# Patient Record
Sex: FEMALE | Race: WHITE | Employment: OTHER | URBAN - METROPOLITAN AREA
[De-identification: names, ages, dates, MRNs, and addresses within clinical notes are randomized per-mention and may not be internally consistent; named-entity substitution may affect disease eponyms.]

---

## 2018-03-27 ENCOUNTER — HOSPITAL ENCOUNTER (INPATIENT)
Age: 83
LOS: 14 days | Discharge: NURSING, REHAB OR LONG-TERM CARE | DRG: 871 | End: 2018-04-12
Attending: EMERGENCY MEDICINE | Admitting: GENERAL ACUTE CARE HOSPITAL
Payer: MEDICARE

## 2018-03-27 ENCOUNTER — APPOINTMENT (OUTPATIENT)
Dept: GENERAL RADIOLOGY | Age: 83
DRG: 871 | End: 2018-03-27
Attending: EMERGENCY MEDICINE
Payer: MEDICARE

## 2018-03-27 ENCOUNTER — APPOINTMENT (OUTPATIENT)
Dept: CT IMAGING | Age: 83
DRG: 871 | End: 2018-03-27
Attending: EMERGENCY MEDICINE
Payer: MEDICARE

## 2018-03-27 DIAGNOSIS — J44.1 COPD EXACERBATION (HCC): Chronic | ICD-10-CM

## 2018-03-27 DIAGNOSIS — N17.9 ACUTE RENAL FAILURE, UNSPECIFIED ACUTE RENAL FAILURE TYPE (HCC): ICD-10-CM

## 2018-03-27 DIAGNOSIS — I48.0 PAF (PAROXYSMAL ATRIAL FIBRILLATION) (HCC): ICD-10-CM

## 2018-03-27 DIAGNOSIS — I10 ESSENTIAL HYPERTENSION: ICD-10-CM

## 2018-03-27 DIAGNOSIS — I73.9 PERIPHERAL VASCULAR DISEASE (HCC): ICD-10-CM

## 2018-03-27 DIAGNOSIS — J96.01 ACUTE HYPOXEMIC RESPIRATORY FAILURE (HCC): ICD-10-CM

## 2018-03-27 DIAGNOSIS — C34.92: ICD-10-CM

## 2018-03-27 DIAGNOSIS — D72.829 LEUKOCYTOSIS, UNSPECIFIED TYPE: ICD-10-CM

## 2018-03-27 DIAGNOSIS — A41.9 SEPSIS, DUE TO UNSPECIFIED ORGANISM: ICD-10-CM

## 2018-03-27 DIAGNOSIS — R11.2 NAUSEA AND VOMITING, INTRACTABILITY OF VOMITING NOT SPECIFIED, UNSPECIFIED VOMITING TYPE: ICD-10-CM

## 2018-03-27 DIAGNOSIS — J96.01 ACUTE RESPIRATORY FAILURE WITH HYPOXIA (HCC): Primary | ICD-10-CM

## 2018-03-27 DIAGNOSIS — J18.9 PNEUMONIA OF BOTH LUNGS DUE TO INFECTIOUS ORGANISM, UNSPECIFIED PART OF LUNG: ICD-10-CM

## 2018-03-27 PROBLEM — J96.91 RESPIRATORY FAILURE WITH HYPOXIA (HCC): Status: ACTIVE | Noted: 2018-03-27

## 2018-03-27 LAB
ALBUMIN SERPL-MCNC: 3.8 G/DL (ref 3.5–5)
ALBUMIN/GLOB SERPL: 1 {RATIO} (ref 1.1–2.2)
ALP SERPL-CCNC: 88 U/L (ref 45–117)
ALT SERPL-CCNC: 14 U/L (ref 12–78)
ANION GAP SERPL CALC-SCNC: 8 MMOL/L (ref 5–15)
APPEARANCE UR: ABNORMAL
AST SERPL-CCNC: 24 U/L (ref 15–37)
BACTERIA URNS QL MICRO: ABNORMAL /HPF
BASOPHILS # BLD: 0 K/UL (ref 0–0.1)
BASOPHILS NFR BLD: 0 % (ref 0–1)
BILIRUB SERPL-MCNC: 0.3 MG/DL (ref 0.2–1)
BILIRUB UR QL CFM: NEGATIVE
BUN SERPL-MCNC: 52 MG/DL (ref 6–20)
BUN/CREAT SERPL: 21 (ref 12–20)
CALCIUM SERPL-MCNC: 9.8 MG/DL (ref 8.5–10.1)
CHLORIDE SERPL-SCNC: 107 MMOL/L (ref 97–108)
CO2 SERPL-SCNC: 23 MMOL/L (ref 21–32)
COLOR UR: ABNORMAL
CREAT SERPL-MCNC: 2.53 MG/DL (ref 0.55–1.02)
DIFFERENTIAL METHOD BLD: ABNORMAL
EOSINOPHIL # BLD: 0 K/UL (ref 0–0.4)
EOSINOPHIL NFR BLD: 0 % (ref 0–7)
EPITH CASTS URNS QL MICRO: ABNORMAL /LPF
ERYTHROCYTE [DISTWIDTH] IN BLOOD BY AUTOMATED COUNT: 14.4 % (ref 11.5–14.5)
GLOBULIN SER CALC-MCNC: 3.7 G/DL (ref 2–4)
GLUCOSE SERPL-MCNC: 115 MG/DL (ref 65–100)
GLUCOSE UR STRIP.AUTO-MCNC: NEGATIVE MG/DL
HCT VFR BLD AUTO: 32.9 % (ref 35–47)
HGB BLD-MCNC: 10.4 G/DL (ref 11.5–16)
HGB UR QL STRIP: ABNORMAL
IMM GRANULOCYTES # BLD: 0 K/UL (ref 0–0.04)
IMM GRANULOCYTES NFR BLD AUTO: 0 % (ref 0–0.5)
INR PPP: 1.1 (ref 0.9–1.1)
KETONES UR QL STRIP.AUTO: NEGATIVE MG/DL
LACTATE SERPL-SCNC: 1.9 MMOL/L (ref 0.4–2)
LEUKOCYTE ESTERASE UR QL STRIP.AUTO: ABNORMAL
LYMPHOCYTES # BLD: 0.1 K/UL (ref 0.8–3.5)
LYMPHOCYTES NFR BLD: 1 % (ref 12–49)
MCH RBC QN AUTO: 29.5 PG (ref 26–34)
MCHC RBC AUTO-ENTMCNC: 31.6 G/DL (ref 30–36.5)
MCV RBC AUTO: 93.5 FL (ref 80–99)
MONOCYTES # BLD: 0.6 K/UL (ref 0–1)
MONOCYTES NFR BLD: 5 % (ref 5–13)
NEUTS SEG # BLD: 11.9 K/UL (ref 1.8–8)
NEUTS SEG NFR BLD: 94 % (ref 32–75)
NITRITE UR QL STRIP.AUTO: NEGATIVE
NRBC # BLD: 0 K/UL (ref 0–0.01)
NRBC BLD-RTO: 0 PER 100 WBC
PH UR STRIP: 5 [PH] (ref 5–8)
PLATELET # BLD AUTO: 221 K/UL (ref 150–400)
PMV BLD AUTO: 10.5 FL (ref 8.9–12.9)
POTASSIUM SERPL-SCNC: 4.5 MMOL/L (ref 3.5–5.1)
PROT SERPL-MCNC: 7.5 G/DL (ref 6.4–8.2)
PROT UR STRIP-MCNC: ABNORMAL MG/DL
PROTHROMBIN TIME: 10.7 SEC (ref 9–11.1)
RBC # BLD AUTO: 3.52 M/UL (ref 3.8–5.2)
RBC #/AREA URNS HPF: ABNORMAL /HPF (ref 0–5)
RBC MORPH BLD: ABNORMAL
SODIUM SERPL-SCNC: 138 MMOL/L (ref 136–145)
SP GR UR REFRACTOMETRY: 1.02 (ref 1–1.03)
TROPONIN I SERPL-MCNC: <0.04 NG/ML
UROBILINOGEN UR QL STRIP.AUTO: 0.2 EU/DL (ref 0.2–1)
WBC # BLD AUTO: 12.6 K/UL (ref 3.6–11)
WBC URNS QL MICRO: ABNORMAL /HPF (ref 0–4)

## 2018-03-27 PROCEDURE — 94761 N-INVAS EAR/PLS OXIMETRY MLT: CPT

## 2018-03-27 PROCEDURE — 74176 CT ABD & PELVIS W/O CONTRAST: CPT

## 2018-03-27 PROCEDURE — 87040 BLOOD CULTURE FOR BACTERIA: CPT | Performed by: EMERGENCY MEDICINE

## 2018-03-27 PROCEDURE — 71045 X-RAY EXAM CHEST 1 VIEW: CPT

## 2018-03-27 PROCEDURE — 96361 HYDRATE IV INFUSION ADD-ON: CPT

## 2018-03-27 PROCEDURE — 84484 ASSAY OF TROPONIN QUANT: CPT | Performed by: EMERGENCY MEDICINE

## 2018-03-27 PROCEDURE — 85025 COMPLETE CBC W/AUTO DIFF WBC: CPT | Performed by: EMERGENCY MEDICINE

## 2018-03-27 PROCEDURE — 77030029684 HC NEB SM VOL KT MONA -A

## 2018-03-27 PROCEDURE — 85610 PROTHROMBIN TIME: CPT | Performed by: EMERGENCY MEDICINE

## 2018-03-27 PROCEDURE — 83605 ASSAY OF LACTIC ACID: CPT | Performed by: EMERGENCY MEDICINE

## 2018-03-27 PROCEDURE — 96374 THER/PROPH/DIAG INJ IV PUSH: CPT

## 2018-03-27 PROCEDURE — 74011000250 HC RX REV CODE- 250: Performed by: EMERGENCY MEDICINE

## 2018-03-27 PROCEDURE — 74011250636 HC RX REV CODE- 250/636: Performed by: GENERAL ACUTE CARE HOSPITAL

## 2018-03-27 PROCEDURE — 74011250636 HC RX REV CODE- 250/636: Performed by: EMERGENCY MEDICINE

## 2018-03-27 PROCEDURE — 94640 AIRWAY INHALATION TREATMENT: CPT

## 2018-03-27 PROCEDURE — 81001 URINALYSIS AUTO W/SCOPE: CPT | Performed by: EMERGENCY MEDICINE

## 2018-03-27 PROCEDURE — 99284 EMERGENCY DEPT VISIT MOD MDM: CPT

## 2018-03-27 PROCEDURE — 74011000258 HC RX REV CODE- 258: Performed by: EMERGENCY MEDICINE

## 2018-03-27 PROCEDURE — 93005 ELECTROCARDIOGRAM TRACING: CPT

## 2018-03-27 PROCEDURE — 36415 COLL VENOUS BLD VENIPUNCTURE: CPT | Performed by: EMERGENCY MEDICINE

## 2018-03-27 PROCEDURE — 80053 COMPREHEN METABOLIC PANEL: CPT | Performed by: EMERGENCY MEDICINE

## 2018-03-27 RX ORDER — ACETAMINOPHEN 325 MG/1
650 TABLET ORAL
Status: COMPLETED | OUTPATIENT
Start: 2018-03-27 | End: 2018-03-29

## 2018-03-27 RX ORDER — LEVOFLOXACIN 5 MG/ML
750 INJECTION, SOLUTION INTRAVENOUS
Status: COMPLETED | OUTPATIENT
Start: 2018-03-27 | End: 2018-03-27

## 2018-03-27 RX ORDER — IPRATROPIUM BROMIDE AND ALBUTEROL SULFATE 2.5; .5 MG/3ML; MG/3ML
3 SOLUTION RESPIRATORY (INHALATION)
Status: COMPLETED | OUTPATIENT
Start: 2018-03-27 | End: 2018-03-27

## 2018-03-27 RX ORDER — SODIUM CHLORIDE 9 MG/ML
75 INJECTION, SOLUTION INTRAVENOUS CONTINUOUS
Status: DISCONTINUED | OUTPATIENT
Start: 2018-03-27 | End: 2018-03-31

## 2018-03-27 RX ORDER — SODIUM CHLORIDE 9 MG/ML
500 INJECTION, SOLUTION INTRAVENOUS ONCE
Status: COMPLETED | OUTPATIENT
Start: 2018-03-27 | End: 2018-03-27

## 2018-03-27 RX ORDER — VANCOMYCIN HYDROCHLORIDE
1250 ONCE
Status: COMPLETED | OUTPATIENT
Start: 2018-03-27 | End: 2018-03-29

## 2018-03-27 RX ORDER — SODIUM CHLORIDE 9 MG/ML
1000 INJECTION, SOLUTION INTRAVENOUS ONCE
Status: COMPLETED | OUTPATIENT
Start: 2018-03-27 | End: 2018-03-27

## 2018-03-27 RX ORDER — VANCOMYCIN/0.9 % SOD CHLORIDE 1.5G/250ML
1500 PLASTIC BAG, INJECTION (ML) INTRAVENOUS
Status: DISCONTINUED | OUTPATIENT
Start: 2018-03-27 | End: 2018-03-27 | Stop reason: SDUPTHER

## 2018-03-27 RX ORDER — SODIUM CHLORIDE 0.9 % (FLUSH) 0.9 %
5-10 SYRINGE (ML) INJECTION AS NEEDED
Status: DISCONTINUED | OUTPATIENT
Start: 2018-03-27 | End: 2018-03-28

## 2018-03-27 RX ADMIN — SODIUM CHLORIDE 500 ML: 900 INJECTION, SOLUTION INTRAVENOUS at 21:16

## 2018-03-27 RX ADMIN — SODIUM CHLORIDE 75 ML/HR: 900 INJECTION, SOLUTION INTRAVENOUS at 22:50

## 2018-03-27 RX ADMIN — IPRATROPIUM BROMIDE AND ALBUTEROL SULFATE 3 ML: .5; 3 SOLUTION RESPIRATORY (INHALATION) at 19:24

## 2018-03-27 RX ADMIN — SODIUM CHLORIDE 1000 ML: 900 INJECTION, SOLUTION INTRAVENOUS at 18:31

## 2018-03-27 RX ADMIN — PIPERACILLIN SODIUM,TAZOBACTAM SODIUM 3.38 G: 3; .375 INJECTION, POWDER, FOR SOLUTION INTRAVENOUS at 23:27

## 2018-03-27 RX ADMIN — LEVOFLOXACIN 750 MG: 5 INJECTION, SOLUTION INTRAVENOUS at 20:47

## 2018-03-27 NOTE — IP AVS SNAPSHOT
[address] [facility] 
[phone] Patient: Inge Garcia MRN: [id number] UNP:[birth year] A check ken indicates which time of day the medication should be taken. My Medications START taking these medications Instructions Each Dose to Equal  
 Morning Noon Evening Bedtime  
 dilTIAZem  mg ER capsule Commonly known as:  CARDIZEM CD Your last dose was: Your next dose is: Take 1 Cap by mouth daily. 180 mg  
    
   
   
   
  
 L. acidoph & paracasei- S therm- Bifido 8 billion cell Cap cap Commonly known as:  HANG-Q/RISAQUAD Your last dose was: Your next dose is: Take 1 Cap by mouth daily. 1 Cap  
    
   
   
   
  
 metoprolol tartrate 50 mg tablet Commonly known as:  LOPRESSOR Your last dose was: Your next dose is: Take 1 Tab by mouth two (2) times a day. 50 mg  
    
   
   
   
  
 nystatin 100,000 unit/mL suspension Commonly known as:  MYCOSTATIN Your last dose was: Your next dose is: Take 5 mL by mouth four (4) times daily. swish and spit [id number] Units  
    
   
   
   
  
 predniSONE 10 mg tablet Commonly known as:  Rubia Cervantes Your last dose was: Your next dose is: Take 1 Tab by mouth daily (with breakfast). 10 mg CHANGE how you take these medications Instructions Each Dose to Equal  
 Morning Noon Evening Bedtime  
 escitalopram oxalate 20 mg tablet Commonly known as:  Luisa Uribe What changed:   
- medication strength 
- how much to take Your last dose was: Your next dose is: Take 1 Tab by mouth daily. 20 mg CONTINUE taking these medications Instructions Each Dose to Equal  
 Morning Noon Evening Bedtime  
 acetaminophen 650 mg Tber Commonly known as:  TYLENOL Your last dose was: Your next dose is: Take 650 mg by mouth every six (6) hours as needed for Pain. 650 mg  
    
   
   
   
  
 aspirin 81 mg tablet Your last dose was: Your next dose is: Take 81 mg by mouth every evening. Stopped for surgery 8-4-10  
 81 mg  
    
   
   
   
  
 hydrALAZINE 100 mg tablet Commonly known as:  APRESOLINE Your last dose was: Your next dose is: Take 1 Tab by mouth three (3) times daily. 100 mg  
    
   
   
   
  
 hydroCHLOROthiazide 25 mg tablet Commonly known as:  HYDRODIURIL Your last dose was: Your next dose is: Take 25 mg by mouth every evening. 25 mg  
    
   
   
   
  
 pantoprazole 40 mg tablet Commonly known as:  PROTONIX Your last dose was: Your next dose is: Take 1 Tab by mouth two (2) times a day. Indications: GASTRIC ULCER  
 40 mg  
    
   
   
   
  
 PLAVIX 75 mg Tab Generic drug:  clopidogrel Your last dose was: Your next dose is: Take 75 mg by mouth daily. Stopped for surgery-8-4-10  
 75 mg  
    
   
   
   
  
 simvastatin 80 mg tablet Commonly known as:  ZOCOR Your last dose was: Your next dose is: Take 80 mg by mouth every evening. 80 mg  
    
   
   
   
  
  
STOP taking these medications   
 cloNIDine HCl 0.3 mg tablet Commonly known as:  CATAPRES  
   
  
 EXFORGE 5-320 mg per tablet Generic drug:  amLODIPine-valsartan  
   
  
 gabapentin 600 mg tablet Commonly known as:  NEURONTIN Where to Get Your Medications These medications were sent to [address] [facility], [address] [place] [address] AT [address], [address] [facility] Hours:  24-hours Phone:  [phone]  
  dilTIAZem  mg ER capsule escitalopram oxalate 20 mg tablet L. acidoph & paracasei- S therm- Bifido 8 billion cell Cap cap  
 metoprolol tartrate 50 mg tablet  
 nystatin 100,000 unit/mL suspension  
 predniSONE 10 mg tablet

## 2018-03-27 NOTE — ED PROVIDER NOTES
EMERGENCY DEPARTMENT HISTORY AND PHYSICAL EXAM      Date: 3/27/2018  Patient Name: Sravan Mason    History of Presenting Illness     Chief Complaint   Patient presents with    Vomiting     Patient with uncontrolled vomiting since yesterday       History Provided By: Patient    HPI: Sravan Mason, 80 y.o. female with PMHx significant for COPD and lung CA, presents via EMS from nursing facility to the ED with cc of vomiting after PO intake since yesterday with associated chills and low garde fever of 100.8 rectally. Pt denies any current pain. Per EMS, pt also complains of some substernal chest pain associated with mild cough. Pt denies being on nasal cannula at baseline however she is sating in the 70s on room air while in the ED. Pt denies any other new symptoms at this time. PCP: Ethan Tamez MD    There are no other complaints, changes, or physical findings at this time. Current Facility-Administered Medications   Medication Dose Route Frequency Provider Last Rate Last Dose    sodium chloride (NS) flush 5-10 mL  5-10 mL IntraVENous PRN Melyssa Henderson DO        piperacillin-tazobactam (ZOSYN) 3.375 g in 0.9% sodium chloride (MBP/ADV) 100 mL ADV  3.375 g IntraVENous NOW Melyssa Henderson DO   3.375 g at 03/27/18 2327    0.9% sodium chloride infusion  75 mL/hr IntraVENous CONTINUOUS Lauren Lovell MD 75 mL/hr at 03/27/18 2250 75 mL/hr at 03/27/18 2250    vancomycin (VANCOCIN) 1250 mg in  ml infusion  1,250 mg IntraVENous ONCE Melyssa Henderson DO        acetaminophen (TYLENOL) tablet 650 mg  650 mg Oral Q6H PRN Carla Gillespie MD   650 mg at 03/28/18 0013     Current Outpatient Prescriptions   Medication Sig Dispense Refill    acetaminophen (TYLENOL) 650 mg CR tablet Take 650 mg by mouth every six (6) hours as needed for Pain.  hydrALAZINE (APRESOLINE) 100 mg tablet Take 1 Tab by mouth three (3) times daily.  90 Tab prn    escitalopram oxalate (LEXAPRO) 10 mg tablet Take 1 Tab by mouth daily. 30 Tab prn    pantoprazole (PROTONIX) 40 mg tablet Take 1 Tab by mouth two (2) times a day. Indications: GASTRIC ULCER 60 Tab 0    cloNIDine HCl (CATAPRES) 0.3 mg tablet Take 0.3 mg by mouth two (2) times a day.  clopidogrel (PLAVIX) 75 mg tablet Take 75 mg by mouth daily. Stopped for surgery-8-4-10       aspirin 81 mg tablet Take 81 mg by mouth every evening. Stopped for surgery 8-4-10       amlodipine-valsartan (EXFORGE) 5-320 mg per tablet Take 1 Tab by mouth every evening.  simvastatin (ZOCOR) 80 mg tablet Take 80 mg by mouth every evening.  gabapentin (NEURONTIN) 600 mg tablet Take 600 mg by mouth three (3) times daily.  hydrochlorothiazide (HYDRODIURIL) 25 mg tablet Take 25 mg by mouth every evening. Past History     Past Medical History:  Past Medical History:   Diagnosis Date    Hypertension     Ill-defined condition     Ex Lap with retickr patch of a perforated pyloric channel ulcer 7/19/16    Other ill-defined conditions(799.89)     lipids    Other ill-defined conditions(799.89)     blood transfusion history       Past Surgical History:  Past Surgical History:   Procedure Laterality Date    BYPASS GRAFT OTHR,FEM-FEM      BYPASS GRAFT OTHR,FEM-POP      right    HX HEENT      bilateral cataracts    HX ORTHOPAEDIC      right hip fracture/pinning    HX UROLOGICAL      right stent/kidney       Family History:  History reviewed. No pertinent family history. Social History:  Social History   Substance Use Topics    Smoking status: Current Every Day Smoker     Packs/day: 0.25    Smokeless tobacco: None      Comment: stopped 2009    Alcohol use No       Allergies: Allergies   Allergen Reactions    Dilaudid [Hydromorphone] Unknown (comments)     Does not remember    Hydrocodone Nausea and Vomiting    Morphine Rash         Review of Systems   Review of Systems   Constitutional: Positive for chills and fever. HENT: Negative for congestion and sore throat. Eyes: Negative for visual disturbance. Respiratory: Negative for cough and shortness of breath. Cardiovascular: Negative for chest pain and leg swelling. Gastrointestinal: Positive for vomiting. Negative for abdominal pain, blood in stool, diarrhea and nausea. Endocrine: Negative for polyuria. Genitourinary: Negative for dysuria, flank pain, vaginal bleeding and vaginal discharge. Musculoskeletal: Negative for myalgias. Skin: Negative for rash. Allergic/Immunologic: Negative for immunocompromised state. Neurological: Negative for weakness and headaches. Psychiatric/Behavioral: Negative for confusion. Physical Exam   Physical Exam   Constitutional: She is oriented to person, place, and time. She appears well-developed and well-nourished. HENT:   Head: Normocephalic and atraumatic. Mouth/Throat: Mucous membranes are dry and cyanotic (perioral). Eyes: Conjunctivae are normal. Pupils are equal, round, and reactive to light. Right eye exhibits no discharge. Left eye exhibits no discharge. Neck: Normal range of motion. Neck supple. No tracheal deviation present. Cardiovascular: Normal rate, regular rhythm and normal heart sounds. No murmur heard. Nail beds dusky   Pulmonary/Chest: She is in respiratory distress. She has no wheezes. She has no rales. Decreased breath sounds BL bases, worse on the left. Patient is hypoxic with perioral cyanosis, but no increased work of breathing. Abdominal: Soft. Bowel sounds are normal. There is no tenderness. There is no rebound and no guarding. Musculoskeletal: Normal range of motion. She exhibits no edema, tenderness or deformity. Neurological: She is alert and oriented to person, place, and time. Skin: Skin is warm and dry. No rash noted. No erythema. Stage 2 decubitus   Psychiatric: Her behavior is normal.   Nursing note and vitals reviewed.         Diagnostic Study Results     Labs -     Recent Results (from the past 12 hour(s))   EKG, 12 LEAD, INITIAL    Collection Time: 03/27/18  5:47 PM   Result Value Ref Range    Ventricular Rate 71 BPM    Atrial Rate 71 BPM    P-R Interval 176 ms    QRS Duration 78 ms    Q-T Interval 378 ms    QTC Calculation (Bezet) 410 ms    Calculated P Axis 73 degrees    Calculated R Axis -20 degrees    Calculated T Axis 54 degrees    Diagnosis       Normal sinus rhythm  Cannot rule out Anterior infarct (cited on or before 27-MAR-2018)  Abnormal ECG  When compared with ECG of 29-AUG-2016 15:31,  Borderline criteria for Inferior infarct are no longer present  QT has shortened     LACTIC ACID    Collection Time: 03/27/18  6:12 PM   Result Value Ref Range    Lactic acid 1.9 0.4 - 2.0 MMOL/L   METABOLIC PANEL, COMPREHENSIVE    Collection Time: 03/27/18  6:12 PM   Result Value Ref Range    Sodium 138 136 - 145 mmol/L    Potassium 4.5 3.5 - 5.1 mmol/L    Chloride 107 97 - 108 mmol/L    CO2 23 21 - 32 mmol/L    Anion gap 8 5 - 15 mmol/L    Glucose 115 (H) 65 - 100 mg/dL    BUN 52 (H) 6 - 20 MG/DL    Creatinine 2.53 (H) 0.55 - 1.02 MG/DL    BUN/Creatinine ratio 21 (H) 12 - 20      GFR est AA 22 (L) >60 ml/min/1.73m2    GFR est non-AA 18 (L) >60 ml/min/1.73m2    Calcium 9.8 8.5 - 10.1 MG/DL    Bilirubin, total 0.3 0.2 - 1.0 MG/DL    ALT (SGPT) 14 12 - 78 U/L    AST (SGOT) 24 15 - 37 U/L    Alk.  phosphatase 88 45 - 117 U/L    Protein, total 7.5 6.4 - 8.2 g/dL    Albumin 3.8 3.5 - 5.0 g/dL    Globulin 3.7 2.0 - 4.0 g/dL    A-G Ratio 1.0 (L) 1.1 - 2.2     CBC WITH AUTOMATED DIFF    Collection Time: 03/27/18  6:12 PM   Result Value Ref Range    WBC 12.6 (H) 3.6 - 11.0 K/uL    RBC 3.52 (L) 3.80 - 5.20 M/uL    HGB 10.4 (L) 11.5 - 16.0 g/dL    HCT 32.9 (L) 35.0 - 47.0 %    MCV 93.5 80.0 - 99.0 FL    MCH 29.5 26.0 - 34.0 PG    MCHC 31.6 30.0 - 36.5 g/dL    RDW 14.4 11.5 - 14.5 %    PLATELET 145 044 - 386 K/uL    MPV 10.5 8.9 - 12.9 FL    NRBC 0.0 0  WBC    ABSOLUTE NRBC 0.00 0.00 - 0.01 K/uL    NEUTROPHILS 94 (H) 32 - 75 %    LYMPHOCYTES 1 (L) 12 - 49 %    MONOCYTES 5 5 - 13 %    EOSINOPHILS 0 0 - 7 %    BASOPHILS 0 0 - 1 %    IMMATURE GRANULOCYTES 0 0.0 - 0.5 %    ABS. NEUTROPHILS 11.9 (H) 1.8 - 8.0 K/UL    ABS. LYMPHOCYTES 0.1 (L) 0.8 - 3.5 K/UL    ABS. MONOCYTES 0.6 0.0 - 1.0 K/UL    ABS. EOSINOPHILS 0.0 0.0 - 0.4 K/UL    ABS. BASOPHILS 0.0 0.0 - 0.1 K/UL    ABS. IMM. GRANS. 0.0 0.00 - 0.04 K/UL    DF SMEAR SCANNED      RBC COMMENTS NORMOCYTIC, NORMOCHROMIC     PROTHROMBIN TIME + INR    Collection Time: 03/27/18  6:12 PM   Result Value Ref Range    INR 1.1 0.9 - 1.1      Prothrombin time 10.7 9.0 - 11.1 sec   TROPONIN I    Collection Time: 03/27/18  6:12 PM   Result Value Ref Range    Troponin-I, Qt. <0.04 <0.05 ng/mL   URINALYSIS W/ RFLX MICROSCOPIC    Collection Time: 03/27/18  9:55 PM   Result Value Ref Range    Color DARK YELLOW      Appearance TURBID (A) CLEAR      Specific gravity 1.023 1.003 - 1.030      pH (UA) 5.0 5.0 - 8.0      Protein TRACE (A) NEG mg/dL    Glucose NEGATIVE  NEG mg/dL    Ketone NEGATIVE  NEG mg/dL    Blood TRACE (A) NEG      Urobilinogen 0.2 0.2 - 1.0 EU/dL    Nitrites NEGATIVE  NEG      Leukocyte Esterase MODERATE (A) NEG      WBC 5-10 0 - 4 /hpf    RBC 5-10 0 - 5 /hpf    Epithelial cells MODERATE (A) FEW /lpf    Bacteria 1+ (A) NEG /hpf   BILIRUBIN, CONFIRM    Collection Time: 03/27/18  9:55 PM   Result Value Ref Range    Bilirubin UA, confirm NEGATIVE  NEG         Radiologic Studies -     CT Results  (Last 48 hours)               03/27/18 1902  CT ABD PELV WO CONT Final result    Impression:  IMPRESSION:   Right middle lobe and bilateral lower lobe infiltrates. Bilateral simple and   hemorrhagic renal cysts. Stable left adrenal adenoma. Right ureteral stent   satisfactory position. No acute abnormality. Narrative:  EXAM:  CT ABD PELV WO CONT       INDICATION: Abdominal pain       COMPARISON: PET CT 10/28/2016       CONTRAST:  None.        TECHNIQUE:    Thin axial images were obtained through the abdomen and pelvis. Coronal and   sagittal reconstructions were generated. Oral contrast was not administered. CT   dose reduction was achieved through use of a standardized protocol tailored for   this examination and automatic exposure control for dose modulation. The absence of intravenous contrast material reduces the sensitivity for   evaluation of the solid parenchymal organs of the abdomen. FINDINGS:    LUNG BASES: Right middle lobe and bilateral lower lobe infiltrates are noted. INCIDENTALLY IMAGED HEART AND MEDIASTINUM: Unremarkable. LIVER: No mass or biliary dilatation. GALLBLADDER: Unremarkable. SPLEEN: No mass. PANCREAS: No mass or ductal dilatation. ADRENALS: 14 mm left adrenal adenoma is noted. KIDNEYS/URETERS: Simple cysts are noted bilaterally. Slight, there are small   hyperdense lesions bilaterally compatible with hemorrhagic cyst. Right ureteral   stent projects in satisfactory position without hydronephrosis. STOMACH: Unremarkable. SMALL BOWEL: No dilatation or wall thickening. COLON: Moderate fecal stasis is noted. APPENDIX: Unremarkable. PERITONEUM: No ascites or pneumoperitoneum. RETROPERITONEUM: Aortofemoral bypass graft is noted. REPRODUCTIVE ORGANS: There is no pelvic mass or lymphadenopathy. URINARY BLADDER: Ureteral stent is in satisfactory position. BONES: The patient is status post ORIF of the right femur. Degenerative changes   are seen in the lumbar spine. ADDITIONAL COMMENTS: N/A               CXR Results  (Last 48 hours)               03/27/18 1853  XR CHEST PORT Final result    Impression:  Impression: Left perihilar and right lower lobe infiltrates. Decrease in the   size of the left lower lobe lesion. Narrative: Indication: Sepsis, vomiting       Comparison: 10/13/2016       Portable exam of the chest obtained at 1850 demonstrates normal heart size. Left   perihilar and right lower lobe infiltrates are noted. The lesion in the left   lower lobe has decreased in size compared to the prior exam. The osseous   structures are unremarkable. Medical Decision Making   I am the first provider for this patient. I reviewed the vital signs, available nursing notes, past medical history, past surgical history, family history and social history. Vital Signs-Reviewed the patient's vital signs. Patient Vitals for the past 12 hrs:   Temp Pulse Resp BP SpO2   03/27/18 2330 (!) 102.3 °F (39.1 °C) 82 18 - 93 %   03/27/18 2315 - 72 20 (!) 111/35 97 %   03/27/18 2115 100 °F (37.8 °C) 69 17 (!) 97/32 95 %   03/27/18 1745 (!) 100.9 °F (38.3 °C) 74 19 (!) 141/35 (!) 86 %   03/27/18 1739 - 75 17 (!) 137/32 (!) 71 %       Pulse Oximetry Analysis - low 70s% on room air, increased to 85-89% on nasal cannula    EKG interpretation: (Preliminary) 1747  Rhythm: normal sinus rhythm; and regular . Rate (approx.): 71 bpm; QRS interval: 78 ms; QTc: 410 ms; No acute ischemic changes  Written by Kavon Sanderson ED Scribe, as dictated by Fina Scott DO. Records Reviewed: Nursing Notes and Old Medical Records    Provider Notes (Medical Decision Making):   Pt appears to have acute infectious process; likely PNA. Will work up with blood work, urinalysis, and CXR. Ptis hypoxic on room air, therefore will provide supplemental oxygen with SpO2 goal of 92%. Pt will need to be hospitalized. Pt is DNR per paperwork. ED Course:   5:37 PM  Initial assessment performed. The patients presenting problems have been discussed, and they are in agreement with the care plan formulated and outlined with them. I have encouraged them to ask questions as they arise throughout their visit. CONSULT NOTE:  7:53 PM  Fina Scott DO spoke with Dr. Ezra Hicks,  Specialty: Hospitalist  Discussed patient's hx, disposition, and available diagnostic and imaging results. Reviewed care plans. Consultant agrees with plans as outlined. Will admit.     CRITICAL CARE NOTE :  IMPENDING DETERIORATION -Respiratory and Metabolic  ASSOCIATED RISK FACTORS - Hypoxia and Metabolic changes  MANAGEMENT- Bedside Assessment and Supervision of Care  INTERPRETATION -  Xrays, ECG and Blood Pressure  INTERVENTIONS - Metobolic interventions and oxygen administration   CASE REVIEW - Hospitalist, Nursing and Family  TREATMENT RESPONSE -Improved  PERFORMED BY - Self  NOTES   :  I have spent 57 minutes of critical care time involved in lab review, consultations with specialist, family decision- making, bedside attention and documentation. During this entire length of time I was immediately available to the patient . Disposition:  PLAN:  1. Admit to Hospitalist    ADMIT NOTE:  7:54 PM  Patient is being admitted to the hospital by Dr. Mackenzie Riley. The results of their tests and reasons for their admission have been discussed with them and/or available family. They convey agreement and understanding for the need to be admitted and for their admission diagnosis. Consultation has been made with the inpatient physician specialist for hospitalization. Diagnosis     Clinical Impression:   1. Acute respiratory failure with hypoxia ([address]    2. Pneumonia of both lungs due to infectious organism, unspecified part of lung    3. Acute renal failure, unspecified acute renal failure type [facility])        Attestations:    ATTESTATION:  This note is prepared by Denia Devries, acting as Scribe for [employer], DO. [employer], DO: The scribe's documentation has been prepared under my direction and personally reviewed by me in its entirety. I confirm that the note above accurately reflects all work, treatment, procedures, and medical decision making performed by me.

## 2018-03-27 NOTE — IP AVS SNAPSHOT
[address] [facility] 
[phone] Patient: Jian Owens MRN: [id number] VGD:[id number] About your hospitalization You were admitted on:  March 29, 2018 You last received care in the:  [facility] 2 PROGRESSIVE CARE You were discharged on:  April 12, 2018 Why you were hospitalized Your primary diagnosis was:  Acute Hypoxemic Respiratory Failure (Hcc) Your diagnoses also included:  Sepsis (Hcc), Copd Exacerbation (Hcc), Pneumonia, Acute Renal Failure (Arf) (Hcc), Hypertension, Primary Lung Large Cell Carcinoma, Left (Hcc), Peripheral Vascular Disease (Hcc), Paf (Paroxysmal Atrial Fibrillation) (Hcc), Moderate Protein-Calorie Malnutrition (Hcc), Decubitus Ulcer Of Sacral Region, Stage 2, Leukocytosis Follow-up Information Follow up With Details Comments Contact Info [address] Ryley Toure [facility]   [address] [place] [address] 
[phone] Ramona Driscoll MD   [address] [facility] 
[phone] Ramona Driscoll MD  After D/C from [address] [place] [address]  [facility] 
[phone] Discharge Orders None A check ken indicates which time of day the medication should be taken. My Medications START taking these medications Instructions Each Dose to Equal  
 Morning Noon Evening Bedtime  
 dilTIAZem  mg ER capsule Commonly known as:  CARDIZEM CD Your last dose was: Your next dose is: Take 1 Cap by mouth daily. 180 mg  
    
   
   
   
  
 L. acidoph & paracasei- S therm- Bifido 8 billion cell Cap cap Commonly known as:  HANG-Q/RISAQUAD Your last dose was: Your next dose is: Take 1 Cap by mouth daily. 1 Cap  
    
   
   
   
  
 metoprolol tartrate 50 mg tablet Commonly known as:  LOPRESSOR Your last dose was: Your next dose is: Take 1 Tab by mouth two (2) times a day. 50 mg  
    
   
   
   
  
 nystatin 100,000 unit/mL suspension Commonly known as:  MYCOSTATIN Your last dose was: Your next dose is: Take 5 mL by mouth four (4) times daily. swish and spit [id number] Units  
    
   
   
   
  
 predniSONE 10 mg tablet Commonly known as:  Nicolette Zavala Your last dose was: Your next dose is: Take 1 Tab by mouth daily (with breakfast). 10 mg CHANGE how you take these medications Instructions Each Dose to Equal  
 Morning Noon Evening Bedtime  
 escitalopram oxalate 20 mg tablet Commonly known as:  Nohelia Camacho What changed:   
- medication strength 
- how much to take Your last dose was: Your next dose is: Take 1 Tab by mouth daily. 20 mg CONTINUE taking these medications Instructions Each Dose to Equal  
 Morning Noon Evening Bedtime  
 acetaminophen 650 mg Tber Commonly known as:  TYLENOL Your last dose was: Your next dose is: Take 650 mg by mouth every six (6) hours as needed for Pain. 650 mg  
    
   
   
   
  
 aspirin 81 mg tablet Your last dose was: Your next dose is: Take 81 mg by mouth every evening. Stopped for surgery 8-4-10  
 81 mg  
    
   
   
   
  
 hydrALAZINE 100 mg tablet Commonly known as:  APRESOLINE Your last dose was: Your next dose is: Take 1 Tab by mouth three (3) times daily. 100 mg  
    
   
   
   
  
 hydroCHLOROthiazide 25 mg tablet Commonly known as:  HYDRODIURIL Your last dose was: Your next dose is: Take 25 mg by mouth every evening. 25 mg  
    
   
   
   
  
 pantoprazole 40 mg tablet Commonly known as:  PROTONIX Your last dose was: Your next dose is: Take 1 Tab by mouth two (2) times a day. Indications: GASTRIC ULCER  
 40 mg  
    
   
   
   
  
 PLAVIX 75 mg Tab Generic drug:  clopidogrel Your last dose was: Your next dose is: Take 75 mg by mouth daily. Stopped for surgery-8-4-10  
 75 mg  
    
   
   
   
  
 simvastatin 80 mg tablet Commonly known as:  ZOCOR Your last dose was: Your next dose is: Take 80 mg by mouth every evening. 80 mg  
    
   
   
   
  
  
STOP taking these medications   
 cloNIDine HCl 0.3 mg tablet Commonly known as:  CATAPRES  
   
  
 EXFORGE 5-320 mg per tablet Generic drug:  amLODIPine-valsartan  
   
  
 gabapentin 600 mg tablet Commonly known as:  NEURONTIN Where to Get Your Medications These medications were sent to [address] [facility] [facility] AT [address], [address] [place] [address] Hours:  24-hours Phone:  [phone]  
  dilTIAZem  mg ER capsule  
 escitalopram oxalate 20 mg tablet L. acidoph & paracasei- S therm- Bifido 8 billion cell Cap cap  
 metoprolol tartrate 50 mg tablet  
 nystatin 100,000 unit/mL suspension  
 predniSONE 10 mg tablet Discharge Instructions [address] [address] 
[phone] Patient Discharge Instructions Jian Owens / [id number] : 1935 Admitted 3/27/2018 Discharged: 18 Principal Problem: 
  Acute hypoxemic respiratory failure ([facility] [address] (3/27/2018) Active Problems: COPD exacerbation ([facility] [address] (2010) Hypertension (2016) Peripheral vascular disease ([facility] [address] (2016) Sepsis ([facility] [facility] (3/28/2018) Pneumonia (3/28/2018) Acute renal failure (ARF) ([facility] [address] (3/28/2018) Primary lung large cell carcinoma, left ([facility] [address] [facility] (3/29/2018) Overview: Being candidate for surgical resection so treated with radiation therapy  
    in 2016 PAF (paroxysmal atrial fibrillation) ([facility] [address] (3/30/2018) Moderate protein-calorie malnutrition ([facility] [address] (3/30/2018) Decubitus ulcer of sacral region, stage 2 (3/30/2018) Leukocytosis (4/8/2018) Allergies Allergen Reactions  Dilaudid [Hydromorphone] Unknown (comments) Does not remember  Hydrocodone Nausea and Vomiting  Morphine Rash · It is important that you take the medication exactly as they are prescribed. · Do not take other medications without consulting your doctor. What to do at Next Level of Care Disposition:  [facility] Recommended diet: Hi Protein, Hi calorie Recommended activity: Up with PT Wound Care: To Perirectal area, Miconazole cream 
 
 
 
 
Information obtained by : 
I understand that if any problems occur once I am at home I am to contact my physician. I understand and acknowledge receipt of the instructions indicated above. Physician's or R.N.'s Signature                                                                  Date/Time Patient or Representative Signature                                                          Date/Time Introducing [facility]! [employer] introduces Online-OR patient portal. Now you can access parts of your medical record, email your doctor's office, and request medication refills online. 1. In your internet browser, go to [email or web address] 2. Click on the First Time User? Click Here link in the Sign In box. You will see the New Member Sign Up page. 3. Enter your Triangulate Access Code exactly as it appears below. You will not need to use this code after youve completed the sign-up process. If you do not sign up before the expiration date, you must request a new code. · Triangulate Access Code: [email or web address] Expires: 6/28/2018  6:00 AM 
 
4. Enter the last four digits of your Social Security Number (xxxx) and Date of Birth (mm/dd/yyyy) as indicated and click Submit. You will be taken to the next sign-up page. 5. Create a WholeWorldBandt ID. This will be your Triangulate login ID and cannot be changed, so think of one that is secure and easy to remember. 6. Create a Triangulate password. You can change your password at any time. 7. Enter your Password Reset Question and Answer. This can be used at a later time if you forget your password. 8. Enter your e-mail address. You will receive e-mail notification when new information is available in [address] 9. Click Sign Up. You can now view and download portions of your medical record. 10. Click the Download Summary menu link to download a portable copy of your medical information. If you have questions, please visit the Frequently Asked Questions section of the Triangulate website. Remember, Triangulate is NOT to be used for urgent needs. For medical emergencies, dial 911. Now available from your iPhone and Android! Introducing Vin Kirk As a [facility] patient, I wanted to make you aware of our electronic visit tool called Vin Kirk. [facility] 24/7 allows you to connect within minutes with a medical provider 24 hours a day, seven days a week via a mobile device or tablet or logging into a secure website from your computer. You can access Vin Kirk from anywhere in the [place].  
 
A virtual visit might be right for you when you have a simple condition and feel like you just dont want to get out of bed, or cant get away from work for an appointment, when your regular [facility] provider is not available (evenings, weekends or holidays), or when youre out of town and need minor care. Electronic visits cost only $49 and if the [facility] 24/7 provider determines a prescription is needed to treat your condition, one can be electronically transmitted to a nearby pharmacy*. Please take a moment to enroll today if you have not already done so. The enrollment process is free and takes just a few minutes. To enroll, please download the Gemvara/Traction pedro to your tablet or phone, or visit [email or web address] to enroll on your computer. And, as an [address] [place] [address] patient with a [employer] account, the results of your visits will be scanned into your electronic medical record and your primary care provider will be able to view the scanned results. We urge you to continue to see your regular [facility] provider for your ongoing medical care. And while your primary care provider may not be the one available when you seek a KidoZen virtual visit, the peace of mind you get from getting a real diagnosis real time can be priceless. For more information on KidoZen, view our Frequently Asked Questions (FAQs) at [email or web address]. Sincerely, 
 
Sonja Nunez MD 
Chief Medical Officer Solo Gauthier *:  certain medications cannot be prescribed via Kido[employer]en Providers Seen During Your Hospitalization Provider Specialty Primary office phone Dg Aviles DO Emergency Medicine [phone] Gabino Daniels MD Internal Medicine [phone] Your Primary Care Physician (PCP) Primary Care Physician Office Phone Office Fax Joni Roach [phone] You are allergic to the following Allergen Reactions Dilaudid (Hydromorphone) Unknown (comments) Does not remember Hydrocodone Nausea and Vomiting Morphine Rash Recent Documentation Height Weight Breastfeeding? BMI OB Status Smoking Status 1.562 m 42.1 kg No 17.27 kg/m2 Menopause Current Every Day Smoker Emergency Contacts Name Discharge Info Relation Home Work Mobile Jessica Lockwood CAREGIVER [3] Child [2] [phone] Caleb Salas DISCHARGE CAREGIVER [3] Child [2] [phone] Patient Belongings The following personal items are in your possession at time of discharge: 
  Dental Appliances: Lowers, Uppers  Visual Aid: Glasses, At bedside      Home Medications: None   Jewelry: Bracelet, Ring  Clothing: At bedside    Other Valuables: None  Personal Items Sent to Safe: none Please provide this summary of care documentation to your next provider. Signatures-by signing, you are acknowledging that this After Visit Summary has been reviewed with you and you have received a copy. Patient Signature:  ____________________________________________________________ Date:  ____________________________________________________________  
  
Meera Zarate Provider Signature:  ____________________________________________________________ Date:  ____________________________________________________________

## 2018-03-27 NOTE — ED TRIAGE NOTES
Pt. Presents to ED today for complaints of vomiting for \"a few days. \"  Upon arrival patient sats 66% on room air. Patient placed on 5L nasal cannula by Dr. Jossy Mayorga. Sats up to 92%. Per Dr. Vizcaino Shark to be maintained above 88%. Pt. Alert and oriented x4. Pt. Placed in position of comfort with call bell in reach.

## 2018-03-27 NOTE — IP AVS SNAPSHOT
Summary of Care Report The Summary of Care report has been created to help improve care coordination. Users with access to Present or [facility] (Web-based application) may access additional patient information including the Discharge Summary. If you are not currently a [address] user and need more information, please call the number listed below in the [address] section and ask to be connected with Medical Records. Facility Information Name Address Phone [address] [address] [phone] Patient Information Patient Name Sex  Tiffany Toure ([id number]) Female 1935 Discharge Information Admitting Provider Service Area Unit Melody Talley MD / [facility] / [phone] Discharge Provider Discharge Date/Time Discharge Disposition Destination (none) 2018 Midday (Pending) SNF (none) Patient Language Language ENGLISH [13] Hospital Problems as of 2018  Reviewed: 3/27/2018  8:48 PM by Melody Talley MD  
  
  
  
 Class Noted - Resolved Last Modified POA Active Problems COPD exacerbation ([facility] [address] (Chronic)  2010 - Present 3/28/2018 by Clifton Krabbe, MD Yes Entered by Ethan Redding Hypertension  2016 - Present 3/28/2018 by Clifton Krabbe, MD Yes Entered by Clifton Krabbe, MD  
  Peripheral vascular disease [facility])  2016 - Present 3/29/2018 by Clifton Krabbe, MD Yes Entered by Clifton Krabbe, MD  
  * (Principal)Acute hypoxemic respiratory failure [facility])  3/27/2018 - Present 3/28/2018 by Clifton Krabbe, MD Unknown Entered by Melody Talley MD  
  Sepsis [facility])  3/28/2018 - Present 3/28/2018 by Clifton Krabbe, MD Unknown   Entered by Clifton Krabbe, MD  
 Pneumonia  3/28/2018 - Present 3/28/2018 by Ramona Driscoll MD Unknown Entered by Ramona Driscoll MD  
  Acute renal failure (ARF) ([facility] [address]  3/28/2018 - Present 3/28/2018 by Ramona Driscoll MD Unknown Entered by Ramona Driscoll MD  
  Primary lung large cell carcinoma, left ([facility] [address]  3/29/2018 - Present 3/29/2018 by Ramona Driscoll MD Unknown Entered by Ramona Driscoll MD  
  Overview Signed 3/29/2018  9:22 AM by Ramona Driscoll MD  
   Being candidate for surgical resection so treated with radiation therapy in 2016 PAF (paroxysmal atrial fibrillation) ([facility] [address]  3/30/2018 - Present 3/30/2018 by Ramona Driscoll MD Unknown Entered by Ramona Driscoll MD  
  Moderate protein-calorie malnutrition ([facility] [address]  3/30/2018 - Present 3/30/2018 by Ramona Driscoll MD Unknown Entered by Ramona Driscoll MD  
  Decubitus ulcer of sacral region, stage 2  3/30/2018 - Present 3/30/2018 by Ramona Driscoll MD Unknown Entered by Ramona Driscoll MD  
  Leukocytosis  4/8/2018 - Present 4/8/2018 by Bladimir Osei MD No  
  Entered by Bladimir Osei MD  
  
Non-Hospital Problems as of 4/12/2018  Reviewed: 3/27/2018  8:48 PM by Elizabeth Quiroga MD  
  
  
  
 Class Noted - Resolved Last Modified Active Problems Acute prepyloric ulcer  7/19/2016 - Present 7/20/2016 by Ramona Driscoll MD  
  Entered by Seth Hart MD  
  Hyperlipidemia  7/20/2016 - Present 9/1/2016 by Ramona Driscoll MD  
  Entered by Ramona Driscoll MD  
  Nausea & vomiting  8/30/2016 - Present 8/30/2016 by Ramona Driscoll MD  
  Entered by Ramona Driscoll MD  
  Gastritis  8/31/2016 - Present 8/31/2016 by Ramona Driscoll MD  
  Entered by Ramona Driscoll MD  
  Depressed  9/1/2016 - Present 9/1/2016 by Ramona Driscoll MD  
  Entered by Ramona Driscoll MD  
  
You are allergic to the following Allergen Reactions Dilaudid (Hydromorphone) Unknown (comments) Does not remember Hydrocodone Nausea and Vomiting Morphine Rash Current Discharge Medication List  
  
START taking these medications Dose & Instructions Dispensing Information Comments  
 dilTIAZem  mg ER capsule Commonly known as:  CARDIZEM CD Dose:  180 mg Take 1 Cap by mouth daily. Quantity:  30 Cap Refills:  PRN  
   
 L. acidoph & paracasei- S therm- Bifido 8 billion cell Cap cap Commonly known as:  HANG-Q/RISAQUAD Dose:  1 Cap Take 1 Cap by mouth daily. Quantity:  30 Cap Refills:  0  
   
 metoprolol tartrate 50 mg tablet Commonly known as:  LOPRESSOR Dose:  50 mg Take 1 Tab by mouth two (2) times a day. Quantity:  60 Tab Refills:  PRN  
   
 nystatin 100,000 unit/mL suspension Commonly known as:  MYCOSTATIN Dose:  390294 Units Take 5 mL by mouth four (4) times daily. swish and spit Quantity:  200 mL Refills:  0  
   
 predniSONE 10 mg tablet Commonly known as:  Lupillo Anthony Dose:  10 mg Take 1 Tab by mouth daily (with breakfast). Quantity:  30 Tab Refills:  0 CONTINUE these medications which have CHANGED Dose & Instructions Dispensing Information Comments  
 escitalopram oxalate 20 mg tablet Commonly known as:  Celesta Prost What changed:   
- medication strength 
- how much to take Dose:  20 mg Take 1 Tab by mouth daily. Quantity:  30 Tab Refills:  PRN  
   
  
CONTINUE these medications which have NOT CHANGED Dose & Instructions Dispensing Information Comments  
 acetaminophen 650 mg Tber Commonly known as:  TYLENOL Dose:  650 mg Take 650 mg by mouth every six (6) hours as needed for Pain. Refills:  0  
   
 aspirin 81 mg tablet Dose:  81 mg Take 81 mg by mouth every evening. Stopped for surgery 8-4-10 Refills:  0  
   
 hydrALAZINE 100 mg tablet Commonly known as:  APRESOLINE Dose:  100 mg Take 1 Tab by mouth three (3) times daily. Quantity:  90 Tab Refills:  prn  
   
 hydroCHLOROthiazide 25 mg tablet Commonly known as:  HYDRODIURIL Dose:  25 mg Take 25 mg by mouth every evening. Refills:  0  
   
 pantoprazole 40 mg tablet Commonly known as:  PROTONIX Dose:  40 mg Take 1 Tab by mouth two (2) times a day. Indications: GASTRIC ULCER Quantity:  60 Tab Refills:  0 PLAVIX 75 mg Tab Generic drug:  clopidogrel Dose:  75 mg Take 75 mg by mouth daily. Stopped for surgery-8-4-10 Refills:  0  
   
 simvastatin 80 mg tablet Commonly known as:  ZOCOR Dose:  80 mg Take 80 mg by mouth every evening. Refills:  0 STOP taking these medications Comments  
 cloNIDine HCl 0.3 mg tablet Commonly known as:  CATAPRES  
   
   
 EXFORGE 5-320 mg per tablet Generic drug:  amLODIPine-valsartan  
   
   
 gabapentin 600 mg tablet Commonly known as:  NEURONTIN Follow-up Information Follow up With Details Comments Contact Info [id number] [address] 
[phone] Geetha Mullen MD   [address] [facility] 
[phone] Geetha Mullen MD  After D/C from [address] [place] [address] [address] [place] 
[phone] Discharge Instructions [address] [address] 
[phone] Patient Discharge Instructions [address] / [id number] : 1935 Admitted 3/27/2018 Discharged: 18 Principal Problem: 
  Acute hypoxemic respiratory failure ([facility] [address] (3/27/2018) Active Problems: COPD exacerbation ([address] (2010) Hypertension (2016) Peripheral vascular disease ([address] (2016) Sepsis ([address] (3/28/2018) Pneumonia (3/28/2018) Acute renal failure (ARF) ([address] (3/28/2018) Primary lung large cell carcinoma, left ([address] (3/29/2018) Overview: Being candidate for surgical resection so treated with radiation therapy  
    in 2016 PAF (paroxysmal atrial fibrillation) ([facility] [address] (3/30/2018) Moderate protein-calorie malnutrition ([facility] [address] (3/30/2018) Decubitus ulcer of sacral region, stage 2 (3/30/2018) Leukocytosis (4/8/2018) Allergies Allergen Reactions  Dilaudid [Hydromorphone] Unknown (comments) Does not remember  Hydrocodone Nausea and Vomiting  Morphine Rash · It is important that you take the medication exactly as they are prescribed. · Do not take other medications without consulting your doctor. What to do at Next Level of Care Disposition:  [facility] Recommended diet: Hi Protein, Hi calorie Recommended activity: Up with PT Wound Care: To Perirectal area, Miconazole cream 
 
 
 
 
Information obtained by : 
I understand that if any problems occur once I am at home I am to contact my physician. I understand and acknowledge receipt of the instructions indicated above. Physician's or R.N.'s Signature                                                                  Date/Time Patient or Representative Signature                                                          Date/Time Chart Review Routing History Recipient Method Report Sent By Demario Poster Bev Ramos MD [place] [facility] 2/2/2012  9:18 PM 02/02/2012 Yong Hinton MD  
Phone: [phone] In Basket IP Auto Routed Notes Ang Arce MD [[id number]] 8/29/2016 10:37 PM 08/29/2016 Hayden Arora MD  
Fax: [phone] Phone: [phone] Fax Shaun Mclaughlin MD NOTES AUTO ROUTING REPORT Bea Loza MD [[id number]] 9/14/2016  5:52 PM 09/14/2016 Amina Costa MD  
Fax: [phone] Phone: [phone] Fax Shaun Mclaughlin MD NOTES AUTO ROUTING REPORT Bea Loza MD [[id number]] 9/14/2016  5:52 PM 09/14/2016 Ubaldo Schmidt MD  
Fax: [phone] Phone: [phone] Fax Shaun Mclaughlin MD NOTES AUTO ROUTING REPORT Bea Loza MD [[id number]] 9/14/2016  5:52 PM 09/14/2016 Bea Loza MD  
Phone: [phone] In Basket IP Auto Routed Notes Margo Lares MD [[id number]] 10/13/2016  2:52 PM 10/13/2016 Bea Loza MD  
Phone: [phone] In Basket IP Auto Routed Notes Honey Oh MD [[id number]] 3/27/2018  9:09 PM 03/27/2018

## 2018-03-28 ENCOUNTER — APPOINTMENT (OUTPATIENT)
Dept: GENERAL RADIOLOGY | Age: 83
DRG: 871 | End: 2018-03-28
Attending: INTERNAL MEDICINE
Payer: MEDICARE

## 2018-03-28 PROBLEM — J18.9 PNEUMONIA: Status: ACTIVE | Noted: 2018-03-28

## 2018-03-28 PROBLEM — A41.9 SEPSIS (HCC): Status: ACTIVE | Noted: 2018-03-28

## 2018-03-28 PROBLEM — J96.01 ACUTE HYPOXEMIC RESPIRATORY FAILURE (HCC): Status: ACTIVE | Noted: 2018-03-27

## 2018-03-28 PROBLEM — N17.9 ACUTE RENAL FAILURE (ARF) (HCC): Status: ACTIVE | Noted: 2018-03-28

## 2018-03-28 LAB
ALBUMIN SERPL-MCNC: 3.1 G/DL (ref 3.5–5)
ALBUMIN/GLOB SERPL: 0.9 {RATIO} (ref 1.1–2.2)
ALP SERPL-CCNC: 69 U/L (ref 45–117)
ALT SERPL-CCNC: 12 U/L (ref 12–78)
ANION GAP SERPL CALC-SCNC: 7 MMOL/L (ref 5–15)
AST SERPL-CCNC: 21 U/L (ref 15–37)
ATRIAL RATE: 71 BPM
BASOPHILS # BLD: 0 K/UL (ref 0–0.1)
BASOPHILS NFR BLD: 0 % (ref 0–1)
BILIRUB SERPL-MCNC: 0.4 MG/DL (ref 0.2–1)
BUN SERPL-MCNC: 48 MG/DL (ref 6–20)
BUN/CREAT SERPL: 22 (ref 12–20)
CALCIUM SERPL-MCNC: 8.9 MG/DL (ref 8.5–10.1)
CALCULATED P AXIS, ECG09: 73 DEGREES
CALCULATED R AXIS, ECG10: -20 DEGREES
CALCULATED T AXIS, ECG11: 54 DEGREES
CHLORIDE SERPL-SCNC: 111 MMOL/L (ref 97–108)
CO2 SERPL-SCNC: 22 MMOL/L (ref 21–32)
CREAT SERPL-MCNC: 2.17 MG/DL (ref 0.55–1.02)
DIAGNOSIS, 93000: NORMAL
DIFFERENTIAL METHOD BLD: ABNORMAL
EOSINOPHIL # BLD: 0 K/UL (ref 0–0.4)
EOSINOPHIL NFR BLD: 0 % (ref 0–7)
ERYTHROCYTE [DISTWIDTH] IN BLOOD BY AUTOMATED COUNT: 14.5 % (ref 11.5–14.5)
GLOBULIN SER CALC-MCNC: 3.5 G/DL (ref 2–4)
GLUCOSE SERPL-MCNC: 111 MG/DL (ref 65–100)
HCT VFR BLD AUTO: 28.1 % (ref 35–47)
HGB BLD-MCNC: 9 G/DL (ref 11.5–16)
IMM GRANULOCYTES # BLD: 0 K/UL (ref 0–0.04)
IMM GRANULOCYTES NFR BLD AUTO: 0 % (ref 0–0.5)
LYMPHOCYTES # BLD: 0.5 K/UL (ref 0.8–3.5)
LYMPHOCYTES NFR BLD: 3 % (ref 12–49)
MAGNESIUM SERPL-MCNC: 1.8 MG/DL (ref 1.6–2.4)
MCH RBC QN AUTO: 30 PG (ref 26–34)
MCHC RBC AUTO-ENTMCNC: 32 G/DL (ref 30–36.5)
MCV RBC AUTO: 93.7 FL (ref 80–99)
MONOCYTES # BLD: 0.5 K/UL (ref 0–1)
MONOCYTES NFR BLD: 3 % (ref 5–13)
NEUTS BAND NFR BLD MANUAL: 3 %
NEUTS SEG # BLD: 15.7 K/UL (ref 1.8–8)
NEUTS SEG NFR BLD: 91 % (ref 32–75)
NRBC # BLD: 0 K/UL (ref 0–0.01)
NRBC BLD-RTO: 0 PER 100 WBC
P-R INTERVAL, ECG05: 176 MS
PHOSPHATE SERPL-MCNC: 3.5 MG/DL (ref 2.6–4.7)
PLATELET # BLD AUTO: 177 K/UL (ref 150–400)
PMV BLD AUTO: 10.4 FL (ref 8.9–12.9)
POTASSIUM SERPL-SCNC: 4.5 MMOL/L (ref 3.5–5.1)
PROT SERPL-MCNC: 6.6 G/DL (ref 6.4–8.2)
Q-T INTERVAL, ECG07: 378 MS
QRS DURATION, ECG06: 78 MS
QTC CALCULATION (BEZET), ECG08: 410 MS
RBC # BLD AUTO: 3 M/UL (ref 3.8–5.2)
RBC MORPH BLD: ABNORMAL
SODIUM SERPL-SCNC: 140 MMOL/L (ref 136–145)
VENTRICULAR RATE, ECG03: 71 BPM
WBC # BLD AUTO: 16.7 K/UL (ref 3.6–11)

## 2018-03-28 PROCEDURE — 51702 INSERT TEMP BLADDER CATH: CPT

## 2018-03-28 PROCEDURE — 85025 COMPLETE CBC W/AUTO DIFF WBC: CPT | Performed by: GENERAL ACUTE CARE HOSPITAL

## 2018-03-28 PROCEDURE — G8978 MOBILITY CURRENT STATUS: HCPCS

## 2018-03-28 PROCEDURE — 74011000250 HC RX REV CODE- 250: Performed by: INTERNAL MEDICINE

## 2018-03-28 PROCEDURE — 84100 ASSAY OF PHOSPHORUS: CPT | Performed by: GENERAL ACUTE CARE HOSPITAL

## 2018-03-28 PROCEDURE — 97161 PT EVAL LOW COMPLEX 20 MIN: CPT

## 2018-03-28 PROCEDURE — 0T9B70Z DRAINAGE OF BLADDER WITH DRAINAGE DEVICE, VIA NATURAL OR ARTIFICIAL OPENING: ICD-10-PCS | Performed by: INTERNAL MEDICINE

## 2018-03-28 PROCEDURE — 74011000258 HC RX REV CODE- 258: Performed by: INTERNAL MEDICINE

## 2018-03-28 PROCEDURE — 36415 COLL VENOUS BLD VENIPUNCTURE: CPT | Performed by: GENERAL ACUTE CARE HOSPITAL

## 2018-03-28 PROCEDURE — 77010033678 HC OXYGEN DAILY

## 2018-03-28 PROCEDURE — 74011250636 HC RX REV CODE- 250/636: Performed by: GENERAL ACUTE CARE HOSPITAL

## 2018-03-28 PROCEDURE — 83735 ASSAY OF MAGNESIUM: CPT | Performed by: GENERAL ACUTE CARE HOSPITAL

## 2018-03-28 PROCEDURE — 51798 US URINE CAPACITY MEASURE: CPT

## 2018-03-28 PROCEDURE — 77030038269 HC DRN EXT URIN PURWCK BARD -A

## 2018-03-28 PROCEDURE — 74011250637 HC RX REV CODE- 250/637: Performed by: GENERAL ACUTE CARE HOSPITAL

## 2018-03-28 PROCEDURE — 74011250637 HC RX REV CODE- 250/637: Performed by: INTERNAL MEDICINE

## 2018-03-28 PROCEDURE — 97530 THERAPEUTIC ACTIVITIES: CPT

## 2018-03-28 PROCEDURE — 80053 COMPREHEN METABOLIC PANEL: CPT | Performed by: GENERAL ACUTE CARE HOSPITAL

## 2018-03-28 PROCEDURE — 74011250636 HC RX REV CODE- 250/636: Performed by: EMERGENCY MEDICINE

## 2018-03-28 PROCEDURE — 74011250636 HC RX REV CODE- 250/636: Performed by: INTERNAL MEDICINE

## 2018-03-28 PROCEDURE — 71045 X-RAY EXAM CHEST 1 VIEW: CPT

## 2018-03-28 PROCEDURE — G8979 MOBILITY GOAL STATUS: HCPCS

## 2018-03-28 RX ORDER — ESCITALOPRAM OXALATE 10 MG/1
10 TABLET ORAL DAILY
Status: DISCONTINUED | OUTPATIENT
Start: 2018-03-28 | End: 2018-04-11

## 2018-03-28 RX ORDER — GUAIFENESIN 100 MG/5ML
81 LIQUID (ML) ORAL EVERY EVENING
Status: DISCONTINUED | OUTPATIENT
Start: 2018-03-28 | End: 2018-04-12 | Stop reason: HOSPADM

## 2018-03-28 RX ORDER — PANTOPRAZOLE SODIUM 40 MG/1
40 TABLET, DELAYED RELEASE ORAL 2 TIMES DAILY
Status: DISCONTINUED | OUTPATIENT
Start: 2018-03-28 | End: 2018-04-12 | Stop reason: HOSPADM

## 2018-03-28 RX ORDER — LEVOFLOXACIN 5 MG/ML
750 INJECTION, SOLUTION INTRAVENOUS
Status: DISCONTINUED | OUTPATIENT
Start: 2018-03-29 | End: 2018-03-28

## 2018-03-28 RX ORDER — ALBUTEROL SULFATE 0.83 MG/ML
2.5 SOLUTION RESPIRATORY (INHALATION)
Status: DISCONTINUED | OUTPATIENT
Start: 2018-03-28 | End: 2018-04-03

## 2018-03-28 RX ORDER — HYDRALAZINE HYDROCHLORIDE 50 MG/1
100 TABLET, FILM COATED ORAL 3 TIMES DAILY
Status: DISCONTINUED | OUTPATIENT
Start: 2018-03-28 | End: 2018-04-12 | Stop reason: HOSPADM

## 2018-03-28 RX ORDER — CLOPIDOGREL BISULFATE 75 MG/1
75 TABLET ORAL DAILY
Status: DISCONTINUED | OUTPATIENT
Start: 2018-03-28 | End: 2018-04-12 | Stop reason: HOSPADM

## 2018-03-28 RX ORDER — LEVOFLOXACIN 5 MG/ML
500 INJECTION, SOLUTION INTRAVENOUS
Status: DISCONTINUED | OUTPATIENT
Start: 2018-03-29 | End: 2018-03-29

## 2018-03-28 RX ORDER — SODIUM CHLORIDE 0.9 % (FLUSH) 0.9 %
5-10 SYRINGE (ML) INJECTION EVERY 8 HOURS
Status: DISCONTINUED | OUTPATIENT
Start: 2018-03-28 | End: 2018-04-12 | Stop reason: HOSPADM

## 2018-03-28 RX ORDER — HEPARIN SODIUM 5000 [USP'U]/ML
5000 INJECTION, SOLUTION INTRAVENOUS; SUBCUTANEOUS EVERY 8 HOURS
Status: DISCONTINUED | OUTPATIENT
Start: 2018-03-28 | End: 2018-04-12 | Stop reason: HOSPADM

## 2018-03-28 RX ORDER — ALBUTEROL SULFATE 0.83 MG/ML
2.5 SOLUTION RESPIRATORY (INHALATION)
Status: COMPLETED | OUTPATIENT
Start: 2018-03-28 | End: 2018-03-28

## 2018-03-28 RX ORDER — SODIUM CHLORIDE 0.9 % (FLUSH) 0.9 %
5-10 SYRINGE (ML) INJECTION AS NEEDED
Status: DISCONTINUED | OUTPATIENT
Start: 2018-03-28 | End: 2018-04-12 | Stop reason: HOSPADM

## 2018-03-28 RX ORDER — HYDROCHLOROTHIAZIDE 25 MG/1
25 TABLET ORAL EVERY EVENING
Status: DISCONTINUED | OUTPATIENT
Start: 2018-03-28 | End: 2018-04-12 | Stop reason: HOSPADM

## 2018-03-28 RX ADMIN — HEPARIN SODIUM 5000 UNITS: 5000 INJECTION, SOLUTION INTRAVENOUS; SUBCUTANEOUS at 02:09

## 2018-03-28 RX ADMIN — Medication 5 ML: at 14:00

## 2018-03-28 RX ADMIN — HYDROCHLOROTHIAZIDE 25 MG: 25 TABLET ORAL at 17:06

## 2018-03-28 RX ADMIN — HEPARIN SODIUM 5000 UNITS: 5000 INJECTION, SOLUTION INTRAVENOUS; SUBCUTANEOUS at 17:06

## 2018-03-28 RX ADMIN — PIPERACILLIN SODIUM,TAZOBACTAM SODIUM 4.5 G: 4; .5 INJECTION, POWDER, FOR SOLUTION INTRAVENOUS at 23:55

## 2018-03-28 RX ADMIN — VANCOMYCIN HYDROCHLORIDE 1250 MG: 10 INJECTION, POWDER, LYOPHILIZED, FOR SOLUTION INTRAVENOUS at 03:08

## 2018-03-28 RX ADMIN — HYDRALAZINE HYDROCHLORIDE 100 MG: 50 TABLET ORAL at 09:59

## 2018-03-28 RX ADMIN — ALBUTEROL SULFATE 2.5 MG: 2.5 SOLUTION RESPIRATORY (INHALATION) at 04:16

## 2018-03-28 RX ADMIN — ACETAMINOPHEN 650 MG: 325 TABLET ORAL at 00:13

## 2018-03-28 RX ADMIN — HYDRALAZINE HYDROCHLORIDE 100 MG: 50 TABLET ORAL at 17:06

## 2018-03-28 RX ADMIN — PANTOPRAZOLE SODIUM 40 MG: 40 TABLET, DELAYED RELEASE ORAL at 17:06

## 2018-03-28 RX ADMIN — CLOPIDOGREL BISULFATE 75 MG: 75 TABLET ORAL at 09:59

## 2018-03-28 RX ADMIN — PANTOPRAZOLE SODIUM 40 MG: 40 TABLET, DELAYED RELEASE ORAL at 09:59

## 2018-03-28 RX ADMIN — PIPERACILLIN SODIUM,TAZOBACTAM SODIUM 4.5 G: 4; .5 INJECTION, POWDER, FOR SOLUTION INTRAVENOUS at 13:19

## 2018-03-28 RX ADMIN — HYDRALAZINE HYDROCHLORIDE 100 MG: 50 TABLET ORAL at 22:19

## 2018-03-28 RX ADMIN — ESCITALOPRAM OXALATE 10 MG: 10 TABLET ORAL at 11:30

## 2018-03-28 RX ADMIN — HEPARIN SODIUM 5000 UNITS: 5000 INJECTION, SOLUTION INTRAVENOUS; SUBCUTANEOUS at 09:59

## 2018-03-28 RX ADMIN — SODIUM CHLORIDE 75 ML/HR: 900 INJECTION, SOLUTION INTRAVENOUS at 23:55

## 2018-03-28 RX ADMIN — SODIUM CHLORIDE 75 ML/HR: 900 INJECTION, SOLUTION INTRAVENOUS at 05:29

## 2018-03-28 NOTE — H&P
Hospitalist Admission Note    NAME: Yaakov Kanner   :  1935   MRN:  [id number]     Date/Time:  3/27/2018 8:55 PM    Patient PCP: Li Haro MD  ______________________________________________________________________  Given the patient's current clinical presentation, I have a high level of concern for decompensation if discharged from the emergency department. Complex decision making was performed, which includes reviewing the patient's available past medical records, laboratory results, and x-ray films. My assessment of this patient's clinical condition and my plan of care is as follows. Assessment / Plan:  Acute hypoxic respiratory failure, secondary to bilateral/multilobar PNA  Sepsis, secondary to above  -Admit to stepdown  -Continue O2 supplementation as needed, currently on 5L NC and saturating >90%  -CXR noted, CT abdo noted  -Continue IV antiobiotics - Zosyn, Vanco and Levofloxacin. Agree with broad coverage. Pt lives in assisted living facility so would treat as if HCAP and not CAP  -Follow BCx  -Repeat labs in the AM  -Currently maintaining MAP >65mmHg  -Pt is febrile, WBC 12.6, negative LA  -CLD for now - uptitrate as tolerated  -Gentle IV hydration  -Pt high risk for further deterioration    CHRISTY  -Likely secondary to sepsis  -Continue to monitor, avoid nephrotoxic drugs, repeat labs in the AM  -If it continues to worsen, consider Nephrology eval and US    HTN  -Hesitant to place pt back on the substantial anti-hypertensives that she is on - have held Norvasc mixture and Clonidine   -Continue to monitor     Code Status: DNR  Surrogate Decision Maker: Son, Dolores Velázquez. Called his listed number to update but no one picked up  DVT Prophylaxis: Heparin  GI Prophylaxis: not indicated  Baseline: Lives at  [address]       Subjective:   CHIEF COMPLAINT: feeling unwell    HISTORY OF PRESENT ILLNESS:     Monie Sandra is a 80 y.o.    female who presents with CC listed above. Pt is here from [address] She states that she feels unwell and does not provide any more history till prompted specifically. She does endorse a cough, non productive, subjective fever, negative chills, decreased appetite. She states all this started this morning. Currently she states that she is feeling \"ok. \" She denies any CP, nausea, vomiting, or dizziness. We were asked to admit for work up and evaluation of the above problems. Past Medical History:   Diagnosis Date    Hypertension     Ill-defined condition     Ex Lap with Maggie Poplar patch of a perforated pyloric channel ulcer 7/19/16    Other ill-defined conditions(799.89)     lipids    Other ill-defined conditions(799.89)     blood transfusion history        Past Surgical History:   Procedure Laterality Date    BYPASS GRAFT OTHR,FEM-FEM      BYPASS GRAFT OTHR,FEM-POP      right    HX HEENT      bilateral cataracts    HX ORTHOPAEDIC      right hip fracture/pinning    HX UROLOGICAL      right stent/kidney       Social History   Substance Use Topics    Smoking status: Current Every Day Smoker     Packs/day: 0.25    Smokeless tobacco: Not on file      Comment: stopped 2009    Alcohol use No        History reviewed. No pertinent family history. Asked pt about family history specifically and she does not recall. Allergies   Allergen Reactions    Dilaudid [Hydromorphone] Unknown (comments)     Does not remember    Hydrocodone Nausea and Vomiting    Morphine Rash        Prior to Admission medications    Medication Sig Start Date End Date Taking? Authorizing Provider   acetaminophen (TYLENOL) 650 mg CR tablet Take 650 mg by mouth every six (6) hours as needed for Pain. Historical Provider   hydrALAZINE (APRESOLINE) 100 mg tablet Take 1 Tab by mouth three (3) times daily. 9/13/16   Caleb Main MD   escitalopram oxalate (LEXAPRO) 10 mg tablet Take 1 Tab by mouth daily.  9/13/16   Caleb Main MD   pantoprazole (PROTONIX) 40 mg tablet Take 1 Tab by mouth two (2) times a day. Indications: GASTRIC ULCER 7/28/16   Tank Liz MD   cloNIDine HCl (CATAPRES) 0.3 mg tablet Take 0.3 mg by mouth two (2) times a day. Baldev Banda MD   clopidogrel (PLAVIX) 75 mg tablet Take 75 mg by mouth daily. Stopped for surgery-8-4-10     Historical Provider   aspirin 81 mg tablet Take 81 mg by mouth every evening. Stopped for surgery 8-4-10  8/5/10   Historical Provider   amlodipine-valsartan (EXFORGE) 5-320 mg per tablet Take 1 Tab by mouth every evening. 8/5/10   Historical Provider   simvastatin (ZOCOR) 80 mg tablet Take 80 mg by mouth every evening. 8/5/10   Historical Provider   gabapentin (NEURONTIN) 600 mg tablet Take 600 mg by mouth three (3) times daily. Historical Provider   hydrochlorothiazide (HYDRODIURIL) 25 mg tablet Take 25 mg by mouth every evening. 8/5/10   Historical Provider       REVIEW OF SYSTEMS:     I am not able to complete the review of systems because:    The patient is intubated and sedated    The patient has altered mental status due to his acute medical problems    The patient has baseline aphasia from prior stroke(s)    The patient has baseline dementia and is not reliable historian    The patient is in acute medical distress and unable to provide information           Total of 12 systems reviewed as follows:       POSITIVE= underlined text  Negative = text not underlined  General:  fever, chills, sweats, generalized weakness, weight loss/gain,      loss of appetite   Eyes:    blurred vision, eye pain, loss of vision, double vision  ENT:    rhinorrhea, pharyngitis   Respiratory:   cough, sputum production, SOB, MONTANO, wheezing, pleuritic pain   Cardiology:   chest pain, palpitations, orthopnea, PND, edema, syncope   Gastrointestinal:  abdominal pain , N/V, diarrhea, dysphagia, constipation, bleeding   Genitourinary:  frequency, urgency, dysuria, hematuria, incontinence   Muskuloskeletal : arthralgia, myalgia, back pain  Hematology:  easy bruising, nose or gum bleeding, lymphadenopathy   Dermatological: rash, ulceration, pruritis, color change / jaundice  Endocrine:   hot flashes or polydipsia   Neurological:  headache, dizziness, confusion, focal weakness, paresthesia,     Speech difficulties, memory loss, gait difficulty  Psychological: Feelings of anxiety, depression, agitation    Objective:   VITALS:    Visit Vitals    BP (!) 141/35    Pulse 74    Temp (!) 100.9 °F (38.3 °C)    Resp 19    SpO2 (!) 86%       PHYSICAL EXAM:    General:    Alert, cooperative, no distress, elderly, on NC 5L  HEENT: Atraumatic, anicteric sclerae, pink conjunctivae     No oral ulcers, mucosa moist, throat clear, dentition fair  Neck:  Supple, symmetrical,  thyroid: non tender  Lungs:   Coarse BS bibasilar  Chest wall:  No tenderness  No Accessory muscle use. Heart:   Regular  rhythm,  No  murmur   No edema  Abdomen:   Soft, non-tender. Not distended. Bowel sounds normal  Extremities: No cyanosis. No clubbing,      Skin turgor normal, Capillary refill normal, Radial dial pulse 2+  Skin:     Not pale. Not Jaundiced  No rashes   Psych:  Good insight. Not depressed. Not anxious or agitated. Neurologic: EOMs intact. No facial asymmetry. No aphasia or slurred speech. Symmetrical strength, Sensation grossly intact.  Alert and oriented X 4.     _______________________________________________________________________  Care Plan discussed with:    Comments   Patient x    Family      RN x    Care Manager                    Consultant:      _______________________________________________________________________  Expected  Disposition:   Home with Family    HH/PT/OT/RN    SNF/LTC x   HERB    ________________________________________________________________________  TOTAL TIME:  54 Minutes    Critical Care Provided     Minutes non procedure based      Comments    x Reviewed previous records   >50% of visit spent in counseling and coordination of care  Discussion with patient and/or family and questions answered       ________________________________________________________________________  Signed: Jamshid Dooley MD    Procedures: see electronic medical records for all procedures/Xrays and details which were not copied into this note but were reviewed prior to creation of Plan. LAB DATA REVIEWED:    Recent Results (from the past 24 hour(s))   EKG, 12 LEAD, INITIAL    Collection Time: 03/27/18  5:47 PM   Result Value Ref Range    Ventricular Rate 71 BPM    Atrial Rate 71 BPM    P-R Interval 176 ms    QRS Duration 78 ms    Q-T Interval 378 ms    QTC Calculation (Bezet) 410 ms    Calculated P Axis 73 degrees    Calculated R Axis -20 degrees    Calculated T Axis 54 degrees    Diagnosis       Normal sinus rhythm  Cannot rule out Anterior infarct (cited on or before 27-MAR-2018)  Abnormal ECG  When compared with ECG of 29-AUG-2016 15:31,  Borderline criteria for Inferior infarct are no longer present  QT has shortened     LACTIC ACID    Collection Time: 03/27/18  6:12 PM   Result Value Ref Range    Lactic acid 1.9 0.4 - 2.0 MMOL/L   METABOLIC PANEL, COMPREHENSIVE    Collection Time: 03/27/18  6:12 PM   Result Value Ref Range    Sodium 138 136 - 145 mmol/L    Potassium 4.5 3.5 - 5.1 mmol/L    Chloride 107 97 - 108 mmol/L    CO2 23 21 - 32 mmol/L    Anion gap 8 5 - 15 mmol/L    Glucose 115 (H) 65 - 100 mg/dL    BUN 52 (H) 6 - 20 MG/DL    Creatinine 2.53 (H) 0.55 - 1.02 MG/DL    BUN/Creatinine ratio 21 (H) 12 - 20      GFR est AA 22 (L) >60 ml/min/1.73m2    GFR est non-AA 18 (L) >60 ml/min/1.73m2    Calcium 9.8 8.5 - 10.1 MG/DL    Bilirubin, total 0.3 0.2 - 1.0 MG/DL    ALT (SGPT) 14 12 - 78 U/L    AST (SGOT) 24 15 - 37 U/L    Alk.  phosphatase 88 45 - 117 U/L    Protein, total 7.5 6.4 - 8.2 g/dL    Albumin 3.8 3.5 - 5.0 g/dL    Globulin 3.7 2.0 - 4.0 g/dL    A-G Ratio 1.0 (L) 1.1 - 2.2     CBC WITH AUTOMATED DIFF    Collection Time: 03/27/18  6:12 PM   Result Value Ref Range    WBC 12.6 (H) 3.6 - 11.0 K/uL    RBC 3.52 (L) 3.80 - 5.20 M/uL    HGB 10.4 (L) 11.5 - 16.0 g/dL    HCT 32.9 (L) 35.0 - 47.0 %    MCV 93.5 80.0 - 99.0 FL    MCH 29.5 26.0 - 34.0 PG    MCHC 31.6 30.0 - 36.5 g/dL    RDW 14.4 11.5 - 14.5 %    PLATELET 419 373 - 727 K/uL    MPV 10.5 8.9 - 12.9 FL    NRBC 0.0 0  WBC    ABSOLUTE NRBC 0.00 0.00 - 0.01 K/uL    NEUTROPHILS 94 (H) 32 - 75 %    LYMPHOCYTES 1 (L) 12 - 49 %    MONOCYTES 5 5 - 13 %    EOSINOPHILS 0 0 - 7 %    BASOPHILS 0 0 - 1 %    IMMATURE GRANULOCYTES 0 0.0 - 0.5 %    ABS. NEUTROPHILS 11.9 (H) 1.8 - 8.0 K/UL    ABS. LYMPHOCYTES 0.1 (L) 0.8 - 3.5 K/UL    ABS. MONOCYTES 0.6 0.0 - 1.0 K/UL    ABS. EOSINOPHILS 0.0 0.0 - 0.4 K/UL    ABS. BASOPHILS 0.0 0.0 - 0.1 K/UL    ABS. IMM.  GRANS. 0.0 0.00 - 0.04 K/UL    DF SMEAR SCANNED      RBC COMMENTS NORMOCYTIC, NORMOCHROMIC     PROTHROMBIN TIME + INR    Collection Time: 03/27/18  6:12 PM   Result Value Ref Range    INR 1.1 0.9 - 1.1      Prothrombin time 10.7 9.0 - 11.1 sec   TROPONIN I    Collection Time: 03/27/18  6:12 PM   Result Value Ref Range    Troponin-I, Qt. <0.04 <0.05 ng/mL

## 2018-03-28 NOTE — PROGRESS NOTES
Pharmacy Automatic Renal Dosing Protocol - Antimicrobials    Indication for Antimicrobials: b/a PNA (patient admitted from [facility])    Current Regimen of Each Antimicrobial:  Vancomycin IV Start Date 3/28/18; Day # 1)  Levofloxacin 750 mg IV q 48 hours (Start Date: 3/27; Day #2)  Pip-tazo 3.375 g IV q 12 hours (Start Date: 3/27; Day #2)      Goal Level: VANCOMYCIN TROUGH GOAL RANGE    Vancomycin Trough: 15 - 20 mcg/mL    Measured / Extrapolated Vancomycin Level: N/A    Significant Cultures:   3/27: blood: in process    Radiology / Imaging results: (X-ray, CT scan or MRI):   3/27 (XR Chest Port): Left perihilar and right lower lobe infiltrates. Decrease in the size of the left lower lobe lesion. Paralysis, amputations, malnutrition: Actual BW<IBW    Labs:  Recent Labs      18   0338  18   1812   CREA  2.17*  2.53*   BUN  48*  52*   WBC  16.7*  12.6*     Temp (24hrs), Av.3 °F (37.9 °C), Min:98.3 °F (36.8 °C), Max:102.3 °F (39.1 °C)    Creatinine Clearance (mL/min) or Dialysis: ~15 ml/min    Impression/Plan:   · Scr trending down, but still in CHRISTY (baseline ~1.3 mg/dL)  · Vancomycin Maintenance regimen entered as \"dosing per levels\" as renal function remains unstable; patient received 1250 mg loading dose this am  · Pip-tazo dosing adjusted to 4.5 g IV q 12 per protocol as patient is being treated for HAP  · Levofloxacin dosing adjusted to 500 mg IV q 48 hours per protocol for indication and renal function  · Daily BMP ordered  · No vancomycin level has been ordered at this time  · Antibiotic stop date: TBD     Pharmacy will follow daily and adjust medications as appropriate for renal function and/or serum levels.     Thank you,    Izabella Cutler, PharmD, [address] [facility] Pharmacy Specialist

## 2018-03-28 NOTE — ED NOTES
I have reviewed the chart and documentation for Aniyah Gregory RN and I agree with the documentation as recorded

## 2018-03-28 NOTE — ED NOTES
Pt received resting on stretcher in NAD with family at the bedside.  Pt asking for ginger ale and given with MD approval.

## 2018-03-28 NOTE — ED NOTES
Pt shaking and pox down to 89% and pt has lt anterior wheezing via auscultation. MD notified and vo for albuterol neb received.

## 2018-03-28 NOTE — PROGRESS NOTES
Problem: Mobility Impaired (Adult and Pediatric)  Goal: *Acute Goals and Plan of Care (Insert Text)  Physical Therapy Goals  Initiated 3/28/2018  1. Patient will move from supine to sit and sit to supine , scoot up and down and roll side to side in bed with contact guard assist within 7 day(s). 2.  Patient will transfer from bed to chair and chair to bed with minimal assistance using the least restrictive device within 7 day(s). 3.  Patient will perform sit to stand with minimal assistance within 7 day(s). 4.  Patient will ambulate with minimal assistance for 3 feet with the least restrictive device within 7 day(s). physical Therapy EVALUATION  Patient: Pamla Habermann (25 y.o. female)  Date: 3/28/2018  Primary Diagnosis: Respiratory failure with hypoxia [facility])        Precautions:  DNR, Fall    ASSESSMENT :  Based on the objective data described below, the patient presents with generalized weakness, impaired balance, impaired activity tolerance following admission for ongoing N/V and general debility. Patient from care home where she mobilized via rollator device, with no A typically required from staff and patient reporting undergoing [place] therapy. Received in supine on 4L NCO2 with prior N/V resolved per patient. Patient initially in poor agreement to participate with education provided. Noted patient with significant bloody drainage under R upper arm with skin tear visible; RN notified and linens obtained to assist in change. Patient very slowed with all movements, especially with LE's, requiring mod A to roll to side then come to EOB via log roll. Obtained RW where patient unable to fully stand despite max A of 2; noted patient with very narrow NAVEEN, and minimal weight bearing through BLE despite facilitation. Returned to supine with mod A and max A to scoot to head of bed. Patient's VSS up until that point with patient HTN s/p activity. During activity, linens switched.         Given current deficits, patient will likely benefit from SNF rehabilitation at [facility] unless much improvement is seen. She would struggle with return to her SUKHDEV without much greater assistance from staff members. Patient will benefit from skilled intervention to address the above impairments. Patients rehabilitation potential is considered to be Fair  Factors which may influence rehabilitation potential include:   []         None noted  []         Mental ability/status  [x]         Medical condition  []         Home/family situation and support systems  []         Safety awareness  []         Pain tolerance/management  []         Other:      PLAN :  Recommendations and Planned Interventions:  [x]           Bed Mobility Training             []    Neuromuscular Re-Education  [x]           Transfer Training                   []    Orthotic/Prosthetic Training  [x]           Gait Training                         []    Modalities  [x]           Therapeutic Exercises           []    Edema Management/Control  [x]           Therapeutic Activities            [x]    Patient and Family Training/Education  []           Other (comment):    Frequency/Duration: Patient will be followed by physical therapy  5 times a week to address goals. Discharge Recommendations: Skilled Nursing Facility  Further Equipment Recommendations for Discharge: defer     SUBJECTIVE:   Patient stated I don't think I can do this right now.     OBJECTIVE DATA SUMMARY:   HISTORY:    Past Medical History:   Diagnosis Date    Hypertension     Ill-defined condition     Ex Lap with Fabian Velasquezr patch of a perforated pyloric channel ulcer 7/19/16    Other ill-defined conditions(799.89)     lipids    Other ill-defined conditions(799.89)     blood transfusion history     Past Surgical History:   Procedure Laterality Date    BYPASS GRAFT OTHR,FEM-FEM      BYPASS GRAFT OTHR,FEM-POP      right    HX HEENT      bilateral cataracts    HX ORTHOPAEDIC      right hip fracture/pinning    HX UROLOGICAL      right stent/kidney     Prior Level of Function/Home Situation: Resides at [facility] where she uses a rollator for all mobility. She typically doesn't require A from staff there, ambulating within her room, the facility, and even the outdoor spaces when the weather is nice. She does report facility recently under quarantine thus she's kept to her room, rarely going down any longer for meals (previously 3x/day). She reports undergoing twice weekly HH therapy. Denies recent falls. Not on O2. Reports continence yet wearing briefs 'just in case'. Personal factors and/or comorbidities impacting plan of care:     Home Situation  Home Environment: [address] Name: Anoop Baird   # Steps to Enter: 0  One/Two Story Residence: One story  Living Alone: No  Support Systems: Assisted living, Family member(s), Child(hansel)  Patient Expects to be Discharged to[de-identified] Assisted living  Current DME Used/Available at Home: Mozella Maha, rollator, Grab bars, Raised toilet seat, Shower chair  Tub or Shower Type: Shower    EXAMINATION/PRESENTATION/DECISION MAKING:   Critical Behavior:  Neurologic State: Alert  Orientation Level: Oriented X4  Cognition: Appropriate decision making  Safety/Judgement: Awareness of environment  Hearing: Auditory  Auditory Impairment: None  Range Of Motion:  AROM: Generally decreased, functional                       Strength:    Strength: Grossly decreased, non-functional                    Tone & Sensation:                  Sensation: Impaired ('diaz numb' in L hand )         Vision:   Corrective Lenses: Reading glasses  Functional Mobility:  Bed Mobility:  Rolling: Moderate assistance;Assist x1;Additional time  Supine to Sit: Moderate assistance;Assist x1;Additional time     Scooting:  Moderate assistance;Assist x1;Additional time  Transfers:  Sit to Stand: Maximum assistance;Assist x2  Stand to Sit: Maximum assistance;Assist x2 Balance:   Sitting: Intact; Without support  Standing: Impaired; With support  Standing - Static: Poor;Constant support    Functional Measure:  Barthel Index:    Bathin  Bladder: 5  Bowels: 10  Groomin  Dressin  Feedin  Mobility: 0  Stairs: 0  Toilet Use: 5  Transfer (Bed to Chair and Back): 5  Total: 30       Barthel and G-code impairment scale:  Percentage of impairment CH  0% CI  1-19% CJ  20-39% CK  40-59% CL  60-79% CM  80-99% CN  100%   Barthel Score 0-100 100 99-80 79-60 59-40 20-39 1-19   0   Barthel Score 0-20 20 17-19 13-16 9-12 5-8 1-4 0      The Barthel ADL Index: Guidelines  1. The index should be used as a record of what a patient does, not as a record of what a patient could do. 2. The main aim is to establish degree of independence from any help, physical or verbal, however minor and for whatever reason. 3. The need for supervision renders the patient not independent. 4. A patient's performance should be established using the best available evidence. Asking the patient, friends/relatives and nurses are the usual sources, but direct observation and common sense are also important. However direct testing is not needed. 5. Usually the patient's performance over the preceding 24-48 hours is important, but occasionally longer periods will be relevant. 6. Middle categories imply that the patient supplies over 50 per cent of the effort. 7. Use of aids to be independent is allowed. Alyssa Hopkins., Barthel, D.W. (3567). Functional evaluation: the Barthel Index. [address] [facility] (14)2. Lula Libman der [place], J.J.M.F, Lidia Estrada., Antoinette Braun., [place], [address] [place] [address] (). Measuring the change indisability after inpatient rehabilitation; comparison of the responsiveness of the Barthel Index and Functional [place] Measure. Journal of Neurology, Neurosurgery, and Psychiatry, 66(4), 199-636.   CELIA Puri.MICHELE, PAULO Sanchez, & Lizbeth Anthony MMitraA. (2004.) Assessment of post-stroke quality of life in cost-effectiveness studies: The usefulness of the Barthel Index and the EuroQoL-5D. Quality of Life Research, 13, 663-31         G codes: In compliance with CMSs Claims Based Outcome Reporting, the following G-code set was chosen for this patient based on their primary functional limitation being treated: The outcome measure chosen to determine the severity of the functional limitation was the barthel with a score of 30/100 which was correlated with the impairment scale. ? Mobility - Walking and Moving Around:     - CURRENT STATUS: CL - 60%-79% impaired, limited or restricted    - GOAL STATUS: CK - 40%-59% impaired, limited or restricted    - D/C STATUS:  ---------------To be determined---------------   Pain:  Pain Scale 1: Numeric (0 - 10)  Pain Intensity 1: 0              Activity Tolerance:   Fair; HTN after activity, requiring 4L NCO2     Please refer to the flowsheet for vital signs taken during this treatment. After treatment:   []         Patient left in no apparent distress sitting up in chair  [x]         Patient left in no apparent distress in bed  [x]         Call bell left within reach  [x]         Nursing notified  []         Caregiver present  []         Bed alarm activated    COMMUNICATION/EDUCATION:   The patients plan of care was discussed with: Registered Nurse. [x]         Fall prevention education was provided and the patient/caregiver indicated understanding. [x]         Patient/family have participated as able in goal setting and plan of care. [x]         Patient/family agree to work toward stated goals and plan of care. []         Patient understands intent and goals of therapy, but is neutral about his/her participation. []         Patient is unable to participate in goal setting and plan of care.     Thank you for this referral.  Jackie Marie, PT, DPT, CEEAA      Time Calculation: 27 mins

## 2018-03-28 NOTE — PROGRESS NOTES
PROGRESS NOTE    NAME:  Xiang Alaniz   :   1935   MRN:   [id number]     Date/Time:  3/28/2018 5:27 AM  Subjective:   History:  Chart reviewed and patient seen and examined this AM and D/W her nurse and all events noted. She presented yesterday with several episodes of N/V w/o diarrhea or other GI c/o. She has had no N/V since yesterday AM and no other GI c/o. She noted to be hypoxemic and was diagnosed with bilateral Pneumonia and has been started on triple antibiotics. She has little cough, but notable SOB w/o other cardio/respiratory c/o. She has no  c/o. She is generally weak w/o other neurologic c/o. There are no other c/o on complete ROS. Medications reviewed:  Current Facility-Administered Medications   Medication Dose Route Frequency    piperacillin-tazobactam (ZOSYN) 4.5 g in 0.9% sodium chloride (MBP/ADV) 100 mL ADV  4.5 g IntraVENous Q12H    [START ON 3/29/2018] levoFLOXacin (LEVAQUIN) 750 mg in D5W IVPB  750 mg IntraVENous Q48H    aspirin chewable tablet 81 mg  81 mg Oral QPM    clopidogrel (PLAVIX) tablet 75 mg  75 mg Oral DAILY    hydrALAZINE (APRESOLINE) tablet 100 mg  100 mg Oral TID    escitalopram oxalate (LEXAPRO) tablet 10 mg  10 mg Oral DAILY    pantoprazole (PROTONIX) tablet 40 mg  40 mg Oral BID    hydroCHLOROthiazide (HYDRODIURIL) tablet 25 mg  25 mg Oral QPM    sodium chloride (NS) flush 5-10 mL  5-10 mL IntraVENous Q8H    sodium chloride (NS) flush 5-10 mL  5-10 mL IntraVENous PRN    heparin (porcine) injection 5,000 Units  5,000 Units SubCUTAneous Q8H    albuterol (PROVENTIL VENTOLIN) nebulizer solution 2.5 mg  2.5 mg Nebulization Q6H PRN    0.9% sodium chloride infusion  75 mL/hr IntraVENous CONTINUOUS    acetaminophen (TYLENOL) tablet 650 mg  650 mg Oral Q6H PRN     Current Outpatient Prescriptions   Medication Sig    acetaminophen (TYLENOL) 650 mg CR tablet Take 650 mg by mouth every six (6) hours as needed for Pain.     hydrALAZINE (APRESOLINE) 100 mg tablet Take 1 Tab by mouth three (3) times daily.  escitalopram oxalate (LEXAPRO) 10 mg tablet Take 1 Tab by mouth daily.  pantoprazole (PROTONIX) 40 mg tablet Take 1 Tab by mouth two (2) times a day. Indications: GASTRIC ULCER    cloNIDine HCl (CATAPRES) 0.3 mg tablet Take 0.3 mg by mouth two (2) times a day.  clopidogrel (PLAVIX) 75 mg tablet Take 75 mg by mouth daily. Stopped for surgery-8-4-10     aspirin 81 mg tablet Take 81 mg by mouth every evening. Stopped for surgery 8-4-10     amlodipine-valsartan (EXFORGE) 5-320 mg per tablet Take 1 Tab by mouth every evening.  simvastatin (ZOCOR) 80 mg tablet Take 80 mg by mouth every evening.  gabapentin (NEURONTIN) 600 mg tablet Take 600 mg by mouth three (3) times daily.  hydrochlorothiazide (HYDRODIURIL) 25 mg tablet Take 25 mg by mouth every evening. Objective:   Vitals:  Visit Vitals    /64    Pulse 79    Temp (!) 100.7 °F (38.2 °C)    Resp 14    Ht 5' 1.5\" (1.562 m)    Wt 105 lb (47.6 kg)    SpO2 (!) 89%    BMI 19.52 kg/m2    O2 Flow Rate (L/min): 4 l/min O2 Device: Nasal cannula Temp (24hrs), Av.9 °F (38.3 °C), Min:100 °F (37.8 °C), Max:102.3 °F (39.1 °C)      Last 24hr Input/Output:  No intake or output data in the 24 hours ending 18 3347     PHYSICAL EXAM:  General:     Alert, cooperative, no distress, appears stated age. Head:    Normocephalic, without obvious abnormality, atraumatic. Eyes:    Conjunctivae/corneas clear. PERRLA  Nose:   Nares normal. No drainage or sinus tenderness. Throat:     Lips, mucosa, and tongue normal.  No Thrush  Neck:   Supple, symmetrical,  no adenopathy, thyroid: non tender     no carotid bruit and no JVD. Back:     Symmetric,  No CVA tenderness. Lungs:    Clear to auscultation bilaterally with overall decreased BS. No Wheezing or Rhonchi. No rales. Heart:    Regular rate and rhythm,  no murmur, rub or gallop.   Abdomen:    Soft, mild mid upper abdominal tenderness w/o rebound. Not distended. Bowel sounds normal. No masses  Extremities:  Extremities normal, atraumatic, No cyanosis. No edema. + clubbing  Lymph nodes:  Cervical, supraclavicular normal.  Neurologic:  General decreased strength, Alert and oriented X 3. Skin:                 No rash      Lab Data Reviewed:    Recent Results (from the past 24 hour(s))   EKG, 12 LEAD, INITIAL    Collection Time: 03/27/18  5:47 PM   Result Value Ref Range    Ventricular Rate 71 BPM    Atrial Rate 71 BPM    P-R Interval 176 ms    QRS Duration 78 ms    Q-T Interval 378 ms    QTC Calculation (Bezet) 410 ms    Calculated P Axis 73 degrees    Calculated R Axis -20 degrees    Calculated T Axis 54 degrees    Diagnosis       Normal sinus rhythm  Cannot rule out Anterior infarct (cited on or before 27-MAR-2018)  Abnormal ECG  When compared with ECG of 29-AUG-2016 15:31,  Borderline criteria for Inferior infarct are no longer present  QT has shortened     LACTIC ACID    Collection Time: 03/27/18  6:12 PM   Result Value Ref Range    Lactic acid 1.9 0.4 - 2.0 MMOL/L   METABOLIC PANEL, COMPREHENSIVE    Collection Time: 03/27/18  6:12 PM   Result Value Ref Range    Sodium 138 136 - 145 mmol/L    Potassium 4.5 3.5 - 5.1 mmol/L    Chloride 107 97 - 108 mmol/L    CO2 23 21 - 32 mmol/L    Anion gap 8 5 - 15 mmol/L    Glucose 115 (H) 65 - 100 mg/dL    BUN 52 (H) 6 - 20 MG/DL    Creatinine 2.53 (H) 0.55 - 1.02 MG/DL    BUN/Creatinine ratio 21 (H) 12 - 20      GFR est AA 22 (L) >60 ml/min/1.73m2    GFR est non-AA 18 (L) >60 ml/min/1.73m2    Calcium 9.8 8.5 - 10.1 MG/DL    Bilirubin, total 0.3 0.2 - 1.0 MG/DL    ALT (SGPT) 14 12 - 78 U/L    AST (SGOT) 24 15 - 37 U/L    Alk.  phosphatase 88 45 - 117 U/L    Protein, total 7.5 6.4 - 8.2 g/dL    Albumin 3.8 3.5 - 5.0 g/dL    Globulin 3.7 2.0 - 4.0 g/dL    A-G Ratio 1.0 (L) 1.1 - 2.2     CBC WITH AUTOMATED DIFF    Collection Time: 03/27/18  6:12 PM   Result Value Ref Range    WBC 12.6 (H) 3.6 - 11.0 K/uL RBC 3.52 (L) 3.80 - 5.20 M/uL    HGB 10.4 (L) 11.5 - 16.0 g/dL    HCT 32.9 (L) 35.0 - 47.0 %    MCV 93.5 80.0 - 99.0 FL    MCH 29.5 26.0 - 34.0 PG    MCHC 31.6 30.0 - 36.5 g/dL    RDW 14.4 11.5 - 14.5 %    PLATELET 760 199 - 196 K/uL    MPV 10.5 8.9 - 12.9 FL    NRBC 0.0 0  WBC    ABSOLUTE NRBC 0.00 0.00 - 0.01 K/uL    NEUTROPHILS 94 (H) 32 - 75 %    LYMPHOCYTES 1 (L) 12 - 49 %    MONOCYTES 5 5 - 13 %    EOSINOPHILS 0 0 - 7 %    BASOPHILS 0 0 - 1 %    IMMATURE GRANULOCYTES 0 0.0 - 0.5 %    ABS. NEUTROPHILS 11.9 (H) 1.8 - 8.0 K/UL    ABS. LYMPHOCYTES 0.1 (L) 0.8 - 3.5 K/UL    ABS. MONOCYTES 0.6 0.0 - 1.0 K/UL    ABS. EOSINOPHILS 0.0 0.0 - 0.4 K/UL    ABS. BASOPHILS 0.0 0.0 - 0.1 K/UL    ABS. IMM.  GRANS. 0.0 0.00 - 0.04 K/UL    DF SMEAR SCANNED      RBC COMMENTS NORMOCYTIC, NORMOCHROMIC     PROTHROMBIN TIME + INR    Collection Time: 03/27/18  6:12 PM   Result Value Ref Range    INR 1.1 0.9 - 1.1      Prothrombin time 10.7 9.0 - 11.1 sec   TROPONIN I    Collection Time: 03/27/18  6:12 PM   Result Value Ref Range    Troponin-I, Qt. <0.04 <0.05 ng/mL   URINALYSIS W/ RFLX MICROSCOPIC    Collection Time: 03/27/18  9:55 PM   Result Value Ref Range    Color DARK YELLOW      Appearance TURBID (A) CLEAR      Specific gravity 1.023 1.003 - 1.030      pH (UA) 5.0 5.0 - 8.0      Protein TRACE (A) NEG mg/dL    Glucose NEGATIVE  NEG mg/dL    Ketone NEGATIVE  NEG mg/dL    Blood TRACE (A) NEG      Urobilinogen 0.2 0.2 - 1.0 EU/dL    Nitrites NEGATIVE  NEG      Leukocyte Esterase MODERATE (A) NEG      WBC 5-10 0 - 4 /hpf    RBC 5-10 0 - 5 /hpf    Epithelial cells MODERATE (A) FEW /lpf    Bacteria 1+ (A) NEG /hpf   BILIRUBIN, CONFIRM    Collection Time: 03/27/18  9:55 PM   Result Value Ref Range    Bilirubin UA, confirm NEGATIVE  NEG     CBC WITH AUTOMATED DIFF    Collection Time: 03/28/18  3:38 AM   Result Value Ref Range    WBC 16.7 (H) 3.6 - 11.0 K/uL    RBC 3.00 (L) 3.80 - 5.20 M/uL    HGB 9.0 (L) 11.5 - 16.0 g/dL    HCT 28.1 (L) 35.0 - 47.0 %    MCV 93.7 80.0 - 99.0 FL    MCH 30.0 26.0 - 34.0 PG    MCHC 32.0 30.0 - 36.5 g/dL    RDW 14.5 11.5 - 14.5 %    PLATELET 627 314 - 173 K/uL    MPV 10.4 8.9 - 12.9 FL    NRBC 0.0 0  WBC    ABSOLUTE NRBC 0.00 0.00 - 0.01 K/uL    NEUTROPHILS 91 (H) 32 - 75 %    BAND NEUTROPHILS 3 %    LYMPHOCYTES 3 (L) 12 - 49 %    MONOCYTES 3 (L) 5 - 13 %    EOSINOPHILS 0 0 - 7 %    BASOPHILS 0 0 - 1 %    IMMATURE GRANULOCYTES 0 0.0 - 0.5 %    ABS. NEUTROPHILS 15.7 (H) 1.8 - 8.0 K/UL    ABS. LYMPHOCYTES 0.5 (L) 0.8 - 3.5 K/UL    ABS. MONOCYTES 0.5 0.0 - 1.0 K/UL    ABS. EOSINOPHILS 0.0 0.0 - 0.4 K/UL    ABS. BASOPHILS 0.0 0.0 - 0.1 K/UL    ABS. IMM. GRANS. 0.0 0.00 - 0.04 K/UL    DF AUTOMATED      RBC COMMENTS NORMOCYTIC, NORMOCHROMIC     MAGNESIUM    Collection Time: 03/28/18  3:38 AM   Result Value Ref Range    Magnesium 1.8 1.6 - 2.4 mg/dL   PHOSPHORUS    Collection Time: 03/28/18  3:38 AM   Result Value Ref Range    Phosphorus 3.5 2.6 - 4.7 MG/DL   METABOLIC PANEL, COMPREHENSIVE    Collection Time: 03/28/18  3:38 AM   Result Value Ref Range    Sodium 140 136 - 145 mmol/L    Potassium 4.5 3.5 - 5.1 mmol/L    Chloride 111 (H) 97 - 108 mmol/L    CO2 22 21 - 32 mmol/L    Anion gap 7 5 - 15 mmol/L    Glucose 111 (H) 65 - 100 mg/dL    BUN 48 (H) 6 - 20 MG/DL    Creatinine 2.17 (H) 0.55 - 1.02 MG/DL    BUN/Creatinine ratio 22 (H) 12 - 20      GFR est AA 26 (L) >60 ml/min/1.73m2    GFR est non-AA 22 (L) >60 ml/min/1.73m2    Calcium 8.9 8.5 - 10.1 MG/DL    Bilirubin, total 0.4 0.2 - 1.0 MG/DL    ALT (SGPT) 12 12 - 78 U/L    AST (SGOT) 21 15 - 37 U/L    Alk.  phosphatase 69 45 - 117 U/L    Protein, total 6.6 6.4 - 8.2 g/dL    Albumin 3.1 (L) 3.5 - 5.0 g/dL    Globulin 3.5 2.0 - 4.0 g/dL    A-G Ratio 0.9 (L) 1.1 - 2.2           Assessment/Plan:     Principal Problem:    Acute hypoxemic respiratory failure (HCC) (3/27/2018)    Active Problems:    [facility] (8/14/2010)      Hypertension (7/20/2016)      Sepsis ([facility] (3/28/2018)      Pneumonia (3/28/2018)      Acute renal failure (ARF) ([facility] (3/28/2018)       ___________________________________________________  PLAN:    1. Zosyn, Levaquin and Vancomycin for Pneumonia pending cultures  2. Supplemental Oxygen for Hypoxemia  3.  JA treatments for acute respiratory failure  4. Steroids for COPD exacerbation  5. IV Hydration with ARF and follow Creat (2.53 on adm to 2.17)  6. With anemia follow Hgb, 10.4 on adm to 9.0 now  7. Protonix for PUD prevention  8. Cont current HTN medications  9.   Other orders as noted and reviewed    40 minutes spent in direct care of this complex patient today        ___________________________________________________    Attending Physician: Teja Domínguez MD

## 2018-03-29 PROBLEM — C34.92: Status: ACTIVE | Noted: 2018-03-29

## 2018-03-29 LAB
ANION GAP SERPL CALC-SCNC: 7 MMOL/L (ref 5–15)
ATRIAL RATE: 103 BPM
BUN SERPL-MCNC: 37 MG/DL (ref 6–20)
BUN/CREAT SERPL: 25 (ref 12–20)
CALCIUM SERPL-MCNC: 9.2 MG/DL (ref 8.5–10.1)
CALCULATED P AXIS, ECG09: 74 DEGREES
CALCULATED R AXIS, ECG10: -32 DEGREES
CALCULATED T AXIS, ECG11: 62 DEGREES
CHLORIDE SERPL-SCNC: 114 MMOL/L (ref 97–108)
CO2 SERPL-SCNC: 21 MMOL/L (ref 21–32)
CREAT SERPL-MCNC: 1.51 MG/DL (ref 0.55–1.02)
DIAGNOSIS, 93000: NORMAL
GLUCOSE SERPL-MCNC: 114 MG/DL (ref 65–100)
P-R INTERVAL, ECG05: 164 MS
POTASSIUM SERPL-SCNC: 4.5 MMOL/L (ref 3.5–5.1)
Q-T INTERVAL, ECG07: 392 MS
QRS DURATION, ECG06: 78 MS
QTC CALCULATION (BEZET), ECG08: 513 MS
SODIUM SERPL-SCNC: 142 MMOL/L (ref 136–145)
VENTRICULAR RATE, ECG03: 103 BPM

## 2018-03-29 PROCEDURE — 65660000000 HC RM CCU STEPDOWN

## 2018-03-29 PROCEDURE — 74011250637 HC RX REV CODE- 250/637: Performed by: INTERNAL MEDICINE

## 2018-03-29 PROCEDURE — 93005 ELECTROCARDIOGRAM TRACING: CPT

## 2018-03-29 PROCEDURE — 74011250637 HC RX REV CODE- 250/637: Performed by: GENERAL ACUTE CARE HOSPITAL

## 2018-03-29 PROCEDURE — 36415 COLL VENOUS BLD VENIPUNCTURE: CPT | Performed by: INTERNAL MEDICINE

## 2018-03-29 PROCEDURE — 93041 RHYTHM ECG TRACING: CPT

## 2018-03-29 PROCEDURE — 80048 BASIC METABOLIC PNL TOTAL CA: CPT | Performed by: INTERNAL MEDICINE

## 2018-03-29 PROCEDURE — 77010033678 HC OXYGEN DAILY

## 2018-03-29 PROCEDURE — 74011250636 HC RX REV CODE- 250/636: Performed by: INTERNAL MEDICINE

## 2018-03-29 PROCEDURE — 74011250636 HC RX REV CODE- 250/636: Performed by: GENERAL ACUTE CARE HOSPITAL

## 2018-03-29 PROCEDURE — 74011000258 HC RX REV CODE- 258: Performed by: INTERNAL MEDICINE

## 2018-03-29 PROCEDURE — 74011000250 HC RX REV CODE- 250: Performed by: INTERNAL MEDICINE

## 2018-03-29 RX ORDER — DIPHENOXYLATE HYDROCHLORIDE AND ATROPINE SULFATE 2.5; .025 MG/1; MG/1
1 TABLET ORAL
Status: DISCONTINUED | OUTPATIENT
Start: 2018-03-29 | End: 2018-04-12 | Stop reason: HOSPADM

## 2018-03-29 RX ORDER — LEVOFLOXACIN 5 MG/ML
750 INJECTION, SOLUTION INTRAVENOUS
Status: DISCONTINUED | OUTPATIENT
Start: 2018-03-29 | End: 2018-04-01

## 2018-03-29 RX ORDER — TEMAZEPAM 15 MG/1
15 CAPSULE ORAL
Status: DISCONTINUED | OUTPATIENT
Start: 2018-03-29 | End: 2018-04-12 | Stop reason: HOSPADM

## 2018-03-29 RX ORDER — METOPROLOL TARTRATE 25 MG/1
25 TABLET, FILM COATED ORAL
Status: COMPLETED | OUTPATIENT
Start: 2018-03-29 | End: 2018-03-29

## 2018-03-29 RX ADMIN — PIPERACILLIN SODIUM,TAZOBACTAM SODIUM 4.5 G: 4; .5 INJECTION, POWDER, FOR SOLUTION INTRAVENOUS at 13:14

## 2018-03-29 RX ADMIN — Medication 10 ML: at 21:50

## 2018-03-29 RX ADMIN — HEPARIN SODIUM 5000 UNITS: 5000 INJECTION, SOLUTION INTRAVENOUS; SUBCUTANEOUS at 02:20

## 2018-03-29 RX ADMIN — DIPHENOXYLATE HYDROCHLORIDE AND ATROPINE SULFATE 1 TABLET: 2.5; .025 TABLET ORAL at 12:57

## 2018-03-29 RX ADMIN — HEPARIN SODIUM 5000 UNITS: 5000 INJECTION, SOLUTION INTRAVENOUS; SUBCUTANEOUS at 18:00

## 2018-03-29 RX ADMIN — METOPROLOL TARTRATE 25 MG: 25 TABLET ORAL at 02:17

## 2018-03-29 RX ADMIN — ASPIRIN 81 MG CHEWABLE TABLET 81 MG: 81 TABLET CHEWABLE at 18:00

## 2018-03-29 RX ADMIN — HYDRALAZINE HYDROCHLORIDE 100 MG: 50 TABLET ORAL at 09:32

## 2018-03-29 RX ADMIN — ACETAMINOPHEN 650 MG: 325 TABLET ORAL at 01:28

## 2018-03-29 RX ADMIN — CLOPIDOGREL BISULFATE 75 MG: 75 TABLET ORAL at 09:33

## 2018-03-29 RX ADMIN — TEMAZEPAM 15 MG: 15 CAPSULE ORAL at 21:49

## 2018-03-29 RX ADMIN — ESCITALOPRAM OXALATE 10 MG: 10 TABLET ORAL at 09:32

## 2018-03-29 RX ADMIN — PIPERACILLIN SODIUM,TAZOBACTAM SODIUM 4.5 G: 4; .5 INJECTION, POWDER, FOR SOLUTION INTRAVENOUS at 21:50

## 2018-03-29 RX ADMIN — LEVOFLOXACIN 750 MG: 5 INJECTION, SOLUTION INTRAVENOUS at 23:31

## 2018-03-29 RX ADMIN — METHYLPREDNISOLONE SODIUM SUCCINATE 40 MG: 40 INJECTION, POWDER, FOR SOLUTION INTRAMUSCULAR; INTRAVENOUS at 12:57

## 2018-03-29 RX ADMIN — PANTOPRAZOLE SODIUM 40 MG: 40 TABLET, DELAYED RELEASE ORAL at 18:00

## 2018-03-29 RX ADMIN — HYDROCHLOROTHIAZIDE 25 MG: 25 TABLET ORAL at 18:00

## 2018-03-29 RX ADMIN — HYDRALAZINE HYDROCHLORIDE 100 MG: 50 TABLET ORAL at 21:50

## 2018-03-29 RX ADMIN — SODIUM CHLORIDE 75 ML/HR: 900 INJECTION, SOLUTION INTRAVENOUS at 12:38

## 2018-03-29 RX ADMIN — ALBUTEROL SULFATE 2.5 MG: 2.5 SOLUTION RESPIRATORY (INHALATION) at 05:11

## 2018-03-29 RX ADMIN — HEPARIN SODIUM 5000 UNITS: 5000 INJECTION, SOLUTION INTRAVENOUS; SUBCUTANEOUS at 09:33

## 2018-03-29 RX ADMIN — METHYLPREDNISOLONE SODIUM SUCCINATE 40 MG: 40 INJECTION, POWDER, FOR SOLUTION INTRAMUSCULAR; INTRAVENOUS at 18:00

## 2018-03-29 RX ADMIN — PANTOPRAZOLE SODIUM 40 MG: 40 TABLET, DELAYED RELEASE ORAL at 09:32

## 2018-03-29 RX ADMIN — HYDRALAZINE HYDROCHLORIDE 100 MG: 50 TABLET ORAL at 16:35

## 2018-03-29 RX ADMIN — METHYLPREDNISOLONE SODIUM SUCCINATE 40 MG: 40 INJECTION, POWDER, FOR SOLUTION INTRAMUSCULAR; INTRAVENOUS at 23:31

## 2018-03-29 RX ADMIN — METHYLPREDNISOLONE SODIUM SUCCINATE 40 MG: 40 INJECTION, POWDER, FOR SOLUTION INTRAMUSCULAR; INTRAVENOUS at 09:33

## 2018-03-29 NOTE — ED NOTES
Bedside and Verbal shift change report given to LOVELY Mariano (oncoming nurse) by Suzanna Parry (offgoing nurse). Report included the following information SBAR, ED Summary, Intake/Output, MAR and Recent Results. Monitor x 3. Call bell in reach. Bed in lowest position, with side rails up x 2. Pt updated on plan of care. Appears to be sleeping at this time, no acute distress. IVF infusing as ordered. Removed off bedpan, meplix border placed to sacral area for redness.

## 2018-03-29 NOTE — PROGRESS NOTES
Pharmacy Automatic Renal Dosing Protocol - Antimicrobials    Indication for Antimicrobials: b/l PNA (patient admitted from )    Current Regimen of Each Antimicrobial:  Vancomycin IV Start Date 3/28/18; Day # 2)  Levofloxacin 500 mg IV q 48 hours (Start Date: 3/27; Day #3)  Pip-tazo 4.5 g IV q 12 hours (Start Date: 3/27; Day #3)    Goal Level: VANCOMYCIN TROUGH GOAL RANGE    Vancomycin Trough: 15 - 20 mcg/mL    Measured / Extrapolated Vancomycin Level: N/A    Significant Cultures:    3/27: blood: no growth x 2 days-PRELIM    Radiology / Imaging results: (X-ray, CT scan or MRI):    3/27 (XR Chest Port): Left perihilar and right lower lobe infiltrates. Decrease in the size of the left lower lobe lesion.  3/28 (XR Chest Port): Interstitial edema, cardiomegaly. Possible superimposed infiltrate left perihilar. Paralysis, amputations, malnutrition: Actual BW<IBW    Labs:  Recent Labs      18   0248  18   0338  18   1812   CREA  1.51*  2.17*  2.53*   BUN  37*  48*  52*   WBC   --   16.7*  12.6*     Temp (24hrs), Av.2 °F (37.3 °C), Min:98 °F (36.7 °C), Max:101.2 °F (38.4 °C)    Creatinine Clearance (mL/min) or Dialysis: ~22 ml/min    Impression/Plan:   · Scr improving and almost to baseline (~1.3 mg/dL)  · Vancomycin Maintenance regimen transitioned from \"dosing per levels\" to 500 mg IV q 24 hours to achieve an expected trough level of ~16 mcg/mL  · Pip-tazo dosing adjusted to 4.5 g IV q 8 per protocol for indication and renal function  · Levofloxacin dosing adjusted to 750 mg IV q 48 hours per protocol for indication and renal function  · Daily BMP ordered  · No vancomycin level has been ordered at this time  · Antibiotic stop date: TBD     Pharmacy will follow daily and adjust medications as appropriate for renal function and/or serum levels.     Thank you,    Anamika Mclean, PharmD, [address] [facility] Specialist

## 2018-03-29 NOTE — ED NOTES
Bedside and Verbal shift change report given to LOVELY Bhatt (oncoming nurse) by Uzma Deluna (offgoing nurse). Report included the following information SBAR, ED Summary, Intake/Output, MAR and Recent Results. Monitor x 3. Call bell in reach. Bed in lowest position, with side rails up x 2. Pt updated on plan of care. 75 mL/hr IVF infusing to RAC.

## 2018-03-29 NOTE — PROGRESS NOTES
PROGRESS NOTE    NAME:  Donald Cote   :   1935   MRN:   [id number]     Date/Time:  3/29/2018 7:19 AM  Subjective:   History:  Chart reviewed and patient seen and examined this AM and D/W her nurse and all events noted. She presented on 3/27 with several episodes of N/V w/o diarrhea or other GI c/o. She has had no Vomiting since 3/27 AM and no other GI c/o except a little nausea. She noted to be hypoxemic and was diagnosed with bilateral Pneumonia and has been started on triple antibiotics. She has a little cough and notable SOB w/o other cardio/respiratory c/o. She has no  c/o. She is generally weak w/o other neurologic c/o. There are no other c/o on complete ROS. Intermittent marked tachycardia last PM up to 170 which responded to Metoprolol IV.        Medications reviewed:  Current Facility-Administered Medications   Medication Dose Route Frequency    aspirin chewable tablet 81 mg  81 mg Oral QPM    clopidogrel (PLAVIX) tablet 75 mg  75 mg Oral DAILY    hydrALAZINE (APRESOLINE) tablet 100 mg  100 mg Oral TID    escitalopram oxalate (LEXAPRO) tablet 10 mg  10 mg Oral DAILY    pantoprazole (PROTONIX) tablet 40 mg  40 mg Oral BID    hydroCHLOROthiazide (HYDRODIURIL) tablet 25 mg  25 mg Oral QPM    sodium chloride (NS) flush 5-10 mL  5-10 mL IntraVENous Q8H    sodium chloride (NS) flush 5-10 mL  5-10 mL IntraVENous PRN    heparin (porcine) injection 5,000 Units  5,000 Units SubCUTAneous Q8H    albuterol (PROVENTIL VENTOLIN) nebulizer solution 2.5 mg  2.5 mg Nebulization Q6H PRN    piperacillin-tazobactam (ZOSYN) 4.5 g in 0.9% sodium chloride (MBP/ADV) 100 mL  4.5 g IntraVENous Q12H    Vancomycin Dosing per Levels   Other DAILY    levoFLOXacin (LEVAQUIN) 500 mg in D5W IVPB  500 mg IntraVENous Q48H    0.9% sodium chloride infusion  75 mL/hr IntraVENous CONTINUOUS     Current Outpatient Prescriptions   Medication Sig    acetaminophen (TYLENOL) 650 mg CR tablet Take 650 mg by mouth every six (6) hours as needed for Pain.  hydrALAZINE (APRESOLINE) 100 mg tablet Take 1 Tab by mouth three (3) times daily.  escitalopram oxalate (LEXAPRO) 10 mg tablet Take 1 Tab by mouth daily.  pantoprazole (PROTONIX) 40 mg tablet Take 1 Tab by mouth two (2) times a day. Indications: GASTRIC ULCER    cloNIDine HCl (CATAPRES) 0.3 mg tablet Take 0.3 mg by mouth two (2) times a day.  clopidogrel (PLAVIX) 75 mg tablet Take 75 mg by mouth daily. Stopped for surgery-8-4-10     aspirin 81 mg tablet Take 81 mg by mouth every evening. Stopped for surgery 8-4-10     amlodipine-valsartan (EXFORGE) 5-320 mg per tablet Take 1 Tab by mouth every evening.  simvastatin (ZOCOR) 80 mg tablet Take 80 mg by mouth every evening.  gabapentin (NEURONTIN) 600 mg tablet Take 600 mg by mouth three (3) times daily.  hydrochlorothiazide (HYDRODIURIL) 25 mg tablet Take 25 mg by mouth every evening. Objective:   Vitals:  Visit Vitals    /66 (BP 1 Location: Left arm, BP Patient Position: At rest)    Pulse 73    Temp 99.2 °F (37.3 °C)    Resp 15    Ht 5' 1.5\" (1.562 m)    Wt 105 lb (47.6 kg)    SpO2 94%    Breastfeeding No    BMI 19.52 kg/m2    O2 Flow Rate (L/min): 5 l/min O2 Device: Nasal cannula Temp (24hrs), Av.3 °F (37.4 °C), Min:98.3 °F (36.8 °C), Max:101.2 °F (38.4 °C)      Last 24hr Input/Output:    Intake/Output Summary (Last 24 hours) at 18 0762  Last data filed at 18 0340   Gross per 24 hour   Intake             1245 ml   Output             1100 ml   Net              145 ml        PHYSICAL EXAM:  General:     Alert, cooperative, no distress, appears stated age. Head:    Normocephalic, without obvious abnormality, atraumatic. Eyes:    Conjunctivae/corneas clear. PERRLA  Nose:   Nares normal. No drainage or sinus tenderness.   Throat:     Lips, mucosa, and tongue normal.  No Thrush  Neck:   Supple, symmetrical,  no adenopathy, thyroid: non tender     no carotid bruit and no JVD.  Back:     Symmetric,  No CVA tenderness. Lungs:    Clear to auscultation bilaterally with overall decreased BS. No Wheezing or Rhonchi. No rales. Heart:    Regular rate and rhythm,  no murmur, rub or gallop. Abdomen:    Soft, mild mid upper abdominal tenderness w/o rebound. Not distended. Bowel sounds normal. No masses  Extremities:  Extremities normal, atraumatic, No cyanosis. No edema. + clubbing  Lymph nodes:  Cervical, supraclavicular normal.  Neurologic:  General decreased strength, Alert and oriented X 3.    Skin:                 No rash      Lab Data Reviewed:    Recent Results (from the past 24 hour(s))   EKG, 12 LEAD, SUBSEQUENT    Collection Time: 03/29/18  1:01 AM   Result Value Ref Range    Ventricular Rate 103 BPM    Atrial Rate 103 BPM    P-R Interval 164 ms    QRS Duration 78 ms    Q-T Interval 392 ms    QTC Calculation (Bezet) 513 ms    Calculated P Axis 74 degrees    Calculated R Axis -32 degrees    Calculated T Axis 62 degrees    Diagnosis       Sinus tachycardia with premature supraventricular complexes  Left axis deviation  Inferior infarct , age undetermined  Cannot rule out Anterior infarct (cited on or before 29-MAR-2018)  When compared with ECG of 27-MAR-2018 17:47,  premature supraventricular complexes are now present  Inferior infarct is now present  Non-specific change in ST segment in Anterolateral leads  QT has lengthened     METABOLIC PANEL, BASIC    Collection Time: 03/29/18  2:48 AM   Result Value Ref Range    Sodium 142 136 - 145 mmol/L    Potassium 4.5 3.5 - 5.1 mmol/L    Chloride 114 (H) 97 - 108 mmol/L    CO2 21 21 - 32 mmol/L    Anion gap 7 5 - 15 mmol/L    Glucose 114 (H) 65 - 100 mg/dL    BUN 37 (H) 6 - 20 MG/DL    Creatinine 1.51 (H) 0.55 - 1.02 MG/DL    BUN/Creatinine ratio 25 (H) 12 - 20      GFR est AA 40 (L) >60 ml/min/1.73m2    GFR est non-AA 33 (L) >60 ml/min/1.73m2    Calcium 9.2 8.5 - 10.1 MG/DL         Assessment/Plan:     Principal Problem:    Ac[facility] ([facility] (3/27/2018)    Active Problems:    COPD exacerbation ([facility] (8/14/2010)      Hypertension (7/20/2016)      Sepsis ([facility] (3/28/2018)      Pneumonia (3/28/2018)      Acute renal failure (ARF) ([facility] (3/28/2018)       ___________________________________________________  PLAN:    1. Zosyn, Levaquin and Vancomycin for Pneumonia pending cultures  2. Supplemental Oxygen for Hypoxemia, now 92% on 5 L NC  3.  JA treatments for acute respiratory failure  4. Steroids for COPD exacerbation  5. IV Hydration currently at 75 cc/hr with ARF and follow Creat (2.53 on adm to 1.51 now)  6. With anemia follow Hgb, 10.4 on adm to 9.0 now  7. Protonix for PUD prevention  8. Cont current HTN with HCTZ and Hydralazine and follow BP which is up a little  9. Cont ASA and Plavix with ASVD  10.  Other orders reviewed and continued    35 minutes spent in direct care of this high complexity patient today with high risk of deterioration        ___________________________________________________    Attending Physician: Sindhu Bowling MD

## 2018-03-29 NOTE — ED NOTES
Paged and spoke with Dr. Mirela Moreno regarding patient's recent VS changes. Order received for repeat chest xray and to call him back if it shows any changes or anything other than her known PNA.

## 2018-03-29 NOTE — ED NOTES
Paged Dr. Gabo Medley covering for Dr. Mary Borrero. No new orders for pain medication at this time.

## 2018-03-29 NOTE — PROGRESS NOTES
TRANSFER - IN REPORT:    Verbal report received from [address] [place] [address] RN(name) on Inge Major  being received from ED(unit) for routine progression of care      Report consisted of patients Situation, Background, Assessment and   Recommendations(SBAR). Information from the following report(s) SBAR, Kardex, ED Summary, Procedure Summary, Intake/Output, MAR and Accordion was reviewed with the receiving nurse. Opportunity for questions and clarification was provided. Assessment completed upon patients arrival to unit and care assumed.          1457: Awaiting pts arrival.     1520: Primary Nurse Sujata Whittaker and Kayode Garrido RN performed a dual skin assessment on this patient Impairment noted- see wound doc flow sheet  Rashid score is 17

## 2018-03-29 NOTE — ED NOTES
Pt passed three loose green stools in the last hour. Incontinence care provided.  Pt resting with lights dimmed

## 2018-03-29 NOTE — ED NOTES
Lab from [facility] called regarding C. Diff specimen. Pt specimen negative for c. Diff. Spoke with Fermín Galvin from the lab.

## 2018-03-29 NOTE — ED NOTES
Report received from LOVELY Bhatt. DAMIAN, Alejandrina, ED Summary, Intake/Output and Recent Results were discussed.     Guanaco Mackay

## 2018-03-29 NOTE — PROGRESS NOTES
Chart reviewed for PT treatment. Spoke to RN who requests deferring at this time 2/2 patient's pulmonary status. Will f/u.      Yelena Narvaez, PT, DPT, [facility]onal[facility]ers

## 2018-03-29 NOTE — ED NOTES
Spoke with Dr. Lashay Alejo regarding pt new onset diarrhea and C. Diff protocol. Orders for q4 PRN Lomotil for diarrhea and okay to send C.  Diff specimen

## 2018-03-29 NOTE — ED NOTES
TRANSFER - OUT REPORT:    Verbal report given to [place], RN(name) on Leandro Delarosa  being transferred to PCU(unit) for routine progression of care       Report consisted of patients Situation, Background, Assessment and   Recommendations(SBAR). Information from the following report(s) SBAR, Kardex, ED Summary, Intake/Output, MAR and Recent Results was reviewed with the receiving nurse. Lines:   Peripheral IV 03/27/18 Right Antecubital (Active)   Site Assessment Clean, dry, & intact 3/28/2018  7:47 AM   Phlebitis Assessment 0 3/28/2018  7:47 AM   Infiltration Assessment 0 3/28/2018  7:47 AM   Dressing Status Clean, dry, & intact 3/28/2018  7:47 AM   Dressing Type Transparent 3/28/2018  7:47 AM   Hub Color/Line Status Infusing 3/28/2018  7:47 AM        Opportunity for questions and clarification was provided.       Patient transported with:   O2 @ 5 liters humidified

## 2018-03-29 NOTE — ED NOTES
Pt required extensive 2 person assistance to bedside commode for BM. Pt back to bed after BM and repositioned in bed. Denies pain/nausea. Respirations easy and unlabored. HR has slowed to 105, sinus.

## 2018-03-30 PROBLEM — L89.152 DECUBITUS ULCER OF SACRAL REGION, STAGE 2 (HCC): Status: ACTIVE | Noted: 2018-03-30

## 2018-03-30 PROBLEM — E44.0 MODERATE PROTEIN-CALORIE MALNUTRITION (HCC): Status: ACTIVE | Noted: 2018-03-30

## 2018-03-30 PROBLEM — I48.0 PAF (PAROXYSMAL ATRIAL FIBRILLATION) (HCC): Status: ACTIVE | Noted: 2018-03-30

## 2018-03-30 LAB
ANION GAP SERPL CALC-SCNC: 7 MMOL/L (ref 5–15)
ATRIAL RATE: 170 BPM
BASOPHILS # BLD: 0 K/UL (ref 0–0.1)
BASOPHILS NFR BLD: 0 % (ref 0–1)
BUN SERPL-MCNC: 39 MG/DL (ref 6–20)
BUN/CREAT SERPL: 33 (ref 12–20)
CALCIUM SERPL-MCNC: 9.7 MG/DL (ref 8.5–10.1)
CALCULATED P AXIS, ECG09: 86 DEGREES
CALCULATED R AXIS, ECG10: -46 DEGREES
CALCULATED T AXIS, ECG11: 71 DEGREES
CHLORIDE SERPL-SCNC: 117 MMOL/L (ref 97–108)
CO2 SERPL-SCNC: 21 MMOL/L (ref 21–32)
CREAT SERPL-MCNC: 1.2 MG/DL (ref 0.55–1.02)
DIAGNOSIS, 93000: NORMAL
DIFFERENTIAL METHOD BLD: ABNORMAL
EOSINOPHIL # BLD: 0 K/UL (ref 0–0.4)
EOSINOPHIL NFR BLD: 0 % (ref 0–7)
ERYTHROCYTE [DISTWIDTH] IN BLOOD BY AUTOMATED COUNT: 15.1 % (ref 11.5–14.5)
GLUCOSE SERPL-MCNC: 139 MG/DL (ref 65–100)
HCT VFR BLD AUTO: 28.9 % (ref 35–47)
HGB BLD-MCNC: 9.5 G/DL (ref 11.5–16)
IMM GRANULOCYTES # BLD: 0 K/UL (ref 0–0.04)
IMM GRANULOCYTES NFR BLD AUTO: 0 % (ref 0–0.5)
LYMPHOCYTES # BLD: 0.2 K/UL (ref 0.8–3.5)
LYMPHOCYTES NFR BLD: 1 % (ref 12–49)
MCH RBC QN AUTO: 30.3 PG (ref 26–34)
MCHC RBC AUTO-ENTMCNC: 32.9 G/DL (ref 30–36.5)
MCV RBC AUTO: 92 FL (ref 80–99)
MONOCYTES # BLD: 0.6 K/UL (ref 0–1)
MONOCYTES NFR BLD: 3 % (ref 5–13)
NEUTS BAND NFR BLD MANUAL: 2 %
NEUTS SEG # BLD: 20.5 K/UL (ref 1.8–8)
NEUTS SEG NFR BLD: 94 % (ref 32–75)
NRBC # BLD: 0.02 K/UL (ref 0–0.01)
NRBC BLD-RTO: 0.1 PER 100 WBC
P-R INTERVAL, ECG05: 172 MS
PLATELET # BLD AUTO: 204 K/UL (ref 150–400)
PMV BLD AUTO: 11.3 FL (ref 8.9–12.9)
POTASSIUM SERPL-SCNC: 3.4 MMOL/L (ref 3.5–5.1)
Q-T INTERVAL, ECG07: 300 MS
QRS DURATION, ECG06: 82 MS
QTC CALCULATION (BEZET), ECG08: 434 MS
RBC # BLD AUTO: 3.14 M/UL (ref 3.8–5.2)
RBC MORPH BLD: ABNORMAL
SODIUM SERPL-SCNC: 145 MMOL/L (ref 136–145)
VENTRICULAR RATE, ECG03: 126 BPM
WBC # BLD AUTO: 21.3 K/UL (ref 3.6–11)

## 2018-03-30 PROCEDURE — 36415 COLL VENOUS BLD VENIPUNCTURE: CPT | Performed by: INTERNAL MEDICINE

## 2018-03-30 PROCEDURE — 77010033678 HC OXYGEN DAILY

## 2018-03-30 PROCEDURE — 74011250637 HC RX REV CODE- 250/637: Performed by: INTERNAL MEDICINE

## 2018-03-30 PROCEDURE — 74011000250 HC RX REV CODE- 250: Performed by: INTERNAL MEDICINE

## 2018-03-30 PROCEDURE — 94640 AIRWAY INHALATION TREATMENT: CPT

## 2018-03-30 PROCEDURE — 85025 COMPLETE CBC W/AUTO DIFF WBC: CPT | Performed by: INTERNAL MEDICINE

## 2018-03-30 PROCEDURE — 74011250636 HC RX REV CODE- 250/636: Performed by: GENERAL ACUTE CARE HOSPITAL

## 2018-03-30 PROCEDURE — 65660000000 HC RM CCU STEPDOWN

## 2018-03-30 PROCEDURE — 74011250636 HC RX REV CODE- 250/636: Performed by: INTERNAL MEDICINE

## 2018-03-30 PROCEDURE — 74011000258 HC RX REV CODE- 258: Performed by: INTERNAL MEDICINE

## 2018-03-30 PROCEDURE — 80048 BASIC METABOLIC PNL TOTAL CA: CPT | Performed by: INTERNAL MEDICINE

## 2018-03-30 PROCEDURE — 74011250637 HC RX REV CODE- 250/637: Performed by: GENERAL ACUTE CARE HOSPITAL

## 2018-03-30 RX ORDER — METOPROLOL TARTRATE 25 MG/1
25 TABLET, FILM COATED ORAL 2 TIMES DAILY
Status: DISCONTINUED | OUTPATIENT
Start: 2018-03-30 | End: 2018-04-03

## 2018-03-30 RX ADMIN — ALBUTEROL SULFATE 2.5 MG: 2.5 SOLUTION RESPIRATORY (INHALATION) at 06:14

## 2018-03-30 RX ADMIN — HEPARIN SODIUM 5000 UNITS: 5000 INJECTION, SOLUTION INTRAVENOUS; SUBCUTANEOUS at 01:33

## 2018-03-30 RX ADMIN — HYDRALAZINE HYDROCHLORIDE 100 MG: 50 TABLET ORAL at 18:04

## 2018-03-30 RX ADMIN — PIPERACILLIN SODIUM,TAZOBACTAM SODIUM 4.5 G: 4; .5 INJECTION, POWDER, FOR SOLUTION INTRAVENOUS at 05:00

## 2018-03-30 RX ADMIN — PIPERACILLIN SODIUM,TAZOBACTAM SODIUM 4.5 G: 4; .5 INJECTION, POWDER, FOR SOLUTION INTRAVENOUS at 21:59

## 2018-03-30 RX ADMIN — HYDRALAZINE HYDROCHLORIDE 100 MG: 50 TABLET ORAL at 21:59

## 2018-03-30 RX ADMIN — DILTIAZEM HYDROCHLORIDE 5 MG/HR: 5 INJECTION, SOLUTION INTRAVENOUS at 16:28

## 2018-03-30 RX ADMIN — ESCITALOPRAM OXALATE 10 MG: 10 TABLET ORAL at 08:50

## 2018-03-30 RX ADMIN — PANTOPRAZOLE SODIUM 40 MG: 40 TABLET, DELAYED RELEASE ORAL at 08:50

## 2018-03-30 RX ADMIN — PIPERACILLIN SODIUM,TAZOBACTAM SODIUM 4.5 G: 4; .5 INJECTION, POWDER, FOR SOLUTION INTRAVENOUS at 15:38

## 2018-03-30 RX ADMIN — TEMAZEPAM 15 MG: 15 CAPSULE ORAL at 21:59

## 2018-03-30 RX ADMIN — PANTOPRAZOLE SODIUM 40 MG: 40 TABLET, DELAYED RELEASE ORAL at 18:04

## 2018-03-30 RX ADMIN — HYDROCHLOROTHIAZIDE 25 MG: 25 TABLET ORAL at 18:04

## 2018-03-30 RX ADMIN — HEPARIN SODIUM 5000 UNITS: 5000 INJECTION, SOLUTION INTRAVENOUS; SUBCUTANEOUS at 13:04

## 2018-03-30 RX ADMIN — HYDRALAZINE HYDROCHLORIDE 100 MG: 50 TABLET ORAL at 11:31

## 2018-03-30 RX ADMIN — HEPARIN SODIUM 5000 UNITS: 5000 INJECTION, SOLUTION INTRAVENOUS; SUBCUTANEOUS at 18:03

## 2018-03-30 RX ADMIN — Medication 5 ML: at 15:40

## 2018-03-30 RX ADMIN — METHYLPREDNISOLONE SODIUM SUCCINATE 40 MG: 40 INJECTION, POWDER, FOR SOLUTION INTRAMUSCULAR; INTRAVENOUS at 18:03

## 2018-03-30 RX ADMIN — CLOPIDOGREL BISULFATE 75 MG: 75 TABLET ORAL at 08:50

## 2018-03-30 RX ADMIN — METHYLPREDNISOLONE SODIUM SUCCINATE 40 MG: 40 INJECTION, POWDER, FOR SOLUTION INTRAMUSCULAR; INTRAVENOUS at 05:00

## 2018-03-30 RX ADMIN — METHYLPREDNISOLONE SODIUM SUCCINATE 40 MG: 40 INJECTION, POWDER, FOR SOLUTION INTRAMUSCULAR; INTRAVENOUS at 23:15

## 2018-03-30 RX ADMIN — SODIUM CHLORIDE 500 MG: 900 INJECTION, SOLUTION INTRAVENOUS at 01:00

## 2018-03-30 RX ADMIN — DEXTROSE MONOHYDRATE 10 MG/HR: 50 INJECTION, SOLUTION INTRAVENOUS at 06:15

## 2018-03-30 RX ADMIN — ASPIRIN 81 MG CHEWABLE TABLET 81 MG: 81 TABLET CHEWABLE at 18:04

## 2018-03-30 RX ADMIN — METOPROLOL TARTRATE 25 MG: 25 TABLET ORAL at 08:50

## 2018-03-30 RX ADMIN — SODIUM CHLORIDE 75 ML/HR: 900 INJECTION, SOLUTION INTRAVENOUS at 22:03

## 2018-03-30 RX ADMIN — DEXTROSE MONOHYDRATE 10 MG/HR: 50 INJECTION, SOLUTION INTRAVENOUS at 00:13

## 2018-03-30 RX ADMIN — Medication 10 ML: at 21:59

## 2018-03-30 RX ADMIN — METOPROLOL TARTRATE 25 MG: 25 TABLET ORAL at 18:04

## 2018-03-30 RX ADMIN — METHYLPREDNISOLONE SODIUM SUCCINATE 40 MG: 40 INJECTION, POWDER, FOR SOLUTION INTRAMUSCULAR; INTRAVENOUS at 13:04

## 2018-03-30 RX ADMIN — SODIUM CHLORIDE 500 MG: 900 INJECTION, SOLUTION INTRAVENOUS at 16:30

## 2018-03-30 NOTE — PROGRESS NOTES
Bedside shift report received from Silvano Matos. SBAR, MAR,VS, KARDEX reviewed. Family at bedside. 2000 Assessment completed as charted. Pt is alert, oriented x4. Generalized weakness noted. Oxygen at 6L via nc with humidification to maintain sats >92%. 2100  paged for pt's RSL medication. New order received    0000 EKG done. 0600  paged regarding HTN and ekg changes. New orders received. 0630  RT paged for prn resp treatment d/t wheezing.

## 2018-03-30 NOTE — PROGRESS NOTES
Physical Therapy  3/30/2018    1130: Chart reviewed and spoke with RN. Nursing requests that we hold therapy today as pt's O2 saturations have been decreased on nasal cannula (6L/min) this date at rest and cardizem drip is running. Will defer and continue to follow.       Thank Blanca Paredes, PT, DPT

## 2018-03-30 NOTE — PROGRESS NOTES
Initial Nutrition Assessment:    INTERVENTIONS/RECOMMENDATIONS:   · Meals/Snacks: General/healthful diet: Advance diet as tolerated  · Supplements: Commercial supplement: Ensure clear BID    ASSESSMENT:   Patient medically noted for vomiting PTA, respiratory failure, pneumonia, acute renal failure, COPD, HTN, and AFIB. Currently receiving a clear liquid diet. Patient is not a fan of the clear liquid diet; didn't consume much for breakfast this morning. Reports she was eating well prior to vomiting episodes; no recent weight loss. Agreeable to ensure clear supplements to help with PO intake for now. Menu provided and room service explained. Encouraged intake of meals. Of note, PI referral received but unable to locate documentation in flowsheets. Diet Order: Clear liquids  % Eaten:  Patient Vitals for the past 72 hrs:   % Diet Eaten   03/28/18 1827 50 %     Pertinent Medications: [x]Reviewed []Other: Plavix, Lexapro, Hydralazine, HCTZ, Solu-medrol, Lopressor, Protonix, Cardizem  Pertinent Labs: [x]Reviewed []Other: K+ 3.4  Food Allergies: [x]None []Other   Last BM: 3/19   [x]Active     []Hyperactive  []Hypoactive       [] Absent BS  Skin:    [] Intact   [] Incision  [] Breakdown  [x] Other: buttocks? Anthropometrics:   Height: 5' 1.5\" (156.2 cm) Weight: 47.6 kg (105 lb)   IBW (%IBW):   ( ) UBW (%UBW):   (  %)   Last Weight Metrics:  Weight Loss Metrics 3/27/2018 10/28/2016 10/13/2016 8/29/2016 7/19/2016 2/14/2015 8/10/2010   Today's Wt 105 lb 95 lb 95 lb 101 lb 101 lb 98 lb 5.2 oz 118 lb 3.2 oz   BMI 19.52 kg/m2 17.95 kg/m2 17.66 kg/m2 19.08 kg/m2 18.78 kg/m2 18.59 kg/m2 -       BMI: Body mass index is 19.52 kg/(m^2). This BMI is indicative of:   [x]Underweight (for age)   []Normal    []Overweight    [] Obesity   [] Extreme Obesity (BMI>40)     Estimated Nutrition Needs (Based on):   1411 Kcals/day (BMR (893) x 1.3AF +250kcal) , 48 g (-58g (1.0-1.2 g/kg bw)) Protein  Carbohydrate:  At Least 130 g/day Fluids: 1400 mL/day (1ml/kcal)    Pt expected to meet estimated nutrient needs: [x]Yes []No    NUTRITION DIAGNOSES:   Problem:  Inadequate protein-energy intake      Etiology: related to current respiratory status     Signs/Symptoms: as evidenced by clear liquids, decreased appetite      NUTRITION INTERVENTIONS:  Meals/Snacks: General/healthful diet   Supplements: Commercial supplement              GOAL:   Diet advanced next 1-2 days; PO intake >50% of meals/supplement next 3-5 days    LEARNING NEEDS (Diet, Food/Nutrient-Drug Interaction):    [x] None Identified   [] Identified and Education Provided/Documented   [] Identified and Pt declined/was not appropriate     Cultureal, Baptist, OR Ethnic Dietary Needs:    [x] None Identified   [] Identified and Addressed     [x] Interdisciplinary Care Plan Reviewed/Documented    [x] Discharge Planning:  Regular/2g Na      MONITORING /EVALUATION:   Food/Nutrient Intake Outcomes:  Total energy intake  Physical Signs/Symptoms Outcomes: Weight/weight change, Electrolyte and renal profile    NUTRITION RISK:    [] High              [x] Moderate           []  Low  []  Minimal/Uncompromised    PT SEEN FOR:    []  MD Consult: []Calorie Count      []Diabetic Diet Education        []Diet Education     []Electrolyte Management     []General Nutrition Management and Supplements     []Management of Tube Feeding     []TPN Recommendations    [x]  RN Referral:  [x]MST score >=2     []Enteral/Parenteral Nutrition PTA     []Pregnant: Gestational DM or Multigestation     [x]Pressure Ulcer/Wound Care needs        []  Low BMI  []  LOS Caryle Meline  Pager [phone]                 Weekend Pager [phone]

## 2018-03-30 NOTE — PROGRESS NOTES
Occupational Therapy  Orders received and medical record reviewed. Nursing requests that we hold therapy evaluations today as pt's O2 saturations have been decreased on nasal cannula this date at rest and cardizem drip is running. Will defer and continue to follow.

## 2018-03-30 NOTE — CDMP QUERY
Patient is noted to have a BMI of 19. Please clarify if this patient is:     =>Underweight  =>Cachexia  =>Failure to Thrive  =>Other explanation of clinical findings  =>Unable to determine (no explanation for clinical findings)    Presentation: 5'1\", 103 lbs = BMI 19    Please clarify and document your clinical opinion in the progress notes and discharge summary, including the definitive and or presumptive diagnosis, (suspected or probable), related to the above clinical findings. Please include clinical findings supporting your diagnosis.    Thanks,  Melanie Ortega RN/CDMP

## 2018-03-30 NOTE — CDMP QUERY
Account Number: [de-identified]  MRN: [id number]  Patient: Francisco Javier Emery  Created: [id number]  Clinician Name: Grandville Rhein Dr. Shara Mcardle MD :  Please clarify if this patient is (was) being treated/managed for:     => Pressure ulcer of unspecified buttock, stage 2 as evidenced by 3/27 ED notes, 3/29 nursing req wound cx, respositing  => Other explanation of clinical findings  => Clinically Undetermined (no explanation for clinical findings)    The medical record reflects the following clinical findings, treatment, and risk factors. Risk Factors:  82f adm w/ sepsis/ pna  Clinical Indicators:  ED--Stage 2 decubitus   Treatment:  wound cx, respositing  Please clarify and document your clinical opinion in the progress notes and discharge summary including the definitive and/or presumptive diagnosis, (suspected or probable), related to the above clinical findings. Please include clinical findings supporting your diagnosis.   Thank Katlyn Mancini

## 2018-03-30 NOTE — PROGRESS NOTES
PROGRESS NOTE    NAME:  Smita Ponce   :   1935   MRN:   [id number]     Date/Time:  3/30/2018 7:07 AM  Subjective:   History:  Chart reviewed and patient seen and examined this AM and D/W her nurse and all events noted. She presented on 3/27 with several episodes of N/V w/o diarrhea or other GI c/o. She has had no Vomiting since 3/27 AM and no other GI c/o except yesterday AM had some diarrhea w/o since yesterday AM. She noted to be hypoxemic and was diagnosed with bilateral Pneumonia and has been started on triple antibiotics. She has a little cough and notable SOB w/o other cardio/respiratory c/o. She has no  c/o. She has developed PAF and now on Cardizem drip. She is generally weak w/o other neurologic c/o. There are no other c/o on complete ROS.        Medications reviewed:  Current Facility-Administered Medications   Medication Dose Route Frequency    metoprolol tartrate (LOPRESSOR) tablet 25 mg  25 mg Oral BID    methylPREDNISolone (PF) (SOLU-MEDROL) injection 40 mg  40 mg IntraVENous Q6H    diphenoxylate-atropine (LOMOTIL) tablet 1 Tab  1 Tab Oral Q4H PRN    vancomycin (VANCOCIN) 500 mg in 0.9% sodium chloride 100 mL IVPB  500 mg IntraVENous Q24H    levoFLOXacin (LEVAQUIN) 750 mg in D5W IVPB  750 mg IntraVENous Q48H    piperacillin-tazobactam (ZOSYN) 4.5 g in 0.9% sodium chloride (MBP/ADV) 100 mL  4.5 g IntraVENous Q8H    temazepam (RESTORIL) capsule 15 mg  15 mg Oral QHS    dilTIAZem (CARDIZEM) 100 mg in dextrose 5% (MBP/ADV) 100 mL infusion  0-15 mg/hr IntraVENous TITRATE    aspirin chewable tablet 81 mg  81 mg Oral QPM    clopidogrel (PLAVIX) tablet 75 mg  75 mg Oral DAILY    hydrALAZINE (APRESOLINE) tablet 100 mg  100 mg Oral TID    escitalopram oxalate (LEXAPRO) tablet 10 mg  10 mg Oral DAILY    pantoprazole (PROTONIX) tablet 40 mg  40 mg Oral BID    hydroCHLOROthiazide (HYDRODIURIL) tablet 25 mg  25 mg Oral QPM    sodium chloride (NS) flush 5-10 mL  5-10 mL IntraVENous Q8H    sodium chloride (NS) flush 5-10 mL  5-10 mL IntraVENous PRN    heparin (porcine) injection 5,000 Units  5,000 Units SubCUTAneous Q8H    albuterol (PROVENTIL VENTOLIN) nebulizer solution 2.5 mg  2.5 mg Nebulization Q6H PRN    0.9% sodium chloride infusion  75 mL/hr IntraVENous CONTINUOUS        Objective:   Vitals:  Visit Vitals    /63 (BP 1 Location: Left arm)    Pulse (!) 113    Temp 97.7 °F (36.5 °C)    Resp 16    Ht 5' 1.5\" (1.562 m)    Wt 105 lb (47.6 kg)    SpO2 91%    Breastfeeding No    BMI 19.52 kg/m2    O2 Flow Rate (L/min): 6 l/min O2 Device: Nasal cannula Temp (24hrs), Av.1 °F (36.7 °C), Min:97.7 °F (36.5 °C), Max:98.6 °F (37 °C)      Last 24hr Input/Output:    Intake/Output Summary (Last 24 hours) at 18 0707  Last data filed at 18 0049   Gross per 24 hour   Intake              250 ml   Output                0 ml   Net              250 ml        PHYSICAL EXAM:  General:     Alert, cooperative, no distress, appears stated age. Head:    Normocephalic, without obvious abnormality, atraumatic. Eyes:    Conjunctivae/corneas clear. PERRLA  Nose:   Nares normal. No drainage or sinus tenderness. Throat:     Lips, mucosa, and tongue normal.  No Thrush  Neck:   Supple, symmetrical,  no adenopathy, thyroid: non tender     no carotid bruit and no JVD. Back:     Symmetric,  No CVA tenderness. Lungs:    Clear to auscultation bilaterally with overall decreased BS. No Wheezing or Rhonchi. No rales. Heart:    Irregular rate and rhythm,  no murmur, rub or gallop. Abdomen:    Soft, mild mid upper abdominal tenderness w/o rebound. Not distended. Bowel sounds normal. No masses  Extremities:  Extremities normal, atraumatic, No cyanosis. No edema. + clubbing  Lymph nodes:  Cervical, supraclavicular normal.  Neurologic:  General decreased strength, Alert and oriented X 3.    Skin:                 No rash      Lab Data Reviewed:    Recent Results (from the past 24 hour(s)) ECG RHYTHM ANALYSIS ADULT    Collection Time: 03/29/18 11:15 PM   Result Value Ref Range    Ventricular Rate 126 BPM    Atrial Rate 170 BPM    P-R Interval 172 ms    QRS Duration 82 ms    Q-T Interval 300 ms    QTC Calculation (Bezet) 434 ms    Calculated P Axis 86 degrees    Calculated R Axis -46 degrees    Calculated T Axis 71 degrees    Diagnosis       Sinus tachycardia with frequent premature ventricular complexes and fusion   complexes  Pulmonary disease pattern  Left anterior fascicular block  Septal infarct , age undetermined  Inferior infarct , age undetermined  When compared with ECG of 29-MAR-2018 01:01,  MANUAL COMPARISON REQUIRED, DATA IS UNCONFIRMED     CBC WITH AUTOMATED DIFF    Collection Time: 03/30/18  5:16 AM   Result Value Ref Range    WBC 21.3 (H) 3.6 - 11.0 K/uL    RBC 3.14 (L) 3.80 - 5.20 M/uL    HGB 9.5 (L) 11.5 - 16.0 g/dL    HCT 28.9 (L) 35.0 - 47.0 %    MCV 92.0 80.0 - 99.0 FL    MCH 30.3 26.0 - 34.0 PG    MCHC 32.9 30.0 - 36.5 g/dL    RDW 15.1 (H) 11.5 - 14.5 %    PLATELET 985 921 - 745 K/uL    MPV 11.3 8.9 - 12.9 FL    NRBC 0.1 (H) 0  WBC    ABSOLUTE NRBC 0.02 (H) 0.00 - 0.01 K/uL    NEUTROPHILS 94 (H) 32 - 75 %    BAND NEUTROPHILS 2 %    LYMPHOCYTES 1 (L) 12 - 49 %    MONOCYTES 3 (L) 5 - 13 %    EOSINOPHILS 0 0 - 7 %    BASOPHILS 0 0 - 1 %    IMMATURE GRANULOCYTES 0 0.0 - 0.5 %    ABS. NEUTROPHILS 20.5 (H) 1.8 - 8.0 K/UL    ABS. LYMPHOCYTES 0.2 (L) 0.8 - 3.5 K/UL    ABS. MONOCYTES 0.6 0.0 - 1.0 K/UL    ABS. EOSINOPHILS 0.0 0.0 - 0.4 K/UL    ABS. BASOPHILS 0.0 0.0 - 0.1 K/UL    ABS. IMM.  GRANS. 0.0 0.00 - 0.04 K/UL    DF MANUAL      RBC COMMENTS ANISOCYTOSIS  1+       METABOLIC PANEL, BASIC    Collection Time: 03/30/18  5:16 AM   Result Value Ref Range    Sodium 145 136 - 145 mmol/L    Potassium 3.4 (L) 3.5 - 5.1 mmol/L    Chloride 117 (H) 97 - 108 mmol/L    CO2 21 21 - 32 mmol/L    Anion gap 7 5 - 15 mmol/L    Glucose 139 (H) 65 - 100 mg/dL    BUN 39 (H) 6 - 20 MG/DL Creatinine 1.20 (H) 0.55 - 1.02 MG/DL    BUN/Creatinine ratio 33 (H) 12 - 20      GFR est AA 52 (L) >60 ml/min/1.73m2    GFR est non-AA 43 (L) >60 ml/min/1.73m2    Calcium 9.7 8.5 - 10.1 MG/DL         Assessment/Plan:     Principal Problem:    Acute hypoxemic respiratory failure (HCC) (3/27/2018)    Active Problems:    COPD exacerbation ([facility] (8/14/2010)      Hypertension (7/20/2016)      Peripheral vascular disease ([facility] (7/20/2016)      Sepsis ([facility] (3/28/2018)      Pneumonia (3/28/2018)      Acute renal failure (ARF) ([facility] (3/28/2018)      Primary lung large cell carcinoma, left ([facility] (3/29/2018)      Overview: Being candidate for surgical resection so treated with radiation therapy       in 2016      PAF (paroxysmal atrial fibrillation) ([facility] (3/30/2018)       ___________________________________________________  PLAN:    1. Zosyn, Levaquin and Vancomycin for Pneumonia pending cultures  2. Supplemental Oxygen for Hypoxemia, now 91% on 5 L NC  3.  JA treatments for acute respiratory failure  4. Steroids for COPD exacerbation  5. IV Hydration currently at 75 cc/hr with ARF and follow Creat (2.53 on adm to 1.20 now)  6. With anemia follow Hgb, 10.4 on adm to 9.5 now  7. Protonix for PUD prevention  8. Cont current HTN with HCTZ and Hydralazine and follow BP which is up a little so add Metoprolol and also on cardiazem drip now  9. Cont ASA and Plavix with ASVD  10.  Other orders reviewed and continued          ___________________________________________________    Attending Physician: Dread Conte MD

## 2018-03-30 NOTE — PROGRESS NOTES
8:34 AM  Pt required NRB mask to maintain SpO2 >90 overnight. Pt now on 6L NC SpO2 89-93%. ST with PAC's on tele- HR remains elevated (> 110bpm) at rest on 10mg/min, increased dilt gtt to 15mg/min. Will monitor HR/BP and give scheduled PO Rx.    3:05 PM  SpO2 88% on 6L NC, encouraged breathing techniques but unable to get SpO2 up. . RT notified- pt placed on simple mask on 12L     3:30 PM  Placed pt back on NRB to maintain SpO2 >90%

## 2018-03-31 ENCOUNTER — APPOINTMENT (OUTPATIENT)
Dept: GENERAL RADIOLOGY | Age: 83
DRG: 871 | End: 2018-03-31
Attending: HOSPITALIST
Payer: MEDICARE

## 2018-03-31 LAB
ANION GAP SERPL CALC-SCNC: 11 MMOL/L (ref 5–15)
ARTERIAL PATENCY WRIST A: ABNORMAL
ARTERIAL PATENCY WRIST A: YES
ARTERIAL PATENCY WRIST A: YES
BASE DEFICIT BLDA-SCNC: 4.4 MMOL/L
BASE DEFICIT BLDA-SCNC: 5.3 MMOL/L
BASE DEFICIT BLDA-SCNC: 5.8 MMOL/L
BDY SITE: ABNORMAL
BUN SERPL-MCNC: 47 MG/DL (ref 6–20)
BUN/CREAT SERPL: 38 (ref 12–20)
CALCIUM SERPL-MCNC: 10.5 MG/DL (ref 8.5–10.1)
CHLORIDE SERPL-SCNC: 117 MMOL/L (ref 97–108)
CO2 SERPL-SCNC: 18 MMOL/L (ref 21–32)
CREAT SERPL-MCNC: 1.25 MG/DL (ref 0.55–1.02)
DATE LAST DOSE: NO GROWTH
EPAP/CPAP/PEEP, PAPEEP: 8
EPAP/CPAP/PEEP, PAPEEP: 8
FIO2 ON VENT: 100 %
GAS FLOW.O2 O2 DELIVERY SYS: 4 L/MIN
GAS FLOW.O2 O2 DELIVERY SYS: 6 L/MIN
GLUCOSE SERPL-MCNC: 129 MG/DL (ref 65–100)
HCO3 BLDA-SCNC: 18 MMOL/L (ref 22–26)
HCO3 BLDA-SCNC: 19 MMOL/L (ref 22–26)
HCO3 BLDA-SCNC: 20 MMOL/L (ref 22–26)
IPAP/PIP, IPAPIP: 12
IPAP/PIP, IPAPIP: 12
PCO2 BLDA: 32 MMHG (ref 35–45)
PCO2 BLDA: 33 MMHG (ref 35–45)
PCO2 BLDA: 33 MMHG (ref 35–45)
PH BLDA: 7.38 [PH] (ref 7.35–7.45)
PH BLDA: 7.38 [PH] (ref 7.35–7.45)
PH BLDA: 7.39 [PH] (ref 7.35–7.45)
PO2 BLDA: 47 MMHG (ref 80–100)
PO2 BLDA: 59 MMHG (ref 80–100)
PO2 BLDA: 90 MMHG (ref 80–100)
POTASSIUM SERPL-SCNC: 3.1 MMOL/L (ref 3.5–5.1)
REPORTED DOSE,DOSE: NO GROWTH UNITS
REPORTED DOSE/TIME,TMG: NO GROWTH
SAO2 % BLD: 83 % (ref 92–97)
SAO2 % BLD: 90 % (ref 92–97)
SAO2 % BLD: 97 % (ref 92–97)
SAO2% DEVICE SAO2% SENSOR NAME: ABNORMAL
SERVICE CMNT-IMP: ABNORMAL
SODIUM SERPL-SCNC: 146 MMOL/L (ref 136–145)
SPECIMEN SITE: ABNORMAL
VANCOMYCIN TROUGH SERPL-MCNC: 12.3 UG/ML (ref 5–10)
VENTILATION MODE VENT: ABNORMAL
VENTILATION MODE VENT: ABNORMAL

## 2018-03-31 PROCEDURE — 94660 CPAP INITIATION&MGMT: CPT

## 2018-03-31 PROCEDURE — 80202 ASSAY OF VANCOMYCIN: CPT | Performed by: INTERNAL MEDICINE

## 2018-03-31 PROCEDURE — 36415 COLL VENOUS BLD VENIPUNCTURE: CPT | Performed by: GENERAL ACUTE CARE HOSPITAL

## 2018-03-31 PROCEDURE — 65660000000 HC RM CCU STEPDOWN

## 2018-03-31 PROCEDURE — 74011000250 HC RX REV CODE- 250: Performed by: INTERNAL MEDICINE

## 2018-03-31 PROCEDURE — 74011250637 HC RX REV CODE- 250/637: Performed by: GENERAL ACUTE CARE HOSPITAL

## 2018-03-31 PROCEDURE — 74011250636 HC RX REV CODE- 250/636: Performed by: HOSPITALIST

## 2018-03-31 PROCEDURE — 36600 WITHDRAWAL OF ARTERIAL BLOOD: CPT | Performed by: INTERNAL MEDICINE

## 2018-03-31 PROCEDURE — 82803 BLOOD GASES ANY COMBINATION: CPT | Performed by: HOSPITALIST

## 2018-03-31 PROCEDURE — 74011250637 HC RX REV CODE- 250/637: Performed by: INTERNAL MEDICINE

## 2018-03-31 PROCEDURE — 74011000258 HC RX REV CODE- 258: Performed by: INTERNAL MEDICINE

## 2018-03-31 PROCEDURE — 74011250636 HC RX REV CODE- 250/636: Performed by: GENERAL ACUTE CARE HOSPITAL

## 2018-03-31 PROCEDURE — 74011250636 HC RX REV CODE- 250/636: Performed by: INTERNAL MEDICINE

## 2018-03-31 PROCEDURE — 74011250636 HC RX REV CODE- 250/636

## 2018-03-31 PROCEDURE — 36600 WITHDRAWAL OF ARTERIAL BLOOD: CPT | Performed by: HOSPITALIST

## 2018-03-31 PROCEDURE — 94640 AIRWAY INHALATION TREATMENT: CPT

## 2018-03-31 PROCEDURE — 80048 BASIC METABOLIC PNL TOTAL CA: CPT | Performed by: GENERAL ACUTE CARE HOSPITAL

## 2018-03-31 PROCEDURE — 36600 WITHDRAWAL OF ARTERIAL BLOOD: CPT

## 2018-03-31 PROCEDURE — 71045 X-RAY EXAM CHEST 1 VIEW: CPT

## 2018-03-31 RX ORDER — POTASSIUM CHLORIDE 750 MG/1
40 TABLET, FILM COATED, EXTENDED RELEASE ORAL
Status: COMPLETED | OUTPATIENT
Start: 2018-03-31 | End: 2018-03-31

## 2018-03-31 RX ORDER — FUROSEMIDE 10 MG/ML
40 INJECTION INTRAMUSCULAR; INTRAVENOUS DAILY
Status: DISCONTINUED | OUTPATIENT
Start: 2018-04-01 | End: 2018-04-01

## 2018-03-31 RX ORDER — FUROSEMIDE 10 MG/ML
INJECTION INTRAMUSCULAR; INTRAVENOUS
Status: COMPLETED
Start: 2018-03-31 | End: 2018-03-31

## 2018-03-31 RX ORDER — FUROSEMIDE 10 MG/ML
40 INJECTION INTRAMUSCULAR; INTRAVENOUS ONCE
Status: COMPLETED | OUTPATIENT
Start: 2018-03-31 | End: 2018-03-31

## 2018-03-31 RX ORDER — POTASSIUM CHLORIDE 1.5 G/1.77G
20 POWDER, FOR SOLUTION ORAL 2 TIMES DAILY WITH MEALS
Status: DISCONTINUED | OUTPATIENT
Start: 2018-03-31 | End: 2018-04-07

## 2018-03-31 RX ORDER — FUROSEMIDE 10 MG/ML
100 INJECTION INTRAMUSCULAR; INTRAVENOUS
Status: COMPLETED | OUTPATIENT
Start: 2018-03-31 | End: 2018-03-31

## 2018-03-31 RX ADMIN — METHYLPREDNISOLONE SODIUM SUCCINATE 40 MG: 40 INJECTION, POWDER, FOR SOLUTION INTRAMUSCULAR; INTRAVENOUS at 17:02

## 2018-03-31 RX ADMIN — PIPERACILLIN SODIUM,TAZOBACTAM SODIUM 4.5 G: 4; .5 INJECTION, POWDER, FOR SOLUTION INTRAVENOUS at 05:40

## 2018-03-31 RX ADMIN — METHYLPREDNISOLONE SODIUM SUCCINATE 40 MG: 40 INJECTION, POWDER, FOR SOLUTION INTRAMUSCULAR; INTRAVENOUS at 11:43

## 2018-03-31 RX ADMIN — METHYLPREDNISOLONE SODIUM SUCCINATE 40 MG: 40 INJECTION, POWDER, FOR SOLUTION INTRAMUSCULAR; INTRAVENOUS at 23:12

## 2018-03-31 RX ADMIN — FUROSEMIDE 100 MG: 10 INJECTION, SOLUTION INTRAMUSCULAR; INTRAVENOUS at 10:40

## 2018-03-31 RX ADMIN — METOPROLOL TARTRATE 25 MG: 25 TABLET ORAL at 17:02

## 2018-03-31 RX ADMIN — METOPROLOL TARTRATE 25 MG: 25 TABLET ORAL at 09:56

## 2018-03-31 RX ADMIN — HEPARIN SODIUM 5000 UNITS: 5000 INJECTION, SOLUTION INTRAVENOUS; SUBCUTANEOUS at 02:01

## 2018-03-31 RX ADMIN — HYDRALAZINE HYDROCHLORIDE 100 MG: 50 TABLET ORAL at 09:56

## 2018-03-31 RX ADMIN — METHYLPREDNISOLONE SODIUM SUCCINATE 40 MG: 40 INJECTION, POWDER, FOR SOLUTION INTRAMUSCULAR; INTRAVENOUS at 05:41

## 2018-03-31 RX ADMIN — ALBUTEROL SULFATE 2.5 MG: 2.5 SOLUTION RESPIRATORY (INHALATION) at 01:58

## 2018-03-31 RX ADMIN — PANTOPRAZOLE SODIUM 40 MG: 40 TABLET, DELAYED RELEASE ORAL at 09:56

## 2018-03-31 RX ADMIN — HYDRALAZINE HYDROCHLORIDE 100 MG: 50 TABLET ORAL at 21:55

## 2018-03-31 RX ADMIN — PIPERACILLIN SODIUM,TAZOBACTAM SODIUM 4.5 G: 4; .5 INJECTION, POWDER, FOR SOLUTION INTRAVENOUS at 21:55

## 2018-03-31 RX ADMIN — Medication 10 ML: at 05:41

## 2018-03-31 RX ADMIN — SODIUM CHLORIDE 500 MG: 900 INJECTION, SOLUTION INTRAVENOUS at 17:08

## 2018-03-31 RX ADMIN — ESCITALOPRAM OXALATE 10 MG: 10 TABLET ORAL at 09:56

## 2018-03-31 RX ADMIN — Medication 10 ML: at 21:56

## 2018-03-31 RX ADMIN — TEMAZEPAM 15 MG: 15 CAPSULE ORAL at 21:55

## 2018-03-31 RX ADMIN — ASPIRIN 81 MG CHEWABLE TABLET 81 MG: 81 TABLET CHEWABLE at 17:01

## 2018-03-31 RX ADMIN — DILTIAZEM HYDROCHLORIDE 5 MG/HR: 5 INJECTION, SOLUTION INTRAVENOUS at 17:11

## 2018-03-31 RX ADMIN — POTASSIUM CHLORIDE 20 MEQ: 1.5 POWDER, FOR SOLUTION ORAL at 17:01

## 2018-03-31 RX ADMIN — Medication 10 ML: at 15:11

## 2018-03-31 RX ADMIN — LEVOFLOXACIN 750 MG: 5 INJECTION, SOLUTION INTRAVENOUS at 23:12

## 2018-03-31 RX ADMIN — CLOPIDOGREL BISULFATE 75 MG: 75 TABLET ORAL at 09:56

## 2018-03-31 RX ADMIN — HYDROCHLOROTHIAZIDE 25 MG: 25 TABLET ORAL at 17:02

## 2018-03-31 RX ADMIN — PANTOPRAZOLE SODIUM 40 MG: 40 TABLET, DELAYED RELEASE ORAL at 17:02

## 2018-03-31 RX ADMIN — FUROSEMIDE 40 MG: 10 INJECTION, SOLUTION INTRAMUSCULAR; INTRAVENOUS at 02:42

## 2018-03-31 RX ADMIN — HEPARIN SODIUM 5000 UNITS: 5000 INJECTION, SOLUTION INTRAVENOUS; SUBCUTANEOUS at 09:56

## 2018-03-31 RX ADMIN — POTASSIUM CHLORIDE 40 MEQ: 750 TABLET, FILM COATED, EXTENDED RELEASE ORAL at 11:43

## 2018-03-31 RX ADMIN — HEPARIN SODIUM 5000 UNITS: 5000 INJECTION, SOLUTION INTRAVENOUS; SUBCUTANEOUS at 17:01

## 2018-03-31 RX ADMIN — PIPERACILLIN SODIUM,TAZOBACTAM SODIUM 4.5 G: 4; .5 INJECTION, POWDER, FOR SOLUTION INTRAVENOUS at 14:30

## 2018-03-31 RX ADMIN — FUROSEMIDE 100 MG: 10 INJECTION INTRAMUSCULAR; INTRAVENOUS at 10:40

## 2018-03-31 RX ADMIN — HYDRALAZINE HYDROCHLORIDE 100 MG: 50 TABLET ORAL at 17:01

## 2018-03-31 NOTE — PROGRESS NOTES
PROGRESS NOTE    NAME:  Clary Velázquez   :   1935   MRN:   [id number]     Date/Time:  3/31/2018 11:02 AM  Subjective:   History:  Chart reviewed and patient seen and examined this AM and D/W her nurse and all events noted. She presented on 3/27 with several episodes of N/V w/o diarrhea or other GI c/o. She has had no Vomiting since 3/27 AM and no other GI c/o except on 3/29 AM had some diarrhea w/o since. . She noted to be hypoxemic and was diagnosed with bilateral Pneumonia and has been started on triple antibiotics. She has had a little cough and notable SOB w/o other cardio/respiratory c/o until during the night when she became more SOB and Hypoxemic and CXR revealed pulmonary Edema. She had 1 dose of 40 mg IV Lasix with 700 cc UO; however still hypoxemic and SOB requiring BIPAP. She denies CP or other cardio/respiratory c/o. She has no  c/o. She has developed PAF since admission and now on Cardizem drip. She is generally weak w/o other neurologic c/o. There are no other c/o on complete ROS.        Medications reviewed:  Current Facility-Administered Medications   Medication Dose Route Frequency    potassium chloride SR (KLOR-CON 10) tablet 40 mEq  40 mEq Oral NOW    metoprolol tartrate (LOPRESSOR) tablet 25 mg  25 mg Oral BID    dilTIAZem (CARDIZEM) 125 mg in 0.9% sodium chloride 125 mL infusion  0-15 mg/hr IntraVENous TITRATE    methylPREDNISolone (PF) (SOLU-MEDROL) injection 40 mg  40 mg IntraVENous Q6H    diphenoxylate-atropine (LOMOTIL) tablet 1 Tab  1 Tab Oral Q4H PRN    vancomycin (VANCOCIN) 500 mg in 0.9% sodium chloride 100 mL IVPB  500 mg IntraVENous Q24H    levoFLOXacin (LEVAQUIN) 750 mg in D5W IVPB  750 mg IntraVENous Q48H    piperacillin-tazobactam (ZOSYN) 4.5 g in 0.9% sodium chloride (MBP/ADV) 100 mL  4.5 g IntraVENous Q8H    temazepam (RESTORIL) capsule 15 mg  15 mg Oral QHS    aspirin chewable tablet 81 mg  81 mg Oral QPM    clopidogrel (PLAVIX) tablet 75 mg  75 mg Oral DAILY    hydrALAZINE (APRESOLINE) tablet 100 mg  100 mg Oral TID    escitalopram oxalate (LEXAPRO) tablet 10 mg  10 mg Oral DAILY    pantoprazole (PROTONIX) tablet 40 mg  40 mg Oral BID    hydroCHLOROthiazide (HYDRODIURIL) tablet 25 mg  25 mg Oral QPM    sodium chloride (NS) flush 5-10 mL  5-10 mL IntraVENous Q8H    sodium chloride (NS) flush 5-10 mL  5-10 mL IntraVENous PRN    heparin (porcine) injection 5,000 Units  5,000 Units SubCUTAneous Q8H    albuterol (PROVENTIL VENTOLIN) nebulizer solution 2.5 mg  2.5 mg Nebulization Q6H PRN        Objective:   Vitals:  Visit Vitals    /65    Pulse 95    Temp 98.5 °F (36.9 °C)    Resp 22    Ht 5' 1.5\" (1.562 m)    Wt 105 lb (47.6 kg)    SpO2 98%    Breastfeeding No    BMI 19.52 kg/m2    O2 Flow Rate (L/min): 15 l/min (6 L NC) O2 Device: BIPAP Temp (24hrs), Av.6 °F (37 °C), Min:98.2 °F (36.8 °C), Max:99.1 °F (37.3 °C)      Last 24hr Input/Output:    Intake/Output Summary (Last 24 hours) at 18 1102  Last data filed at 18 1025   Gross per 24 hour   Intake               50 ml   Output             1575 ml   Net            -1525 ml        PHYSICAL EXAM:  General:     Alert, cooperative, moderate respiratory distress, appears stated age. Head:    Normocephalic, without obvious abnormality, atraumatic. Eyes:    Conjunctivae/corneas clear. PERRLA  Nose:   Nares normal. No drainage or sinus tenderness. BIPAP in place  Throat:     Lips, mucosa, and tongue normal.  No Thrush  Neck:   Supple, symmetrical,  no adenopathy, thyroid: non tender     no carotid bruit and no JVD. Back:     Symmetric,  No CVA tenderness. Lungs:    Clear to auscultation bilaterally with overall decreased BS. No Wheezing or Rhonchi. No rales. Heart:    Irregular rate and rhythm,  no murmur, rub or gallop. Abdomen:    Soft, mild mid upper abdominal tenderness w/o rebound. Not distended.   Bowel sounds normal. No masses  Extremities:  Extremities normal, atraumatic, No cyanosis. No edema. + clubbing  Lymph nodes:  Cervical, supraclavicular normal.  Neurologic:  General decreased strength, Alert and oriented X 3.    Skin:                 No rash      Lab Data Reviewed:    Recent Results (from the past 24 hour(s))   METABOLIC PANEL, BASIC    Collection Time: 03/31/18  2:22 AM   Result Value Ref Range    Sodium 146 (H) 136 - 145 mmol/L    Potassium 3.1 (L) 3.5 - 5.1 mmol/L    Chloride 117 (H) 97 - 108 mmol/L    CO2 18 (L) 21 - 32 mmol/L    Anion gap 11 5 - 15 mmol/L    Glucose 129 (H) 65 - 100 mg/dL    BUN 47 (H) 6 - 20 MG/DL    Creatinine 1.25 (H) 0.55 - 1.02 MG/DL    BUN/Creatinine ratio 38 (H) 12 - 20      GFR est AA 50 (L) >60 ml/min/1.73m2    GFR est non-AA 41 (L) >60 ml/min/1.73m2    Calcium 10.5 (H) 8.5 - 10.1 MG/DL   BLOOD GAS, ARTERIAL    Collection Time: 03/31/18  2:45 AM   Result Value Ref Range    pH 7.39 7.35 - 7.45      PCO2 33 (L) 35.0 - 45.0 mmHg    PO2 47 (LL) 80 - 100 mmHg    O2 SAT 83 (L) 92 - 97 %    BICARBONATE 20 (L) 22 - 26 mmol/L    BASE DEFICIT 4.4 mmol/L    O2 METHOD NC + NRM      O2 FLOW RATE 6.00 L/min    FIO2 100 %    Sample source ARTERIAL      SITE LEFT RADIAL      DOLORES'S TEST YES      Critical value read back [employer] R.N.    BLOOD GAS, ARTERIAL    Collection Time: 03/31/18  4:06 AM   Result Value Ref Range    pH 7.38 7.35 - 7.45      PCO2 32 (L) 35.0 - 45.0 mmHg    PO2 59 (L) 80 - 100 mmHg    O2 SAT 90 (L) 92 - 97 %    BICARBONATE 18 (L) 22 - 26 mmol/L    BASE DEFICIT 5.8 mmol/L    O2 METHOD BIPAP      FIO2 100 %    MODE BIPAP      IPAP/PIP 12.0      EPAP/CPAP/PEEP 8.0      Sample source ARTERIAL      SITE LEFT RADIAL      DOLORES'S TEST YES     BLOOD GAS, ARTERIAL    Collection Time: 03/31/18  9:00 AM   Result Value Ref Range    pH 7.38 7.35 - 7.45      PCO2 33 (L) 35.0 - 45.0 mmHg    PO2 90 80 - 100 mmHg    O2 SAT 97 92 - 97 %    BICARBONATE 19 (L) 22 - 26 mmol/L    BASE DEFICIT 5.3 mmol/L    O2 METHOD BIPAP      O2 FLOW RATE 4.00 L/min FIO2 100 %    MODE BIPAP      IPAP/PIP 12.0      EPAP/CPAP/PEEP 8.0      Sample source ARTERIAL      SITE LEFT BRACHIAL      DOLORES'S TEST N/A           Assessment/Plan:     Principal Problem:    Acute hypoxemic respiratory failure ([facility] (3/27/2018)    Active Problems:    COPD exacerbation ([address] (8/14/2010)      Hypertension (7/20/2016)      Peripheral vascular disease ([facility] (7/20/2016)      Sepsis ([facility] (3/28/2018)      Pneumonia (3/28/2018)      Acute renal failure (ARF) ([facility] (3/28/2018)      Primary lung large cell carcinoma, left ([facility] (3/29/2018)      Overview: Being candidate for surgical resection so treated with radiation therapy       in 2016      PAF (paroxysmal atrial fibrillation) ([facility] (3/30/2018)      Moderate protein-calorie malnutrition ([facility] (3/30/2018)      Decubitus ulcer of sacral region, stage 2 (3/30/2018)       ___________________________________________________  PLAN:    1. Zosyn, Levaquin and Vancomycin for Pneumonia pending cultures  2. Supplemental Oxygen for Hypoxemia now with BIPAP and 90% Sat (She is DNR)  3.  JA treatments for acute respiratory failure  4. Steroids for COPD exacerbation  5. D/C IV Hydration which she was on with ARF and follow Creat (2.53 on adm to 1.25 now)  6. Give another dose of IV Lasix now with 100 mg  7. With anemia follow Hgb, 10.4 on adm to 9.5 now  8. Protonix for PUD prevention  9. Cont current HTN with HCTZ and Hydralazine and follow BP which is up a little so added Metoprolol yesterday and also on cardiazem drip now  10. Cont ASA and Plavix with ASVD  11.  Other orders reviewed and continued      35 minutes spent today in direct care of this critically ill patient with end stage lung disease and high risk of decompensation (Resprct DNR wishes)    ___________________________________________________    Attending Physician: Li Haro MD

## 2018-03-31 NOTE — PROGRESS NOTES
PCU SHIFT NURSING NOTE      Bedside and Verbal shift change report given to Cristina Pelaez RN (oncoming nurse) by Mrac Pyle (offgoing nurse). Report included the following information SBAR, Kardex, Intake/Output, MAR, Recent Results and Cardiac Rhythm SR/ST. Shift Summary:   5786 - RT made aware of ABG ordered this AM.  9181 - Paged Dr. Lashay Alejo to inquire as to whether MD wants to give additional lasix this AM. Attempted to take off Bipap per RT after this AM ABGs without success; pt O2 Sats 77% on 6L NC. O2 Sats 80% on Simple mask. Pt placed back on Bipap 100% FiO2; pt does not appear to be in distress and denies any discomfort. RT notified and to evaluate. 80 - Repaged Dr. Lashay Alejo regarding the above; waiting for return call. 2690 - Discussed the above with Dr. Lashay Alejo and noted 400 ml urine output since IV Lasix early this AM; received order to give 100 mg IV Lasix now as BP still elevated. Admission Date 3/27/2018   Admission Diagnosis Respiratory failure with hypoxia ([facility] [address]   Consults None        Consults   []PT   []OT   []Speech   []Case Management      [] Palliative      Cardiac Monitoring Order   []Yes   []No     IV drips   []Yes    Drip:                            Dose:  Drip:                            Dose:  Drip:                            Dose:   []No     GI Prophylaxis   []Yes   []No         DVT Prophylaxis   SCDs:             Kunal stockings:         [] Medication   []Contraindicated   []None      Activity Level Activity Level: Bed Rest     Activity Assistance: Partial (two people)   Purposeful Rounding every 1-2 hour? []Yes   Campo Score  Total Score: 4   Bed Alarm (If score 3 or >)   []Yes   [] Refused (See signed refusal form in chart)   Rashid Score  Rashid Score: 14   Rashid Score (if score 14 or less)   []PMT consult   []Wound Care consult      []Specialty bed   [] Nutrition consult          Needs prior to discharge:   Home O2 required:    []Yes   []No    If yes, how much O2 required? Other:    Last Bowel Movement: Last Bowel Movement Date: 03/29/18      Influenza Vaccine Received Flu Vaccine for Current Season (usually Sept-March): Yes        Pneumonia Vaccine           Diet Active Orders   Diet    DIET CLEAR LIQUID      LDAs               Peripheral IV 03/27/18 Right Antecubital (Active)   Site Assessment Clean, dry, & intact 3/31/2018  2:00 AM   Phlebitis Assessment 0 3/31/2018  2:00 AM   Infiltration Assessment 0 3/31/2018  2:00 AM   Dressing Status Clean, dry, & intact 3/31/2018  2:00 AM   Dressing Type Tape;Transparent 3/31/2018  2:00 AM   Hub Color/Line Status Pink;Flushed 3/31/2018  2:00 AM       Peripheral IV 03/30/18 Left; Inner Arm (Active)   Site Assessment Clean, dry, & intact 3/31/2018  2:00 AM   Phlebitis Assessment 0 3/31/2018  2:00 AM   Infiltration Assessment 0 3/31/2018  2:00 AM   Dressing Status Clean, dry, & intact 3/31/2018  2:00 AM   Dressing Type Tape;Transparent 3/31/2018  2:00 AM   Hub Color/Line Status Yellow; Infusing 3/31/2018  2:00 AM   Action Taken Open ports on tubing capped; Tubing changed 3/30/2018  1:07 AM   Alcohol Cap Used Yes 3/30/2018  1:07 AM                      Urinary Catheter Urinary Catheter 03/28/18 Tellez-Indications for Use: Urinary retention/bladder outlet obstruction    Intake & Output   Date 03/30/18 0700 - 03/31/18 0659 03/31/18 0700 - 04/01/18 0659   Shift 2096-0163 1911-0202 24 Hour Total 3604-1964 2796-9496 24 Hour Total   I  N  T  A  K  E   P. O. 60 50 110         P. O. 60 50 110       Shift Total  (mL/kg) 60  (1.3) 50  (1) 110  (2.3)      O  U  T  P  U  T   Urine  (mL/kg/hr) 1225  (2.1) 700  (1.2) 1925  (1.7)         Urine Output (mL) (Urinary Catheter 03/28/18 Tellez) 4013 321 2220       Shift Total  (mL/kg) 1225  (25.7) 700  (14.7) 1925  (40.4)      NET -4772 -709 -6880      Weight (kg) 47.6 47.6 47.6 47.6 47.6 47.6         Readmission Risk Assessment Tool Score Medium Risk            17       Total Score        2 .  Living with Significant Other. Assisted Living. LTAC. SNF. or   Rehab    5 Pt.  Coverage (Medicare=5 , Medicaid, or Self-Pay=4)    10 Charlson Comorbidity Score (Age + Comorbid Conditions)        Criteria that do not apply:    Has Seen PCP in Last 6 Months (Yes=3, No=0)    Patient Length of Stay (>5 days = 3)    IP Visits Last 12 Months (1-3=4, 4=9, >4=11)       Expected Length of Stay 4d 21h   Actual Length of Stay 2

## 2018-03-31 NOTE — PROGRESS NOTES
PCU SHIFT NURSING NOTE      Bedside verbal shift change report given to Cristhian Palumbo (oncoming nurse) by Hari Jose (offgoing nurse). Report included the following information SBAR, Kardex, Intake/Output, Recent Results and Cardiac Rhythm NSR-ST.    1930: Noted stage 1 pressure ulcer to sacrum with non-blanchable erythema extending to buttocks. Mepilex replaced. 0140: Patient on NRB sating 84-88%. Patient denies any complaints. Respiratory rate 24. Lungs coarse. Dr. Jp mcgraw. 0155: RT on floor. Made Salazar aware. Albuterol treatment given. Oxygen saturation maintain 88%. 0209: Second page sent to Dr. Deutsch Angry: Dr. Jp Carrasco re-paged. MD made aware of patients status. Ordered: ABG, CXR, lasix 40 mg x1, BIPAP (RT to set settings.)    9366: Wound consult placed for Stage 1 pressure ulcer to sacrum. Mepilex re-applied. Admission Date 3/27/2018   Admission Diagnosis Respiratory failure with hypoxia ([address]   Consults None        Consults   []PT   []OT   []Speech   []Case Management      [] Palliative      Cardiac Monitoring Order   []Yes   []No     IV drips   []Yes    Drip:                            Dose:  Drip:                            Dose:  Drip:                            Dose:   []No     GI Prophylaxis   []Yes   []No         DVT Prophylaxis   SCDs:             Kunal stockings:         [] Medication   []Contraindicated   []None      Activity Level Activity Level: Bed Rest     Activity Assistance: Partial (two people)   Purposeful Rounding every 1-2 hour? []Yes   Campo Score  Total Score: 4   Bed Alarm (If score 3 or >)   []Yes   [] Refused (See signed refusal form in chart)   Rashid Score  Rashid Score: 14   Rashid Score (if score 14 or less)   []PMT consult   []Wound Care consult      []Specialty bed   [] Nutrition consult          Needs prior to discharge:   Home O2 required:    []Yes   []No    If yes, how much O2 required?     Other:    Last Bowel Movement: Last Bowel Movement Date: 03/29/18 Influenza Vaccine Received Flu Vaccine for Current Season (usually Sept-March): Yes        Pneumonia Vaccine           Diet Active Orders   Diet    DIET CLEAR LIQUID      LDAs               Peripheral IV 03/27/18 Right Antecubital (Active)   Site Assessment Clean, dry, & intact 3/30/2018  7:00 PM   Phlebitis Assessment 0 3/30/2018  7:00 PM   Infiltration Assessment 0 3/30/2018  7:00 PM   Dressing Status Clean, dry, & intact 3/30/2018  7:00 PM   Dressing Type Tape;Transparent 3/30/2018  7:00 PM   Hub Color/Line Status Pink; Infusing 3/30/2018  7:00 PM       Peripheral IV 03/30/18 Left; Inner Arm (Active)   Site Assessment Clean, dry, & intact 3/30/2018  7:00 PM   Phlebitis Assessment 0 3/30/2018  7:00 PM   Infiltration Assessment 0 3/30/2018  7:00 PM   Dressing Status Clean, dry, & intact 3/30/2018  7:00 PM   Dressing Type Tape;Transparent 3/30/2018  7:00 PM   Hub Color/Line Status Yellow; Infusing 3/30/2018  7:00 PM   Action Taken Open ports on tubing capped; Tubing changed 3/30/2018  1:07 AM   Alcohol Cap Used Yes 3/30/2018  1:07 AM                      Urinary Catheter Urinary Catheter 03/28/18 Tellez-Indications for Use: Urinary retention/bladder outlet obstruction    Intake & Output   Date 03/29/18 1900 - 03/30/18 0659 03/30/18 0700 - 03/31/18 0659   Shift 4752-6379 24 Hour Total 4157-6795 2593-3291 24 Hour Total   I  N  T  A  K  E   P. O.   60  60      P. O.   60  60    I.V.  (mL/kg/hr) 250 250         Volume (levoFLOXacin (LEVAQUIN) 750 mg in D5W IVPB) 150 150         Volume (piperacillin-tazobactam (ZOSYN) 4.5 g in 0.9% sodium chloride (MBP/ADV) 100 mL) 100 100       Shift Total  (mL/kg) 250  (5.2) 250  (5.2) 60  (1.3)  60  (1.3)   O  U  T  P  U  T   Urine  (mL/kg/hr)   1225  (2.1)  1225      Urine Output (mL) (Urinary Catheter 03/28/18 Tellez)   1225  1225    Shift Total  (mL/kg)   1225  (25.7)  1225  (25.7)    070 -1168 -1168   Weight (kg) 47.6 47.6 47.6 47.6 47.6         Readmission Risk Assessment Tool Score Medium Risk            17       Total Score        2 . Living with Significant Other. Assisted Living. LTAC. SNF. or   Rehab    5 Pt.  Coverage (Medicare=5 , Medicaid, or Self-Pay=4)    10 Charlson Comorbidity Score (Age + Comorbid Conditions)        Criteria that do not apply:    Has Seen PCP in Last 6 Months (Yes=3, No=0)    Patient Length of Stay (>5 days = 3)    IP Visits Last 12 Months (1-3=4, 4=9, >4=11)       Expected Length of Stay 4d 21h   Actual Length of Stay 1

## 2018-03-31 NOTE — PROGRESS NOTES
Called re: hypoxia on patient.  -stat cxr, abg ordered  Cxr reviewed with CHF and bilateral pulmonary edema  abg with hypoxia worsening  -give iv lasix  -bipap  Case re discussed with RN

## 2018-04-01 ENCOUNTER — APPOINTMENT (OUTPATIENT)
Dept: GENERAL RADIOLOGY | Age: 83
DRG: 871 | End: 2018-04-01
Attending: INTERNAL MEDICINE
Payer: MEDICARE

## 2018-04-01 LAB
ANION GAP SERPL CALC-SCNC: 13 MMOL/L (ref 5–15)
BASOPHILS # BLD: 0 K/UL (ref 0–0.1)
BASOPHILS NFR BLD: 0 % (ref 0–1)
BUN SERPL-MCNC: 59 MG/DL (ref 6–20)
BUN/CREAT SERPL: 35 (ref 12–20)
CALCIUM SERPL-MCNC: 10.5 MG/DL (ref 8.5–10.1)
CHLORIDE SERPL-SCNC: 114 MMOL/L (ref 97–108)
CO2 SERPL-SCNC: 24 MMOL/L (ref 21–32)
CREAT SERPL-MCNC: 1.67 MG/DL (ref 0.55–1.02)
DIFFERENTIAL METHOD BLD: ABNORMAL
EOSINOPHIL # BLD: 0 K/UL (ref 0–0.4)
EOSINOPHIL NFR BLD: 0 % (ref 0–7)
ERYTHROCYTE [DISTWIDTH] IN BLOOD BY AUTOMATED COUNT: 14.7 % (ref 11.5–14.5)
GLUCOSE SERPL-MCNC: 145 MG/DL (ref 65–100)
HCT VFR BLD AUTO: 28.1 % (ref 35–47)
HGB BLD-MCNC: 9.5 G/DL (ref 11.5–16)
IMM GRANULOCYTES # BLD: 0.3 K/UL (ref 0–0.04)
IMM GRANULOCYTES NFR BLD AUTO: 2 % (ref 0–0.5)
LYMPHOCYTES # BLD: 0.3 K/UL (ref 0.8–3.5)
LYMPHOCYTES NFR BLD: 2 % (ref 12–49)
MCH RBC QN AUTO: 29.3 PG (ref 26–34)
MCHC RBC AUTO-ENTMCNC: 33.8 G/DL (ref 30–36.5)
MCV RBC AUTO: 86.7 FL (ref 80–99)
MONOCYTES # BLD: 1 K/UL (ref 0–1)
MONOCYTES NFR BLD: 7 % (ref 5–13)
NEUTS SEG # BLD: 13.8 K/UL (ref 1.8–8)
NEUTS SEG NFR BLD: 90 % (ref 32–75)
NRBC # BLD: 0.17 K/UL (ref 0–0.01)
NRBC BLD-RTO: 1.1 PER 100 WBC
PLATELET # BLD AUTO: 213 K/UL (ref 150–400)
PMV BLD AUTO: 11.6 FL (ref 8.9–12.9)
POTASSIUM SERPL-SCNC: 3.1 MMOL/L (ref 3.5–5.1)
RBC # BLD AUTO: 3.24 M/UL (ref 3.8–5.2)
SODIUM SERPL-SCNC: 151 MMOL/L (ref 136–145)
WBC # BLD AUTO: 15.3 K/UL (ref 3.6–11)

## 2018-04-01 PROCEDURE — 74011000258 HC RX REV CODE- 258: Performed by: INTERNAL MEDICINE

## 2018-04-01 PROCEDURE — 74011250636 HC RX REV CODE- 250/636: Performed by: INTERNAL MEDICINE

## 2018-04-01 PROCEDURE — 80048 BASIC METABOLIC PNL TOTAL CA: CPT | Performed by: GENERAL ACUTE CARE HOSPITAL

## 2018-04-01 PROCEDURE — 71045 X-RAY EXAM CHEST 1 VIEW: CPT

## 2018-04-01 PROCEDURE — 85025 COMPLETE CBC W/AUTO DIFF WBC: CPT | Performed by: INTERNAL MEDICINE

## 2018-04-01 PROCEDURE — 74011250637 HC RX REV CODE- 250/637: Performed by: INTERNAL MEDICINE

## 2018-04-01 PROCEDURE — 74011250636 HC RX REV CODE- 250/636: Performed by: GENERAL ACUTE CARE HOSPITAL

## 2018-04-01 PROCEDURE — 77010033678 HC OXYGEN DAILY

## 2018-04-01 PROCEDURE — 65660000000 HC RM CCU STEPDOWN

## 2018-04-01 PROCEDURE — 74011000250 HC RX REV CODE- 250: Performed by: INTERNAL MEDICINE

## 2018-04-01 PROCEDURE — 74011250637 HC RX REV CODE- 250/637: Performed by: GENERAL ACUTE CARE HOSPITAL

## 2018-04-01 PROCEDURE — 36415 COLL VENOUS BLD VENIPUNCTURE: CPT | Performed by: GENERAL ACUTE CARE HOSPITAL

## 2018-04-01 PROCEDURE — 94660 CPAP INITIATION&MGMT: CPT

## 2018-04-01 RX ORDER — FUROSEMIDE 10 MG/ML
100 INJECTION INTRAMUSCULAR; INTRAVENOUS DAILY
Status: DISCONTINUED | OUTPATIENT
Start: 2018-04-01 | End: 2018-04-02

## 2018-04-01 RX ORDER — LEVOFLOXACIN 5 MG/ML
500 INJECTION, SOLUTION INTRAVENOUS
Status: DISCONTINUED | OUTPATIENT
Start: 2018-04-02 | End: 2018-04-11

## 2018-04-01 RX ORDER — DILTIAZEM HYDROCHLORIDE 180 MG/1
180 CAPSULE, COATED, EXTENDED RELEASE ORAL DAILY
Status: DISCONTINUED | OUTPATIENT
Start: 2018-04-01 | End: 2018-04-12 | Stop reason: HOSPADM

## 2018-04-01 RX ADMIN — HYDRALAZINE HYDROCHLORIDE 100 MG: 50 TABLET ORAL at 09:02

## 2018-04-01 RX ADMIN — Medication 10 ML: at 06:19

## 2018-04-01 RX ADMIN — PANTOPRAZOLE SODIUM 40 MG: 40 TABLET, DELAYED RELEASE ORAL at 18:33

## 2018-04-01 RX ADMIN — METHYLPREDNISOLONE SODIUM SUCCINATE 40 MG: 40 INJECTION, POWDER, FOR SOLUTION INTRAMUSCULAR; INTRAVENOUS at 22:23

## 2018-04-01 RX ADMIN — METOPROLOL TARTRATE 25 MG: 25 TABLET ORAL at 09:02

## 2018-04-01 RX ADMIN — DILTIAZEM HYDROCHLORIDE 10 MG/HR: 5 INJECTION, SOLUTION INTRAVENOUS at 06:18

## 2018-04-01 RX ADMIN — Medication 10 ML: at 22:09

## 2018-04-01 RX ADMIN — PIPERACILLIN SODIUM,TAZOBACTAM SODIUM 4.5 G: 4; .5 INJECTION, POWDER, FOR SOLUTION INTRAVENOUS at 06:19

## 2018-04-01 RX ADMIN — HEPARIN SODIUM 5000 UNITS: 5000 INJECTION, SOLUTION INTRAVENOUS; SUBCUTANEOUS at 18:33

## 2018-04-01 RX ADMIN — ASPIRIN 81 MG CHEWABLE TABLET 81 MG: 81 TABLET CHEWABLE at 18:33

## 2018-04-01 RX ADMIN — PANTOPRAZOLE SODIUM 40 MG: 40 TABLET, DELAYED RELEASE ORAL at 09:02

## 2018-04-01 RX ADMIN — Medication 10 ML: at 15:30

## 2018-04-01 RX ADMIN — HYDROCHLOROTHIAZIDE 25 MG: 25 TABLET ORAL at 18:33

## 2018-04-01 RX ADMIN — VANCOMYCIN HYDROCHLORIDE 750 MG: 750 INJECTION, POWDER, LYOPHILIZED, FOR SOLUTION INTRAVENOUS at 15:57

## 2018-04-01 RX ADMIN — ESCITALOPRAM OXALATE 10 MG: 10 TABLET ORAL at 09:02

## 2018-04-01 RX ADMIN — METOPROLOL TARTRATE 25 MG: 25 TABLET ORAL at 18:33

## 2018-04-01 RX ADMIN — DILTIAZEM HYDROCHLORIDE 180 MG: 180 CAPSULE, COATED, EXTENDED RELEASE ORAL at 11:14

## 2018-04-01 RX ADMIN — PIPERACILLIN SODIUM,TAZOBACTAM SODIUM 4.5 G: 4; .5 INJECTION, POWDER, FOR SOLUTION INTRAVENOUS at 18:33

## 2018-04-01 RX ADMIN — POTASSIUM CHLORIDE 20 MEQ: 1.5 POWDER, FOR SOLUTION ORAL at 09:02

## 2018-04-01 RX ADMIN — TEMAZEPAM 15 MG: 15 CAPSULE ORAL at 22:09

## 2018-04-01 RX ADMIN — CLOPIDOGREL BISULFATE 75 MG: 75 TABLET ORAL at 09:02

## 2018-04-01 RX ADMIN — METHYLPREDNISOLONE SODIUM SUCCINATE 40 MG: 40 INJECTION, POWDER, FOR SOLUTION INTRAMUSCULAR; INTRAVENOUS at 15:30

## 2018-04-01 RX ADMIN — FUROSEMIDE 40 MG: 10 INJECTION, SOLUTION INTRAMUSCULAR; INTRAVENOUS at 09:02

## 2018-04-01 RX ADMIN — HYDRALAZINE HYDROCHLORIDE 100 MG: 50 TABLET ORAL at 22:09

## 2018-04-01 RX ADMIN — HYDRALAZINE HYDROCHLORIDE 100 MG: 50 TABLET ORAL at 15:57

## 2018-04-01 RX ADMIN — HEPARIN SODIUM 5000 UNITS: 5000 INJECTION, SOLUTION INTRAVENOUS; SUBCUTANEOUS at 02:49

## 2018-04-01 RX ADMIN — HEPARIN SODIUM 5000 UNITS: 5000 INJECTION, SOLUTION INTRAVENOUS; SUBCUTANEOUS at 09:02

## 2018-04-01 RX ADMIN — POTASSIUM CHLORIDE 20 MEQ: 1.5 POWDER, FOR SOLUTION ORAL at 15:58

## 2018-04-01 RX ADMIN — METHYLPREDNISOLONE SODIUM SUCCINATE 40 MG: 40 INJECTION, POWDER, FOR SOLUTION INTRAMUSCULAR; INTRAVENOUS at 06:19

## 2018-04-01 NOTE — PROGRESS NOTES
PCU SHIFT NURSING NOTE      Bedside and Verbal shift change report given to Katherin King RN (oncoming nurse) by Suma Pardo RN (offgoing nurse). Report included the following information SBAR, Kardex, Intake/Output, MAR, Recent Results and Cardiac Rhythm A-Fib. Shift Summary:   1314 - Cardizem gtt stopped 2 hours post administration of po cardizem per protocol. HR 90s-110s; but remains controlled A-Fib. Will continue to monitor. 1600 - Throughout the day, pt continually removes NC; O2 Sats drop to 80s%. O2 Sats slowly increases >90% after 5 min of being on 6L NC. Pt oriented to self and place; but periodically confused since admission. Admission Date 3/27/2018   Admission Diagnosis Respiratory failure with hypoxia ([facility] [address]   Consults None        Consults   []PT   []OT   []Speech   []Case Management      [] Palliative      Cardiac Monitoring Order   [x]Yes   []No     IV drips   []Yes    Drip:                            Dose:  Drip:                            Dose:  Drip:                            Dose:   [x]No     GI Prophylaxis   []Yes   []No         DVT Prophylaxis   SCDs:             Kunal stockings:         [] Medication   []Contraindicated   []None      Activity Level Activity Level: Bed Rest     Activity Assistance: Partial (two people)   Purposeful Rounding every 1-2 hour? [x]Yes   Campo Score  Total Score: 4   Bed Alarm (If score 3 or >)   [x]Yes   [] Refused (See signed refusal form in chart)   Rashid Score  Rashid Score: 12   Rashid Score (if score 14 or less)   []PMT consult   []Wound Care consult      []Specialty bed   [] Nutrition consult          Needs prior to discharge:   Home O2 required:    [x]Yes   []No    If yes, how much O2 required?     Other:    Last Bowel Movement: Last Bowel Movement Date: 03/29/18      Influenza Vaccine Received Flu Vaccine for Current Season (usually Sept-March): Yes        Pneumonia Vaccine           Diet Active Orders   Diet    DIET CLEAR LIQUID      LDAs Peripheral IV 03/30/18 Left; Inner Arm (Active)   Site Assessment Clean, dry, & intact 4/1/2018  2:50 AM   Phlebitis Assessment 0 4/1/2018  2:50 AM   Infiltration Assessment 0 4/1/2018  2:50 AM   Dressing Status Clean, dry, & intact 4/1/2018  2:50 AM   Dressing Type Tape;Transparent 4/1/2018  2:50 AM   Hub Color/Line Status Yellow; Infusing 4/1/2018  2:50 AM   Action Taken Open ports on tubing capped; Tubing changed 3/30/2018  1:07 AM   Alcohol Cap Used Yes 3/30/2018  1:07 AM       Peripheral IV 04/01/18 Right Forearm (Active)                      Urinary Catheter Urinary Catheter 03/28/18 Tellez-Indications for Use: Urinary retention/bladder outlet obstruction    Intake & Output   Date 03/31/18 0700 - 04/01/18 0659 04/01/18 0700 - 04/02/18 0659   Shift 8898-2080 7288-9965 24 Hour Total 0700-1859 1900-0659 24 Hour Total   I  N  T  A  K  E   I.V.  (mL/kg/hr) 20.1  (0)  20.1  (0)         Cardizem Volume 20.1  20.1       Shift Total  (mL/kg) 20.1  (0.4)  20.1  (0.4)      O  U  T  P  U  T   Urine  (mL/kg/hr) 1000  (1.7) 950  (1.7) 1950  (1.7)         Urine Output (mL) (Urinary Catheter 03/28/18 Tellez) 3447 760 7235       Shift Total  (mL/kg) 1000  (21) 950  (19.9) 1950  (40.9)      NET -979.9 -950 -1929.9      Weight (kg) 47.6 47.6 47.6 47.6 47.6 47.6         Readmission Risk Assessment Tool Score Medium Risk            17       Total Score        2 . Living with Significant Other. Assisted Living. LTAC. SNF. or   Rehab    5 Pt.  Coverage (Medicare=5 , Medicaid, or Self-Pay=4)    10 Charlson Comorbidity Score (Age + Comorbid Conditions)        Criteria that do not apply:    Has Seen PCP in Last 6 Months (Yes=3, No=0)    Patient Length of Stay (>5 days = 3)    IP Visits Last 12 Months (1-3=4, 4=9, >4=11)       Expected Length of Stay 4d 21h   Actual Length of Stay 3

## 2018-04-01 NOTE — PROGRESS NOTES
PCU SHIFT NURSING NOTE    Bedside verbal shift change report given to Jennifer Mcdonald (oncoming nurse) by Corine Peguero (offgoing nurse). Report included the following information SBAR, Kardex, Intake/Output, Recent Results and Cardiac Rhythm A.FIB.      1900: Daughter Romaine Severin present in the room. 4114-0428: Patient assisted with medication administration. Was able to tolerate medication with no difficulty. Venti-mask utilized and patient able to maintain saturations between 88-91% with no difficulty. Patient has weak unproductive congested cough. Assisted back on BIPAP. Admission Date 3/27/2018   Admission Diagnosis Respiratory failure with hypoxia ([facility] [address]   Consults None        Consults   []PT   []OT   []Speech   []Case Management      [] Palliative      Cardiac Monitoring Order   []Yes   []No     IV drips   []Yes    Drip:                            Dose:  Drip:                            Dose:  Drip:                            Dose:   []No     GI Prophylaxis   []Yes   []No         DVT Prophylaxis   SCDs:             Kunal stockings:         [] Medication   []Contraindicated   []None      Activity Level Activity Level: Bed Rest     Activity Assistance: Partial (two people)   Purposeful Rounding every 1-2 hour? []Yes   Campo Score  Total Score: 4   Bed Alarm (If score 3 or >)   []Yes   [] Refused (See signed refusal form in chart)   Rashid Score  Rashid Score: 12   Rashid Score (if score 14 or less)   []PMT consult   []Wound Care consult      []Specialty bed   [] Nutrition consult          Needs prior to discharge:   Home O2 required:    []Yes   []No    If yes, how much O2 required?     Other:    Last Bowel Movement: Last Bowel Movement Date: 03/29/18      Influenza Vaccine Received Flu Vaccine for Current Season (usually Sept-March): Yes        Pneumonia Vaccine           Diet Active Orders   Diet    DIET CLEAR LIQUID      LDAs               Peripheral IV 03/27/18 Right Antecubital (Active)   Site Assessment Clean, dry, & intact 3/31/2018  7:17 PM   Phlebitis Assessment 0 3/31/2018  7:17 PM   Infiltration Assessment 0 3/31/2018  7:17 PM   Dressing Status Clean, dry, & intact 3/31/2018  7:17 PM   Dressing Type Tape;Transparent 3/31/2018  7:17 PM   Hub Color/Line Status Pink;Flushed 3/31/2018  7:17 PM       Peripheral IV 03/30/18 Left; Inner Arm (Active)   Site Assessment Clean, dry, & intact 3/31/2018  7:17 PM   Phlebitis Assessment 0 3/31/2018  7:17 PM   Infiltration Assessment 0 3/31/2018  7:17 PM   Dressing Status Clean, dry, & intact 3/31/2018  7:17 PM   Dressing Type Tape;Transparent 3/31/2018  7:17 PM   Hub Color/Line Status Yellow; Infusing 3/31/2018  7:17 PM   Action Taken Open ports on tubing capped; Tubing changed 3/30/2018  1:07 AM   Alcohol Cap Used Yes 3/30/2018  1:07 AM                      Urinary Catheter Urinary Catheter 03/28/18 Tellez-Indications for Use: Urinary retention/bladder outlet obstruction    Intake & Output   Date 03/31/18 0700 - 04/01/18 0659 04/01/18 0700 - 04/02/18 0659   Shift 0964-2422 1297-2647 24 Hour Total 2035-4614 7146-9562 24 Hour Total   I  N  T  A  K  E   I.V.  (mL/kg/hr) 20.1  (0)  20.1         Cardizem Volume 20.1  20.1       Shift Total  (mL/kg) 20.1  (0.4)  20.1  (0.4)      O  U  T  P  U  T   Urine  (mL/kg/hr) 1000  (1.7) 450 1450         Urine Output (mL) (Urinary Catheter 03/28/18 Tellez) 2791 721 7313       Shift Total  (mL/kg) 1000  (21) 450  (9.4) 1450  (30.4)      NET -979.9 -450 -1429.9      Weight (kg) 47.6 47.6 47.6 47.6 47.6 47.6         Readmission Risk Assessment Tool Score Medium Risk            17       Total Score        2 . Living with Significant Other. Assisted Living. LTAC. SNF. or   Rehab    5 Pt.  Coverage (Medicare=5 , Medicaid, or Self-Pay=4)    10 Charlson Comorbidity Score (Age + Comorbid Conditions)        Criteria that do not apply:    Has Seen PCP in Last 6 Months (Yes=3, No=0)    Patient Length of Stay (>5 days = 3)    IP Visits Last 12 Months (1-3=4, 4=9, >4=11)       Expected Length of Stay 4d 21h   Actual Length of Stay 3

## 2018-04-01 NOTE — PROGRESS NOTES
PROGRESS NOTE    NAME:  Yung Cohen   :   1935   MRN:   [id number]     Date/Time:  2018 10:07 AM  Subjective:   History:  Chart reviewed and patient seen and examined this AM and D/W her nurse and daughter and all events noted. She presented on 3/27 with several episodes of N/V w/o diarrhea or other GI c/o. She has had no Vomiting since 3/27 AM and no other GI c/o except on 3/29 AM had some diarrhea w/o since. . She noted to be hypoxemic and was diagnosed with bilateral Pneumonia and has been started on triple antibiotics. She has had a little cough and notable SOB w/o other cardio/respiratory c/o until during the night 3/30 when she became more SOB and Hypoxemic and CXR revealed Pulmonary Edema. She had 1 dose of 40 mg IV Lasix with 700 cc UO; however still hypoxemic and SOB requiring BIPAP so [id number] with improved UO and oxygenation since. She denies CP or other cardio/respiratory c/o. She has no  c/o. She has developed PAF since admission and now on Cardizem drip. She is generally weak w/o other neurologic c/o. There are no other c/o on complete ROS except now poor appetite.  .       Medications reviewed:  Current Facility-Administered Medications   Medication Dose Route Frequency    furosemide (LASIX) injection 40 mg  40 mg IntraVENous DAILY    potassium chloride (KLOR-CON) packet 20 mEq  20 mEq Oral BID WITH MEALS    vancomycin (VANCOCIN) 750 mg in 0.9% sodium chloride (MBP/ADV) 250 mL  750 mg IntraVENous Q24H    metoprolol tartrate (LOPRESSOR) tablet 25 mg  25 mg Oral BID    dilTIAZem (CARDIZEM) 125 mg in 0.9% sodium chloride 125 mL infusion  0-15 mg/hr IntraVENous TITRATE    methylPREDNISolone (PF) (SOLU-MEDROL) injection 40 mg  40 mg IntraVENous Q6H    diphenoxylate-atropine (LOMOTIL) tablet 1 Tab  1 Tab Oral Q4H PRN    levoFLOXacin (LEVAQUIN) 750 mg in D5W IVPB  750 mg IntraVENous Q48H    piperacillin-tazobactam (ZOSYN) 4.5 g in 0.9% sodium chloride (MBP/ADV) 100 mL 4.5 g IntraVENous Q8H    temazepam (RESTORIL) capsule 15 mg  15 mg Oral QHS    aspirin chewable tablet 81 mg  81 mg Oral QPM    clopidogrel (PLAVIX) tablet 75 mg  75 mg Oral DAILY    hydrALAZINE (APRESOLINE) tablet 100 mg  100 mg Oral TID    escitalopram oxalate (LEXAPRO) tablet 10 mg  10 mg Oral DAILY    pantoprazole (PROTONIX) tablet 40 mg  40 mg Oral BID    hydroCHLOROthiazide (HYDRODIURIL) tablet 25 mg  25 mg Oral QPM    sodium chloride (NS) flush 5-10 mL  5-10 mL IntraVENous Q8H    sodium chloride (NS) flush 5-10 mL  5-10 mL IntraVENous PRN    heparin (porcine) injection 5,000 Units  5,000 Units SubCUTAneous Q8H    albuterol (PROVENTIL VENTOLIN) nebulizer solution 2.5 mg  2.5 mg Nebulization Q6H PRN        Objective:   Vitals:  Visit Vitals    /84 (BP 1 Location: Right arm, BP Patient Position: At rest)    Pulse (!) 120    Temp 97.5 °F (36.4 °C)    Resp 24    Ht 5' 1.5\" (1.562 m)    Wt 105 lb (47.6 kg)    SpO2 95%    Breastfeeding No    BMI 19.52 kg/m2    O2 Flow Rate (L/min): 6 l/min O2 Device: Nasal cannula Temp (24hrs), Av.1 °F (36.7 °C), Min:97.5 °F (36.4 °C), Max:98.5 °F (36.9 °C)      Last 24hr Input/Output:    Intake/Output Summary (Last 24 hours) at 18 1007  Last data filed at 18 3061   Gross per 24 hour   Intake            20.08 ml   Output             1950 ml   Net         -1929.92 ml        PHYSICAL EXAM:  General:     Alert, cooperative, moderate respiratory distress, appears stated age. Head:    Normocephalic, without obvious abnormality, atraumatic. Eyes:    Conjunctivae/corneas clear. PERRLA  Nose:   Nares normal. No drainage or sinus tenderness. BIPAP in place  Throat:     Lips, mucosa, and tongue normal.  No Thrush  Neck:   Supple, symmetrical,  no adenopathy, thyroid: non tender     no carotid bruit and no JVD. Back:     Symmetric,  No CVA tenderness. Lungs:    Clear to auscultation bilaterally with overall decreased BS.   No Wheezing or Rhonchi. No rales. Heart:    Regular rate and rhythm,  no murmur, rub or gallop. Abdomen:    Soft, non tender. Not distended. Bowel sounds normal. No masses  Extremities:  Extremities normal, atraumatic, No cyanosis. No edema. + clubbing  Lymph nodes:  Cervical, supraclavicular normal.  Neurologic:  General decreased strength, Alert and oriented X 3. Skin:                 No rash    Telemetry: NSR    Lab Data Reviewed:    Recent Results (from the past 24 hour(s))   VANCOMYCIN, TROUGH    Collection Time: 03/31/18  3:15 PM   Result Value Ref Range    Vancomycin,trough 12.3 (H) 5.0 - 10.0 ug/mL    Reported dose date: NO GROWTH      Reported dose time: NO GROWTH      Reported dose: NO GROWTH UNITS   METABOLIC PANEL, BASIC    Collection Time: 04/01/18  2:55 AM   Result Value Ref Range    Sodium 151 (H) 136 - 145 mmol/L    Potassium 3.1 (L) 3.5 - 5.1 mmol/L    Chloride 114 (H) 97 - 108 mmol/L    CO2 24 21 - 32 mmol/L    Anion gap 13 5 - 15 mmol/L    Glucose 145 (H) 65 - 100 mg/dL    BUN 59 (H) 6 - 20 MG/DL    Creatinine 1.67 (H) 0.55 - 1.02 MG/DL    BUN/Creatinine ratio 35 (H) 12 - 20      GFR est AA 36 (L) >60 ml/min/1.73m2    GFR est non-AA 29 (L) >60 ml/min/1.73m2    Calcium 10.5 (H) 8.5 - 10.1 MG/DL   CBC WITH AUTOMATED DIFF    Collection Time: 04/01/18  2:55 AM   Result Value Ref Range    WBC 15.3 (H) 3.6 - 11.0 K/uL    RBC 3.24 (L) 3.80 - 5.20 M/uL    HGB 9.5 (L) 11.5 - 16.0 g/dL    HCT 28.1 (L) 35.0 - 47.0 %    MCV 86.7 80.0 - 99.0 FL    MCH 29.3 26.0 - 34.0 PG    MCHC 33.8 30.0 - 36.5 g/dL    RDW 14.7 (H) 11.5 - 14.5 %    PLATELET 778 435 - 003 K/uL    MPV 11.6 8.9 - 12.9 FL    NRBC 1.1 (H) 0  WBC    ABSOLUTE NRBC 0.17 (H) 0.00 - 0.01 K/uL    NEUTROPHILS 90 (H) 32 - 75 %    LYMPHOCYTES 2 (L) 12 - 49 %    MONOCYTES 7 5 - 13 %    EOSINOPHILS 0 0 - 7 %    BASOPHILS 0 0 - 1 %    IMMATURE GRANULOCYTES 2 (H) 0.0 - 0.5 %    ABS. NEUTROPHILS 13.8 (H) 1.8 - 8.0 K/UL    ABS.  LYMPHOCYTES 0.3 (L) 0.8 - 3.5 K/UL ABS. MONOCYTES 1.0 0.0 - 1.0 K/UL    ABS. EOSINOPHILS 0.0 0.0 - 0.4 K/UL    ABS. BASOPHILS 0.0 0.0 - 0.1 K/UL    ABS. IMM. GRANS. 0.3 (H) 0.00 - 0.04 K/UL    DF AUTOMATED           Assessment/Plan:     Principal Problem:    Acute hypoxemic respiratory failure ([facility] (3/27/2018)    Active Problems:    COPD exacerbation ([facility] (8/14/2010)      Hypertension (7/20/2016)      Peripheral vascular disease ([facility] (7/20/2016)      Sepsis ([facility] (3/28/2018)      Pneumonia (3/28/2018)      Acute renal failure (ARF) ([facility] (3/28/2018)      Primary lung large cell carcinoma, left ([facility] (3/29/2018)      Overview: Being candidate for surgical resection so treated with radiation therapy       in 2016      PAF (paroxysmal atrial fibrillation) ([facility] (3/30/2018)      Moderate protein-calorie malnutrition ([facility] (3/30/2018)      Decubitus ulcer of sacral region, stage 2 (3/30/2018)       ___________________________________________________  PLAN:    1. Zosyn, Levaquin and Vancomycin for Pneumonia pending cultures  2. Supplemental Oxygen for Hypoxemia now with 6 L and 95% Sat (She is DNR)  3.  JA treatments for acute respiratory failure  4. Steroids for COPD exacerbation  5. D/Micah IV Hydration which she was on with ARF and follow Creat (2.53 on adm to 1.67 now)  6. Give another dose of IV Lasix now with 100 mg  7. With anemia follow Hgb, 10.4 on adm to 9.5 now  8. Protonix for PUD prevention  9. Cont current HTN with HCTZ and Hydralazine and follow BP which is up a little so added Metoprolol on 3/30 and also on cardiazem drip now which I will change to PO  10. Cont ASA and Plavix with ASVD  11. Repeat CXR  12. Encourage PO intake  13.  Other orders reviewed and continued      Critically ill patient with end stage lung disease and high risk of decompensation (Resprct DNR wishes)    ___________________________________________________    Attending Physician: Geetha Mullen MD

## 2018-04-01 NOTE — PROGRESS NOTES
Vancomycin trough resulted 12.3, extrapolated to 11.74. Will adjust regimen to 750 mg IV Q24h for an expected trough of 17.6.     Thank you,  Merlyn Goltz, ERIKD

## 2018-04-01 NOTE — PROGRESS NOTES
Pharmacy Automatic Renal Dosing Protocol - Antimicrobials    Indication for Antimicrobials: b/l PNA (patient admitted from SNF)    Current Regimen of Each Antimicrobial:  Vancomycin IV Start Date 3/28/18; Day # 5  Levofloxacin 500 mg IV q 48 hours (Start Date: 3/27; Day #6  Pip-tazo 4.5 g IV q 12 hours (Start Date: 3/27; Day #6    Goal Level: VANCOMYCIN TROUGH GOAL RANGE    Vancomycin Trough: 15 - 20 mcg/mL    Measured / Extrapolated Vancomycin Level: N/A    Significant Cultures:    3/27: blood: no growth  -PRELIM    Radiology / Imaging results: (X-ray, CT scan or MRI):    3/27 (XR Chest Port): Left perihilar and right lower lobe infiltrates. Decrease in the size of the left lower lobe lesion.  3/28 (XR Chest Port): Interstitial edema, cardiomegaly. Possible superimposed infiltrate left perihilar. Paralysis, amputations, malnutrition: Actual BW<IBW    Labs:  Recent Labs      18   0255  18   0222  18   0516   CREA  1.67*  1.25*  1.20*   BUN  59*  47*  39*   WBC  15.3*   --   21.3*     Temp (24hrs), Av.1 °F (36.7 °C), Min:97.5 °F (36.4 °C), Max:98.5 °F (36.9 °C)    Creatinine Clearance (mL/min) or Dialysis: ~19 ml/min    Impression/Plan:   · Scr trending up  · WBC trending down  · Vancomycin trough resulted 12.3, extrapolated to 11.74.   · Vancomycin dose adjusted to 750 mg IV Q24h for an expected trough of 17.6 mcg/ml. · Will adjust the Pip-tazo dosing to 4.5 g IV q 12 hours    · Will adjust the Levofloxacin dosing to 500 mg IV q 48 hours per protocol for indication and renal function. · Daily BMP ordered  · Antibiotic stop date: TBD     Pharmacy will follow daily and adjust medications as appropriate for renal function and/or serum levels. Thank you,  Christelle Valencia, PHARMD      Recommended duration of therapy  http://[facility]/[place]/[facility]/Pharmacy/Clinical%20Companion/Duration%20of%20ABX%20therapy. docx    Renal Dosing  [email or web address]/[place]/[facility]/Pharmacy/Clinical%20Companion/Renal%[email or web address]. pdf

## 2018-04-02 LAB
ANION GAP SERPL CALC-SCNC: 11 MMOL/L (ref 5–15)
BACTERIA SPEC CULT: NORMAL
BACTERIA SPEC CULT: NORMAL
BASOPHILS # BLD: 0 K/UL (ref 0–0.1)
BASOPHILS NFR BLD: 0 % (ref 0–1)
BUN SERPL-MCNC: 71 MG/DL (ref 6–20)
BUN/CREAT SERPL: 38 (ref 12–20)
CALCIUM SERPL-MCNC: 10.2 MG/DL (ref 8.5–10.1)
CHLORIDE SERPL-SCNC: 112 MMOL/L (ref 97–108)
CO2 SERPL-SCNC: 27 MMOL/L (ref 21–32)
CREAT SERPL-MCNC: 1.86 MG/DL (ref 0.55–1.02)
DIFFERENTIAL METHOD BLD: ABNORMAL
EOSINOPHIL # BLD: 0 K/UL (ref 0–0.4)
EOSINOPHIL NFR BLD: 0 % (ref 0–7)
ERYTHROCYTE [DISTWIDTH] IN BLOOD BY AUTOMATED COUNT: 14.6 % (ref 11.5–14.5)
GLUCOSE SERPL-MCNC: 140 MG/DL (ref 65–100)
HCT VFR BLD AUTO: 28 % (ref 35–47)
HGB BLD-MCNC: 9.6 G/DL (ref 11.5–16)
IMM GRANULOCYTES # BLD: 0 K/UL (ref 0–0.04)
IMM GRANULOCYTES NFR BLD AUTO: 0 % (ref 0–0.5)
LYMPHOCYTES # BLD: 0.4 K/UL (ref 0.8–3.5)
LYMPHOCYTES NFR BLD: 3 % (ref 12–49)
MAGNESIUM SERPL-MCNC: 1.7 MG/DL (ref 1.6–2.4)
MCH RBC QN AUTO: 29.5 PG (ref 26–34)
MCHC RBC AUTO-ENTMCNC: 34.3 G/DL (ref 30–36.5)
MCV RBC AUTO: 86.2 FL (ref 80–99)
METAMYELOCYTES NFR BLD MANUAL: 1 %
MONOCYTES # BLD: 0.4 K/UL (ref 0–1)
MONOCYTES NFR BLD: 3 % (ref 5–13)
MYELOCYTES NFR BLD MANUAL: 1 %
NEUTS BAND NFR BLD MANUAL: 1 %
NEUTS SEG # BLD: 11.9 K/UL (ref 1.8–8)
NEUTS SEG NFR BLD: 91 % (ref 32–75)
NRBC # BLD: 0.4 K/UL (ref 0–0.01)
NRBC BLD-RTO: 3.1 PER 100 WBC
PLATELET # BLD AUTO: 178 K/UL (ref 150–400)
PMV BLD AUTO: 11.8 FL (ref 8.9–12.9)
POTASSIUM SERPL-SCNC: 2.4 MMOL/L (ref 3.5–5.1)
RBC # BLD AUTO: 3.25 M/UL (ref 3.8–5.2)
RBC MORPH BLD: ABNORMAL
SERVICE CMNT-IMP: NORMAL
SERVICE CMNT-IMP: NORMAL
SODIUM SERPL-SCNC: 150 MMOL/L (ref 136–145)
WBC # BLD AUTO: 12.9 K/UL (ref 3.6–11)

## 2018-04-02 PROCEDURE — 85025 COMPLETE CBC W/AUTO DIFF WBC: CPT | Performed by: INTERNAL MEDICINE

## 2018-04-02 PROCEDURE — G8988 SELF CARE GOAL STATUS: HCPCS | Performed by: OCCUPATIONAL THERAPIST

## 2018-04-02 PROCEDURE — 74011000258 HC RX REV CODE- 258: Performed by: INTERNAL MEDICINE

## 2018-04-02 PROCEDURE — 65660000000 HC RM CCU STEPDOWN

## 2018-04-02 PROCEDURE — 77010033678 HC OXYGEN DAILY

## 2018-04-02 PROCEDURE — 74011250637 HC RX REV CODE- 250/637: Performed by: INTERNAL MEDICINE

## 2018-04-02 PROCEDURE — 74011000250 HC RX REV CODE- 250: Performed by: INTERNAL MEDICINE

## 2018-04-02 PROCEDURE — 74011250636 HC RX REV CODE- 250/636: Performed by: HOSPITALIST

## 2018-04-02 PROCEDURE — 74011250637 HC RX REV CODE- 250/637: Performed by: GENERAL ACUTE CARE HOSPITAL

## 2018-04-02 PROCEDURE — 74011250637 HC RX REV CODE- 250/637: Performed by: HOSPITALIST

## 2018-04-02 PROCEDURE — 83735 ASSAY OF MAGNESIUM: CPT | Performed by: INTERNAL MEDICINE

## 2018-04-02 PROCEDURE — G8987 SELF CARE CURRENT STATUS: HCPCS | Performed by: OCCUPATIONAL THERAPIST

## 2018-04-02 PROCEDURE — 97535 SELF CARE MNGMENT TRAINING: CPT | Performed by: OCCUPATIONAL THERAPIST

## 2018-04-02 PROCEDURE — 80048 BASIC METABOLIC PNL TOTAL CA: CPT | Performed by: INTERNAL MEDICINE

## 2018-04-02 PROCEDURE — 97165 OT EVAL LOW COMPLEX 30 MIN: CPT | Performed by: OCCUPATIONAL THERAPIST

## 2018-04-02 PROCEDURE — 74011250636 HC RX REV CODE- 250/636: Performed by: GENERAL ACUTE CARE HOSPITAL

## 2018-04-02 PROCEDURE — 36415 COLL VENOUS BLD VENIPUNCTURE: CPT | Performed by: INTERNAL MEDICINE

## 2018-04-02 PROCEDURE — 74011250636 HC RX REV CODE- 250/636: Performed by: INTERNAL MEDICINE

## 2018-04-02 RX ORDER — FUROSEMIDE 10 MG/ML
40 INJECTION INTRAMUSCULAR; INTRAVENOUS DAILY
Status: DISCONTINUED | OUTPATIENT
Start: 2018-04-02 | End: 2018-04-12 | Stop reason: HOSPADM

## 2018-04-02 RX ORDER — ACETAMINOPHEN 325 MG/1
650 TABLET ORAL
Status: DISCONTINUED | OUTPATIENT
Start: 2018-04-02 | End: 2018-04-12 | Stop reason: HOSPADM

## 2018-04-02 RX ORDER — POTASSIUM CHLORIDE 7.45 MG/ML
10 INJECTION INTRAVENOUS
Status: COMPLETED | OUTPATIENT
Start: 2018-04-02 | End: 2018-04-03

## 2018-04-02 RX ORDER — POTASSIUM CHLORIDE 1.5 G/1.77G
40 POWDER, FOR SOLUTION ORAL
Status: COMPLETED | OUTPATIENT
Start: 2018-04-02 | End: 2018-04-02

## 2018-04-02 RX ORDER — POTASSIUM CHLORIDE 1.5 G/1.77G
20 POWDER, FOR SOLUTION ORAL
Status: DISCONTINUED | OUTPATIENT
Start: 2018-04-02 | End: 2018-04-06

## 2018-04-02 RX ADMIN — ESCITALOPRAM OXALATE 10 MG: 10 TABLET ORAL at 10:12

## 2018-04-02 RX ADMIN — POTASSIUM CHLORIDE 20 MEQ: 1.5 POWDER, FOR SOLUTION ORAL at 11:08

## 2018-04-02 RX ADMIN — PANTOPRAZOLE SODIUM 40 MG: 40 TABLET, DELAYED RELEASE ORAL at 10:12

## 2018-04-02 RX ADMIN — DILTIAZEM HYDROCHLORIDE 15 MG/HR: 5 INJECTION, SOLUTION INTRAVENOUS at 06:28

## 2018-04-02 RX ADMIN — HYDROCHLOROTHIAZIDE 25 MG: 25 TABLET ORAL at 17:09

## 2018-04-02 RX ADMIN — DILTIAZEM HYDROCHLORIDE 180 MG: 180 CAPSULE, COATED, EXTENDED RELEASE ORAL at 10:12

## 2018-04-02 RX ADMIN — HEPARIN SODIUM 5000 UNITS: 5000 INJECTION, SOLUTION INTRAVENOUS; SUBCUTANEOUS at 02:05

## 2018-04-02 RX ADMIN — Medication 10 ML: at 13:29

## 2018-04-02 RX ADMIN — LEVOFLOXACIN 500 MG: 5 INJECTION, SOLUTION INTRAVENOUS at 22:29

## 2018-04-02 RX ADMIN — POTASSIUM CHLORIDE 10 MEQ: 10 INJECTION, SOLUTION INTRAVENOUS at 04:18

## 2018-04-02 RX ADMIN — CLOPIDOGREL BISULFATE 75 MG: 75 TABLET ORAL at 10:12

## 2018-04-02 RX ADMIN — Medication 10 ML: at 21:35

## 2018-04-02 RX ADMIN — ACETAMINOPHEN 650 MG: 325 TABLET ORAL at 23:32

## 2018-04-02 RX ADMIN — METOPROLOL TARTRATE 25 MG: 25 TABLET ORAL at 10:12

## 2018-04-02 RX ADMIN — PANTOPRAZOLE SODIUM 40 MG: 40 TABLET, DELAYED RELEASE ORAL at 17:09

## 2018-04-02 RX ADMIN — PIPERACILLIN SODIUM,TAZOBACTAM SODIUM 4.5 G: 4; .5 INJECTION, POWDER, FOR SOLUTION INTRAVENOUS at 05:10

## 2018-04-02 RX ADMIN — METOPROLOL TARTRATE 25 MG: 25 TABLET ORAL at 17:09

## 2018-04-02 RX ADMIN — PIPERACILLIN SODIUM,TAZOBACTAM SODIUM 4.5 G: 4; .5 INJECTION, POWDER, FOR SOLUTION INTRAVENOUS at 17:11

## 2018-04-02 RX ADMIN — METHYLPREDNISOLONE SODIUM SUCCINATE 40 MG: 40 INJECTION, POWDER, FOR SOLUTION INTRAMUSCULAR; INTRAVENOUS at 21:35

## 2018-04-02 RX ADMIN — HEPARIN SODIUM 5000 UNITS: 5000 INJECTION, SOLUTION INTRAVENOUS; SUBCUTANEOUS at 17:10

## 2018-04-02 RX ADMIN — METHYLPREDNISOLONE SODIUM SUCCINATE 40 MG: 40 INJECTION, POWDER, FOR SOLUTION INTRAMUSCULAR; INTRAVENOUS at 13:28

## 2018-04-02 RX ADMIN — VANCOMYCIN HYDROCHLORIDE 750 MG: 750 INJECTION, POWDER, LYOPHILIZED, FOR SOLUTION INTRAVENOUS at 16:00

## 2018-04-02 RX ADMIN — Medication 10 ML: at 14:30

## 2018-04-02 RX ADMIN — TEMAZEPAM 15 MG: 15 CAPSULE ORAL at 21:35

## 2018-04-02 RX ADMIN — DILTIAZEM HYDROCHLORIDE 10 MG/HR: 5 INJECTION, SOLUTION INTRAVENOUS at 05:08

## 2018-04-02 RX ADMIN — DILTIAZEM HYDROCHLORIDE 5 MG/HR: 5 INJECTION, SOLUTION INTRAVENOUS at 04:47

## 2018-04-02 RX ADMIN — POTASSIUM CHLORIDE 20 MEQ: 1.5 POWDER, FOR SOLUTION ORAL at 17:10

## 2018-04-02 RX ADMIN — FUROSEMIDE 40 MG: 10 INJECTION, SOLUTION INTRAMUSCULAR; INTRAVENOUS at 10:11

## 2018-04-02 RX ADMIN — POTASSIUM CHLORIDE 40 MEQ: 1.5 POWDER, FOR SOLUTION ORAL at 10:11

## 2018-04-02 RX ADMIN — HYDRALAZINE HYDROCHLORIDE 100 MG: 50 TABLET ORAL at 17:09

## 2018-04-02 RX ADMIN — ASPIRIN 81 MG CHEWABLE TABLET 81 MG: 81 TABLET CHEWABLE at 17:09

## 2018-04-02 RX ADMIN — Medication 10 ML: at 06:00

## 2018-04-02 RX ADMIN — METHYLPREDNISOLONE SODIUM SUCCINATE 40 MG: 40 INJECTION, POWDER, FOR SOLUTION INTRAMUSCULAR; INTRAVENOUS at 05:09

## 2018-04-02 RX ADMIN — POTASSIUM CHLORIDE 20 MEQ: 1.5 POWDER, FOR SOLUTION ORAL at 17:09

## 2018-04-02 RX ADMIN — HYDRALAZINE HYDROCHLORIDE 100 MG: 50 TABLET ORAL at 21:35

## 2018-04-02 RX ADMIN — POTASSIUM CHLORIDE 10 MEQ: 10 INJECTION, SOLUTION INTRAVENOUS at 05:27

## 2018-04-02 RX ADMIN — HEPARIN SODIUM 5000 UNITS: 5000 INJECTION, SOLUTION INTRAVENOUS; SUBCUTANEOUS at 10:12

## 2018-04-02 RX ADMIN — HYDRALAZINE HYDROCHLORIDE 100 MG: 50 TABLET ORAL at 10:12

## 2018-04-02 NOTE — PROGRESS NOTES
PROGRESS NOTE    NAME:  Leandro Delarosa   :   1935   MRN:   [id number]     Date/Time:  2018 6:59 AM  Subjective:   History:  Chart reviewed and patient seen and examined this AM and D/W her nurse and all events noted. She presented on 3/27 with several episodes of N/V w/o diarrhea or other GI c/o. She has had no Vomiting since 3/27 AM and no other GI c/o except on 3/29 AM had some diarrhea w/o since. . She noted to be hypoxemic and was diagnosed with bilateral Pneumonia and has been started on triple antibiotics. She has had a little cough and notable SOB w/o other cardio/respiratory c/o until during the night 3/30 when she became more SOB and Hypoxemic and CXR revealed Pulmonary Edema. She had IV Lasix with good UO and progressively improved oxygenation since. She denies CP or other cardio/respiratory c/o and says she feels better now. She has no  c/o. She developed PAF since admission treated with Cardizem drip and changed to PO yesterday. She is generally weak w/o other neurologic c/o. There are no other c/o on complete ROS except now poor appetite.  .       Medications reviewed:  Current Facility-Administered Medications   Medication Dose Route Frequency    dilTIAZem (CARDIZEM) 125 mg in 0.9% sodium chloride 125 mL infusion  0-15 mg/hr IntraVENous TITRATE    furosemide (LASIX) injection 100 mg  100 mg IntraVENous DAILY    methylPREDNISolone (PF) (SOLU-MEDROL) injection 40 mg  40 mg IntraVENous Q8H    dilTIAZem CD (CARDIZEM CD) capsule 180 mg  180 mg Oral DAILY    levoFLOXacin (LEVAQUIN) 500 mg in D5W IVPB  500 mg IntraVENous Q48H    piperacillin-tazobactam (ZOSYN) 4.5 g in 0.9% sodium chloride (MBP/ADV) 100 mL  4.5 g IntraVENous Q12H    potassium chloride (KLOR-CON) packet 20 mEq  20 mEq Oral BID WITH MEALS    vancomycin (VANCOCIN) 750 mg in 0.9% sodium chloride (MBP/ADV) 250 mL  750 mg IntraVENous Q24H    metoprolol tartrate (LOPRESSOR) tablet 25 mg  25 mg Oral BID    diphenoxylate-atropine (LOMOTIL) tablet 1 Tab  1 Tab Oral Q4H PRN    temazepam (RESTORIL) capsule 15 mg  15 mg Oral QHS    aspirin chewable tablet 81 mg  81 mg Oral QPM    clopidogrel (PLAVIX) tablet 75 mg  75 mg Oral DAILY    hydrALAZINE (APRESOLINE) tablet 100 mg  100 mg Oral TID    escitalopram oxalate (LEXAPRO) tablet 10 mg  10 mg Oral DAILY    pantoprazole (PROTONIX) tablet 40 mg  40 mg Oral BID    hydroCHLOROthiazide (HYDRODIURIL) tablet 25 mg  25 mg Oral QPM    sodium chloride (NS) flush 5-10 mL  5-10 mL IntraVENous Q8H    sodium chloride (NS) flush 5-10 mL  5-10 mL IntraVENous PRN    heparin (porcine) injection 5,000 Units  5,000 Units SubCUTAneous Q8H    albuterol (PROVENTIL VENTOLIN) nebulizer solution 2.5 mg  2.5 mg Nebulization Q6H PRN        Objective:   Vitals:  Visit Vitals    /68    Pulse (!) 135    Temp 98.5 °F (36.9 °C)    Resp 20    Ht 5' 1.5\" (1.562 m)    Wt 105 lb (47.6 kg)    SpO2 93%    Breastfeeding No    BMI 19.52 kg/m2    O2 Flow Rate (L/min): 6 l/min O2 Device: Nasal cannula Temp (24hrs), Av.3 °F (36.8 °C), Min:98 °F (36.7 °C), Max:98.7 °F (37.1 °C)      Last 24hr Input/Output:    Intake/Output Summary (Last 24 hours) at 18 0659  Last data filed at 18 0530   Gross per 24 hour   Intake           539.17 ml   Output             1900 ml   Net         -1360.83 ml        PHYSICAL EXAM:  General:     Alert, cooperative, moderate respiratory distress, appears stated age. Head:    Normocephalic, without obvious abnormality, atraumatic. Eyes:    Conjunctivae/corneas clear. PERRLA  Nose:   Nares normal. No drainage or sinus tenderness. BIPAP in place  Throat:     Lips, mucosa, and tongue normal.  No Thrush  Neck:   Supple, symmetrical,  no adenopathy, thyroid: non tender     no carotid bruit and no JVD. Back:     Symmetric,  No CVA tenderness. Lungs:    Clear to auscultation bilaterally with overall decreased BS. No Wheezing or Rhonchi.  No rales.  Heart:    Regular rate and rhythm,  no murmur, rub or gallop. Abdomen:    Soft, non tender. Not distended. Bowel sounds normal. No masses  Extremities:  Extremities normal, atraumatic, No cyanosis. No edema. + clubbing  Lymph nodes:  Cervical, supraclavicular normal.  Neurologic:  General decreased strength, Alert and oriented X 3. Skin:                 No rash    Telemetry: NSR with PVCs    Lab Data Reviewed:    Recent Results (from the past 24 hour(s))   METABOLIC PANEL, BASIC    Collection Time: 04/02/18  2:13 AM   Result Value Ref Range    Sodium 150 (H) 136 - 145 mmol/L    Potassium 2.4 (LL) 3.5 - 5.1 mmol/L    Chloride 112 (H) 97 - 108 mmol/L    CO2 27 21 - 32 mmol/L    Anion gap 11 5 - 15 mmol/L    Glucose 140 (H) 65 - 100 mg/dL    BUN 71 (H) 6 - 20 MG/DL    Creatinine 1.86 (H) 0.55 - 1.02 MG/DL    BUN/Creatinine ratio 38 (H) 12 - 20      GFR est AA 31 (L) >60 ml/min/1.73m2    GFR est non-AA 26 (L) >60 ml/min/1.73m2    Calcium 10.2 (H) 8.5 - 10.1 MG/DL   CBC WITH AUTOMATED DIFF    Collection Time: 04/02/18  2:13 AM   Result Value Ref Range    WBC 12.9 (H) 3.6 - 11.0 K/uL    RBC 3.25 (L) 3.80 - 5.20 M/uL    HGB 9.6 (L) 11.5 - 16.0 g/dL    HCT 28.0 (L) 35.0 - 47.0 %    MCV 86.2 80.0 - 99.0 FL    MCH 29.5 26.0 - 34.0 PG    MCHC 34.3 30.0 - 36.5 g/dL    RDW 14.6 (H) 11.5 - 14.5 %    PLATELET 351 662 - 359 K/uL    MPV 11.8 8.9 - 12.9 FL    NRBC 3.1 (H) 0  WBC    ABSOLUTE NRBC 0.40 (H) 0.00 - 0.01 K/uL    NEUTROPHILS 91 (H) 32 - 75 %    BAND NEUTROPHILS 1 %    LYMPHOCYTES 3 (L) 12 - 49 %    MONOCYTES 3 (L) 5 - 13 %    EOSINOPHILS 0 0 - 7 %    BASOPHILS 0 0 - 1 %    METAMYELOCYTES 1 %    MYELOCYTES 1 %    IMMATURE GRANULOCYTES 0 0.0 - 0.5 %    ABS. NEUTROPHILS 11.9 (H) 1.8 - 8.0 K/UL    ABS. LYMPHOCYTES 0.4 (L) 0.8 - 3.5 K/UL    ABS. MONOCYTES 0.4 0.0 - 1.0 K/UL    ABS. EOSINOPHILS 0.0 0.0 - 0.4 K/UL    ABS. BASOPHILS 0.0 0.0 - 0.1 K/UL    ABS. IMM.  GRANS. 0.0 0.00 - 0.04 K/UL    DF MANUAL RBC COMMENTS NORMOCYTIC, NORMOCHROMIC           Assessment/Plan:     Principal Problem:    Acute hypoxemic respiratory failure ([facility] (3/27/2018)    Active Problems:    COPD exacerbation ([facility] (8/14/2010)      Hypertension (7/20/2016)      Peripheral vascular disease ([facility] (7/20/2016)      Sepsis ([facility] (3/28/2018)      Pneumonia (3/28/2018)      Acute renal failure (ARF) ([facility] (3/28/2018)      Primary lung large cell carcinoma, left ([facility] (3/29/2018)      Overview: Being candidate for surgical resection so treated with radiation therapy       in 2016      PAF ([facility]oxysmal [facility] fibrillation) ([facility] (3/30/2018)      Moderate protein-calorie malnutrition ([facility] (3/30/2018)      Decubitus ulcer of sacral region, stage 2 (3/30/2018)       ___________________________________________________  PLAN:    1. Zosyn, Levaquin and Vancomycin for Pneumonia pending cultures  2. Supplemental Oxygen for Hypoxemia now with 6 L and 93% Sat (She is DNR)  3.  JA treatments for acute respiratory failure  4. Steroids for COPD exacerbation tapered a little yesterday  5. D/Micah IV Hydration which she was on with ARF and follow Creat (2.53 on adm to 1.86 now)  6. Decrease IV Lasix  7. Replete K  8. With anemia follow Hgb, 10.4 on adm to 9.6 now  9. Cont current HTN with HCTZ and Hydralazine and follow BP which is up a little so added Metoprolol on 3/30 and also on cardiazem drip now which I will change to PO  10. Cont ASA and Plavix with ASVD  11. Repeat CXR improved  12. Encourage PO intake  13.  Protonix IV      Critically ill patient with end stage lung disease and high risk of decompensation (Resprct DNR wishes)    ___________________________________________________    Attending Physician: Robert Castro MD

## 2018-04-02 NOTE — PROGRESS NOTES
PCU SHIFT NURSING NOTE      Bedside verbal shift change report given to Richard Jacobs (oncoming nurse) by Heike Gonzales (offgoing nurse). Report included the following information SBAR, Kardex, Intake/Output, Recent Results and Cardiac Rhythm A.FIB.    0307: Notified Dr. Jing Caldwell of potassium level of 2.4. Orders received. 0423: Patient A. FIB with RVR. Patient restless. Oxygen 89%. Venti-mask applied. Dr. Jing Caldwell notified. Ordered to restart cardizem infusion. Orders received. 0530: Patient resting with no signs of distress noted. 6L NC on patient with oxygen level 94%. HR 93 on 10 mg IV cardizem infusion. Will continue to monitor. Admission Date 3/27/2018   Admission Diagnosis Respiratory failure with hypoxia ([facility]   Consults None        Consults   []PT   []OT   []Speech   []Case Management      [] Palliative      Cardiac Monitoring Order   []Yes   []No     IV drips   []Yes    Drip:                            Dose:  Drip:                            Dose:  Drip:                            Dose:   []No     GI Prophylaxis   []Yes   []No         DVT Prophylaxis   SCDs:             Kunal stockings:         [] Medication   []Contraindicated   []None      Activity Level Activity Level: Bed Rest     Activity Assistance: Partial (two people)   Purposeful Rounding every 1-2 hour? []Yes   Campo Score  Total Score: 4   Bed Alarm (If score 3 or >)   []Yes   [] Refused (See signed refusal form in chart)   Rashid Score  Rashid Score: 12   Rashid Score (if score 14 or less)   []PMT consult   []Wound Care consult      []Specialty bed   [] Nutrition consult          Needs prior to discharge:   Home O2 required:    []Yes   []No    If yes, how much O2 required?     Other:    Last Bowel Movement: Last Bowel Movement Date: 03/29/18      Influenza Vaccine Received Flu Vaccine for Current Season (usually Sept-March): Yes        Pneumonia Vaccine           Diet Active Orders   Diet    DIET CLEAR LIQUID      LDAs               Peripheral IV 03/30/18 Left;Inner Arm (Active)   Site Assessment Clean, dry, & intact 4/1/2018  8:01 PM   Phlebitis Assessment 0 4/1/2018  8:01 PM   Infiltration Assessment 0 4/1/2018  8:01 PM   Dressing Status Clean, dry, & intact 4/1/2018  8:01 PM   Dressing Type Tape;Transparent 4/1/2018  8:01 PM   Hub Color/Line Status Yellow; Flushed 4/1/2018  8:01 PM   Action Taken Open ports on tubing capped; Tubing changed 3/30/2018  1:07 AM   Alcohol Cap Used Yes 3/30/2018  1:07 AM       Peripheral IV 04/01/18 Right Forearm (Active)   Site Assessment Clean, dry, & intact 4/1/2018  8:01 PM   Phlebitis Assessment 0 4/1/2018  8:01 PM   Infiltration Assessment 0 4/1/2018  8:01 PM   Dressing Status Clean, dry, & intact 4/1/2018  8:01 PM   Dressing Type Tape;Transparent 4/1/2018  8:01 PM   Hub Color/Line Status Blue; Infusing 4/1/2018  8:01 PM                      Urinary Catheter Urinary Catheter 03/28/18 Tellez-Indications for Use: Urinary retention/bladder outlet obstruction    Intake & Output   Date 03/31/18 1900 - 04/01/18 0659 04/01/18 0700 - 04/02/18 0659   Shift 9558-2165 24 Hour Total 5890-2038 4029-1150 24 Hour Total   I  N  T  A  K  E   I.V.  (mL/kg/hr) 403.3 423.4 539.2  (0.9)  539.2      Cardizem Volume 103.3 123.4 39.2  39.2      Volume (vancomycin (VANCOCIN) 750 mg in 0.9% sodium chloride (MBP/ADV) 250 mL)   250  250      Volume (piperacillin-tazobactam (ZOSYN) 4.5 g in 0.9% sodium chloride (MBP/ADV) 100 mL)   100  100      Volume (levoFLOXacin (LEVAQUIN) 750 mg in D5W IVPB)   150  150      Volume (piperacillin-tazobactam (ZOSYN) 4.5 g in 0.9% sodium chloride (MBP/ADV) 100 mL) 300 300       Shift Total  (mL/kg) 403.3  (8.5) 423.4  (8.9) 539.2  (11.3)  539.2  (11.3)   O  U  T  P  U  T   Urine  (mL/kg/hr) 950 1950 1000  (1.7)  1000      Urine Output (mL) (Urinary Catheter 03/28/18 Tellez) 950 1950 1000  1000    Stool           Stool Occurrence(s)    1 x 1 x    Shift Total  (mL/kg) 950  (19.9) 1950  (40.9) 1000  (21)  1000  (21)   NET -546. 7 -1526.6 -460.8  -460.8   Weight (kg) 47.6 47.6 47.6 47.6 47.6         Readmission Risk Assessment Tool Score Medium Risk            20       Total Score        2 . Living with Significant Other. Assisted Living. LTAC. SNF. or   Rehab    3 Patient Length of Stay (>5 days = 3)    5 Pt.  Coverage (Medicare=5 , Medicaid, or Self-Pay=4)    10 Charlson Comorbidity Score (Age + Comorbid Conditions)        Criteria that do not apply:    Has Seen PCP in Last 6 Months (Yes=3, No=0)    IP Visits Last 12 Months (1-3=4, 4=9, >4=11)       Expected Length of Stay 4d 21h   Actual Length of Stay 3

## 2018-04-02 NOTE — PROGRESS NOTES
Spiritual Care Assessment/Progress Note  [facility]      NAME: Anni Beckwith      MRN: [id number]  AGE: 80 y.o.  SEX: female  Confucianist Affiliation: Amish   Language: English     4/2/2018     Total Time (in minutes): 14     Spiritual Assessment begun in [facility] PROGRESSIVE CARE through conversation with:         []Patient        [] Family    [] Friend(s)        Reason for Consult: Initial/Spiritual assessment, patient floor     Spiritual beliefs: (Please include comment if needed)     [x] Involved in a jairon tradition/spiritual practice: Farzad Green     [] Supported by a jairon community:      [] Claims no spiritual orientation:      [] Seeking spiritual identity:           [] Adheres to an individual form of spirituality:      [] Not able to assess:                     Identified resources for coping:      [x] Prayer                  [] Devotional reading               [] Music                  [] Guided Imagery     [x] Family/friends                 [] Pet visits     [] Other:         Interventions offered during this visit: (See comments for more details)    Patient Interventions: Affirmation of jairon, Affirmation of emotions/emotional suffering, Catharsis/review of pertinent events in supportive environment, Iconic (affirming the presence of God/Higher Power), Initial/Spiritual assessment, patient floor, Normalization of emotional/spiritual concerns, Prayer (assurance of), Confucianist beliefs/image of God discussed           Plan of Care:     [] Discuss Spiritual/Cultural needs    [] Support AMD and/or advance care planning process      [] Support grieving process   [] Coordinate Rites/Rituals    [] Coordination with community clergy   [x] No spiritual needs identified at this time   [] Detailed Plan of Care below (See Comments)  [] Make referral to Music Therapy  [] Make referral to Pet Therapy     [] Make referral to Addiction services  [] Make referral to [facility]  [] Make referral to Spiritual Care Partner  [] No future visits requested             Comments:   Initial visit on PCU for spiritual assessment. No visitors present. Patient appears sullen, answered questions, but did not appear to want to engage further. She indicated her family has been visiting. She identifies as [facility] and was receptive to assurance of prayer. Provided pastoral presence and advised her of  availability.   OMAIRA Lopez, [facility], [address]    [address] Paging Service  [phone]

## 2018-04-02 NOTE — PROGRESS NOTES
PCU SHIFT NURSING NOTE      Bedside and Verbal shift change report given to Greyson Santacruz RN (oncoming nurse) by Adrian Villeda RN (offgoing nurse). Report included the following information SBAR, Kardex, Intake/Output, MAR, Recent Results and Cardiac Rhythm NSR. Shift Summary:       Admission Date 3/27/2018   Admission Diagnosis Respiratory failure with hypoxia ([address]   Consults None        Consults   []PT   []OT   []Speech   []Case Management      [] Palliative      Cardiac Monitoring Order   []Yes   []No     IV drips   []Yes    Drip:                            Dose:  Drip:                            Dose:  Drip:                            Dose:   []No     GI Prophylaxis   []Yes   []No         DVT Prophylaxis   SCDs:             Kunal stockings:         [] Medication   []Contraindicated   []None      Activity Level Activity Level: Bed Rest     Activity Assistance: Partial (two people)   Purposeful Rounding every 1-2 hour? []Yes   Campo Score  Total Score: 4   Bed Alarm (If score 3 or >)   []Yes   [] Refused (See signed refusal form in chart)   Rashid Score  Rashid Score: 12   Rashid Score (if score 14 or less)   []PMT consult   []Wound Care consult      []Specialty bed   [] Nutrition consult          Needs prior to discharge:   Home O2 required:    []Yes   []No    If yes, how much O2 required? Other:    Last Bowel Movement: Last Bowel Movement Date: 04/01/18      Influenza Vaccine Received Flu Vaccine for Current Season (usually Sept-March): Yes        Pneumonia Vaccine           Diet Active Orders   Diet    DIET CLEAR LIQUID      LDAs               Peripheral IV 04/01/18 Right Forearm (Active)   Site Assessment Clean, dry, & intact 4/2/2018  2:05 AM   Phlebitis Assessment 0 4/2/2018  2:05 AM   Infiltration Assessment 0 4/2/2018  2:05 AM   Dressing Status Clean, dry, & intact 4/2/2018  2:05 AM   Dressing Type Tape;Transparent 4/2/2018  2:05 AM   Hub Color/Line Status Blue; Infusing 4/2/2018  2:05 AM       Peripheral IV 04/02/18 Right Hand (Active)       Peripheral IV 04/02/18 Right;Posterior Forearm (Active)                      Urinary Catheter Urinary Catheter 03/28/18 Tellez-Indications for Use: Urinary retention/bladder outlet obstruction    Intake & Output   Date 04/01/18 0700 - 04/02/18 0659 04/02/18 0700 - 04/03/18 0659   Shift 8738-8241 [id number] 24 Hour Total 4555-8699 6041-5669 24 Hour Total   I  N  T  A  K  E   I.V.  (mL/kg/hr) 539.2  (0.9)  539.2  (0.5)         Cardizem Volume 39.2  39.2         Volume (vancomycin (VANCOCIN) 750 mg in 0.9% sodium chloride (MBP/ADV) 250 mL) 250  250         Volume (piperacillin-tazobactam (ZOSYN) 4.5 g in 0.9% sodium chloride (MBP/ADV) 100 mL) 100  100         Volume (levoFLOXacin (LEVAQUIN) 750 mg in D5W IVPB) 150  150       Shift Total  (mL/kg) 539.2  (11.3)  539.2  (11.3)      O  U  T  P  U  T   Urine  (mL/kg/hr) 1000  (1.7) 900  (1.6) 1900  (1.7)         Urine Output (mL) (Urinary Catheter 03/28/18 Tellez) 8413 337 5956       Stool            Stool Occurrence(s)  1 x 1 x       Shift Total  (mL/kg) 1000  (21) 900  (18.9) 1900  (39.9)      NET -460.8 -900 -1360.8      Weight (kg) 47.6 47.6 47.6 47.6 47.6 47.6         Readmission Risk Assessment Tool Score Medium Risk            20       Total Score        2 . Living with Significant Other. Assisted Living. LTAC. SNF. or   Rehab    3 Patient Length of Stay (>5 days = 3)    5 Pt.  Coverage (Medicare=5 , Medicaid, or Self-Pay=4)    10 Charlson Comorbidity Score (Age + Comorbid Conditions)        Criteria that do not apply:    Has Seen PCP in Last 6 Months (Yes=3, No=0)    IP Visits Last 12 Months (1-3=4, 4=9, >4=11)       Expected Length of Stay 4d 21h   Actual Length of Stay 4

## 2018-04-02 NOTE — PROGRESS NOTES
Problem: Self Care Deficits Care Plan (Adult)  Goal: *Acute Goals and Plan of Care (Insert Text)  Occupational Therapy Goals  Initiated 4/2/2018  1. Patient will perform self-feeding with moderate assistance  within 7 day(s). 2.  Patient will perform upper body bathing with moderate assistance  within 7 day(s). 3.  Patient will perform upper body dressing with minimal assistance/contact guard assist within 7 day(s). 4.  Patient will perform toilet transfers with moderate assistance  within 7 day(s). 5.  Patient will perform all aspects of toileting with moderate assistance  within 7 day(s). 6.  Patient will participate in upper extremity therapeutic exercise/activities with moderate assistance  for 5 minutes within 7 day(s). 7.  Patient will participate in functional mobility/transfer assessment within 7 day(s). Occupational Therapy EVALUATION  Patient: Sravan Mason (43 y.o. female)  Date: 4/2/2018  Primary Diagnosis: Respiratory failure with hypoxia ([address] [facility] [address]        Precautions:   DNR, Fall    ASSESSMENT :  Based on the objective data described below, the patient presents with confusion/altered mental status, decreased balance, generalized weakness, and decreased endurance impairing independence and safety during adls and functional mobility. Pt is functioning well below her independent/supervision baseline level requiring assistance  For all adls and  functional mobility. At this time, pt is generally dependent for self care and mobility. She was unable to rise up from sitting EOB to standing  this date, with maximal assistance, moving her hips slightly up off the mattress requiring maximal assistance to scoot up the side of the bed for repositioning. Pt was incontinent and nursing assisted with pt care. Pt will likely need SNF rehab at discharge. . .    Patient will benefit from skilled intervention to address the above impairments.   Patients rehabilitation potential is considered to be Good  Factors which may influence rehabilitation potential include:   []             None noted  [x]             Mental ability/status  [x]             Medical condition  []             Home/family situation and support systems  []             Safety awareness  []             Pain tolerance/management  []             Other:      PLAN :  Recommendations and Planned Interventions:  [x]               Self Care Training                  [x]        Therapeutic Activities  [x]               Functional Mobility Training    [x]        Cognitive Retraining  [x]               Therapeutic Exercises           [x]        Endurance Activities  [x]               Balance Training                   [x]        Neuromuscular Re-Education  []               Visual/Perceptual Training     [x]   Home Safety Training  [x]               Patient Education                 []        Family Training/Education  []               Other (comment):    Frequency/Duration: Patient will be followed by occupational therapy 3 times a week to address goals. Discharge Recommendations: Rehab/snf rehab  Further Equipment Recommendations for Discharge: tbd     SUBJECTIVE:   Patient was able to state her name, knew she was in a hospital, stated the month and year.   \"I wonder where my daughter is\"    OBJECTIVE DATA SUMMARY:   HISTORY:   Past Medical History:   Diagnosis Date    Hypertension     Ill-defined condition     Ex Lap with Gearldean Hurl patch of a perforated pyloric channel ulcer 7/19/16    Other ill-defined conditions(799.89)     lipids    Other ill-defined conditions(799.89)     blood transfusion history     Past Surgical History:   Procedure Laterality Date    BYPASS GRAFT OTHR,FEM-FEM      BYPASS GRAFT OTHR,FEM-POP      right    HX HEENT      bilateral cataracts    HX ORTHOPAEDIC      right hip fracture/pinning    HX UROLOGICAL      right stent/kidney       Prior Level of Function/Environment/Context: Pt lives in [place], reporting that she sometimes has assistance for self care, but mostly is independent. Pt is unable to report specifically her PLOF and per medical record, pt uses a RW. She had been going to the DR 3X a day, but less often lately. 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, generalized weakness-Medical condition  Performance Patterns (routines, roles, habits, and rituals):   Personal Interests and/or values: reports she likes to read  Expanded or extensive additional review of patient history:     Home Situation  [address] [facility] [facility] [address] Name: Manuel Powell  # Steps to Enter: 0  One/Two Story Residence: One story  Living Alone: No  Support Systems: Assisted living, Friends \ neighbors, Child(hansel)  Patient Expects to be Discharged to[de-identified] Assisted living  Current DME Used/Available at Home: Allie Stack, rollator, Grab bars, Raised toilet seat, Shower chair  Tub or Shower Type: Shower  []  Right hand dominant   []  Left hand dominant    EXAMINATION OF PERFORMANCE DEFICITS:  Cognitive/Behavioral Status:  Neurologic State: Alert;Confused;Drowsy (no initiation of conversation, flat affect, limited response)  Orientation Level: Oriented to person (knows in hospital (not name of), knows mo.,year, birthdate)  Cognition: Decreased attention/concentration; Follows commands; Impaired decision making;Poor safety awareness  Perception:  (unable to accurately assess.)  Perseveration: No perseveration noted  Safety/Judgement: Decreased awareness of environment;Decreased awareness of need for assistance;Decreased awareness of need for safety;Decreased insight into deficits; Fall prevention    Skin: breakdown on sacrum    Edema: none observed    Hearing:   Auditory  Auditory Impairment: None    Vision/Perceptual:    Tracking:  (decreased awareness, decreased scanning of environment)                      Acuity:  (not formally tested)    Corrective Lenses: Reading glasses    Range of Motion:  BUEs. AROM: Generally decreased, functional                         Strength:  BUES:  impaired  Strength: Grossly decreased, n (minimally functional for adls/limited participation in all adls at this time.  )                Coordination:     Fine Motor Skills-Upper: Left Intact; Right Intact    Gross Motor Skills-Upper: Left Impaired;Right Impaired    Tone & Sensation: Tone is normal     Sensation: Intact (no complaints this date)                      Balance:  Sitting: Impaired; Without support  Sitting - Static: Fair (occasional) (needs max A initially to sit unsupported, progresses to fair)  Sitting - Dynamic: Not tested  Standing: Impaired  Standing - Static: Constant support;Poor  Standing - Dynamic :  (not assessed this date, unable to participate in OOB)    Functional Mobility and Transfers for ADLs:  Bed Mobility:  Rolling: Moderate assistance  Supine to Sit: Moderate assistance;Maximum assistance  Sit to Supine: Moderate assistance;Maximum assistance  Scooting: Maximum assistance    Transfers:  Sit to Stand: Maximum assistance; Total assistance (limited WB lifts hips off bed minimally)  Stand to Sit: Maximum assistance  Bed to Chair:  (unable this date)    ADL Assessment:  Feeding: Maximum assistance (pt is declining eating--likes gingerale)    Oral Facial Hygiene/Grooming: Moderate assistance    Bathing: Maximum assistance    Upper Body Dressing: Maximum assistance    Lower Body Dressing: Maximum assistance    Toileting: Total assistance (catheter)                ADL Intervention and task modifications:     Pt unable to report specifics in PLOF. Pt agreeable to tx. Pt with fair sitting balance at EOB, poor strength for mobility and adls at this time. Pt minimally wiped her face at bed level--required assistance for thoroughness. Total assistance for pericare at bed level post incontinence.                                 Cognitive Retraining  Safety/Judgement: Decreased awareness of environment;Decreased awareness of need for assistance;Decreased awareness of need for safety;Decreased insight into deficits; Fall prevention    Functional Measure:  Barthel Index:    Bathin  Bladder: 0  Bowels: 0  Groomin  Dressin  Feedin  Mobility: 0  Stairs: 0  Toilet Use: 0  Transfer (Bed to Chair and Back): 0  Total: 0       Barthel and G-code impairment scale:  Percentage of impairment CH  0% CI  1-19% CJ  20-39% CK  40-59% CL  60-79% CM  80-99% CN  100%   Barthel Score 0-100 100 99-80 79-60 59-40 20-39 1-19   0   Barthel Score 0-20 20 17-19 13-16 9-12 5-8 1-4 0      The Barthel ADL Index: Guidelines  1. The index should be used as a record of what a patient does, not as a record of what a patient could do. 2. The main aim is to establish degree of independence from any help, physical or verbal, however minor and for whatever reason. 3. The need for supervision renders the patient not independent. 4. A patient's performance should be established using the best available evidence. Asking the patient, friends/relatives and nurses are the usual sources, but direct observation and common sense are also important. However direct testing is not needed. 5. Usually the patient's performance over the preceding 24-48 hours is important, but occasionally longer periods will be relevant. 6. Middle categories imply that the patient supplies over 50 per cent of the effort. 7. Use of aids to be independent is allowed. Portia Woodall., Barthel, DMitraWMitra (1693). Functional evaluation: the Barthel Index. [address] [facility] (14)2. [facility] evan CARINA Rosales, Genoveva Smith., Devorah Masterson., [place], [address] [facility] (). Measuring the change indisability after inpatient rehabilitation; comparison of the responsiveness of the Barthel Index and Functional [place] Measure. Journal of Neurology, Neurosurgery, and Psychiatry, 66(4), 394-419.   ROSE MARY Matta, PAULO Sanchez, Deangelo Mosqueda, M.A. (2004.) Assessment of post-stroke quality of life in cost-effectiveness studies: The usefulness of the Barthel Index and the EuroQoL-5D. Quality of Life Research, 13, 256-97       G codes: In compliance with CMSs Claims Based Outcome Reporting, the following G-code set was chosen for this patient based on their primary functional limitation being treated: The outcome measure chosen to determine the severity of the functional limitation was the Barthel index with a score of 0-/100 which was correlated with the impairment scale. ? Self Care:     - CURRENT STATUS: CN - 100% impaired, limited or restricted    - GOAL STATUS: CM - 80%-99% impaired, limited or restricted    - D/C STATUS:  ---------------To be determined---------------     Occupational Therapy Evaluation Charge Determination   History Examination Decision-Making   LOW Complexity : Brief history review  MEDIUM Complexity : 3-5 performance deficits relating to physical, cognitive , or psychosocial skils that result in activity limitations and / or participation restrictions MEDIUM Complexity : Patient may present with comorbidities that affect occupational performnce.  Miniml to moderate modification of tasks or assistance (eg, physical or verbal ) with assesment(s) is necessary to enable patient to complete evaluation       Based on the above components, the patient evaluation is determined to be of the following complexity level: LOW   Pain:  Pain Scale 1: Numeric (0 - 10)  Pain Intensity 1: 0              Activity Tolerance:   Poor due to weakness and impaired cognition  After treatment:   [] Patient left in no apparent distress sitting up in chair  [x] Patient left in no apparent distress in bed  [x] Call bell left within reach  [x] Nursing notified  [x] Caregiver present  [x] Bed alarm activated    COMMUNICATION/EDUCATION:   The patients plan of care was discussed with: Registered Nurse.  [] Home safety education was provided and the patient/caregiver indicated understanding. [] Patient/family have participated as able in goal setting and plan of care. [] Patient/family agree to work toward stated goals and plan of care. [] Patient understands intent and goals of therapy, but is neutral about his/her participation. [x] Patient is unable to participate in goal setting and plan of care. This patients plan of care is appropriate for delegation to [facility].     Thank you for this referral.  Prachi Gongora, OTR/L   39 minutes

## 2018-04-03 LAB
ANION GAP SERPL CALC-SCNC: 11 MMOL/L (ref 5–15)
ATRIAL RATE: 144 BPM
BASOPHILS # BLD: 0 K/UL (ref 0–0.1)
BASOPHILS NFR BLD: 0 % (ref 0–1)
BUN SERPL-MCNC: 70 MG/DL (ref 6–20)
BUN/CREAT SERPL: 41 (ref 12–20)
CALCIUM SERPL-MCNC: 9.9 MG/DL (ref 8.5–10.1)
CALCULATED P AXIS, ECG09: 55 DEGREES
CALCULATED R AXIS, ECG10: -37 DEGREES
CALCULATED T AXIS, ECG11: 80 DEGREES
CHLORIDE SERPL-SCNC: 108 MMOL/L (ref 97–108)
CO2 SERPL-SCNC: 24 MMOL/L (ref 21–32)
CREAT SERPL-MCNC: 1.72 MG/DL (ref 0.55–1.02)
DIAGNOSIS, 93000: NORMAL
DIFFERENTIAL METHOD BLD: ABNORMAL
EOSINOPHIL # BLD: 0 K/UL (ref 0–0.4)
EOSINOPHIL NFR BLD: 0 % (ref 0–7)
ERYTHROCYTE [DISTWIDTH] IN BLOOD BY AUTOMATED COUNT: 14.6 % (ref 11.5–14.5)
GLUCOSE SERPL-MCNC: 145 MG/DL (ref 65–100)
HCT VFR BLD AUTO: 28 % (ref 35–47)
HGB BLD-MCNC: 9.6 G/DL (ref 11.5–16)
IMM GRANULOCYTES # BLD: 0 K/UL (ref 0–0.04)
IMM GRANULOCYTES NFR BLD AUTO: 0 % (ref 0–0.5)
LYMPHOCYTES # BLD: 0.4 K/UL (ref 0.8–3.5)
LYMPHOCYTES NFR BLD: 4 % (ref 12–49)
MCH RBC QN AUTO: 29.4 PG (ref 26–34)
MCHC RBC AUTO-ENTMCNC: 34.3 G/DL (ref 30–36.5)
MCV RBC AUTO: 85.9 FL (ref 80–99)
METAMYELOCYTES NFR BLD MANUAL: 3 %
MONOCYTES # BLD: 0.4 K/UL (ref 0–1)
MONOCYTES NFR BLD: 4 % (ref 5–13)
NEUTS BAND NFR BLD MANUAL: 2 %
NEUTS SEG # BLD: 9.8 K/UL (ref 1.8–8)
NEUTS SEG NFR BLD: 87 % (ref 32–75)
NRBC # BLD: 0.54 K/UL (ref 0–0.01)
NRBC BLD-RTO: 4.9 PER 100 WBC
PLATELET # BLD AUTO: 162 K/UL (ref 150–400)
PMV BLD AUTO: 11.8 FL (ref 8.9–12.9)
POTASSIUM SERPL-SCNC: 2.3 MMOL/L (ref 3.5–5.1)
Q-T INTERVAL, ECG07: 356 MS
QRS DURATION, ECG06: 98 MS
QTC CALCULATION (BEZET), ECG08: 519 MS
RBC # BLD AUTO: 3.26 M/UL (ref 3.8–5.2)
RBC MORPH BLD: ABNORMAL
SODIUM SERPL-SCNC: 143 MMOL/L (ref 136–145)
VENTRICULAR RATE, ECG03: 128 BPM
WBC # BLD AUTO: 11 K/UL (ref 3.6–11)

## 2018-04-03 PROCEDURE — 74011250637 HC RX REV CODE- 250/637: Performed by: INTERNAL MEDICINE

## 2018-04-03 PROCEDURE — 65660000000 HC RM CCU STEPDOWN

## 2018-04-03 PROCEDURE — 36415 COLL VENOUS BLD VENIPUNCTURE: CPT | Performed by: INTERNAL MEDICINE

## 2018-04-03 PROCEDURE — 97110 THERAPEUTIC EXERCISES: CPT

## 2018-04-03 PROCEDURE — 77030011256 HC DRSG MEPILEX <16IN NO BORD MOLN -A

## 2018-04-03 PROCEDURE — 85025 COMPLETE CBC W/AUTO DIFF WBC: CPT | Performed by: INTERNAL MEDICINE

## 2018-04-03 PROCEDURE — 74011000250 HC RX REV CODE- 250: Performed by: HOSPITALIST

## 2018-04-03 PROCEDURE — 80048 BASIC METABOLIC PNL TOTAL CA: CPT | Performed by: INTERNAL MEDICINE

## 2018-04-03 PROCEDURE — 97530 THERAPEUTIC ACTIVITIES: CPT

## 2018-04-03 PROCEDURE — 74011250636 HC RX REV CODE- 250/636: Performed by: GENERAL ACUTE CARE HOSPITAL

## 2018-04-03 PROCEDURE — 93005 ELECTROCARDIOGRAM TRACING: CPT

## 2018-04-03 PROCEDURE — 74011000258 HC RX REV CODE- 258: Performed by: INTERNAL MEDICINE

## 2018-04-03 PROCEDURE — 74011250637 HC RX REV CODE- 250/637: Performed by: GENERAL ACUTE CARE HOSPITAL

## 2018-04-03 PROCEDURE — 74011250636 HC RX REV CODE- 250/636: Performed by: INTERNAL MEDICINE

## 2018-04-03 RX ORDER — METOPROLOL TARTRATE 50 MG/1
50 TABLET ORAL 2 TIMES DAILY
Status: DISCONTINUED | OUTPATIENT
Start: 2018-04-03 | End: 2018-04-12 | Stop reason: HOSPADM

## 2018-04-03 RX ORDER — POTASSIUM CHLORIDE 7.45 MG/ML
10 INJECTION INTRAVENOUS
Status: COMPLETED | OUTPATIENT
Start: 2018-04-03 | End: 2018-04-03

## 2018-04-03 RX ORDER — METOPROLOL TARTRATE 5 MG/5ML
5 INJECTION INTRAVENOUS ONCE
Status: COMPLETED | OUTPATIENT
Start: 2018-04-03 | End: 2018-04-03

## 2018-04-03 RX ADMIN — POTASSIUM CHLORIDE 10 MEQ: 10 INJECTION, SOLUTION INTRAVENOUS at 11:46

## 2018-04-03 RX ADMIN — METHYLPREDNISOLONE SODIUM SUCCINATE 40 MG: 40 INJECTION, POWDER, FOR SOLUTION INTRAMUSCULAR; INTRAVENOUS at 14:39

## 2018-04-03 RX ADMIN — Medication 10 ML: at 14:39

## 2018-04-03 RX ADMIN — METHYLPREDNISOLONE SODIUM SUCCINATE 40 MG: 40 INJECTION, POWDER, FOR SOLUTION INTRAMUSCULAR; INTRAVENOUS at 21:27

## 2018-04-03 RX ADMIN — ESCITALOPRAM OXALATE 10 MG: 10 TABLET ORAL at 09:23

## 2018-04-03 RX ADMIN — METOPROLOL TARTRATE 5 MG: 5 INJECTION, SOLUTION INTRAVENOUS at 05:54

## 2018-04-03 RX ADMIN — TEMAZEPAM 15 MG: 15 CAPSULE ORAL at 21:27

## 2018-04-03 RX ADMIN — CLOPIDOGREL BISULFATE 75 MG: 75 TABLET ORAL at 09:23

## 2018-04-03 RX ADMIN — HYDRALAZINE HYDROCHLORIDE 100 MG: 50 TABLET ORAL at 16:03

## 2018-04-03 RX ADMIN — POTASSIUM CHLORIDE 10 MEQ: 10 INJECTION, SOLUTION INTRAVENOUS at 09:33

## 2018-04-03 RX ADMIN — POTASSIUM CHLORIDE 20 MEQ: 1.5 POWDER, FOR SOLUTION ORAL at 17:40

## 2018-04-03 RX ADMIN — ASPIRIN 81 MG CHEWABLE TABLET 81 MG: 81 TABLET CHEWABLE at 17:40

## 2018-04-03 RX ADMIN — DILTIAZEM HYDROCHLORIDE 180 MG: 180 CAPSULE, COATED, EXTENDED RELEASE ORAL at 09:23

## 2018-04-03 RX ADMIN — POTASSIUM CHLORIDE 20 MEQ: 1.5 POWDER, FOR SOLUTION ORAL at 13:09

## 2018-04-03 RX ADMIN — PIPERACILLIN SODIUM,TAZOBACTAM SODIUM 4.5 G: 4; .5 INJECTION, POWDER, FOR SOLUTION INTRAVENOUS at 17:32

## 2018-04-03 RX ADMIN — HEPARIN SODIUM 5000 UNITS: 5000 INJECTION, SOLUTION INTRAVENOUS; SUBCUTANEOUS at 17:41

## 2018-04-03 RX ADMIN — POTASSIUM CHLORIDE 10 MEQ: 10 INJECTION, SOLUTION INTRAVENOUS at 10:38

## 2018-04-03 RX ADMIN — POTASSIUM CHLORIDE 20 MEQ: 1.5 POWDER, FOR SOLUTION ORAL at 09:22

## 2018-04-03 RX ADMIN — METOPROLOL TARTRATE 50 MG: 50 TABLET ORAL at 17:40

## 2018-04-03 RX ADMIN — PIPERACILLIN SODIUM,TAZOBACTAM SODIUM 4.5 G: 4; .5 INJECTION, POWDER, FOR SOLUTION INTRAVENOUS at 05:56

## 2018-04-03 RX ADMIN — HYDRALAZINE HYDROCHLORIDE 100 MG: 50 TABLET ORAL at 21:27

## 2018-04-03 RX ADMIN — METOPROLOL TARTRATE 50 MG: 50 TABLET ORAL at 09:23

## 2018-04-03 RX ADMIN — PANTOPRAZOLE SODIUM 40 MG: 40 TABLET, DELAYED RELEASE ORAL at 09:23

## 2018-04-03 RX ADMIN — Medication 10 ML: at 21:27

## 2018-04-03 RX ADMIN — FUROSEMIDE 40 MG: 10 INJECTION, SOLUTION INTRAMUSCULAR; INTRAVENOUS at 09:28

## 2018-04-03 RX ADMIN — HYDROCHLOROTHIAZIDE 25 MG: 25 TABLET ORAL at 17:40

## 2018-04-03 RX ADMIN — POTASSIUM CHLORIDE 10 MEQ: 10 INJECTION, SOLUTION INTRAVENOUS at 08:29

## 2018-04-03 RX ADMIN — PANTOPRAZOLE SODIUM 40 MG: 40 TABLET, DELAYED RELEASE ORAL at 17:40

## 2018-04-03 RX ADMIN — HYDRALAZINE HYDROCHLORIDE 100 MG: 50 TABLET ORAL at 09:23

## 2018-04-03 RX ADMIN — VANCOMYCIN HYDROCHLORIDE 750 MG: 750 INJECTION, POWDER, LYOPHILIZED, FOR SOLUTION INTRAVENOUS at 16:08

## 2018-04-03 RX ADMIN — HEPARIN SODIUM 5000 UNITS: 5000 INJECTION, SOLUTION INTRAVENOUS; SUBCUTANEOUS at 09:28

## 2018-04-03 RX ADMIN — HEPARIN SODIUM 5000 UNITS: 5000 INJECTION, SOLUTION INTRAVENOUS; SUBCUTANEOUS at 02:30

## 2018-04-03 RX ADMIN — DIPHENOXYLATE HYDROCHLORIDE AND ATROPINE SULFATE 1 TABLET: 2.5; .025 TABLET ORAL at 14:43

## 2018-04-03 RX ADMIN — Medication 10 ML: at 05:55

## 2018-04-03 RX ADMIN — METHYLPREDNISOLONE SODIUM SUCCINATE 40 MG: 40 INJECTION, POWDER, FOR SOLUTION INTRAMUSCULAR; INTRAVENOUS at 05:56

## 2018-04-03 NOTE — PROGRESS NOTES
Problem: Mobility Impaired (Adult and Pediatric)  Goal: *Acute Goals and Plan of Care (Insert Text)  Physical Therapy Goals  Initiated 3/28/2018  1. Patient will move from supine to sit and sit to supine , scoot up and down and roll side to side in bed with contact guard assist within 7 day(s). 2.  Patient will transfer from bed to chair and chair to bed with minimal assistance using the least restrictive device within 7 day(s). 3.  Patient will perform sit to stand with minimal assistance within 7 day(s). 4.  Patient will ambulate with minimal assistance for 3 feet with the least restrictive device within 7 day(s). physical Therapy TREATMENT  Patient: Kayode Day (75 y.o. female)  Date: 4/3/2018  Diagnosis: Respiratory failure with hypoxia ([address]  Acute hypoxemic respiratory failure ([address]       Precautions: DNR, Fall  Chart, physical therapy assessment, plan of care and goals were reviewed. ASSESSMENT:  Pt with slow progress, very weak does fair with bed mob but needs a lot of assist for transfers, fair with ther-ex, will need SNF rehab at d/c, 's for safety. Progression toward goals:  []    Improving appropriately and progressing toward goals  [x]    Improving very slowly and progressing toward goals  []    Not making progress toward goals and plan of care will be adjusted     PLAN:  Patient continues to benefit from skilled intervention to address the above impairments. Continue treatment per established plan of care.   Discharge Recommendations:  Tee López  Further Equipment Recommendations for Discharge:  rolling walker     OBJECTIVE DATA SUMMARY:     Critical Behavior:  Neurologic State: Alert  Orientation Level: Disoriented to situation, Disoriented to time, Oriented to person, Oriented to place  Cognition: Follows commands  Safety/Judgement: Decreased awareness of environment, Decreased awareness of need for assistance, Decreased awareness of need for safety, Decreased insight into deficits, Fall prevention     Functional Mobility Training:  Bed Mobility:  Rolling: Minimum assistance;Assist x1;Additional time  Supine to Sit: Minimum assistance;Assist x1;Additional time  Scooting: Minimum assistance;Assist x1;Additional time  Level of Assistance: Minimum assistance  Interventions: Tactile cues; Verbal cues     Transfers:  Sit to Stand: Maximum assistance;Assist x2; Additional time  Stand to Sit: Maximum assistance;Assist x2  Stand Pivot Transfers: Maximum assistance  Bed to Chair: Maximum assistance;Assist x2; Additional time  Interventions: Tactile cues;Manual cues; Verbal cues  Level of Assistance: Maximum assistance     Balance:  Sitting: Intact; Without support  Sitting - Static: Good (unsupported)  Sitting - Dynamic: Not tested  Standing: Impaired; With support  Standing - Static: Poor;Constant support  Standing - Dynamic :  (unable)     Ambulation/Gait Training: unable    Therapeutic Exercises:   sitting  EXERCISE   Sets   Reps   Active Active Assist   Passive   Comments   Ankle pumps 1 10 [x] [] [] bilat   Heel raises 1 10 [x] [] [] \"   Toe tap 1 10 [x] [] [] \"   Knee ext 1 10 [x] [] [] \"   Hip flex 1 10 [x] [] [] \"     Pain:  Pain Scale 1: Numeric (0 - 10)  Pain Intensity 1: 0    Activity Tolerance: poor    After treatment:   [x]    Patient left in no apparent distress sitting up in chair  []    Patient left in no apparent distress in bed  [x]    Call bell left within reach  [x]    Nursing notified  []    Caregiver present  [x]    Bed alarm activated    COMMUNICATION/COLLABORATION:   The patients plan of care was discussed with: Registered Nurse    Con Calamity, PTA   Time Calculation: 25 mins

## 2018-04-03 NOTE — PROGRESS NOTES
PCU SHIFT NURSING NOTE      Bedside shift change report given to [facility] (oncoming nurse) by Farzaneh Stauffer  (offgoing nurse). Report included the following information SBAR, Intake/Output, MAR, Accordion, Recent Results and Cardiac Rhythm Afib. Shift Summary: 7310  Patients HR continues to bounce between 90s and 140s however does not sustain in the 100s   0430: frequency of HR tach increases On call paged no return call   0500: EKG obtained On call paged   0530: MD on call place straight through to Dr. Med Givens made aware of situation telephone order with read back for 5mg metoprolol IV push    Admission Date 3/27/2018   Admission Diagnosis Respiratory failure with hypoxia ([address]   Consults None        Consults   []PT   []OT   []Speech   []Case Management      [] Palliative      Cardiac Monitoring Order   []Yes   []No     IV drips   []Yes    Drip:                            Dose:  Drip:                            Dose:  Drip:                            Dose:   []No     GI Prophylaxis   []Yes   []No         DVT Prophylaxis   SCDs:             Kunal stockings:         [] Medication   []Contraindicated   []None      Activity Level Activity Level: Bed Rest     Activity Assistance: Partial (two people)   Purposeful Rounding every 1-2 hour? []Yes   Campo Score  Total Score: 4   Bed Alarm (If score 3 or >)   []Yes   [] Refused (See signed refusal form in chart)   Rashid Score  Rashid Score: 13   Rashid Score (if score 14 or less)   []PMT consult   []Wound Care consult      []Specialty bed   [] Nutrition consult          Needs prior to discharge:   Home O2 required:    []Yes   []No    If yes, how much O2 required?     Other:    Last Bowel Movement: Last Bowel Movement Date: 04/02/18      Influenza Vaccine Received Flu Vaccine for Current Season (usually Sept-March): Yes        Pneumonia Vaccine           Diet Active Orders   Diet    DIET CLEAR LIQUID      LDAs               Peripheral IV 04/01/18 Right Forearm (Active)   Site Assessment Clean, dry, & intact 4/2/2018  7:00 PM   Phlebitis Assessment 0 4/2/2018  7:00 PM   Infiltration Assessment 0 4/2/2018  7:00 PM   Dressing Status Clean, dry, & intact 4/2/2018  7:00 PM   Dressing Type Transparent;Tape 4/2/2018  7:00 PM   Hub Color/Line Status Blue 4/2/2018  7:00 PM       Peripheral IV 04/02/18 Right Hand (Active)   Site Assessment Clean, dry, & intact 4/2/2018  7:00 PM   Phlebitis Assessment 0 4/2/2018  7:00 PM   Infiltration Assessment 0 4/2/2018  7:00 PM   Dressing Status Clean, dry, & intact 4/2/2018  7:00 PM   Dressing Type Transparent;Tape 4/2/2018  7:00 PM   Hub Color/Line Status Blue 4/2/2018  7:00 PM       Peripheral IV 04/02/18 Right;Posterior Forearm (Active)   Site Assessment Clean, dry, & intact 4/2/2018  7:00 PM   Phlebitis Assessment 0 4/2/2018  7:00 PM   Infiltration Assessment 0 4/2/2018  7:00 PM   Dressing Status Clean, dry, & intact 4/2/2018  7:00 PM   Dressing Type Transparent;Tape 4/2/2018  7:00 PM   Hub Color/Line Status Blue 4/2/2018  7:00 PM                      Urinary Catheter Urinary Catheter 03/28/18 Tellez-Indications for Use: Urinary retention/bladder outlet obstruction    Intake & Output   Date 04/02/18 0700 - 04/03/18 0659 04/03/18 0700 - 04/04/18 0659   Shift 0700-1859 1297-6713 24 Hour Total 2234-1339 1169-5835 24 Hour Total   I  N  T  A  K  E   I.V.  (mL/kg/hr) 100  (0.2)  100         Volume (piperacillin-tazobactam (ZOSYN) 4.5 g in 0.9% sodium chloride (MBP/ADV) 100 mL) 100  100       Shift Total  (mL/kg) 100  (2.1)  100  (2.1)      O  U  T  P  U  T   Urine  (mL/kg/hr)  1450 1450         Urine Output (mL) (Urinary Catheter 03/28/18 Tellez)  1450 1450       Shift Total  (mL/kg)  1450  (30.4) 1450  (30.4)       -1450 -1350      Weight (kg) 47.6 47.6 47.6 47.6 47.6 47.6         Readmission Risk Assessment Tool Score Medium Risk            20       Total Score        2 . Living with Significant Other. Assisted Living. LTAC. SNF.  or   Rehab    3 Patient Length of Stay (>5 days = 3)    5 Pt.  Coverage (Medicare=5 , Medicaid, or Self-Pay=4)    10 Charlson Comorbidity Score (Age + Comorbid Conditions)        Criteria that do not apply:    Has Seen PCP in Last 6 Months (Yes=3, No=0)    IP Visits Last 12 Months (1-3=4, 4=9, >4=11)       Expected Length of Stay 4d 21h   Actual Length of Stay 5

## 2018-04-03 NOTE — PROGRESS NOTES
Pharmacy Automatic Renal Dosing Protocol - Antimicrobials    Indication for Antimicrobials: b/l PNA (patient admitted from SNF)    Current Regimen of Each Antimicrobial:  Vancomycin IV Start Date 3/28/18; Day # 7  Levofloxacin 500 mg IV q 48 hours (Start Date: 3/27; Day #8  Pip-tazo 4.5 g IV q 12 hours (Start Date: 3/27; Day #8    Goal Level: VANCOMYCIN TROUGH GOAL RANGE    Vancomycin Trough: 15 - 20 mcg/mL    Measured / Extrapolated Vancomycin Level: N/A    Significant Cultures:    3/27: blood: no growth  -Final    Radiology / Imaging results: (X-ray, CT scan or MRI):    3/27 (XR Chest Port): Left perihilar and right lower lobe infiltrates. Decrease in the size of the left lower lobe lesion.  3/28 (XR Chest Port): Interstitial edema, cardiomegaly. Possible superimposed infiltrate left perihilar. Paralysis, amputations, malnutrition: Actual BW<IBW    Labs:  Recent Labs      18   0437  18   0213  18   0255   CREA  1.72*  1.86*  1.67*   BUN  70*  71*  59*   WBC  11.0  12.9*  15.3*     Temp (24hrs), Av.8 °F (37.1 °C), Min:98.2 °F (36.8 °C), Max:99.6 °F (37.6 °C)    Creatinine Clearance (mL/min) or Dialysis: ~19 ml/min    Impression/Plan:   · Scr trending down  · WBC trending down  · Continue with the current dose of Vancomycin, zosyn and Levofloxacin  · Daily BMP ordered  · Antibiotic stop date: Day #8 of LVQ and Zosyn. Day #7 of vancomycin. Blood cx: NG - Final.  Please consider discontinuing the abx as the patient had a week of abx for PNA. Pharmacy will follow daily and adjust medications as appropriate for renal function and/or serum levels. Thank you,  ERIK TuckerD      Recommended duration of therapy  http://[facility]/Pharmacy/Clinical%20Companion/Duration%20of%20ABX%20therapy. docx    Renal Dosing  http://[facility]/Pharmacy/Clinical%20Companion/Renal%[id number]. pdf

## 2018-04-03 NOTE — PROGRESS NOTES
PCU SHIFT NURSING NOTE      Bedside and Verbal shift change report given to Narda Cox RN (oncoming nurse) by Harvey Toth RN (offgoing nurse). Report included the following information SBAR, Kardex, MAR, Recent Results and Cardiac Rhythm sinus with PVCs. Shift Summary:       Admission Date 3/27/2018   Admission Diagnosis Respiratory failure with hypoxia ([address]   Consults None        Consults   [x]PT   [x]OT   []Speech   [x]Case Management      [] Palliative      Cardiac Monitoring Order   [x]Yes   []No     IV drips   []Yes    Drip:                            Dose:  Drip:                            Dose:  Drip:                            Dose:   [x]No     GI Prophylaxis   [x]Yes   []No         DVT Prophylaxis   SCDs:             Kunal stockings:         [x] Medication   []Contraindicated   []None      Activity Level Activity Level: Bed Rest     Activity Assistance: Partial (two people)   Purposeful Rounding every 1-2 hour? []Yes   Campo Score  Total Score: 4   Bed Alarm (If score 3 or >)   [x]Yes   [] Refused (See signed refusal form in chart)   Rashid Score  Rashid Score: 13   Rashid Score (if score 14 or less)   [x]PMT consult   [x]Wound Care consult      []Specialty bed   [x] Nutrition consult          Needs prior to discharge:   Home O2 required:    [x]Yes   []No    If yes, how much O2 required?     Other:    Last Bowel Movement: Last Bowel Movement Date: 04/02/18      Influenza Vaccine Received Flu Vaccine for Current Season (usually Sept-March): Yes        Pneumonia Vaccine           Diet Active Orders   Diet    DIET CLEAR LIQUID      LDAs               Peripheral IV 04/01/18 Right Forearm (Active)   Site Assessment Clean, dry, & intact 4/2/2018  7:00 PM   Phlebitis Assessment 0 4/2/2018  7:00 PM   Infiltration Assessment 0 4/2/2018  7:00 PM   Dressing Status Clean, dry, & intact 4/2/2018  7:00 PM   Dressing Type Transparent;Tape 4/2/2018  7:00 PM   Hub Color/Line Status Blue 4/2/2018  7:00 PM       Peripheral IV 04/02/18 Right Hand (Active)   Site Assessment Clean, dry, & intact 4/2/2018  7:00 PM   Phlebitis Assessment 0 4/2/2018  7:00 PM   Infiltration Assessment 0 4/2/2018  7:00 PM   Dressing Status Clean, dry, & intact 4/2/2018  7:00 PM   Dressing Type Transparent;Tape 4/2/2018  7:00 PM   Hub Color/Line Status Blue 4/2/2018  7:00 PM       Peripheral IV 04/02/18 Right;Posterior Forearm (Active)   Site Assessment Clean, dry, & intact 4/2/2018  7:00 PM   Phlebitis Assessment 0 4/2/2018  7:00 PM   Infiltration Assessment 0 4/2/2018  7:00 PM   Dressing Status Clean, dry, & intact 4/2/2018  7:00 PM   Dressing Type Transparent;Tape 4/2/2018  7:00 PM   Hub Color/Line Status Blue 4/2/2018  7:00 PM                      Urinary Catheter Urinary Catheter 03/28/18 Tellez-Indications for Use: Urinary retention/bladder outlet obstruction    Intake & Output   Date 04/02/18 0700 - 04/03/18 0659 04/03/18 0700 - 04/04/18 0659   Shift 6025-0470 1474-7425 24 Hour Total 6241-8484 0017-4344 24 Hour Total   I  N  T  A  K  E   I.V.  (mL/kg/hr) 100  (0.2)  100  (0.1)         Volume (piperacillin-tazobactam (ZOSYN) 4.5 g in 0.9% sodium chloride (MBP/ADV) 100 mL) 100  100       Shift Total  (mL/kg) 100  (2.1)  100  (2.1)      O  U  T  P  U  T   Urine  (mL/kg/hr)  1450  (2.5) 1450  (1.3)         Urine Output (mL) (Urinary Catheter 03/28/18 Tellez)  1450 1450       Shift Total  (mL/kg)  1450  (30.4) 1450  (30.4)       -1450 -1350      Weight (kg) 47.6 47.6 47.6 47.6 47.6 47.6         Readmission Risk Assessment Tool Score Medium Risk            20       Total Score        2 . Living with Significant Other. Assisted Living. LTAC. SNF. or   Rehab    3 Patient Length of Stay (>5 days = 3)    5 Pt.  Coverage (Medicare=5 , Medicaid, or Self-Pay=4)    10 Charlson Comorbidity Score (Age + Comorbid Conditions)        Criteria that do not apply:    Has Seen PCP in Last 6 Months (Yes=3, No=0)    IP Visits Last 12 Months (1-3=4, 4=9, >4=11) Expected Length of Stay 4d 21h   Actual Length of Stay 5

## 2018-04-03 NOTE — PROGRESS NOTES
PROGRESS NOTE    NAME:  Clary Velázquez   :   1935   MRN:   [id number]     Date/Time:  4/3/2018 7:30 AM  Subjective:   History:  Chart reviewed and patient seen and examined this AM and D/W her nurse and all events noted. She presented on 3/27 with several episodes of N/V w/o diarrhea or other GI c/o. She has had no Vomiting since 3/27 AM and no other GI c/o except on 3/29 AM had some diarrhea w/o since. . She noted to be hypoxemic and was diagnosed with bilateral Pneumonia and has been started on triple antibiotics. She has had a little cough and notable SOB w/o other cardio/respiratory c/o until during the night 3/30 when she became more SOB and Hypoxemic and CXR revealed Pulmonary Edema. She had IV Lasix with good UO and progressively improved oxygenation since. She denies CP or other cardio/respiratory c/o and says she feels better now. She has no  c/o. She developed PAF since admission treated with Cardizem drip and changed to PO yesterday. She is generally weak w/o other neurologic c/o. There are no other c/o on complete ROS except now poor appetite.  .       Medications reviewed:  Current Facility-Administered Medications   Medication Dose Route Frequency    potassium chloride (KLOR-CON) packet 20 mEq  20 mEq Oral TID WITH MEALS    furosemide (LASIX) injection 40 mg  40 mg IntraVENous DAILY    acetaminophen (TYLENOL) tablet 650 mg  650 mg Oral Q6H PRN    methylPREDNISolone (PF) (SOLU-MEDROL) injection 40 mg  40 mg IntraVENous Q8H    dilTIAZem CD (CARDIZEM CD) capsule 180 mg  180 mg Oral DAILY    levoFLOXacin (LEVAQUIN) 500 mg in D5W IVPB  500 mg IntraVENous Q48H    piperacillin-tazobactam (ZOSYN) 4.5 g in 0.9% sodium chloride (MBP/ADV) 100 mL  4.5 g IntraVENous Q12H    potassium chloride (KLOR-CON) packet 20 mEq  20 mEq Oral BID WITH MEALS    vancomycin (VANCOCIN) 750 mg in 0.9% sodium chloride (MBP/ADV) 250 mL  750 mg IntraVENous Q24H    metoprolol tartrate (LOPRESSOR) tablet 25 mg  25 mg Oral BID    diphenoxylate-atropine (LOMOTIL) tablet 1 Tab  1 Tab Oral Q4H PRN    temazepam (RESTORIL) capsule 15 mg  15 mg Oral QHS    aspirin chewable tablet 81 mg  81 mg Oral QPM    clopidogrel (PLAVIX) tablet 75 mg  75 mg Oral DAILY    hydrALAZINE (APRESOLINE) tablet 100 mg  100 mg Oral TID    escitalopram oxalate (LEXAPRO) tablet 10 mg  10 mg Oral DAILY    pantoprazole (PROTONIX) tablet 40 mg  40 mg Oral BID    hydroCHLOROthiazide (HYDRODIURIL) tablet 25 mg  25 mg Oral QPM    sodium chloride (NS) flush 5-10 mL  5-10 mL IntraVENous Q8H    sodium chloride (NS) flush 5-10 mL  5-10 mL IntraVENous PRN    heparin (porcine) injection 5,000 Units  5,000 Units SubCUTAneous Q8H    albuterol (PROVENTIL VENTOLIN) nebulizer solution 2.5 mg  2.5 mg Nebulization Q6H PRN        Objective:   Vitals:  Visit Vitals    /81 (BP 1 Location: Right arm, BP Patient Position: At rest)    Pulse 84    Temp 99.3 °F (37.4 °C)    Resp 18    Ht 5' 1.5\" (1.562 m)    Wt 105 lb (47.6 kg)    SpO2 92%    Breastfeeding No    BMI 19.52 kg/m2    O2 Flow Rate (L/min): 6 l/min O2 Device: Nasal cannula Temp (24hrs), Av.8 °F (37.1 °C), Min:98.2 °F (36.8 °C), Max:99.6 °F (37.6 °C)      Last 24hr Input/Output:    Intake/Output Summary (Last 24 hours) at 18 0730  Last data filed at 18 1900   Gross per 24 hour   Intake              100 ml   Output             1450 ml   Net            -1350 ml        PHYSICAL EXAM:  General:     Alert, cooperative, moderate respiratory distress, appears stated age. Head:    Normocephalic, without obvious abnormality, atraumatic. Eyes:    Conjunctivae/corneas clear. PERRLA  Nose:   Nares normal. No drainage or sinus tenderness. BIPAP in place  Throat:     Lips, mucosa, and tongue normal.  No Thrush  Neck:   Supple, symmetrical,  no adenopathy, thyroid: non tender     no carotid bruit and no JVD. Back:     Symmetric,  No CVA tenderness.   Lungs:    Clear to auscultation bilaterally with overall decreased BS. No Wheezing or Rhonchi. No rales. Heart:    Regular rate and rhythm,  no murmur, rub or gallop. Abdomen:    Soft, non tender. Not distended. Bowel sounds normal. No masses  Extremities:  Extremities normal, atraumatic, No cyanosis. No edema. + clubbing  Lymph nodes:  Cervical, supraclavicular normal.  Neurologic:  General decreased strength, Alert and oriented X 3. Skin:                 No rash    Telemetry: NSR with PVCs    Lab Data Reviewed:    Recent Results (from the past 24 hour(s))   CBC WITH AUTOMATED DIFF    Collection Time: 04/03/18  4:37 AM   Result Value Ref Range    WBC 11.0 3.6 - 11.0 K/uL    RBC 3.26 (L) 3.80 - 5.20 M/uL    HGB 9.6 (L) 11.5 - 16.0 g/dL    HCT 28.0 (L) 35.0 - 47.0 %    MCV 85.9 80.0 - 99.0 FL    MCH 29.4 26.0 - 34.0 PG    MCHC 34.3 30.0 - 36.5 g/dL    RDW 14.6 (H) 11.5 - 14.5 %    PLATELET 862 753 - 844 K/uL    MPV 11.8 8.9 - 12.9 FL    NRBC 4.9 (H) 0  WBC    ABSOLUTE NRBC 0.54 (H) 0.00 - 0.01 K/uL    NEUTROPHILS 87 (H) 32 - 75 %    BAND NEUTROPHILS 2 %    LYMPHOCYTES 4 (L) 12 - 49 %    MONOCYTES 4 (L) 5 - 13 %    EOSINOPHILS 0 0 - 7 %    BASOPHILS 0 0 - 1 %    METAMYELOCYTES 3 %    IMMATURE GRANULOCYTES 0 0.0 - 0.5 %    ABS. NEUTROPHILS 9.8 (H) 1.8 - 8.0 K/UL    ABS. LYMPHOCYTES 0.4 (L) 0.8 - 3.5 K/UL    ABS. MONOCYTES 0.4 0.0 - 1.0 K/UL    ABS. EOSINOPHILS 0.0 0.0 - 0.4 K/UL    ABS. BASOPHILS 0.0 0.0 - 0.1 K/UL    ABS. IMM.  GRANS. 0.0 0.00 - 0.04 K/UL    DF MANUAL      RBC COMMENTS HELMET CELLS  PRESENT        RBC COMMENTS SCHISTOCYTES  PRESENT        RBC COMMENTS POLYCHROMASIA  PRESENT       METABOLIC PANEL, BASIC    Collection Time: 04/03/18  4:37 AM   Result Value Ref Range    Sodium 143 136 - 145 mmol/L    Potassium 2.3 (LL) 3.5 - 5.1 mmol/L    Chloride 108 97 - 108 mmol/L    CO2 24 21 - 32 mmol/L    Anion gap 11 5 - 15 mmol/L    Glucose 145 (H) 65 - 100 mg/dL    BUN 70 (H) 6 - 20 MG/DL    Creatinine 1.72 (H) 0.55 - 1.02 MG/DL BUN/Creatinine ratio 41 (H) 12 - 20      GFR est AA 34 (L) >60 ml/min/1.73m2    GFR est non-AA 28 (L) >60 ml/min/1.73m2    Calcium 9.9 8.5 - 10.1 MG/DL   EKG, 12 LEAD, INITIAL    Collection Time: 04/03/18  4:52 AM   Result Value Ref Range    Ventricular Rate 128 BPM    Atrial Rate 144 BPM    QRS Duration 98 ms    Q-T Interval 356 ms    QTC Calculation (Bezet) 519 ms    Calculated P Axis 55 degrees    Calculated R Axis -37 degrees    Calculated T Axis 80 degrees    Diagnosis       Sinus tachycardia with frequent and consecutive premature ventricular   complexes and fusion complexes  Left axis deviation  Inferior infarct (cited on or before 29-MAR-2018)  Anterior infarct (cited on or before 29-MAR-2018)  When compared with ECG of 29-MAR-2018 23:15,  fusion complexes are now present  premature ventricular complexes are now present  premature atrial complexes are no longer present  Questionable change in initial forces of Anteroseptal leads           Assessment/Plan:     Principal Problem:    Acute hypoxemic respiratory failure ([address] (3/27/2018)    Active Problems:    COPD exacerbation ([address] (8/14/2010)      Hypertension (7/20/2016)      Peripheral vascular disease ([address] (7/20/2016)      Sepsis ([address] (3/28/2018)      Pneumonia (3/28/2018)      Acute renal failure (ARF) ([address] (3/28/2018)      Primary lung large cell carcinoma, left ([address] (3/29/2018)      Overview: Being candidate for surgical resection so treated with radiation therapy       in 2016      PAF (paroxysmal atrial fibrillation) ([address] (3/30/2018)      Moderate protein-calorie malnutrition ([address] (3/30/2018)      Decubitus ulcer of sacral region, stage 2 (3/30/2018)       ___________________________________________________  PLAN:    1. Zosyn, Levaquin and Vancomycin for Pneumonia pending cultures  2. Supplemental Oxygen for Hypoxemia now with 6 L and 93% Sat (She is DNR)  3.  JA treatments for acute respiratory failure  4.   Steroids for COPD exacerbation tapered a little yesterday  5. D/Micah IV Hydration which she was on with ARF and follow Creat (2.53 on adm to 1.72 now)  6. Decreased IV Lasix  7. Replete K with I.V  8.  With anemia follow Hgb, 10.4 on adm to 9.6 now  9. Cont current HTN with HCTZ and Hydralazine and follow BP which is up a little so added Metoprolol on 3/30 and also on Cardizem PO  10. Cont ASA and Plavix with ASVD  11. Repeat CXR improved  12. Encourage PO intake  13.  Protonix IV      Critically ill patient with end stage lung disease and high risk of decompensation (Resprct DNR wishes)    ___________________________________________________    Attending Physician: Camryn Mcclain MD

## 2018-04-03 NOTE — PROGRESS NOTES
Cm met pt and her daughter Riri Harrell discussed discharge plan. SNF list given pt selected [facility] from list referral send through [address] [facility] [place]. Confirmed with Libby regarding pt's admission. Pt had multiple SNF placement in [address] [place] [address] given copy attached to pt's bedside chart.     Ubaldo Astudillo MSW  ED Case Manager   Ext -[phone]

## 2018-04-04 ENCOUNTER — APPOINTMENT (OUTPATIENT)
Dept: GENERAL RADIOLOGY | Age: 83
DRG: 871 | End: 2018-04-04
Attending: INTERNAL MEDICINE
Payer: MEDICARE

## 2018-04-04 LAB
ANION GAP SERPL CALC-SCNC: 8 MMOL/L (ref 5–15)
BUN SERPL-MCNC: 68 MG/DL (ref 6–20)
BUN/CREAT SERPL: 38 (ref 12–20)
CALCIUM SERPL-MCNC: 9.3 MG/DL (ref 8.5–10.1)
CHLORIDE SERPL-SCNC: 110 MMOL/L (ref 97–108)
CO2 SERPL-SCNC: 28 MMOL/L (ref 21–32)
CREAT SERPL-MCNC: 1.77 MG/DL (ref 0.55–1.02)
DATE LAST DOSE: ABNORMAL
GLUCOSE SERPL-MCNC: 124 MG/DL (ref 65–100)
POTASSIUM SERPL-SCNC: 3.2 MMOL/L (ref 3.5–5.1)
REPORTED DOSE,DOSE: ABNORMAL UNITS
REPORTED DOSE/TIME,TMG: ABNORMAL
SODIUM SERPL-SCNC: 146 MMOL/L (ref 136–145)
VANCOMYCIN TROUGH SERPL-MCNC: 17.2 UG/ML (ref 5–10)

## 2018-04-04 PROCEDURE — 80048 BASIC METABOLIC PNL TOTAL CA: CPT | Performed by: GENERAL ACUTE CARE HOSPITAL

## 2018-04-04 PROCEDURE — 36415 COLL VENOUS BLD VENIPUNCTURE: CPT | Performed by: GENERAL ACUTE CARE HOSPITAL

## 2018-04-04 PROCEDURE — 77030038269 HC DRN EXT URIN PURWCK BARD -A

## 2018-04-04 PROCEDURE — 77030011256 HC DRSG MEPILEX <16IN NO BORD MOLN -A

## 2018-04-04 PROCEDURE — 74011250637 HC RX REV CODE- 250/637: Performed by: GENERAL ACUTE CARE HOSPITAL

## 2018-04-04 PROCEDURE — 74011250637 HC RX REV CODE- 250/637: Performed by: INTERNAL MEDICINE

## 2018-04-04 PROCEDURE — 71045 X-RAY EXAM CHEST 1 VIEW: CPT

## 2018-04-04 PROCEDURE — 80202 ASSAY OF VANCOMYCIN: CPT | Performed by: INTERNAL MEDICINE

## 2018-04-04 PROCEDURE — 74011000258 HC RX REV CODE- 258: Performed by: INTERNAL MEDICINE

## 2018-04-04 PROCEDURE — 74011250636 HC RX REV CODE- 250/636: Performed by: INTERNAL MEDICINE

## 2018-04-04 PROCEDURE — 65660000000 HC RM CCU STEPDOWN

## 2018-04-04 PROCEDURE — 74011250636 HC RX REV CODE- 250/636: Performed by: GENERAL ACUTE CARE HOSPITAL

## 2018-04-04 PROCEDURE — 77010033678 HC OXYGEN DAILY

## 2018-04-04 RX ORDER — NYSTATIN 100000 [USP'U]/ML
500000 SUSPENSION ORAL 4 TIMES DAILY
Status: DISCONTINUED | OUTPATIENT
Start: 2018-04-04 | End: 2018-04-12 | Stop reason: HOSPADM

## 2018-04-04 RX ORDER — MICONAZOLE NITRATE 20 MG/G
CREAM TOPICAL 2 TIMES DAILY
Status: DISCONTINUED | OUTPATIENT
Start: 2018-04-04 | End: 2018-04-12 | Stop reason: HOSPADM

## 2018-04-04 RX ADMIN — POTASSIUM CHLORIDE 20 MEQ: 1.5 POWDER, FOR SOLUTION ORAL at 16:18

## 2018-04-04 RX ADMIN — HEPARIN SODIUM 5000 UNITS: 5000 INJECTION, SOLUTION INTRAVENOUS; SUBCUTANEOUS at 03:00

## 2018-04-04 RX ADMIN — ASPIRIN 81 MG CHEWABLE TABLET 81 MG: 81 TABLET CHEWABLE at 18:00

## 2018-04-04 RX ADMIN — FUROSEMIDE 40 MG: 10 INJECTION, SOLUTION INTRAMUSCULAR; INTRAVENOUS at 09:55

## 2018-04-04 RX ADMIN — HYDRALAZINE HYDROCHLORIDE 100 MG: 50 TABLET ORAL at 09:54

## 2018-04-04 RX ADMIN — METOPROLOL TARTRATE 50 MG: 50 TABLET ORAL at 18:35

## 2018-04-04 RX ADMIN — POTASSIUM CHLORIDE 20 MEQ: 1.5 POWDER, FOR SOLUTION ORAL at 09:54

## 2018-04-04 RX ADMIN — PANTOPRAZOLE SODIUM 40 MG: 40 TABLET, DELAYED RELEASE ORAL at 16:56

## 2018-04-04 RX ADMIN — Medication 10 ML: at 09:55

## 2018-04-04 RX ADMIN — Medication 10 ML: at 16:17

## 2018-04-04 RX ADMIN — Medication 10 ML: at 23:01

## 2018-04-04 RX ADMIN — MICONAZOLE NITRATE: 20 CREAM TOPICAL at 16:23

## 2018-04-04 RX ADMIN — DILTIAZEM HYDROCHLORIDE 180 MG: 180 CAPSULE, COATED, EXTENDED RELEASE ORAL at 09:54

## 2018-04-04 RX ADMIN — LEVOFLOXACIN 500 MG: 5 INJECTION, SOLUTION INTRAVENOUS at 22:56

## 2018-04-04 RX ADMIN — PIPERACILLIN SODIUM,TAZOBACTAM SODIUM 4.5 G: 4; .5 INJECTION, POWDER, FOR SOLUTION INTRAVENOUS at 18:35

## 2018-04-04 RX ADMIN — METHYLPREDNISOLONE SODIUM SUCCINATE 40 MG: 40 INJECTION, POWDER, FOR SOLUTION INTRAMUSCULAR; INTRAVENOUS at 06:00

## 2018-04-04 RX ADMIN — HYDRALAZINE HYDROCHLORIDE 100 MG: 50 TABLET ORAL at 22:56

## 2018-04-04 RX ADMIN — METHYLPREDNISOLONE SODIUM SUCCINATE 20 MG: 40 INJECTION, POWDER, FOR SOLUTION INTRAMUSCULAR; INTRAVENOUS at 22:56

## 2018-04-04 RX ADMIN — ESCITALOPRAM OXALATE 10 MG: 10 TABLET ORAL at 09:54

## 2018-04-04 RX ADMIN — HYDROCHLOROTHIAZIDE 25 MG: 25 TABLET ORAL at 18:35

## 2018-04-04 RX ADMIN — POTASSIUM CHLORIDE 20 MEQ: 1.5 POWDER, FOR SOLUTION ORAL at 12:00

## 2018-04-04 RX ADMIN — NYSTATIN 500000 UNITS: 100000 SUSPENSION ORAL at 22:56

## 2018-04-04 RX ADMIN — VANCOMYCIN HYDROCHLORIDE 750 MG: 750 INJECTION, POWDER, LYOPHILIZED, FOR SOLUTION INTRAVENOUS at 16:21

## 2018-04-04 RX ADMIN — DIPHENOXYLATE HYDROCHLORIDE AND ATROPINE SULFATE 1 TABLET: 2.5; .025 TABLET ORAL at 10:33

## 2018-04-04 RX ADMIN — NYSTATIN 500000 UNITS: 100000 SUSPENSION ORAL at 16:54

## 2018-04-04 RX ADMIN — Medication 10 ML: at 05:12

## 2018-04-04 RX ADMIN — HYDRALAZINE HYDROCHLORIDE 100 MG: 50 TABLET ORAL at 16:56

## 2018-04-04 RX ADMIN — PIPERACILLIN SODIUM,TAZOBACTAM SODIUM 4.5 G: 4; .5 INJECTION, POWDER, FOR SOLUTION INTRAVENOUS at 05:12

## 2018-04-04 RX ADMIN — METHYLPREDNISOLONE SODIUM SUCCINATE 20 MG: 40 INJECTION, POWDER, FOR SOLUTION INTRAMUSCULAR; INTRAVENOUS at 16:17

## 2018-04-04 RX ADMIN — HEPARIN SODIUM 5000 UNITS: 5000 INJECTION, SOLUTION INTRAVENOUS; SUBCUTANEOUS at 09:57

## 2018-04-04 RX ADMIN — CLOPIDOGREL BISULFATE 75 MG: 75 TABLET ORAL at 09:53

## 2018-04-04 RX ADMIN — METOPROLOL TARTRATE 50 MG: 50 TABLET ORAL at 09:56

## 2018-04-04 RX ADMIN — PANTOPRAZOLE SODIUM 40 MG: 40 TABLET, DELAYED RELEASE ORAL at 09:54

## 2018-04-04 RX ADMIN — TEMAZEPAM 15 MG: 15 CAPSULE ORAL at 22:56

## 2018-04-04 RX ADMIN — POTASSIUM CHLORIDE 20 MEQ: 1.5 POWDER, FOR SOLUTION ORAL at 09:55

## 2018-04-04 RX ADMIN — HEPARIN SODIUM 5000 UNITS: 5000 INJECTION, SOLUTION INTRAVENOUS; SUBCUTANEOUS at 18:34

## 2018-04-04 NOTE — PROGRESS NOTES
Pharmacy Automatic Renal Dosing Protocol - Antimicrobials    Indication for Antimicrobials: b/l PNA (patient admitted from [facility])    Current Regimen of Each Antimicrobial:  Vancomycin IV Start Date 3/28/18; Day # 8  Levofloxacin 500 mg IV q 48 hours (Start Date: 3/27; Day #9  Pip-tazo 4.5 g IV q 12 hours (Start Date: 3/27; Day #9    Goal Level: VANCOMYCIN TROUGH GOAL RANGE    Vancomycin Trough: 15 - 20 mcg/mL    Measured / Extrapolated Vancomycin Level:   3/31: 12.3 mcg/ml / 11.74 mcg/ml    Significant Cultures:    3/27: blood: no growth  -Final    Radiology / Imaging results: (X-ray, CT scan or MRI):    3/27 (XR Chest Port): Left perihilar and right lower lobe infiltrates. Decrease in the size of the left lower lobe lesion.  3/28 (XR Chest Port): Interstitial edema, cardiomegaly. Possible superimposed infiltrate left perihilar. Paralysis, amputations, malnutrition: Actual BW<IBW    Labs:  Recent Labs      18   0449  18   0437  18   0213   CREA  1.77*  1.72*  1.86*   BUN  68*  70*  71*   WBC   --   11.0  12.9*     Temp (24hrs), Av.4 °F (36.9 °C), Min:97.3 °F (36.3 °C), Max:99.5 °F (37.5 °C)    Creatinine Clearance (mL/min) or Dialysis: ~18 ml/min    Impression/Plan:   · Scr trending down  · WBC trending down  · Continue with the current dose of Vancomycin, zosyn and Levofloxacin  · Daily BMP ordered  · Vancomycin trough today at 1500  · Antibiotic stop date: Day #9 of LVQ and Zosyn. Day #8 of vancomycin. Blood cx: NG - Final.  Please consider discontinuing the abx as the patient had over a week of abx for PNA. Pharmacy will follow daily and adjust medications as appropriate for renal function and/or serum levels. Thank you,  Christelle Valencia, ERIKD      Recommended duration of therapy  [email or web address]/[place]/[facility]/Pharmacy/Clinical%20Companion/Duration%20of%20ABX%20therapy. docx    Renal Dosing  [email or web address]/[place]/[facility]/Pharmacy/Clinical%20Companion/Renal%[id number]. pdf

## 2018-04-04 NOTE — PROGRESS NOTES
Nutrition Assessment:    INTERVENTIONS/RECOMMENDATIONS:   Meals/Snacks: General/healthful diet: Continue Dental soft diet  Supplements: Commercial supplement: Discontinue ensure enlive; try magic cups BID and ensure pudding daily    ASSESSMENT:   Patient medically noted for respiratory failure, COPD, and HTN. Diet advanced to dental soft. Nursing reports poor appetite and not eating much. Patient and family at bedside confirm poor appetite and PO intake. Patient only taking in gingerale. Dislikes the ensure clear supplements and reports dislike for ensure enlive as well. Agreeable to trying magic cups and ensure pudding. Encouraged use of menu and room service to choose foods she feels she can tolerate. Diet Order:  (Dental Soft)  % Eaten:  Patient Vitals for the past 72 hrs:   % Diet Eaten   04/04/18 0955 0 %     Pertinent Medications: [x] Reviewed []Other: Plavix, Cardizem, Lexapro, Lasix, hydralazine, HCTZ, Solu-medrol, Lopressor, Protonix, KCl  Pertinent Labs: [x]Reviewed  []Other: K+ 3.2, Na 146  Food Allergies: [x]None []Other:     Last BM: 4/3   [x]Active     []Hyperactive  []Hypoactive       [] Absent  BS  Skin:    [x] Intact   [] Incision  [] Breakdown   []Edema   []Other:    Anthropometrics: Height: 5' 1.5\" (156.2 cm) Weight: 47.6 kg (105 lb)    IBW (%IBW):   ( ) UBW (%UBW):   (  %)    BMI: Body mass index is 19.52 kg/(m^2). This BMI is indicative of:  []Underweight   [x]Normal   []Overweight   [] Obesity   [] Extreme Obesity (BMI>40)  Last Weight Metrics:  Weight Loss Metrics 3/27/2018 10/28/2016 10/13/2016 8/29/2016 7/19/2016 2/14/2015 8/10/2010   Today's Wt 105 lb 95 lb 95 lb 101 lb 101 lb 98 lb 5.2 oz 118 lb 3.2 oz   BMI 19.52 kg/m2 17.95 kg/m2 17.66 kg/m2 19.08 kg/m2 18.78 kg/m2 18.59 kg/m2 -       Estimated Nutrition Needs (Based on): 1411 Kcals/day (BMR (893) x 1.3AF +250kcal) , 48 g (-58g (1.0-1.2 g/kg bw)) Protein  Carbohydrate:  At Least 130 g/day  Fluids: 1400 mL/day     Pt expected to meet estimated nutrient needs: [x]Yes []No    NUTRITION DIAGNOSES:   Problem:  Inadequate protein-energy intake      Etiology: related to nausea, decreased appetite     Signs/Symptoms: as evidenced by PO intake <25% of meals      NUTRITION INTERVENTIONS:  Meals/Snacks: General/healthful diet   Supplements: Commercial supplement              GOAL:   PO intake >50% of meals next 2-4 days    NUTRITION MONITORING AND EVALUATION   Food/Nutrient Intake Outcomes:  Total energy intake  Physical Signs/Symptoms Outcomes: Weight/weight change, Electrolyte and renal profile    Previous Goal Met:   [] Met              [x] Progressing Towards Goal              [] Not Progressing Towards Goal   Previous Recommendations:   [x] Implemented          [] Not Implemented          [] Not Applicable    LEARNING NEEDS (Diet, Food/Nutrient-Drug Interaction):    [x] None Identified   [] Identified and Education Provided/Documented   [] Identified and Pt declined/was not appropriate     Cultural, Orthodoxy, OR Ethnic Dietary Needs:    [x] None Identified   [] Identified and Addressed     [x] Interdisciplinary Care Plan Reviewed/Documented    [x] Discharge Planning: Regular diet + ONS TID   [] Participated in Interdisciplinary Rounds    NUTRITION RISK:    [x] High              [] Moderate           []  Low  []  Minimal/Uncompromised      Milagros Luke  Pager [phone]              Weekend Pager [phone]

## 2018-04-04 NOTE — WOUND CARE
Wound Care Consult: Chart reviewed and patient assessed for her sacrum excoriation. Pt. Is not mobile and she appears to be \"stiff\" when staff are attempting to move her side to side. Pt. Has some fungal rash on the gluteal cleft and upper thighs as well as in the mouth (thrush). The rash is red with satellite lesions mildly. Patient is non-mobile and her Rashid is now a 9 today. She will not eat anything today. She drank a tiny amount of gingerale only. The sacrum has some scabbed areas that were once open. The warner was removed today and the patient is urinating all day incontinent. Wound Buttocks Left;Right;Mid (Active)   DRESSING STATUS Dry; Intact 4/2/2018  4:00 PM   DRESSING TYPE Foam 4/2/2018 10:25 PM   Non-Pressure Injury Partial thickness (epider/derm) 4/4/2018  4:18 PM   Pressure Injury Stage l 3/31/2018  4:00 AM   Tissue Type Pink 4/4/2018  4:18 PM   Drainage Amount  None 4/4/2018  4:18 PM   Wound Odor None 4/4/2018  4:18 PM   Periwound Skin Condition Erythema, blanchable 4/4/2018  4:18 PM   Cleansing and Cleansing Agents  Soap and water 4/4/2018  4:18 PM   Dressing Type Applied Zinc based paste 4/4/2018  4:18 PM   Procedure Tolerated Well 4/4/2018  4:18 PM   Number of days:6       [REMOVED] Wound Abdomen Mid (Removed)   Removed 03/29/18 0741   DRESSING STATUS Dry; Intact 3/29/2018  8:00 AM   Pressure Injury Stage ll 3/29/2018  8:00 AM   Number of days:618      Recommendation: Treat the topical fungal rash with the Secura antifungal which has miconizole 2% and zinc oxide cream 30 %. This is thick and will protect as well as heal the skin. Use one absorbent Chux under the patient and NO Diapers. Pull the sheets very tight to decrease wrinkles under her. Turn pt. Every two hours. Specialty bed ordered.    María Moon RN, BSN, [employer]

## 2018-04-04 NOTE — PROGRESS NOTES
PROGRESS NOTE    NAME:  Emilie Paz   :   1935   MRN:   [id number]     Date/Time:  2018 7:14 AM  Subjective:   History:  Chart reviewed and patient seen and examined this AM and D/W her nurse and all events noted. She presented on 3/27 with several episodes of N/V w/o diarrhea or other GI c/o. She has had no Vomiting since 3/27 AM and no other GI c/o except on 3/29 AM had some diarrhea w/o since. . She noted to be hypoxemic and was diagnosed with bilateral Pneumonia and has been started on triple antibiotics. She has had a little cough and notable SOB w/o other cardio/respiratory c/o until during the night 3/30 when she became more SOB and Hypoxemic and CXR revealed Pulmonary Edema. She had IV Lasix with good UO and progressively improved oxygenation since. She denies CP or other cardio/respiratory c/o and says she feels better now. She has no  c/o. She developed PAF since admission treated with Cardizem drip and changed to PO on . She is generally weak w/o other neurologic c/o. There are no other c/o on complete ROS except now poor appetite persists .        Medications reviewed:  Current Facility-Administered Medications   Medication Dose Route Frequency    metoprolol tartrate (LOPRESSOR) tablet 50 mg  50 mg Oral BID    potassium chloride (KLOR-CON) packet 20 mEq  20 mEq Oral TID WITH MEALS    furosemide (LASIX) injection 40 mg  40 mg IntraVENous DAILY    acetaminophen (TYLENOL) tablet 650 mg  650 mg Oral Q6H PRN    methylPREDNISolone (PF) (SOLU-MEDROL) injection 40 mg  40 mg IntraVENous Q8H    dilTIAZem CD (CARDIZEM CD) capsule 180 mg  180 mg Oral DAILY    levoFLOXacin (LEVAQUIN) 500 mg in D5W IVPB  500 mg IntraVENous Q48H    piperacillin-tazobactam (ZOSYN) 4.5 g in 0.9% sodium chloride (MBP/ADV) 100 mL  4.5 g IntraVENous Q12H    potassium chloride (KLOR-CON) packet 20 mEq  20 mEq Oral BID WITH MEALS    vancomycin (VANCOCIN) 750 mg in 0.9% sodium chloride (MBP/ADV) 250 mL  750 mg IntraVENous Q24H    diphenoxylate-atropine (LOMOTIL) tablet 1 Tab  1 Tab Oral Q4H PRN    temazepam (RESTORIL) capsule 15 mg  15 mg Oral QHS    aspirin chewable tablet 81 mg  81 mg Oral QPM    clopidogrel (PLAVIX) tablet 75 mg  75 mg Oral DAILY    hydrALAZINE (APRESOLINE) tablet 100 mg  100 mg Oral TID    escitalopram oxalate (LEXAPRO) tablet 10 mg  10 mg Oral DAILY    pantoprazole (PROTONIX) tablet 40 mg  40 mg Oral BID    hydroCHLOROthiazide (HYDRODIURIL) tablet 25 mg  25 mg Oral QPM    sodium chloride (NS) flush 5-10 mL  5-10 mL IntraVENous Q8H    sodium chloride (NS) flush 5-10 mL  5-10 mL IntraVENous PRN    heparin (porcine) injection 5,000 Units  5,000 Units SubCUTAneous Q8H        Objective:   Vitals:  Visit Vitals    /65    Pulse 75    Temp 98.3 °F (36.8 °C)    Resp 20    Ht 5' 1.5\" (1.562 m)    Wt 105 lb (47.6 kg)    SpO2 94%    Breastfeeding No    BMI 19.52 kg/m2    O2 Flow Rate (L/min): 5 l/min O2 Device: Nasal cannula Temp (24hrs), Av.4 °F (36.9 °C), Min:97.3 °F (36.3 °C), Max:99.3 °F (37.4 °C)      Last 24hr Input/Output:    Intake/Output Summary (Last 24 hours) at 18 0714  Last data filed at 18 1744   Gross per 24 hour   Intake              400 ml   Output             1050 ml   Net             -650 ml        PHYSICAL EXAM:  General:     Alert, cooperative, moderate respiratory distress, appears stated age. Head:    Normocephalic, without obvious abnormality, atraumatic. Eyes:    Conjunctivae/corneas clear. PERRLA  Nose:   Nares normal. No drainage or sinus tenderness. BIPAP in place  Throat:     Lips, mucosa, and tongue normal.  No Thrush  Neck:   Supple, symmetrical,  no adenopathy, thyroid: non tender     no carotid bruit and no JVD. Back:     Symmetric,  No CVA tenderness. Lungs:    Clear to auscultation bilaterally with overall decreased BS. No Wheezing or Rhonchi. No rales. Heart:    Regular rate and rhythm,  no murmur, rub or gallop.   Abdomen: Soft, non tender. Not distended. Bowel sounds normal. No masses  Extremities:  Extremities normal, atraumatic, No cyanosis. No edema. + clubbing  Lymph nodes:  Cervical, supraclavicular normal.  Neurologic:  General decreased strength, Alert and oriented X 3. Skin:                 No rash    Telemetry: NSR with PVCs    Lab Data Reviewed:    Recent Results (from the past 24 hour(s))   METABOLIC PANEL, BASIC    Collection Time: 04/04/18  4:49 AM   Result Value Ref Range    Sodium 146 (H) 136 - 145 mmol/L    Potassium 3.2 (L) 3.5 - 5.1 mmol/L    Chloride 110 (H) 97 - 108 mmol/L    CO2 28 21 - 32 mmol/L    Anion gap 8 5 - 15 mmol/L    Glucose 124 (H) 65 - 100 mg/dL    BUN 68 (H) 6 - 20 MG/DL    Creatinine 1.77 (H) 0.55 - 1.02 MG/DL    BUN/Creatinine ratio 38 (H) 12 - 20      GFR est AA 33 (L) >60 ml/min/1.73m2    GFR est non-AA 27 (L) >60 ml/min/1.73m2    Calcium 9.3 8.5 - 10.1 MG/DL         Assessment/Plan:     Principal Problem:    Acute hypoxemic respiratory failure (HCC) (3/27/2018)    Active Problems:    COPD exacerbation ([facility] [address] (8/14/2010)      Hypertension (7/20/2016)      Peripheral vascular disease ([facility] [address] (7/20/2016)      Sepsis ([facility] [address] (3/28/2018)      Pneumonia (3/28/2018)      Acute renal failure (ARF) ([address] (3/28/2018)      Primary lung large cell carcinoma, 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 Zosyn, Levaquin and Vancomycin for Pneumonia pending cultures (D/C Zosyn in light of diarrhea - has received it for 7 days)  2. Supplemental Oxygen for Hypoxemia now with 5 L and 94% Sat (She is DNR)  3.  JA treatments for acute respiratory failure  4.   Steroids for COPD exacerbation tapered a little on 4/2 and decrease again today  5. D/Micah IV Hydration which she was on with ARF and follow Creat (2.53 on adm to 1.77 now)  6. Decreased IV Lasix  7. Repleting K with I.V and PO and now 3.2 from 2.3, Mag normal 1.7  8. With anemia follow Hgb, 10.4 on adm to 9.6 now  9. Cont current HTN with HCTZ and Hydralazine and follow BP which is up a little so increasedMetoprolol on 4/3 and also on Cardizem PO  10. Cont ASA and Plavix with ASVD  11. Repeat CXR improved, re check today  12. Encouraged PO intake  13.  Protonix IV      Critically ill patient with end stage lung disease and high risk of decompensation (Resprct DNR wishes)    ___________________________________________________    Attending Physician: Li Haro MD

## 2018-04-04 NOTE — PROGRESS NOTES
Physical Therapy  Attempting to see patient for PT this pm. Patient refusing any mobility at this time, stating was dizzy with therapy yesterday and wants to take a nap. Attempting to educate patient on the importance of OOB mobility however patient continues to refuse. Therapy session aborted. Will follow back tomorrow.   Thank you,  Jabari Castelan, PT

## 2018-04-04 NOTE — PROGRESS NOTES
Pharmacy Automatic Renal Dosing Protocol - Antimicrobials    Indication for Antimicrobials: PNA (patient admitted from [facility])    Current Regimen of Each Antimicrobial:  Vancomycin IV Start Date 3/28/18; Day # 8  Levofloxacin 500 mg IV q 48 hours (Start Date: 3/27; Day #9  Pip-tazo 4.5 g IV q 12 hours (Start Date: 3/27; Day #9    Goal Level: VANCOMYCIN TROUGH GOAL RANGE    Vancomycin Trough: 15 - 20 mcg/mL    Measured / Extrapolated Vancomycin Level:   3/31: 12.3 mcg/ml / 11.74 mcg/ml  / 750 mg Q24H  17.2 / 15.4    Significant Cultures:    3/27: blood: no growth  -Final    Radiology / Imaging results: (X-ray, CT scan or MRI):    3/27 (XR Chest Port): Left perihilar and right lower lobe infiltrates. Decrease in the size of the left lower lobe lesion.  3/28 (XR Chest Port): Interstitial edema, cardiomegaly. Possible superimposed infiltrate left perihilar. Paralysis, amputations, malnutrition: Actual BW<IBW    Labs:  Recent Labs      18   0449  18   0437  18   0213   CREA  1.77*  1.72*  1.86*   BUN  68*  70*  71*   WBC   --   11.0  12.9*     Temp (24hrs), Av.7 °F (37.1 °C), Min:97.3 °F (36.3 °C), Max:99.9 °F (37.7 °C)    Creatinine Clearance (mL/min) or Dialysis: ~18 ml/min    Impression/Plan:   · Scr trending down  · WBC trending down  · Continue with the current dose of Vancomycin, zosyn and Levofloxacin  · Daily BMP ordered  · Vancomycin trough 17.2 mcg/ml extrapolated to 15.4 mcg/ml on 750 mg Q24H. Continue same. · Antibiotic stop date: Day #9 of LVQ and Zosyn. Day #8 of vancomycin. Blood cx: NG - Final.  Please consider discontinuing the abx as the patient had over a week of abx for PNA. Pharmacy will follow daily and adjust medications as appropriate for renal function and/or serum levels.     Thank you,  Chantelle Dyer [facility]      Recommended duration of therapy  [email or web address]. docx    Renal Dosing  [email or web address]/[facility]/Pharmacy/[email or web address]/Renal%[email or web address]. pdf

## 2018-04-04 NOTE — PROGRESS NOTES
Occupational Therapy  Medical record reviewed. Nurse cleared pt for therapy. Attempted to initiate OT  Treatment, however, pt declined, reporting that she wanted to take a nap. She also reported that her R leg was painful and nursing was informed. Son reported that pt has chronic R LE pain that she takes medication for. Comfort measures provided. Aborted treatment session. Will continue to follow.  14 minutes

## 2018-04-05 LAB
ANION GAP SERPL CALC-SCNC: 6 MMOL/L (ref 5–15)
BASOPHILS # BLD: 0 K/UL (ref 0–0.1)
BASOPHILS NFR BLD: 0 % (ref 0–1)
BUN SERPL-MCNC: 66 MG/DL (ref 6–20)
BUN/CREAT SERPL: 35 (ref 12–20)
CALCIUM SERPL-MCNC: 8.9 MG/DL (ref 8.5–10.1)
CHLORIDE SERPL-SCNC: 111 MMOL/L (ref 97–108)
CO2 SERPL-SCNC: 29 MMOL/L (ref 21–32)
CREAT SERPL-MCNC: 1.9 MG/DL (ref 0.55–1.02)
DIFFERENTIAL METHOD BLD: ABNORMAL
EOSINOPHIL # BLD: 0 K/UL (ref 0–0.4)
EOSINOPHIL NFR BLD: 0 % (ref 0–7)
ERYTHROCYTE [DISTWIDTH] IN BLOOD BY AUTOMATED COUNT: 15.6 % (ref 11.5–14.5)
GLUCOSE SERPL-MCNC: 125 MG/DL (ref 65–100)
HCT VFR BLD AUTO: 28.6 % (ref 35–47)
HGB BLD-MCNC: 9.7 G/DL (ref 11.5–16)
IMM GRANULOCYTES # BLD: 0 K/UL (ref 0–0.04)
IMM GRANULOCYTES NFR BLD AUTO: 0 % (ref 0–0.5)
LYMPHOCYTES # BLD: 0.4 K/UL (ref 0.8–3.5)
LYMPHOCYTES NFR BLD: 3 % (ref 12–49)
MCH RBC QN AUTO: 29.6 PG (ref 26–34)
MCHC RBC AUTO-ENTMCNC: 33.9 G/DL (ref 30–36.5)
MCV RBC AUTO: 87.2 FL (ref 80–99)
METAMYELOCYTES NFR BLD MANUAL: 3 %
MONOCYTES # BLD: 0.4 K/UL (ref 0–1)
MONOCYTES NFR BLD: 3 % (ref 5–13)
MYELOCYTES NFR BLD MANUAL: 1 %
NEUTS SEG # BLD: 12.3 K/UL (ref 1.8–8)
NEUTS SEG NFR BLD: 90 % (ref 32–75)
NRBC # BLD: 0.52 K/UL (ref 0–0.01)
NRBC BLD-RTO: 3.8 PER 100 WBC
PLATELET # BLD AUTO: 157 K/UL (ref 150–400)
PMV BLD AUTO: 11.8 FL (ref 8.9–12.9)
POTASSIUM SERPL-SCNC: 4.2 MMOL/L (ref 3.5–5.1)
RBC # BLD AUTO: 3.28 M/UL (ref 3.8–5.2)
RBC MORPH BLD: ABNORMAL
SODIUM SERPL-SCNC: 146 MMOL/L (ref 136–145)
WBC # BLD AUTO: 13.7 K/UL (ref 3.6–11)

## 2018-04-05 PROCEDURE — 65660000000 HC RM CCU STEPDOWN

## 2018-04-05 PROCEDURE — 77030038269 HC DRN EXT URIN PURWCK BARD -A

## 2018-04-05 PROCEDURE — 36415 COLL VENOUS BLD VENIPUNCTURE: CPT | Performed by: GENERAL ACUTE CARE HOSPITAL

## 2018-04-05 PROCEDURE — 85025 COMPLETE CBC W/AUTO DIFF WBC: CPT | Performed by: INTERNAL MEDICINE

## 2018-04-05 PROCEDURE — 97110 THERAPEUTIC EXERCISES: CPT | Performed by: PHYSICAL THERAPIST

## 2018-04-05 PROCEDURE — 74011250636 HC RX REV CODE- 250/636: Performed by: INTERNAL MEDICINE

## 2018-04-05 PROCEDURE — 74011250637 HC RX REV CODE- 250/637: Performed by: GENERAL ACUTE CARE HOSPITAL

## 2018-04-05 PROCEDURE — 97530 THERAPEUTIC ACTIVITIES: CPT | Performed by: PHYSICAL THERAPIST

## 2018-04-05 PROCEDURE — 77030011256 HC DRSG MEPILEX <16IN NO BORD MOLN -A

## 2018-04-05 PROCEDURE — 74011250636 HC RX REV CODE- 250/636: Performed by: GENERAL ACUTE CARE HOSPITAL

## 2018-04-05 PROCEDURE — 77010033678 HC OXYGEN DAILY

## 2018-04-05 PROCEDURE — 74011250637 HC RX REV CODE- 250/637: Performed by: INTERNAL MEDICINE

## 2018-04-05 PROCEDURE — 80048 BASIC METABOLIC PNL TOTAL CA: CPT | Performed by: GENERAL ACUTE CARE HOSPITAL

## 2018-04-05 RX ADMIN — POTASSIUM CHLORIDE 20 MEQ: 1.5 POWDER, FOR SOLUTION ORAL at 10:13

## 2018-04-05 RX ADMIN — MICONAZOLE NITRATE: 20 CREAM TOPICAL at 10:12

## 2018-04-05 RX ADMIN — DILTIAZEM HYDROCHLORIDE 180 MG: 180 CAPSULE, COATED, EXTENDED RELEASE ORAL at 10:09

## 2018-04-05 RX ADMIN — TEMAZEPAM 15 MG: 15 CAPSULE ORAL at 22:45

## 2018-04-05 RX ADMIN — PANTOPRAZOLE SODIUM 40 MG: 40 TABLET, DELAYED RELEASE ORAL at 10:08

## 2018-04-05 RX ADMIN — POTASSIUM CHLORIDE 20 MEQ: 1.5 POWDER, FOR SOLUTION ORAL at 10:10

## 2018-04-05 RX ADMIN — POTASSIUM CHLORIDE 20 MEQ: 1.5 POWDER, FOR SOLUTION ORAL at 13:12

## 2018-04-05 RX ADMIN — CLOPIDOGREL BISULFATE 75 MG: 75 TABLET ORAL at 10:08

## 2018-04-05 RX ADMIN — NYSTATIN 500000 UNITS: 100000 SUSPENSION ORAL at 10:08

## 2018-04-05 RX ADMIN — METOPROLOL TARTRATE 50 MG: 50 TABLET ORAL at 10:08

## 2018-04-05 RX ADMIN — HEPARIN SODIUM 5000 UNITS: 5000 INJECTION, SOLUTION INTRAVENOUS; SUBCUTANEOUS at 01:51

## 2018-04-05 RX ADMIN — HYDROCHLOROTHIAZIDE 25 MG: 25 TABLET ORAL at 17:39

## 2018-04-05 RX ADMIN — Medication 10 ML: at 22:46

## 2018-04-05 RX ADMIN — HYDRALAZINE HYDROCHLORIDE 100 MG: 50 TABLET ORAL at 17:39

## 2018-04-05 RX ADMIN — METOPROLOL TARTRATE 50 MG: 50 TABLET ORAL at 17:39

## 2018-04-05 RX ADMIN — MICONAZOLE NITRATE: 20 CREAM TOPICAL at 17:40

## 2018-04-05 RX ADMIN — ASPIRIN 81 MG CHEWABLE TABLET 81 MG: 81 TABLET CHEWABLE at 17:39

## 2018-04-05 RX ADMIN — Medication 10 ML: at 05:14

## 2018-04-05 RX ADMIN — DIPHENOXYLATE HYDROCHLORIDE AND ATROPINE SULFATE 1 TABLET: 2.5; .025 TABLET ORAL at 17:39

## 2018-04-05 RX ADMIN — DIPHENOXYLATE HYDROCHLORIDE AND ATROPINE SULFATE 1 TABLET: 2.5; .025 TABLET ORAL at 22:45

## 2018-04-05 RX ADMIN — POTASSIUM CHLORIDE 20 MEQ: 1.5 POWDER, FOR SOLUTION ORAL at 17:39

## 2018-04-05 RX ADMIN — VANCOMYCIN HYDROCHLORIDE 750 MG: 750 INJECTION, POWDER, LYOPHILIZED, FOR SOLUTION INTRAVENOUS at 17:38

## 2018-04-05 RX ADMIN — HYDRALAZINE HYDROCHLORIDE 100 MG: 50 TABLET ORAL at 10:09

## 2018-04-05 RX ADMIN — METHYLPREDNISOLONE SODIUM SUCCINATE 20 MG: 40 INJECTION, POWDER, FOR SOLUTION INTRAMUSCULAR; INTRAVENOUS at 13:04

## 2018-04-05 RX ADMIN — Medication 10 ML: at 13:04

## 2018-04-05 RX ADMIN — FUROSEMIDE 40 MG: 10 INJECTION, SOLUTION INTRAMUSCULAR; INTRAVENOUS at 10:08

## 2018-04-05 RX ADMIN — HEPARIN SODIUM 5000 UNITS: 5000 INJECTION, SOLUTION INTRAVENOUS; SUBCUTANEOUS at 10:01

## 2018-04-05 RX ADMIN — HYDRALAZINE HYDROCHLORIDE 100 MG: 50 TABLET ORAL at 22:45

## 2018-04-05 RX ADMIN — PANTOPRAZOLE SODIUM 40 MG: 40 TABLET, DELAYED RELEASE ORAL at 17:39

## 2018-04-05 RX ADMIN — POTASSIUM CHLORIDE 20 MEQ: 1.5 POWDER, FOR SOLUTION ORAL at 17:41

## 2018-04-05 RX ADMIN — HEPARIN SODIUM 5000 UNITS: 5000 INJECTION, SOLUTION INTRAVENOUS; SUBCUTANEOUS at 17:40

## 2018-04-05 RX ADMIN — METHYLPREDNISOLONE SODIUM SUCCINATE 20 MG: 40 INJECTION, POWDER, FOR SOLUTION INTRAMUSCULAR; INTRAVENOUS at 22:45

## 2018-04-05 RX ADMIN — ESCITALOPRAM OXALATE 10 MG: 10 TABLET ORAL at 10:09

## 2018-04-05 RX ADMIN — NYSTATIN 500000 UNITS: 100000 SUSPENSION ORAL at 17:39

## 2018-04-05 RX ADMIN — NYSTATIN 500000 UNITS: 100000 SUSPENSION ORAL at 13:03

## 2018-04-05 RX ADMIN — METHYLPREDNISOLONE SODIUM SUCCINATE 20 MG: 40 INJECTION, POWDER, FOR SOLUTION INTRAMUSCULAR; INTRAVENOUS at 05:23

## 2018-04-05 RX ADMIN — NYSTATIN 500000 UNITS: 100000 SUSPENSION ORAL at 22:45

## 2018-04-05 NOTE — PROGRESS NOTES
PCU SHIFT NURSING NOTE      Bedside shift change report given to James Castro RN (oncoming nurse) by Radha Michelle RN (offgoing nurse). Report included the following information SBAR, Kardex, Intake/Output, MAR, Recent Results and Cardiac Rhythm NSR. Shift Summary:   2030: Pt asking for bedpan. Pt already incontinent of small amount of loose brown stool. Vianca care completed, bed pad changed. Cream applied to excoriated perineum. Sacrum/coccyx red, blanchable. Applied mepilex to bony prominence of coccyx to protect skin. Stool is not liquid and was therefore not sent for C diff testing. Repositioned pt and elevated heels. 0600: Pt incontinent of smear of soft stool. Vianca care provided; changed Pure Wick.  0700: Bedside shift change report given to Radha Michelle RN (oncoming nurse) by James Castro RN (offgoing nurse). Report included the following information SBAR, Kardex, Intake/Output, MAR, Recent Results and Cardiac Rhythm NSR c T inversion. Admission Date 3/27/2018   Admission Diagnosis Respiratory failure with hypoxia ([facility] [address]   Consults None        Consults   []PT   []OT   []Speech   []Case Management      [] Palliative      Cardiac Monitoring Order   []Yes   []No     IV drips   []Yes    Drip:                            Dose:  Drip:                            Dose:  Drip:                            Dose:   []No     GI Prophylaxis   []Yes   []No         DVT Prophylaxis   SCDs:  Sequential Compression Device: Bilateral     Patient Refused VTE Prophylaxis: Yes    Kunal stockings:         [] Medication   []Contraindicated   []None      Activity Level Activity Level: Bed Rest     Activity Assistance: Complete care   Purposeful Rounding every 1-2 hour?    []Yes   Campo Score  Total Score: 4   Bed Alarm (If score 3 or >)   []Yes   [] Refused (See signed refusal form in chart)   Rashid Score  Rashid Score: 9   Rashid Score (if score 14 or less)   []PMT consult   []Wound Care consult      []Specialty bed   [] Nutrition consult Needs prior to discharge:   Home O2 required:    []Yes   []No    If yes, how much O2 required? Other:    Last Bowel Movement: Last Bowel Movement Date: 04/04/18      Influenza Vaccine Received Flu Vaccine for Current Season (usually Sept-March): Yes        Pneumonia Vaccine           Diet Active Orders   Diet    DIET DENTAL SOFT (SOFT SOLID)      LDAs               Peripheral IV 04/02/18 Right Hand (Active)   Site Assessment Clean, dry, & intact 4/4/2018  7:26 PM   Phlebitis Assessment 0 4/4/2018  7:26 PM   Infiltration Assessment 0 4/4/2018  7:26 PM   Dressing Status Clean, dry, & intact 4/4/2018  7:26 PM   Dressing Type Transparent 4/4/2018  7:26 PM   Hub Color/Line Status Blue;Patent; Flushed 4/4/2018  7:26 PM   Alcohol Cap Used Yes 4/4/2018  1:56 AM       Peripheral IV 04/02/18 Right;Posterior Forearm (Active)   Site Assessment Clean, dry, & intact 4/4/2018  7:26 PM   Phlebitis Assessment 0 4/4/2018  7:26 PM   Infiltration Assessment 0 4/4/2018  7:26 PM   Dressing Status Clean, dry, & intact 4/4/2018  7:26 PM   Dressing Type Transparent 4/4/2018  7:26 PM   Hub Color/Line Status Blue;Patent; Flushed; Infusing 4/4/2018  7:26 PM   Alcohol Cap Used Yes 4/4/2018  1:56 AM          External Female Catheter 04/04/18 (Active)   Site Assessment Dry; Intact 4/4/2018  8:30 PM   Repositioned Yes 4/4/2018  8:30 PM   Perineal Care Yes 4/4/2018  8:30 PM   Wick Changed Yes 4/4/2018  8:30 PM   Suction Canister/Tubing Changed No 4/4/2018  8:30 PM   Urine Output (mL) 300 ml 4/4/2018  8:30 PM                Urinary Catheter [REMOVED] Urinary Catheter 03/28/18 Tellez-Indications for Use: Urinary retention/bladder outlet obstruction    Intake & Output   Date 04/03/18 1900 - 04/04/18 0659 04/04/18 0700 - 04/05/18 0659   Shift 4969-0537 24 Hour Total 9739-9257 8713-5027 24 Hour Total   I  N  T  A  K  E   P. O.   550  550      P. O.   550  550    I.V.  (mL/kg/hr)  400 450  (0.8)  450      I.V.   200  200      Volume (vancomycin (VANCOCIN) 750 mg in 0.9% sodium chloride (MBP/ADV) 250 mL)   250  250      Volume (potassium chloride 10 mEq in 100 ml IVPB)  400       Shift Total  (mL/kg)  400  (8.4) 1000  (21)  1000  (21)   O  U  T  P  U  T   Urine  (mL/kg/hr)  1050 500  (0.9) 300 800      Urine Occurrence(s)   1 x  1 x      Urine Output (mL) (External Female Catheter 04/04/18)    300 300      Urine Output (mL) ([REMOVED] Urinary Catheter 03/28/18 Tellez)  1050 500  500    Stool           Stool Occurrence(s)  3 x 2 x 1 x 3 x    Shift Total  (mL/kg)  1050  (22) 500  (10.5) 300  (6.3) 800  (16.8)   NET  -650 500 -300 200   Weight (kg) 47.6 47.6 47.6 47.6 47.6         Readmission Risk Assessment Tool Score Medium Risk            20       Total Score        2 . Living with Significant Other. Assisted Living. LTAC. SNF. or   Rehab    3 Patient Length of Stay (>5 days = 3)    5 Pt.  Coverage (Medicare=5 , Medicaid, or Self-Pay=4)    10 Charlson Comorbidity Score (Age + Comorbid Conditions)        Criteria that do not apply:    Has Seen PCP in Last 6 Months (Yes=3, No=0)    IP Visits Last 12 Months (1-3=4, 4=9, >4=11)       Expected Length of Stay 4d 21h   Actual Length of Stay 6

## 2018-04-05 NOTE — PROGRESS NOTES
9:04 AM pt's stool not appropriate for cdiff collection, stool is soft/formed and less frequent. Order d/c'd.

## 2018-04-05 NOTE — PROGRESS NOTES
Reason for Admission:  Patient came into ed with vomiting , decreased po intake with associated chills and low grade temperature. Per ems patient also complained of chest pain with mild cough. She lives at the [facility]. Per her daughter states at the [facility] patient was independent. She used a walker. She does not use oxygen at the [facility]. She has friends and family who take her to appointments and errands. RRAT Score:   20      Ask Me Three:         What is my (patient's) main problem? Not feeling good. What is it that I (patient) need to do? To get better. Why is it important for me (patient) to do this? I want to get back to my old self. Do you (patient/family) have any concerns for transition/discharge? No.    Plan for utilizing home health:  Patient will be going to snf when medically stable. Family and patient wants to go to [facility] and [address] [place] [address]. Referral was sent by previous CM and Manuel has accepted. Patient and family already aware and FOC obtained by previous CM. Likelihood of readmission? Low      Care Management Interventions  PCP Verified by CM: Yes  Discharge Durable Medical Equipment:  (Patient has a walker at home. )  Physical Therapy Consult: Yes  Occupational Therapy Consult: Yes  Current Support Network:  Other (Patient lives at the [facility].)  Confirm Follow Up Transport: Family  Plan discussed with Pt/Family/Caregiver: Yes  Discharge Location  Discharge Placement: Home

## 2018-04-05 NOTE — PROGRESS NOTES
PROGRESS NOTE    NAME:  Donald Cote   :   1935   MRN:   [id number]     Date/Time:  2018 7:55 AM  Subjective:   History:  Chart reviewed and patient seen and examined this AM and D/W her nurse and all events noted. She presented on 3/27 with several episodes of N/V w/o diarrhea or other GI c/o. She has had no Vomiting since 3/27 AM and no other GI c/o except on 3/29 AM had some diarrhea w/o since. . She noted to be hypoxemic and was diagnosed with bilateral Pneumonia and has been started on triple antibiotics. She has had a little cough and notable SOB w/o other cardio/respiratory c/o until during the night 3/30 when she became more SOB and Hypoxemic and CXR revealed Pulmonary Edema. She had IV Lasix with good UO and progressively improved oxygenation since. She denies CP or other cardio/respiratory c/o and says she feels better now. She has no  c/o. She developed PAF since admission treated with Cardizem drip and changed to PO on . She is generally weak w/o other neurologic c/o. There are no other c/o on complete ROS except now poor appetite persists .       Medications reviewed:  Current Facility-Administered Medications   Medication Dose Route Frequency    methylPREDNISolone (PF) (SOLU-MEDROL) injection 20 mg  20 mg IntraVENous Q8H    miconazole (SECURA) 2 % extra thick cream   Topical BID    nystatin (MYCOSTATIN) 100,000 unit/mL oral suspension 500,000 Units  500,000 Units Oral QID    metoprolol tartrate (LOPRESSOR) tablet 50 mg  50 mg Oral BID    potassium chloride (KLOR-CON) packet 20 mEq  20 mEq Oral TID WITH MEALS    furosemide (LASIX) injection 40 mg  40 mg IntraVENous DAILY    acetaminophen (TYLENOL) tablet 650 mg  650 mg Oral Q6H PRN    dilTIAZem CD (CARDIZEM CD) capsule 180 mg  180 mg Oral DAILY    levoFLOXacin (LEVAQUIN) 500 mg in D5W IVPB  500 mg IntraVENous Q48H    potassium chloride (KLOR-CON) packet 20 mEq  20 mEq Oral BID WITH MEALS    vancomycin (VANCOCIN) 750 mg in 0.9% sodium chloride (MBP/ADV) 250 mL  750 mg IntraVENous Q24H    diphenoxylate-atropine (LOMOTIL) tablet 1 Tab  1 Tab Oral Q4H PRN    temazepam (RESTORIL) capsule 15 mg  15 mg Oral QHS    aspirin chewable tablet 81 mg  81 mg Oral QPM    clopidogrel (PLAVIX) tablet 75 mg  75 mg Oral DAILY    hydrALAZINE (APRESOLINE) tablet 100 mg  100 mg Oral TID    escitalopram oxalate (LEXAPRO) tablet 10 mg  10 mg Oral DAILY    pantoprazole (PROTONIX) tablet 40 mg  40 mg Oral BID    hydroCHLOROthiazide (HYDRODIURIL) tablet 25 mg  25 mg Oral QPM    sodium chloride (NS) flush 5-10 mL  5-10 mL IntraVENous Q8H    sodium chloride (NS) flush 5-10 mL  5-10 mL IntraVENous PRN    heparin (porcine) injection 5,000 Units  5,000 Units SubCUTAneous Q8H        Objective:   Vitals:  Visit Vitals    /71 (BP 1 Location: Right arm, BP Patient Position: At rest)    Pulse 68    Temp 99.5 °F (37.5 °C)    Resp 20    Ht 5' 1.5\" (1.562 m)    Wt 93 lb 8 oz (42.4 kg)    SpO2 97%    Breastfeeding No    BMI 17.38 kg/m2    O2 Flow Rate (L/min): 5 l/min O2 Device: Nasal cannula Temp (24hrs), Av.2 °F (37.3 °C), Min:98.6 °F (37 °C), Max:99.9 °F (37.7 °C)      Last 24hr Input/Output:    Intake/Output Summary (Last 24 hours) at 18 0755  Last data filed at 18 0500   Gross per 24 hour   Intake             1440 ml   Output             1100 ml   Net              340 ml        PHYSICAL EXAM:  General:     Alert, cooperative, moderate respiratory distress, appears stated age. Head:    Normocephalic, without obvious abnormality, atraumatic. Eyes:    Conjunctivae/corneas clear. PERRLA  Nose:   Nares normal. No drainage or sinus tenderness. BIPAP in place  Throat:     Lips, mucosa, and tongue normal.  + Thrush  Neck:   Supple, symmetrical,  no adenopathy, thyroid: non tender     no carotid bruit and no JVD. Back:     Symmetric,  No CVA tenderness. Lungs:    Clear to auscultation bilaterally with overall decreased BS.   No Wheezing or Rhonchi. No rales. Heart:    Regular rate and rhythm,  no murmur, rub or gallop. Abdomen:    Soft, non tender. Not distended. Bowel sounds normal. No masses  Extremities:  Extremities normal, atraumatic, No cyanosis. No edema. + clubbing  Lymph nodes:  Cervical, supraclavicular normal.  Neurologic:  General decreased strength, Alert and oriented X 3. Skin:                 No rash    Telemetry: NSR with PVCs    Lab Data Reviewed:    Recent Results (from the past 24 hour(s))   VANCOMYCIN, TROUGH    Collection Time: 04/04/18 12:55 PM   Result Value Ref Range    Vancomycin,trough 17.2 (H) 5.0 - 10.0 ug/mL    Reported dose date: NOT PROVIDED      Reported dose time: NOT PROVIDED      Reported dose: NOT PROVIDED UNITS   METABOLIC PANEL, BASIC    Collection Time: 04/05/18  2:00 AM   Result Value Ref Range    Sodium 146 (H) 136 - 145 mmol/L    Potassium 4.2 3.5 - 5.1 mmol/L    Chloride 111 (H) 97 - 108 mmol/L    CO2 29 21 - 32 mmol/L    Anion gap 6 5 - 15 mmol/L    Glucose 125 (H) 65 - 100 mg/dL    BUN 66 (H) 6 - 20 MG/DL    Creatinine 1.90 (H) 0.55 - 1.02 MG/DL    BUN/Creatinine ratio 35 (H) 12 - 20      GFR est AA 31 (L) >60 ml/min/1.73m2    GFR est non-AA 25 (L) >60 ml/min/1.73m2    Calcium 8.9 8.5 - 10.1 MG/DL   CBC WITH AUTOMATED DIFF    Collection Time: 04/05/18  2:00 AM   Result Value Ref Range    WBC 13.7 (H) 3.6 - 11.0 K/uL    RBC 3.28 (L) 3.80 - 5.20 M/uL    HGB 9.7 (L) 11.5 - 16.0 g/dL    HCT 28.6 (L) 35.0 - 47.0 %    MCV 87.2 80.0 - 99.0 FL    MCH 29.6 26.0 - 34.0 PG    MCHC 33.9 30.0 - 36.5 g/dL    RDW 15.6 (H) 11.5 - 14.5 %    PLATELET 173 474 - 308 K/uL    MPV 11.8 8.9 - 12.9 FL    NRBC 3.8 (H) 0  WBC    ABSOLUTE NRBC 0.52 (H) 0.00 - 0.01 K/uL    NEUTROPHILS 90 (H) 32 - 75 %    LYMPHOCYTES 3 (L) 12 - 49 %    MONOCYTES 3 (L) 5 - 13 %    EOSINOPHILS 0 0 - 7 %    BASOPHILS 0 0 - 1 %    METAMYELOCYTES 3 %    MYELOCYTES 1 %    IMMATURE GRANULOCYTES 0 0.0 - 0.5 %    ABS.  NEUTROPHILS 12.3 (H) 1.8 - 8.0 K/UL    ABS. LYMPHOCYTES 0.4 (L) 0.8 - 3.5 K/UL    ABS. MONOCYTES 0.4 0.0 - 1.0 K/UL    ABS. EOSINOPHILS 0.0 0.0 - 0.4 K/UL    ABS. BASOPHILS 0.0 0.0 - 0.1 K/UL    ABS. IMM. GRANS. 0.0 0.00 - 0.04 K/UL    DF MANUAL      RBC COMMENTS ANISOCYTOSIS  1+        RBC COMMENTS HELMET CELLS  PRESENT        RBC COMMENTS SCHISTOCYTES  PRESENT             Assessment/Plan:     Principal Problem:    Acute hypoxemic respiratory failure ([facility] (3/27/2018)    Active Problems:    COPD exacerbation ([facility] [address] (8/14/2010)      Hypertension (7/20/2016)      Peripheral vascular disease ([facility] [address] (7/20/2016)      Sepsis ([facility] (3/28/2018)      Pneumonia (3/28/2018)      Acute renal failure (ARF) ([facility] [address] (3/28/2018)      Primary lung large cell carcinoma, left ([facility] [address] (3/29/2018)      Overview: Being candidate for surgical resection so treated with radiation therapy       in 2016      PAF (paroxysmal atrial fibrillation) ([facility] [address] (3/30/2018)      Moderate protein-calorie malnutrition ([facility] [address] (3/30/2018)      Decubitus ulcer of sacral region, stage 2 (3/30/2018)       ___________________________________________________  PLAN:    1. Levaquin and Vancomycin for Pneumonia (D/Micah Zosyn in light of diarrhea - had received it for 7 days)  2. Supplemental Oxygen for Hypoxemia now with 5 L and 97% Sat (She is DNR)  3.  JA treatments for acute respiratory failure  4. Steroids for COPD exacerbation tapered a little on 4/2 and decrease again today  5. D/Micah IV Hydration which she was on with ARF and follow Creat (2.53 on adm to 1.90 now)  6. Decreased IV Lasix  7. Repleting K with I.V and PO and now 4.2 from 2.3, Mag normal 1.7  8. With anemia follow Hgb, 10.4 on adm to 9.6 now  9. Cont current HTN with HCTZ and Hydralazine and follow BP which is up a little so increasedMetoprolol on 4/3 and also on Cardizem PO  10. Cont ASA and Plavix with ASVD  11. Repeat CXR improved  12. Encouraged PO intake  13. Protonix IV  14.  Nystatin for thrush      Critically ill patient with end stage lung disease and high risk of decompensation (Resprct DNR licha)    ___________________________________________________    Attending Physician: Kerry Gallardo MD

## 2018-04-05 NOTE — PROGRESS NOTES
Problem: Mobility Impaired (Adult and Pediatric)  Goal: *Acute Goals and Plan of Care (Insert Text)  Physical Therapy Goals  Revised 4/5/2018  1. Patient will move from supine to sit and sit to supine , scoot up and down and roll side to side in bed with minimal assistance/contact guard assist within 7 day(s). 2.  Patient will transfer from bed to chair and chair to bed with moderate assistance  using the least restrictive device within 7 day(s). 3.  Patient will perform sit to stand with moderate assistance  within 7 day(s). 4.  Patient will ambulate with moderate assistance of 2 for 5 feet with the least restrictive device within 7 day(s). 5.  Patient will perform 2 sets of 10 repetitions of active strengthening exercises for bilateral upper and lower extremity(s) with supervision/set-up within 7 day(s). Physical Therapy Goals  Initiated 3/28/2018- all goals not achieved. Please see above for modified goals. 1.  Patient will move from supine to sit and sit to supine , scoot up and down and roll side to side in bed with contact guard assist within 7 day(s). 2.  Patient will transfer from bed to chair and chair to bed with minimal assistance using the least restrictive device within 7 day(s). 3.  Patient will perform sit to stand with minimal assistance within 7 day(s). 4.  Patient will ambulate with minimal assistance for 3 feet with the least restrictive device within 7 day(s). physical Therapy TREATMENT: WEEKLY REASSESSMENT  Patient: Ottis Najjar (89 y.o. female)  Date: 4/5/2018  Diagnosis: Respiratory failure with hypoxia ([facility] [address] Acute hypoxemic respiratory failure (HCC)       Precautions: DNR, Fall  Chart, physical therapy assessment, plan of care and goals were reviewed. ASSESSMENT: Patient minimally interactive with no eye contact noted but willing to participate in therapy today. Patient demonstrating increasing generalized weakness and increasing overall debility.  Patient requiring min A to perform exercises. She requires mod A for overall bed mobility and max A for sit<>stand. Patient unable to achieve fully upright standing position and not able to advance LEs to step bed to chair. Patient requiring total A for stand pivot transfer today. Patient agreeable to remain in chair for dinner. Recommend mobility team for OOB transfers at this time. Recommend SNF following discharge. Patient may benefit from psych consult as she appears depressed. Patient's progression toward goals since last assessment: goals not achieved and POC updated     PLAN:  Goals have been updated based on progression since last assessment. Patient continues to benefit from skilled intervention to address the above impairments. Continue to follow the patient 4 times a week to address goals. Planned Interventions:  [x]              Bed Mobility Training             []       Neuromuscular Re-Education  [x]              Transfer Training                   []       Orthotic/Prosthetic Training  [x]              Gait Training                         []       Modalities  [x]              Therapeutic Exercises           []       Edema Management/Control  [x]              Therapeutic Activities            [x]       Patient and Family Training/Education  []              Other (comment):  Discharge Recommendations: Skilled Nursing Facility  Further Equipment Recommendations for Discharge: TBD     SUBJECTIVE:   Patient stated I'll try.     OBJECTIVE DATA SUMMARY:   Critical Behavior:  Neurologic State: Alert  Orientation Level: Disoriented to time  Cognition: Follows commands  Safety/Judgement: Decreased awareness of environment, Decreased awareness of need for assistance, Decreased awareness of need for safety, Decreased insight into deficits, Fall prevention    Strength:   Strength: Generally decreased, functional       Functional Mobility Training:  Bed Mobility:  Rolling:  Moderate assistance  Supine to Sit: Moderate assistance; Additional time     Scooting: Moderate assistance; Additional time        Transfers:  Sit to Stand: Maximum assistance;Assist x1  Stand to Sit: Maximum assistance        Bed to Chair: Total assistance;Assist x1                    Balance:  Sitting: Impaired  Sitting - Static: Fair (occasional)  Sitting - Dynamic: Not tested  Standing: Impaired  Standing - Static: Poor;Constant support  Standing - Dynamic : Poor  Ambulation/Gait Training:   Patient unable to advance LEs to step bed to chair requiring total assist for stand pivot transfer       Therapeutic Exercises:     EXERCISE   Sets   Reps   Active Active Assist   Passive   Comments   Finger flex/ext 1 5 []                        [x]                        []                           Elbow flex/ext 1 5 []                        [x]                        []                           Shoulder flex 1 5 []                        [x]                        []                           Heel slides 1 5 []                        [x]                        []                           abd slides 1 5 []                        [x]                        []                                                                                   Pain:   Patient states that her stomach hurts but did not rate pain                 Activity Tolerance:   VSS  Please refer to the flowsheet for vital signs taken during this treatment.   After treatment:   [x]  Patient left in no apparent distress sitting up in chair  []  Patient left in no apparent distress in bed  [x]  Call bell left within reach  [x]  Nursing notified  []  Caregiver present  []  Bed alarm activated    COMMUNICATION/COLLABORATION:   The patients plan of care was discussed with: Registered Nurse and Rehabilitation Attendant    Margie Lange, PT   Time Calculation: 30 mins

## 2018-04-06 LAB
ANION GAP SERPL CALC-SCNC: 7 MMOL/L (ref 5–15)
BUN SERPL-MCNC: 69 MG/DL (ref 6–20)
BUN/CREAT SERPL: 41 (ref 12–20)
CALCIUM SERPL-MCNC: 9.3 MG/DL (ref 8.5–10.1)
CHLORIDE SERPL-SCNC: 112 MMOL/L (ref 97–108)
CO2 SERPL-SCNC: 28 MMOL/L (ref 21–32)
CREAT SERPL-MCNC: 1.7 MG/DL (ref 0.55–1.02)
GLUCOSE SERPL-MCNC: 132 MG/DL (ref 65–100)
POTASSIUM SERPL-SCNC: 5.1 MMOL/L (ref 3.5–5.1)
SODIUM SERPL-SCNC: 147 MMOL/L (ref 136–145)

## 2018-04-06 PROCEDURE — 77030038269 HC DRN EXT URIN PURWCK BARD -A

## 2018-04-06 PROCEDURE — 74011250637 HC RX REV CODE- 250/637: Performed by: INTERNAL MEDICINE

## 2018-04-06 PROCEDURE — 36415 COLL VENOUS BLD VENIPUNCTURE: CPT | Performed by: GENERAL ACUTE CARE HOSPITAL

## 2018-04-06 PROCEDURE — 97530 THERAPEUTIC ACTIVITIES: CPT | Performed by: PHYSICAL THERAPIST

## 2018-04-06 PROCEDURE — 74011250636 HC RX REV CODE- 250/636: Performed by: GENERAL ACUTE CARE HOSPITAL

## 2018-04-06 PROCEDURE — 74011250636 HC RX REV CODE- 250/636: Performed by: INTERNAL MEDICINE

## 2018-04-06 PROCEDURE — 77010033678 HC OXYGEN DAILY

## 2018-04-06 PROCEDURE — 80048 BASIC METABOLIC PNL TOTAL CA: CPT | Performed by: GENERAL ACUTE CARE HOSPITAL

## 2018-04-06 PROCEDURE — 74011250637 HC RX REV CODE- 250/637: Performed by: GENERAL ACUTE CARE HOSPITAL

## 2018-04-06 PROCEDURE — 65660000000 HC RM CCU STEPDOWN

## 2018-04-06 RX ORDER — POTASSIUM CHLORIDE 1.5 G/1.77G
20 POWDER, FOR SOLUTION ORAL DAILY
Status: DISCONTINUED | OUTPATIENT
Start: 2018-04-07 | End: 2018-04-08 | Stop reason: SDUPTHER

## 2018-04-06 RX ADMIN — HYDROCHLOROTHIAZIDE 25 MG: 25 TABLET ORAL at 17:38

## 2018-04-06 RX ADMIN — METHYLPREDNISOLONE SODIUM SUCCINATE 20 MG: 40 INJECTION, POWDER, FOR SOLUTION INTRAMUSCULAR; INTRAVENOUS at 13:36

## 2018-04-06 RX ADMIN — HEPARIN SODIUM 5000 UNITS: 5000 INJECTION, SOLUTION INTRAVENOUS; SUBCUTANEOUS at 02:39

## 2018-04-06 RX ADMIN — Medication 1 CAPSULE: at 09:27

## 2018-04-06 RX ADMIN — Medication 10 ML: at 06:00

## 2018-04-06 RX ADMIN — MICONAZOLE NITRATE: 20 CREAM TOPICAL at 09:29

## 2018-04-06 RX ADMIN — NYSTATIN 500000 UNITS: 100000 SUSPENSION ORAL at 22:17

## 2018-04-06 RX ADMIN — NYSTATIN 500000 UNITS: 100000 SUSPENSION ORAL at 17:37

## 2018-04-06 RX ADMIN — NYSTATIN 500000 UNITS: 100000 SUSPENSION ORAL at 13:36

## 2018-04-06 RX ADMIN — LEVOFLOXACIN 500 MG: 5 INJECTION, SOLUTION INTRAVENOUS at 23:41

## 2018-04-06 RX ADMIN — FUROSEMIDE 40 MG: 10 INJECTION, SOLUTION INTRAMUSCULAR; INTRAVENOUS at 09:31

## 2018-04-06 RX ADMIN — METOPROLOL TARTRATE 50 MG: 50 TABLET ORAL at 09:27

## 2018-04-06 RX ADMIN — POTASSIUM CHLORIDE 20 MEQ: 1.5 POWDER, FOR SOLUTION ORAL at 09:27

## 2018-04-06 RX ADMIN — POTASSIUM CHLORIDE 20 MEQ: 1.5 POWDER, FOR SOLUTION ORAL at 17:38

## 2018-04-06 RX ADMIN — NYSTATIN 500000 UNITS: 100000 SUSPENSION ORAL at 09:27

## 2018-04-06 RX ADMIN — VANCOMYCIN HYDROCHLORIDE 750 MG: 750 INJECTION, POWDER, LYOPHILIZED, FOR SOLUTION INTRAVENOUS at 16:35

## 2018-04-06 RX ADMIN — ESCITALOPRAM OXALATE 10 MG: 10 TABLET ORAL at 09:28

## 2018-04-06 RX ADMIN — Medication 10 ML: at 13:37

## 2018-04-06 RX ADMIN — HEPARIN SODIUM 5000 UNITS: 5000 INJECTION, SOLUTION INTRAVENOUS; SUBCUTANEOUS at 10:16

## 2018-04-06 RX ADMIN — METHYLPREDNISOLONE SODIUM SUCCINATE 20 MG: 40 INJECTION, POWDER, FOR SOLUTION INTRAMUSCULAR; INTRAVENOUS at 06:00

## 2018-04-06 RX ADMIN — METHYLPREDNISOLONE SODIUM SUCCINATE 20 MG: 40 INJECTION, POWDER, FOR SOLUTION INTRAMUSCULAR; INTRAVENOUS at 22:17

## 2018-04-06 RX ADMIN — PANTOPRAZOLE SODIUM 40 MG: 40 TABLET, DELAYED RELEASE ORAL at 09:28

## 2018-04-06 RX ADMIN — HYDRALAZINE HYDROCHLORIDE 100 MG: 50 TABLET ORAL at 09:27

## 2018-04-06 RX ADMIN — DILTIAZEM HYDROCHLORIDE 180 MG: 180 CAPSULE, COATED, EXTENDED RELEASE ORAL at 09:27

## 2018-04-06 RX ADMIN — DIPHENOXYLATE HYDROCHLORIDE AND ATROPINE SULFATE 1 TABLET: 2.5; .025 TABLET ORAL at 22:17

## 2018-04-06 RX ADMIN — HEPARIN SODIUM 5000 UNITS: 5000 INJECTION, SOLUTION INTRAVENOUS; SUBCUTANEOUS at 17:38

## 2018-04-06 RX ADMIN — HYDRALAZINE HYDROCHLORIDE 100 MG: 50 TABLET ORAL at 22:17

## 2018-04-06 RX ADMIN — PANTOPRAZOLE SODIUM 40 MG: 40 TABLET, DELAYED RELEASE ORAL at 17:39

## 2018-04-06 RX ADMIN — CLOPIDOGREL BISULFATE 75 MG: 75 TABLET ORAL at 09:27

## 2018-04-06 RX ADMIN — HYDRALAZINE HYDROCHLORIDE 100 MG: 50 TABLET ORAL at 17:39

## 2018-04-06 RX ADMIN — Medication 10 ML: at 09:31

## 2018-04-06 RX ADMIN — MICONAZOLE NITRATE: 20 CREAM TOPICAL at 17:39

## 2018-04-06 RX ADMIN — Medication 10 ML: at 22:17

## 2018-04-06 RX ADMIN — ASPIRIN 81 MG CHEWABLE TABLET 81 MG: 81 TABLET CHEWABLE at 17:38

## 2018-04-06 RX ADMIN — METOPROLOL TARTRATE 50 MG: 50 TABLET ORAL at 17:39

## 2018-04-06 RX ADMIN — TEMAZEPAM 15 MG: 15 CAPSULE ORAL at 22:17

## 2018-04-06 NOTE — PROGRESS NOTES
Problem: Mobility Impaired (Adult and Pediatric)  Goal: *Acute Goals and Plan of Care (Insert Text)  Physical Therapy Goals  Revised 4/5/2018  1. Patient will move from supine to sit and sit to supine , scoot up and down and roll side to side in bed with minimal assistance/contact guard assist within 7 day(s). 2.  Patient will transfer from bed to chair and chair to bed with moderate assistance  using the least restrictive device within 7 day(s). 3.  Patient will perform sit to stand with moderate assistance  within 7 day(s). 4.  Patient will ambulate with moderate assistance of 2 for 5 feet with the least restrictive device within 7 day(s). 5.  Patient will perform 2 sets of 10 repetitions of active strengthening exercises for bilateral upper and lower extremity(s) with supervision/set-up within 7 day(s). Physical Therapy Goals  Initiated 3/28/2018- all goals not achieved. Please see above for modified goals. 1.  Patient will move from supine to sit and sit to supine , scoot up and down and roll side to side in bed with contact guard assist within 7 day(s). 2.  Patient will transfer from bed to chair and chair to bed with minimal assistance using the least restrictive device within 7 day(s). 3.  Patient will perform sit to stand with minimal assistance within 7 day(s). 4.  Patient will ambulate with minimal assistance for 3 feet with the least restrictive device within 7 day(s). physical Therapy TREATMENT  Patient: Donna Morris (58 y.o. female)  Date: 4/6/2018  Diagnosis: Respiratory failure with hypoxia ([facility] Acute hypoxemic respiratory failure ([facility]       Precautions: DNR, Fall  Chart, physical therapy assessment, plan of care and goals were reviewed. ASSESSMENT:  Patient continues with profound weakness and debility. Barely able to help with any mobility. Max assist of 2 to come to sitting EOB. Attempted to stand with a RW but she is unable to weight bear or come upright. Noted to be stooling so we transferred her to the [facility]. Stood with max assist to be cleaned up and then max assist to pivot to the chair. The nursing student is with her otherwise not safe to be up alone as she looks like she may tumble out of the chair. She is able to respond to questions. Will needs SNF rehab. Progression toward goals:  []    Improving appropriately and progressing toward goals  [x]    Improving slowly and progressing toward goals  []    Not making progress toward goals and plan of care will be adjusted     PLAN:  Patient continues to benefit from skilled intervention to address the above impairments. Continue treatment per established plan of care. Discharge Recommendations:  [facility]  Further Equipment Recommendations for Discharge:  none     SUBJECTIVE:   Patient stated I'm finished (using the [facility]).     OBJECTIVE DATA SUMMARY:   Critical Behavior:  Neurologic State: Alert, Eyes open spontaneously  Orientation Level: Disoriented to time, Oriented to person, Oriented to place, Oriented to situation  Cognition: Follows commands  Safety/Judgement: Decreased awareness of environment, Decreased awareness of need for assistance, Decreased awareness of need for safety, Decreased insight into deficits, Fall prevention  Functional Mobility Training:  Bed Mobility:  Rolling: Moderate assistance;Assist x2  Supine to Sit: Moderate assistance;Assist x2     Scooting: Maximum assistance        Transfers:  Sit to Stand: Maximum assistance;Assist x2  Stand to Sit: Maximum assistance;Assist x2        Bed to Chair: Maximum assistance;Assist x2                    Balance:  Sitting: Impaired  Sitting - Static: Fair (occasional)  Sitting - Dynamic: Fair (occasional)  Standing: Impaired  Standing - Static: Constant support;Poor  Standing - Dynamic : Poor  Ambulation/Gait Training:                                                      Unable at this time.                Pain:  Pain Scale 1: Numeric (0 - 10)  Pain Intensity 1: 0              Activity Tolerance:   Good. Low endurance. Please refer to the flowsheet for vital signs taken during this treatment. After treatment:   [x]    Patient left in no apparent distress sitting up in chair with nursing student present. []    Patient left in no apparent distress in bed  [x]    Call bell left within reach  [x]    Nursing notified  []    Caregiver present  []    Bed alarm activated    COMMUNICATION/COLLABORATION:   The patients plan of care was discussed with: Registered Nurse, Rehabilitation Attendant and nursing student.      Sheng Rivera PT   Time Calculation: 25 mins

## 2018-04-06 NOTE — PROGRESS NOTES
PROGRESS NOTE    NAME:  Smita Ponce   :   1935   MRN:   [id number]     Date/Time:  2018 7:54 AM  Subjective:   History:  Chart reviewed and patient seen and examined this AM and D/W her nurse and all events noted. She presented on 3/27 with several episodes of N/V w/o diarrhea or other GI c/o. She has had no Vomiting since 3/27 AM and no other GI c/o except some diarrhea and now loose stools. She noted to be hypoxemic and was diagnosed with bilateral Pneumonia and has been started on triple antibiotics initially. She has had a little cough and notable SOB w/o other cardio/respiratory c/o until during the night 3/30 when she became more SOB and Hypoxemic and CXR revealed Pulmonary Edema. She had IV Lasix with good UO and progressively improved oxygenation since. She denies CP or other cardio/respiratory c/o and says she feels better now. She has no  c/o. She developed PAF since admission treated with Cardizem drip and changed to PO on . She is generally weak w/o other neurologic c/o. There are no other c/o on complete ROS except now poor appetite persists .        Medications reviewed:  Current Facility-Administered Medications   Medication Dose Route Frequency    lactobac ac& pc-s.therm-b.anim (HANG Q/RISAQUAD)  1 Cap Oral DAILY    methylPREDNISolone (PF) (SOLU-MEDROL) injection 20 mg  20 mg IntraVENous Q8H    miconazole (SECURA) 2 % extra thick cream   Topical BID    nystatin (MYCOSTATIN) 100,000 unit/mL oral suspension 500,000 Units  500,000 Units Oral QID    metoprolol tartrate (LOPRESSOR) tablet 50 mg  50 mg Oral BID    potassium chloride (KLOR-CON) packet 20 mEq  20 mEq Oral TID WITH MEALS    furosemide (LASIX) injection 40 mg  40 mg IntraVENous DAILY    acetaminophen (TYLENOL) tablet 650 mg  650 mg Oral Q6H PRN    dilTIAZem CD (CARDIZEM CD) capsule 180 mg  180 mg Oral DAILY    levoFLOXacin (LEVAQUIN) 500 mg in D5W IVPB  500 mg IntraVENous Q48H    potassium chloride (KLOR-CON) packet 20 mEq  20 mEq Oral BID WITH MEALS    vancomycin (VANCOCIN) 750 mg in 0.9% sodium chloride (MBP/ADV) 250 mL  750 mg IntraVENous Q24H    diphenoxylate-atropine (LOMOTIL) tablet 1 Tab  1 Tab Oral Q4H PRN    temazepam (RESTORIL) capsule 15 mg  15 mg Oral QHS    aspirin chewable tablet 81 mg  81 mg Oral QPM    clopidogrel (PLAVIX) tablet 75 mg  75 mg Oral DAILY    hydrALAZINE (APRESOLINE) tablet 100 mg  100 mg Oral TID    escitalopram oxalate (LEXAPRO) tablet 10 mg  10 mg Oral DAILY    pantoprazole (PROTONIX) tablet 40 mg  40 mg Oral BID    hydroCHLOROthiazide (HYDRODIURIL) tablet 25 mg  25 mg Oral QPM    sodium chloride (NS) flush 5-10 mL  5-10 mL IntraVENous Q8H    sodium chloride (NS) flush 5-10 mL  5-10 mL IntraVENous PRN    heparin (porcine) injection 5,000 Units  5,000 Units SubCUTAneous Q8H        Objective:   Vitals:  Visit Vitals    /68 (BP 1 Location: Right arm, BP Patient Position: At rest)    Pulse 77    Temp 98.4 °F (36.9 °C)    Resp 20    Ht 5' 1.5\" (1.562 m)    Wt 93 lb 4.8 oz (42.3 kg)    SpO2 (!) 87%    Breastfeeding No    BMI 17.34 kg/m2    O2 Flow Rate (L/min): 4 l/min O2 Device: Nasal cannula Temp (24hrs), Av.3 °F (36.8 °C), Min:97.5 °F (36.4 °C), Max:99.3 °F (37.4 °C)      Last 24hr Input/Output:    Intake/Output Summary (Last 24 hours) at 18 7608  Last data filed at 18 1948   Gross per 24 hour   Intake              650 ml   Output              600 ml   Net               50 ml        PHYSICAL EXAM:  General:     Alert, cooperative, moderate respiratory distress, appears stated age. Head:    Normocephalic, without obvious abnormality, atraumatic. Eyes:    Conjunctivae/corneas clear. PERRLA  Nose:   Nares normal. No drainage or sinus tenderness. BIPAP in place  Throat:     Lips, mucosa, and tongue normal.  No Thrush  Neck:   Supple, symmetrical,  no adenopathy, thyroid: non tender     no carotid bruit and no JVD.   Back:     Symmetric, No CVA tenderness. Lungs:    Clear to auscultation bilaterally with overall decreased BS. No Wheezing or Rhonchi. No rales. Heart:    Regular rate and rhythm,  no murmur, rub or gallop. Abdomen:    Soft, non tender. Not distended. Bowel sounds normal. No masses  Extremities:  Extremities normal, atraumatic, No cyanosis. No edema. + clubbing  Lymph nodes:  Cervical, supraclavicular normal.  Neurologic:  General decreased strength, Alert and oriented X 3. Skin:                 No rash    Telemetry: NSR with PVCs    Lab Data Reviewed:    Recent Results (from the past 24 hour(s))   METABOLIC PANEL, BASIC    Collection Time: 04/06/18  2:47 AM   Result Value Ref Range    Sodium 147 (H) 136 - 145 mmol/L    Potassium 5.1 3.5 - 5.1 mmol/L    Chloride 112 (H) 97 - 108 mmol/L    CO2 28 21 - 32 mmol/L    Anion gap 7 5 - 15 mmol/L    Glucose 132 (H) 65 - 100 mg/dL    BUN 69 (H) 6 - 20 MG/DL    Creatinine 1.70 (H) 0.55 - 1.02 MG/DL    BUN/Creatinine ratio 41 (H) 12 - 20      GFR est AA 35 (L) >60 ml/min/1.73m2    GFR est non-AA 29 (L) >60 ml/min/1.73m2    Calcium 9.3 8.5 - 10.1 MG/DL         Assessment/Plan:     Principal Problem:    Acute hypoxemic respiratory failure (HCC) (3/27/2018)    Active Problems:    COPD exacerbation ([address] (8/14/2010)      Hypertension (7/20/2016)      Peripheral vascular disease ([address] (7/20/2016)      Sepsis ([address] (3/28/2018)      Pneumonia (3/28/2018)      Acute renal failure (ARF) ([address] (3/28/2018)      Primary lung large cell carcinoma, 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   Levaquin and Vancomycin for Pneumonia (D/Micah Zosyn in light of diarrhea - had received it for 7 days)  2. Supplemental Oxygen for Hypoxemia now with 5 L and 94% Sat (She is DNR)  3.  JA treatments for acute respiratory failure  4. Steroids for COPD exacerbation tapered a little   5. D/Micah IV Hydration which she was on with ARF and follow Creat (2.53 on adm to 1.70 now)  6. Decreased IV Lasix  7. Repleting K with I.V and PO and now 5.1 from 2.3, Mag normal 1.7  8. With anemia follow Hgb, 10.4 on adm to 9.6 now  9. Cont current HTN with HCTZ and Hydralazine and follow BP which is up a little so increased Metoprolol on 4/3 and also on Cardizem PO  10. Cont ASA and Plavix with ASVD  11. Repeat CXR improved  12. Encouraged PO intake  13. Protonix IV  14.  Add Prociotic      Critically ill patient with end stage lung disease and high risk of decompensation (Resprct DNR wishes)    ___________________________________________________    Attending Physician: Marisela Correia MD

## 2018-04-06 NOTE — PROGRESS NOTES
Nutrition Assessment:    INTERVENTIONS/RECOMMENDATIONS:   Meals/Snacks: General/healthful diet: Dental soft diet  Supplements: Commercial supplement: Ensure pudding daily, magic cups BID    ASSESSMENT:   Chart reviewed; patient continues on a dental soft diet. PO intake remains poor; mostly eating <25% of meals. Patient states she continues with a poor appetite; still drinking gingerale. She reported that she liked the supplements being sent. Lunch tray at bedside was essentially untouched, including supplement. Offered to help her get set up but she declined meal. Patient's weight has dropped 12# since admission (not sure of accuracy on admit). Currently meets criteria for acute severe malnutrition. Recommend discussion on goals of care and nutrition support if appetite continues to remain poor.      Meets Criteria for Acute Malnutrition   [x] Severe Malnutrition, as evidenced by:   [x] Moderate muscle wasting, loss of subcutaneous fat   [x] Nutritional intake of <50% of recommended intake for >5 days   [x] Weight loss of >1-2% in 1 week, >5% in 1 month, >7.5% in 3 months, or >10% in 6 months   [] Moderate-severe edema   []Moderate Malnutrition, as evidenced by:   [] Mild muscle wasting, loss of subcutaneous fat   [] Nutritional intake <75% of recommended intake for >1 week   [] Weight loss of 1-2% in 1 week, 5% in 1 month, 7.5% in 3 months, or 10% in 6 months   [] Mild edema          Diet Order:  (Dental Soft + magic cups BID, ensure pudding daily)  % Eaten:  Patient Vitals for the past 72 hrs:   % Diet Eaten   04/06/18 0914 10 %   04/05/18 1738 5 %   04/05/18 1200 5 %   04/05/18 0904 40 %   04/04/18 0955 0 %     Pertinent Medications: [x] Reviewed []Other: Plavix, Cardizem, Lexapro, Lasix, Hydralazine, HCTZ, Priscila Q, Solu-medrol, Lopressor, KCl   Pertinent Labs: [x]Reviewed  []Other: Na 147, K+ 5.1, [phone]  Food Allergies: [x]None []Other:     Last BM: 4/5   [x]Active     []Hyperactive  []Hypoactive [] Absent  BS  Skin:    [x] Intact   [] Incision  [] Breakdown   []Edema   []Other:    Anthropometrics: Height: 5' 1.5\" (156.2 cm) Weight: 42.3 kg (93 lb 4.8 oz)    IBW (%IBW):   ( ) UBW (%UBW):   (  %)    BMI: Body mass index is 17.34 kg/(m^2). This BMI is indicative of:  [x]Underweight   []Normal   []Overweight   [] Obesity   [] Extreme Obesity (BMI>40)  Last Weight Metrics:  Weight Loss Metrics 4/6/2018 10/28/2016 10/13/2016 8/29/2016 7/19/2016 2/14/2015 8/10/2010   Today's Wt 93 lb 4.8 oz 95 lb 95 lb 101 lb 101 lb 98 lb 5.2 oz 118 lb 3.2 oz   BMI 17.34 kg/m2 17.95 kg/m2 17.66 kg/m2 19.08 kg/m2 18.78 kg/m2 18.59 kg/m2 -       Estimated Nutrition Needs (Based on): 1333 Kcals/day (BMR (833) x 1.2AF +250kcal) , 50 g (-63g (1.2-1.5 g/kg bw)) Protein  Carbohydrate: At Least 130 g/day  Fluids: 1350 mL/day     Pt expected to meet estimated nutrient needs: []Yes [x]No    NUTRITION DIAGNOSES:   Problem:  Inadequate protein-energy intake      Etiology: related to nausea, decreased appetite     Signs/Symptoms: as evidenced by PO intake <25% of meals      NUTRITION INTERVENTIONS:  Meals/Snacks: General/healthful diet   Supplements: Commercial supplement              GOAL:   PO intake >25% of meals and 50% of supplement next 2-4 days    NUTRITION MONITORING AND EVALUATION   Behavioral-Environmental Outcomes: Behavior  Food/Nutrient Intake Outcomes:  Total energy intake  Physical Signs/Symptoms Outcomes: Weight/weight change, Electrolyte and renal profile, GI profile, Glucose profile    Previous Goal Met:   [x] Met              [] Progressing Towards Goal              [] Not Progressing Towards Goal   Previous Recommendations:   [x] Implemented          [] Not Implemented          [] Not Applicable    LEARNING NEEDS (Diet, Food/Nutrient-Drug Interaction):    [x] None Identified   [] Identified and Education Provided/Documented   [] Identified and Pt declined/was not appropriate     Cultural, Lutheran, OR Ethnic Dietary Needs:    [x] None Identified   [] Identified and Addressed     [x] Interdisciplinary Care Plan Reviewed/Documented    [x] Discharge Planning: Regular diet   [] Participated in Interdisciplinary Rounds    NUTRITION RISK:    [x] High              [] Moderate           []  Low  []  Minimal/Uncompromised      Stefanie [place]  Pager [phone]              Weekend Pager [phone]

## 2018-04-06 NOTE — PROGRESS NOTES
PCU SHIFT NURSING NOTE      Bedside and Verbal shift change report given to Keke Pool (oncoming nurse) by Keke Pool (offgoing nurse). Report included the following information SBAR, Kardex, Procedure Summary, Intake/Output, MAR and Recent Results. Shift Summary: 2105  1281: Patient with flat affect, not interactive or talking. Will answer to yes and no questions. Patient appears very depressed and upset. Vital signs stable, patient being assisted with eating breakfast at this time. 1015: Patient up to bedside commode with physical therapy, now sitting up in chair. No complaints at this time. 1200:Patient back to bed with assistance of three nursing staff. Patient is very weak and needs complete care when moving from chair to bed    Admission Date 3/27/2018   Admission Diagnosis Respiratory failure with hypoxia ([facility]   Consults None        Consults   [x]PT   [x]OT   []Speech   []Case Management      [] Palliative      Cardiac Monitoring Order   [x]Yes   []No     IV drips   []Yes    Drip:                            Dose:  Drip:                            Dose:  Drip:                            Dose:   []No     GI Prophylaxis   []Yes   []No         DVT Prophylaxis   SCDs:  Sequential Compression Device: Bilateral     Patient Refused VTE Prophylaxis: Yes    Kunal stockings:         [x] Medication   []Contraindicated   []None      Activity Level Activity Level: Up with Assistance     Activity Assistance: Partial (two people)   Purposeful Rounding every 1-2 hour? [x]Yes   Campo Score  Total Score: 4   Bed Alarm (If score 3 or >)   []Yes   [] Refused (See signed refusal form in chart)   Rashid Score  Rashid Score: 9   Rashid Score (if score 14 or less)   []PMT consult   []Wound Care consult      []Specialty bed   [] Nutrition consult          Needs prior to discharge:   Home O2 required:    []Yes   []No    If yes, how much O2 required?     Other:    Last Bowel Movement: Last Bowel Movement Date: 04/05/18      Influenza Vaccine Received Flu Vaccine for Current Season (usually Sept-March): Yes        Pneumonia Vaccine           Diet Active Orders   Diet    DIET DENTAL SOFT (SOFT SOLID)      LDAs               Peripheral IV 04/02/18 Right Hand (Active)   Site Assessment Clean, dry, & intact 4/6/2018  2:23 AM   Phlebitis Assessment 0 4/6/2018  2:23 AM   Infiltration Assessment 0 4/6/2018  2:23 AM   Dressing Status Clean, dry, & intact 4/6/2018  2:23 AM   Dressing Type Transparent 4/6/2018  2:23 AM   Hub Color/Line Status Blue;Flushed;Patent 4/6/2018  2:23 AM   Action Taken Open ports on tubing capped 4/6/2018  2:23 AM   Alcohol Cap Used Yes 4/6/2018  2:23 AM       Peripheral IV 04/02/18 Right;Posterior Forearm (Active)   Site Assessment Clean, dry, & intact 4/6/2018  2:23 AM   Phlebitis Assessment 0 4/6/2018  2:23 AM   Infiltration Assessment 0 4/6/2018  2:23 AM   Dressing Status Clean, dry, & intact 4/6/2018  2:23 AM   Dressing Type Transparent 4/6/2018  2:23 AM   Hub Color/Line Status Blue;Patent 4/6/2018  2:23 AM   Action Taken Open ports on tubing capped 4/6/2018  2:23 AM   Alcohol Cap Used Yes 4/6/2018  2:23 AM          External Female Catheter 04/04/18 (Active)   Site Assessment Clean;Clean, dry, & intact 4/5/2018  7:48 PM   Repositioned Yes 4/5/2018  7:48 PM   Perineal Care Yes 4/5/2018  7:48 PM   Wick Changed No 4/5/2018  7:48 PM   Suction Canister/Tubing Changed No 4/5/2018  7:48 PM   Urine Output (mL) 300 ml 4/5/2018  7:48 PM                Urinary Catheter [REMOVED] Urinary Catheter 03/28/18 Tellez-Indications for Use: Urinary retention/bladder outlet obstruction    Intake & Output   Date 04/05/18 0700 - 04/06/18 0659 04/06/18 0700 - 04/07/18 0659   Shift [id number] 1900-0659 24 Hour Total 0700-1859 1900-0659 24 Hour Total   I  N  T  A  K  E   P.O. 400  400         P. O. 400  400       I.V.  (mL/kg/hr) 250  (0.5)  250  (0.2)         Volume (vancomycin (VANCOCIN) 750 mg in 0.9% sodium chloride (MBP/ADV) 250 mL) 250  250 Shift Total  (mL/kg) 650  (15.3)  650  (15.4)      O  U  T  P  U  T   Urine  (mL/kg/hr) 300  (0.6) 300  (0.6) 600  (0.6)         Urine Voided 300  300         Urine Output (mL) (External Female Catheter 04/04/18)  300 300       Shift Total  (mL/kg) 300  (7.1) 300  (7.1) 600  (14.2)       -300 50      Weight (kg) 42.4 42.3 42.3 42.3 42.3 42.3         Readmission Risk Assessment Tool Score Medium Risk            20       Total Score        2 . Living with Significant Other. Assisted Living. LTAC. SNF. or   Rehab    3 Patient Length of Stay (>5 days = 3)    5 Pt.  Coverage (Medicare=5 , Medicaid, or Self-Pay=4)    10 Charlson Comorbidity Score (Age + Comorbid Conditions)        Criteria that do not apply:    Has Seen PCP in Last 6 Months (Yes=3, No=0)    IP Visits Last 12 Months (1-3=4, 4=9, >4=11)       Expected Length of Stay 4d 21h   Actual Length of Stay 8

## 2018-04-06 NOTE — PROGRESS NOTES
Pharmacy Automatic Renal Dosing Protocol - Antimicrobials    Indication for Antimicrobials: PNA (patient admitted from [facility])    Current Regimen of Each Antimicrobial:  Vancomycin IV Start Date 3/28/18; Day # 10  Levofloxacin 500 mg IV q 48 hours (Start Date: 3/27; Day #11    Previous Abx:  Pip-tazo 4.5 g IV q 12 hours (Start Date: 3/27; Day #9    Goal Level: VANCOMYCIN TROUGH GOAL RANGE    Vancomycin Trough: 15 - 20 mcg/mL    Measured / Extrapolated Vancomycin Level:   3/31: 12.3 mcg/ml / 11.74 mcg/ml  / 750 mg Q24H  17.2 / 15.4    Significant Cultures:    3/27: blood: no growth  -Final    Radiology / Imaging results: (X-ray, CT scan or MRI):    3/27 (XR Chest Port): Left perihilar and right lower lobe infiltrates. Decrease in the size of the left lower lobe lesion.  3/28 (XR Chest Port): Interstitial edema, cardiomegaly. Possible superimposed infiltrate left perihilar. Paralysis, amputations, malnutrition: Actual BW<IBW    Labs:  Recent Labs      18   0247  18   0200  18   0449   CREA  1.70*  1.90*  1.77*   BUN  69*  66*  68*   WBC   --   13.7*   --      Temp (24hrs), Av.4 °F (36.9 °C), Min:97.5 °F (36.4 °C), Max:99.3 °F (37.4 °C)    Creatinine Clearance (mL/min) or Dialysis: ~18 ml/min    Impression/Plan:   · Scr stable since last vanco trough on   · BMP in am  · Continue same vanco and levaquin dose  · No stop date yet - Please consider placing stop date on antibiotics     Pharmacy will follow daily and adjust medications as appropriate for renal function and/or serum levels.     Thank you,  Iam Casas, PHARMD

## 2018-04-07 LAB
ANION GAP SERPL CALC-SCNC: 12 MMOL/L (ref 5–15)
BASOPHILS # BLD: 0 K/UL (ref 0–0.1)
BASOPHILS NFR BLD: 0 % (ref 0–1)
BUN SERPL-MCNC: 66 MG/DL (ref 6–20)
BUN/CREAT SERPL: 41 (ref 12–20)
CALCIUM SERPL-MCNC: 8.8 MG/DL (ref 8.5–10.1)
CHLORIDE SERPL-SCNC: 106 MMOL/L (ref 97–108)
CO2 SERPL-SCNC: 24 MMOL/L (ref 21–32)
CREAT SERPL-MCNC: 1.6 MG/DL (ref 0.55–1.02)
DIFFERENTIAL METHOD BLD: ABNORMAL
EOSINOPHIL # BLD: 0 K/UL (ref 0–0.4)
EOSINOPHIL NFR BLD: 0 % (ref 0–7)
ERYTHROCYTE [DISTWIDTH] IN BLOOD BY AUTOMATED COUNT: 17.5 % (ref 11.5–14.5)
GLUCOSE SERPL-MCNC: 158 MG/DL (ref 65–100)
HCT VFR BLD AUTO: 30.3 % (ref 35–47)
HGB BLD-MCNC: 10.3 G/DL (ref 11.5–16)
IMM GRANULOCYTES # BLD: 0 K/UL
IMM GRANULOCYTES NFR BLD AUTO: 0 %
LYMPHOCYTES # BLD: 0.2 K/UL (ref 0.8–3.5)
LYMPHOCYTES NFR BLD: 1 % (ref 12–49)
MCH RBC QN AUTO: 30.4 PG (ref 26–34)
MCHC RBC AUTO-ENTMCNC: 34 G/DL (ref 30–36.5)
MCV RBC AUTO: 89.4 FL (ref 80–99)
MONOCYTES # BLD: 0.6 K/UL (ref 0–1)
MONOCYTES NFR BLD: 3 % (ref 5–13)
NEUTS BAND NFR BLD MANUAL: 4 % (ref 0–6)
NEUTS SEG # BLD: 19.7 K/UL (ref 1.8–8)
NEUTS SEG NFR BLD: 92 % (ref 32–75)
NRBC # BLD: 0.17 K/UL (ref 0–0.01)
NRBC BLD-RTO: 0.8 PER 100 WBC
PLATELET # BLD AUTO: 184 K/UL (ref 150–400)
PMV BLD AUTO: 12.2 FL (ref 8.9–12.9)
POTASSIUM SERPL-SCNC: 3.7 MMOL/L (ref 3.5–5.1)
RBC # BLD AUTO: 3.39 M/UL (ref 3.8–5.2)
RBC MORPH BLD: ABNORMAL
SODIUM SERPL-SCNC: 142 MMOL/L (ref 136–145)
WBC # BLD AUTO: 20.5 K/UL (ref 3.6–11)

## 2018-04-07 PROCEDURE — 74011250637 HC RX REV CODE- 250/637: Performed by: INTERNAL MEDICINE

## 2018-04-07 PROCEDURE — 65660000000 HC RM CCU STEPDOWN

## 2018-04-07 PROCEDURE — 80048 BASIC METABOLIC PNL TOTAL CA: CPT | Performed by: GENERAL ACUTE CARE HOSPITAL

## 2018-04-07 PROCEDURE — 74011250636 HC RX REV CODE- 250/636: Performed by: GENERAL ACUTE CARE HOSPITAL

## 2018-04-07 PROCEDURE — 77010033678 HC OXYGEN DAILY

## 2018-04-07 PROCEDURE — 36415 COLL VENOUS BLD VENIPUNCTURE: CPT | Performed by: GENERAL ACUTE CARE HOSPITAL

## 2018-04-07 PROCEDURE — 74011250637 HC RX REV CODE- 250/637: Performed by: GENERAL ACUTE CARE HOSPITAL

## 2018-04-07 PROCEDURE — 74011250636 HC RX REV CODE- 250/636: Performed by: INTERNAL MEDICINE

## 2018-04-07 PROCEDURE — 85025 COMPLETE CBC W/AUTO DIFF WBC: CPT | Performed by: INTERNAL MEDICINE

## 2018-04-07 RX ORDER — POTASSIUM CHLORIDE 1.5 G/1.77G
20 POWDER, FOR SOLUTION ORAL
Status: DISCONTINUED | OUTPATIENT
Start: 2018-04-07 | End: 2018-04-08

## 2018-04-07 RX ADMIN — HYDRALAZINE HYDROCHLORIDE 100 MG: 50 TABLET ORAL at 16:46

## 2018-04-07 RX ADMIN — TEMAZEPAM 15 MG: 15 CAPSULE ORAL at 21:27

## 2018-04-07 RX ADMIN — METHYLPREDNISOLONE SODIUM SUCCINATE 20 MG: 40 INJECTION, POWDER, FOR SOLUTION INTRAMUSCULAR; INTRAVENOUS at 04:00

## 2018-04-07 RX ADMIN — MICONAZOLE NITRATE: 20 CREAM TOPICAL at 18:11

## 2018-04-07 RX ADMIN — CLOPIDOGREL BISULFATE 75 MG: 75 TABLET ORAL at 09:30

## 2018-04-07 RX ADMIN — HYDRALAZINE HYDROCHLORIDE 100 MG: 50 TABLET ORAL at 09:30

## 2018-04-07 RX ADMIN — MICONAZOLE NITRATE: 20 CREAM TOPICAL at 09:30

## 2018-04-07 RX ADMIN — NYSTATIN 500000 UNITS: 100000 SUSPENSION ORAL at 18:10

## 2018-04-07 RX ADMIN — POTASSIUM CHLORIDE 20 MEQ: 1.5 POWDER, FOR SOLUTION ORAL at 16:46

## 2018-04-07 RX ADMIN — NYSTATIN 500000 UNITS: 100000 SUSPENSION ORAL at 13:30

## 2018-04-07 RX ADMIN — POTASSIUM CHLORIDE 20 MEQ: 1.5 POWDER, FOR SOLUTION ORAL at 12:35

## 2018-04-07 RX ADMIN — METOPROLOL TARTRATE 50 MG: 50 TABLET ORAL at 09:30

## 2018-04-07 RX ADMIN — Medication 10 ML: at 04:03

## 2018-04-07 RX ADMIN — Medication 1 CAPSULE: at 09:30

## 2018-04-07 RX ADMIN — FUROSEMIDE 40 MG: 10 INJECTION, SOLUTION INTRAMUSCULAR; INTRAVENOUS at 09:30

## 2018-04-07 RX ADMIN — ASPIRIN 81 MG CHEWABLE TABLET 81 MG: 81 TABLET CHEWABLE at 18:09

## 2018-04-07 RX ADMIN — VANCOMYCIN HYDROCHLORIDE 750 MG: 750 INJECTION, POWDER, LYOPHILIZED, FOR SOLUTION INTRAVENOUS at 16:44

## 2018-04-07 RX ADMIN — HYDRALAZINE HYDROCHLORIDE 100 MG: 50 TABLET ORAL at 21:28

## 2018-04-07 RX ADMIN — DIPHENOXYLATE HYDROCHLORIDE AND ATROPINE SULFATE 1 TABLET: 2.5; .025 TABLET ORAL at 14:01

## 2018-04-07 RX ADMIN — PANTOPRAZOLE SODIUM 40 MG: 40 TABLET, DELAYED RELEASE ORAL at 18:09

## 2018-04-07 RX ADMIN — DILTIAZEM HYDROCHLORIDE 180 MG: 180 CAPSULE, COATED, EXTENDED RELEASE ORAL at 09:30

## 2018-04-07 RX ADMIN — HEPARIN SODIUM 5000 UNITS: 5000 INJECTION, SOLUTION INTRAVENOUS; SUBCUTANEOUS at 09:48

## 2018-04-07 RX ADMIN — HYDROCHLOROTHIAZIDE 25 MG: 25 TABLET ORAL at 18:09

## 2018-04-07 RX ADMIN — Medication 10 ML: at 14:03

## 2018-04-07 RX ADMIN — PANTOPRAZOLE SODIUM 40 MG: 40 TABLET, DELAYED RELEASE ORAL at 09:30

## 2018-04-07 RX ADMIN — NYSTATIN 500000 UNITS: 100000 SUSPENSION ORAL at 09:30

## 2018-04-07 RX ADMIN — Medication 10 ML: at 21:28

## 2018-04-07 RX ADMIN — METHYLPREDNISOLONE SODIUM SUCCINATE 20 MG: 40 INJECTION, POWDER, FOR SOLUTION INTRAMUSCULAR; INTRAVENOUS at 21:28

## 2018-04-07 RX ADMIN — HEPARIN SODIUM 5000 UNITS: 5000 INJECTION, SOLUTION INTRAVENOUS; SUBCUTANEOUS at 18:09

## 2018-04-07 RX ADMIN — METHYLPREDNISOLONE SODIUM SUCCINATE 20 MG: 40 INJECTION, POWDER, FOR SOLUTION INTRAMUSCULAR; INTRAVENOUS at 14:02

## 2018-04-07 RX ADMIN — ESCITALOPRAM OXALATE 10 MG: 10 TABLET ORAL at 09:30

## 2018-04-07 RX ADMIN — METOPROLOL TARTRATE 50 MG: 50 TABLET ORAL at 18:09

## 2018-04-07 RX ADMIN — NYSTATIN 500000 UNITS: 100000 SUSPENSION ORAL at 21:28

## 2018-04-07 RX ADMIN — HEPARIN SODIUM 5000 UNITS: 5000 INJECTION, SOLUTION INTRAVENOUS; SUBCUTANEOUS at 03:00

## 2018-04-07 RX ADMIN — POTASSIUM CHLORIDE 20 MEQ: 1.5 POWDER, FOR SOLUTION ORAL at 09:30

## 2018-04-07 NOTE — PROGRESS NOTES
PCU SHIFT NURSING NOTE      Bedside and Verbal shift change report given to Renata Wilkins RN (oncoming nurse) by Joby Graves RN (offgoing nurse). Report included the following information SBAR, Kardex, ED Summary, Procedure Summary, Intake/Output, MAR, Recent Results, Med Rec Status, Cardiac Rhythm NSR and Alarm Parameters . Shift Summary:         Admission Date 3/27/2018   Admission Diagnosis Respiratory failure with hypoxia ([address]   Consults None        Consults   [x]PT   [x]OT   []Speech   [x]Case Management      [x] Palliative      Cardiac Monitoring Order   [x]Yes   []No     IV drips   []Yes    Drip:                            Dose:  Drip:                            Dose:  Drip:                            Dose:   [x]No     GI Prophylaxis   [x]Yes   []No         DVT Prophylaxis   SCDs:  Sequential Compression Device: Bilateral     Patient Refused VTE Prophylaxis: Yes    Kunal stockings:         [x] Medication   []Contraindicated   []None      Activity Level Activity Level: Up with Assistance     Activity Assistance: Complete care   Purposeful Rounding every 1-2 hour? [x]Yes   Campo Score  Total Score: 4   Bed Alarm (If score 3 or >)   [x]Yes   [] Refused (See signed refusal form in chart)   Rashid Score  Rashid Score: 12   Rashid Score (if score 14 or less)   [x]PMT consult   [x]Wound Care consult      []Specialty bed   [x] Nutrition consult          Needs prior to discharge:   Home O2 required:    []Yes   [x]No    If yes, how much O2 required?     Other:    Last Bowel Movement: Last Bowel Movement Date: 04/06/18      Influenza Vaccine Received Flu Vaccine for Current Season (usually Sept-March): Yes        Pneumonia Vaccine           Diet Active Orders   Diet    DIET DENTAL SOFT (SOFT SOLID)      LDAs               Peripheral IV 04/07/18 Left Wrist (Active)   Site Assessment Clean, dry, & intact 4/7/2018 11:44 AM   Phlebitis Assessment 0 4/7/2018 11:44 AM   Infiltration Assessment 0 4/7/2018 11:44 AM Dressing Status Clean, dry, & intact 4/7/2018 11:44 AM   Dressing Type Tape;Transparent 4/7/2018 11:44 AM   Hub Color/Line Status Pink; Infusing;Flushed 4/7/2018 11:44 AM   Action Taken Open ports on tubing capped 4/7/2018 11:44 AM   Alcohol Cap Used Yes 4/7/2018 11:44 AM          External Female Catheter 04/04/18 (Active)   Site Assessment Clean, dry, & intact 4/7/2018 11:44 AM   Repositioned No 4/7/2018 11:44 AM   Perineal Care No 4/7/2018 11:44 AM   Wick Changed No 4/7/2018 11:44 AM   Suction Canister/Tubing Changed No 4/7/2018 11:44 AM   Urine Output (mL) 225 ml 4/7/2018 11:44 AM                Urinary Catheter [REMOVED] Urinary Catheter 03/28/18 Tellez-Indications for Use: Urinary retention/bladder outlet obstruction    Intake & Output   Date 04/06/18 0700 - 04/07/18 0659 04/07/18 0700 - 04/08/18 0659   Shift 6788-1478 8757-4312 24 Hour Total 6677-2972 6755-2283 24 Hour Total   I  N  T  A  K  E   P.O. 425  425         P. O. 425  425       I.V.  (mL/kg/hr) 250  (0.5) 673.5  (1.3) 923.5  (0.9) 0  0      Cardizem Volume  573.5 573.5 0  0      Volume (vancomycin (VANCOCIN) 750 mg in 0.9% sodium chloride (MBP/ADV) 250 mL) 250  250 0  0      Volume (levoFLOXacin (LEVAQUIN) 500 mg in D5W IVPB)  100 100 0  0    Shift Total  (mL/kg) 675  (15.9) 673.5  (16.1) 1348.5  (32.3) 0  (0)  0  (0)   O  U  T  P  U  T   Urine  (mL/kg/hr) 950  (1.9) 300  (0.6) 1250  (1.2) 450  450      Urine Voided 350 300 650         Urine Occurrence(s) 1 x  1 x         Urine Output (mL) (External Female Catheter 04/04/18) 600  600 450  450    Stool            Stool Occurrence(s) 1 x  1 x       Shift Total  (mL/kg) 950  (22.4) 300  (7.2) 1250  (29.9) 450  (10.8)  450  (10.8)   NET -275 373.5 98.5 -450  -450   Weight (kg) 42.3 41.8 41.8 41.8 41.8 41.8         Readmission Risk Assessment Tool Score Medium Risk            20       Total Score        2 . Living with Significant Other. Assisted Living. LTAC. SNF.  or   Rehab    3 Patient Length of Stay (>5 days = 3)    5 Pt.  Coverage (Medicare=5 , Medicaid, or Self-Pay=4)    10 Charlson Comorbidity Score (Age + Comorbid Conditions)        Criteria that do not apply:    Has Seen PCP in Last 6 Months (Yes=3, No=0)    IP Visits Last 12 Months (1-3=4, 4=9, >4=11)       Expected Length of Stay 4d 21h   Actual Length of Stay 9

## 2018-04-07 NOTE — PROGRESS NOTES
Problem: Falls - Risk of  Goal: *Absence of Falls  Document [place] Fall Risk and appropriate interventions in the flowsheet.    Outcome: Progressing Towards Goal  Fall Risk Interventions:  Mobility Interventions: Bed/chair exit alarm, Patient to call before getting OOB, PT Consult for assist device competence, Utilize walker, cane, or other assitive device, PT Consult for mobility concerns, Strengthening exercises (ROM-active/passive), Utilize gait belt for transfers/ambulation, OT consult for ADLs, Communicate number of staff needed for ambulation/transfer, Assess mobility with egress test    Mentation Interventions: Adequate sleep, hydration, pain control, Bed/chair exit alarm, Door open when patient unattended, Familiar objects from home, Reorient patient, Toileting rounds, More frequent rounding, Eyeglasses and hearing aids, Gait belt with transfers/ambulation, Update white board, Room close to nurse's station, Increase mobility, Family/sitter at bedside, Evaluate medications/consider consulting pharmacy    Medication Interventions: Bed/chair exit alarm, Patient to call before getting OOB, Teach patient to arise slowly, Evaluate medications/consider consulting pharmacy    Elimination Interventions: Bed/chair exit alarm, Call light in reach, Elevated toilet seat, Toileting schedule/hourly rounds, Toilet paper/wipes in reach, Patient to call for help with toileting needs

## 2018-04-07 NOTE — PROGRESS NOTES
PROGRESS NOTE    NAME:  Carolyn Hancock   :   1935   MRN:   [id number]     Date/Time:  2018 9:27 AM  Subjective:   History:  Poorly communicative but denies C/P or increased SOB. Being treated for pneumonia and respiratory failure. WBC significantly elevated this am possibly related to steroids? No fever. Renal function stable.   O2 sat stable on 4 lpm    Medications reviewed:  Current Facility-Administered Medications   Medication Dose Route Frequency    lactobac ac& pc-s.therm-b.anim (HANG Q/RISAQUAD)  1 Cap Oral DAILY    potassium chloride (KLOR-CON) packet 20 mEq  20 mEq Oral DAILY    methylPREDNISolone (PF) (SOLU-MEDROL) injection 20 mg  20 mg IntraVENous Q8H    miconazole (SECURA) 2 % extra thick cream   Topical BID    nystatin (MYCOSTATIN) 100,000 unit/mL oral suspension 500,000 Units  500,000 Units Oral QID    metoprolol tartrate (LOPRESSOR) tablet 50 mg  50 mg Oral BID    furosemide (LASIX) injection 40 mg  40 mg IntraVENous DAILY    acetaminophen (TYLENOL) tablet 650 mg  650 mg Oral Q6H PRN    dilTIAZem CD (CARDIZEM CD) capsule 180 mg  180 mg Oral DAILY    levoFLOXacin (LEVAQUIN) 500 mg in D5W IVPB  500 mg IntraVENous Q48H    potassium chloride (KLOR-CON) packet 20 mEq  20 mEq Oral BID WITH MEALS    vancomycin (VANCOCIN) 750 mg in 0.9% sodium chloride (MBP/ADV) 250 mL  750 mg IntraVENous Q24H    diphenoxylate-atropine (LOMOTIL) tablet 1 Tab  1 Tab Oral Q4H PRN    temazepam (RESTORIL) capsule 15 mg  15 mg Oral QHS    aspirin chewable tablet 81 mg  81 mg Oral QPM    clopidogrel (PLAVIX) tablet 75 mg  75 mg Oral DAILY    hydrALAZINE (APRESOLINE) tablet 100 mg  100 mg Oral TID    escitalopram oxalate (LEXAPRO) tablet 10 mg  10 mg Oral DAILY    pantoprazole (PROTONIX) tablet 40 mg  40 mg Oral BID    hydroCHLOROthiazide (HYDRODIURIL) tablet 25 mg  25 mg Oral QPM    sodium chloride (NS) flush 5-10 mL  5-10 mL IntraVENous Q8H    sodium chloride (NS) flush 5-10 mL  5-10 mL IntraVENous PRN    heparin (porcine) injection 5,000 Units  5,000 Units SubCUTAneous Q8H        Objective:   Vitals:  Visit Vitals    /88 (BP 1 Location: Left arm, BP Patient Position: At rest)    Pulse 84    Temp 98.4 °F (36.9 °C)    Resp 20    Ht 5' 1.5\" (1.562 m)    Wt 92 lb 1.6 oz (41.8 kg)    SpO2 97%    Breastfeeding No    BMI 17.12 kg/m2    O2 Flow Rate (L/min): 4 l/min O2 Device: Nasal cannula Temp (24hrs), Av.7 °F (37.1 °C), Min:98.4 °F (36.9 °C), Max:99.3 °F (37.4 °C)      Last 24hr Input/Output:    Intake/Output Summary (Last 24 hours) at 1859  Last data filed at 18 0418   Gross per 24 hour   Intake              675 ml   Output             1250 ml   Net             -575 ml        PHYSICAL EXAM:  General:     Alert, cooperative, moderate respiratory distress, appears stated age. Head:    Normocephalic, without obvious abnormality, atraumatic. Eyes:    Conjunctivae/corneas clear. PERRLA  Nose:   Nares normal. No drainage or sinus tenderness. BIPAP in place  Throat:     Lips, mucosa, and tongue normal.  No Thrush  Neck:   Supple, symmetrical,  no adenopathy, thyroid: non tender     no carotid bruit and no JVD. Back:     Symmetric,  No CVA tenderness. Lungs:    Clear to auscultation bilaterally with overall decreased BS. No Wheezing or Rhonchi. No rales. Heart:    Regular rate and rhythm,  no murmur, rub or gallop. Abdomen:    Soft, non tender. Not distended. Bowel sounds normal. No masses  Extremities:  Extremities normal, atraumatic, No cyanosis. No edema. + clubbing  Lymph nodes:  Cervical, supraclavicular normal.  Neurologic:  General decreased strength, Alert and oriented X 3.    Skin:                 No rash    Telemetry: NSR with PVCs    Lab Data Reviewed:    Recent Results (from the past 24 hour(s))   METABOLIC PANEL, BASIC    Collection Time: 18  4:00 AM   Result Value Ref Range    Sodium 142 136 - 145 mmol/L    Potassium 3.7 3.5 - 5.1 mmol/L Chloride 106 97 - 108 mmol/L    CO2 24 21 - 32 mmol/L    Anion gap 12 5 - 15 mmol/L    Glucose 158 (H) 65 - 100 mg/dL    BUN 66 (H) 6 - 20 MG/DL    Creatinine 1.60 (H) 0.55 - 1.02 MG/DL    BUN/Creatinine ratio 41 (H) 12 - 20      GFR est AA 37 (L) >60 ml/min/1.73m2    GFR est non-AA 31 (L) >60 ml/min/1.73m2    Calcium 8.8 8.5 - 10.1 MG/DL   CBC WITH AUTOMATED DIFF    Collection Time: 04/07/18  4:00 AM   Result Value Ref Range    WBC 20.5 (H) 3.6 - 11.0 K/uL    RBC 3.39 (L) 3.80 - 5.20 M/uL    HGB 10.3 (L) 11.5 - 16.0 g/dL    HCT 30.3 (L) 35.0 - 47.0 %    MCV 89.4 80.0 - 99.0 FL    MCH 30.4 26.0 - 34.0 PG    MCHC 34.0 30.0 - 36.5 g/dL    RDW 17.5 (H) 11.5 - 14.5 %    PLATELET 938 797 - 804 K/uL    MPV 12.2 8.9 - 12.9 FL    NRBC 0.8 (H) 0  WBC    ABSOLUTE NRBC 0.17 (H) 0.00 - 0.01 K/uL    NEUTROPHILS 92 (H) 32 - 75 %    BAND NEUTROPHILS 4 0 - 6 %    LYMPHOCYTES 1 (L) 12 - 49 %    MONOCYTES 3 (L) 5 - 13 %    EOSINOPHILS 0 0 - 7 %    BASOPHILS 0 0 - 1 %    IMMATURE GRANULOCYTES 0 %    ABS. NEUTROPHILS 19.7 (H) 1.8 - 8.0 K/UL    ABS. LYMPHOCYTES 0.2 (L) 0.8 - 3.5 K/UL    ABS. MONOCYTES 0.6 0.0 - 1.0 K/UL    ABS. EOSINOPHILS 0.0 0.0 - 0.4 K/UL    ABS. BASOPHILS 0.0 0.0 - 0.1 K/UL    ABS. IMM.  GRANS. 0.0 K/UL    DF MANUAL      RBC COMMENTS ANISOCYTOSIS  1+             Assessment/Plan:     Principal Problem:    Acute hypoxemic respiratory failure ([facility] (3/27/2018)    Active Problems:    COPD exacerbation ([address] (8/14/2010)      Hypertension (7/20/2016)      Peripheral vascular disease ([address] (7/20/2016)      Sepsis ([address] (3/28/2018)      Pneumonia (3/28/2018)      Acute renal failure (ARF) ([address] (3/28/2018)      Primary lung large cell carcinoma, left ([address] (3/29/2018)      Overview: Being candidate for surgical resection so treated with radiation therapy       in 2016      PAF (paroxysmal atrial fibrillation) ([address] (3/30/2018)      Moderate protein-calorie malnutrition ([address] (3/30/2018)      Decubitus ulcer of sacral region, stage 2 (3/30/2018)       ___________________________________________________  PLAN:    1. Levaquin and Vancomycin for Pneumonia   2. Supplemental Oxygen for Hypoxemia   3. Not on nebs and don't see need currently  4. Steroids continued  5. D/Micah IV Hydration  6. Decreased IV Lasix  7. Repleting K with increased PO   8. With anemia follow Hgb. Recheck WBC  9. Cont current Rx HTN   10. Cont ASA and Plavix with ASVD  11. Repeat CXR   12. Encouraged PO intake  13. Protonix po  14.  Added Probiotic      ___________________________________________________    Attending Physician: Martin Estrada MD

## 2018-04-08 ENCOUNTER — APPOINTMENT (OUTPATIENT)
Dept: GENERAL RADIOLOGY | Age: 83
DRG: 871 | End: 2018-04-08
Attending: INTERNAL MEDICINE
Payer: MEDICARE

## 2018-04-08 PROBLEM — D72.829 LEUKOCYTOSIS: Status: ACTIVE | Noted: 2018-04-08

## 2018-04-08 LAB
ANION GAP SERPL CALC-SCNC: 9 MMOL/L (ref 5–15)
BUN SERPL-MCNC: 66 MG/DL (ref 6–20)
BUN/CREAT SERPL: 42 (ref 12–20)
CALCIUM SERPL-MCNC: 8.9 MG/DL (ref 8.5–10.1)
CHLORIDE SERPL-SCNC: 107 MMOL/L (ref 97–108)
CO2 SERPL-SCNC: 27 MMOL/L (ref 21–32)
CREAT SERPL-MCNC: 1.57 MG/DL (ref 0.55–1.02)
ERYTHROCYTE [DISTWIDTH] IN BLOOD BY AUTOMATED COUNT: 17.8 % (ref 11.5–14.5)
GLUCOSE SERPL-MCNC: 123 MG/DL (ref 65–100)
HCT VFR BLD AUTO: 30.2 % (ref 35–47)
HGB BLD-MCNC: 10.5 G/DL (ref 11.5–16)
MCH RBC QN AUTO: 30.7 PG (ref 26–34)
MCHC RBC AUTO-ENTMCNC: 34.8 G/DL (ref 30–36.5)
MCV RBC AUTO: 88.3 FL (ref 80–99)
NRBC # BLD: 0.08 K/UL (ref 0–0.01)
NRBC BLD-RTO: 0.4 PER 100 WBC
PLATELET # BLD AUTO: 181 K/UL (ref 150–400)
PMV BLD AUTO: 12.7 FL (ref 8.9–12.9)
POTASSIUM SERPL-SCNC: 4.9 MMOL/L (ref 3.5–5.1)
RBC # BLD AUTO: 3.42 M/UL (ref 3.8–5.2)
SODIUM SERPL-SCNC: 143 MMOL/L (ref 136–145)
WBC # BLD AUTO: 21.6 K/UL (ref 3.6–11)

## 2018-04-08 PROCEDURE — 74011250637 HC RX REV CODE- 250/637: Performed by: INTERNAL MEDICINE

## 2018-04-08 PROCEDURE — 36415 COLL VENOUS BLD VENIPUNCTURE: CPT | Performed by: GENERAL ACUTE CARE HOSPITAL

## 2018-04-08 PROCEDURE — 74011250636 HC RX REV CODE- 250/636: Performed by: INTERNAL MEDICINE

## 2018-04-08 PROCEDURE — 80048 BASIC METABOLIC PNL TOTAL CA: CPT | Performed by: GENERAL ACUTE CARE HOSPITAL

## 2018-04-08 PROCEDURE — 85027 COMPLETE CBC AUTOMATED: CPT | Performed by: GENERAL ACUTE CARE HOSPITAL

## 2018-04-08 PROCEDURE — 74011250636 HC RX REV CODE- 250/636: Performed by: GENERAL ACUTE CARE HOSPITAL

## 2018-04-08 PROCEDURE — 74011250637 HC RX REV CODE- 250/637: Performed by: GENERAL ACUTE CARE HOSPITAL

## 2018-04-08 PROCEDURE — 71046 X-RAY EXAM CHEST 2 VIEWS: CPT

## 2018-04-08 PROCEDURE — 77010033678 HC OXYGEN DAILY

## 2018-04-08 PROCEDURE — 65660000000 HC RM CCU STEPDOWN

## 2018-04-08 RX ORDER — POTASSIUM CHLORIDE 1.5 G/1.77G
20 POWDER, FOR SOLUTION ORAL DAILY
Status: DISCONTINUED | OUTPATIENT
Start: 2018-04-08 | End: 2018-04-12 | Stop reason: HOSPADM

## 2018-04-08 RX ADMIN — METHYLPREDNISOLONE SODIUM SUCCINATE 20 MG: 40 INJECTION, POWDER, FOR SOLUTION INTRAMUSCULAR; INTRAVENOUS at 06:04

## 2018-04-08 RX ADMIN — Medication 10 ML: at 09:44

## 2018-04-08 RX ADMIN — NYSTATIN 500000 UNITS: 100000 SUSPENSION ORAL at 09:45

## 2018-04-08 RX ADMIN — METOPROLOL TARTRATE 50 MG: 50 TABLET ORAL at 09:45

## 2018-04-08 RX ADMIN — HEPARIN SODIUM 5000 UNITS: 5000 INJECTION, SOLUTION INTRAVENOUS; SUBCUTANEOUS at 18:04

## 2018-04-08 RX ADMIN — HEPARIN SODIUM 5000 UNITS: 5000 INJECTION, SOLUTION INTRAVENOUS; SUBCUTANEOUS at 09:42

## 2018-04-08 RX ADMIN — MICONAZOLE NITRATE: 20 CREAM TOPICAL at 18:04

## 2018-04-08 RX ADMIN — DILTIAZEM HYDROCHLORIDE 180 MG: 180 CAPSULE, COATED, EXTENDED RELEASE ORAL at 09:45

## 2018-04-08 RX ADMIN — MICONAZOLE NITRATE: 20 CREAM TOPICAL at 09:46

## 2018-04-08 RX ADMIN — HEPARIN SODIUM 5000 UNITS: 5000 INJECTION, SOLUTION INTRAVENOUS; SUBCUTANEOUS at 02:28

## 2018-04-08 RX ADMIN — VANCOMYCIN HYDROCHLORIDE 750 MG: 750 INJECTION, POWDER, LYOPHILIZED, FOR SOLUTION INTRAVENOUS at 16:47

## 2018-04-08 RX ADMIN — Medication 1 CAPSULE: at 09:45

## 2018-04-08 RX ADMIN — NYSTATIN 500000 UNITS: 100000 SUSPENSION ORAL at 20:40

## 2018-04-08 RX ADMIN — FUROSEMIDE 40 MG: 10 INJECTION, SOLUTION INTRAMUSCULAR; INTRAVENOUS at 09:43

## 2018-04-08 RX ADMIN — LEVOFLOXACIN 500 MG: 5 INJECTION, SOLUTION INTRAVENOUS at 20:39

## 2018-04-08 RX ADMIN — TEMAZEPAM 15 MG: 15 CAPSULE ORAL at 20:40

## 2018-04-08 RX ADMIN — ESCITALOPRAM OXALATE 10 MG: 10 TABLET ORAL at 09:45

## 2018-04-08 RX ADMIN — POTASSIUM CHLORIDE 20 MEQ: 1.5 POWDER, FOR SOLUTION ORAL at 09:44

## 2018-04-08 RX ADMIN — Medication 10 ML: at 20:41

## 2018-04-08 RX ADMIN — METOPROLOL TARTRATE 50 MG: 50 TABLET ORAL at 18:04

## 2018-04-08 RX ADMIN — ASPIRIN 81 MG CHEWABLE TABLET 81 MG: 81 TABLET CHEWABLE at 18:04

## 2018-04-08 RX ADMIN — Medication 10 ML: at 13:20

## 2018-04-08 RX ADMIN — PANTOPRAZOLE SODIUM 40 MG: 40 TABLET, DELAYED RELEASE ORAL at 18:04

## 2018-04-08 RX ADMIN — METHYLPREDNISOLONE SODIUM SUCCINATE 20 MG: 40 INJECTION, POWDER, FOR SOLUTION INTRAMUSCULAR; INTRAVENOUS at 20:40

## 2018-04-08 RX ADMIN — HYDRALAZINE HYDROCHLORIDE 100 MG: 50 TABLET ORAL at 09:45

## 2018-04-08 RX ADMIN — HYDROCHLOROTHIAZIDE 25 MG: 25 TABLET ORAL at 18:04

## 2018-04-08 RX ADMIN — PANTOPRAZOLE SODIUM 40 MG: 40 TABLET, DELAYED RELEASE ORAL at 09:44

## 2018-04-08 RX ADMIN — NYSTATIN 500000 UNITS: 100000 SUSPENSION ORAL at 13:20

## 2018-04-08 RX ADMIN — HYDRALAZINE HYDROCHLORIDE 100 MG: 50 TABLET ORAL at 20:39

## 2018-04-08 RX ADMIN — NYSTATIN 500000 UNITS: 100000 SUSPENSION ORAL at 18:04

## 2018-04-08 RX ADMIN — HYDRALAZINE HYDROCHLORIDE 100 MG: 50 TABLET ORAL at 16:49

## 2018-04-08 RX ADMIN — CLOPIDOGREL BISULFATE 75 MG: 75 TABLET ORAL at 09:45

## 2018-04-08 RX ADMIN — Medication 10 ML: at 06:04

## 2018-04-08 NOTE — PROGRESS NOTES
Problem: Falls - Risk of  Goal: *Absence of Falls  Document [place] Fall Risk and appropriate interventions in the flowsheet.    Outcome: Progressing Towards Goal  Fall Risk Interventions:  Mobility Interventions: Assess mobility with egress test, Bed/chair exit alarm, Communicate number of staff needed for ambulation/transfer, PT Consult for assist device competence, PT Consult for mobility concerns, Utilize gait belt for transfers/ambulation, Utilize walker, cane, or other assitive device, Patient to call before getting OOB, Strengthening exercises (ROM-active/passive), OT consult for ADLs    Mentation Interventions: Adequate sleep, hydration, pain control, Bed/chair exit alarm, Door open when patient unattended, Evaluate medications/consider consulting pharmacy, Eyeglasses and hearing aids, Increase mobility, Toileting rounds, HELP ([address]) if available, Room close to nurse's station, Gait belt with transfers/ambulation, Reorient patient, Update white board, More frequent rounding, Familiar objects from home    Medication Interventions: Bed/chair exit alarm, Evaluate medications/consider consulting pharmacy, Patient to call before getting OOB, Teach patient to arise slowly    Elimination Interventions: Bed/chair exit alarm, Call light in reach, Elevated toilet seat, Patient to call for help with toileting needs, Toilet paper/wipes in reach, Toileting schedule/hourly rounds

## 2018-04-08 NOTE — PROGRESS NOTES
PROGRESS NOTE    NAME:  Reuben Abrams   :   1935   MRN:   [id number]     Date/Time:  2018 9:47 AM  Subjective:   History:  Poorly communicative but denies C/P or increased SOB. Being treated for pneumonia and respiratory failure. WBC significantly elevated this am possibly related to steroids? No fever. Renal function stable. O2 sat stable on 4 lpm.  K= up to 4.9. CXR unchanged. Poor po intake.     Medications reviewed:  Current Facility-Administered Medications   Medication Dose Route Frequency    potassium chloride (KLOR-CON) packet 20 mEq  20 mEq Oral DAILY    methylPREDNISolone (PF) (SOLU-MEDROL) injection 20 mg  20 mg IntraVENous Q12H    lactobac ac& pc-s.therm-b.anim (HANG Q/RISAQUAD)  1 Cap Oral DAILY    miconazole (SECURA) 2 % extra thick cream   Topical BID    nystatin (MYCOSTATIN) 100,000 unit/mL oral suspension 500,000 Units  500,000 Units Oral QID    metoprolol tartrate (LOPRESSOR) tablet 50 mg  50 mg Oral BID    furosemide (LASIX) injection 40 mg  40 mg IntraVENous DAILY    acetaminophen (TYLENOL) tablet 650 mg  650 mg Oral Q6H PRN    dilTIAZem CD (CARDIZEM CD) capsule 180 mg  180 mg Oral DAILY    levoFLOXacin (LEVAQUIN) 500 mg in D5W IVPB  500 mg IntraVENous Q48H    vancomycin (VANCOCIN) 750 mg in 0.9% sodium chloride (MBP/ADV) 250 mL  750 mg IntraVENous Q24H    diphenoxylate-atropine (LOMOTIL) tablet 1 Tab  1 Tab Oral Q4H PRN    temazepam (RESTORIL) capsule 15 mg  15 mg Oral QHS    aspirin chewable tablet 81 mg  81 mg Oral QPM    clopidogrel (PLAVIX) tablet 75 mg  75 mg Oral DAILY    hydrALAZINE (APRESOLINE) tablet 100 mg  100 mg Oral TID    escitalopram oxalate (LEXAPRO) tablet 10 mg  10 mg Oral DAILY    pantoprazole (PROTONIX) tablet 40 mg  40 mg Oral BID    hydroCHLOROthiazide (HYDRODIURIL) tablet 25 mg  25 mg Oral QPM    sodium chloride (NS) flush 5-10 mL  5-10 mL IntraVENous Q8H    sodium chloride (NS) flush 5-10 mL  5-10 mL IntraVENous PRN    heparin (porcine) injection 5,000 Units  5,000 Units SubCUTAneous Q8H        Objective:   Vitals:  Visit Vitals    /72 (BP 1 Location: Left arm, BP Patient Position: At rest)    Pulse 78    Temp 98.2 °F (36.8 °C)    Resp 21    Ht 5' 1.5\" (1.562 m)    Wt 93 lb 4.8 oz (42.3 kg)    SpO2 97%    Breastfeeding No    BMI 17.34 kg/m2    O2 Flow Rate (L/min): 4 l/min O2 Device: Nasal cannula Temp (24hrs), Av.2 °F (36.8 °C), Min:98.1 °F (36.7 °C), Max:98.3 °F (36.8 °C)      Last 24hr Input/Output:    Intake/Output Summary (Last 24 hours) at 18 09  Last data filed at 18 0720   Gross per 24 hour   Intake              250 ml   Output             1075 ml   Net             -825 ml        PHYSICAL EXAM:  General:     Alert, cooperative, moderate respiratory distress, appears stated age. Head:    Normocephalic, without obvious abnormality, atraumatic. Eyes:    Conjunctivae/corneas clear. PERRLA  Nose:   Nares normal. No drainage or sinus tenderness. BIPAP in place  Throat:     Lips, mucosa, and tongue normal.  No Thrush  Neck:   Supple, symmetrical,  no adenopathy, thyroid: non tender     no carotid bruit and no JVD. Back:     Symmetric,  No CVA tenderness. Lungs:    Clear to auscultation bilaterally with overall decreased BS. No Wheezing or Rhonchi. No rales. Heart:    Regular rate and rhythm,  no murmur, rub or gallop. Abdomen:    Soft, non tender. Not distended. Bowel sounds normal. No masses  Extremities:  Extremities normal, atraumatic, No cyanosis. No edema. + clubbing  Lymph nodes:  Cervical, supraclavicular normal.  Neurologic:  General decreased strength, Alert and oriented X 3.    Skin:                 No rash    Telemetry: NSR with PVCs    Lab Data Reviewed:    Recent Results (from the past 24 hour(s))   METABOLIC PANEL, BASIC    Collection Time: 18  2:32 AM   Result Value Ref Range    Sodium 143 136 - 145 mmol/L    Potassium 4.9 3.5 - 5.1 mmol/L    Chloride 107 97 - 108 mmol/L    CO2 27 21 - 32 mmol/L    Anion gap 9 5 - 15 mmol/L    Glucose 123 (H) 65 - 100 mg/dL    BUN 66 (H) 6 - 20 MG/DL    Creatinine 1.57 (H) 0.55 - 1.02 MG/DL    BUN/Creatinine ratio 42 (H) 12 - 20      GFR est AA 38 (L) >60 ml/min/1.73m2    GFR est non-AA 32 (L) >60 ml/min/1.73m2    Calcium 8.9 8.5 - 10.1 MG/DL   CBC W/O DIFF    Collection Time: 04/08/18  2:32 AM   Result Value Ref Range    WBC 21.6 (H) 3.6 - 11.0 K/uL    RBC 3.42 (L) 3.80 - 5.20 M/uL    HGB 10.5 (L) 11.5 - 16.0 g/dL    HCT 30.2 (L) 35.0 - 47.0 %    MCV 88.3 80.0 - 99.0 FL    MCH 30.7 26.0 - 34.0 PG    MCHC 34.8 30.0 - 36.5 g/dL    RDW 17.8 (H) 11.5 - 14.5 %    PLATELET 238 970 - 320 K/uL    MPV 12.7 8.9 - 12.9 FL    NRBC 0.4 (H) 0  WBC    ABSOLUTE NRBC 0.08 (H) 0.00 - 0.01 K/uL         Assessment/Plan:     Principal Problem:    Acute hypoxemic respiratory failure (HCC) (3/27/2018)    Active Problems:    COPD exacerbation (HCC) (8/14/2010)      Hypertension (7/20/2016)      Peripheral vascular disease (HCC) (7/20/2016)      Sepsis ([address] (3/28/2018)      Pneumonia (3/28/2018)      Acute renal failure (ARF) ([address] (3/28/2018)      Primary lung large cell carcinoma, left ([address] (3/29/2018)      Overview: Being candidate for surgical resection so treated with radiation therapy       in 2016      PAF (paroxysmal atrial fibrillation) ([address] (3/30/2018)      Moderate protein-calorie malnutrition ([address] (3/30/2018)      Decubitus ulcer of sacral region, stage 2 (3/30/2018)      Leukocytosis (4/8/2018)       ___________________________________________________  PLAN:    1. Levaquin and Vancomycin for Pneumonia   2. Supplemental Oxygen for Hypoxemia   3. Not on nebs and don't see need currently  4. Steroids decreased  5. D/Micah IV Hydration  6. Decreased IV Lasix  7. Decrease KVl   8. With anemia follow Hgb. Recheck WBC  9. Cont current Rx HTN   10. Cont ASA and Plavix with ASCVD  11. Repeat CXR unchanged  12. Encouraged PO intake  13. Protonix po  14.  Added Probiotic      ___________________________________________________    Attending Physician: Priya Kennedy MD

## 2018-04-08 NOTE — PROGRESS NOTES
PCU SHIFT NURSING NOTE      Bedside shift change report given to [employer] RN (oncoming nurse) by Nereyda Champion (offgoing nurse). Report included the following information SBAR, Kardex, Intake/Output and Recent Results. Shift Summary:   1930: Patient resting in bed. Son at bedside. No complaints of pain or discomfort. Call bell in reach. Will monitor   0600: Uneventful night   Bedside shift change report given to Nereyda Champion (oncoming nurse) by [employer] RN (offgoing nurse). Report included the following information SBAR, Kardex, Intake/Output and Recent Results. Admission Date 3/27/2018   Admission Diagnosis Respiratory failure with hypoxia ([facility]   Consults None        Consults   []PT   []OT   []Speech   []Case Management      [] Palliative      Cardiac Monitoring Order   []Yes   []No     IV drips   []Yes    Drip:                            Dose:  Drip:                            Dose:  Drip:                            Dose:   []No     GI Prophylaxis   []Yes   []No         DVT Prophylaxis   SCDs:  Sequential Compression Device: Bilateral     Patient Refused VTE Prophylaxis: Yes    Kunal stockings:         [] Medication   []Contraindicated   []None      Activity Level Activity Level: Up with Assistance     Activity Assistance: Complete care   Purposeful Rounding every 1-2 hour? []Yes   Campo Score  Total Score: 3   Bed Alarm (If score 3 or >)   []Yes   [] Refused (See signed refusal form in chart)   Rashid Score  Rashid Score: 12   Rashid Score (if score 14 or less)   []PMT consult   []Wound Care consult      []Specialty bed   [] Nutrition consult          Needs prior to discharge:   Home O2 required:    []Yes   []No    If yes, how much O2 required?     Other:    Last Bowel Movement: Last Bowel Movement Date: 04/06/18      Influenza Vaccine Received Flu Vaccine for Current Season (usually Sept-March): Yes        Pneumonia Vaccine           Diet Active Orders   Diet    DIET DENTAL SOFT (SOFT SOLID)      LDAs Peripheral IV 04/07/18 Left Wrist (Active)   Site Assessment Clean, dry, & intact 4/7/2018  7:12 PM   Phlebitis Assessment 0 4/7/2018  7:12 PM   Infiltration Assessment 0 4/7/2018  7:12 PM   Dressing Status Clean, dry, & intact 4/7/2018  7:12 PM   Dressing Type Tape;Transparent 4/7/2018  7:12 PM   Hub Color/Line Status Pink; Infusing 4/7/2018  7:12 PM   Action Taken Open ports on tubing capped 4/7/2018  7:12 PM   Alcohol Cap Used Yes 4/7/2018  7:12 PM          External Female Catheter 04/04/18 (Active)   Site Assessment Clean, dry, & intact 4/7/2018  7:12 PM   Repositioned Yes 4/7/2018  7:12 PM   Perineal Care Yes 4/7/2018  7:12 PM   Wick Changed Yes 4/7/2018  7:12 PM   Suction Canister/Tubing Changed No 4/7/2018  7:12 PM   Urine Output (mL) 225 ml 4/7/2018  6:10 PM                Urinary Catheter [REMOVED] Urinary Catheter 03/28/18 Tellez-Indications for Use: Urinary retention/bladder outlet obstruction    Intake & Output   Date 04/06/18 1900 - 04/07/18 0659 04/07/18 0700 - 04/08/18 0659   Shift 1783-2693 24 Hour Total 1966-9230 4220-0858 24 Hour Total   I  N  T  A  K  E   P.O.  425         P. O.  425       I.V.  (mL/kg/hr) 673.5 923.5 250  (0.5)  250      Cardizem Volume 573.5 573.5 0  0      Volume (vancomycin (VANCOCIN) 750 mg in 0.9% sodium chloride (MBP/ADV) 250 mL)  250 250  250      Volume (levoFLOXacin (LEVAQUIN) 500 mg in D5W IVPB) 100 100 0  0    Shift Total  (mL/kg) 673.5  (16.1) 1348.5  (32.3) 250  (6)  250  (6)   O  U  T  P  U  T   Urine  (mL/kg/hr) 300 1250 1175  (2.3)  1175      Urine Voided 300 650         Urine Occurrence(s)  1 x         Urine Output (mL) (External Female Catheter 04/04/18)  600 1175  1175    Stool           Stool Occurrence(s)  1 x  2 x 2 x    Shift Total  (mL/kg) 300  (7.2) 1250  (29.9) 1175  (28.1)  1175  (28.1)   .5 98.5 -925  -925   Weight (kg) 41.8 41.8 41.8 41.8 41.8         Readmission Risk Assessment Tool Score Medium Risk            20       Total Score 2 . Living with Significant Other. Assisted Living. LTAC. SNF. or   Rehab    3 Patient Length of Stay (>5 days = 3)    5 Pt.  Coverage (Medicare=5 , Medicaid, or Self-Pay=4)    10 Charlson Comorbidity Score (Age + Comorbid Conditions)        Criteria that do not apply:    Has Seen PCP in Last 6 Months (Yes=3, No=0)    IP Visits Last 12 Months (1-3=4, 4=9, >4=11)       Expected Length of Stay 4d 21h   Actual Length of Stay 9

## 2018-04-08 NOTE — PROGRESS NOTES
PCU SHIFT NURSING NOTE      Bedside and Verbal shift change report given to Brittany Marquis RN (oncoming nurse) by Huy Levy RN (offgoing nurse). Report included the following information SBAR, Kardex, ED Summary, Procedure Summary, Intake/Output, MAR, Recent Results, Med Rec Status, Cardiac Rhythm NSR and Alarm Parameters . Shift Summary:         Admission Date 3/27/2018   Admission Diagnosis Respiratory failure with hypoxia ([address]   Consults None        Consults   [x]PT   [x]OT   [x]Speech   [x]Case Management      [x] Palliative      Cardiac Monitoring Order   [x]Yes   []No     IV drips   []Yes    Drip:                            Dose:  Drip:                            Dose:  Drip:                            Dose:   [x]No     GI Prophylaxis   [x]Yes   []No         DVT Prophylaxis   SCDs:  Sequential Compression Device: Bilateral     Patient Refused VTE Prophylaxis: Yes    Kunal stockings:         [x] Medication   []Contraindicated   []None      Activity Level Activity Level: Up with Assistance     Activity Assistance: Complete care   Purposeful Rounding every 1-2 hour? [x]Yes   Campo Score  Total Score: 3   Bed Alarm (If score 3 or >)   [x]Yes   [] Refused (See signed refusal form in chart)   Rashid Score  Rashid Score: 12   Rashid Score (if score 14 or less)   [x]PMT consult   [x]Wound Care consult      []Specialty bed   [x] Nutrition consult          Needs prior to discharge:   Home O2 required:    []Yes   [x]No    If yes, how much O2 required?     Other:    Last Bowel Movement: Last Bowel Movement Date: 04/07/18      Influenza Vaccine Received Flu Vaccine for Current Season (usually Sept-March): Yes        Pneumonia Vaccine           Diet Active Orders   Diet    DIET DENTAL SOFT (SOFT SOLID)      LDAs               Peripheral IV 04/07/18 Left Wrist (Active)   Site Assessment Clean, dry, & intact 4/8/2018  7:25 AM   Phlebitis Assessment 0 4/8/2018  7:25 AM   Infiltration Assessment 0 4/8/2018 7:25 AM   Dressing Status Clean, dry, & intact 4/8/2018  7:25 AM   Dressing Type Tape;Transparent 4/8/2018  7:25 AM   Hub Color/Line Status Pink; Infusing;Flushed 4/8/2018  7:25 AM   Action Taken Open ports on tubing capped 4/8/2018  7:25 AM   Alcohol Cap Used Yes 4/8/2018  7:25 AM          External Female Catheter 04/04/18 (Active)   Site Assessment Clean, dry, & intact 4/8/2018  7:25 AM   Repositioned No 4/8/2018  7:25 AM   Perineal Care No 4/8/2018  7:25 AM   Wick Changed No 4/8/2018  7:25 AM   Suction Canister/Tubing Changed No 4/8/2018  7:25 AM   Urine Output (mL) 125 ml 4/8/2018  7:25 AM                Urinary Catheter [REMOVED] Urinary Catheter 03/28/18 Tellez-Indications for Use: Urinary retention/bladder outlet obstruction    Intake & Output   Date 04/07/18 0700 - 04/08/18 0659 04/08/18 0700 - 04/09/18 0659   Shift 9785-0746 6331-5138 24 Hour Total 0700-1859 1900-0659 24 Hour Total   I  N  T  A  K  E   I.V.  (mL/kg/hr) 250  (0.5) 0  (0) 250  (0.2) 0  0      Cardizem Volume 0 0 0 0  0      Volume (vancomycin (VANCOCIN) 750 mg in 0.9% sodium chloride (MBP/ADV) 250 mL) 250 0 250 0  0      Volume (levoFLOXacin (LEVAQUIN) 500 mg in D5W IVPB) 0 0 0 0  0    Shift Total  (mL/kg) 250  (6) 0  (0) 250  (6) 0  (0)  0  (0)   O  U  T  P  U  T   Urine  (mL/kg/hr) 1175  (2.3)  1175  (1.2) 125  125      Urine Output (mL) (External Female Catheter 04/04/18) 1175  1175 125  125    Stool            Stool Occurrence(s)  4 x 4 x 1 x  1 x    Shift Total  (mL/kg) 1175  (28.1)  1175  (28.1) 125  (3)  125  (3)   NET -925 0 -925 -125  -125   Weight (kg) 41.8 41.8 41.8 42.3 42.3 42.3         Readmission Risk Assessment Tool Score Medium Risk            20       Total Score        2 . Living with Significant Other. Assisted Living. LTAC. SNF. or   Rehab    3 Patient Length of Stay (>5 days = 3)    5 Pt.  Coverage (Medicare=5 , Medicaid, or Self-Pay=4)    10 Charlson Comorbidity Score (Age + Comorbid Conditions)        Criteria that do not apply:    Has Seen PCP in Last 6 Months (Yes=3, No=0)    IP Visits Last 12 Months (1-3=4, 4=9, >4=11)       Expected Length of Stay 4d 21h   Actual Length of Stay 10

## 2018-04-09 LAB
ANION GAP SERPL CALC-SCNC: 9 MMOL/L (ref 5–15)
BASOPHILS # BLD: 0 K/UL (ref 0–0.1)
BASOPHILS NFR BLD: 0 % (ref 0–1)
BUN SERPL-MCNC: 68 MG/DL (ref 6–20)
BUN/CREAT SERPL: 47 (ref 12–20)
CALCIUM SERPL-MCNC: 8.8 MG/DL (ref 8.5–10.1)
CHLORIDE SERPL-SCNC: 105 MMOL/L (ref 97–108)
CO2 SERPL-SCNC: 27 MMOL/L (ref 21–32)
CREAT SERPL-MCNC: 1.45 MG/DL (ref 0.55–1.02)
DIFFERENTIAL METHOD BLD: ABNORMAL
EOSINOPHIL # BLD: 0 K/UL (ref 0–0.4)
EOSINOPHIL NFR BLD: 0 % (ref 0–7)
ERYTHROCYTE [DISTWIDTH] IN BLOOD BY AUTOMATED COUNT: 18.6 % (ref 11.5–14.5)
GLUCOSE SERPL-MCNC: 119 MG/DL (ref 65–100)
HCT VFR BLD AUTO: 33.5 % (ref 35–47)
HGB BLD-MCNC: 11.5 G/DL (ref 11.5–16)
IMM GRANULOCYTES # BLD: 0.3 K/UL (ref 0–0.04)
IMM GRANULOCYTES NFR BLD AUTO: 2 % (ref 0–0.5)
LYMPHOCYTES # BLD: 0.1 K/UL (ref 0.8–3.5)
LYMPHOCYTES NFR BLD: 1 % (ref 12–49)
MCH RBC QN AUTO: 30.9 PG (ref 26–34)
MCHC RBC AUTO-ENTMCNC: 34.3 G/DL (ref 30–36.5)
MCV RBC AUTO: 90.1 FL (ref 80–99)
MONOCYTES # BLD: 0.6 K/UL (ref 0–1)
MONOCYTES NFR BLD: 3 % (ref 5–13)
NEUTS SEG # BLD: 17.6 K/UL (ref 1.8–8)
NEUTS SEG NFR BLD: 94 % (ref 32–75)
NRBC # BLD: 0.02 K/UL (ref 0–0.01)
NRBC BLD-RTO: 0.1 PER 100 WBC
PLATELET # BLD AUTO: 182 K/UL (ref 150–400)
PMV BLD AUTO: 12.2 FL (ref 8.9–12.9)
POTASSIUM SERPL-SCNC: 4.4 MMOL/L (ref 3.5–5.1)
RBC # BLD AUTO: 3.72 M/UL (ref 3.8–5.2)
SODIUM SERPL-SCNC: 141 MMOL/L (ref 136–145)
WBC # BLD AUTO: 18.7 K/UL (ref 3.6–11)

## 2018-04-09 PROCEDURE — 36415 COLL VENOUS BLD VENIPUNCTURE: CPT | Performed by: INTERNAL MEDICINE

## 2018-04-09 PROCEDURE — 74011250636 HC RX REV CODE- 250/636: Performed by: GENERAL ACUTE CARE HOSPITAL

## 2018-04-09 PROCEDURE — 77030038269 HC DRN EXT URIN PURWCK BARD -A

## 2018-04-09 PROCEDURE — 74011250636 HC RX REV CODE- 250/636: Performed by: INTERNAL MEDICINE

## 2018-04-09 PROCEDURE — 74011250637 HC RX REV CODE- 250/637: Performed by: INTERNAL MEDICINE

## 2018-04-09 PROCEDURE — 77030011256 HC DRSG MEPILEX <16IN NO BORD MOLN -A

## 2018-04-09 PROCEDURE — 85025 COMPLETE CBC W/AUTO DIFF WBC: CPT | Performed by: INTERNAL MEDICINE

## 2018-04-09 PROCEDURE — 74011250637 HC RX REV CODE- 250/637: Performed by: GENERAL ACUTE CARE HOSPITAL

## 2018-04-09 PROCEDURE — 65660000000 HC RM CCU STEPDOWN

## 2018-04-09 PROCEDURE — 80048 BASIC METABOLIC PNL TOTAL CA: CPT | Performed by: GENERAL ACUTE CARE HOSPITAL

## 2018-04-09 RX ADMIN — METOPROLOL TARTRATE 50 MG: 50 TABLET ORAL at 08:37

## 2018-04-09 RX ADMIN — Medication 10 ML: at 08:45

## 2018-04-09 RX ADMIN — METHYLPREDNISOLONE SODIUM SUCCINATE 20 MG: 40 INJECTION, POWDER, FOR SOLUTION INTRAMUSCULAR; INTRAVENOUS at 21:26

## 2018-04-09 RX ADMIN — HEPARIN SODIUM 5000 UNITS: 5000 INJECTION, SOLUTION INTRAVENOUS; SUBCUTANEOUS at 17:15

## 2018-04-09 RX ADMIN — FUROSEMIDE 40 MG: 10 INJECTION, SOLUTION INTRAMUSCULAR; INTRAVENOUS at 08:44

## 2018-04-09 RX ADMIN — PANTOPRAZOLE SODIUM 40 MG: 40 TABLET, DELAYED RELEASE ORAL at 17:12

## 2018-04-09 RX ADMIN — METOPROLOL TARTRATE 50 MG: 50 TABLET ORAL at 17:10

## 2018-04-09 RX ADMIN — Medication 10 ML: at 03:46

## 2018-04-09 RX ADMIN — CLOPIDOGREL BISULFATE 75 MG: 75 TABLET ORAL at 08:37

## 2018-04-09 RX ADMIN — VANCOMYCIN HYDROCHLORIDE 750 MG: 750 INJECTION, POWDER, LYOPHILIZED, FOR SOLUTION INTRAVENOUS at 15:18

## 2018-04-09 RX ADMIN — HYDRALAZINE HYDROCHLORIDE 100 MG: 50 TABLET ORAL at 15:30

## 2018-04-09 RX ADMIN — PANTOPRAZOLE SODIUM 40 MG: 40 TABLET, DELAYED RELEASE ORAL at 08:58

## 2018-04-09 RX ADMIN — POTASSIUM CHLORIDE 20 MEQ: 1.5 POWDER, FOR SOLUTION ORAL at 08:38

## 2018-04-09 RX ADMIN — HEPARIN SODIUM 5000 UNITS: 5000 INJECTION, SOLUTION INTRAVENOUS; SUBCUTANEOUS at 10:24

## 2018-04-09 RX ADMIN — NYSTATIN 500000 UNITS: 100000 SUSPENSION ORAL at 21:27

## 2018-04-09 RX ADMIN — NYSTATIN 500000 UNITS: 100000 SUSPENSION ORAL at 13:25

## 2018-04-09 RX ADMIN — HYDROCHLOROTHIAZIDE 25 MG: 25 TABLET ORAL at 17:12

## 2018-04-09 RX ADMIN — Medication 1 CAPSULE: at 08:29

## 2018-04-09 RX ADMIN — NYSTATIN 500000 UNITS: 100000 SUSPENSION ORAL at 17:14

## 2018-04-09 RX ADMIN — HYDRALAZINE HYDROCHLORIDE 100 MG: 50 TABLET ORAL at 08:31

## 2018-04-09 RX ADMIN — HYDRALAZINE HYDROCHLORIDE 100 MG: 50 TABLET ORAL at 21:26

## 2018-04-09 RX ADMIN — HEPARIN SODIUM 5000 UNITS: 5000 INJECTION, SOLUTION INTRAVENOUS; SUBCUTANEOUS at 01:03

## 2018-04-09 RX ADMIN — ESCITALOPRAM OXALATE 10 MG: 10 TABLET ORAL at 08:35

## 2018-04-09 RX ADMIN — MICONAZOLE NITRATE: 20 CREAM TOPICAL at 08:52

## 2018-04-09 RX ADMIN — MICONAZOLE NITRATE: 20 CREAM TOPICAL at 17:20

## 2018-04-09 RX ADMIN — METHYLPREDNISOLONE SODIUM SUCCINATE 20 MG: 40 INJECTION, POWDER, FOR SOLUTION INTRAMUSCULAR; INTRAVENOUS at 08:46

## 2018-04-09 RX ADMIN — Medication 10 ML: at 21:28

## 2018-04-09 RX ADMIN — ASPIRIN 81 MG CHEWABLE TABLET 81 MG: 81 TABLET CHEWABLE at 17:13

## 2018-04-09 RX ADMIN — DILTIAZEM HYDROCHLORIDE 180 MG: 180 CAPSULE, COATED, EXTENDED RELEASE ORAL at 08:30

## 2018-04-09 RX ADMIN — Medication 10 ML: at 15:23

## 2018-04-09 RX ADMIN — NYSTATIN 500000 UNITS: 100000 SUSPENSION ORAL at 08:40

## 2018-04-09 RX ADMIN — TEMAZEPAM 15 MG: 15 CAPSULE ORAL at 21:27

## 2018-04-09 NOTE — PROGRESS NOTES
PROGRESS NOTE    NAME:  Russell Da Silva   :   1935   MRN:   [id number]     Date/Time:  2018 7:53 AM  Subjective:   History:  Chart reviewed and patient seen and examined this AM and D/W her nurse and all events noted. She presented on 3/27 with several episodes of N/V w/o diarrhea or other GI c/o. She has had no Vomiting since 3/27 AM and no other GI c/o except some diarrhea which has now resolved. She noted to be hypoxemic and was diagnosed with bilateral Pneumonia and has been started on triple antibiotics initially. She has had a little cough and notable SOB w/o other cardio/respiratory c/o until during the night 3/30 when she became more SOB and Hypoxemic and CXR revealed Pulmonary Edema. She had IV Lasix with good UO and progressively improved oxygenation since. She denies CP or other cardio/respiratory c/o and says she feels better now. She has no  c/o. She developed PAF since admission treated with Cardizem drip and changed to PO on . She is generally weak w/o other neurologic c/o. There are no other c/o on complete ROS except now poor appetite persists .        Medications reviewed:  Current Facility-Administered Medications   Medication Dose Route Frequency    potassium chloride (KLOR-CON) packet 20 mEq  20 mEq Oral DAILY    methylPREDNISolone (PF) (SOLU-MEDROL) injection 20 mg  20 mg IntraVENous Q12H    lactobac ac& pc-s.therm-b.anim (HANG Q/RISAQUAD)  1 Cap Oral DAILY    miconazole (SECURA) 2 % extra thick cream   Topical BID    nystatin (MYCOSTATIN) 100,000 unit/mL oral suspension 500,000 Units  500,000 Units Oral QID    metoprolol tartrate (LOPRESSOR) tablet 50 mg  50 mg Oral BID    furosemide (LASIX) injection 40 mg  40 mg IntraVENous DAILY    acetaminophen (TYLENOL) tablet 650 mg  650 mg Oral Q6H PRN    dilTIAZem CD (CARDIZEM CD) capsule 180 mg  180 mg Oral DAILY    levoFLOXacin (LEVAQUIN) 500 mg in D5W IVPB  500 mg IntraVENous Q48H    vancomycin (VANCOCIN) 750 mg in 0.9% sodium chloride (MBP/ADV) 250 mL  750 mg IntraVENous Q24H    diphenoxylate-atropine (LOMOTIL) tablet 1 Tab  1 Tab Oral Q4H PRN    temazepam (RESTORIL) capsule 15 mg  15 mg Oral QHS    aspirin chewable tablet 81 mg  81 mg Oral QPM    clopidogrel (PLAVIX) tablet 75 mg  75 mg Oral DAILY    hydrALAZINE (APRESOLINE) tablet 100 mg  100 mg Oral TID    escitalopram oxalate (LEXAPRO) tablet 10 mg  10 mg Oral DAILY    pantoprazole (PROTONIX) tablet 40 mg  40 mg Oral BID    hydroCHLOROthiazide (HYDRODIURIL) tablet 25 mg  25 mg Oral QPM    sodium chloride (NS) flush 5-10 mL  5-10 mL IntraVENous Q8H    sodium chloride (NS) flush 5-10 mL  5-10 mL IntraVENous PRN    heparin (porcine) injection 5,000 Units  5,000 Units SubCUTAneous Q8H        Objective:   Vitals:  Visit Vitals    /62 (BP 1 Location: Left arm, BP Patient Position: At rest)    Pulse 75    Temp 97.7 °F (36.5 °C)    Resp 18    Ht 5' 1.5\" (1.562 m)    Wt 93 lb 4.8 oz (42.3 kg)    SpO2 94%    Breastfeeding No    BMI 17.34 kg/m2    O2 Flow Rate (L/min): 4 l/min O2 Device: Room air Temp (24hrs), Av.1 °F (36.7 °C), Min:97.7 °F (36.5 °C), Max:98.3 °F (36.8 °C)      Last 24hr Input/Output:    Intake/Output Summary (Last 24 hours) at 18 1337  Last data filed at 18 1090   Gross per 24 hour   Intake              350 ml   Output              950 ml   Net             -600 ml        PHYSICAL EXAM:  General:     Alert, cooperative, moderate respiratory distress, appears stated age. Head:    Normocephalic, without obvious abnormality, atraumatic. Eyes:    Conjunctivae/corneas clear. PERRLA  Nose:   Nares normal. No drainage or sinus tenderness. Throat:     Lips, mucosa, and tongue normal.  No Thrush  Neck:   Supple, symmetrical,  no adenopathy, thyroid: non tender     no carotid bruit and no JVD. Back:     Symmetric,  No CVA tenderness. Lungs:    Clear to auscultation bilaterally with overall decreased BS.   No Wheezing or Rhonchi. No rales. Heart:    Regular rate and rhythm,  no murmur, rub or gallop. Abdomen:    Soft, non tender. Not distended. Bowel sounds normal. No masses  Extremities:  Extremities normal, atraumatic, No cyanosis. No edema. + clubbing  Lymph nodes:  Cervical, supraclavicular normal.  Neurologic:  General decreased strength, Alert and oriented X 3. Skin:                 No rash    Telemetry: NSR     Lab Data Reviewed:    Recent Results (from the past 24 hour(s))   METABOLIC PANEL, BASIC    Collection Time: 04/09/18  2:48 AM   Result Value Ref Range    Sodium 141 136 - 145 mmol/L    Potassium 4.4 3.5 - 5.1 mmol/L    Chloride 105 97 - 108 mmol/L    CO2 27 21 - 32 mmol/L    Anion gap 9 5 - 15 mmol/L    Glucose 119 (H) 65 - 100 mg/dL    BUN 68 (H) 6 - 20 MG/DL    Creatinine 1.45 (H) 0.55 - 1.02 MG/DL    BUN/Creatinine ratio 47 (H) 12 - 20      GFR est AA 42 (L) >60 ml/min/1.73m2    GFR est non-AA 35 (L) >60 ml/min/1.73m2    Calcium 8.8 8.5 - 10.1 MG/DL   CBC WITH AUTOMATED DIFF    Collection Time: 04/09/18  2:48 AM   Result Value Ref Range    WBC 18.7 (H) 3.6 - 11.0 K/uL    RBC 3.72 (L) 3.80 - 5.20 M/uL    HGB 11.5 11.5 - 16.0 g/dL    HCT 33.5 (L) 35.0 - 47.0 %    MCV 90.1 80.0 - 99.0 FL    MCH 30.9 26.0 - 34.0 PG    MCHC 34.3 30.0 - 36.5 g/dL    RDW 18.6 (H) 11.5 - 14.5 %    PLATELET 980 568 - 469 K/uL    MPV 12.2 8.9 - 12.9 FL    NRBC 0.1 (H) 0  WBC    ABSOLUTE NRBC 0.02 (H) 0.00 - 0.01 K/uL    NEUTROPHILS 94 (H) 32 - 75 %    LYMPHOCYTES 1 (L) 12 - 49 %    MONOCYTES 3 (L) 5 - 13 %    EOSINOPHILS 0 0 - 7 %    BASOPHILS 0 0 - 1 %    IMMATURE GRANULOCYTES 2 (H) 0.0 - 0.5 %    ABS. NEUTROPHILS 17.6 (H) 1.8 - 8.0 K/UL    ABS. LYMPHOCYTES 0.1 (L) 0.8 - 3.5 K/UL    ABS. MONOCYTES 0.6 0.0 - 1.0 K/UL    ABS. EOSINOPHILS 0.0 0.0 - 0.4 K/UL    ABS. BASOPHILS 0.0 0.0 - 0.1 K/UL    ABS. IMM.  GRANS. 0.3 (H) 0.00 - 0.04 K/UL    DF AUTOMATED           Assessment/Plan:     Principal Problem:    Acute hypoxemic respiratory failure ([facility] (3/27/2018)    Active Problems:    COPD exacerbation ([facility] (8/14/2010)      Hypertension (7/20/2016)      Peripheral vascular disease ([facility] (7/20/2016)      Sepsis ([facility] (3/28/2018)      Pneumonia (3/28/2018)      Acute renal failure (ARF) ([facility] (3/28/2018)      Primary lung large cell carcinoma, left ([facility] (3/29/2018)      Overview: Being candidate for surgical resection so treated with radiation therapy       in 2016      PAF (paroxysmal atrial fibrillation) ([facility] [address] (3/30/2018)      Moderate protein-calorie malnutrition ([facility] (3/30/2018)      Decubitus ulcer of sacral region, stage 2 (3/30/2018)      Leukocytosis (4/8/2018)       ___________________________________________________  PLAN:    1. Levaquin and Vancomycin for Pneumonia (D/Micah Zosyn in light of diarrhea - had received it for 7 days)  2. Supplemental Oxygen for Hypoxemia now with 2 L and 93% Sat (She is DNR)  3.  JA treatments for acute respiratory failure  4. Steroids for COPD exacerbation tapered   5. D/Micah IV Hydration which she was on with ARF and follow Creat (2.53 on adm to 1.45 now)  6. Decreased IV Lasix  7. Repleting K with I.V and PO and now 4.4 from 2.3, Mag normal 1.7  8. With anemia follow Hgb, 10.4 on adm to 11.5 now  9. Cont current HTN with HCTZ and Hydralazine and follow BP which is up a little so increased Metoprolol on 4/3 and also on Cardizem PO  10. Cont ASA and Plavix with ASVD  11. Repeat CXR improved  12. Encouraged PO intake  13. Protonix IV  14. Added Probiotic  15. SNF Rehab on D/C  16.  Lexapro for depression    Critically ill patient with end stage lung disease and high risk of decompensation (Resprct DNR wishes)    ___________________________________________________    Attending Physician: Akil Diallo MD

## 2018-04-09 NOTE — PROGRESS NOTES
Problem: Falls - Risk of  Goal: *Absence of Falls  Document [place] Fall Risk and appropriate interventions in the flowsheet.    Outcome: Progressing Towards Goal  Fall Risk Interventions:  Mobility Interventions: Bed/chair exit alarm, Communicate number of staff needed for ambulation/transfer, OT consult for ADLs, Patient to call before getting OOB, PT Consult for mobility concerns, PT Consult for assist device competence, Strengthening exercises (ROM-active/passive), Utilize walker, cane, or other assitive device, Utilize gait belt for transfers/ambulation    Mentation Interventions: Adequate sleep, hydration, pain control, Bed/chair exit alarm, Door open when patient unattended, Evaluate medications/consider consulting pharmacy, Increase mobility, More frequent rounding, Update white board    Medication Interventions: Bed/chair exit alarm, Patient to call before getting OOB, Teach patient to arise slowly    Elimination Interventions: Bed/chair exit alarm, Call light in reach, Patient to call for help with toileting needs, Toilet paper/wipes in reach, Toileting schedule/hourly rounds

## 2018-04-09 NOTE — PROGRESS NOTES
Today is day 13 of vancomycin and day 14 of levofloxacin for pneumonia. Please consider discontinuing antibiotics or placing a stop date.     Thank you,  Beth Owusu, PHARMD

## 2018-04-09 NOTE — PROGRESS NOTES
PCU SHIFT NURSING NOTE      Bedside and Verbal shift change report given to Tal Marie RN (oncoming nurse) by Colletta Springer RN (offgoing nurse). Report included the following information SBAR, Kardex, Intake/Output, MAR, Recent Results and Cardiac Rhythm NSR. Shift Summary: Received pt lying in bed. No noted distress. Pt quiet and responds infreq with one to just a couple of words. 9745 new pur wick and new tubing and cannister after pt cleaned of loose brown stool. Applied secura to perineal,groin and rectal excoriated areas. Admission Date 3/27/2018   Admission Diagnosis Respiratory failure with hypoxia ([facility]   Consults None        Consults   []PT   []OT   []Speech   []Case Management      [] Palliative      Cardiac Monitoring Order   []Yes   []No     IV drips   []Yes    Drip:                            Dose:  Drip:                            Dose:  Drip:                            Dose:   []No     GI Prophylaxis   []Yes   []No         DVT Prophylaxis   SCDs:  Sequential Compression Device: Bilateral     Patient Refused VTE Prophylaxis: Yes    Kunal stockings:         [] Medication   []Contraindicated   []None      Activity Level Activity Level: Up with Assistance     Activity Assistance: Complete care   Purposeful Rounding every 1-2 hour? []Yes   Campo Score  Total Score: 3   Bed Alarm (If score 3 or >)   []Yes   [] Refused (See signed refusal form in chart)   Rashid Score  Rashid Score: 12   Rashid Score (if score 14 or less)   []PMT consult   []Wound Care consult      []Specialty bed   [] Nutrition consult          Needs prior to discharge:   Home O2 required:    []Yes   []No    If yes, how much O2 required?     Other:    Last Bowel Movement: Last Bowel Movement Date: 04/08/18      Influenza Vaccine Received Flu Vaccine for Current Season (usually Sept-March): Yes        Pneumonia Vaccine           Diet Active Orders   Diet    DIET DENTAL SOFT (SOFT SOLID)      LDAs               Peripheral IV 04/07/18 Left Wrist (Active)   Site Assessment Clean, dry, & intact 4/8/2018  8:00 PM   Phlebitis Assessment 0 4/8/2018  8:00 PM   Infiltration Assessment 0 4/8/2018  8:00 PM   Dressing Status Clean, dry, & intact 4/8/2018  8:00 PM   Dressing Type Transparent 4/8/2018  8:00 PM   Hub Color/Line Status Pink; Infusing 4/8/2018  8:00 PM   Action Taken Open ports on tubing capped 4/8/2018  6:20 PM   Alcohol Cap Used Yes 4/8/2018  8:00 PM          External Female Catheter 04/04/18 (Active)   Site Assessment Clean, dry, & intact 4/8/2018  8:00 PM   Repositioned Yes 4/8/2018  8:00 PM   Perineal Care Yes 4/8/2018  8:00 PM   Wick Changed No 4/8/2018  8:00 PM   Suction Canister/Tubing Changed No 4/8/2018  8:00 PM   Urine Output (mL) 150 ml 4/8/2018  6:20 PM                Urinary Catheter [REMOVED] Urinary Catheter 03/28/18 Tellez-Indications for Use: Urinary retention/bladder outlet obstruction    Intake & Output   Date 04/07/18 1900 - 04/08/18 0659 04/08/18 0700 - 04/09/18 0659   Shift 9979-1586 24 Hour Total 7377-4846 2340-5655 24 Hour Total   I  N  T  A  K  E   I.V.  (mL/kg/hr) 0 250 250  (0.5)  250      Cardizem Volume 0 0 0  0      Volume (vancomycin (VANCOCIN) 750 mg in 0.9% sodium chloride (MBP/ADV) 250 mL) 0 250 250  250      Volume (levoFLOXacin (LEVAQUIN) 500 mg in D5W IVPB) 0 0 0  0    Shift Total  (mL/kg) 0  (0) 250  (6) 250  (5.9)  250  (5.9)   O  U  T  P  U  T   Urine  (mL/kg/hr)  1175 675  (1.3)  675      Urine Occurrence(s)   1 x  1 x      Urine Output (mL) (External Female Catheter 04/04/18)  1175 675  675    Stool           Stool Occurrence(s) 4 x 4 x 3 x  3 x    Shift Total  (mL/kg)  1175  (28.1) 675  (15.9)  675  (15.9)   NET 0 -925 -425  -425   Weight (kg) 41.8 41.8 42.3 42.3 42.3         Readmission Risk Assessment Tool Score Medium Risk            20       Total Score        2 . Living with Significant Other. Assisted Living. LTAC. SNF. or   Rehab    3 Patient Length of Stay (>5 days = 3)    5 Pt. Coverage (Medicare=5 , Medicaid, or Self-Pay=4)    10 Charlson Comorbidity Score (Age + Comorbid Conditions)        Criteria that do not apply:    Has Seen PCP in Last 6 Months (Yes=3, No=0)    IP Visits Last 12 Months (1-3=4, 4=9, >4=11)       Expected Length of Stay 4d 21h   Actual Length of Stay 10

## 2018-04-09 NOTE — PROGRESS NOTES
PCU SHIFT NURSING NOTE      Bedside and Verbal shift change report given to Juancarlos Wallace RN (oncoming nurse) by Inés Leger RN (offgoing nurse). Report included the following information SBAR, Kardex, ED Summary, Procedure Summary, Intake/Output, MAR, Recent Results, Med Rec Status, Cardiac Rhythm NSR and Alarm Parameters . Shift Summary:         Admission Date 3/27/2018   Admission Diagnosis Respiratory failure with hypoxia ([address]   Consults None        Consults   [x]PT   [x]OT   [x]Speech   [x]Case Management      [] Palliative      Cardiac Monitoring Order   [x]Yes   []No     IV drips   []Yes    Drip:                            Dose:  Drip:                            Dose:  Drip:                            Dose:   [x]No     GI Prophylaxis   [x]Yes   []No         DVT Prophylaxis   SCDs:  Sequential Compression Device: Bilateral     Patient Refused VTE Prophylaxis: Yes    Kunal stockings:         [x] Medication   []Contraindicated   []None      Activity Level Activity Level: Up with Assistance     Activity Assistance: Partial (two people)   Purposeful Rounding every 1-2 hour? [x]Yes   Campo Score  Total Score: 3   Bed Alarm (If score 3 or >)   [x]Yes   [] Refused (See signed refusal form in chart)   Rashid Score  Rashid Score: 11   Rashid Score (if score 14 or less)   [x]PMT consult   [x]Wound Care consult      []Specialty bed   [x] Nutrition consult          Needs prior to discharge:   Home O2 required:    []Yes   [x]No    If yes, how much O2 required?     Other:    Last Bowel Movement: Last Bowel Movement Date: 04/09/18      Influenza Vaccine Received Flu Vaccine for Current Season (usually Sept-March): Yes        Pneumonia Vaccine           Diet Active Orders   Diet    DIET DENTAL SOFT (SOFT SOLID)      LDAs               Peripheral IV 04/07/18 Left Wrist (Active)   Site Assessment Clean, dry, & intact 4/9/2018  1:00 PM   Phlebitis Assessment 0 4/9/2018  1:00 PM   Infiltration Assessment 0 4/9/2018 1:00 PM   Dressing Status Clean, dry, & intact 4/9/2018  1:00 PM   Dressing Type Tape;Transparent 4/9/2018  1:00 PM   Hub Color/Line Status Pink; Infusing;Flushed 4/9/2018  1:00 PM   Action Taken Open ports on tubing capped 4/9/2018  1:00 PM   Alcohol Cap Used Yes 4/9/2018  1:00 PM          External Female Catheter 04/04/18 (Active)   Site Assessment Clean, dry, & intact 4/9/2018 11:59 AM   Repositioned No 4/9/2018 11:59 AM   Perineal Care No 4/9/2018 11:59 AM   Wick Changed No 4/9/2018 11:59 AM   Suction Canister/Tubing Changed No 4/9/2018 11:59 AM   Urine Output (mL) 450 ml 4/9/2018 11:59 AM                Urinary Catheter [REMOVED] Urinary Catheter 03/28/18 Tellez-Indications for Use: Urinary retention/bladder outlet obstruction    Intake & Output   Date 04/08/18 0700 - 04/09/18 0659 04/09/18 0700 - 04/10/18 0659   Shift 2261-7522 3142-5884 24 Hour Total 0700-1859 6921-6327 24 Hour Total   I  N  T  A  K  E   I.V.  (mL/kg/hr) 250  (0.5) 100  (0.2) 350  (0.3) 0  0      Cardizem Volume 0 0 0 0  0      Volume (vancomycin (VANCOCIN) 750 mg in 0.9% sodium chloride (MBP/ADV) 250 mL) 250 0 250 0  0      Volume (levoFLOXacin (LEVAQUIN) 500 mg in D5W IVPB) 0 100 100 0  0    Shift Total  (mL/kg) 250  (5.9) 100  (2.4) 350  (8.3) 0  (0)  0  (0)   O  U  T  P  U  T   Urine  (mL/kg/hr) 675  (1.3) 400  (0.8) 1075  (1.1) 575  575      Urine Occurrence(s) 1 x  1 x         Urine Output (mL) (External Female Catheter 04/04/18)  575  575    Stool            Stool Occurrence(s) 3 x 1 x 4 x 1 x  1 x    Shift Total  (mL/kg) 675  (15.9) 400  (9.5) 1075  (25.4) 575  (13.6)  575  (13.6)   NET -425 -300 -725 -575  -575   Weight (kg) 42.3 42.3 42.3 42.3 42.3 42.3         Readmission Risk Assessment Tool Score Medium Risk            20       Total Score        2 . Living with Significant Other. Assisted Living. LTAC. SNF. or   Rehab    3 Patient Length of Stay (>5 days = 3)    5 Pt.  Coverage (Medicare=5 , Medicaid, or Self-Pay=4)    10 Charlson Comorbidity Score (Age + Comorbid Conditions)        Criteria that do not apply:    Has Seen PCP in Last 6 Months (Yes=3, No=0)    IP Visits Last 12 Months (1-3=4, 4=9, >4=11)       Expected Length of Stay 4d 21h   Actual Length of Stay 11

## 2018-04-10 LAB
ANION GAP SERPL CALC-SCNC: 10 MMOL/L (ref 5–15)
BASOPHILS # BLD: 0 K/UL (ref 0–0.1)
BASOPHILS NFR BLD: 0 % (ref 0–1)
BUN SERPL-MCNC: 72 MG/DL (ref 6–20)
BUN/CREAT SERPL: 43 (ref 12–20)
CALCIUM SERPL-MCNC: 8.8 MG/DL (ref 8.5–10.1)
CHLORIDE SERPL-SCNC: 102 MMOL/L (ref 97–108)
CO2 SERPL-SCNC: 25 MMOL/L (ref 21–32)
CREAT SERPL-MCNC: 1.67 MG/DL (ref 0.55–1.02)
DIFFERENTIAL METHOD BLD: ABNORMAL
EOSINOPHIL # BLD: 0 K/UL (ref 0–0.4)
EOSINOPHIL NFR BLD: 0 % (ref 0–7)
ERYTHROCYTE [DISTWIDTH] IN BLOOD BY AUTOMATED COUNT: 18.6 % (ref 11.5–14.5)
GLUCOSE SERPL-MCNC: 124 MG/DL (ref 65–100)
HCT VFR BLD AUTO: 35.1 % (ref 35–47)
HGB BLD-MCNC: 12.1 G/DL (ref 11.5–16)
IMM GRANULOCYTES # BLD: 0 K/UL (ref 0–0.04)
IMM GRANULOCYTES NFR BLD AUTO: 0 % (ref 0–0.5)
LYMPHOCYTES # BLD: 0.2 K/UL (ref 0.8–3.5)
LYMPHOCYTES NFR BLD: 1 % (ref 12–49)
MCH RBC QN AUTO: 30.6 PG (ref 26–34)
MCHC RBC AUTO-ENTMCNC: 34.5 G/DL (ref 30–36.5)
MCV RBC AUTO: 88.6 FL (ref 80–99)
MONOCYTES # BLD: 0.4 K/UL (ref 0–1)
MONOCYTES NFR BLD: 2 % (ref 5–13)
NEUTS SEG # BLD: 20.1 K/UL (ref 1.8–8)
NEUTS SEG NFR BLD: 97 % (ref 32–75)
NRBC # BLD: 0.02 K/UL (ref 0–0.01)
NRBC BLD-RTO: 0.1 PER 100 WBC
PLATELET # BLD AUTO: 185 K/UL (ref 150–400)
POTASSIUM SERPL-SCNC: 4.6 MMOL/L (ref 3.5–5.1)
RBC # BLD AUTO: 3.96 M/UL (ref 3.8–5.2)
RBC MORPH BLD: ABNORMAL
SODIUM SERPL-SCNC: 137 MMOL/L (ref 136–145)
WBC # BLD AUTO: 20.7 K/UL (ref 3.6–11)

## 2018-04-10 PROCEDURE — 36415 COLL VENOUS BLD VENIPUNCTURE: CPT | Performed by: GENERAL ACUTE CARE HOSPITAL

## 2018-04-10 PROCEDURE — 74011250636 HC RX REV CODE- 250/636: Performed by: GENERAL ACUTE CARE HOSPITAL

## 2018-04-10 PROCEDURE — 74011636637 HC RX REV CODE- 636/637: Performed by: INTERNAL MEDICINE

## 2018-04-10 PROCEDURE — 74011250637 HC RX REV CODE- 250/637: Performed by: INTERNAL MEDICINE

## 2018-04-10 PROCEDURE — 85025 COMPLETE CBC W/AUTO DIFF WBC: CPT | Performed by: INTERNAL MEDICINE

## 2018-04-10 PROCEDURE — 97535 SELF CARE MNGMENT TRAINING: CPT

## 2018-04-10 PROCEDURE — 74011250637 HC RX REV CODE- 250/637: Performed by: GENERAL ACUTE CARE HOSPITAL

## 2018-04-10 PROCEDURE — 74011250636 HC RX REV CODE- 250/636: Performed by: INTERNAL MEDICINE

## 2018-04-10 PROCEDURE — 65660000000 HC RM CCU STEPDOWN

## 2018-04-10 PROCEDURE — 80048 BASIC METABOLIC PNL TOTAL CA: CPT | Performed by: GENERAL ACUTE CARE HOSPITAL

## 2018-04-10 RX ORDER — PREDNISONE 20 MG/1
20 TABLET ORAL
Status: DISCONTINUED | OUTPATIENT
Start: 2018-04-10 | End: 2018-04-12 | Stop reason: HOSPADM

## 2018-04-10 RX ADMIN — MICONAZOLE NITRATE: 20 CREAM TOPICAL at 18:17

## 2018-04-10 RX ADMIN — Medication 10 ML: at 08:19

## 2018-04-10 RX ADMIN — PANTOPRAZOLE SODIUM 40 MG: 40 TABLET, DELAYED RELEASE ORAL at 08:13

## 2018-04-10 RX ADMIN — PANTOPRAZOLE SODIUM 40 MG: 40 TABLET, DELAYED RELEASE ORAL at 18:16

## 2018-04-10 RX ADMIN — Medication 10 ML: at 21:16

## 2018-04-10 RX ADMIN — POTASSIUM CHLORIDE 20 MEQ: 1.5 POWDER, FOR SOLUTION ORAL at 08:13

## 2018-04-10 RX ADMIN — ESCITALOPRAM OXALATE 10 MG: 10 TABLET ORAL at 08:13

## 2018-04-10 RX ADMIN — NYSTATIN 500000 UNITS: 100000 SUSPENSION ORAL at 18:17

## 2018-04-10 RX ADMIN — HEPARIN SODIUM 5000 UNITS: 5000 INJECTION, SOLUTION INTRAVENOUS; SUBCUTANEOUS at 10:15

## 2018-04-10 RX ADMIN — HYDRALAZINE HYDROCHLORIDE 100 MG: 50 TABLET ORAL at 08:13

## 2018-04-10 RX ADMIN — CLOPIDOGREL BISULFATE 75 MG: 75 TABLET ORAL at 08:13

## 2018-04-10 RX ADMIN — HEPARIN SODIUM 5000 UNITS: 5000 INJECTION, SOLUTION INTRAVENOUS; SUBCUTANEOUS at 18:17

## 2018-04-10 RX ADMIN — HYDRALAZINE HYDROCHLORIDE 100 MG: 50 TABLET ORAL at 16:45

## 2018-04-10 RX ADMIN — TEMAZEPAM 15 MG: 15 CAPSULE ORAL at 21:15

## 2018-04-10 RX ADMIN — NYSTATIN 500000 UNITS: 100000 SUSPENSION ORAL at 12:20

## 2018-04-10 RX ADMIN — HEPARIN SODIUM 5000 UNITS: 5000 INJECTION, SOLUTION INTRAVENOUS; SUBCUTANEOUS at 01:28

## 2018-04-10 RX ADMIN — DILTIAZEM HYDROCHLORIDE 180 MG: 180 CAPSULE, COATED, EXTENDED RELEASE ORAL at 08:13

## 2018-04-10 RX ADMIN — HYDRALAZINE HYDROCHLORIDE 100 MG: 50 TABLET ORAL at 21:15

## 2018-04-10 RX ADMIN — MICONAZOLE NITRATE: 20 CREAM TOPICAL at 08:14

## 2018-04-10 RX ADMIN — Medication 10 ML: at 13:07

## 2018-04-10 RX ADMIN — PREDNISONE 20 MG: 20 TABLET ORAL at 08:16

## 2018-04-10 RX ADMIN — FUROSEMIDE 40 MG: 10 INJECTION, SOLUTION INTRAMUSCULAR; INTRAVENOUS at 08:12

## 2018-04-10 RX ADMIN — METOPROLOL TARTRATE 50 MG: 50 TABLET ORAL at 08:13

## 2018-04-10 RX ADMIN — HYDROCHLOROTHIAZIDE 25 MG: 25 TABLET ORAL at 18:17

## 2018-04-10 RX ADMIN — NYSTATIN 500000 UNITS: 100000 SUSPENSION ORAL at 21:15

## 2018-04-10 RX ADMIN — Medication 1 CAPSULE: at 08:13

## 2018-04-10 RX ADMIN — NYSTATIN 500000 UNITS: 100000 SUSPENSION ORAL at 08:12

## 2018-04-10 RX ADMIN — METOPROLOL TARTRATE 50 MG: 50 TABLET ORAL at 18:17

## 2018-04-10 RX ADMIN — ASPIRIN 81 MG CHEWABLE TABLET 81 MG: 81 TABLET CHEWABLE at 18:17

## 2018-04-10 RX ADMIN — LEVOFLOXACIN 500 MG: 5 INJECTION, SOLUTION INTRAVENOUS at 22:59

## 2018-04-10 NOTE — PROGRESS NOTES
Today is day 15 of levofloxacin for pneumonia. Please consider discontinuing antibiotics or placing a stop date.     Thank you,  KEY Garcia

## 2018-04-10 NOTE — PROGRESS NOTES
PCU SHIFT NURSING NOTE      Bedside and Verbal shift change report given to Teo Glover RN (oncoming nurse) by Luan Mustafa RN (offgoing nurse). Report included the following information SBAR, Kardex, ED Summary, OR Summary, Intake/Output, MAR, Recent Results, Med Rec Status, Cardiac Rhythm NSR and Alarm Parameters . Shift Summary:         Admission Date 3/27/2018   Admission Diagnosis Respiratory failure with hypoxia ([address]   Consults None        Consults   [x]PT   [x]OT   [x]Speech   [x]Case Management      [x] Palliative      Cardiac Monitoring Order   [x]Yes   []No     IV drips   []Yes    Drip:                            Dose:  Drip:                            Dose:  Drip:                            Dose:   [x]No     GI Prophylaxis   [x]Yes   []No         DVT Prophylaxis   SCDs:  Sequential Compression Device: Bilateral     Patient Refused VTE Prophylaxis: Yes    Kunal stockings:         [x] Medication   []Contraindicated   []None      Activity Level Activity Level: Up with Assistance     Activity Assistance: Partial (two people)   Purposeful Rounding every 1-2 hour? [x]Yes   Campo Score  Total Score: 3   Bed Alarm (If score 3 or >)   [x]Yes   [] Refused (See signed refusal form in chart)   Rashid Score  Rashid Score: 11   Rashid Score (if score 14 or less)   [x]PMT consult   [x]Wound Care consult      []Specialty bed   [x] Nutrition consult          Needs prior to discharge:   Home O2 required:    []Yes   [x]No    If yes, how much O2 required?     Other:    Last Bowel Movement: Last Bowel Movement Date: 04/10/18      Influenza Vaccine Received Flu Vaccine for Current Season (usually Sept-March): Yes        Pneumonia Vaccine           Diet Active Orders   Diet    DIET DENTAL SOFT (SOFT SOLID)      LDAs               Peripheral IV 04/07/18 Left Wrist (Active)   Site Assessment Clean, dry, & intact 4/10/2018  7:00 AM   Phlebitis Assessment 0 4/10/2018  7:00 AM   Infiltration Assessment 0 4/10/2018  7:00 AM   Dressing Status Clean, dry, & intact 4/10/2018  7:00 AM   Dressing Type Tape;Transparent 4/10/2018  7:00 AM   Hub Color/Line Status Pink; Infusing;Flushed 4/10/2018  7:00 AM   Action Taken Open ports on tubing capped 4/10/2018  7:00 AM   Alcohol Cap Used Yes 4/10/2018  7:00 AM          External Female Catheter 04/04/18 (Active)   Site Assessment Clean, dry, & intact 4/10/2018  7:00 AM   Repositioned No 4/10/2018  7:00 AM   Perineal Care No 4/10/2018  7:00 AM   Wick Changed No 4/10/2018  7:00 AM   Suction Canister/Tubing Changed No 4/10/2018  7:00 AM   Urine Output (mL) 100 ml 4/10/2018  7:00 AM                Urinary Catheter [REMOVED] Urinary Catheter 03/28/18 Tellez-Indications for Use: Urinary retention/bladder outlet obstruction    Intake & Output   Date 04/09/18 0700 - 04/10/18 0659 04/10/18 0700 - 04/11/18 0659   Shift 0700-1859 0573-1810 24 Hour Total 0700-1859 6732-7772 24 Hour Total   I  N  T  A  K  E   I.V.  (mL/kg/hr) 250  (0.5)  250  (0.2) 0  0      Cardizem Volume 0  0 0  0      Volume (vancomycin (VANCOCIN) 750 mg in 0.9% sodium chloride (MBP/ADV) 250 mL) 250  250 0  0      Volume (levoFLOXacin (LEVAQUIN) 500 mg in D5W IVPB) 0  0 0  0    Shift Total  (mL/kg) 250  (5.9)  250  (5.9) 0  (0)  0  (0)   O  U  T  P  U  T   Urine  (mL/kg/hr) 825  (1.6) 375  (0.7) 1200  (1.2) 100  100      Urine Output (mL) (External Female Catheter 04/04/18)  100  100    Stool            Stool Occurrence(s) 2 x 1 x 3 x       Shift Total  (mL/kg) 825  (19.5) 375  (8.9) 1200  (28.4) 100  (2.4)  100  (2.4)   NET -575 -375 -950 -100  -100   Weight (kg) 42.3 42.3 42.3 42.1 42.1 42.1         Readmission Risk Assessment Tool Score Medium Risk            20       Total Score        2 . Living with Significant Other. Assisted Living. LTAC. SNF. or   Rehab    3 Patient Length of Stay (>5 days = 3)    5 Pt.  Coverage (Medicare=5 , Medicaid, or Self-Pay=4)    10 Charlson Comorbidity Score (Age + Comorbid Conditions) Criteria that do not apply:    Has Seen PCP in Last 6 Months (Yes=3, No=0)    IP Visits Last 12 Months (1-3=4, 4=9, >4=11)       Expected Length of Stay 4d 21h   Actual Length of Stay 12

## 2018-04-10 NOTE — PROGRESS NOTES
PROGRESS NOTE    NAME:  Ottis Najjar   :   1935   MRN:   [id number]     Date/Time:  4/10/2018 7:42 AM  Subjective:   History:  Chart reviewed and patient seen and examined this AM and D/W her nurse and all events noted. She presented on 3/27 with several episodes of N/V w/o diarrhea or other GI c/o. She has had no Vomiting since 3/27 AM and no other GI c/o except some diarrhea which has now resolved. She noted to be hypoxemic and was diagnosed with bilateral Pneumonia and has been started on triple antibiotics initially. She has had a little cough and notable SOB w/o other cardio/respiratory c/o until during the night 3/30 when she became more SOB and Hypoxemic and CXR revealed Pulmonary Edema. She had IV Lasix with good UO and progressively improved oxygenation since. She denies CP or other cardio/respiratory c/o and says she feels better now. She has no  c/o. She developed PAF since admission treated with Cardizem drip and changed to PO on . She is generally weak w/o other neurologic c/o. There are no other c/o on complete ROS except now poor appetite persists and remains extremely weak .        Medications reviewed:  Current Facility-Administered Medications   Medication Dose Route Frequency    potassium chloride (KLOR-CON) packet 20 mEq  20 mEq Oral DAILY    methylPREDNISolone (PF) (SOLU-MEDROL) injection 20 mg  20 mg IntraVENous Q12H    lactobac ac& pc-s.therm-b.anim (HANG Q/RISAQUAD)  1 Cap Oral DAILY    miconazole (SECURA) 2 % extra thick cream   Topical BID    nystatin (MYCOSTATIN) 100,000 unit/mL oral suspension 500,000 Units  500,000 Units Oral QID    metoprolol tartrate (LOPRESSOR) tablet 50 mg  50 mg Oral BID    furosemide (LASIX) injection 40 mg  40 mg IntraVENous DAILY    acetaminophen (TYLENOL) tablet 650 mg  650 mg Oral Q6H PRN    dilTIAZem CD (CARDIZEM CD) capsule 180 mg  180 mg Oral DAILY    levoFLOXacin (LEVAQUIN) 500 mg in D5W IVPB  500 mg IntraVENous Q48H    vancomycin (VANCOCIN) 750 mg in 0.9% sodium chloride (MBP/ADV) 250 mL  750 mg IntraVENous Q24H    diphenoxylate-atropine (LOMOTIL) tablet 1 Tab  1 Tab Oral Q4H PRN    temazepam (RESTORIL) capsule 15 mg  15 mg Oral QHS    aspirin chewable tablet 81 mg  81 mg Oral QPM    clopidogrel (PLAVIX) tablet 75 mg  75 mg Oral DAILY    hydrALAZINE (APRESOLINE) tablet 100 mg  100 mg Oral TID    escitalopram oxalate (LEXAPRO) tablet 10 mg  10 mg Oral DAILY    pantoprazole (PROTONIX) tablet 40 mg  40 mg Oral BID    hydroCHLOROthiazide (HYDRODIURIL) tablet 25 mg  25 mg Oral QPM    sodium chloride (NS) flush 5-10 mL  5-10 mL IntraVENous Q8H    sodium chloride (NS) flush 5-10 mL  5-10 mL IntraVENous PRN    heparin (porcine) injection 5,000 Units  5,000 Units SubCUTAneous Q8H        Objective:   Vitals:  Visit Vitals    /65 (BP 1 Location: Left arm, BP Patient Position: At rest)    Pulse 66    Temp 98.5 °F (36.9 °C)    Resp 18    Ht 5' 1.5\" (1.562 m)    Wt 92 lb 14.2 oz (42.1 kg)    SpO2 95%    Breastfeeding No    BMI 17.27 kg/m2    O2 Flow Rate (L/min): 4 l/min O2 Device: Room air Temp (24hrs), Av.3 °F (36.8 °C), Min:97.8 °F (36.6 °C), Max:99 °F (37.2 °C)      Last 24hr Input/Output:    Intake/Output Summary (Last 24 hours) at 04/10/18 0742  Last data filed at 04/10/18 0700   Gross per 24 hour   Intake              250 ml   Output             1175 ml   Net             -925 ml        PHYSICAL EXAM:  General:     Alert, cooperative, moderate respiratory distress, appears stated age. Head:    Normocephalic, without obvious abnormality, atraumatic. Eyes:    Conjunctivae/corneas clear. PERRLA  Nose:   Nares normal. No drainage or sinus tenderness. Throat:     Lips, mucosa, and tongue normal.  No Thrush  Neck:   Supple, symmetrical,  no adenopathy, thyroid: non tender     no carotid bruit and no JVD. Back:     Symmetric,  No CVA tenderness.   Lungs:    Clear to auscultation bilaterally with overall decreased BS. No Wheezing or Rhonchi. No rales. Heart:    Regular rate and rhythm,  no murmur, rub or gallop. Abdomen:    Soft, non tender. Not distended. Bowel sounds normal. No masses  Extremities:  Extremities normal, atraumatic, No cyanosis. No edema. + clubbing  Lymph nodes:  Cervical, supraclavicular normal.  Neurologic:  General decreased strength, Alert and oriented X 3. Skin:                 No rash    Telemetry: NSR     Lab Data Reviewed:    Recent Results (from the past 24 hour(s))   METABOLIC PANEL, BASIC    Collection Time: 04/10/18  1:51 AM   Result Value Ref Range    Sodium 137 136 - 145 mmol/L    Potassium 4.6 3.5 - 5.1 mmol/L    Chloride 102 97 - 108 mmol/L    CO2 25 21 - 32 mmol/L    Anion gap 10 5 - 15 mmol/L    Glucose 124 (H) 65 - 100 mg/dL    BUN 72 (H) 6 - 20 MG/DL    Creatinine 1.67 (H) 0.55 - 1.02 MG/DL    BUN/Creatinine ratio 43 (H) 12 - 20      GFR est AA 36 (L) >60 ml/min/1.73m2    GFR est non-AA 29 (L) >60 ml/min/1.73m2    Calcium 8.8 8.5 - 10.1 MG/DL   CBC WITH AUTOMATED DIFF    Collection Time: 04/10/18  1:51 AM   Result Value Ref Range    WBC 20.7 (H) 3.6 - 11.0 K/uL    RBC 3.96 3.80 - 5.20 M/uL    HGB 12.1 11.5 - 16.0 g/dL    HCT 35.1 35.0 - 47.0 %    MCV 88.6 80.0 - 99.0 FL    MCH 30.6 26.0 - 34.0 PG    MCHC 34.5 30.0 - 36.5 g/dL    RDW 18.6 (H) 11.5 - 14.5 %    PLATELET 698 243 - 637 K/uL    NRBC 0.1 (H) 0  WBC    ABSOLUTE NRBC 0.02 (H) 0.00 - 0.01 K/uL    NEUTROPHILS 97 (H) 32 - 75 %    LYMPHOCYTES 1 (L) 12 - 49 %    MONOCYTES 2 (L) 5 - 13 %    EOSINOPHILS 0 0 - 7 %    BASOPHILS 0 0 - 1 %    IMMATURE GRANULOCYTES 0 0.0 - 0.5 %    ABS. NEUTROPHILS 20.1 (H) 1.8 - 8.0 K/UL    ABS. LYMPHOCYTES 0.2 (L) 0.8 - 3.5 K/UL    ABS. MONOCYTES 0.4 0.0 - 1.0 K/UL    ABS. EOSINOPHILS 0.0 0.0 - 0.4 K/UL    ABS. BASOPHILS 0.0 0.0 - 0.1 K/UL    ABS. IMM.  GRANS. 0.0 0.00 - 0.04 K/UL    DF MANUAL      RBC COMMENTS ANISOCYTOSIS  1+        RBC COMMENTS HELMET CELLS  PRESENT        RBC COMMENTS SCHISTOCYTES  PRESENT             Assessment/Plan:     Principal Problem:    Acute hypoxemic respiratory failure ([address] (3/27/2018)    Active Problems:    COPD exacerbation ([address] (8/14/2010)      Hypertension (7/20/2016)      Peripheral vascular disease ([address] (7/20/2016)      Sepsis ([address] (3/28/2018)      Pneumonia (3/28/2018)      Acute renal failure (ARF) ([address] (3/28/2018)      Primary lung large cell carcinoma, left ([address] (3/29/2018)      Overview: Being candidate for surgical resection so treated with radiation therapy       in 2016      PAF (paroxysmal atrial fibrillation) ([facility] [address] (3/30/2018)      Moderate protein-calorie malnutrition ([facility] [address] (3/30/2018)      Decubitus ulcer of sacral region, stage 2 (3/30/2018)      Leukocytosis (4/8/2018)       ___________________________________________________  PLAN:    1. Levaquin and Vancomycin for Pneumonia (D/Micah Zosyn in light of diarrhea - had received it for 7 days) (D/C Vanc now)  2. Supplemental Oxygen for Hypoxemia now with RA and 94% Sat (She is DNR)  3.  JA treatments for acute respiratory failure  4. Steroids for COPD exacerbation tapered, taper further today  5. D/Micah IV Hydration which she was on with ARF and follow Creat (2.53 on adm to 1.67 now)  6. Decreased IV Lasix  7. Repleting K with I.V and PO and now 4.6 from 2.3, Mag normal 1.7  8. With anemia follow Hgb, 10.4 on adm to 12.1 now  9. Cont current HTN with HCTZ and Hydralazine and follow BP which is better now with Metoprolol on 4/3 and also on Cardizem PO  10. Cont ASA and Plavix with ASVD  11. Repeat CXR improved  12. Encouraged PO intake  13. Protonix IV  14. Added Probiotic  15. SNF Rehab on D/C, hopefully tomorrow  16.  Lexapro for depression    Critically ill patient with end stage lung disease and high risk of decompensation (Resprct DNR wishes)    ___________________________________________________    Attending Physician: Clifton Krabbe, MD

## 2018-04-10 NOTE — PROGRESS NOTES
PCU SHIFT NURSING NOTE      Bedside and Verbal shift change report given to Jackie Dillon RN (oncoming nurse) by Saba Gutiérrez rn (offgoing nurse). Report included the following information SBAR, Kardex, Intake/Output, MAR, Recent Results and Cardiac Rhythm NSR. Shift Summary: Received pt lying in bed with no noted distress. Will monitor. Admission Date 3/27/2018   Admission Diagnosis Respiratory failure with hypoxia ([address]   Consults None        Consults   []PT   []OT   []Speech   []Case Management      [] Palliative      Cardiac Monitoring Order   []Yes   []No     IV drips   []Yes    Drip:                            Dose:  Drip:                            Dose:  Drip:                            Dose:   []No     GI Prophylaxis   []Yes   []No         DVT Prophylaxis   SCDs:  Sequential Compression Device: Bilateral     Patient Refused VTE Prophylaxis: Yes    Kunal stockings:         [] Medication   []Contraindicated   []None      Activity Level Activity Level: Up with Assistance     Activity Assistance: Partial (two people)   Purposeful Rounding every 1-2 hour? []Yes   Campo Score  Total Score: 3   Bed Alarm (If score 3 or >)   []Yes   [] Refused (See signed refusal form in chart)   Rashid Score  Rashid Score: 11   Rashid Score (if score 14 or less)   []PMT consult   []Wound Care consult      []Specialty bed   [] Nutrition consult          Needs prior to discharge:   Home O2 required:    []Yes   []No    If yes, how much O2 required?     Other:    Last Bowel Movement: Last Bowel Movement Date: 04/09/18      Influenza Vaccine Received Flu Vaccine for Current Season (usually Sept-March): Yes        Pneumonia Vaccine           Diet Active Orders   Diet    DIET DENTAL SOFT (SOFT SOLID)      LDAs               Peripheral IV 04/07/18 Left Wrist (Active)   Site Assessment Clean, dry, & intact 4/9/2018  7:25 PM   Phlebitis Assessment 0 4/9/2018  7:25 PM   Infiltration Assessment 0 4/9/2018  7:25 PM   Dressing Status Clean, dry, & intact 4/9/2018  7:25 PM   Dressing Type Transparent 4/9/2018  7:25 PM   Hub Color/Line Status Pink 4/9/2018  7:25 PM   Action Taken Open ports on tubing capped 4/9/2018  5:30 PM   Alcohol Cap Used Yes 4/9/2018  7:25 PM          External Female Catheter 04/04/18 (Active)   Site Assessment Clean, dry, & intact 4/9/2018  7:25 PM   Repositioned Yes 4/9/2018  7:25 PM   Perineal Care Yes 4/9/2018  7:25 PM   Wick Changed No 4/9/2018  7:25 PM   Suction Canister/Tubing Changed No 4/9/2018  7:25 PM   Urine Output (mL) 150 ml 4/9/2018  6:49 PM                Urinary Catheter [REMOVED] Urinary Catheter 03/28/18 Tellez-Indications for Use: Urinary retention/bladder outlet obstruction    Intake & Output   Date 04/08/18 1900 - 04/09/18 0659 04/09/18 0700 - 04/10/18 0659   Shift 0498-6846 24 Hour Total 0866-4036 4902-6408 24 Hour Total   I  N  T  A  K  E   I.V.  (mL/kg/hr) 100 350 250  (0.5)  250      Cardizem Volume 0 0 0  0      Volume (vancomycin (VANCOCIN) 750 mg in 0.9% sodium chloride (MBP/ADV) 250 mL) 0 250 250  250      Volume (levoFLOXacin (LEVAQUIN) 500 mg in D5W IVPB) 100 100 0  0    Shift Total  (mL/kg) 100  (2.4) 350  (8.3) 250  (5.9)  250  (5.9)   O  U  T  P  U  T   Urine  (mL/kg/hr) 400 1075 825  (1.6)  825      Urine Occurrence(s)  1 x         Urine Output (mL) (External Female Catheter 04/04/18) 400 1075 825  825    Stool           Stool Occurrence(s) 1 x 4 x 2 x  2 x    Shift Total  (mL/kg) 400  (9.5) 1075  (25.4) 825  (19.5)  825  (19.5)   NET -300 -725 -575  -575   Weight (kg) 42.3 42.3 42.3 42.3 42.3         Readmission Risk Assessment Tool Score Medium Risk            20       Total Score        2 . Living with Significant Other. Assisted Living. LTAC. SNF. or   Rehab    3 Patient Length of Stay (>5 days = 3)    5 Pt.  Coverage (Medicare=5 , Medicaid, or Self-Pay=4)    10 Charlson Comorbidity Score (Age + Comorbid Conditions)        Criteria that do not apply:    Has Seen PCP in Last 6 Months (Yes=3, No=0)    IP Visits Last 12 Months (1-3=4, 4=9, >4=11)       Expected Length of Stay 4d 21h   Actual Length of Stay 11

## 2018-04-10 NOTE — PROGRESS NOTES
Nutrition Assessment:    INTERVENTIONS/RECOMMENDATIONS:   · Meals/Snacks: General/healthful diet: Dental soft diet. Encourage PO intake of meals  · Supplements: Commercial supplement: Continue magic cups BID and ensure pudding daily  · Consider appetite stimulant if appropriate  · Discussion on goals of care regarding nutrition support/TF     ASSESSMENT:   Chart reviewed; patient in bed with eyes closed at time of visit. Walked into room to check breakfast tray at bedside; it was untouched as are most meals when visiting patient. She opened here eyes shortly after entering; she continues to report poor appetite and does not feel like eating anything at all. Nothing sounds good and not requesting anything special when offered to provide her with anything she felt like. Patient with very poor appetite throughout admission. Underweight BMI. Continues to meet criteria for acute severe malnutrition. She would benefit from nutrition support if in line with goals of care. Encouraged intake of meals/supplements. Meets Criteria for Acute Malnutrition   [x] Severe Malnutrition, as evidenced by:   [x] Moderate muscle wasting, loss of subcutaneous fat   [x] Nutritional intake of <50% of recommended intake for >5 days   [x] Weight loss of >1-2% in 1 week, >5% in 1 month, >7.5% in 3 months, or >10% in 6 months   [] Moderate-severe edema   []Moderate Malnutrition, as evidenced by:   [] Mild muscle wasting, loss of subcutaneous fat   [] Nutritional intake <75% of recommended intake for >1 week   [] Weight loss of 1-2% in 1 week, 5% in 1 month, 7.5% in 3 months, or 10% in 6 months   [] Mild edema        Diet Order:  (Dental Soft + Magic cup BID, ensure pudding daily)  % Eaten:  No data found.     Pertinent Medications: [x] Reviewed []Other: Plavix, Cardizem, Lexapro, Lasix, Hydralazine, HCTZ, Priscila Q, Lopressor, Protonix, KCl, Prednisone   Pertinent Labs: [x]Reviewed  []Other:   Food Allergies: [x]None []Other:     Last BM: 4/10   [x]Active     []Hyperactive  []Hypoactive       [] Absent  BS  Skin:    [x] Intact   [] Incision  [] Breakdown   []Edema   []Other:    Anthropometrics: Height: 5' 1.5\" (156.2 cm) Weight: 42.1 kg (92 lb 14.2 oz)    IBW (%IBW):   ( ) UBW (%UBW):   (  %)    BMI: Body mass index is 17.27 kg/(m^2). This BMI is indicative of:  [x]Underweight   []Normal   []Overweight   [] Obesity   [] Extreme Obesity (BMI>40)  Last Weight Metrics:  Weight Loss Metrics 4/10/2018 10/28/2016 10/13/2016 8/29/2016 7/19/2016 2/14/2015 8/10/2010   Today's Wt 92 lb 14.2 oz 95 lb 95 lb 101 lb 101 lb 98 lb 5.2 oz 118 lb 3.2 oz   BMI 17.27 kg/m2 17.95 kg/m2 17.66 kg/m2 19.08 kg/m2 18.78 kg/m2 18.59 kg/m2 -       Estimated Nutrition Needs (Based on): 1363 Kcals/day (BMR (817) x 1.3AF +300kcal) , 50 g (-63g (1.2-1.5 g/kg bw)) Protein  Carbohydrate: At Least 130 g/day  Fluids: 1350 mL/day     Pt expected to meet estimated nutrient needs: []Yes [x]No    NUTRITION DIAGNOSES:   Problem:  Inadequate protein-energy intake      Etiology: related to nausea, decreased appetite     Signs/Symptoms: as evidenced by PO intake <25% of meals      NUTRITION INTERVENTIONS:  Meals/Snacks: General/healthful diet   Supplements: Commercial supplement              GOAL:   PO intake >25%/supplement next 2-4 days    NUTRITION MONITORING AND EVALUATION   Behavioral-Environmental Outcomes: Behavior  Food/Nutrient Intake Outcomes:  Total energy intake  Physical Signs/Symptoms Outcomes: Weight/weight change, Electrolyte and renal profile, Glucose profile    Previous Goal Met:   [] Met              [] Progressing Towards Goal              [x] Not Progressing Towards Goal   Previous Recommendations:   [x] Implemented          [] Not Implemented          [] Not Applicable    LEARNING NEEDS (Diet, Food/Nutrient-Drug Interaction):    [x] None Identified   [] Identified and Education Provided/Documented   [] Identified and Pt declined/was not appropriate     Cultural, Holiness, OR Ethnic Dietary Needs:    [x] None Identified   [] Identified and Addressed     [x] Interdisciplinary Care Plan Reviewed/Documented    [x] Discharge Planning: Regular diet + ONS TID   [] Participated in Interdisciplinary Rounds    NUTRITION RISK:    [x] High              [] Moderate           []  Low  []  Minimal/Uncompromised      Gibran\[facility] [place] [facility]\""  Pager [phone]              Weekend Pager [phone]

## 2018-04-10 NOTE — PROGRESS NOTES
Problem: Self Care Deficits Care Plan (Adult)  Goal: *Acute Goals and Plan of Care (Insert Text)  Occupational Therapy Goals  Initiated 4/2/2018, ReEval 4/10/2018  1. Patient will perform self-feeding with moderate assistance  within 7 day(s). MET upgrade to SETUP continue for consistency  2. Patient will perform upper body bathing with moderate assistance  within 7 day(s). CONTINUE  3. Patient will perform upper body dressing with minimal assistance/contact guard assist within 7 day(s). CONTINUE  4.  Patient will perform toilet transfers with moderate assistance  within 7 day(s). CONTINUE  5. Patient will perform all aspects of toileting with moderate assistance  within 7 day(s). CONTINUE  6. Patient will participate in upper extremity therapeutic exercise/activities with moderate assistance  for 5 minutes within 7 day(s). CONTINUE  7. Patient will participate in functional mobility/transfer assessment within 7 day(s). CONTINUE       Occupational Therapy TREATMENT: WEEKLY REASSESSMENT  Patient: Donald Cote (14 y.o. female)  Date: 4/10/2018  Diagnosis: Respiratory failure with hypoxia ([facility] [address] Acute hypoxemic respiratory failure (HCC)       Precautions: DNR, Fall  Chart, occupational therapy assessment, plan of care, and goals were reviewed. ASSESSMENT:  Progressing slowly, declined EOB or OOB, attempted to reposition, modified bed-chair positioning, mod A to setup for grooming task sitting up in bed, more alert but still requesting to \"sleep\", participated with 1/2 of each task with MAX encouragement, patient declined any other ADLs, seems to be confused, may benefit from cognitive assessment, small increase in affect while discussing daugther/family, Trial EOB next session as tolerated.  Recommend discharge to SNF    Progression toward goals:  [x]            Improving appropriately and progressing toward goals  []            Improving slowly and progressing toward goals  []            Not making progress toward goals and plan of care will be adjusted     PLAN:  Goals have been updated based on progression since last assessment. Patient continues to benefit from skilled intervention to address the above impairments. Continue to follow patient 3 times a week to address goals. Planned Interventions:  [x]                    Self Care Training                  [x]             Therapeutic Activities  [x]                    Functional Mobility Training    []             Cognitive Retraining  [x]                    Therapeutic Exercises           [x]             Endurance Activities  [x]                    Balance Training                   []             Neuromuscular Re-Education  []                    Visual/Perceptual Training     []        Home Safety Training  [x]                    Patient Education                 []             Family Training/Education  []                    Other (comment):  Discharge Recommendations: Skilled Nursing Facility  Further Equipment Recommendations for Discharge: TBD at SNF     SUBJECTIVE:   Patient stated I don't want to put my teeth in, I want to sleep.     OBJECTIVE DATA SUMMARY:   Cognitive/Behavioral Status:  Neurologic State: Alert;Eyes do not open to any stimulus  Orientation Level: Disoriented to situation;Disoriented to time;Oriented to person;Oriented to place  Cognition: Decreased attention/concentration; Follows commands; Impaired decision making;Poor safety awareness;Recognition of people/places             Functional Mobility and Transfers for ADLs:  Bed Mobility:       Transfers:         Bed to Chair: Total assistance (bed-chair positiong modified)    Balance:       ADL Intervention:  Feeding  Feeding Assistance: Supervision/set-up  Container Management: Total assistance (dependent)  Food to Mouth: Supervision/set-up  Drink to Mouth: Supervision/set-up  Cues: Verbal cues provided    Grooming  Washing Face:  Moderate assistance (for full completion)  Washing Hands: Moderate assistance (for full completion)  Brushing/Combing Hair: Maximum assistance                   Lower Body Dressing Assistance  Socks: Total assistance (dependent)    Toileting  Bladder Hygiene: Total assistance (dependent) (wes)         Neuro Re-Education:           Therapeutic Exercises:   B UE AAROM shoulder flexion, 5x each, 2x, B UE lifting for functional forward reach with AAROM and 10x  Pain:  Pain Scale 1: Numeric (0 - 10)  Pain Intensity 1: 0              Activity Tolerance:   poor  Please refer to the flowsheet for vital signs taken during this treatment.   After treatment:   [] Patient left in no apparent distress sitting up in chair  [x] Patient left in no apparent distress in bed  [x] Call bell left within reach  [x] Nursing notified  [] Caregiver present  [] Bed alarm activated    COMMUNICATION/COLLABORATION:   The patients plan of care was discussed with: Physical Therapy Assistant and Registered Nurse    Durga Reed OT  Time Calculation: 21 mins

## 2018-04-10 NOTE — PROGRESS NOTES
PCU SHIFT NURSING NOTE      Bedside shift change report given to [facility] RN (oncoming nurse) by Freeman Cuba (offgoing nurse). Report included the following information SBAR, Kardex, Intake/Output, Recent Results and Cardiac Rhythm NSR. Shift Summary:   1930: Patient resting comfortably in bed. Family at bedside. Denies pain, sob. Call bell in reach. Will monitor   2317: PIV infiltrated. New 24G placed in patients left hand   Bedside shift change report given to [facility] [address] (oncoming nurse) by Wade Barlow RN (offgoing nurse). Report included the following information SBAR, Kardex, Intake/Output and Recent Results. Admission Date 3/27/2018   Admission Diagnosis Respiratory failure with hypoxia ([facility] [address]   Consults None        Consults   []PT   []OT   []Speech   []Case Management      [] Palliative      Cardiac Monitoring Order   []Yes   []No     IV drips   []Yes    Drip:                            Dose:  Drip:                            Dose:  Drip:                            Dose:   []No     GI Prophylaxis   []Yes   []No         DVT Prophylaxis   SCDs:  Sequential Compression Device: Bilateral     Patient Refused VTE Prophylaxis: Yes    Kunal stockings:         [] Medication   []Contraindicated   []None      Activity Level Activity Level: Up with Assistance     Activity Assistance: Complete care   Purposeful Rounding every 1-2 hour? []Yes   Campo Score  Total Score: 3   Bed Alarm (If score 3 or >)   []Yes   [] Refused (See signed refusal form in chart)   Rashid Score  Rashid Score: 11   Rashid Score (if score 14 or less)   []PMT consult   []Wound Care consult      []Specialty bed   [] Nutrition consult          Needs prior to discharge:   Home O2 required:    []Yes   []No    If yes, how much O2 required?     Other:    Last Bowel Movement: Last Bowel Movement Date: 04/10/18      Influenza Vaccine Received Flu Vaccine for Current Season (usually Sept-March): Yes        Pneumonia Vaccine           Diet Active Orders Diet    DIET DENTAL SOFT (SOFT SOLID)      LDAs               Peripheral IV 04/07/18 Left Wrist (Active)   Site Assessment Clean, dry, & intact 4/10/2018  4:17 PM   Phlebitis Assessment 0 4/10/2018  4:17 PM   Infiltration Assessment 0 4/10/2018  4:17 PM   Dressing Status Clean, dry, & intact 4/10/2018  4:17 PM   Dressing Type Tape;Transparent 4/10/2018  4:17 PM   Hub Color/Line Status Pink; Infusing;Flushed 4/10/2018  4:17 PM   Action Taken Open ports on tubing capped 4/10/2018  4:17 PM   Alcohol Cap Used Yes 4/10/2018  4:17 PM          External Female Catheter 04/04/18 (Active)   Site Assessment Clean, dry, & intact 4/10/2018  4:17 PM   Repositioned Yes 4/10/2018  4:17 PM   Perineal Care No 4/10/2018  4:17 PM   Wick Changed No 4/10/2018  4:17 PM   Suction Canister/Tubing Changed No 4/10/2018  4:17 PM   Urine Output (mL) 225 ml 4/10/2018  4:17 PM                Urinary Catheter [REMOVED] Urinary Catheter 03/28/18 Tellez-Indications for Use: Urinary retention/bladder outlet obstruction    Intake & Output   Date 04/09/18 1900 - 04/10/18 0659 04/10/18 0700 - 04/11/18 0659   Shift 4280-8870 24 Hour Total 0734-6934 9772-8082 24 Hour Total   I  N  T  A  K  E   I.V.  (mL/kg/hr)  250 0  (0)  0      Cardizem Volume  0 0  0      Volume (vancomycin (VANCOCIN) 750 mg in 0.9% sodium chloride (MBP/ADV) 250 mL)  250 0  0      Volume (levoFLOXacin (LEVAQUIN) 500 mg in D5W IVPB)  0 0  0    Shift Total  (mL/kg)  250  (5.9) 0  (0)  0  (0)   O  U  T  P  U  T   Urine  (mL/kg/hr) 375 1200 1025  (2)  1025      Urine Output (mL) (External Female Catheter 04/04/18) 375 1200 1025  1025    Stool           Stool Occurrence(s) 1 x 3 x 1 x  1 x    Shift Total  (mL/kg) 375  (8.9) 1200  (28.4) 1025  (24.3)  1025  (24.3)   NET -375 -950 -1025  -1025   Weight (kg) 42.3 42.3 42.1 42.1 42.1         Readmission Risk Assessment Tool Score Medium Risk            20       Total Score        2 . Living with Significant Other. Assisted Living. LTAC. SNF. or   Rehab    3 Patient Length of Stay (>5 days = 3)    5 Pt.  Coverage (Medicare=5 , Medicaid, or Self-Pay=4)    10 Charlson Comorbidity Score (Age + Comorbid Conditions)        Criteria that do not apply:    Has Seen PCP in Last 6 Months (Yes=3, No=0)    IP Visits Last 12 Months (1-3=4, 4=9, >4=11)       Expected Length of Stay 4d 21h   Actual Length of Stay 12

## 2018-04-10 NOTE — PROGRESS NOTES
Problem: Falls - Risk of  Goal: *Absence of Falls  Document [place] Fall Risk and appropriate interventions in the flowsheet.    Outcome: Progressing Towards Goal  Fall Risk Interventions:  Mobility Interventions: Bed/chair exit alarm    Mentation Interventions: Adequate sleep, hydration, pain control    Medication Interventions: Bed/chair exit alarm    Elimination Interventions: Bed/chair exit alarm

## 2018-04-11 LAB
ANION GAP SERPL CALC-SCNC: 10 MMOL/L (ref 5–15)
BASOPHILS # BLD: 0 K/UL (ref 0–0.1)
BASOPHILS NFR BLD: 0 % (ref 0–1)
BUN SERPL-MCNC: 83 MG/DL (ref 6–20)
BUN/CREAT SERPL: 44 (ref 12–20)
CALCIUM SERPL-MCNC: 8.4 MG/DL (ref 8.5–10.1)
CHLORIDE SERPL-SCNC: 103 MMOL/L (ref 97–108)
CO2 SERPL-SCNC: 26 MMOL/L (ref 21–32)
CREAT SERPL-MCNC: 1.89 MG/DL (ref 0.55–1.02)
DIFFERENTIAL METHOD BLD: ABNORMAL
EOSINOPHIL # BLD: 0 K/UL (ref 0–0.4)
EOSINOPHIL NFR BLD: 0 % (ref 0–7)
ERYTHROCYTE [DISTWIDTH] IN BLOOD BY AUTOMATED COUNT: 18.1 % (ref 11.5–14.5)
GLUCOSE SERPL-MCNC: 99 MG/DL (ref 65–100)
HCT VFR BLD AUTO: 29 % (ref 35–47)
HGB BLD-MCNC: 10.1 G/DL (ref 11.5–16)
IMM GRANULOCYTES # BLD: 0.2 K/UL (ref 0–0.04)
IMM GRANULOCYTES NFR BLD AUTO: 1 % (ref 0–0.5)
LYMPHOCYTES # BLD: 0.2 K/UL (ref 0.8–3.5)
LYMPHOCYTES NFR BLD: 1 % (ref 12–49)
MCH RBC QN AUTO: 30.6 PG (ref 26–34)
MCHC RBC AUTO-ENTMCNC: 34.8 G/DL (ref 30–36.5)
MCV RBC AUTO: 87.9 FL (ref 80–99)
MONOCYTES # BLD: 1.4 K/UL (ref 0–1)
MONOCYTES NFR BLD: 9 % (ref 5–13)
NEUTS SEG # BLD: 13.7 K/UL (ref 1.8–8)
NEUTS SEG NFR BLD: 88 % (ref 32–75)
NRBC # BLD: 0 K/UL (ref 0–0.01)
NRBC BLD-RTO: 0 PER 100 WBC
PLATELET # BLD AUTO: 174 K/UL (ref 150–400)
PMV BLD AUTO: 12.3 FL (ref 8.9–12.9)
POTASSIUM SERPL-SCNC: 4.5 MMOL/L (ref 3.5–5.1)
RBC # BLD AUTO: 3.3 M/UL (ref 3.8–5.2)
SODIUM SERPL-SCNC: 139 MMOL/L (ref 136–145)
WBC # BLD AUTO: 15.4 K/UL (ref 3.6–11)

## 2018-04-11 PROCEDURE — 74011250637 HC RX REV CODE- 250/637: Performed by: INTERNAL MEDICINE

## 2018-04-11 PROCEDURE — 65660000000 HC RM CCU STEPDOWN

## 2018-04-11 PROCEDURE — 74011636637 HC RX REV CODE- 636/637: Performed by: INTERNAL MEDICINE

## 2018-04-11 PROCEDURE — 74011250636 HC RX REV CODE- 250/636: Performed by: INTERNAL MEDICINE

## 2018-04-11 PROCEDURE — 77030038269 HC DRN EXT URIN PURWCK BARD -A

## 2018-04-11 PROCEDURE — 74011250637 HC RX REV CODE- 250/637: Performed by: GENERAL ACUTE CARE HOSPITAL

## 2018-04-11 PROCEDURE — 85025 COMPLETE CBC W/AUTO DIFF WBC: CPT | Performed by: INTERNAL MEDICINE

## 2018-04-11 PROCEDURE — 36415 COLL VENOUS BLD VENIPUNCTURE: CPT | Performed by: GENERAL ACUTE CARE HOSPITAL

## 2018-04-11 PROCEDURE — 74011250636 HC RX REV CODE- 250/636: Performed by: GENERAL ACUTE CARE HOSPITAL

## 2018-04-11 PROCEDURE — 80048 BASIC METABOLIC PNL TOTAL CA: CPT | Performed by: GENERAL ACUTE CARE HOSPITAL

## 2018-04-11 RX ORDER — ESCITALOPRAM OXALATE 10 MG/1
20 TABLET ORAL DAILY
Status: DISCONTINUED | OUTPATIENT
Start: 2018-04-11 | End: 2018-04-12 | Stop reason: HOSPADM

## 2018-04-11 RX ADMIN — NYSTATIN 500000 UNITS: 100000 SUSPENSION ORAL at 21:14

## 2018-04-11 RX ADMIN — Medication 1 CAPSULE: at 09:37

## 2018-04-11 RX ADMIN — PREDNISONE 20 MG: 20 TABLET ORAL at 09:37

## 2018-04-11 RX ADMIN — MICONAZOLE NITRATE: 20 CREAM TOPICAL at 18:02

## 2018-04-11 RX ADMIN — METOPROLOL TARTRATE 50 MG: 50 TABLET ORAL at 17:59

## 2018-04-11 RX ADMIN — PANTOPRAZOLE SODIUM 40 MG: 40 TABLET, DELAYED RELEASE ORAL at 18:00

## 2018-04-11 RX ADMIN — HEPARIN SODIUM 5000 UNITS: 5000 INJECTION, SOLUTION INTRAVENOUS; SUBCUTANEOUS at 09:37

## 2018-04-11 RX ADMIN — HEPARIN SODIUM 5000 UNITS: 5000 INJECTION, SOLUTION INTRAVENOUS; SUBCUTANEOUS at 04:12

## 2018-04-11 RX ADMIN — ASPIRIN 81 MG CHEWABLE TABLET 81 MG: 81 TABLET CHEWABLE at 18:00

## 2018-04-11 RX ADMIN — POTASSIUM CHLORIDE 20 MEQ: 1.5 POWDER, FOR SOLUTION ORAL at 09:37

## 2018-04-11 RX ADMIN — MICONAZOLE NITRATE: 20 CREAM TOPICAL at 09:38

## 2018-04-11 RX ADMIN — DILTIAZEM HYDROCHLORIDE 180 MG: 180 CAPSULE, COATED, EXTENDED RELEASE ORAL at 09:37

## 2018-04-11 RX ADMIN — PANTOPRAZOLE SODIUM 40 MG: 40 TABLET, DELAYED RELEASE ORAL at 09:36

## 2018-04-11 RX ADMIN — NYSTATIN 500000 UNITS: 100000 SUSPENSION ORAL at 13:44

## 2018-04-11 RX ADMIN — Medication 10 ML: at 13:45

## 2018-04-11 RX ADMIN — Medication 10 ML: at 05:54

## 2018-04-11 RX ADMIN — METOPROLOL TARTRATE 50 MG: 50 TABLET ORAL at 09:37

## 2018-04-11 RX ADMIN — HYDRALAZINE HYDROCHLORIDE 100 MG: 50 TABLET ORAL at 09:37

## 2018-04-11 RX ADMIN — Medication 10 ML: at 09:37

## 2018-04-11 RX ADMIN — FUROSEMIDE 40 MG: 10 INJECTION, SOLUTION INTRAMUSCULAR; INTRAVENOUS at 09:37

## 2018-04-11 RX ADMIN — NYSTATIN 500000 UNITS: 100000 SUSPENSION ORAL at 09:37

## 2018-04-11 RX ADMIN — HYDRALAZINE HYDROCHLORIDE 100 MG: 50 TABLET ORAL at 21:14

## 2018-04-11 RX ADMIN — NYSTATIN 500000 UNITS: 100000 SUSPENSION ORAL at 18:15

## 2018-04-11 RX ADMIN — HEPARIN SODIUM 5000 UNITS: 5000 INJECTION, SOLUTION INTRAVENOUS; SUBCUTANEOUS at 18:01

## 2018-04-11 RX ADMIN — HYDRALAZINE HYDROCHLORIDE 100 MG: 50 TABLET ORAL at 17:59

## 2018-04-11 RX ADMIN — CLOPIDOGREL BISULFATE 75 MG: 75 TABLET ORAL at 09:37

## 2018-04-11 RX ADMIN — HYDROCHLOROTHIAZIDE 25 MG: 25 TABLET ORAL at 18:58

## 2018-04-11 RX ADMIN — TEMAZEPAM 15 MG: 15 CAPSULE ORAL at 21:13

## 2018-04-11 RX ADMIN — Medication 10 ML: at 21:14

## 2018-04-11 RX ADMIN — ESCITALOPRAM OXALATE 20 MG: 10 TABLET ORAL at 09:37

## 2018-04-11 NOTE — PROGRESS NOTES
Attempted to see pt this pm and once again pt refused. Will attempt one more time and d/c if she is not going to participate.

## 2018-04-11 NOTE — PROGRESS NOTES
PCU SHIFT NURSING NOTE      Bedside shift change report given to Fannie RN (oncoming nurse) by Manuel Freeman RN (offgoing nurse). Report included the following information SBAR, Kardex, Intake/Output and Recent Results. Shift Summary:   1930: Patient resting comfortably and quietly in bed. Denies pain, sob. Call bell in reach. Will monitor   Bedside shift change report given to [facility] (oncoming nurse) by Anand Trinh RN (offgoing nurse). Report included the following information SBAR, Kardex, Intake/Output and Recent Results. Admission Date 3/27/2018   Admission Diagnosis Respiratory failure with hypoxia ([facility] [address]   Consults None        Consults   []PT   []OT   []Speech   []Case Management      [] Palliative      Cardiac Monitoring Order   []Yes   []No     IV drips   []Yes    Drip:                            Dose:  Drip:                            Dose:  Drip:                            Dose:   []No     GI Prophylaxis   []Yes   []No         DVT Prophylaxis   SCDs:  Sequential Compression Device: Bilateral     Patient Refused VTE Prophylaxis: Yes    Kunal stockings:         [] Medication   []Contraindicated   []None      Activity Level Activity Level: Up with Assistance     Activity Assistance: Complete care   Purposeful Rounding every 1-2 hour? []Yes   Campo Score  Total Score: 3   Bed Alarm (If score 3 or >)   []Yes   [] Refused (See signed refusal form in chart)   Rashid Score  Rashid Score: 11   Rashid Score (if score 14 or less)   []PMT consult   []Wound Care consult      []Specialty bed   [] Nutrition consult          Needs prior to discharge:   Home O2 required:    []Yes   []No    If yes, how much O2 required?     Other:    Last Bowel Movement: Last Bowel Movement Date: 04/10/18      Influenza Vaccine Received Flu Vaccine for Current Season (usually Sept-March): Yes        Pneumonia Vaccine           Diet Active Orders   Diet    DIET DENTAL SOFT (SOFT SOLID)      LDAs               Peripheral IV 04/10/18 Left Hand (Active)   Site Assessment Clean, dry, & intact 4/11/2018  8:08 AM   Phlebitis Assessment 0 4/11/2018  8:08 AM   Infiltration Assessment 0 4/11/2018  8:08 AM   Dressing Status Clean, dry, & intact 4/11/2018  8:08 AM   Dressing Type Transparent 4/11/2018  8:08 AM   Hub Color/Line Status Yellow 4/11/2018  8:08 AM   Action Taken Open ports on tubing capped 4/11/2018  4:15 AM   Alcohol Cap Used Yes 4/11/2018  4:15 AM          External Female Catheter 04/04/18 (Active)   Site Assessment Clean, dry, & intact 4/11/2018  4:15 AM   Repositioned Yes 4/11/2018  4:15 AM   Perineal Care Yes 4/11/2018  4:15 AM   Wick Changed Yes 4/11/2018  4:15 AM   Suction Canister/Tubing Changed No 4/11/2018  4:15 AM   Urine Output (mL) 300 ml 4/11/2018  6:55 AM                Urinary Catheter [REMOVED] Urinary Catheter 03/28/18 Tellez-Indications for Use: Urinary retention/bladder outlet obstruction    Intake & Output   Date 04/10/18 1900 - 04/11/18 0659 04/11/18 0700 - 04/12/18 0659   Shift 9622-3516 24 Hour Total 4946-7879 3074-2368 24 Hour Total   I  N  T  A  K  E   I.V.  (mL/kg/hr)  0         Cardizem Volume  0         Volume (vancomycin (VANCOCIN) 750 mg in 0.9% sodium chloride (MBP/ADV) 250 mL)  0         Volume (levoFLOXacin (LEVAQUIN) 500 mg in D5W IVPB)  0       Shift Total  (mL/kg)  0  (0)      O  U  T  P  U  T   Urine  (mL/kg/hr) 1200 2225 400  (0.8)  400      Urine Voided   400  400      Urine Occurrence(s)    1 x 1 x      Urine Output (mL) (External Female Catheter 04/04/18) 1200 2225       Stool           Stool Occurrence(s)  1 x  1 x 1 x    Shift Total  (mL/kg) 1200  (28.5) 2225  (52.8) 400  (9.5)  400  (9.5)   NET -1200 -2225 -400  -400   Weight (kg) 42.1 42.1 42.1 42.1 42.1         Readmission Risk Assessment Tool Score Medium Risk            20       Total Score        2 . Living with Significant Other. Assisted Living. LTAC. SNF. or   Rehab    3 Patient Length of Stay (>5 days = 3)    5 Pt.  Coverage (Medicare=5 , Medicaid, or Self-Pay=4)    10 Charlson Comorbidity Score (Age + Comorbid Conditions)        Criteria that do not apply:    Has Seen PCP in Last 6 Months (Yes=3, No=0)    IP Visits Last 12 Months (1-3=4, 4=9, >4=11)       Expected Length of Stay 4d 21h   Actual Length of Stay 13

## 2018-04-11 NOTE — PROGRESS NOTES
Second medicare im letter given to patient with opportunity for questions and signed copy placed on chart. Spoke with OREMIGDIO at [facility] and alerted her patient for possible discharge to them tomorrow. Spoke with patient and made her aware.

## 2018-04-12 VITALS
HEART RATE: 73 BPM | WEIGHT: 92.89 LBS | TEMPERATURE: 98 F | OXYGEN SATURATION: 95 % | HEIGHT: 62 IN | SYSTOLIC BLOOD PRESSURE: 140 MMHG | BODY MASS INDEX: 17.09 KG/M2 | DIASTOLIC BLOOD PRESSURE: 62 MMHG | RESPIRATION RATE: 20 BRPM

## 2018-04-12 LAB
ANION GAP SERPL CALC-SCNC: 9 MMOL/L (ref 5–15)
BASOPHILS # BLD: 0 K/UL (ref 0–0.1)
BASOPHILS NFR BLD: 0 % (ref 0–1)
BUN SERPL-MCNC: 85 MG/DL (ref 6–20)
BUN/CREAT SERPL: 47 (ref 12–20)
CALCIUM SERPL-MCNC: 8.7 MG/DL (ref 8.5–10.1)
CHLORIDE SERPL-SCNC: 103 MMOL/L (ref 97–108)
CO2 SERPL-SCNC: 26 MMOL/L (ref 21–32)
CREAT SERPL-MCNC: 1.81 MG/DL (ref 0.55–1.02)
DIFFERENTIAL METHOD BLD: ABNORMAL
EOSINOPHIL # BLD: 0 K/UL (ref 0–0.4)
EOSINOPHIL NFR BLD: 0 % (ref 0–7)
ERYTHROCYTE [DISTWIDTH] IN BLOOD BY AUTOMATED COUNT: 18.2 % (ref 11.5–14.5)
GLUCOSE SERPL-MCNC: 101 MG/DL (ref 65–100)
HCT VFR BLD AUTO: 30.4 % (ref 35–47)
HGB BLD-MCNC: 10.7 G/DL (ref 11.5–16)
IMM GRANULOCYTES # BLD: 0.1 K/UL (ref 0–0.04)
IMM GRANULOCYTES NFR BLD AUTO: 1 % (ref 0–0.5)
LYMPHOCYTES # BLD: 0.2 K/UL (ref 0.8–3.5)
LYMPHOCYTES NFR BLD: 2 % (ref 12–49)
MCH RBC QN AUTO: 30.8 PG (ref 26–34)
MCHC RBC AUTO-ENTMCNC: 35.2 G/DL (ref 30–36.5)
MCV RBC AUTO: 87.6 FL (ref 80–99)
MONOCYTES # BLD: 1.2 K/UL (ref 0–1)
MONOCYTES NFR BLD: 8 % (ref 5–13)
NEUTS SEG # BLD: 13.2 K/UL (ref 1.8–8)
NEUTS SEG NFR BLD: 89 % (ref 32–75)
NRBC # BLD: 0 K/UL (ref 0–0.01)
NRBC BLD-RTO: 0 PER 100 WBC
PLATELET # BLD AUTO: 166 K/UL (ref 150–400)
PMV BLD AUTO: 12.3 FL (ref 8.9–12.9)
POTASSIUM SERPL-SCNC: 4.3 MMOL/L (ref 3.5–5.1)
RBC # BLD AUTO: 3.47 M/UL (ref 3.8–5.2)
SODIUM SERPL-SCNC: 138 MMOL/L (ref 136–145)
WBC # BLD AUTO: 14.7 K/UL (ref 3.6–11)

## 2018-04-12 PROCEDURE — 74011250637 HC RX REV CODE- 250/637: Performed by: INTERNAL MEDICINE

## 2018-04-12 PROCEDURE — 85025 COMPLETE CBC W/AUTO DIFF WBC: CPT | Performed by: INTERNAL MEDICINE

## 2018-04-12 PROCEDURE — 36415 COLL VENOUS BLD VENIPUNCTURE: CPT | Performed by: INTERNAL MEDICINE

## 2018-04-12 PROCEDURE — 74011636637 HC RX REV CODE- 636/637: Performed by: INTERNAL MEDICINE

## 2018-04-12 PROCEDURE — 74011250636 HC RX REV CODE- 250/636: Performed by: GENERAL ACUTE CARE HOSPITAL

## 2018-04-12 PROCEDURE — 74011250636 HC RX REV CODE- 250/636: Performed by: INTERNAL MEDICINE

## 2018-04-12 PROCEDURE — 74011250637 HC RX REV CODE- 250/637: Performed by: GENERAL ACUTE CARE HOSPITAL

## 2018-04-12 PROCEDURE — 80048 BASIC METABOLIC PNL TOTAL CA: CPT | Performed by: GENERAL ACUTE CARE HOSPITAL

## 2018-04-12 RX ORDER — PREDNISONE 10 MG/1
10 TABLET ORAL
Qty: 30 TAB | Refills: 0 | Status: SHIPPED | OUTPATIENT
Start: 2018-04-12 | End: 2019-10-11

## 2018-04-12 RX ORDER — DILTIAZEM HYDROCHLORIDE 180 MG/1
180 CAPSULE, COATED, EXTENDED RELEASE ORAL DAILY
Qty: 30 CAP | Status: SHIPPED | OUTPATIENT
Start: 2018-04-12 | End: 2018-10-30

## 2018-04-12 RX ORDER — ESCITALOPRAM OXALATE 20 MG/1
20 TABLET ORAL DAILY
Qty: 30 TAB | Status: SHIPPED | OUTPATIENT
Start: 2018-04-12 | End: 2018-10-24

## 2018-04-12 RX ORDER — METOPROLOL TARTRATE 50 MG/1
50 TABLET ORAL 2 TIMES DAILY
Qty: 60 TAB | Status: SHIPPED | OUTPATIENT
Start: 2018-04-12

## 2018-04-12 RX ORDER — NYSTATIN 100000 [USP'U]/ML
500000 SUSPENSION ORAL 4 TIMES DAILY
Qty: 200 ML | Refills: 0 | Status: SHIPPED | OUTPATIENT
Start: 2018-04-12 | End: 2018-10-24

## 2018-04-12 RX ADMIN — Medication 10 ML: at 05:24

## 2018-04-12 RX ADMIN — FUROSEMIDE 40 MG: 10 INJECTION, SOLUTION INTRAMUSCULAR; INTRAVENOUS at 09:31

## 2018-04-12 RX ADMIN — ESCITALOPRAM OXALATE 20 MG: 10 TABLET ORAL at 09:30

## 2018-04-12 RX ADMIN — HEPARIN SODIUM 5000 UNITS: 5000 INJECTION, SOLUTION INTRAVENOUS; SUBCUTANEOUS at 09:31

## 2018-04-12 RX ADMIN — PREDNISONE 20 MG: 20 TABLET ORAL at 09:30

## 2018-04-12 RX ADMIN — DILTIAZEM HYDROCHLORIDE 180 MG: 180 CAPSULE, COATED, EXTENDED RELEASE ORAL at 09:30

## 2018-04-12 RX ADMIN — HEPARIN SODIUM 5000 UNITS: 5000 INJECTION, SOLUTION INTRAVENOUS; SUBCUTANEOUS at 01:27

## 2018-04-12 RX ADMIN — HYDRALAZINE HYDROCHLORIDE 100 MG: 50 TABLET ORAL at 09:30

## 2018-04-12 RX ADMIN — NYSTATIN 500000 UNITS: 100000 SUSPENSION ORAL at 09:30

## 2018-04-12 RX ADMIN — Medication 1 CAPSULE: at 09:30

## 2018-04-12 RX ADMIN — PANTOPRAZOLE SODIUM 40 MG: 40 TABLET, DELAYED RELEASE ORAL at 09:30

## 2018-04-12 RX ADMIN — METOPROLOL TARTRATE 50 MG: 50 TABLET ORAL at 09:31

## 2018-04-12 RX ADMIN — POTASSIUM CHLORIDE 20 MEQ: 1.5 POWDER, FOR SOLUTION ORAL at 09:30

## 2018-04-12 RX ADMIN — MICONAZOLE NITRATE: 20 CREAM TOPICAL at 09:31

## 2018-04-12 RX ADMIN — CLOPIDOGREL BISULFATE 75 MG: 75 TABLET ORAL at 09:30

## 2018-04-12 NOTE — PROGRESS NOTES
1530: Bedside shift change report given to Maureen Perdomo RN (oncoming nurse) by Tricia Larose RN (offgoing nurse). Report included the following information SBAR, Kardex, Intake/Output, MAR, Recent Results, Cardiac Rhythm Sinus rhythm and Alarm Parameters . 1845: Patient was incontinent of stool and was leaking around the [address]. Incontinence care was provided, linen changed, and new Purewick inserted. Will continue to monitor closely. 1910: Bedside shift change report given to Hema Deluna RN (oncoming nurse) by Maureen Perdomo RN (offgoing nurse). Report included the following information SBAR, Kardex, Intake/Output, MAR, Recent Results, Cardiac Rhythm Sinus rhythm and Alarm Parameters .

## 2018-04-12 NOTE — ROUTINE PROCESS
PCP MARC appt scheduled with Dr. Gallito Rivera on May 2 at 10:50am. Appt added to [address] [place] CM Specialist

## 2018-04-12 NOTE — PROGRESS NOTES
Patient is being discharged to [facility] today via [facility]. Called daughter and her son and made them aware. Referral sent to [facility] and they will transport patient at 1100am today to [facility]. Informed patient. PCS completed and given to nursing. Nursing to call report to [phone]. Patient will be going to room 409b.

## 2018-04-12 NOTE — PROGRESS NOTES
[facility] [id number] [facility], RECENT RESULTS. No complaints of pain, nausea, or shortness of breath. 0930: Patient bathed, eating breakfast. PO meds crushed in applesauce. 0945: Called report to [facility].

## 2018-04-12 NOTE — DISCHARGE INSTRUCTIONS
[address]  [phone]      Patient Discharge Instructions    Pamla Habermann / [id number] : 1935    Admitted 3/27/2018 Discharged: 18     Principal Problem:    Acute hypoxemic respiratory failure ([address] (3/27/2018)    Active Problems:    COPD exacerbation ([address] (2010)      Hypertension (2016)      Peripheral vascular disease ([address] (2016)      Sepsis ([address] (3/28/2018)      Pneumonia (3/28/2018)      Acute renal failure (ARF) ([address] (3/28/2018)      Primary lung large cell carcinoma, left ([address] (3/29/2018)      Overview: Being candidate for surgical resection so treated with radiation therapy       in       PAF (paroxysmal atrial fibrillation) ([address] (3/30/2018)      Moderate protein-calorie malnutrition ([address] (3/30/2018)      Decubitus ulcer of sacral region, stage 2 (3/30/2018)      Leukocytosis (2018)          Allergies   Allergen Reactions    Dilaudid [Hydromorphone] Unknown (comments)     Does not remember    Hydrocodone Nausea and Vomiting    Morphine Rash       · It is important that you take the medication exactly as they are prescribed. · Do not take other medications without consulting your doctor. What to do at Next Level of Care    Disposition:  [facility]    Recommended diet: Hi Protein, Hi calorie    Recommended activity: Up with PT    Wound Care: To Perirectal area, Miconazole cream          Information obtained by :  I understand that if any problems occur once I am at home I am to contact my physician. I understand and acknowledge receipt of the instructions indicated above.                                                                                                                                            Physician's or R.N.'s Signature                                                                  Date/Time Patient or Representative Signature                                                          Date/Time

## 2018-04-13 ENCOUNTER — PATIENT OUTREACH (OUTPATIENT)
Dept: CASE MANAGEMENT | Age: 83
End: 2018-04-13

## 2018-04-13 NOTE — Clinical Note
Dr. Zuri Souza,  I am the ambulatory , Thalia Banks working with you and your patient. I have placed a outreach note in patient's chart for your review. If you need any assistance, please feel free to contact me at [phone]. Thank you.

## 2018-04-13 NOTE — DISCHARGE SUMMARY
[facility] [address]    Massimo Live.  MR#: [id number]  : 1935  ACCOUNT #: [de-identified]   ADMIT DATE: 2018  DISCHARGE DATE: 2018    FINAL DIAGNOSES  1. Acute hypoxemic respiratory failure. 2.  Chronic obstructive pulmonary disease exacerbation. 3.  Bilateral pneumonia. 4.  Hypertension. 5.  Peripheral vascular disease. 6.  Sepsis. 7.  Acute renal failure, resolved. 8.  Status post treatment for large cell lung carcinoma, left side, treated with radiation in 2016.  9.  Paroxysmal atrial fibrillation during this hospitalization. 10.  Moderate protein calorie malnutrition. 11.  Decubitus ulcers, sacral region, stage II.  12.  Failure to thrive. CONSULTATIONS:  None. PROCEDURES:  None. HISTORY OF PRESENT ILLNESS:  For details of admission history and physical, please see admit note. Briefly, the patient is an 45-year-old white female resident of assisted living facility, who presented to the hospital with increased respiratory distress and was found to have hypoxemic respiratory failure and bilateral infiltrates on chest x-ray. She also was noted to have acute on chronic renal failure. HOSPITAL COURSE:  She was initially started on IV hydration as well as triple antibiotic therapy with Zosyn, Levaquin and vancomycin. She, after a couple days of hydration, developed some increasing shortness of breath, and was found to have pulmonary edema and hydration was stopped and she was diuresed and continued on low dose of IV Lasix and the remainder of the hospitalization was without IV fluids. Her chest x-ray cleared and her respiratory status gradually improved with antibiotic therapy. She was treated with Zosyn for 7 days and that was discontinued due to diarrhea. She was continued on Levaquin and vancomycin for a total of 14 days.   She had improvement in respiratory status and oxygenation improved to the point where she had 95% sat on room air. She was noted to be generally debilitated and malnourished and protein calorie intake was increased. She was also seen for rehabilitation and accepted at [facility] for rehabilitation. I did discuss this with her daughter who preferred [facility] over other health care facilities. At this point, it is felt that her diarrhea is now resolved, her pneumonia is clear, she is no longer in pulmonary edema, her hypertension is controlled and paroxysmal atrial fib that she developed during the hospitalization is now controlled with p.o. medications and she is in sinus rhythm. Maximal hospitalization has been achieved and she will be discharged to SNF rehab at [facility]. DISCHARGE MEDICATIONS:  Will consist of discontinuation of Exforge, and in place of that she will have Cardizem- daily, metoprolol 50 mg b.i.d. She will have her Catapres discontinued. She will be continued on aspirin 81 mg daily, Plavix 75 mg every day, Tylenol 650 every 6 hours as needed, Lexapro 20 mg daily as a new prescription. Her gabapentin was discontinued. She will be continued on hydralazine 100 mg 3 times a day, hydrochlorothiazide 25 mg daily, Protonix 40 mg daily, Zocor 80 mg daily. Other additional new medications prednisone 10 mg daily to be tapered as an outpatient. Nystatin swish and swallow 5 mL q.i.d. for an additional 10 days, and Priscila-Q probiotic one tablet daily. FOLLOWUP:  She will have followup with me after discharge from rehabilitation. DISPOSITION:  Discharged to [facility].       MD SEAN Santacruz/TN  D: 04/12/2018 07:59     T: 04/13/2018 06:35  JOB #: [id number]

## 2018-04-13 NOTE — PROGRESS NOTES
Hospital Discharge Follow-Up      Date/Time:  2018 1:57 PM    [facility] and discharged on 18 for respiratory failure/COPD. The physician discharge summary was available at the time of outreach. Patient was discharge to skilled nursing facility (SNF), [address] Per PCP note, patient is to follow up with PCP after discharge from SNF. Patient was contacted within 2 business days of discharge. Top Challenges reviewed with the provider   Recommended blood labs to be drawn at SNF - CBC (WBCs elevated at d/c, 14.7 and hemoglobin 10.7), BMP (potassium was low, 2.3, BUN 70, creatinine 1.72), no d/c lactic acid, but admitted lactic acid was 1.7 WNL and no albumin. Information on labs was relayed to community care team via [employer] and [address] via telephone on 18 to nurseDion. Medication reconciliation completed. Patient's family informed of ambulatory case management with [employer] via telephone. Patient's PCP informed of ambulatory case management via [facility]. Inpatient RRAT score: 21  Was this a readmission? no   Patient stated reason for the readmission: n/a    Method of communication with provider :chart routing    Nurse Navigator (NN) contacted the family by telephone to perform post hospital discharge assessment. Verified name and  with family as identifiers. Provided introduction to self, and explanation of the Nurse Navigator role. Reviewed discharge instructions and red flags with  family who verbalized understanding. Family given an opportunity to ask questions and does not have any further questions or concerns at this time. The family agrees to contact the PCP office for questions related to their healthcare. NN provided contact information for future reference. Summary of patients top problems:  1. Respiratory failure/COPD   2. PNA  3.  Sepsis    Home Health orders at discharge: Patient currently at [facility], [facility]: n/a  Date of initial visit: [address] [place] [address] ordered/company: [address] [place] [address] received: n/a    Barriers to care? Patient is currently at [facility], financial, depression, ineffective coping, lack of knowledge about disease, level of motivation, medication management, PCP relationship, stages of grief, support system, transportation, utilization of services    Advance Care Planning:   Does patient have an Advance Directive:  reviewed and current       Medication:     New Medications at Discharge: cardizem, meteprolol, nystatin, prednisone & petra-q  Changed Medications at Discharge: none  Discontinued Medications at Discharge: exforge, catapres    Medication reconciliation was performed with [facility] nurse, who verbalizes understanding of administration of home medications. There were no barriers to obtaining medications identified at this time. Referral to Pharm D needed: no     Current Outpatient Prescriptions   Medication Sig    escitalopram oxalate (LEXAPRO) 20 mg tablet Take 1 Tab by mouth daily.  dilTIAZem CD (CARDIZEM CD) 180 mg ER capsule Take 1 Cap by mouth daily.  L. acidoph & paracasei- S therm- Bifido (PETRA-Q/RISAQUAD) 8 billion cell cap cap Take 1 Cap by mouth daily.  metoprolol tartrate (LOPRESSOR) 50 mg tablet Take 1 Tab by mouth two (2) times a day.  nystatin (MYCOSTATIN) 100,000 unit/mL suspension Take 5 mL by mouth four (4) times daily. swish and spit    predniSONE (DELTASONE) 10 mg tablet Take 1 Tab by mouth daily (with breakfast).  acetaminophen (TYLENOL) 650 mg CR tablet Take 650 mg by mouth every six (6) hours as needed for Pain.  hydrALAZINE (APRESOLINE) 100 mg tablet Take 1 Tab by mouth three (3) times daily.     pantoprazole (PROTONIX) 40 mg tablet Take 1 Tab by mouth two (2) times a day. Indications: GASTRIC ULCER    clopidogrel (PLAVIX) 75 mg tablet Take 75 mg by mouth daily. Stopped for surgery-8-4-10     aspirin 81 mg tablet Take 81 mg by mouth every evening. Stopped for surgery 8-4-10     simvastatin (ZOCOR) 80 mg tablet Take 80 mg by mouth every evening.  hydrochlorothiazide (HYDRODIURIL) 25 mg tablet Take 25 mg by mouth every evening. No current facility-administered medications for this visit. There are no discontinued medications.     PCP/Specialist follow up: Future Appointments  Date Time Provider Miroslava Espinoza   5/2/2018 10:50 AM Jero Ruiz MD [address] [place] [address],[facility], [id number]

## 2018-04-18 ENCOUNTER — PATIENT OUTREACH (OUTPATIENT)
Dept: CASE MANAGEMENT | Age: 83
End: 2018-04-18

## 2018-04-18 NOTE — PROGRESS NOTES
Community Care Team Documentation for Patient in [facility]  Initial Follow Up       Patient was admitted to [facility] from 3/27 to 4/12. Patient was discharged to [facility], on 4/12 (date). See previous [facility] Care Team documentation under Patient Outreach. Hospital Discharge diagnosis:  Acute hypoxemic respiratory failure    RRAT score:  20    Advance Medical Directive on file in EMR? DDNR     Total Hospitalizations/ED visits last 6 months? IP - 1; ED - 0    PCP : Ramona Driscoll MD    Per Karen Lopez at [facility], Reviewed [place] questions with SNF to ensure patient arrived with admission packet in order. Pt is not progressing well with therapy. Motivation and participation is low. Currently max to dependent with transfers. Ambulating 5ft in parallel bars with mod assist. Mod assist with upper body bathing and dressing. Max assist with lower body bathing, dressing and toileting. Psych to eval this week. Plan to return to [facility]. SNF Attending:  Evelin Nguyen MD    Medications were not reconciled and general patient assessment was not completed during this skilled nursing facility outreach.      VIVIANA CorreaW

## 2018-04-25 ENCOUNTER — PATIENT OUTREACH (OUTPATIENT)
Dept: CASE MANAGEMENT | Age: 83
End: 2018-04-25

## 2018-04-25 NOTE — PROGRESS NOTES
Community Care Team Documentation for Patient in [facility]  Subsequent Follow up     Patient remains at [address] ([facility]). See previous [facility] Team notes. PCP : Ramona Driscoll MD    Per Frank Valdez and team at [facility], PT/OT continue. Currently mod to max assist for bed mobility and transfers, ambulating 5 ft with RW or in parallel bars, mod assist for grooming, max assist for ADLs and toileting. Barrier - motivation. Needs encouragement. Paxil started this week by provider. Psych will evaluate on next visit to SNF. Plan for return to [facility]. Date TBD. PCP follow up canceled. CCT will reschedule once discharge plan/date is determined. Medications were not reconciled and general patient assessment was not completed during this skilled nursing facility outreach.      Luis Hernandez, MSN, RN, ACNS-BC, [facility]  Nurse Navigator, [place] [phone]

## 2018-05-02 ENCOUNTER — PATIENT OUTREACH (OUTPATIENT)
Dept: CASE MANAGEMENT | Age: 83
End: 2018-05-02

## 2018-05-02 NOTE — PROGRESS NOTES
Community Care Team Documentation for Patient in [facility]  Subsequent Follow up     Patient remains at [facility] ([facility]). See previous [facility] Team notes. PCP : Adriana Cuenca MD    Per Charlene Martino at [facility], PT/OT continue. Currently ambulating 20ft with RW and min assist. Min assist with transfers and bed mobility. Mod assist with upper body ADLs. Max assist with lower body ADLs. Max assist with toileting. Needing encouragement. Therapy anticipating 3 weeks LOS. Plan to DC to [place]. Medications were not reconciled and general patient assessment was not completed during this skilled nursing facility outreach.      HIRA Mota

## 2018-05-09 ENCOUNTER — PATIENT OUTREACH (OUTPATIENT)
Dept: CASE MANAGEMENT | Age: 83
End: 2018-05-09

## 2018-05-09 NOTE — PROGRESS NOTES
Community Care Team Documentation for Patient in [facility]  Subsequent Follow up     Patient remains at [facility] ([facility]). See previous [facility] Team notes. PCP : Caleb Main MD    Per Dl Hines and IDT at SNF, Progressing well with PT/OT. Currently mod assist with bed mobility and toileting. Min to mod assist with lower body ADLs. Set up to min assist with upper body ADLs. Min to contact guard assist with transfers. Ambulating 50ft with RW and min assist. Toileting with mod assist. 3 weeks LOS anticipated. Plan to DC to [place] with [facility]. Medications were not reconciled and general patient assessment was not completed during this skilled nursing facility outreach.      VIVIANA LópezW

## 2018-05-18 ENCOUNTER — PATIENT OUTREACH (OUTPATIENT)
Dept: CASE MANAGEMENT | Age: 83
End: 2018-05-18

## 2018-05-18 NOTE — PROGRESS NOTES
Community Care Team Documentation for Patient in [facility]  Subsequent Follow up     Patient remains at [address] ([facility]). See previous [facility] Team notes. PCP : Margarita Jones MD  PCP follow up appointment:  6/1 at 11:10 AM.    Per Leonides Ray and team at [facility], PT/OT continue. Currently ambulating 60-80 ft with RW at [facility], close supervision to [facility] for transfers, supervision with cues for upper and lower body ADLs. Using pull-ups during the day. Toileting with supervision. Plan for discharge 5/31, possibly to [facility] with [facility]. PCP follow up 6/1 at 11:10 AM.    Medications were not reconciled and general patient assessment was not completed during this skilled nursing facility outreach.      Dania Cross, MSN, RN, ACNS-BC, [facility]  Nurse Navigator, [employer] [phone]

## 2018-05-23 ENCOUNTER — PATIENT OUTREACH (OUTPATIENT)
Dept: CASE MANAGEMENT | Age: 83
End: 2018-05-23

## 2018-05-23 NOTE — PROGRESS NOTES
Community Care Team Documentation for Patient in [facility]  Subsequent Follow up     Patient remains at [address] ([facility]). See previous [facility] Team notes. PCP : Harshad Cuenca MD    Per Margy Castro and team at [facility], PT/OT continue. Currently CGA for mobility, supervision for all ADLs, using adaptive equipment for lower body. Plan for discharge on 5/31 to [facility]. Possibly open to [facility]. SNF checking. PCP follow up 6/1 at 11:10 AM.    Medications were not reconciled and general patient assessment was not completed during this skilled nursing facility outreach.      Billie Calixto, MSN, RN, ACNS-BC, [facility]  Nurse Navigator, [employer] [phone]

## 2018-05-30 ENCOUNTER — PATIENT OUTREACH (OUTPATIENT)
Dept: CASE MANAGEMENT | Age: 83
End: 2018-05-30

## 2018-05-30 NOTE — PROGRESS NOTES
Community Care Team Documentation for Patient in [facility]  Discharge Note    Per SNF staff, patient to discharge from [facility] ([facility]) tomorrow 5/31. See previous [facility] Team notes. PCP : Lissy Jennings MD    Per Milagro Thakkar at [facility], PT/OT last therapy treat day today 5/30. Plan to DC 5/31 to [facility] with [facility] (pt choice). PCP follow up 6/1 at 11:10 AM.    [facility] Team will sign off at this time. Medications were not reconciled and general patient assessment was not completed during this skilled nursing facility outreach.

## 2018-06-06 ENCOUNTER — PATIENT OUTREACH (OUTPATIENT)
Dept: CASE MANAGEMENT | Age: 83
End: 2018-06-06

## 2018-06-06 NOTE — PROGRESS NOTES
Community Care Team Documentation for Patient in [facility]  Discharge Note    Per SNF staff, patient discharged from [facility] ([facility]) 5/31 to [facility] with [facility] (pt choice). PCP follow up 6/1 at 11:10 AM.    See previous [facility] Team notes. Community Care Team will sign off at this time. Medications were not reconciled and general patient assessment was not completed during this skilled nursing facility outreach.

## 2018-10-24 ENCOUNTER — APPOINTMENT (OUTPATIENT)
Dept: GENERAL RADIOLOGY | Age: 83
DRG: 659 | End: 2018-10-24
Attending: EMERGENCY MEDICINE
Payer: MEDICARE

## 2018-10-24 ENCOUNTER — ANESTHESIA (OUTPATIENT)
Dept: SURGERY | Age: 83
DRG: 659 | End: 2018-10-24
Payer: MEDICARE

## 2018-10-24 ENCOUNTER — ANESTHESIA EVENT (OUTPATIENT)
Dept: SURGERY | Age: 83
DRG: 659 | End: 2018-10-24
Payer: MEDICARE

## 2018-10-24 ENCOUNTER — APPOINTMENT (OUTPATIENT)
Dept: CT IMAGING | Age: 83
DRG: 659 | End: 2018-10-24
Attending: EMERGENCY MEDICINE
Payer: MEDICARE

## 2018-10-24 ENCOUNTER — HOSPITAL ENCOUNTER (INPATIENT)
Age: 83
LOS: 6 days | Discharge: HOME | DRG: 659 | End: 2018-10-30
Attending: EMERGENCY MEDICINE | Admitting: INTERNAL MEDICINE
Payer: MEDICARE

## 2018-10-24 ENCOUNTER — APPOINTMENT (OUTPATIENT)
Dept: GENERAL RADIOLOGY | Age: 83
DRG: 659 | End: 2018-10-24
Attending: UROLOGY
Payer: MEDICARE

## 2018-10-24 DIAGNOSIS — N17.9 ACUTE RENAL FAILURE, UNSPECIFIED ACUTE RENAL FAILURE TYPE (HCC): Primary | ICD-10-CM

## 2018-10-24 DIAGNOSIS — C34.32 MALIGNANT NEOPLASM OF LOWER LOBE OF LEFT LUNG (HCC): ICD-10-CM

## 2018-10-24 DIAGNOSIS — I10 ESSENTIAL HYPERTENSION: ICD-10-CM

## 2018-10-24 DIAGNOSIS — N19 RENAL FAILURE, UNSPECIFIED CHRONICITY: ICD-10-CM

## 2018-10-24 DIAGNOSIS — N13.30 HYDRONEPHROSIS, UNSPECIFIED HYDRONEPHROSIS TYPE: ICD-10-CM

## 2018-10-24 DIAGNOSIS — J44.1 COPD EXACERBATION (HCC): Chronic | ICD-10-CM

## 2018-10-24 DIAGNOSIS — A41.9 SEPSIS, DUE TO UNSPECIFIED ORGANISM: ICD-10-CM

## 2018-10-24 DIAGNOSIS — N17.9 AKI (ACUTE KIDNEY INJURY) (HCC): ICD-10-CM

## 2018-10-24 DIAGNOSIS — T83.122A URETERAL STENT DISPLACEMENT, INITIAL ENCOUNTER (HCC): ICD-10-CM

## 2018-10-24 DIAGNOSIS — E44.0 MODERATE PROTEIN-CALORIE MALNUTRITION (HCC): ICD-10-CM

## 2018-10-24 DIAGNOSIS — I48.0 PAF (PAROXYSMAL ATRIAL FIBRILLATION) (HCC): ICD-10-CM

## 2018-10-24 LAB
ALBUMIN SERPL-MCNC: 2.5 G/DL (ref 3.5–5)
ALBUMIN/GLOB SERPL: 0.5 {RATIO} (ref 1.1–2.2)
ALP SERPL-CCNC: 73 U/L (ref 45–117)
ALT SERPL-CCNC: 13 U/L (ref 12–78)
ANION GAP SERPL CALC-SCNC: 8 MMOL/L (ref 5–15)
APPEARANCE UR: ABNORMAL
AST SERPL-CCNC: 18 U/L (ref 15–37)
ATRIAL RATE: 65 BPM
BACTERIA URNS QL MICRO: ABNORMAL /HPF
BASOPHILS # BLD: 0 K/UL (ref 0–0.1)
BASOPHILS NFR BLD: 0 % (ref 0–1)
BILIRUB SERPL-MCNC: 0.4 MG/DL (ref 0.2–1)
BILIRUB UR QL: NEGATIVE
BUN SERPL-MCNC: 61 MG/DL (ref 6–20)
BUN/CREAT SERPL: 19 (ref 12–20)
CALCIUM SERPL-MCNC: 10 MG/DL (ref 8.5–10.1)
CALCULATED P AXIS, ECG09: 50 DEGREES
CALCULATED R AXIS, ECG10: -16 DEGREES
CALCULATED T AXIS, ECG11: 113 DEGREES
CHLORIDE SERPL-SCNC: 102 MMOL/L (ref 97–108)
CK MB CFR SERPL CALC: 4.3 % (ref 0–2.5)
CK MB SERPL-MCNC: 1.8 NG/ML (ref 5–25)
CK SERPL-CCNC: 42 U/L (ref 26–192)
CO2 SERPL-SCNC: 23 MMOL/L (ref 21–32)
COLOR UR: ABNORMAL
CREAT SERPL-MCNC: 3.21 MG/DL (ref 0.55–1.02)
DIAGNOSIS, 93000: NORMAL
DIFFERENTIAL METHOD BLD: ABNORMAL
EOSINOPHIL # BLD: 0.1 K/UL (ref 0–0.4)
EOSINOPHIL NFR BLD: 1 % (ref 0–7)
EPITH CASTS URNS QL MICRO: ABNORMAL /LPF
ERYTHROCYTE [DISTWIDTH] IN BLOOD BY AUTOMATED COUNT: 16.9 % (ref 11.5–14.5)
GLOBULIN SER CALC-MCNC: 4.9 G/DL (ref 2–4)
GLUCOSE SERPL-MCNC: 132 MG/DL (ref 65–100)
GLUCOSE UR STRIP.AUTO-MCNC: NEGATIVE MG/DL
HCT VFR BLD AUTO: 39 % (ref 35–47)
HGB BLD-MCNC: 12.6 G/DL (ref 11.5–16)
HGB UR QL STRIP: NEGATIVE
IMM GRANULOCYTES # BLD: 0.1 K/UL (ref 0–0.04)
IMM GRANULOCYTES NFR BLD AUTO: 1 % (ref 0–0.5)
KETONES UR QL STRIP.AUTO: NEGATIVE MG/DL
LACTATE SERPL-SCNC: 1 MMOL/L (ref 0.4–2)
LEUKOCYTE ESTERASE UR QL STRIP.AUTO: ABNORMAL
LIPASE SERPL-CCNC: 81 U/L (ref 73–393)
LYMPHOCYTES # BLD: 0.3 K/UL (ref 0.8–3.5)
LYMPHOCYTES NFR BLD: 2 % (ref 12–49)
MCH RBC QN AUTO: 28.3 PG (ref 26–34)
MCHC RBC AUTO-ENTMCNC: 32.3 G/DL (ref 30–36.5)
MCV RBC AUTO: 87.4 FL (ref 80–99)
MONOCYTES # BLD: 1.3 K/UL (ref 0–1)
MONOCYTES NFR BLD: 9 % (ref 5–13)
NEUTS SEG # BLD: 12.3 K/UL (ref 1.8–8)
NEUTS SEG NFR BLD: 87 % (ref 32–75)
NITRITE UR QL STRIP.AUTO: NEGATIVE
NRBC # BLD: 0 K/UL (ref 0–0.01)
NRBC BLD-RTO: 0 PER 100 WBC
P-R INTERVAL, ECG05: 170 MS
PH UR STRIP: 5.5 [PH] (ref 5–8)
PLATELET # BLD AUTO: 229 K/UL (ref 150–400)
PMV BLD AUTO: 10.9 FL (ref 8.9–12.9)
POTASSIUM SERPL-SCNC: 4.8 MMOL/L (ref 3.5–5.1)
PROT SERPL-MCNC: 7.4 G/DL (ref 6.4–8.2)
PROT UR STRIP-MCNC: 300 MG/DL
Q-T INTERVAL, ECG07: 384 MS
QRS DURATION, ECG06: 78 MS
QTC CALCULATION (BEZET), ECG08: 399 MS
RBC # BLD AUTO: 4.46 M/UL (ref 3.8–5.2)
RBC #/AREA URNS HPF: ABNORMAL /HPF (ref 0–5)
RBC MORPH BLD: ABNORMAL
SODIUM SERPL-SCNC: 133 MMOL/L (ref 136–145)
SP GR UR REFRACTOMETRY: 1.02 (ref 1–1.03)
TROPONIN I SERPL-MCNC: <0.05 NG/ML
UA: UC IF INDICATED,UAUC: ABNORMAL
UROBILINOGEN UR QL STRIP.AUTO: 0.2 EU/DL (ref 0.2–1)
VENTRICULAR RATE, ECG03: 65 BPM
WBC # BLD AUTO: 14.1 K/UL (ref 3.6–11)
WBC URNS QL MICRO: >100 /HPF (ref 0–4)
YEAST URNS QL MICRO: PRESENT

## 2018-10-24 PROCEDURE — 77030020782 HC GWN BAIR PAWS FLX 3M -B

## 2018-10-24 PROCEDURE — C1758 CATHETER, URETERAL: HCPCS | Performed by: UROLOGY

## 2018-10-24 PROCEDURE — 76010000138 HC OR TIME 0.5 TO 1 HR: Performed by: UROLOGY

## 2018-10-24 PROCEDURE — 77030018846 HC SOL IRR STRL H20 ICUM -A: Performed by: UROLOGY

## 2018-10-24 PROCEDURE — 74420 UROGRAPHY RTRGR +-KUB: CPT

## 2018-10-24 PROCEDURE — 87040 BLOOD CULTURE FOR BACTERIA: CPT | Performed by: INTERNAL MEDICINE

## 2018-10-24 PROCEDURE — 76210000017 HC OR PH I REC 1.5 TO 2 HR: Performed by: UROLOGY

## 2018-10-24 PROCEDURE — 81001 URINALYSIS AUTO W/SCOPE: CPT | Performed by: EMERGENCY MEDICINE

## 2018-10-24 PROCEDURE — 74011250636 HC RX REV CODE- 250/636: Performed by: INTERNAL MEDICINE

## 2018-10-24 PROCEDURE — 77030032490 HC SLV COMPR SCD KNE COVD -B: Performed by: UROLOGY

## 2018-10-24 PROCEDURE — 74011250636 HC RX REV CODE- 250/636

## 2018-10-24 PROCEDURE — 93005 ELECTROCARDIOGRAM TRACING: CPT

## 2018-10-24 PROCEDURE — 82550 ASSAY OF CK (CPK): CPT | Performed by: EMERGENCY MEDICINE

## 2018-10-24 PROCEDURE — 74011250636 HC RX REV CODE- 250/636: Performed by: EMERGENCY MEDICINE

## 2018-10-24 PROCEDURE — 65270000029 HC RM PRIVATE

## 2018-10-24 PROCEDURE — 74176 CT ABD & PELVIS W/O CONTRAST: CPT

## 2018-10-24 PROCEDURE — 74011250637 HC RX REV CODE- 250/637: Performed by: INTERNAL MEDICINE

## 2018-10-24 PROCEDURE — 74011250636 HC RX REV CODE- 250/636: Performed by: ANESTHESIOLOGY

## 2018-10-24 PROCEDURE — 36415 COLL VENOUS BLD VENIPUNCTURE: CPT | Performed by: EMERGENCY MEDICINE

## 2018-10-24 PROCEDURE — 76060000032 HC ANESTHESIA 0.5 TO 1 HR: Performed by: UROLOGY

## 2018-10-24 PROCEDURE — 87086 URINE CULTURE/COLONY COUNT: CPT | Performed by: EMERGENCY MEDICINE

## 2018-10-24 PROCEDURE — 85025 COMPLETE CBC W/AUTO DIFF WBC: CPT | Performed by: EMERGENCY MEDICINE

## 2018-10-24 PROCEDURE — 84484 ASSAY OF TROPONIN QUANT: CPT | Performed by: EMERGENCY MEDICINE

## 2018-10-24 PROCEDURE — 77030012961 HC IRR KT CYSTO/TUR ICUM -A: Performed by: UROLOGY

## 2018-10-24 PROCEDURE — C2617 STENT, NON-COR, TEM W/O DEL: HCPCS | Performed by: UROLOGY

## 2018-10-24 PROCEDURE — 77030018832 HC SOL IRR H20 ICUM -A: Performed by: UROLOGY

## 2018-10-24 PROCEDURE — 99285 EMERGENCY DEPT VISIT HI MDM: CPT

## 2018-10-24 PROCEDURE — 83605 ASSAY OF LACTIC ACID: CPT | Performed by: INTERNAL MEDICINE

## 2018-10-24 PROCEDURE — 77010033678 HC OXYGEN DAILY

## 2018-10-24 PROCEDURE — 74011000258 HC RX REV CODE- 258: Performed by: EMERGENCY MEDICINE

## 2018-10-24 PROCEDURE — 51702 INSERT TEMP BLADDER CATH: CPT

## 2018-10-24 PROCEDURE — 83690 ASSAY OF LIPASE: CPT | Performed by: EMERGENCY MEDICINE

## 2018-10-24 PROCEDURE — 74018 RADEX ABDOMEN 1 VIEW: CPT

## 2018-10-24 PROCEDURE — C1769 GUIDE WIRE: HCPCS | Performed by: UROLOGY

## 2018-10-24 PROCEDURE — 71046 X-RAY EXAM CHEST 2 VIEWS: CPT

## 2018-10-24 PROCEDURE — 80053 COMPREHEN METABOLIC PANEL: CPT | Performed by: EMERGENCY MEDICINE

## 2018-10-24 DEVICE — BLACK SILICONE FILIFORM DBL PIGTAIL URETERAL STENT SET WIRE GUIDE WITH HYDROPHILIC COATING
Type: IMPLANTABLE DEVICE | Site: URETER | Status: NON-FUNCTIONAL
Brand: BLACK SILICONE
Removed: 2019-07-10

## 2018-10-24 RX ORDER — SODIUM CHLORIDE 9 MG/ML
50 INJECTION, SOLUTION INTRAVENOUS CONTINUOUS
Status: DISCONTINUED | OUTPATIENT
Start: 2018-10-24 | End: 2018-10-28

## 2018-10-24 RX ORDER — GABAPENTIN 400 MG/1
400 CAPSULE ORAL 4 TIMES DAILY
COMMUNITY
End: 2019-08-13 | Stop reason: SDUPTHER

## 2018-10-24 RX ORDER — ASPIRIN 325 MG
50000 TABLET, DELAYED RELEASE (ENTERIC COATED) ORAL
COMMUNITY
End: 2019-10-29

## 2018-10-24 RX ORDER — SODIUM CHLORIDE 0.9 % (FLUSH) 0.9 %
5-10 SYRINGE (ML) INJECTION AS NEEDED
Status: DISCONTINUED | OUTPATIENT
Start: 2018-10-24 | End: 2018-10-30 | Stop reason: HOSPADM

## 2018-10-24 RX ORDER — GUAIFENESIN 100 MG/5ML
81 LIQUID (ML) ORAL DAILY
Status: DISCONTINUED | OUTPATIENT
Start: 2018-10-25 | End: 2018-10-30 | Stop reason: HOSPADM

## 2018-10-24 RX ORDER — HYDRALAZINE HYDROCHLORIDE 20 MG/ML
10 INJECTION INTRAMUSCULAR; INTRAVENOUS
Status: DISCONTINUED | OUTPATIENT
Start: 2018-10-24 | End: 2018-10-28

## 2018-10-24 RX ORDER — POLYETHYLENE GLYCOL 3350 17 G/17G
17 POWDER, FOR SOLUTION ORAL DAILY
Status: DISCONTINUED | OUTPATIENT
Start: 2018-10-25 | End: 2018-10-30 | Stop reason: HOSPADM

## 2018-10-24 RX ORDER — PAROXETINE HYDROCHLORIDE 20 MG/1
20 TABLET, FILM COATED ORAL DAILY
COMMUNITY

## 2018-10-24 RX ORDER — TEMAZEPAM 15 MG/1
15 CAPSULE ORAL
COMMUNITY
End: 2018-12-24 | Stop reason: SDUPTHER

## 2018-10-24 RX ORDER — SODIUM CHLORIDE, SODIUM LACTATE, POTASSIUM CHLORIDE, CALCIUM CHLORIDE 600; 310; 30; 20 MG/100ML; MG/100ML; MG/100ML; MG/100ML
25 INJECTION, SOLUTION INTRAVENOUS CONTINUOUS
Status: DISCONTINUED | OUTPATIENT
Start: 2018-10-24 | End: 2018-10-24 | Stop reason: HOSPADM

## 2018-10-24 RX ORDER — LOPERAMIDE HYDROCHLORIDE 2 MG/1
4 CAPSULE ORAL
COMMUNITY
End: 2019-03-13 | Stop reason: SDUPTHER

## 2018-10-24 RX ORDER — SODIUM CHLORIDE 0.9 % (FLUSH) 0.9 %
5-10 SYRINGE (ML) INJECTION EVERY 8 HOURS
Status: DISCONTINUED | OUTPATIENT
Start: 2018-10-24 | End: 2018-10-30 | Stop reason: HOSPADM

## 2018-10-24 RX ORDER — ACETAMINOPHEN 325 MG/1
650 TABLET ORAL
Status: DISCONTINUED | OUTPATIENT
Start: 2018-10-24 | End: 2018-10-30 | Stop reason: HOSPADM

## 2018-10-24 RX ORDER — LIDOCAINE HYDROCHLORIDE 10 MG/ML
0.1 INJECTION, SOLUTION EPIDURAL; INFILTRATION; INTRACAUDAL; PERINEURAL AS NEEDED
Status: DISCONTINUED | OUTPATIENT
Start: 2018-10-24 | End: 2018-10-24 | Stop reason: HOSPADM

## 2018-10-24 RX ORDER — DOCUSATE SODIUM 100 MG/1
100 CAPSULE, LIQUID FILLED ORAL 2 TIMES DAILY
COMMUNITY
End: 2019-09-27

## 2018-10-24 RX ORDER — FENTANYL CITRATE 50 UG/ML
25 INJECTION, SOLUTION INTRAMUSCULAR; INTRAVENOUS
Status: DISCONTINUED | OUTPATIENT
Start: 2018-10-24 | End: 2018-10-24 | Stop reason: HOSPADM

## 2018-10-24 RX ORDER — GABAPENTIN 100 MG/1
100 CAPSULE ORAL 4 TIMES DAILY
Status: DISCONTINUED | OUTPATIENT
Start: 2018-10-24 | End: 2018-10-24

## 2018-10-24 RX ORDER — PREDNISONE 5 MG/1
10 TABLET ORAL
Status: DISCONTINUED | OUTPATIENT
Start: 2018-10-25 | End: 2018-10-30 | Stop reason: HOSPADM

## 2018-10-24 RX ORDER — NEOMYCIN SULFATE, POLYMYXIN B SULFATE, AND DEXAMETHASONE 3.5; 10000; 1 MG/G; [USP'U]/G; MG/G
OINTMENT OPHTHALMIC
COMMUNITY
End: 2018-10-30

## 2018-10-24 RX ORDER — PROPOFOL 10 MG/ML
INJECTION, EMULSION INTRAVENOUS AS NEEDED
Status: DISCONTINUED | OUTPATIENT
Start: 2018-10-24 | End: 2018-10-24 | Stop reason: HOSPADM

## 2018-10-24 RX ORDER — MENTHOL AND ZINC OXIDE .44; 20.625 G/100G; G/100G
OINTMENT TOPICAL
COMMUNITY
End: 2018-10-30

## 2018-10-24 RX ORDER — HYDRALAZINE HYDROCHLORIDE 20 MG/ML
10 INJECTION INTRAMUSCULAR; INTRAVENOUS ONCE
Status: COMPLETED | OUTPATIENT
Start: 2018-10-24 | End: 2018-10-24

## 2018-10-24 RX ORDER — DIPHENHYDRAMINE HYDROCHLORIDE 50 MG/ML
12.5 INJECTION, SOLUTION INTRAMUSCULAR; INTRAVENOUS AS NEEDED
Status: DISCONTINUED | OUTPATIENT
Start: 2018-10-24 | End: 2018-10-24 | Stop reason: HOSPADM

## 2018-10-24 RX ORDER — GABAPENTIN 300 MG/1
300 CAPSULE ORAL DAILY
Status: DISCONTINUED | OUTPATIENT
Start: 2018-10-25 | End: 2018-10-29

## 2018-10-24 RX ORDER — PAROXETINE HYDROCHLORIDE 20 MG/1
20 TABLET, FILM COATED ORAL DAILY
Status: DISCONTINUED | OUTPATIENT
Start: 2018-10-25 | End: 2018-10-30 | Stop reason: HOSPADM

## 2018-10-24 RX ORDER — DILTIAZEM HYDROCHLORIDE 180 MG/1
180 CAPSULE, COATED, EXTENDED RELEASE ORAL DAILY
Status: DISCONTINUED | OUTPATIENT
Start: 2018-10-25 | End: 2018-10-28

## 2018-10-24 RX ORDER — POLYETHYLENE GLYCOL 3350 17 G/17G
17 POWDER, FOR SOLUTION ORAL
Status: DISCONTINUED | OUTPATIENT
Start: 2018-10-24 | End: 2018-10-24

## 2018-10-24 RX ORDER — TEMAZEPAM 15 MG/1
15 CAPSULE ORAL
Status: DISCONTINUED | OUTPATIENT
Start: 2018-10-24 | End: 2018-10-30 | Stop reason: HOSPADM

## 2018-10-24 RX ORDER — NEOMYCIN SULFATE, POLYMYXIN B SULFATE, AND DEXAMETHASONE 3.5; 10000; 1 MG/G; [USP'U]/G; MG/G
OINTMENT OPHTHALMIC
Status: DISCONTINUED | OUTPATIENT
Start: 2018-10-24 | End: 2018-10-30 | Stop reason: HOSPADM

## 2018-10-24 RX ORDER — ONDANSETRON 2 MG/ML
4 INJECTION INTRAMUSCULAR; INTRAVENOUS
Status: DISCONTINUED | OUTPATIENT
Start: 2018-10-24 | End: 2018-10-30 | Stop reason: HOSPADM

## 2018-10-24 RX ORDER — DOCUSATE SODIUM 100 MG/1
100 CAPSULE, LIQUID FILLED ORAL 2 TIMES DAILY
Status: DISCONTINUED | OUTPATIENT
Start: 2018-10-24 | End: 2018-10-30 | Stop reason: HOSPADM

## 2018-10-24 RX ORDER — FENTANYL CITRATE 50 UG/ML
INJECTION, SOLUTION INTRAMUSCULAR; INTRAVENOUS AS NEEDED
Status: DISCONTINUED | OUTPATIENT
Start: 2018-10-24 | End: 2018-10-24 | Stop reason: HOSPADM

## 2018-10-24 RX ORDER — SODIUM CHLORIDE 0.9 % (FLUSH) 0.9 %
5-10 SYRINGE (ML) INJECTION AS NEEDED
Status: DISCONTINUED | OUTPATIENT
Start: 2018-10-24 | End: 2018-10-24 | Stop reason: HOSPADM

## 2018-10-24 RX ORDER — HEPARIN SODIUM 5000 [USP'U]/ML
5000 INJECTION, SOLUTION INTRAVENOUS; SUBCUTANEOUS EVERY 12 HOURS
Status: DISCONTINUED | OUTPATIENT
Start: 2018-10-24 | End: 2018-10-30 | Stop reason: HOSPADM

## 2018-10-24 RX ORDER — METOPROLOL TARTRATE 50 MG/1
50 TABLET ORAL 2 TIMES DAILY
Status: DISCONTINUED | OUTPATIENT
Start: 2018-10-24 | End: 2018-10-30 | Stop reason: HOSPADM

## 2018-10-24 RX ORDER — SODIUM CHLORIDE 0.9 % (FLUSH) 0.9 %
5-10 SYRINGE (ML) INJECTION EVERY 8 HOURS
Status: DISCONTINUED | OUTPATIENT
Start: 2018-10-24 | End: 2018-10-24 | Stop reason: HOSPADM

## 2018-10-24 RX ORDER — POLYETHYLENE GLYCOL 3350 17 G/17G
17 POWDER, FOR SOLUTION ORAL
COMMUNITY

## 2018-10-24 RX ORDER — HYDRALAZINE HYDROCHLORIDE 50 MG/1
100 TABLET, FILM COATED ORAL 3 TIMES DAILY
Status: DISCONTINUED | OUTPATIENT
Start: 2018-10-24 | End: 2018-10-30 | Stop reason: HOSPADM

## 2018-10-24 RX ADMIN — HEPARIN SODIUM 5000 UNITS: 5000 INJECTION INTRAVENOUS; SUBCUTANEOUS at 19:22

## 2018-10-24 RX ADMIN — DOCUSATE SODIUM 100 MG: 100 CAPSULE, LIQUID FILLED ORAL at 19:22

## 2018-10-24 RX ADMIN — PROPOFOL 10 MG: 10 INJECTION, EMULSION INTRAVENOUS at 16:46

## 2018-10-24 RX ADMIN — METOPROLOL TARTRATE 50 MG: 50 TABLET ORAL at 19:23

## 2018-10-24 RX ADMIN — FENTANYL CITRATE 25 MCG: 50 INJECTION, SOLUTION INTRAMUSCULAR; INTRAVENOUS at 16:41

## 2018-10-24 RX ADMIN — CEFTRIAXONE 1 G: 1 INJECTION, POWDER, FOR SOLUTION INTRAMUSCULAR; INTRAVENOUS at 15:51

## 2018-10-24 RX ADMIN — SODIUM CHLORIDE 100 ML/HR: 900 INJECTION, SOLUTION INTRAVENOUS at 17:42

## 2018-10-24 RX ADMIN — HYDRALAZINE HYDROCHLORIDE 10 MG: 20 INJECTION INTRAMUSCULAR; INTRAVENOUS at 17:40

## 2018-10-24 RX ADMIN — HYDRALAZINE HYDROCHLORIDE 10 MG: 20 INJECTION INTRAMUSCULAR; INTRAVENOUS at 18:08

## 2018-10-24 RX ADMIN — PROPOFOL 10 MG: 10 INJECTION, EMULSION INTRAVENOUS at 16:50

## 2018-10-24 RX ADMIN — PROPOFOL 10 MG: 10 INJECTION, EMULSION INTRAVENOUS at 16:48

## 2018-10-24 RX ADMIN — SODIUM CHLORIDE: 900 INJECTION, SOLUTION INTRAVENOUS at 16:26

## 2018-10-24 NOTE — ANESTHESIA POSTPROCEDURE EVALUATION
Procedure(s): 
CYSTOSCOPY Extraction of uretral stent from bladder, placement of right uretral stent. Anesthesia Post Evaluation Patient location during evaluation: PACU Note status: Adequate. Level of consciousness: responsive to verbal stimuli and sleepy but conscious Pain management: satisfactory to patient Airway patency: patent Anesthetic complications: no 
Cardiovascular status: acceptable Respiratory status: acceptable Hydration status: acceptable Comments: +Post-Anesthesia Evaluation and Assessment Patient: Linda Paulson MRN: [id number]  SSN: [id number] YOB: 1935  Age: 80 y.o. Sex: female Cardiovascular Function/Vital Signs /67   Pulse 83   Temp 37.7 °C (99.9 °F)   Resp 15   Ht 5' 1.5\" (1.562 m)   Wt 52.2 kg (115 lb)   SpO2 97%   BMI 21.38 kg/m² Patient is status post Procedure(s): 
CYSTOSCOPY Extraction of uretral stent from bladder, placement of right uretral stent. Nausea/Vomiting: Controlled. Postoperative hydration reviewed and adequate. Pain: 
Pain Scale 1: Numeric (0 - 10) (10/24/18 1804) Pain Intensity 1: 0 (10/24/18 1804) Managed. Neurological Status:  
Neuro (WDL): Exceptions to WDL (10/24/18 1717) At baseline. Mental Status and Level of Consciousness: Arousable. Pulmonary Status:  
O2 Device: Nasal cannula (10/24/18 1717) Adequate oxygenation and airway patent. Complications related to anesthesia: None Post-anesthesia assessment completed. No concerns. Signed By: Tom Rutherford MD  
 10/24/2018 Visit Vitals /67 Pulse 83 Temp 37.7 °C (99.9 °F) Resp 15 Ht 5' 1.5\" (1.562 m) Wt 52.2 kg (115 lb) SpO2 97% BMI 21.38 kg/m²

## 2018-10-24 NOTE — H&P
Hospitalist Admission NoteNAME: Naty Hughes :  1935 MRN:  [id number] Date/Time:  10/24/2018 2:48 PM 
 
Patient PCP: Ezra Essex, MD 
______________________________________________________________________ Given the patient's current clinical presentation, I have a high level of concern for decompensation if discharged from the emergency department. Complex decision making was performed, which includes reviewing the patient's available past medical records, laboratory results, and x-ray films. My assessment of this patient's clinical condition and my plan of care is as follows. Assessment / Plan: 
 
 
 
Right sided flank pain 2/2  right hydroureteronephrosis with early sepsis? sirsx2 
-CT A  
right hydroureteronephrosis. The renal stent has 
migrated distally. The proximal pigtail is in the distal ureter and the 
remainder of the catheter is in the bladder. Urology  To take pt to the or now  for  
stent removal with replacement of right ureteral stent 
 
-UA with evidence of infection. Small blood. Have ordered lactic aicd/blood cx in the er  
-low grade temperature. CBC with leukocytosis. -ED notified Urology -Empiric coverage with IV ceftriaxone. -prn pain control and bowel regimen. -IVF. NPO 
-hold nephrotoxic agents. -IVF x 24 hours. -follow uop and bmp 
 
-Gentle IV hydration 
-Pt high risk for further deterioration 
  
CHRISTY/hyponatremia 
-Likely secondary to above 
-Continue to monitor, avoid nephrotoxic drugs, repeat labs in the AM 
- Nephrology eval  After above w/u  
ivf 
 
H/O c/p pt denies any pain Check ekg trop ngt Trend trop HO large cell lung carcinoma, left side, treated with radiation in 2016. Get onco input Chronic steroid use???? Unsure why Cont for now ask pharmacy to review No need for stress doses at this time Deperssion? Cont w home meds HTN on multiple meds On metoprolol/diltiazem/hydralazine --Continue to monitor  
  
Code Status: DNR Surrogate Decision Maker: Jing Cutler. by records DVT Prophylaxis: Heparin GI Prophylaxis: not indicated Baseline: Lives at [address] Subjective: CHIEF COMPLAINT: chest pain/abdominal pain HISTORY OF PRESENT ILLNESS:    
Raymundo Smith is a 80 y.o.  female with PMHx significant for HTN, lung cancer, and dementia, presents via EMS to the ED with pain rt flank. x day  No f/c. H/o stent placement in rt kidney never f/u with urology. she was supposed to have the stent replaced last spring but this never occurred due to patient being hospitalized elsewhere At time of examination, pt denies pain of any nature. She also reports upper ABD pain x ~2 days, which she states she may be attributed to the food she ate the night of onset. Pt denies hx of similar symptoms. She states she is compliant with her prescribed antihypertensives. Pt specifically denies cough, SOB, N/V/D, dysuria, frequency, melena, hematochezia, or nervousness/anxiousness. No leg pain. No c/p . We were asked to admit for work up and evaluation of the above problems. Past Medical History:  
Diagnosis Date  Hypertension  Ill-defined condition Ex Lap with Mushtaq Delude patch of a perforated pyloric channel ulcer 7/19/16  Other ill-defined conditions(799.89) lipids  Other ill-defined conditions(799.89)   
 blood transfusion history Past Surgical History:  
Procedure Laterality Date  BYPASS GRAFT OTHR,FEM-FEM    
 BYPASS GRAFT OTHR,FEM-POP    
 right  HX HEENT    
 bilateral cataracts  HX ORTHOPAEDIC    
 right hip fracture/pinning  HX UROLOGICAL    
 right stent/kidney Social History Tobacco Use  Smoking status: Current Every Day Smoker Packs/day: 0.25  Tobacco comment: stopped 2009 Substance Use Topics  Alcohol use: No  
  
 
No family history on file. Allergies Allergen Reactions  Dilaudid [Hydromorphone] Unknown (comments) Does not remember  Hydrocodone Nausea and Vomiting  Morphine Rash Prior to Admission medications Medication Sig Start Date End Date Taking? Authorizing Provider  
docusate sodium (COLACE) 100 mg capsule Take 100 mg by mouth two (2) times a day. Yes Other, MD Baldev  
gabapentin (NEURONTIN) 400 mg capsule Take 400 mg by mouth four (4) times daily. Yes Other, MD Baldev  
neomycin-polymyxin-dexamethasone (DEXACINE) 3.5 mg/g-10,000 unit/g-0.1 % ophthalmic ointment Administer  to both eyes nightly. Yes Other, MD Baldev  
PARoxetine (PAXIL) 20 mg tablet Take 20 mg by mouth daily. Yes Veronika, MD Baldev  
temazepam (RESTORIL) 15 mg capsule Take 15 mg by mouth nightly. Yes Other, MD Baldev  
cholecalciferol (VITAMIN D3) 50,000 unit capsule Take 50,000 Units by mouth every seven (7) days. Yes Other, MD Baldev  
dilTIAZem CD (CARDIZEM CD) 180 mg ER capsule Take 1 Cap by mouth daily. 4/12/18  Yes Noelle Brooks MD  
LMitra acidoph & paracasei- S therm- Bifido (HANG-Q/RISAQUAD) 8 billion cell cap cap Take 1 Cap by mouth daily. 4/12/18  Yes Noelle Brooks MD  
metoprolol tartrate (LOPRESSOR) 50 mg tablet Take 1 Tab by mouth two (2) times a day. 4/12/18  Yes Noelle Brooks MD  
predniSONE (DELTASONE) 10 mg tablet Take 1 Tab by mouth daily (with breakfast). 4/12/18  Yes Noelle Brooks MD  
acetaminophen (TYLENOL) 650 mg CR tablet Take 650 mg by mouth every six (6) hours as needed for Pain. Yes Provider, Historical  
hydrALAZINE (APRESOLINE) 100 mg tablet Take 1 Tab by mouth three (3) times daily. 9/13/16  Yes Noelle Brooks MD  
aspirin 81 mg tablet Take 81 mg by mouth daily. Stopped for surgery 8-4-10  8/5/10  Yes Provider, Historical  
hydrochlorothiazide (HYDRODIURIL) 25 mg tablet Take 25 mg by mouth daily.  8/5/10  Yes Provider, Historical  
Menthol-Zinc Oxide (RISAMINE) 0.44-20.6 % oint Apply  to affected area daily as needed (rash). Baldev Banda MD  
loperamide (IMODIUM) 2 mg capsule Take 4 mg by mouth every eight (8) hours as needed for Diarrhea. Baldev Banda MD  
polyethylene glycol (MIRALAX) 17 gram packet Take 17 g by mouth daily as needed (constipation). Baldev Banda MD  
 
 
REVIEW OF SYSTEMS:    
I am not able to complete the review of systems because: The patient is intubated and sedated The patient has altered mental status due to his acute medical problems The patient has baseline aphasia from prior stroke(s) The patient has baseline dementia and is not reliable historian The patient is in acute medical distress and unable to provide information Total of 12 systems reviewed as follows:   
   POSITIVE= underlined text  Negative = text not underlined General:  fever, chills, sweats, generalized weakness, weight loss/gain,  
   loss of appetite Eyes:    blurred vision, eye pain, loss of vision, double vision ENT:    rhinorrhea, pharyngitis Respiratory:   cough, sputum production, SOB, MONTANO, wheezing, pleuritic pain  
Cardiology:   chest pain, palpitations, orthopnea, PND, edema, syncope Gastrointestinal:  abdominal pain , N/V, diarrhea, dysphagia, constipation, bleeding Genitourinary:  frequency, urgency, dysuria, hematuria, incontinence Muskuloskeletal :  arthralgia, myalgia, back pain Hematology:  easy bruising, nose or gum bleeding, lymphadenopathy Dermatological: rash, ulceration, pruritis, color change / jaundice Endocrine:   hot flashes or polydipsia Neurological:  headache, dizziness, confusion, focal weakness, paresthesia, Speech difficulties, memory loss, gait difficulty Psychological: Feelings of anxiety, depression, agitation Objective: VITALS:   
Visit Vitals /82 Pulse 70 Temp 98.5 °F (36.9 °C) Resp 18 Ht 5' 1.5\" (1.562 m) Wt 52.2 kg (115 lb) SpO2 97% BMI 21.38 kg/m² PHYSICAL EXAM: 
 
 General:    Alert, cooperative, no distress, appears stated age. HEENT: Atraumatic, anicteric sclerae, pink conjunctivae No oral ulcers, mucosa moist, throat clear, dentition fair Neck:  Supple, symmetrical,  thyroid: non tender Lungs:   Clear to auscultation bilaterally. No Wheezing or Rhonchi. No rales. Chest wall:  No tenderness  No Accessory muscle use. Heart:   Regular  rhythm,  No  murmur   No edema no leg pain. Abdomen:   Soft, non-tender. Not distended. Bowel sounds normal 
Extremities: No cyanosis. No clubbing,   
  Skin turgor normal, Capillary refill normal, Radial dial pulse 2+ Skin:     Not pale. Not Jaundiced  No rashes Psych:  poor insight. Not depressed. Not anxious or agitated. Neurologic: EOMs intact. No facial asymmetry. No aphasia or slurred speech. Symmetrical strength, Sensation grossly intact. Alert and oriented X 4.  
 
_______________________________________________________________________ Care Plan discussed with: 
  Comments Patient x Family RN x Care Manager Consultant:  x   
_______________________________________________________________________ Expected  Disposition:  
Home with Family HH/PT/OT/RN   
SNF/LTC x  
HERB   
________________________________________________________________________ TOTAL TIME:  90  Minutes Critical Care Provided     Minutes non procedure based Comments  
 x Reviewed previous records  
>50% of visit spent in counseling and coordination of care x Discussion with patient and/or family and questions answered 
  
 
________________________________________________________________________ Signed: Jessica Owens MD 
 
Procedures: see electronic medical records for all procedures/Xrays and details which were not copied into this note but were reviewed prior to creation of Plan. LAB DATA REVIEWED:   
Recent Results (from the past 24 hour(s)) EKG, 12 LEAD, INITIAL Collection Time: 10/24/18 10:17 AM  
Result Value Ref Range Ventricular Rate 65 BPM  
 Atrial Rate 65 BPM  
 P-R Interval 170 ms QRS Duration 78 ms Q-T Interval 384 ms QTC Calculation (Bezet) 399 ms Calculated P Axis 50 degrees Calculated R Axis -16 degrees Calculated T Axis 113 degrees Diagnosis Normal sinus rhythm Left ventricular hypertrophy with repolarization abnormality Confirmed by Елена Ramírez ([id number]) on 10/24/2018 67:25:52 AM 
  
METABOLIC PANEL, COMPREHENSIVE Collection Time: 10/24/18 11:13 AM  
Result Value Ref Range Sodium 133 (L) 136 - 145 mmol/L Potassium 4.8 3.5 - 5.1 mmol/L Chloride 102 97 - 108 mmol/L  
 CO2 23 21 - 32 mmol/L Anion gap 8 5 - 15 mmol/L Glucose 132 (H) 65 - 100 mg/dL BUN 61 (H) 6 - 20 MG/DL Creatinine 3.21 (H) 0.55 - 1.02 MG/DL  
 BUN/Creatinine ratio 19 12 - 20 GFR est AA 17 (L) >60 ml/min/1.73m2 GFR est non-AA 14 (L) >60 ml/min/1.73m2 Calcium 10.0 8.5 - 10.1 MG/DL Bilirubin, total 0.4 0.2 - 1.0 MG/DL  
 ALT (SGPT) 13 12 - 78 U/L  
 AST (SGOT) 18 15 - 37 U/L Alk. phosphatase 73 45 - 117 U/L Protein, total 7.4 6.4 - 8.2 g/dL Albumin 2.5 (L) 3.5 - 5.0 g/dL Globulin 4.9 (H) 2.0 - 4.0 g/dL A-G Ratio 0.5 (L) 1.1 - 2.2    
CBC WITH AUTOMATED DIFF Collection Time: 10/24/18 11:13 AM  
Result Value Ref Range WBC 14.1 (H) 3.6 - 11.0 K/uL  
 RBC 4.46 3.80 - 5.20 M/uL  
 HGB 12.6 11.5 - 16.0 g/dL HCT 39.0 35.0 - 47.0 % MCV 87.4 80.0 - 99.0 FL  
 MCH 28.3 26.0 - 34.0 PG  
 MCHC 32.3 30.0 - 36.5 g/dL  
 RDW 16.9 (H) 11.5 - 14.5 % PLATELET 096 330 - 646 K/uL MPV 10.9 8.9 - 12.9 FL  
 NRBC 0.0 0  WBC ABSOLUTE NRBC 0.00 0.00 - 0.01 K/uL NEUTROPHILS 87 (H) 32 - 75 % LYMPHOCYTES 2 (L) 12 - 49 % MONOCYTES 9 5 - 13 % EOSINOPHILS 1 0 - 7 % BASOPHILS 0 0 - 1 % IMMATURE GRANULOCYTES 1 (H) 0.0 - 0.5 % ABS. NEUTROPHILS 12.3 (H) 1.8 - 8.0 K/UL ABS. LYMPHOCYTES 0.3 (L) 0.8 - 3.5 K/UL  
 ABS. MONOCYTES 1.3 (H) 0.0 - 1.0 K/UL  
 ABS. EOSINOPHILS 0.1 0.0 - 0.4 K/UL  
 ABS. BASOPHILS 0.0 0.0 - 0.1 K/UL  
 ABS. IMM. GRANS. 0.1 (H) 0.00 - 0.04 K/UL  
 DF AUTOMATED    
 RBC COMMENTS ANISOCYTOSIS 1+ LIPASE Collection Time: 10/24/18 11:13 AM  
Result Value Ref Range Lipase 81 73 - 393 U/L  
CK W/ CKMB & INDEX Collection Time: 10/24/18 11:13 AM  
Result Value Ref Range CK 42 26 - 192 U/L  
 CK - MB 1.8 <3.6 NG/ML  
 CK-MB Index 4.3 (H) 0 - 2.5    
TROPONIN I Collection Time: 10/24/18 11:13 AM  
Result Value Ref Range Troponin-I, Qt. <0.05 <0.05 ng/mL URINALYSIS W/ REFLEX CULTURE Collection Time: 10/24/18 12:53 PM  
Result Value Ref Range Color YELLOW/STRAW Appearance CLOUDY (A) CLEAR Specific gravity 1.016 1.003 - 1.030    
 pH (UA) 5.5 5.0 - 8.0 Protein 300 (A) NEG mg/dL Glucose NEGATIVE  NEG mg/dL Ketone NEGATIVE  NEG mg/dL Bilirubin NEGATIVE  NEG Blood NEGATIVE  NEG Urobilinogen 0.2 0.2 - 1.0 EU/dL Nitrites NEGATIVE  NEG Leukocyte Esterase LARGE (A) NEG    
 WBC >100 (H) 0 - 4 /hpf  
 RBC 5-10 0 - 5 /hpf Epithelial cells FEW FEW /lpf Bacteria 1+ (A) NEG /hpf  
 UA:UC IF INDICATED URINE CULTURE ORDERED (A) CNI  Yeast PRESENT (A) NEG

## 2018-10-24 NOTE — BRIEF OP NOTE
BRIEF OPERATIVE NOTE Date of Procedure: 10/24/2018 Preoperative Diagnosis: RIGHT HYDROURETERONEPHROSIS, MIGRATED RIGHT URETRAL STENT2 Postoperative Diagnosis: * No post-op diagnosis entered * Procedure(s): 
CYSTOSCOPY Extraction of uretral stent from bladder, placement of right uretral stent Surgeon(s) and Role: Richard Noble MD - Primary Surgical Assistant: none Surgical Staff: 
Circ-1: Oswaldo Razo RN Radiology Technician: Gorge Main Scrub Tech-1: Felton Schumacher Event Time In Time Out Incision Start [id number] Incision Close 1704 Anesthesia: MAC Estimated Blood Loss: none Specimens:  
ID Type Source Tests Collected by Time Destination 1 : Urine  Urine Bladder URINE CULTURE, ROUTINE Saleem Owusu MD 10/24/2018 1701 Microbiology Findings: right displaced JJ, cloudy urone Complications: none Implants:  
Implant Name Type Inv. Item Serial No.  Lot No. LRB No. Used Action STENT URET KT RADPQ [id number] -- CONVERT TO ITEM [id number] - SN/A  Yuval Hernandez KT RADPQ [id number] -- CONVERT TO ITEM [id number] N/A [facility] [id number] Right 1 Implanted

## 2018-10-24 NOTE — PERIOP NOTES
TRANSFER - OUT REPORT: 
 
Verbal report given to Eladio MURRY(name) on Mariangel Cordero  being transferred to 2134(unit) for routine progression of care Report consisted of patients Situation, Background, Assessment and  
Recommendations(SBAR). Information from the following report(s) OR Summary, Intake/Output and MAR was reviewed with the receiving nurse. Opportunity for questions and clarification was provided. Patient transported with: 
 Monitor O2 @ 2 liters Registered Nurse

## 2018-10-24 NOTE — PERIOP NOTES
TRANSFER - IN REPORT: 
 
Verbal report received from LATRICE Chandler RN(name) on Shawn Rodriguez  being received from OR(unit) for routine post - op Report consisted of patients Situation, Background, Assessment and  
Recommendations(SBAR). Information from the following report(s) OR Summary was reviewed with the receiving nurse. Opportunity for questions and clarification was provided. Assessment completed upon patients arrival to unit and care assumed.

## 2018-10-24 NOTE — ANESTHESIA PREPROCEDURE EVALUATION
Anesthetic History PONV Review of Systems / Medical History Patient summary reviewed, nursing notes reviewed and pertinent labs reviewed Pulmonary COPD Smoker Comments: Smoker - 0.25 ppd Primary lung large cell carcinoma, left Neuro/Psych Psychiatric history and dementia Cardiovascular Hypertension: well controlled Dysrhythmias PAD and hyperlipidemia Exercise tolerance: >4 METS 
  
GI/Hepatic/Renal 
  
 
 
Renal disease: ARF and CRI 
PUD Comments: Hydronephrosis ARF 
CRI, Stage IV Endo/Other Other Findings Physical Exam 
 
Airway Mallampati: II 
TM Distance: > 6 cm Neck ROM: normal range of motion Mouth opening: Normal 
 
 Cardiovascular Regular rate and rhythm,  S1 and S2 normal,  no murmur, click, rub, or gallop Dental 
 
Dentition: Edentulous, Full lower dentures and Full upper dentures Pulmonary Breath sounds clear to auscultation Abdominal 
GI exam deferred Other Findings Anesthetic Plan ASA: 3 Anesthesia type: general and total IV anesthesia Induction: Intravenous Anesthetic plan and risks discussed with: Patient

## 2018-10-24 NOTE — PERIOP NOTES
TRANSFER - IN REPORT: 
 
Verbal report received from [facility] RN(name) on Eloise Radish  being received from ER 15(unit) for ordered procedure Report consisted of patients Situation, Background, Assessment and  
Recommendations(SBAR). Information from the following report(s) SBAR, Kardex, Intake/Output, MAR, Recent Results, Cardiac Rhythm NSR and Procedure Verification was reviewed with the receiving nurse. Opportunity for questions and clarification was provided. Assessment completed upon patients arrival to unit and care assumed.

## 2018-10-24 NOTE — CONSULTS
Urology Consult and H&P    Subjective:     Date of Consultation:  October 24, 2018    Referring Physician: Ubaldo Almanzar    Reason for Consultation: indwelling Stent migration with acute on chronic renal insufficiency and possible sepsis    Patient Name: Jamaal Machado  MRN: [id number]    History of Present Illness:   Patient is a 80 y.o.  female who is being seen for Stent migration with acute on chronic renal insufficiency and possible sepsis. She was admitted to the hospital for Renal failure. She has been followed by Dr. 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 She has had indwelling stents for over 6 years. As best we can tell, she was supposed to have the stent replaced last spring but this never occurred due to patient being hospitalized elsewhere. Therefore she is well past her due date for stent exchange. The recent imaging showed the stent to have migrated completely out of the upper tract and is coiled in the bladder. Past Medical History:   Diagnosis Date    Hypertension     Ill-defined condition     Ex Lap with Dereck Mortimer patch of a perforated pyloric channel ulcer 7/19/16    Other ill-defined conditions(799.89)     lipids    Other ill-defined conditions(799.89)     blood transfusion history      Past Surgical History:   Procedure Laterality Date    BYPASS GRAFT OTHR,FEM-FEM      BYPASS GRAFT OTHR,FEM-POP      right    HX HEENT      bilateral cataracts    HX ORTHOPAEDIC      right hip fracture/pinning    HX UROLOGICAL      right stent/kidney      No family history on file.    Social History     Tobacco Use    Smoking status: Current Every Day Smoker     Packs/day: 0.25    Tobacco comment: stopped 2009   Substance Use Topics    Alcohol use: No     Allergies   Allergen Reactions    Dilaudid [Hydromorphone] Unknown (comments) Does not remember    Hydrocodone Nausea and Vomiting    Morphine Rash      Prior to Admission medications    Medication Sig Start Date End Date Taking? Authorizing Provider   docusate sodium (COLACE) 100 mg capsule Take 100 mg by mouth two (2) times a day. Yes Veronika, MD Baldev   gabapentin (NEURONTIN) 400 mg capsule Take 400 mg by mouth four (4) times daily. Yes Veronika, MD Baldev   neomycin-polymyxin-dexamethasone (DEXACINE) 3.5 mg/g-10,000 unit/g-0.1 % ophthalmic ointment Administer  to both eyes nightly. Yes Veronika, MD Baldev   PARoxetine (PAXIL) 20 mg tablet Take 20 mg by mouth daily. Yes Baldev Banda MD   temazepam (RESTORIL) 15 mg capsule Take 15 mg by mouth nightly. Yes Baldev Banda MD   cholecalciferol (VITAMIN D3) 50,000 unit capsule Take 50,000 Units by mouth every seven (7) days. Yes Baldev Banda MD   dilTIAZem CD (CARDIZEM CD) 180 mg ER capsule Take 1 Cap by mouth daily. 4/12/18  Yes Thresa Goldberg, MD   L. acidoph & paracasei- S therm- Bifido (HANG-Q/RISAQUAD) 8 billion cell cap cap Take 1 Cap by mouth daily. 4/12/18  Yes Thresa Goldberg, MD   metoprolol tartrate (LOPRESSOR) 50 mg tablet Take 1 Tab by mouth two (2) times a day. 4/12/18  Yes Thresa Goldberg, MD   predniSONE (DELTASONE) 10 mg tablet Take 1 Tab by mouth daily (with breakfast). 4/12/18  Yes Thresa Goldberg, MD   acetaminophen (TYLENOL) 650 mg CR tablet Take 650 mg by mouth every six (6) hours as needed for Pain. Yes Provider, Historical   hydrALAZINE (APRESOLINE) 100 mg tablet Take 1 Tab by mouth three (3) times daily. 9/13/16  Yes Thresa Goldberg, MD   aspirin 81 mg tablet Take 81 mg by mouth daily. Stopped for surgery 8-4-10  8/5/10  Yes Provider, Historical   hydrochlorothiazide (HYDRODIURIL) 25 mg tablet Take 25 mg by mouth daily. 8/5/10  Yes Provider, Historical   Menthol-Zinc Oxide (RISAMINE) 0.44-20.6 % oint Apply  to affected area daily as needed (rash).     Other, MD Baldev   loperamide (IMODIUM) 2 mg capsule Take 4 mg by mouth every eight (8) hours as needed for Diarrhea. Baldev Banda MD   polyethylene glycol (MIRALAX) 17 gram packet Take 17 g by mouth daily as needed (constipation). Baldev Banda MD             Objective:     Data Review (Labs):    Recent Labs     10/24/18  1113   WBC 14.1*   HGB 12.6      *   K 4.8   CREA 3.21*   BUN 61*       Patient Vitals for the past 8 hrs:   BP Temp Pulse Resp SpO2 Height Weight   10/24/18 1103   70 18 97 %     10/24/18 1044   74 21 90 %     10/24/18 1010 180/82 98.5 °F (36.9 °C) 67 19 91 % 5' 1.5\" (1.562 m) 52.2 kg (115 lb)     Temp (24hrs), Av.5 °F (36.9 °C), Min:98.5 °F (36.9 °C), Max:98.5 °F (36.9 °C)      Intake and Output:   No intake/output data recorded. Physical Exam:            General:    Thin female NAD, confused                     Skin:  dry, no lesions                 Abdomen[de-identified]  Soft , NT,ND, No masses             Genitalia:  deferred          Extremities:  no edema       Assessment:     Active Problems:    Renal failure (10/24/2018)          Stent migration with resultant hydronephrosis and acute on chronic renal insufficiency    Plan:     Cystoscopy and stent removal with replacement of right ureteral stent. Medical management as planned in regards to her renal insufficiency and possible sepsis.     Signed By: Judith Berg MD                         2018

## 2018-10-24 NOTE — ED PROVIDER NOTES
EMERGENCY DEPARTMENT HISTORY AND PHYSICAL EXAM 
 
 
Date: 10/24/2018 Patient Name: Osman Harden History of Presenting Illness Chief Complaint Patient presents with  Abdominal Pain Pain across upper abd since Monday, EMS reports last BM on Friday  Chest Pain Pt reports pain under left breast since Tuesday, EMS reports recent hx of pneumonia. SBP elevated to 200, FSBS 112 History Provided By: Patient and Records HPI: Osman Harden, 80 y.o. female with PMHx significant for HTN, lung cancer, and dementia, presents via EMS to the ED with cc of left sided CP, located under her left breast, since yesterday (10/23/18). At time of examination, pt denies pain of any nature. She also reports upper ABD pain x ~2 days, which she states she may be attributed to the food she ate the night of onset. Pt denies hx of similar symptoms. She states she is compliant with her prescribed antihypertensives. Pt specifically denies cough, SOB, N/V/D, dysuria, frequency, melena, hematochezia, or nervousness/anxiousness. There are no other complaints, changes, or physical findings at this time. PCP: Lorrie Bray MD 
 
Current Facility-Administered Medications Medication Dose Route Frequency Provider Last Rate Last Dose  cefTRIAXone (ROCEPHIN) 1 g in 0.9% sodium chloride (MBP/ADV) 50 mL  1 g IntraVENous NOW Zahraa Mendiola,       
 
Current Outpatient Medications Medication Sig Dispense Refill  escitalopram oxalate (LEXAPRO) 20 mg tablet Take 1 Tab by mouth daily. 30 Tab PRN  
 dilTIAZem CD (CARDIZEM CD) 180 mg ER capsule Take 1 Cap by mouth daily. 30 Cap PRN  
 L. acidoph & paracasei- S therm- Bifido (HANG-Q/RISAQUAD) 8 billion cell cap cap Take 1 Cap by mouth daily. 30 Cap 0  
 metoprolol tartrate (LOPRESSOR) 50 mg tablet Take 1 Tab by mouth two (2) times a day.  60 Tab PRN  
 nystatin (MYCOSTATIN) 100,000 unit/mL suspension Take 5 mL by mouth four (4) times daily. swish and spit 200 mL 0  
 predniSONE (DELTASONE) 10 mg tablet Take 1 Tab by mouth daily (with breakfast). 30 Tab 0  
 acetaminophen (TYLENOL) 650 mg CR tablet Take 650 mg by mouth every six (6) hours as needed for Pain.  hydrALAZINE (APRESOLINE) 100 mg tablet Take 1 Tab by mouth three (3) times daily. 90 Tab prn  pantoprazole (PROTONIX) 40 mg tablet Take 1 Tab by mouth two (2) times a day. Indications: GASTRIC ULCER 60 Tab 0  clopidogrel (PLAVIX) 75 mg tablet Take 75 mg by mouth daily. Stopped for surgery-8-4-10  aspirin 81 mg tablet Take 81 mg by mouth every evening. Stopped for surgery 8-4-10     
 simvastatin (ZOCOR) 80 mg tablet Take 80 mg by mouth every evening.  hydrochlorothiazide (HYDRODIURIL) 25 mg tablet Take 25 mg by mouth every evening. Past History Past Medical History: 
Past Medical History:  
Diagnosis Date  Hypertension  Ill-defined condition Ex Lap with Shelvy Roses patch of a perforated pyloric channel ulcer 7/19/16  Other ill-defined conditions(799.89) lipids  Other ill-defined conditions(799.89)   
 blood transfusion history Past Surgical History: 
Past Surgical History:  
Procedure Laterality Date  BYPASS GRAFT OTHR,FEM-FEM    
 BYPASS GRAFT OTHR,FEM-POP    
 right  HX HEENT    
 bilateral cataracts  HX ORTHOPAEDIC    
 right hip fracture/pinning  HX UROLOGICAL    
 right stent/kidney Family History: No family history on file. Social History: 
Social History Tobacco Use  Smoking status: Current Every Day Smoker Packs/day: 0.25  Tobacco comment: stopped 2009 Substance Use Topics  Alcohol use: No  
 Drug use: No  
 
 
Allergies: Allergies Allergen Reactions  Dilaudid [Hydromorphone] Unknown (comments) Does not remember  Hydrocodone Nausea and Vomiting  Morphine Rash Review of Systems Review of Systems Constitutional: Negative. Negative for appetite change, chills, fatigue and fever. HENT: Negative. Negative for congestion, rhinorrhea, sinus pressure and sore throat. Eyes: Negative. Respiratory: Negative. Negative for cough, choking, chest tightness, shortness of breath and wheezing. Cardiovascular: Positive for chest pain (left; resolved). Negative for palpitations and leg swelling. Gastrointestinal: Positive for abdominal pain (upper; resolved). Negative for blood in stool, constipation, diarrhea, nausea and vomiting. Endocrine: Negative. Genitourinary: Negative. Negative for difficulty urinating, dysuria, flank pain, frequency and urgency. Musculoskeletal: Negative. Skin: Negative. Neurological: Negative. Negative for dizziness, speech difficulty, weakness, light-headedness, numbness and headaches. Psychiatric/Behavioral: Negative. The patient is not nervous/anxious. All other systems reviewed and are negative. Physical Exam  
Physical Exam  
Constitutional: She appears well-developed and well-nourished. No distress. Elderly female, no acute distress HENT:  
Head: Normocephalic and atraumatic. Mouth/Throat: Oropharynx is clear and moist. No oropharyngeal exudate. Eyes: Conjunctivae and EOM are normal. Pupils are equal, round, and reactive to light. Neck: Normal range of motion. Neck supple. No JVD present. No tracheal deviation present. Cardiovascular: Normal rate, regular rhythm, normal heart sounds and intact distal pulses. No murmur heard. Pulmonary/Chest: Effort normal and breath sounds normal. No stridor. No respiratory distress. She has no wheezes. She has no rales. She exhibits no tenderness. Abdominal: Soft. She exhibits no distension. There is no tenderness. There is no rebound and no guarding. Musculoskeletal: Normal range of motion. She exhibits no edema or tenderness. Neurological: She is alert. No cranial nerve deficit. No gross motor or sensory deficits, Awake, alert, +confusion at baseline Skin: Skin is warm and dry. She is not diaphoretic. Psychiatric: She has a normal mood and affect. Her behavior is normal.  
Nursing note and vitals reviewed. Diagnostic Study Results Labs - Recent Results (from the past 12 hour(s)) EKG, 12 LEAD, INITIAL Collection Time: 10/24/18 10:17 AM  
Result Value Ref Range Ventricular Rate 65 BPM  
 Atrial Rate 65 BPM  
 P-R Interval 170 ms QRS Duration 78 ms Q-T Interval 384 ms QTC Calculation (Bezet) 399 ms Calculated P Axis 50 degrees Calculated R Axis -16 degrees Calculated T Axis 113 degrees Diagnosis Normal sinus rhythm Left ventricular hypertrophy with repolarization abnormality Confirmed by Ayo Spicer ([id number]) on 10/24/2018 22:40:27 AM 
  
METABOLIC PANEL, COMPREHENSIVE Collection Time: 10/24/18 11:13 AM  
Result Value Ref Range Sodium 133 (L) 136 - 145 mmol/L Potassium 4.8 3.5 - 5.1 mmol/L Chloride 102 97 - 108 mmol/L  
 CO2 23 21 - 32 mmol/L Anion gap 8 5 - 15 mmol/L Glucose 132 (H) 65 - 100 mg/dL BUN 61 (H) 6 - 20 MG/DL Creatinine 3.21 (H) 0.55 - 1.02 MG/DL  
 BUN/Creatinine ratio 19 12 - 20 GFR est AA 17 (L) >60 ml/min/1.73m2 GFR est non-AA 14 (L) >60 ml/min/1.73m2 Calcium 10.0 8.5 - 10.1 MG/DL Bilirubin, total 0.4 0.2 - 1.0 MG/DL  
 ALT (SGPT) 13 12 - 78 U/L  
 AST (SGOT) 18 15 - 37 U/L Alk. phosphatase 73 45 - 117 U/L Protein, total 7.4 6.4 - 8.2 g/dL Albumin 2.5 (L) 3.5 - 5.0 g/dL Globulin 4.9 (H) 2.0 - 4.0 g/dL A-G Ratio 0.5 (L) 1.1 - 2.2    
CBC WITH AUTOMATED DIFF Collection Time: 10/24/18 11:13 AM  
Result Value Ref Range WBC 14.1 (H) 3.6 - 11.0 K/uL  
 RBC 4.46 3.80 - 5.20 M/uL  
 HGB 12.6 11.5 - 16.0 g/dL HCT 39.0 35.0 - 47.0 % MCV 87.4 80.0 - 99.0 FL  
 MCH 28.3 26.0 - 34.0 PG  
 MCHC 32.3 30.0 - 36.5 g/dL  
 RDW 16.9 (H) 11.5 - 14.5 % PLATELET 070 177 - 907 K/uL MPV 10.9 8.9 - 12.9 FL  
 NRBC 0.0 0  WBC ABSOLUTE NRBC 0.00 0.00 - 0.01 K/uL NEUTROPHILS 87 (H) 32 - 75 % LYMPHOCYTES 2 (L) 12 - 49 % MONOCYTES 9 5 - 13 % EOSINOPHILS 1 0 - 7 % BASOPHILS 0 0 - 1 % IMMATURE GRANULOCYTES 1 (H) 0.0 - 0.5 % ABS. NEUTROPHILS 12.3 (H) 1.8 - 8.0 K/UL  
 ABS. LYMPHOCYTES 0.3 (L) 0.8 - 3.5 K/UL  
 ABS. MONOCYTES 1.3 (H) 0.0 - 1.0 K/UL  
 ABS. EOSINOPHILS 0.1 0.0 - 0.4 K/UL  
 ABS. BASOPHILS 0.0 0.0 - 0.1 K/UL  
 ABS. IMM. GRANS. 0.1 (H) 0.00 - 0.04 K/UL  
 DF AUTOMATED    
 RBC COMMENTS ANISOCYTOSIS 1+ LIPASE Collection Time: 10/24/18 11:13 AM  
Result Value Ref Range Lipase 81 73 - 393 U/L  
CK W/ CKMB & INDEX Collection Time: 10/24/18 11:13 AM  
Result Value Ref Range CK 42 26 - 192 U/L  
 CK - MB 1.8 <3.6 NG/ML  
 CK-MB Index 4.3 (H) 0 - 2.5    
TROPONIN I Collection Time: 10/24/18 11:13 AM  
Result Value Ref Range Troponin-I, Qt. <0.05 <0.05 ng/mL URINALYSIS W/ REFLEX CULTURE Collection Time: 10/24/18 12:53 PM  
Result Value Ref Range Color YELLOW/STRAW Appearance CLOUDY (A) CLEAR Specific gravity 1.016 1.003 - 1.030    
 pH (UA) 5.5 5.0 - 8.0 Protein 300 (A) NEG mg/dL Glucose NEGATIVE  NEG mg/dL Ketone NEGATIVE  NEG mg/dL Bilirubin NEGATIVE  NEG Blood NEGATIVE  NEG Urobilinogen 0.2 0.2 - 1.0 EU/dL Nitrites NEGATIVE  NEG Leukocyte Esterase LARGE (A) NEG    
 WBC PENDING /hpf  
 RBC PENDING /hpf Epithelial cells PENDING /lpf Bacteria PENDING /hpf  
 UA:UC IF INDICATED PENDING Radiologic Studies -  
XR ABD (KUB) Final Result Clinical indication: Abdominal pain. 
  
Supine view of the abdomen is obtained. There is some mild fecal stasis, gas 
pattern is otherwise nonspecific. A catheter is seen in the bladder which appear 
to be a double-J. This is compatible with a migration of a ureteral catheter within the bladder. prior vascular stent. Prior right hip surgery. 
  
IMPRESSION 
impression: Nonspecific fecal stasis. Double-J catheter in the bladder at this 
time. CT Results  (Last 48 hours) 10/24/18 1303  CT ABD PELV WO CONT Final result Impression:  IMPRESSION:  
   
1. Interval inferior migration of the right ureteral stent. The proximal pigtail  
is in the distal ureter with the remainder in the bladder. There is moderate  
right hydroureteronephrosis now present which is new. 2. Status post ORIF of the proximal right humerus. There is unchanged avascular  
necrosis of the right femoral head with subchondral collapse and severe  
osteoarthritis. The dynamic compression screw extends past the cortex of the  
femoral head into the joint space with chronic remodeling of the right  
acetabulum. 3. Bilateral renal lesions, several of which are hyperdense and previously shown  
to be hemorrhagic cysts. 4. Unchanged left lower lobe mass related to the patient's known malignancy. 5. Trace new left pleural effusion. 6. Interval development of bilateral sacral ala insufficiency fractures. The  
sclerosis suggests that these are more chronic, but they are new since 3/27/2018. Narrative:  EXAM:  CT ABD PELV WO CONT INDICATION: abd pain, acute renal failure COMPARISON: CT 3/27/2018. PET CT 3/27/2018. CONTRAST:  None. TECHNIQUE:   
Thin axial images were obtained through the abdomen and pelvis. Coronal and  
sagittal reconstructions were generated. Oral contrast was not administered. CT  
dose reduction was achieved through use of a standardized protocol tailored for  
this examination and automatic exposure control for dose modulation. The absence of intravenous contrast material reduces the sensitivity for  
evaluation of the solid parenchymal organs of the abdomen.    
   
FINDINGS:   
 LUNG BASES: There is a 2.9 x 2.8 cm mass in the left lower lobe again seen. There is a new trace left pleural effusion. INCIDENTALLY IMAGED HEART AND MEDIASTINUM: The heart is mildly enlarged. Cardiothoracic a calcified lesions are present. LIVER: No mass or biliary dilatation. Incidental small calcification in the  
liver is likely related to granulomatous disease. GALLBLADDER: Unremarkable. SPLEEN: No mass. PANCREAS: No mass or ductal dilatation. Several calcifications in the pancreas  
may be related to prior pancreatitis. ADRENALS: Unremarkable. The left adrenal gland is less thickened than previously  
seen. KIDNEYS/URETERS: Multiple bilateral renal cysts are again seen. There are  
several which show hyperdensity and were previously shown to be hemorrhagic  
cysts. There is moderate right hydroureteronephrosis. The renal stent has  
migrated distally. The proximal pigtail is in the distal ureter and the  
remainder of the catheter is in the bladder. STOMACH: Unremarkable. SMALL BOWEL: No dilatation or wall thickening. COLON: No dilatation or wall thickening. APPENDIX: Unremarkable. PERITONEUM: No ascites or pneumoperitoneum. RETROPERITONEUM: No lymphadenopathy or aortic aneurysm. There is an  
aortobifemoral bypass graft. There are 2 femorofemoral bypass grafts in the  
subcutaneous tissues anterior to the pelvis. Patency of these cannot be  
established without IV contrast.  
REPRODUCTIVE ORGANS: The uterus is unremarkable. URINARY BLADDER: No mass or calculus. BONES: The patient is status post ORIF of the proximal right femur. There is  
severe right hip osteophytosis. There is collapse of the right femoral head. The  
dynamic compression screw in the femoral head protrudes past the cortex of the  
femoral head into the joint space. There is chronic remodeling of the right  
acetabulum. Serpiginous lines of sclerosis in the right femoral head likely represent avascular necrosis. Areas of irregular sclerosis are seen in both  
sacral ala with deformity of the anterior sacral ala cortices related to  
bilateral insufficiency fractures. These are new since the prior study. Several  
right-sided chronic rib fractures are identified. ADDITIONAL COMMENTS: There is mild diastases of the anterior abdominopelvic wall  
musculature with bulging of abdominal contents anteriorly in the midline. CXR Results  (Last 48 hours) 10/24/18 1152  XR CHEST PA LAT Final result Impression:   impression: Decrease in size of a left pleural effusion, cardiomegaly. Narrative:  Clinical indication: Left side chest pain. Frontal and lateral views of the chest obtained, comparison April 8, 2018. Improvement in aeration of the left lung base with minimal blunting at this  
time. The heart is enlarged the right lung is clear. Medical Decision Making I am the first provider for this patient. I reviewed the vital signs, available nursing notes, past medical history, past surgical history, family history and social history. Vital Signs-Reviewed the patient's vital signs. Patient Vitals for the past 12 hrs: 
 Temp Pulse Resp BP SpO2  
10/24/18 1103  70 18  97 % 10/24/18 1044  74 21  90 % 10/24/18 1010 98.5 °F (36.9 °C) 67 19 180/82 91 % Pulse Oximetry Analysis - 97% on RA Cardiac Monitor:  
Rate: 65 bpm 
Rhythm: Normal Sinus Rhythm EKG interpretation: 10:17 Rhythm: normal sinus rhythm; and regular . Rate (approx.): 65; Axis: normal; MO interval: normal; QRS interval: normal ; ST/T wave: T wave inversion in leads V1-V6. Records Reviewed: Nursing Notes, Old Medical Records and Ambulance Run Sheet Provider Notes (Medical Decision Making): DDx: ACS, chest wall pain, constipation, pancreatitis ED Course:  
Initial assessment performed.  The patients presenting problems have been discussed, and they are in agreement with the care plan formulated and outlined with them. I have encouraged them to ask questions as they arise throughout their visit. 1:20 PM 
Spoke with Glendy Escobar in Urology scheduling office; on-call physician will be to ED to see pt. CONSULT NOTE:  
1:31 PM 
Marcus Mejia DO spoke with Rom Fletcher MD, Specialty: Hospitalist 
Discussed pt's hx, disposition, and available diagnostic and imaging results. Reviewed care plans. Consultant will evaluate pt for admission. Pt started on Rocephin after Urinalysis resulted. Ureteral stent in dislodged and in bladder, complicated by Acute Renal Insufficiency. Discussed with Dr. Steff Garvey, pt will likely go to the OR tonight, NPO, consent for cystoscopy with extraction of ureteral stent and placement of right ureter stent. Unfortunately at the time I went to discuss with family, they had left, ed nursing staff to contact family to discuss, Chey Andrews DO Disposition: 
ADMIT NOTE: 
1:31 PM 
The patient is being admitted to the hospital.  The results of their tests and reasons for their admission have been discussed with the patient and/or available family. They convey agreement and understanding for the need to be admitted and for their admission diagnosis. PLAN: 
1. Admit to Hospitalist 
 
Diagnosis Clinical Impression: 1. Acute renal failure, unspecified acute renal failure type ([address] 2. Ureteral stent displacement, initial encounter ([address] 3. UTI Attestations: This note is prepared by Isak Vergara, acting as Scribe for Ozzie Love DO. Ozzie Love DO: The scribe's documentation has been prepared under my direction and personally reviewed by me in its entirety. I confirm that the note above accurately reflects all work, treatment, procedures, and medical decision making performed by me.

## 2018-10-24 NOTE — ED NOTES
IV AB started. CHG bath given. Pt turned s/s placed in clean gown and on clean linens. All jewelry removed and placed in pts pocketbook. Cell phone with pt.   Remains on 2L NC.

## 2018-10-24 NOTE — PROGRESS NOTES
Pharmacy Clarification of Prior to Admission Medication Regimen The patient was not interviewed regarding clarification of the prior to admission medication regimen. Patient was transferred from [facility], to [facility], with a current med list. Pharmacy called [facility], [phone], and spoke with Alexandra Alba, who was able to verify the patient's last administered doses. Information Obtained From: Transfer papers, SNF Pertinent Pharmacy Findings: 
? Updated patients preferred outpatient pharmacy to: [facility] did not update the outpatient pharmacy due to the patient living at a SNF. ? Menthol-Zinc Oxide (RISAMINE) 0.44-20.6 % oint, loperamide (IMODIUM) 2 mg capsule, polyethylene glycol (MIRALAX) 17 gram packet: Per SNF, patient has not taken these agents as of 10/24/2018. PTA medication list was corrected to the following:  
 
Prior to Admission Medications Prescriptions Last Dose Informant Patient Reported? Taking? L. acidoph & paracasei- S therm- Bifido (HANG-Q/RISAQUAD) 8 billion cell cap cap 10/23/2018 at 0900 Transfer Papers No Yes Sig: Take 1 Cap by mouth daily. Menthol-Zinc Oxide (RISAMINE) 0.44-20.6 % oint Not Taking at Unknown time Transfer Papers Yes No  
Sig: Apply  to affected area daily as needed (rash). PARoxetine (PAXIL) 20 mg tablet 10/23/2018 at 0900 Transfer Papers Yes Yes Sig: Take 20 mg by mouth daily. acetaminophen (TYLENOL) 650 mg CR tablet 10/23/2018 at 1300 Transfer Papers Yes Yes Sig: Take 650 mg by mouth every six (6) hours as needed for Pain. aspirin 81 mg tablet 10/23/2018 at 0900 Transfer Papers Yes Yes Sig: Take 81 mg by mouth daily. Stopped for surgery 8-4-10   
cholecalciferol (VITAMIN D3) 50,000 unit capsule 10/23/2018 at 0800 Transfer Papers Yes Yes Sig: Take 50,000 Units by mouth every seven (7) days. dilTIAZem CD (CARDIZEM CD) 180 mg ER capsule 10/23/2018 at 0900 Transfer Papers No Yes Sig: Take 1 Cap by mouth daily. docusate sodium (COLACE) 100 mg capsule 10/23/2018 at 2000 Transfer Papers Yes Yes Sig: Take 100 mg by mouth two (2) times a day.  
gabapentin (NEURONTIN) 400 mg capsule 10/23/2018 at 2200 Transfer Papers Yes Yes Sig: Take 400 mg by mouth four (4) times daily. hydrALAZINE (APRESOLINE) 100 mg tablet 10/23/2018 at 2000 Transfer Papers No Yes Sig: Take 1 Tab by mouth three (3) times daily. hydrochlorothiazide (HYDRODIURIL) 25 mg tablet 10/23/2018 at 0900 Transfer Papers Yes Yes Sig: Take 25 mg by mouth daily. loperamide (IMODIUM) 2 mg capsule Not Taking at Unknown time Transfer Papers Yes No  
Sig: Take 4 mg by mouth every eight (8) hours as needed for Diarrhea.  
metoprolol tartrate (LOPRESSOR) 50 mg tablet 10/23/2018 at 2000 Transfer Papers No Yes Sig: Take 1 Tab by mouth two (2) times a day. neomycin-polymyxin-dexamethasone (DEXACINE) 3.5 mg/g-10,000 unit/g-0.1 % ophthalmic ointment 10/23/2018 at 2000 Transfer Papers Yes Yes Sig: Administer  to both eyes nightly. polyethylene glycol (MIRALAX) 17 gram packet Not Taking at Unknown time Transfer Papers Yes No  
Sig: Take 17 g by mouth daily as needed (constipation). predniSONE (DELTASONE) 10 mg tablet 10/23/2018 at 0800 Transfer Papers No Yes Sig: Take 1 Tab by mouth daily (with breakfast). temazepam (RESTORIL) 15 mg capsule 10/23/2018 at 2200 Transfer Papers Yes Yes Sig: Take 15 mg by mouth nightly. Facility-Administered Medications: None Thank you, 
Sirisha Sterling PharmD candidate Class of 2019

## 2018-10-24 NOTE — PERIOP NOTES
16: 20= pt confused; states year is \"2020\"; performed phone consent with daughter, Mamie Mcghee; verified with Maria Guadalupe Trujillo RN.

## 2018-10-24 NOTE — ED NOTES
Daughter, Augustina Rosenthal, called by Mora Pedersen RN to update on current plan of care and OR status. Phone numbers provided.

## 2018-10-25 PROBLEM — N13.5 URETERAL OBSTRUCTION: Status: ACTIVE | Noted: 2018-10-25

## 2018-10-25 LAB
ALBUMIN SERPL-MCNC: 2.1 G/DL (ref 3.5–5)
ALBUMIN/GLOB SERPL: 0.5 {RATIO} (ref 1.1–2.2)
ALP SERPL-CCNC: 61 U/L (ref 45–117)
ALT SERPL-CCNC: 12 U/L (ref 12–78)
ANION GAP SERPL CALC-SCNC: 9 MMOL/L (ref 5–15)
AST SERPL-CCNC: 17 U/L (ref 15–37)
BACTERIA SPEC CULT: ABNORMAL
BASOPHILS # BLD: 0 K/UL (ref 0–0.1)
BASOPHILS NFR BLD: 0 % (ref 0–1)
BILIRUB DIRECT SERPL-MCNC: 0.1 MG/DL (ref 0–0.2)
BILIRUB SERPL-MCNC: 0.4 MG/DL (ref 0.2–1)
BUN SERPL-MCNC: 59 MG/DL (ref 6–20)
BUN/CREAT SERPL: 22 (ref 12–20)
CALCIUM SERPL-MCNC: 8.8 MG/DL (ref 8.5–10.1)
CC UR VC: ABNORMAL
CHLORIDE SERPL-SCNC: 106 MMOL/L (ref 97–108)
CO2 SERPL-SCNC: 22 MMOL/L (ref 21–32)
COMMENT, HOLDF: NORMAL
CREAT SERPL-MCNC: 2.72 MG/DL (ref 0.55–1.02)
DIFFERENTIAL METHOD BLD: ABNORMAL
EOSINOPHIL # BLD: 0.1 K/UL (ref 0–0.4)
EOSINOPHIL NFR BLD: 1 % (ref 0–7)
ERYTHROCYTE [DISTWIDTH] IN BLOOD BY AUTOMATED COUNT: 16.7 % (ref 11.5–14.5)
GLOBULIN SER CALC-MCNC: 4 G/DL (ref 2–4)
GLUCOSE SERPL-MCNC: 115 MG/DL (ref 65–100)
HCT VFR BLD AUTO: 32.4 % (ref 35–47)
HGB BLD-MCNC: 10.4 G/DL (ref 11.5–16)
IMM GRANULOCYTES # BLD: 0.1 K/UL (ref 0–0.04)
IMM GRANULOCYTES NFR BLD AUTO: 1 % (ref 0–0.5)
LYMPHOCYTES # BLD: 0.6 K/UL (ref 0.8–3.5)
LYMPHOCYTES NFR BLD: 4 % (ref 12–49)
MCH RBC QN AUTO: 28.3 PG (ref 26–34)
MCHC RBC AUTO-ENTMCNC: 32.1 G/DL (ref 30–36.5)
MCV RBC AUTO: 88.3 FL (ref 80–99)
MONOCYTES # BLD: 1.5 K/UL (ref 0–1)
MONOCYTES NFR BLD: 12 % (ref 5–13)
NEUTS SEG # BLD: 10.6 K/UL (ref 1.8–8)
NEUTS SEG NFR BLD: 82 % (ref 32–75)
NRBC # BLD: 0 K/UL (ref 0–0.01)
NRBC BLD-RTO: 0 PER 100 WBC
OSMOLALITY UR: 401 MOSM/KG H2O
PLATELET # BLD AUTO: 215 K/UL (ref 150–400)
PMV BLD AUTO: 11 FL (ref 8.9–12.9)
POTASSIUM SERPL-SCNC: 4.7 MMOL/L (ref 3.5–5.1)
POTASSIUM UR-SCNC: 39 MMOL/L
PROT SERPL-MCNC: 6.1 G/DL (ref 6.4–8.2)
RBC # BLD AUTO: 3.67 M/UL (ref 3.8–5.2)
SAMPLES BEING HELD,HOLD: NORMAL
SERVICE CMNT-IMP: ABNORMAL
SODIUM SERPL-SCNC: 137 MMOL/L (ref 136–145)
SODIUM UR-SCNC: 56 MMOL/L
TROPONIN I SERPL-MCNC: 0.05 NG/ML
WBC # BLD AUTO: 12.8 K/UL (ref 3.6–11)

## 2018-10-25 PROCEDURE — 0T768DZ DILATION OF RIGHT URETER WITH INTRALUMINAL DEVICE, VIA NATURAL OR ARTIFICIAL OPENING ENDOSCOPIC: ICD-10-PCS | Performed by: UROLOGY

## 2018-10-25 PROCEDURE — 74011250636 HC RX REV CODE- 250/636: Performed by: INTERNAL MEDICINE

## 2018-10-25 PROCEDURE — 85025 COMPLETE CBC W/AUTO DIFF WBC: CPT | Performed by: UROLOGY

## 2018-10-25 PROCEDURE — 74011636637 HC RX REV CODE- 636/637: Performed by: INTERNAL MEDICINE

## 2018-10-25 PROCEDURE — 84133 ASSAY OF URINE POTASSIUM: CPT | Performed by: INTERNAL MEDICINE

## 2018-10-25 PROCEDURE — 94760 N-INVAS EAR/PLS OXIMETRY 1: CPT

## 2018-10-25 PROCEDURE — 74011000250 HC RX REV CODE- 250: Performed by: INTERNAL MEDICINE

## 2018-10-25 PROCEDURE — 84300 ASSAY OF URINE SODIUM: CPT | Performed by: INTERNAL MEDICINE

## 2018-10-25 PROCEDURE — 80076 HEPATIC FUNCTION PANEL: CPT | Performed by: UROLOGY

## 2018-10-25 PROCEDURE — 82043 UR ALBUMIN QUANTITATIVE: CPT | Performed by: INTERNAL MEDICINE

## 2018-10-25 PROCEDURE — 74011250637 HC RX REV CODE- 250/637: Performed by: INTERNAL MEDICINE

## 2018-10-25 PROCEDURE — 74011000258 HC RX REV CODE- 258: Performed by: INTERNAL MEDICINE

## 2018-10-25 PROCEDURE — 84484 ASSAY OF TROPONIN QUANT: CPT | Performed by: UROLOGY

## 2018-10-25 PROCEDURE — 77010033678 HC OXYGEN DAILY

## 2018-10-25 PROCEDURE — 65270000029 HC RM PRIVATE

## 2018-10-25 PROCEDURE — 36415 COLL VENOUS BLD VENIPUNCTURE: CPT | Performed by: UROLOGY

## 2018-10-25 PROCEDURE — 83935 ASSAY OF URINE OSMOLALITY: CPT | Performed by: INTERNAL MEDICINE

## 2018-10-25 PROCEDURE — 80048 BASIC METABOLIC PNL TOTAL CA: CPT | Performed by: UROLOGY

## 2018-10-25 PROCEDURE — 0TPB8DZ REMOVAL OF INTRALUMINAL DEVICE FROM BLADDER, VIA NATURAL OR ARTIFICIAL OPENING ENDOSCOPIC: ICD-10-PCS | Performed by: UROLOGY

## 2018-10-25 RX ADMIN — PREDNISONE 10 MG: 5 TABLET ORAL at 08:29

## 2018-10-25 RX ADMIN — GABAPENTIN 300 MG: 300 CAPSULE ORAL at 08:30

## 2018-10-25 RX ADMIN — CEFTRIAXONE 1 G: 1 INJECTION, POWDER, FOR SOLUTION INTRAMUSCULAR; INTRAVENOUS at 08:28

## 2018-10-25 RX ADMIN — METOPROLOL TARTRATE 50 MG: 50 TABLET ORAL at 08:30

## 2018-10-25 RX ADMIN — HEPARIN SODIUM 5000 UNITS: 5000 INJECTION INTRAVENOUS; SUBCUTANEOUS at 07:00

## 2018-10-25 RX ADMIN — HYDRALAZINE HYDROCHLORIDE 100 MG: 50 TABLET, FILM COATED ORAL at 00:04

## 2018-10-25 RX ADMIN — HYDRALAZINE HYDROCHLORIDE 100 MG: 50 TABLET, FILM COATED ORAL at 17:23

## 2018-10-25 RX ADMIN — HYDRALAZINE HYDROCHLORIDE 100 MG: 50 TABLET, FILM COATED ORAL at 08:29

## 2018-10-25 RX ADMIN — DOCUSATE SODIUM 100 MG: 100 CAPSULE, LIQUID FILLED ORAL at 08:30

## 2018-10-25 RX ADMIN — POLYETHYLENE GLYCOL 3350 17 G: 17 POWDER, FOR SOLUTION ORAL at 08:28

## 2018-10-25 RX ADMIN — TEMAZEPAM 15 MG: 15 CAPSULE ORAL at 00:04

## 2018-10-25 RX ADMIN — DOCUSATE SODIUM 100 MG: 100 CAPSULE, LIQUID FILLED ORAL at 17:22

## 2018-10-25 RX ADMIN — TEMAZEPAM 15 MG: 15 CAPSULE ORAL at 21:22

## 2018-10-25 RX ADMIN — HYDRALAZINE HYDROCHLORIDE 100 MG: 50 TABLET, FILM COATED ORAL at 21:22

## 2018-10-25 RX ADMIN — SODIUM CHLORIDE 100 ML/HR: 900 INJECTION, SOLUTION INTRAVENOUS at 03:00

## 2018-10-25 RX ADMIN — ACETAMINOPHEN 650 MG: 325 TABLET ORAL at 07:00

## 2018-10-25 RX ADMIN — PAROXETINE 20 MG: 20 TABLET, FILM COATED ORAL at 08:30

## 2018-10-25 RX ADMIN — METOPROLOL TARTRATE 50 MG: 50 TABLET ORAL at 17:22

## 2018-10-25 RX ADMIN — ASPIRIN 81 MG CHEWABLE TABLET 81 MG: 81 TABLET CHEWABLE at 08:29

## 2018-10-25 RX ADMIN — Medication 10 ML: at 21:23

## 2018-10-25 RX ADMIN — HEPARIN SODIUM 5000 UNITS: 5000 INJECTION INTRAVENOUS; SUBCUTANEOUS at 17:22

## 2018-10-25 RX ADMIN — DILTIAZEM HYDROCHLORIDE 180 MG: 180 CAPSULE, COATED, EXTENDED RELEASE ORAL at 08:29

## 2018-10-25 NOTE — CONSULTS
[facility]  [address], [address] [facility] [place], [address] [facility]  [phone]      Hematology/ Oncology consult note      Reason for consult:     Ms. Ava Whitman is a 81 yo female who we have been asked to see by Dr. Didier Pollard for a past history of lung cancer. Subjective:     Yakov Barron presented to the ED yesterday for abdominal pain and chest pain. She was admitted by the hospitalist after it was found she had a migrated indwelling renal stent and acute or chronic renal insufficiency. Per the medical records she was diagnosed with a left lower lobe mass in 2016 and received radiation only at that time. On the CT scan obtained yesterday, the mass remains and is unchanged in comparison to 3/2018 scan. Review of Systems:  Unable to obtain d/t mental status       Past Medical History:   Diagnosis Date    Hypertension     Ill-defined condition     Ex Lap with Fernando Fought patch of a perforated pyloric channel ulcer 7/19/16    Other ill-defined conditions(799.89)     lipids    Other ill-defined conditions(799.89)     blood transfusion history     Past Surgical History:   Procedure Laterality Date    BYPASS GRAFT OTHR,FEM-FEM      BYPASS GRAFT OTHR,FEM-POP      right    HX HEENT      bilateral cataracts    HX ORTHOPAEDIC      right hip fracture/pinning    HX UROLOGICAL      right stent/kidney      History reviewed. No pertinent family history.   Social History     Tobacco Use    Smoking status: Former Smoker     Packs/day: 0.25    Smokeless tobacco: Never Used    Tobacco comment: stopped 2009   Substance Use Topics    Alcohol use: No      Current Facility-Administered Medications   Medication Dose Route Frequency Provider Last Rate Last Dose    acetaminophen (TYLENOL) tablet 650 mg  650 mg Oral Q4H PRN Sb POLLARD MD   650 mg at 10/25/18 0700    aspirin chewable tablet 81 mg  81 mg Oral DAILY Nima Osuna MD   81 mg at 10/25/18 8387    dilTIAZem CD (CARDIZEM CD) capsule 180 mg  180 mg Oral DAILY Nima Osuna MD   180 mg at 10/25/18 5270    docusate sodium (COLACE) capsule 100 mg  100 mg Oral BID Laban Rubinstein A., MD   100 mg at 10/25/18 0830    hydrALAZINE (APRESOLINE) tablet 100 mg  100 mg Oral TID Nima Osuna MD   100 mg at 10/25/18 2844    metoprolol tartrate (LOPRESSOR) tablet 50 mg  50 mg Oral BID Laban Rubinstein A., MD   50 mg at 10/25/18 0830    neomycin-polymyxin-dexamethasone (DEXACINE) 3.5 mg/g-10,000 unit/g-0.1 % ophthalmic ointment   Both Eyes QHS Nima Osuna MD        PARoxetine (PAXIL) tablet 20 mg  20 mg Oral DAILY Laban Rubinstein A., MD   20 mg at 10/25/18 0830    predniSONE (DELTASONE) tablet 10 mg  10 mg Oral DAILY WITH Osmar POLLARD MD   10 mg at 10/25/18 7216    temazepam (RESTORIL) capsule 15 mg  15 mg Oral QHS Nima Osuna MD   15 mg at 10/25/18 0004    cefTRIAXone (ROCEPHIN) 1 g in 0.9% sodium chloride (MBP/ADV) 50 mL  1 g IntraVENous Q24H Laban Rubinstein A.,  mL/hr at 10/25/18 0828 1 g at 10/25/18 0828    sodium chloride (NS) flush 5-10 mL  5-10 mL IntraVENous Q8H Nima Osuna MD        sodium chloride (NS) flush 5-10 mL  5-10 mL IntraVENous PRN Nima Osuna MD        0.9% sodium chloride infusion  100 mL/hr IntraVENous CONTINUOUS Laban Rubinstein A.,  mL/hr at 10/25/18 0300 100 mL/hr at 10/25/18 0300    heparin (porcine) injection 5,000 Units  5,000 Units SubCUTAneous Q12H Nima Osuna MD   5,000 Units at 10/25/18 0700    hydrALAZINE (APRESOLINE) 20 mg/mL injection 10 mg  10 mg IntraVENous Q6H PRN Laban Rubinstein A., MD   10 mg at 10/24/18 1740    ondansetron (ZOFRAN) injection 4 mg  4 mg IntraVENous Q6H PRN Nima Osuna MD        polyethylene glycol (MIRALAX) packet 17 g  17 g Oral DAILY Nima Osuna MD   17 g at 10/25/18 3152    gabapentin (NEURONTIN) capsule 300 mg  300 mg Oral DAILY Aamir POLLARD MD   300 mg at 10/25/18 0830        Allergies   Allergen Reactions    Dilaudid [Hydromorphone] Unknown (comments)     Does not remember    Hydrocodone Nausea and Vomiting    Morphine Rash          Objective:     Patient Vitals for the past 8 hrs:   BP Temp Pulse Resp SpO2   10/25/18 1133 151/76 98.6 °F (37 °C) (!) 57 18 92 %   10/25/18 0842 159/62 98.7 °F (37.1 °C) 63 17 95 %       Lab Results   Component Value Date/Time    WBC 12.8 (H) 10/25/2018 12:30 AM    Hemoglobin (POC) 12.6 06/24/2009 12:08 PM    HGB 10.4 (L) 10/25/2018 12:30 AM    Hematocrit (POC) 37 06/24/2009 12:08 PM    HCT 32.4 (L) 10/25/2018 12:30 AM    PLATELET 273 78/89/6501 12:30 AM    MCV 88.3 10/25/2018 12:30 AM       Physical Exam:   Constitutional: Elderly female, no acute distress  HEENT: negative  Respiratory: normal effort, CTA bilaterally   Cardiovascular: normal rate, regular rhythm, normal heart sounds. GI:soft, no distention  Integumentary: no rashes, not diaphoretic  Musculoskeletal: normal range of motion, no edema  Neurological: Alert, confusion at baseline      Assessment:     1. Left lower lobe lung cancer -    Diagnosed in 2016 and received radiation at that time. CT from yesterday shows the mass is stable compared to the 3/2018 scan. Will attempt to obtain addition information from the family. Patient currently resided in assisted living facility d/t her dementia. 2. Right sided hydroureteronephrosis-    Followed by urology and nephrology        Plan:     1. No further work up for lung mass. Will attempt to obtain additional information from family as patient currently lives in assisted living facility for dementia.         Signed By: Andrea Mayberry NP     October 25, 2018

## 2018-10-25 NOTE — PROGRESS NOTES
Admit Date: 10/24/2018 POD 1 Day Post-Op Procedure:  Procedure(s): 
CYSTOSCOPY Extraction of uretral stent from bladder, placement of right uretral stent Subjective:  
 
Patient is sleeping Objective:  
 
Blood pressure 151/76, pulse (!) 57, temperature 98.6 °F (37 °C), resp. rate 18, height 5' 1.5\" (1.562 m), weight 52.2 kg (115 lb), SpO2 92 %. Temp (24hrs), Av.9 °F (37.2 °C), Min:97.6 °F (36.4 °C), Max:99.9 °F (37.7 °C) Physical Exam:Urine draining well, appears concentrated Labs:  
Recent Results (from the past 48 hour(s)) EKG, 12 LEAD, INITIAL Collection Time: 10/24/18 10:17 AM  
Result Value Ref Range Ventricular Rate 65 BPM  
 Atrial Rate 65 BPM  
 P-R Interval 170 ms QRS Duration 78 ms Q-T Interval 384 ms QTC Calculation (Bezet) 399 ms Calculated P Axis 50 degrees Calculated R Axis -16 degrees Calculated T Axis 113 degrees Diagnosis Normal sinus rhythm Left ventricular hypertrophy with repolarization abnormality Confirmed by Taina Kate ([id number]) on 10/24/2018 22:56:88 AM 
  
METABOLIC PANEL, COMPREHENSIVE Collection Time: 10/24/18 11:13 AM  
Result Value Ref Range Sodium 133 (L) 136 - 145 mmol/L Potassium 4.8 3.5 - 5.1 mmol/L Chloride 102 97 - 108 mmol/L  
 CO2 23 21 - 32 mmol/L Anion gap 8 5 - 15 mmol/L Glucose 132 (H) 65 - 100 mg/dL BUN 61 (H) 6 - 20 MG/DL Creatinine 3.21 (H) 0.55 - 1.02 MG/DL  
 BUN/Creatinine ratio 19 12 - 20 GFR est AA 17 (L) >60 ml/min/1.73m2 GFR est non-AA 14 (L) >60 ml/min/1.73m2 Calcium 10.0 8.5 - 10.1 MG/DL Bilirubin, total 0.4 0.2 - 1.0 MG/DL  
 ALT (SGPT) 13 12 - 78 U/L  
 AST (SGOT) 18 15 - 37 U/L Alk. phosphatase 73 45 - 117 U/L Protein, total 7.4 6.4 - 8.2 g/dL Albumin 2.5 (L) 3.5 - 5.0 g/dL Globulin 4.9 (H) 2.0 - 4.0 g/dL A-G Ratio 0.5 (L) 1.1 - 2.2    
CBC WITH AUTOMATED DIFF Collection Time: 10/24/18 11:13 AM  
Result Value Ref Range WBC 14.1 (H) 3.6 - 11.0 K/uL  
 RBC 4.46 3.80 - 5.20 M/uL  
 HGB 12.6 11.5 - 16.0 g/dL HCT 39.0 35.0 - 47.0 % MCV 87.4 80.0 - 99.0 FL  
 MCH 28.3 26.0 - 34.0 PG  
 MCHC 32.3 30.0 - 36.5 g/dL  
 RDW 16.9 (H) 11.5 - 14.5 % PLATELET 546 596 - 667 K/uL MPV 10.9 8.9 - 12.9 FL  
 NRBC 0.0 0  WBC ABSOLUTE NRBC 0.00 0.00 - 0.01 K/uL NEUTROPHILS 87 (H) 32 - 75 % LYMPHOCYTES 2 (L) 12 - 49 % MONOCYTES 9 5 - 13 % EOSINOPHILS 1 0 - 7 % BASOPHILS 0 0 - 1 % IMMATURE GRANULOCYTES 1 (H) 0.0 - 0.5 % ABS. NEUTROPHILS 12.3 (H) 1.8 - 8.0 K/UL  
 ABS. LYMPHOCYTES 0.3 (L) 0.8 - 3.5 K/UL  
 ABS. MONOCYTES 1.3 (H) 0.0 - 1.0 K/UL  
 ABS. EOSINOPHILS 0.1 0.0 - 0.4 K/UL  
 ABS. BASOPHILS 0.0 0.0 - 0.1 K/UL  
 ABS. IMM. GRANS. 0.1 (H) 0.00 - 0.04 K/UL  
 DF AUTOMATED    
 RBC COMMENTS ANISOCYTOSIS 1+ LIPASE Collection Time: 10/24/18 11:13 AM  
Result Value Ref Range Lipase 81 73 - 393 U/L  
CK W/ CKMB & INDEX Collection Time: 10/24/18 11:13 AM  
Result Value Ref Range CK 42 26 - 192 U/L  
 CK - MB 1.8 <3.6 NG/ML  
 CK-MB Index 4.3 (H) 0 - 2.5    
TROPONIN I Collection Time: 10/24/18 11:13 AM  
Result Value Ref Range Troponin-I, Qt. <0.05 <0.05 ng/mL URINALYSIS W/ REFLEX CULTURE Collection Time: 10/24/18 12:53 PM  
Result Value Ref Range Color YELLOW/STRAW Appearance CLOUDY (A) CLEAR Specific gravity 1.016 1.003 - 1.030    
 pH (UA) 5.5 5.0 - 8.0 Protein 300 (A) NEG mg/dL Glucose NEGATIVE  NEG mg/dL Ketone NEGATIVE  NEG mg/dL Bilirubin NEGATIVE  NEG Blood NEGATIVE  NEG Urobilinogen 0.2 0.2 - 1.0 EU/dL Nitrites NEGATIVE  NEG Leukocyte Esterase LARGE (A) NEG    
 WBC >100 (H) 0 - 4 /hpf  
 RBC 5-10 0 - 5 /hpf Epithelial cells FEW FEW /lpf Bacteria 1+ (A) NEG /hpf  
 UA:UC IF INDICATED URINE CULTURE ORDERED (A) CNI Yeast PRESENT (A) NEG    
CULTURE, BLOOD, PAIRED  Collection Time: 10/24/18  2:30 PM  
 Result Value Ref Range Special Requests: NO SPECIAL REQUESTS Culture result: NO GROWTH AFTER 16 HOURS    
LACTIC ACID Collection Time: 10/24/18  2:48 PM  
Result Value Ref Range Lactic acid 1.0 0.4 - 2.0 MMOL/L  
TROPONIN I Collection Time: 10/25/18 12:30 AM  
Result Value Ref Range Troponin-I, Qt. 0.05 (H) <0.05 ng/mL CBC WITH AUTOMATED DIFF Collection Time: 10/25/18 12:30 AM  
Result Value Ref Range WBC 12.8 (H) 3.6 - 11.0 K/uL  
 RBC 3.67 (L) 3.80 - 5.20 M/uL  
 HGB 10.4 (L) 11.5 - 16.0 g/dL HCT 32.4 (L) 35.0 - 47.0 % MCV 88.3 80.0 - 99.0 FL  
 MCH 28.3 26.0 - 34.0 PG  
 MCHC 32.1 30.0 - 36.5 g/dL  
 RDW 16.7 (H) 11.5 - 14.5 % PLATELET 356 779 - 060 K/uL MPV 11.0 8.9 - 12.9 FL  
 NRBC 0.0 0  WBC ABSOLUTE NRBC 0.00 0.00 - 0.01 K/uL NEUTROPHILS 82 (H) 32 - 75 % LYMPHOCYTES 4 (L) 12 - 49 % MONOCYTES 12 5 - 13 % EOSINOPHILS 1 0 - 7 % BASOPHILS 0 0 - 1 % IMMATURE GRANULOCYTES 1 (H) 0.0 - 0.5 % ABS. NEUTROPHILS 10.6 (H) 1.8 - 8.0 K/UL  
 ABS. LYMPHOCYTES 0.6 (L) 0.8 - 3.5 K/UL  
 ABS. MONOCYTES 1.5 (H) 0.0 - 1.0 K/UL  
 ABS. EOSINOPHILS 0.1 0.0 - 0.4 K/UL  
 ABS. BASOPHILS 0.0 0.0 - 0.1 K/UL  
 ABS. IMM. GRANS. 0.1 (H) 0.00 - 0.04 K/UL  
 DF AUTOMATED METABOLIC PANEL, BASIC Collection Time: 10/25/18 12:30 AM  
Result Value Ref Range Sodium 137 136 - 145 mmol/L Potassium 4.7 3.5 - 5.1 mmol/L Chloride 106 97 - 108 mmol/L  
 CO2 22 21 - 32 mmol/L Anion gap 9 5 - 15 mmol/L Glucose 115 (H) 65 - 100 mg/dL BUN 59 (H) 6 - 20 MG/DL Creatinine 2.72 (H) 0.55 - 1.02 MG/DL  
 BUN/Creatinine ratio 22 (H) 12 - 20 GFR est AA 20 (L) >60 ml/min/1.73m2 GFR est non-AA 17 (L) >60 ml/min/1.73m2 Calcium 8.8 8.5 - 10.1 MG/DL  
HEPATIC FUNCTION PANEL Collection Time: 10/25/18 12:30 AM  
Result Value Ref Range Protein, total 6.1 (L) 6.4 - 8.2 g/dL Albumin 2.1 (L) 3.5 - 5.0 g/dL Globulin 4.0 2.0 - 4.0 g/dL A-G Ratio 0.5 (L) 1.1 - 2.2 Bilirubin, total 0.4 0.2 - 1.0 MG/DL Bilirubin, direct 0.1 0.0 - 0.2 MG/DL Alk. phosphatase 61 45 - 117 U/L  
 AST (SGOT) 17 15 - 37 U/L  
 ALT (SGPT) 12 12 - 78 U/L  
POTASSIUM, UR, RANDOM Collection Time: 10/25/18  6:30 AM  
Result Value Ref Range Potassium urine, random 39 MMOL/L  
SODIUM, UR, RANDOM Collection Time: 10/25/18  6:30 AM  
Result Value Ref Range Sodium,urine random 56 MMOL/L  
OSMOLALITY, UR Collection Time: 10/25/18  6:30 AM  
Result Value Ref Range Osmolality,urine 401 MOSM/kg H2O Data Review creatinine improved Assessment:  
 
Principal Problem: 
  [facility] (10/24/2018) Active Problems: COPD exacerbation ([facility] (8/14/2010) Essential hypertension (7/20/2016) Sepsis ([facility] (3/28/2018) Renal failure (10/24/2018) Hydronephrosis (10/24/2018) Ureteral obstruction (10/25/2018) Plan/Recommendations/Medical Decision Making:  
 
Adjust abx per sensitivities. I will schedule follow up in my office

## 2018-10-25 NOTE — OP NOTES
[address] REPORT    Ming Ontiveros  MR#: [id number]  : 1935  ACCOUNT #: [de-identified]   DATE OF SERVICE: 10/24/2018    PREOPERATIVE DIAGNOSIS:  Acute renal failure with right hydronephrosis and displaced right ureteral stent. POSTOPERATIVE DIAGNOSIS:  Acute renal failure with right hydronephrosis and displaced right ureteral stent. PROCEDURE PERFORMED:  Anesthetic cystoscopy examination with removal of double-J stent and placement of 6 x 24 cm 12-month Cook double-J stent and use of intermittent fluoroscopy. SURGEON:  Sally Hernandez. Mateus Merrill MD    COMPLICATIONS:  None. IMPLANTS:  Right double-J stent. SPECIMENS REMOVED:  Urine for culture. ANESTHESIA:  MAC. ASSISTANT:  None. ESTIMATED BLOOD LOSS:  None. DESCRIPTION OF PROCEDURE:  After informed consent, the patient had already received preoperative antibiotics. She was placed on the operating table in supine position. After induction of MAC anesthesia, she was gently placed in the lithotomy position. All pressure points were carefully padded. The vaginal area was prepped and draped in sterile fashion. A full time-out was accomplished. A 21-[place] cystoscope was then positioned in the bladder. The bladder had purulent urine in it and a stent within the bladder. It was grasped and removed. I then looked back in the bladder and was able to place a wire under fluoroscopic guidance into the right kidney. Over the wire I placed a 6 x 24 cm 12-month Cook double-J stent. The wire was removed. There was good curl in the kidney and the bladder. Purulent material came out around the stent. Therefore, I did send a culture directly of this. The bladder was surveyed and found to be free of any injury. The scope was removed and a 16-[place] Tellez was placed with efflux of cloudy urine and 10 mL of water was placed in the balloon.   The patient tolerated the procedure well with no complications. DAVID A. Audrene Arabia, MD Gib Snellen / Art Arrington  D: 10/24/2018 17:05     T: 10/25/2018 02:25  JOB #: [id number]  CC: Neil Alvarez MD

## 2018-10-25 NOTE — ROUTINE PROCESS
Bedside and Verbal shift change report given to Linda Francisgerhard Hernandez. (oncoming nurse) by Sharon Peralta (offgoing nurse). Report included the following information SBAR, Kardex, Procedure Summary, Intake/Output, MAR and Recent Results. Pt arrived to unit at 1900. Vitals started. Tolerated fluids well. No n/v. Vitals started. PO BP meds and heparin  given. BP elevated. Daughter at bedside.

## 2018-10-25 NOTE — PROGRESS NOTES
Physical Therapy Screening: 
Services are indicated and therapy order is required. An InBasket screening referral was triggered for physical therapy based on results obtained during the nursing admission assessment. The patients chart was reviewed and the patient is appropriate for a skilled therapy evaluation. Please order a consult for physical therapy if you are in agreement and would like an evaluation to be completed. Thank you.  
 
Paco Bunch, PT

## 2018-10-25 NOTE — PROGRESS NOTES
General Surgery End of Shift Nursing Note Bedside shift change report given to [address] (oncoming nurse) by Deonte Roque RN (offgoing nurse). Report included the following information SBAR, Kardex and Recent Results. Shift worked:   7p-7a Summary of shift:    Pt slept well. drowsy first part of the evening. Urine mteul=975 cc output overnight. Bp's better this morning Issues for physician to address: Maybe Urine output Number times ambulated in hallway past shift: 0 Number of times OOB to chair past shift: 0 Pain Management: 
Current medication: Tylenol Patient states pain is manageable on current pain medication: YES 
 
GI: 
 
Current diet:  DIET RENAL Regular Tolerating current diet: YES Passing flatus: YES Last Bowel Movement: several days ago Appearance:  
 
Respiratory: 
 
Incentive Spirometer at bedside: yes Patient instructed on use: NO(pt drowsy, will inform day shift) Patient Safety: 
 
Falls Score: 3 Bed Alarm On? Yes Sitter?  No 
 
Braden Ray RN

## 2018-10-25 NOTE — PROGRESS NOTES
Hospitalist Progress Note NAME: Mariangel Cordero :  1935 MRN:  [id number] Assessment / Plan: 
Right sided flank pain due to right hydroureteronephrosis   
-CT abd/pelvis: 
right hydroureteronephrosis. The renal stent has 
migrated distally. The proximal pigtail is in the distal ureter and the 
remainder of the catheter is in the bladder. Indwelling Stent migration related to UTI POA, CHRISTY requiring urology consult, iv rocephin, stent removal with replacement of right ureteral stent.  
 
-Urology consulted. Patient taken to OR 10/24 for cystoscopy, extraction of uretral stent from bladder, placement of R uretral stent. UTI, acute complicated cystitis w/o hematuria 
-does not meet criteria for sepsis nor SIRS at this time. Patient had temp < 100 F and normal heart rate on admission. Lactic acid normal. 
-cont IV abx. Follow cx. 
-IV fluids 
  
CHRISTY on CKD stage 3 - low GFR noted throughout year. 
-likely due to dehydration, hydroneprhosis 
-avoid nephrotoxic agents. -nephrology consulted. -monitor renal function Hyponatremia 
-mild. Likely due to dehydration. 
-Likely secondary to above 
  
H/O chest pain EKG w/o acute changes; trop neg. 
  
HO large cell lung carcinoma, left side, treated with radiation in 2016. 
-Onc consulted on admission 
  
Chronic steroid use - unknown reason No need for stress doses at this time 
  
Depression 
-Cont w home meds HTN Cont meds; discontinue HCTZ due to CKD Dementia 
  
 
Surrogate Decision Maker: Son, Caleb. by records Baseline: Lives at [address] [facility] 
 
Body mass index is 21.38 kg/m². Code status: DNR Prophylaxis: Hep SQ Recommended Disposition: SNF/LTC Subjective: Chief Complaint / Reason for Physician Visit Follow up for R flank pain. Discussed with RN events overnight. Denies any pain. No cp/sob/abd pain. Review of Systems: 
Symptom Y/N Comments  Symptom Y/N Comments Fever/Chills    Chest Pain Poor Appetite    Edema Cough    Abdominal Pain Sputum    Joint Pain SOB/MONTANO    Pruritis/Rash Nausea/vomit    Tolerating PT/OT Diarrhea    Tolerating Diet Constipation    Other Could NOT obtain due to:   
 
Objective: VITALS:  
Last 24hrs VS reviewed since prior progress note. Most recent are: 
Patient Vitals for the past 24 hrs: 
 Temp Pulse Resp BP SpO2  
10/25/18 0456     95 % 10/25/18 0350 98.9 °F (37.2 °C) 69 17 168/79 99 % 10/24/18 2353 99.8 °F (37.7 °C) 70 18 171/69 98 % 10/24/18 2250    146/65   
10/24/18 1909 97.6 °F (36.4 °C) 86 18 192/74 98 % 10/24/18 1830 98.9 °F (37.2 °C) 85 13 186/70 98 % 10/24/18 1815  83 15 192/67 97 % 10/24/18 1804  81 13 198/81 98 % 10/24/18 1745  79 23 186/68 95 % 10/24/18 1740  77 17 196/68 97 % 10/24/18 1735  78 16 199/68 97 % 10/24/18 1730  79 17 198/69 97 % 10/24/18 1725  79 20 185/79 99 % 10/24/18 1720  78 19 196/71 98 % 10/24/18 1718  79 15 (!) 201/78 99 % 10/24/18 1717 99.9 °F (37.7 °C) 78 14 (!) 201/78 97 % 10/24/18 1615 99.1 °F (37.3 °C) 76 16 (!) 205/72 94 % 10/24/18 1103  70 18  97 % 10/24/18 1044  74 21  90 % 10/24/18 1010 98.5 °F (36.9 °C) 67 19 180/82 91 % Intake/Output Summary (Last 24 hours) at 10/25/2018 5479 Last data filed at 10/25/2018 0630 Gross per 24 hour Intake 250 ml Output 600 ml Net -350 ml PHYSICAL EXAM: 
General: WD, WN. Alert, cooperative, no acute distress   
EENT:  EOMI. Anicteric sclerae. MMM Resp:  CTA bilaterally, no wheezing or rales. No accessory muscle use CV:  Regular  rhythm,  No edema GI:  Soft, Non distended, Non tender.  +Bowel sounds Neurologic:  Alert and oriented, normal speech, Psych:   Appropriate affect. Not anxious nor agitated Skin:  No rashes. No jaundice Reviewed most current lab test results and cultures  YES Reviewed most current radiology test results   YES 
 Review and summation of old records today    NO Reviewed patient's current orders and MAR    YES 
PMH/SH reviewed - no change compared to H&P 
________________________________________________________________________ Care Plan discussed with: 
  Comments Patient Family RN x Care Manager Consultant Multidiciplinary team rounds were held today with , nursing, pharmacist and clinical coordinator. Patient's plan of care was discussed; medications were reviewed and discharge planning was addressed. ________________________________________________________________________ Total NON critical care TIME:  30  Minutes Total CRITICAL CARE TIME Spent:   Minutes non procedure based Comments >50% of visit spent in counseling and coordination of care    
________________________________________________________________________ Deb Kang MD  
 
Procedures: see electronic medical records for all procedures/Xrays and details which were not copied into this note but were reviewed prior to creation of Plan. LABS: 
I reviewed today's most current labs and imaging studies. Pertinent labs include: 
Recent Labs 10/25/18 
0030 10/24/18 
1113 WBC 12.8* 14.1* HGB 10.4* 12.6 HCT 32.4* 39.0  229 Recent Labs 10/25/18 
0030 10/24/18 
1113  133* K 4.7 4.8  
 102 CO2 22 23 * 132* BUN 59* 61* CREA 2.72* 3.21* CA 8.8 10.0 ALB 2.1* 2.5* TBILI 0.4 0.4 SGOT 17 18 ALT 12 13 Signed: Deb Kang MD

## 2018-10-25 NOTE — CONSULTS
[id number] [facility]  MR#: [id number]  : 1935  ACCOUNT #: [de-identified]   DATE OF SERVICE: 10/25/2018    Requested by Dr. Víctor Herrmann for evaluation and management of renal failure. Patient was seen and examined. History obtained from patient and chart review. HISTORY OF PRESENT ILLNESS:  The patient is an 80-year-old female with past medical history significant for hypertension, lung cancer, dementia who presented via EMS to the ED with right flank pain. She has a history of a right ureteral stricture for which she has had chronic right-sided ureteral stent in place. It gets changed periodically. She has not had the stent replaced in recent times. On further workup, her right-sided ureteral stent has migrated into the bladder and she had new right-sided hydroureteronephrosis. She had renal failure with creatinine of 3.21, mild hyponatremia, but normal potassium. She was seen by Urology and has undergone cystoscopy with extraction of the ureteral stent from the bladder and placement of a new right ureteral stent. Her renal function has improved and creatinine is down to 2.72. She does not have any history of previous kidney stone. She is a very poor historian. She denies taking any NSAIDs. No recent vomiting or diarrhea. She does not see any nephrologist.    Based on review of labs, seems like she has underlying CKD and baseline creatinine seems to be fluctuating anywhere from 1.4-1.7 range earlier this month. PAST MEDICAL HISTORY:  As noted above. In addition, she has hyperlipidemia. History of exploratory laparotomy with perforated pyloric channel ulcer in 2016. PAST SURGICAL HISTORY:  1. Right fem-pop bypass. 2.  Bilateral cataracts. 3.  Right hip fracture repair. 4.  Right-sided ureteral stent as noted above. SOCIAL HISTORY:  She smokes cigarettes, does not use alcohol.     ALLERGIES:  SHE IS ALLERGIC TO DILAUDID, HYDROCODONE AND MORPHINE.    HOME MEDICATIONS:  Reviewed. Medications Prior to Admission   Medication Sig    docusate sodium (COLACE) 100 mg capsule Take 100 mg by mouth two (2) times a day.  gabapentin (NEURONTIN) 400 mg capsule Take 400 mg by mouth four (4) times daily.  neomycin-polymyxin-dexamethasone (DEXACINE) 3.5 mg/g-10,000 unit/g-0.1 % ophthalmic ointment Administer  to both eyes nightly.  PARoxetine (PAXIL) 20 mg tablet Take 20 mg by mouth daily.  temazepam (RESTORIL) 15 mg capsule Take 15 mg by mouth nightly.  cholecalciferol (VITAMIN D3) 50,000 unit capsule Take 50,000 Units by mouth every seven (7) days.  dilTIAZem CD (CARDIZEM CD) 180 mg ER capsule Take 1 Cap by mouth daily.  L. acidoph & paracasei- S therm- Bifido (HANG-Q/RISAQUAD) 8 billion cell cap cap Take 1 Cap by mouth daily.  metoprolol tartrate (LOPRESSOR) 50 mg tablet Take 1 Tab by mouth two (2) times a day.  predniSONE (DELTASONE) 10 mg tablet Take 1 Tab by mouth daily (with breakfast).  acetaminophen (TYLENOL) 650 mg CR tablet Take 650 mg by mouth every six (6) hours as needed for Pain.  hydrALAZINE (APRESOLINE) 100 mg tablet Take 1 Tab by mouth three (3) times daily.  aspirin 81 mg tablet Take 81 mg by mouth daily. Stopped for surgery 8-4-10     hydrochlorothiazide (HYDRODIURIL) 25 mg tablet Take 25 mg by mouth daily.  Menthol-Zinc Oxide (RISAMINE) 0.44-20.6 % oint Apply  to affected area daily as needed (rash).  loperamide (IMODIUM) 2 mg capsule Take 4 mg by mouth every eight (8) hours as needed for Diarrhea.  polyethylene glycol (MIRALAX) 17 gram packet Take 17 g by mouth daily as needed (constipation). REVIEW OF SYSTEMS:  As noted in HPI. Remainder is negative. PHYSICAL EXAMINATION:  GENERAL:  Elderly female, well-built, well-nourished, in no acute distress.   VITAL SIGNS:  She is afebrile, pulse 57-63, -014 systolic, 49-44 diastolic, respiration of 18, saturating 95% on room air. HEENT:  Head is atraumatic, normocephalic. Conjunctivae are pink. Mucous membranes are slightly dry. No scleral icterus. NECK:  No JVD. LUNGS:  Clear to auscultation. CARDIOVASCULAR:  Regular rate and rhythm. Normal S1, S2.  ABDOMEN:  Soft, nontender, active bowel sounds. EXTREMITIES:  Without edema. CENTRAL NERVOUS SYSTEM:  Alert and oriented x3. LABORATORY DATA:  CBC shows improving white count down to 12.8 compared to 14.1. Mild anemia with a hemoglobin of 10.4, normal platelets. Urinalysis showed protein of 300 on the dipstick, large leukocyte esterase, greater than 100 WBC, no blood. RBCs were only 5-10. BUN 59, creatinine down to 2.72. Potassium, bicarbonate, sodium are all normal.  LFTs are pretty much normal except low albumin of 2.1. CAT scan of abdomen and pelvis without contrast done yesterday showed interval inferior migration of the right ureteral stent into the bladder; moderate right hydroureteronephrosis, which is new; bilateral renal lesions, several of which are hyperdense and previously shown to be hemorrhagic cysts; unchanged left lower lobe mass related to patient's known malignancy; and interval development of bilateral sacral ala insufficiency fractures. 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 Not replaced recently as outpt. Now replaced inpt  R sided hydroureteronephrosis- new; due to migration of R ureteral stent  HTN     PLAN:  IVF  S/p urologic intervention already done. CHRISTY improving  Avoid nephrotoxins  ABx per 1ry team  Am labs  Will arrange outpt f/u if there is significant residual abnl renal fxn     D/W pt/friend and RN  Thanks for Renal consult.  We will follow patient with you.     Signed by:  Patti Diamond MD  Nephrology and HTN      BP / SN  D: 10/25/2018 16:49     T: 10/25/2018 17:38  JOB #: [id number]

## 2018-10-25 NOTE — PROGRESS NOTES
PROGRESS NOTE 
 
NAME:  Chris Query :   1935 MRN:   [id number] Date/Time:  10/25/2018 7:49 AM 
Subjective:  
History:  Chart reviewed and patient seen and examined this AM and D/W her nurse and all events noted. She was admitted yesterday with  Sepsis and R Hydronephrosis and had Stent replaced in R Ureter. This AM she has less abdominal pain and denies N/V. She denies SOB or cardio/respiratory c/o. She denies any other specific complaints although history is somewhat difficult with the patient secondary to dementia. Medications reviewed: 
Current Facility-Administered Medications Medication Dose Route Frequency  acetaminophen (TYLENOL) tablet 650 mg  650 mg Oral Q4H PRN  
 aspirin chewable tablet 81 mg  81 mg Oral DAILY  dilTIAZem CD (CARDIZEM CD) capsule 180 mg  180 mg Oral DAILY  docusate sodium (COLACE) capsule 100 mg  100 mg Oral BID  hydrALAZINE (APRESOLINE) tablet 100 mg  100 mg Oral TID  metoprolol tartrate (LOPRESSOR) tablet 50 mg  50 mg Oral BID  neomycin-polymyxin-dexamethasone (DEXACINE) 3.5 mg/g-10,000 unit/g-0.1 % ophthalmic ointment   Both Eyes QHS  PARoxetine (PAXIL) tablet 20 mg  20 mg Oral DAILY  predniSONE (DELTASONE) tablet 10 mg  10 mg Oral DAILY WITH BREAKFAST  temazepam (RESTORIL) capsule 15 mg  15 mg Oral QHS  cefTRIAXone (ROCEPHIN) 1 g in 0.9% sodium chloride (MBP/ADV) 50 mL  1 g IntraVENous Q24H  
 sodium chloride (NS) flush 5-10 mL  5-10 mL IntraVENous Q8H  
 sodium chloride (NS) flush 5-10 mL  5-10 mL IntraVENous PRN  
 0.9% sodium chloride infusion  100 mL/hr IntraVENous CONTINUOUS  
 heparin (porcine) injection 5,000 Units  5,000 Units SubCUTAneous Q12H  hydrALAZINE (APRESOLINE) 20 mg/mL injection 10 mg  10 mg IntraVENous Q6H PRN  
 ondansetron (ZOFRAN) injection 4 mg  4 mg IntraVENous Q6H PRN  polyethylene glycol (MIRALAX) packet 17 g  17 g Oral DAILY  gabapentin (NEURONTIN) capsule 300 mg  300 mg Oral DAILY Objective:  
Vitals: 
Visit Vitals /79 Pulse 69 Temp 98.9 °F (37.2 °C) Resp 17 Ht 5' 1.5\" (1.562 m) Wt 115 lb (52.2 kg) SpO2 95% BMI 21.38 kg/m² O2 Flow Rate (L/min): 2 l/min O2 Device: Room air Temp (24hrs), Av °F (37.2 °C), Min:97.6 °F (36.4 °C), Max:99.9 °F (37.7 °C) Last 24hr Input/Output: 
 
Intake/Output Summary (Last 24 hours) at 10/25/2018 1176 Last data filed at 10/25/2018 0630 Gross per 24 hour Intake 250 ml Output 600 ml Net -350 ml PHYSICAL EXAM: 
General:     Alert, cooperative, no distress, appears stated age. Head:    Normocephalic, without obvious abnormality, atraumatic. Eyes:    Conjunctivae/corneas clear. PERRLA Nose:   Nares normal. No drainage or sinus tenderness. Throat:     Lips, mucosa, and tongue normal.  No Thrush Neck:   Supple, symmetrical,  no adenopathy, thyroid: non tender 
   no carotid bruit and no JVD. Back:     Symmetric,  No CVA tenderness. Lungs:    Clear to auscultation bilaterally with overall decreased breath sounds. No Wheezing or Rhonchi. No rales. Heart:    Regular rate and rhythm,  no murmur, rub or gallop. Abdomen:    Soft, non-tender. Not distended. Bowel sounds normal. No masses Extremities:  Extremities normal, atraumatic, No cyanosis. No edema. No clubbing Lymph nodes:  Cervical, supraclavicular normal. 
Neurologic:  Normal strength, Alert and oriented X 3. Skin:                 No rash Lab Data Reviewed: 
 
Recent Results (from the past 24 hour(s)) EKG, 12 LEAD, INITIAL Collection Time: 10/24/18 10:17 AM  
Result Value Ref Range Ventricular Rate 65 BPM  
 Atrial Rate 65 BPM  
 P-R Interval 170 ms QRS Duration 78 ms Q-T Interval 384 ms QTC Calculation (Bezet) 399 ms Calculated P Axis 50 degrees Calculated R Axis -16 degrees Calculated T Axis 113 degrees Diagnosis Normal sinus rhythm Left ventricular hypertrophy with repolarization abnormality Confirmed by Viky Rae ([id number]) on 10/24/2018 39:44:64 AM 
  
METABOLIC PANEL, COMPREHENSIVE Collection Time: 10/24/18 11:13 AM  
Result Value Ref Range Sodium 133 (L) 136 - 145 mmol/L Potassium 4.8 3.5 - 5.1 mmol/L Chloride 102 97 - 108 mmol/L  
 CO2 23 21 - 32 mmol/L Anion gap 8 5 - 15 mmol/L Glucose 132 (H) 65 - 100 mg/dL BUN 61 (H) 6 - 20 MG/DL Creatinine 3.21 (H) 0.55 - 1.02 MG/DL  
 BUN/Creatinine ratio 19 12 - 20 GFR est AA 17 (L) >60 ml/min/1.73m2 GFR est non-AA 14 (L) >60 ml/min/1.73m2 Calcium 10.0 8.5 - 10.1 MG/DL Bilirubin, total 0.4 0.2 - 1.0 MG/DL  
 ALT (SGPT) 13 12 - 78 U/L  
 AST (SGOT) 18 15 - 37 U/L Alk. phosphatase 73 45 - 117 U/L Protein, total 7.4 6.4 - 8.2 g/dL Albumin 2.5 (L) 3.5 - 5.0 g/dL Globulin 4.9 (H) 2.0 - 4.0 g/dL A-G Ratio 0.5 (L) 1.1 - 2.2    
CBC WITH AUTOMATED DIFF Collection Time: 10/24/18 11:13 AM  
Result Value Ref Range WBC 14.1 (H) 3.6 - 11.0 K/uL  
 RBC 4.46 3.80 - 5.20 M/uL  
 HGB 12.6 11.5 - 16.0 g/dL HCT 39.0 35.0 - 47.0 % MCV 87.4 80.0 - 99.0 FL  
 MCH 28.3 26.0 - 34.0 PG  
 MCHC 32.3 30.0 - 36.5 g/dL  
 RDW 16.9 (H) 11.5 - 14.5 % PLATELET 157 747 - 832 K/uL MPV 10.9 8.9 - 12.9 FL  
 NRBC 0.0 0  WBC ABSOLUTE NRBC 0.00 0.00 - 0.01 K/uL NEUTROPHILS 87 (H) 32 - 75 % LYMPHOCYTES 2 (L) 12 - 49 % MONOCYTES 9 5 - 13 % EOSINOPHILS 1 0 - 7 % BASOPHILS 0 0 - 1 % IMMATURE GRANULOCYTES 1 (H) 0.0 - 0.5 % ABS. NEUTROPHILS 12.3 (H) 1.8 - 8.0 K/UL  
 ABS. LYMPHOCYTES 0.3 (L) 0.8 - 3.5 K/UL  
 ABS. MONOCYTES 1.3 (H) 0.0 - 1.0 K/UL  
 ABS. EOSINOPHILS 0.1 0.0 - 0.4 K/UL  
 ABS. BASOPHILS 0.0 0.0 - 0.1 K/UL  
 ABS. IMM. GRANS. 0.1 (H) 0.00 - 0.04 K/UL  
 DF AUTOMATED    
 RBC COMMENTS ANISOCYTOSIS 1+ LIPASE Collection Time: 10/24/18 11:13 AM  
Result Value Ref Range  Lipase 81 73 - 393 U/L  
 CK W/ CKMB & INDEX Collection Time: 10/24/18 11:13 AM  
Result Value Ref Range CK 42 26 - 192 U/L  
 CK - MB 1.8 <3.6 NG/ML  
 CK-MB Index 4.3 (H) 0 - 2.5    
TROPONIN I Collection Time: 10/24/18 11:13 AM  
Result Value Ref Range Troponin-I, Qt. <0.05 <0.05 ng/mL URINALYSIS W/ REFLEX CULTURE Collection Time: 10/24/18 12:53 PM  
Result Value Ref Range Color YELLOW/STRAW Appearance CLOUDY (A) CLEAR Specific gravity 1.016 1.003 - 1.030    
 pH (UA) 5.5 5.0 - 8.0 Protein 300 (A) NEG mg/dL Glucose NEGATIVE  NEG mg/dL Ketone NEGATIVE  NEG mg/dL Bilirubin NEGATIVE  NEG Blood NEGATIVE  NEG Urobilinogen 0.2 0.2 - 1.0 EU/dL Nitrites NEGATIVE  NEG Leukocyte Esterase LARGE (A) NEG    
 WBC >100 (H) 0 - 4 /hpf  
 RBC 5-10 0 - 5 /hpf Epithelial cells FEW FEW /lpf Bacteria 1+ (A) NEG /hpf  
 UA:UC IF INDICATED URINE CULTURE ORDERED (A) CNI Yeast PRESENT (A) NEG    
CULTURE, BLOOD, PAIRED Collection Time: 10/24/18  2:30 PM  
Result Value Ref Range Special Requests: NO SPECIAL REQUESTS Culture result: NO GROWTH AFTER 16 HOURS    
LACTIC ACID Collection Time: 10/24/18  2:48 PM  
Result Value Ref Range Lactic acid 1.0 0.4 - 2.0 MMOL/L  
TROPONIN I Collection Time: 10/25/18 12:30 AM  
Result Value Ref Range Troponin-I, Qt. 0.05 (H) <0.05 ng/mL CBC WITH AUTOMATED DIFF Collection Time: 10/25/18 12:30 AM  
Result Value Ref Range WBC 12.8 (H) 3.6 - 11.0 K/uL  
 RBC 3.67 (L) 3.80 - 5.20 M/uL  
 HGB 10.4 (L) 11.5 - 16.0 g/dL HCT 32.4 (L) 35.0 - 47.0 % MCV 88.3 80.0 - 99.0 FL  
 MCH 28.3 26.0 - 34.0 PG  
 MCHC 32.1 30.0 - 36.5 g/dL  
 RDW 16.7 (H) 11.5 - 14.5 % PLATELET 700 885 - 748 K/uL MPV 11.0 8.9 - 12.9 FL  
 NRBC 0.0 0  WBC ABSOLUTE NRBC 0.00 0.00 - 0.01 K/uL NEUTROPHILS 82 (H) 32 - 75 % LYMPHOCYTES 4 (L) 12 - 49 % MONOCYTES 12 5 - 13 % EOSINOPHILS 1 0 - 7 % BASOPHILS 0 0 - 1 % IMMATURE GRANULOCYTES 1 (H) 0.0 - 0.5 % ABS. NEUTROPHILS 10.6 (H) 1.8 - 8.0 K/UL  
 ABS. LYMPHOCYTES 0.6 (L) 0.8 - 3.5 K/UL  
 ABS. MONOCYTES 1.5 (H) 0.0 - 1.0 K/UL  
 ABS. EOSINOPHILS 0.1 0.0 - 0.4 K/UL  
 ABS. BASOPHILS 0.0 0.0 - 0.1 K/UL  
 ABS. IMM. GRANS. 0.1 (H) 0.00 - 0.04 K/UL  
 DF AUTOMATED METABOLIC PANEL, BASIC Collection Time: 10/25/18 12:30 AM  
Result Value Ref Range Sodium 137 136 - 145 mmol/L Potassium 4.7 3.5 - 5.1 mmol/L Chloride 106 97 - 108 mmol/L  
 CO2 22 21 - 32 mmol/L Anion gap 9 5 - 15 mmol/L Glucose 115 (H) 65 - 100 mg/dL BUN 59 (H) 6 - 20 MG/DL Creatinine 2.72 (H) 0.55 - 1.02 MG/DL  
 BUN/Creatinine ratio 22 (H) 12 - 20 GFR est AA 20 (L) >60 ml/min/1.73m2 GFR est non-AA 17 (L) >60 ml/min/1.73m2 Calcium 8.8 8.5 - 10.1 MG/DL  
HEPATIC FUNCTION PANEL Collection Time: 10/25/18 12:30 AM  
Result Value Ref Range Protein, total 6.1 (L) 6.4 - 8.2 g/dL Albumin 2.1 (L) 3.5 - 5.0 g/dL Globulin 4.0 2.0 - 4.0 g/dL A-G Ratio 0.5 (L) 1.1 - 2.2 Bilirubin, total 0.4 0.2 - 1.0 MG/DL Bilirubin, direct 0.1 0.0 - 0.2 MG/DL Alk. phosphatase 61 45 - 117 U/L  
 AST (SGOT) 17 15 - 37 U/L  
 ALT (SGPT) 12 12 - 78 U/L Assessment/Plan:  
 
Principal Problem: 
  CHRISTY (acute kidney injury) ([facility] (10/24/2018) Active Problems: 
  Essential hypertension (7/20/2016) COPD exacerbation ([facility] (8/14/2010) Sepsis ([facility] (3/28/2018) Renal failure (10/24/2018) Hydronephrosis (10/24/2018) Ureteral obstruction (10/25/2018) 
 
  
___________________________________________________ PLAN: 
 
1. Continue Rocephin for  Infection pending culture and sensitivity 2. Follow renal function with CHRISTY on CKD 3. Follow Hgb and WBC 4. Follow Urine output 5.  JA treatments for COPD 6. Cardizem and metoprolol for PAF 7. Gentle IV hydration Other orders as noted. 35 minutes spent in direct care of this complex patient today. 
 
___________________________________________________ Attending Physician: Ze Liang MD

## 2018-10-25 NOTE — CDMP QUERY
This patient has a history of \"ureteral stricture on the right\" and \"indwelling stents for over 6 years\" and was admitted with \"possible Sepsis,UA with evidence of infection\" and CHRISTY\" and \"possible stent migration\". Please clarify what, if any, relationships exists between these 2 conditions. =>Indwelling Stent migration related to UTI POA, CHRISTY requiring urology consult, iv rocephin, stent removal with replacement of right ureteral stent. =>No relationship between stent migration and UTI, CHRISTY 
=>Other Explanation of the clinical findings =>Clinically Undetermined (no explanation for clinical findings) The medical record reflects the following clinical findings, risk factors and treatment:  
 
Risk Factors: indwelling stent over 6yrs, Clinical Indicators: Right sided flank pain 2/2  right hydroureteronephrosis with early sepsis,  WBC 14.1  
-CT A  
right hydroureteronephrosis. The renal stent has 
migrated distally. The proximal pigtail is in the distal ureter and the 
remainder of the catheter is in the bladder. 
  
Urology  To take pt to the or now  for  
stent removal with replacement of right ureteral stent Treatment: consult urology, stent removal with replacement, iv rocephin, urine culture, u/a, CTA Please clarify and document your clinical opinion in the progress notes and discharge summary including the definitive and/or presumptive diagnosis, (suspected or probable), related to the above clinical findings. Please include clinical findings supporting your diagnosis. Thank You Kristina Potter, [address] [facility] 
   [phone]

## 2018-10-25 NOTE — CONSULTS
[facility] ([facility])         Nephrology and Hypertension         Patient seen and examined. 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 Not replaced recently as outpt. Now replaced inpt  R sided hydroureteronephrosis- new; due to migration of R ureteral stent  HTN    PLAN:  IVF  S/p urologic intervention already done. CHRISTY improving  Avoid nephrotoxins  ABx per 1ry team  Am labs  Will arrange outpt f/u if there is significant residual abnl renal fxn    D/W pt/friend and RN  Thanks for Renal consult. We will follow patient with you.     Signed by:  Clare Hinojosa MD  Nephrology and HTN

## 2018-10-26 PROBLEM — C34.32 MALIGNANT NEOPLASM OF LOWER LOBE OF LEFT LUNG (HCC): Status: ACTIVE | Noted: 2018-10-26

## 2018-10-26 LAB
ANION GAP SERPL CALC-SCNC: 8 MMOL/L (ref 5–15)
BACTERIA SPEC CULT: ABNORMAL
BASOPHILS # BLD: 0 K/UL (ref 0–0.1)
BASOPHILS NFR BLD: 0 % (ref 0–1)
BUN SERPL-MCNC: 55 MG/DL (ref 6–20)
BUN/CREAT SERPL: 25 (ref 12–20)
CALCIUM SERPL-MCNC: 8.7 MG/DL (ref 8.5–10.1)
CC UR VC: ABNORMAL
CHLORIDE SERPL-SCNC: 107 MMOL/L (ref 97–108)
CO2 SERPL-SCNC: 21 MMOL/L (ref 21–32)
COLLECT DURATION TIME UR: 24 HR
CREAT 24H UR-MRATE: 618 MG/24HR (ref 600–1800)
CREAT SERPL-MCNC: 2.22 MG/DL (ref 0.55–1.02)
DIFFERENTIAL METHOD BLD: ABNORMAL
EOSINOPHIL # BLD: 0.1 K/UL (ref 0–0.4)
EOSINOPHIL NFR BLD: 1 % (ref 0–7)
ERYTHROCYTE [DISTWIDTH] IN BLOOD BY AUTOMATED COUNT: 16.5 % (ref 11.5–14.5)
GLUCOSE SERPL-MCNC: 117 MG/DL (ref 65–100)
HCT VFR BLD AUTO: 28.6 % (ref 35–47)
HGB BLD-MCNC: 9.1 G/DL (ref 11.5–16)
IMM GRANULOCYTES # BLD: 0.1 K/UL (ref 0–0.04)
IMM GRANULOCYTES NFR BLD AUTO: 1 % (ref 0–0.5)
LYMPHOCYTES # BLD: 0.5 K/UL (ref 0.8–3.5)
LYMPHOCYTES NFR BLD: 5 % (ref 12–49)
MCH RBC QN AUTO: 28.5 PG (ref 26–34)
MCHC RBC AUTO-ENTMCNC: 31.8 G/DL (ref 30–36.5)
MCV RBC AUTO: 89.7 FL (ref 80–99)
MICROALBUMIN 24H UR-MRATE: 984 MG/24HR
MICROALBUMIN/CREAT 24H UR: 1592 MG/G (ref 0–30)
MONOCYTES # BLD: 0.8 K/UL (ref 0–1)
MONOCYTES NFR BLD: 9 % (ref 5–13)
NEUTS SEG # BLD: 7.8 K/UL (ref 1.8–8)
NEUTS SEG NFR BLD: 84 % (ref 32–75)
NRBC # BLD: 0 K/UL (ref 0–0.01)
NRBC BLD-RTO: 0 PER 100 WBC
PLATELET # BLD AUTO: 202 K/UL (ref 150–400)
PMV BLD AUTO: 11 FL (ref 8.9–12.9)
POTASSIUM SERPL-SCNC: 4.5 MMOL/L (ref 3.5–5.1)
RBC # BLD AUTO: 3.19 M/UL (ref 3.8–5.2)
SERVICE CMNT-IMP: ABNORMAL
SODIUM SERPL-SCNC: 136 MMOL/L (ref 136–145)
SPECIMEN VOL ?TM UR: 600 ML
WBC # BLD AUTO: 9.3 K/UL (ref 3.6–11)

## 2018-10-26 PROCEDURE — 74011000250 HC RX REV CODE- 250: Performed by: INTERNAL MEDICINE

## 2018-10-26 PROCEDURE — 97161 PT EVAL LOW COMPLEX 20 MIN: CPT

## 2018-10-26 PROCEDURE — 74011000258 HC RX REV CODE- 258: Performed by: INTERNAL MEDICINE

## 2018-10-26 PROCEDURE — 74011250636 HC RX REV CODE- 250/636: Performed by: INTERNAL MEDICINE

## 2018-10-26 PROCEDURE — 85025 COMPLETE CBC W/AUTO DIFF WBC: CPT

## 2018-10-26 PROCEDURE — 36415 COLL VENOUS BLD VENIPUNCTURE: CPT

## 2018-10-26 PROCEDURE — 74011250637 HC RX REV CODE- 250/637: Performed by: INTERNAL MEDICINE

## 2018-10-26 PROCEDURE — 74011636637 HC RX REV CODE- 636/637: Performed by: INTERNAL MEDICINE

## 2018-10-26 PROCEDURE — 80048 BASIC METABOLIC PNL TOTAL CA: CPT

## 2018-10-26 PROCEDURE — G8978 MOBILITY CURRENT STATUS: HCPCS

## 2018-10-26 PROCEDURE — G8979 MOBILITY GOAL STATUS: HCPCS

## 2018-10-26 PROCEDURE — 65270000029 HC RM PRIVATE

## 2018-10-26 PROCEDURE — 97530 THERAPEUTIC ACTIVITIES: CPT

## 2018-10-26 RX ORDER — FLUCONAZOLE 100 MG/1
100 TABLET ORAL DAILY
Status: DISCONTINUED | OUTPATIENT
Start: 2018-10-26 | End: 2018-10-30 | Stop reason: HOSPADM

## 2018-10-26 RX ADMIN — DOCUSATE SODIUM 100 MG: 100 CAPSULE, LIQUID FILLED ORAL at 09:42

## 2018-10-26 RX ADMIN — HYDRALAZINE HYDROCHLORIDE 100 MG: 50 TABLET, FILM COATED ORAL at 09:41

## 2018-10-26 RX ADMIN — METOPROLOL TARTRATE 50 MG: 50 TABLET ORAL at 17:33

## 2018-10-26 RX ADMIN — PAROXETINE 20 MG: 20 TABLET, FILM COATED ORAL at 09:41

## 2018-10-26 RX ADMIN — PREDNISONE 10 MG: 5 TABLET ORAL at 09:41

## 2018-10-26 RX ADMIN — GABAPENTIN 300 MG: 300 CAPSULE ORAL at 09:42

## 2018-10-26 RX ADMIN — HYDRALAZINE HYDROCHLORIDE 100 MG: 50 TABLET, FILM COATED ORAL at 17:33

## 2018-10-26 RX ADMIN — CEFTRIAXONE 1 G: 1 INJECTION, POWDER, FOR SOLUTION INTRAMUSCULAR; INTRAVENOUS at 09:42

## 2018-10-26 RX ADMIN — FLUCONAZOLE 100 MG: 100 TABLET ORAL at 09:41

## 2018-10-26 RX ADMIN — ASPIRIN 81 MG CHEWABLE TABLET 81 MG: 81 TABLET CHEWABLE at 09:42

## 2018-10-26 RX ADMIN — Medication 10 ML: at 21:43

## 2018-10-26 RX ADMIN — METOPROLOL TARTRATE 50 MG: 50 TABLET ORAL at 09:41

## 2018-10-26 RX ADMIN — HEPARIN SODIUM 5000 UNITS: 5000 INJECTION INTRAVENOUS; SUBCUTANEOUS at 05:11

## 2018-10-26 RX ADMIN — HYDRALAZINE HYDROCHLORIDE 100 MG: 50 TABLET, FILM COATED ORAL at 21:42

## 2018-10-26 RX ADMIN — TEMAZEPAM 15 MG: 15 CAPSULE ORAL at 21:42

## 2018-10-26 RX ADMIN — Medication 10 ML: at 05:00

## 2018-10-26 RX ADMIN — DOCUSATE SODIUM 100 MG: 100 CAPSULE, LIQUID FILLED ORAL at 17:33

## 2018-10-26 RX ADMIN — HEPARIN SODIUM 5000 UNITS: 5000 INJECTION INTRAVENOUS; SUBCUTANEOUS at 17:33

## 2018-10-26 RX ADMIN — DILTIAZEM HYDROCHLORIDE 180 MG: 180 CAPSULE, COATED, EXTENDED RELEASE ORAL at 09:41

## 2018-10-26 RX ADMIN — HYDRALAZINE HYDROCHLORIDE 10 MG: 20 INJECTION INTRAMUSCULAR; INTRAVENOUS at 23:54

## 2018-10-26 RX ADMIN — POLYETHYLENE GLYCOL 3350 17 G: 17 POWDER, FOR SOLUTION ORAL at 09:42

## 2018-10-26 RX ADMIN — SODIUM CHLORIDE 100 ML/HR: 900 INJECTION, SOLUTION INTRAVENOUS at 00:40

## 2018-10-26 NOTE — PROGRESS NOTES
Bedside shift change report given to [id number] Union (oncoming nurse) by Frankie Martinez RN (offgoing nurse).  Report included the following information SBAR, Kardex, Procedure Summary, Intake/Output, MAR, Accordion, Recent Results, Med Rec Status and Cardiac Rhythm NSR, Occas First degree, SB.

## 2018-10-26 NOTE — PROGRESS NOTES
PROGRESS NOTE 
 
NAME:  Henreitta Cranker :   1935 MRN:   [id number] Date/Time:  10/26/2018 7:07 AM 
Subjective:  
History:  Chart reviewed and patient seen and examined this AM and D/W her nurse and all events noted. She was admitted on 10/24 with  Sepsis and R Hydronephrosis and had Stent replaced in R Ureter. This AM she has no abdominal pain and denies N/V as well as she has no other /GI c/o. She denies SOB or cardio/respiratory c/o. She denies any other specific complaints and she is much more oriented today. She knows me and A & O X 3. She has no other c/o on complete ROS although she still has some confusion during our conversation. Medications reviewed: 
Current Facility-Administered Medications Medication Dose Route Frequency  acetaminophen (TYLENOL) tablet 650 mg  650 mg Oral Q4H PRN  
 aspirin chewable tablet 81 mg  81 mg Oral DAILY  dilTIAZem CD (CARDIZEM CD) capsule 180 mg  180 mg Oral DAILY  docusate sodium (COLACE) capsule 100 mg  100 mg Oral BID  hydrALAZINE (APRESOLINE) tablet 100 mg  100 mg Oral TID  metoprolol tartrate (LOPRESSOR) tablet 50 mg  50 mg Oral BID  neomycin-polymyxin-dexamethasone (DEXACINE) 3.5 mg/g-10,000 unit/g-0.1 % ophthalmic ointment   Both Eyes QHS  PARoxetine (PAXIL) tablet 20 mg  20 mg Oral DAILY  predniSONE (DELTASONE) tablet 10 mg  10 mg Oral DAILY WITH BREAKFAST  temazepam (RESTORIL) capsule 15 mg  15 mg Oral QHS  cefTRIAXone (ROCEPHIN) 1 g in 0.9% sodium chloride (MBP/ADV) 50 mL  1 g IntraVENous Q24H  
 sodium chloride (NS) flush 5-10 mL  5-10 mL IntraVENous Q8H  
 sodium chloride (NS) flush 5-10 mL  5-10 mL IntraVENous PRN  
 0.9% sodium chloride infusion  100 mL/hr IntraVENous CONTINUOUS  
 heparin (porcine) injection 5,000 Units  5,000 Units SubCUTAneous Q12H  hydrALAZINE (APRESOLINE) 20 mg/mL injection 10 mg  10 mg IntraVENous Q6H PRN  
 ondansetron (ZOFRAN) injection 4 mg  4 mg IntraVENous Q6H PRN  
  polyethylene glycol (MIRALAX) packet 17 g  17 g Oral DAILY  gabapentin (NEURONTIN) capsule 300 mg  300 mg Oral DAILY Objective:  
Vitals: 
Visit Vitals /53 Pulse (!) 59 Temp 98 °F (36.7 °C) Resp 16 Ht 5' 1.5\" (1.562 m) Wt 115 lb (52.2 kg) SpO2 95% BMI 21.38 kg/m² O2 Flow Rate (L/min): 2 l/min O2 Device: Room air Temp (24hrs), Av.2 °F (36.8 °C), Min:97.7 °F (36.5 °C), Max:98.7 °F (37.1 °C) Last 24hr Input/Output: 
 
Intake/Output Summary (Last 24 hours) at 10/26/2018 1958 Last data filed at 10/26/2018 2067 Gross per 24 hour Intake 2145 ml Output 701 ml Net 1444 ml PHYSICAL EXAM: 
General:     Alert, cooperative, no distress, appears stated age. Head:    Normocephalic, without obvious abnormality, atraumatic. Eyes:    Conjunctivae/corneas clear. PERRLA Nose:   Nares normal. No drainage or sinus tenderness. Throat:     Lips, mucosa, and tongue normal.  No Thrush Neck:   Supple, symmetrical,  no adenopathy, thyroid: non tender 
   no carotid bruit and no JVD. Back:     Symmetric,  No CVA tenderness. Lungs:    Clear to auscultation bilaterally with overall decreased breath sounds. No Wheezing or Rhonchi. No rales. Heart:    Regular rate and rhythm,  no murmur, rub or gallop. Abdomen:    Soft, non-tender. Not distended. Bowel sounds normal. No masses Extremities:  Extremities normal, atraumatic, No cyanosis. No edema. No clubbing Lymph nodes:  Cervical, supraclavicular normal. 
Neurologic:  Mild decreased strength, Alert and oriented X 3 and knows me today. Skin:                 No rash Lab Data Reviewed: 
 
Recent Results (from the past 24 hour(s)) METABOLIC PANEL, BASIC Collection Time: 10/26/18  1:34 AM  
Result Value Ref Range Sodium 136 136 - 145 mmol/L Potassium 4.5 3.5 - 5.1 mmol/L Chloride 107 97 - 108 mmol/L  
 CO2 21 21 - 32 mmol/L Anion gap 8 5 - 15 mmol/L Glucose 117 (H) 65 - 100 mg/dL BUN 55 (H) 6 - 20 MG/DL Creatinine 2.22 (H) 0.55 - 1.02 MG/DL  
 BUN/Creatinine ratio 25 (H) 12 - 20 GFR est AA 26 (L) >60 ml/min/1.73m2 GFR est non-AA 21 (L) >60 ml/min/1.73m2 Calcium 8.7 8.5 - 10.1 MG/DL  
CBC WITH AUTOMATED DIFF Collection Time: 10/26/18  1:34 AM  
Result Value Ref Range WBC 9.3 3.6 - 11.0 K/uL  
 RBC 3.19 (L) 3.80 - 5.20 M/uL HGB 9.1 (L) 11.5 - 16.0 g/dL HCT 28.6 (L) 35.0 - 47.0 % MCV 89.7 80.0 - 99.0 FL  
 MCH 28.5 26.0 - 34.0 PG  
 MCHC 31.8 30.0 - 36.5 g/dL  
 RDW 16.5 (H) 11.5 - 14.5 % PLATELET 928 457 - 834 K/uL MPV 11.0 8.9 - 12.9 FL  
 NRBC 0.0 0  WBC ABSOLUTE NRBC 0.00 0.00 - 0.01 K/uL NEUTROPHILS 84 (H) 32 - 75 % LYMPHOCYTES 5 (L) 12 - 49 % MONOCYTES 9 5 - 13 % EOSINOPHILS 1 0 - 7 % BASOPHILS 0 0 - 1 % IMMATURE GRANULOCYTES 1 (H) 0.0 - 0.5 % ABS. NEUTROPHILS 7.8 1.8 - 8.0 K/UL  
 ABS. LYMPHOCYTES 0.5 (L) 0.8 - 3.5 K/UL  
 ABS. MONOCYTES 0.8 0.0 - 1.0 K/UL  
 ABS. EOSINOPHILS 0.1 0.0 - 0.4 K/UL  
 ABS. BASOPHILS 0.0 0.0 - 0.1 K/UL  
 ABS. IMM. GRANS. 0.1 (H) 0.00 - 0.04 K/UL  
 DF AUTOMATED Assessment/Plan:  
 
Principal Problem: 
  CHRISTY (acute kidney injury) ([facility] (10/24/2018) Active Problems: 
  Essential hypertension (7/20/2016) COPD exacerbation ([facility] (8/14/2010) Sepsis ([facility] (3/28/2018) Renal failure (10/24/2018) Hydronephrosis (10/24/2018) Ureteral obstruction (10/25/2018) 
 
  
___________________________________________________ PLAN: 
 
1. Continue Rocephin for  Infection pending final culture and sensitivity 2. 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 Follow renal function with CHRISTY on CKD (61/3.21 adm) to now 55/2.22  ( 70/1.72 baseline in April) 4. Follow Hgb and WBC, 10.4 adm to 9.1 now 5. Follow Urine output which is good so far and IV still at 100 
6.  JA treatments for COPD 7. Cardizem and metoprolol for PAF 8. Gentle IV hydration 9.  Sorry about Oncology consult, I didn't realize it was ordered. I agree obviously needs no further treatment or w/u 
 
___________________________________________________ Attending Physician: Qian Spence MD

## 2018-10-26 NOTE — PROGRESS NOTES
Name: Jayjay Zuniga MRN: [id number] : 1935 Assessment: 
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 Not replaced recently as outpt. Now replaced inpt R sided hydroureteronephrosis- new; due to migration of R ureteral stent HTN Plan/Recommendations: 
Ct IVF- reduce rate to 50 ml/hr Avoid nephrotoxins ABx per 1ry team 
Am labs Subjective: 
Resting. No acute c/o ROS:  
No nausea, no vomiting No chest pain, no shortness of breath Exam: 
Visit Vitals /45 Pulse 64 Temp 98.5 °F (36.9 °C) Resp 16 Ht 5' 1.5\" (1.562 m) Wt 52.2 kg (115 lb) SpO2 93% BMI 21.38 kg/m² WB/WN in NAD Moist mm, no icterus Clear RRR No edema Alert awake Current Facility-Administered Medications Medication Dose Route Frequency Last Dose  fluconazole (DIFLUCAN) tablet 100 mg  100 mg Oral DAILY 100 mg at 10/26/18 0941  acetaminophen (TYLENOL) tablet 650 mg  650 mg Oral Q4H  mg at 10/25/18 0700  aspirin chewable tablet 81 mg  81 mg Oral DAILY 81 mg at 10/26/18 3996  dilTIAZem CD (CARDIZEM CD) capsule 180 mg  180 mg Oral DAILY 180 mg at 10/26/18 0941  
 docusate sodium (COLACE) capsule 100 mg  100 mg Oral  mg at 10/26/18 8318  hydrALAZINE (APRESOLINE) tablet 100 mg  100 mg Oral  mg at 10/26/18 0941  
 metoprolol tartrate (LOPRESSOR) tablet 50 mg  50 mg Oral BID 50 mg at 10/26/18 0941  
 neomycin-polymyxin-dexamethasone (DEXACINE) 3.5 mg/g-10,000 unit/g-0.1 % ophthalmic ointment   Both Eyes QHS  PARoxetine (PAXIL) tablet 20 mg  20 mg Oral DAILY 20 mg at 10/26/18 0941  predniSONE (DELTASONE) tablet 10 mg  10 mg Oral DAILY WITH BREAKFAST 10 mg at 10/26/18 0941  temazepam (RESTORIL) capsule 15 mg  15 mg Oral QHS 15 mg at 10/25/18 2122  cefTRIAXone (ROCEPHIN) 1 g in 0.9% sodium chloride (MBP/ADV) 50 mL  1 g IntraVENous Q24H 1 g at 10/26/18 8453  sodium chloride (NS) flush 5-10 mL  5-10 mL IntraVENous Q8H 10 mL at 10/26/18 0500  
 sodium chloride (NS) flush 5-10 mL  5-10 mL IntraVENous PRN    
 0.9% sodium chloride infusion  100 mL/hr IntraVENous CONTINUOUS 100 mL/hr at 10/26/18 0040  
 heparin (porcine) injection 5,000 Units  5,000 Units SubCUTAneous Q12H 5,000 Units at 10/26/18 2507  hydrALAZINE (APRESOLINE) 20 mg/mL injection 10 mg  10 mg IntraVENous Q6H PRN 10 mg at 10/24/18 1740  ondansetron (ZOFRAN) injection 4 mg  4 mg IntraVENous Q6H PRN  polyethylene glycol (MIRALAX) packet 17 g  17 g Oral DAILY 17 g at 10/26/18 0942  
 gabapentin (NEURONTIN) capsule 300 mg  300 mg Oral DAILY 300 mg at 10/26/18 2384 Labs/Data: 
 
Lab Results Component Value Date/Time WBC 9.3 10/26/2018 01:34 AM  
 Hemoglobin (POC) 12.6 06/24/2009 12:08 PM  
 HGB 9.1 (L) 10/26/2018 01:34 AM  
 Hematocrit (POC) 37 06/24/2009 12:08 PM  
 HCT 28.6 (L) 10/26/2018 01:34 AM  
 PLATELET 354 65/75/6000 01:34 AM  
 MCV 89.7 10/26/2018 01:34 AM  
 
 
Lab Results Component Value Date/Time Sodium 136 10/26/2018 01:34 AM  
 Potassium 4.5 10/26/2018 01:34 AM  
 Chloride 107 10/26/2018 01:34 AM  
 CO2 21 10/26/2018 01:34 AM  
 Anion gap 8 10/26/2018 01:34 AM  
 Glucose 117 (H) 10/26/2018 01:34 AM  
 BUN 55 (H) 10/26/2018 01:34 AM  
 Creatinine 2.22 (H) 10/26/2018 01:34 AM  
 BUN/Creatinine ratio 25 (H) 10/26/2018 01:34 AM  
 GFR est AA 26 (L) 10/26/2018 01:34 AM  
 GFR est non-AA 21 (L) 10/26/2018 01:34 AM  
 Calcium 8.7 10/26/2018 01:34 AM  
 
 
Wt Readings from Last 3 Encounters:  
10/24/18 52.2 kg (115 lb) 04/10/18 42.1 kg (92 lb 14.2 oz) 10/28/16 43.1 kg (95 lb) Intake/Output Summary (Last 24 hours) at 10/26/2018 1034 Last data filed at 10/26/2018 9488 Gross per 24 hour Intake 3345 ml Output 701 ml Net 2644 ml Patient seen and examined. Chart reviewed. Labs, data and other pertinent notes reviewed in last 24 hrs. PMH/SH/FH reviewed and unchanged compared to H&P Discussed with pt Sarah Levy MD

## 2018-10-26 NOTE — PROGRESS NOTES
Problem: Mobility Impaired (Adult and Pediatric) Goal: *Acute Goals and Plan of Care (Insert Text) Physical Therapy Goals Initiated 10/26/2018 1. Patient will move from supine to sit and sit to supine , scoot up and down and roll side to side in bed with modified independence within 7 day(s). 2.  Patient will transfer from bed to chair and chair to bed with supervision/set-up using the least restrictive device within 7 day(s). 3.  Patient will perform sit to stand with supervision/set-up within 7 day(s). 4.  Patient will ambulate with minimal assistance/contact guard assist for 10 feet with the least restrictive device within 7 day(s). physical Therapy EVALUATION Patient: Karishma Owens (33 y.o. female) Date: 10/26/2018 Primary Diagnosis: Renal failure CHRISTY (acute kidney injury) ([facility] Hydronephrosis Procedure(s) (LRB): 
CYSTOSCOPY Extraction of uretral stent from bladder, placement of right uretral stent (Right) 2 Days Post-Op Precautions:     
 
ASSESSMENT : 
Based on the objective data described below, the patient presents with R LE weakness, R LE ataxia, generalized weakness, decreased endurance/activity tolerance, and overall mild impairments in functional mobility. Pt received supine in bed, agreeable to participation with therapy. Pt assumed seated position EOB at supervision level, intact sitting balance. Remaining functional mobility occurred with CGAx2 including sit<>stand transfers and ambulation w/ RW support to bedside chair. Pt ambulated 6-7 steps w/ RW and CGAx2, demonstrating fair gait stability secondary to RLE ataxia/decreased coordination, increased trunk flexion, and mild buckling of R LE. Overall, pt tolerated therapy session well . Anticipate that she is likely close to her baseline level however would benefit from additional skilled therapy to ensure safe functional mobility at discharge.  Recommend pt continue to mobilize bed<>recliner chair over the weekend w/ x2 person assist of RN via standpivot transfer. Upon discharge, recommend pt return to [facility] w/ therapy at facility. Pt states staff is able to provide necessary assistance at discharge. Patient will benefit from skilled intervention to address the above impairments. Patients rehabilitation potential is considered to be Excellent Factors which may influence rehabilitation potential include:  
[]         None noted 
[]         Mental ability/status []         Medical condition 
[]         Home/family situation and support systems 
[]         Safety awareness 
[]         Pain tolerance/management 
[]         Other: PLAN : 
Recommendations and Planned Interventions: 
[x]           Bed Mobility Training             []    Neuromuscular Re-Education 
[x]           Transfer Training                   []    Orthotic/Prosthetic Training 
[x]           Gait Training                         []    Modalities [x]           Therapeutic Exercises           []    Edema Management/Control 
[x]           Therapeutic Activities            [x]    Patient and Family Training/Education 
[]           Other (comment): Frequency/Duration: Patient will be followed by physical therapy  3 times a week to address goals. Discharge Recommendations: Return to [facility] PT at facility Further Equipment Recommendations for Discharge: None, owns necessary equipment SUBJECTIVE:  
Patient stated I play bingo over at [employer], I won BIG a few nights ago.  OBJECTIVE DATA SUMMARY:  
HISTORY:   
Past Medical History:  
Diagnosis Date  Hypertension  Ill-defined condition Ex Lap with Mushtaq Delude patch of a perforated pyloric channel ulcer 7/19/16  Other ill-defined conditions(799.89) lipids  Other ill-defined conditions(799.89)   
 blood transfusion history Past Surgical History:  
Procedure Laterality Date  BYPASS GRAFT OTHR,FEM-FEM    
 BYPASS GRAFT OTHR,FEM-POP    
 right  HX HEENT    
 bilateral cataracts  HX ORTHOPAEDIC    
 right hip fracture/pinning  HX UROLOGICAL    
 right stent/kidney Prior Level of Function/Home Situation: [facility] Pt states she independently performs standpivot transfer from EOB<>wheelchair<>commode with all functional mobility occurring at wheelchair level. Pt states she is non ambulatory. Had received therapy at facility however it has ended. Personal factors and/or comorbidities impacting plan of care:  
 
Home Situation Home Environment: Assisted living Care Facility Name: [place] # Steps to Enter: 0 One/Two Story Residence: One story Living Alone: No 
Support Systems: Assisted living Patient Expects to be Discharged to[de-identified] Assisted living Current DME Used/Available at Home: Wheelchair, Walker, rolling, Grab bars, [employer], straight EXAMINATION/PRESENTATION/DECISION MAKING: Critical Behavior: 
Neurologic State: Alert Orientation Level: Oriented X4 Cognition: Follows commands Hearing: Auditory Auditory Impairment: NoneSkin:  intact Edema: none noted Range Of Motion: 
AROM: Generally decreased, functional 
  
  
  
  
  
  
  
Strength:   
Strength: Generally decreased, functional 
  
  
  
  
  
  
Tone & Sensation:  
Tone: Normal 
  
  
  
  
Sensation: Intact Coordination: 
Coordination: Generally decreased, functional(R LE) Vision:  
  
Functional Mobility: 
Bed Mobility: 
Rolling: Supervision Supine to Sit: Supervision Scooting: Supervision Transfers: 
Sit to Stand: Contact guard assistance Stand to Sit: Contact guard assistance Bed to Chair: Contact guard assistance;Assist x2 Balance:  
Sitting: Intact Standing: Impaired; With support(RW) 
Standing - Static: Good;Constant support Standing - Dynamic : FairAmbulation/Gait Training:Distance (ft): 2 Feet (ft) Assistive Device: Walker, rolling;Gait belt Ambulation - Level of Assistance: Contact guard assistance;Assist x2 Gait Abnormalities: Ataxic;Trunk sway increased(increased trunk flexion, R LE buckling) Base of Support: Widened Speed/Marilyn: Pace decreased (<100 feet/min) Step Length: Left shortened;Right shortened Functional Measure: 
Barthel Index: 
 
Bathin Bladder: 0 Bowels: 10 
Groomin Dressin Feeding: 10 Mobility: 5 Stairs: 0 Toilet Use: 5 Transfer (Bed to Chair and Back): 10 Total: 50 Barthel and G-code impairment scale: 
Percentage of impairment CH 
0% CI 
1-19% CJ 
20-39% CK 
40-59% CL 
60-79% CM 
80-99% CN 
100% Barthel Score 0-100 100 99-80 79-60 59-40 20-39 1-19 
 0 Barthel Score 0-20 20 17-19 13-16 9-12 5-8 1-4 0 The Barthel ADL Index: Guidelines 1. The index should be used as a record of what a patient does, not as a record of what a patient could do. 2. The main aim is to establish degree of independence from any help, physical or verbal, however minor and for whatever reason. 3. The need for supervision renders the patient not independent. 4. A patient's performance should be established using the best available evidence. Asking the patient, friends/relatives and nurses are the usual sources, but direct observation and common sense are also important. However direct testing is not needed. 5. Usually the patient's performance over the preceding 24-48 hours is important, but occasionally longer periods will be relevant. 6. Middle categories imply that the patient supplies over 50 per cent of the effort. 7. Use of aids to be independent is allowed. Christel García., Barthel, D.W. (8071). Functional evaluation: the Barthel Index. [address] [facility] (14)2. Ravinder Rockwell evan CARINA Rosales, Eduarda Anderson., Dev Flores., Harvinder, [address] [place] [address] ().  Measuring the change indisability after inpatient rehabilitation; comparison of the responsiveness of the Barthel Index and Functional [place] Measure. Journal of Neurology, Neurosurgery, and Psychiatry, 66(4), 140-869. CELIA Harrison.A, PAULO Sanchez, & Joyce Marquis M.A. (2004.) Assessment of post-stroke quality of life in cost-effectiveness studies: The usefulness of the Barthel Index and the EuroQoL-5D. [facility], 13, 297-10 G codes: In compliance with CMSs Claims Based Outcome Reporting, the following G-code set was chosen for this patient based on their primary functional limitation being treated: The outcome measure chosen to determine the severity of the functional limitation was the Barthel Index with a score of 50/100 which was correlated with the impairment scale. ? Mobility - Walking and Moving Around:  
  - CURRENT STATUS: CK - 40%-59% impaired, limited or restricted  - GOAL STATUS: CI - 1%-19% impaired, limited or restricted  - D/C STATUS:  ---------------To be determined--------------- Physical Therapy Evaluation Charge Determination History Examination Presentation Decision-Making MEDIUM  Complexity : 1-2 comorbidities / personal factors will impact the outcome/ POC  MEDIUM Complexity : 3 Standardized tests and measures addressing body structure, function, activity limitation and / or participation in recreation  MEDIUM Complexity : Evolving with changing characteristics  MEDIUM Complexity : FOTO score of 26-74 Based on the above components, the patient evaluation is determined to be of the following complexity level: MEDIUM Pain: 
  
  
  
  
  
  
Activity Tolerance: VSS Please refer to the flowsheet for vital signs taken during this treatment. After treatment:  
[x]         Patient left in no apparent distress sitting up in chair 
[]         Patient left in no apparent distress in bed 
[x]         Call bell left within reach [x]         Nursing notified 
[]         Caregiver present 
[]         Bed alarm activated COMMUNICATION/EDUCATION:  
 The patients plan of care was discussed with: Registered Nurse. [x]         Fall prevention education was provided and the patient/caregiver indicated understanding. [x]         Patient/family have participated as able in goal setting and plan of care. [x]         Patient/family agree to work toward stated goals and plan of care. []         Patient understands intent and goals of therapy, but is neutral about his/her participation. []         Patient is unable to participate in goal setting and plan of care. Thank you for this referral. 
Hakeem Matias, PT, DPT Time Calculation: 16 mins

## 2018-10-27 LAB
ANION GAP SERPL CALC-SCNC: 9 MMOL/L (ref 5–15)
BASOPHILS # BLD: 0 K/UL (ref 0–0.1)
BASOPHILS NFR BLD: 0 % (ref 0–1)
BUN SERPL-MCNC: 51 MG/DL (ref 6–20)
BUN/CREAT SERPL: 26 (ref 12–20)
CALCIUM SERPL-MCNC: 8.9 MG/DL (ref 8.5–10.1)
CHLORIDE SERPL-SCNC: 110 MMOL/L (ref 97–108)
CO2 SERPL-SCNC: 21 MMOL/L (ref 21–32)
CREAT SERPL-MCNC: 1.98 MG/DL (ref 0.55–1.02)
DIFFERENTIAL METHOD BLD: ABNORMAL
EOSINOPHIL # BLD: 0.1 K/UL (ref 0–0.4)
EOSINOPHIL NFR BLD: 1 % (ref 0–7)
ERYTHROCYTE [DISTWIDTH] IN BLOOD BY AUTOMATED COUNT: 16.5 % (ref 11.5–14.5)
GLUCOSE SERPL-MCNC: 102 MG/DL (ref 65–100)
HCT VFR BLD AUTO: 31.3 % (ref 35–47)
HGB BLD-MCNC: 10 G/DL (ref 11.5–16)
IMM GRANULOCYTES # BLD: 0.2 K/UL (ref 0–0.04)
IMM GRANULOCYTES NFR BLD AUTO: 2 % (ref 0–0.5)
LYMPHOCYTES # BLD: 0.8 K/UL (ref 0.8–3.5)
LYMPHOCYTES NFR BLD: 8 % (ref 12–49)
MCH RBC QN AUTO: 28.2 PG (ref 26–34)
MCHC RBC AUTO-ENTMCNC: 31.9 G/DL (ref 30–36.5)
MCV RBC AUTO: 88.4 FL (ref 80–99)
MONOCYTES # BLD: 1 K/UL (ref 0–1)
MONOCYTES NFR BLD: 10 % (ref 5–13)
NEUTS SEG # BLD: 8.1 K/UL (ref 1.8–8)
NEUTS SEG NFR BLD: 79 % (ref 32–75)
NRBC # BLD: 0 K/UL (ref 0–0.01)
NRBC BLD-RTO: 0 PER 100 WBC
PLATELET # BLD AUTO: 278 K/UL (ref 150–400)
PMV BLD AUTO: 10.5 FL (ref 8.9–12.9)
POTASSIUM SERPL-SCNC: 4 MMOL/L (ref 3.5–5.1)
RBC # BLD AUTO: 3.54 M/UL (ref 3.8–5.2)
SODIUM SERPL-SCNC: 140 MMOL/L (ref 136–145)
WBC # BLD AUTO: 10.2 K/UL (ref 3.6–11)

## 2018-10-27 PROCEDURE — 36415 COLL VENOUS BLD VENIPUNCTURE: CPT | Performed by: INTERNAL MEDICINE

## 2018-10-27 PROCEDURE — 85025 COMPLETE CBC W/AUTO DIFF WBC: CPT | Performed by: INTERNAL MEDICINE

## 2018-10-27 PROCEDURE — 80048 BASIC METABOLIC PNL TOTAL CA: CPT | Performed by: INTERNAL MEDICINE

## 2018-10-27 PROCEDURE — 74011250636 HC RX REV CODE- 250/636: Performed by: INTERNAL MEDICINE

## 2018-10-27 PROCEDURE — 74011250637 HC RX REV CODE- 250/637: Performed by: INTERNAL MEDICINE

## 2018-10-27 PROCEDURE — 74011000250 HC RX REV CODE- 250: Performed by: INTERNAL MEDICINE

## 2018-10-27 PROCEDURE — 74011636637 HC RX REV CODE- 636/637: Performed by: INTERNAL MEDICINE

## 2018-10-27 PROCEDURE — 65270000029 HC RM PRIVATE

## 2018-10-27 RX ORDER — LABETALOL HYDROCHLORIDE 5 MG/ML
INJECTION, SOLUTION INTRAVENOUS
Status: DISPENSED
Start: 2018-10-27 | End: 2018-10-27

## 2018-10-27 RX ORDER — LABETALOL HYDROCHLORIDE 5 MG/ML
20 INJECTION, SOLUTION INTRAVENOUS ONCE
Status: ACTIVE | OUTPATIENT
Start: 2018-10-27 | End: 2018-10-27

## 2018-10-27 RX ADMIN — ACETAMINOPHEN 650 MG: 325 TABLET ORAL at 17:27

## 2018-10-27 RX ADMIN — DILTIAZEM HYDROCHLORIDE 180 MG: 180 CAPSULE, COATED, EXTENDED RELEASE ORAL at 10:54

## 2018-10-27 RX ADMIN — PAROXETINE 20 MG: 20 TABLET, FILM COATED ORAL at 10:54

## 2018-10-27 RX ADMIN — HEPARIN SODIUM 5000 UNITS: 5000 INJECTION INTRAVENOUS; SUBCUTANEOUS at 06:32

## 2018-10-27 RX ADMIN — NEOMYCIN, POLYMYXIN B SULFATES, DEXAMETHASONE: 1; 3.5; 1 OINTMENT OPHTHALMIC at 23:35

## 2018-10-27 RX ADMIN — Medication 10 ML: at 06:33

## 2018-10-27 RX ADMIN — HYDRALAZINE HYDROCHLORIDE 100 MG: 50 TABLET, FILM COATED ORAL at 10:55

## 2018-10-27 RX ADMIN — HYDRALAZINE HYDROCHLORIDE 100 MG: 50 TABLET, FILM COATED ORAL at 23:34

## 2018-10-27 RX ADMIN — HYDRALAZINE HYDROCHLORIDE 10 MG: 20 INJECTION INTRAMUSCULAR; INTRAVENOUS at 12:42

## 2018-10-27 RX ADMIN — Medication 10 ML: at 23:35

## 2018-10-27 RX ADMIN — PREDNISONE 10 MG: 5 TABLET ORAL at 10:54

## 2018-10-27 RX ADMIN — METOPROLOL TARTRATE 50 MG: 50 TABLET ORAL at 17:27

## 2018-10-27 RX ADMIN — FLUCONAZOLE 100 MG: 100 TABLET ORAL at 10:55

## 2018-10-27 RX ADMIN — HYDRALAZINE HYDROCHLORIDE 100 MG: 50 TABLET, FILM COATED ORAL at 17:27

## 2018-10-27 RX ADMIN — HYDRALAZINE HYDROCHLORIDE 10 MG: 20 INJECTION INTRAMUSCULAR; INTRAVENOUS at 20:08

## 2018-10-27 RX ADMIN — DOCUSATE SODIUM 100 MG: 100 CAPSULE, LIQUID FILLED ORAL at 10:55

## 2018-10-27 RX ADMIN — ASPIRIN 81 MG CHEWABLE TABLET 81 MG: 81 TABLET CHEWABLE at 10:54

## 2018-10-27 RX ADMIN — HEPARIN SODIUM 5000 UNITS: 5000 INJECTION INTRAVENOUS; SUBCUTANEOUS at 17:28

## 2018-10-27 RX ADMIN — TEMAZEPAM 15 MG: 15 CAPSULE ORAL at 23:34

## 2018-10-27 RX ADMIN — SODIUM CHLORIDE 50 ML/HR: 900 INJECTION, SOLUTION INTRAVENOUS at 10:53

## 2018-10-27 RX ADMIN — METOPROLOL TARTRATE 50 MG: 50 TABLET ORAL at 10:54

## 2018-10-27 RX ADMIN — GABAPENTIN 300 MG: 300 CAPSULE ORAL at 10:54

## 2018-10-27 RX ADMIN — DOCUSATE SODIUM 100 MG: 100 CAPSULE, LIQUID FILLED ORAL at 17:27

## 2018-10-27 NOTE — PROGRESS NOTES
Patient with high BP; Systolic maintaining in 976'U despite scheduled PO Hydralazine and PRN IV Hydralazine; Dr. Елена Acosta gave one time order for 20mg IV Labetolol; Patient refused medication stating \"I don't want anymore medicine tonight\"; RN educated patient on risks of high BP, including stroke; Patient declined medication; Primary RN, Mikaela Aleman, updated.  
 
[place] Kanner, RN 
10/27/18 
1:48 AM

## 2018-10-27 NOTE — PROGRESS NOTES
PROGRESS NOTE 
 
NAME:  Millie Saleem :   1935 MRN:   [id number] Date/Time:  10/27/2018 9:58 AM 
Subjective:  
History:  Chart reviewed and patient seen and examined this AM and D/W her nurse and all events noted. She was admitted on 10/24 with  Sepsis and R Hydronephrosis and had Stent replaced in R Ureter. This AM she has a little lower abdominal pain but denies N/V as well as she has no other /GI c/o. She denies SOB or cardio/respiratory c/o. She denies any other specific complaints and she remains much more oriented today. She knows me and A & O X 3. She has no other c/o on complete ROS although she still has some confusion during our conversation. Medications reviewed: 
Current Facility-Administered Medications Medication Dose Route Frequency  labetalol (NORMODYNE;TRANDATE) injection 20 mg  20 mg IntraVENous ONCE  
 labetalol (NORMODYNE;TRANDATE) 5 mg/mL injection  fluconazole (DIFLUCAN) tablet 100 mg  100 mg Oral DAILY  acetaminophen (TYLENOL) tablet 650 mg  650 mg Oral Q4H PRN  
 aspirin chewable tablet 81 mg  81 mg Oral DAILY  dilTIAZem CD (CARDIZEM CD) capsule 180 mg  180 mg Oral DAILY  docusate sodium (COLACE) capsule 100 mg  100 mg Oral BID  hydrALAZINE (APRESOLINE) tablet 100 mg  100 mg Oral TID  metoprolol tartrate (LOPRESSOR) tablet 50 mg  50 mg Oral BID  neomycin-polymyxin-dexamethasone (DEXACINE) 3.5 mg/g-10,000 unit/g-0.1 % ophthalmic ointment   Both Eyes QHS  PARoxetine (PAXIL) tablet 20 mg  20 mg Oral DAILY  predniSONE (DELTASONE) tablet 10 mg  10 mg Oral DAILY WITH BREAKFAST  temazepam (RESTORIL) capsule 15 mg  15 mg Oral QHS  sodium chloride (NS) flush 5-10 mL  5-10 mL IntraVENous Q8H  
 sodium chloride (NS) flush 5-10 mL  5-10 mL IntraVENous PRN  
 0.9% sodium chloride infusion  50 mL/hr IntraVENous CONTINUOUS  
 heparin (porcine) injection 5,000 Units  5,000 Units SubCUTAneous Q12H  hydrALAZINE (APRESOLINE) 20 mg/mL injection 10 mg  10 mg IntraVENous Q6H PRN  
 ondansetron (ZOFRAN) injection 4 mg  4 mg IntraVENous Q6H PRN  polyethylene glycol (MIRALAX) packet 17 g  17 g Oral DAILY  gabapentin (NEURONTIN) capsule 300 mg  300 mg Oral DAILY Objective:  
Vitals: 
Visit Vitals /88 Pulse 66 Temp 97.8 °F (36.6 °C) Resp 17 Ht 5' 1.5\" (1.562 m) Wt 115 lb (52.2 kg) SpO2 98% BMI 21.38 kg/m² O2 Flow Rate (L/min): 2 l/min O2 Device: Room air Temp (24hrs), Av.4 °F (36.9 °C), Min:97.8 °F (36.6 °C), Max:98.9 °F (37.2 °C) Last 24hr Input/Output: 
 
Intake/Output Summary (Last 24 hours) at 10/27/2018 7420 Last data filed at 10/27/2018 5811 Gross per 24 hour Intake 1298.33 ml Output 1250 ml Net 48.33 ml PHYSICAL EXAM: 
General:     Alert, cooperative, no distress, appears stated age. Head:    Normocephalic, without obvious abnormality, atraumatic. Eyes:    Conjunctivae/corneas clear. PERRLA Nose:   Nares normal. No drainage or sinus tenderness. Throat:     Lips, mucosa, and tongue normal.  No Thrush Neck:   Supple, symmetrical,  no adenopathy, thyroid: non tender 
   no carotid bruit and no JVD. Back:     Symmetric,  No CVA tenderness. Lungs:    Clear to auscultation bilaterally with overall decreased breath sounds. No Wheezing or Rhonchi. No rales. Heart:    Regular rate and rhythm,  no murmur, rub or gallop. Abdomen:    Soft, non-tender except very minimal RL abdomen. Not distended. Bowel sounds normal. No masses Extremities:  Extremities normal, atraumatic, No cyanosis. No edema. No clubbing Lymph nodes:  Cervical, supraclavicular normal. 
Neurologic:  Mild decreased strength, Alert and oriented X 3 and knows me today. Skin:                 No rash Lab Data Reviewed: 
 
Recent Results (from the past 24 hour(s)) METABOLIC PANEL, BASIC Collection Time: 10/27/18  5:07 AM  
Result Value Ref Range Sodium 140 136 - 145 mmol/L Potassium 4.0 3.5 - 5.1 mmol/L Chloride 110 (H) 97 - 108 mmol/L  
 CO2 21 21 - 32 mmol/L Anion gap 9 5 - 15 mmol/L Glucose 102 (H) 65 - 100 mg/dL BUN 51 (H) 6 - 20 MG/DL Creatinine 1.98 (H) 0.55 - 1.02 MG/DL  
 BUN/Creatinine ratio 26 (H) 12 - 20 GFR est AA 29 (L) >60 ml/min/1.73m2 GFR est non-AA 24 (L) >60 ml/min/1.73m2 Calcium 8.9 8.5 - 10.1 MG/DL  
CBC WITH AUTOMATED DIFF Collection Time: 10/27/18  5:07 AM  
Result Value Ref Range WBC 10.2 3.6 - 11.0 K/uL  
 RBC 3.54 (L) 3.80 - 5.20 M/uL  
 HGB 10.0 (L) 11.5 - 16.0 g/dL HCT 31.3 (L) 35.0 - 47.0 % MCV 88.4 80.0 - 99.0 FL  
 MCH 28.2 26.0 - 34.0 PG  
 MCHC 31.9 30.0 - 36.5 g/dL  
 RDW 16.5 (H) 11.5 - 14.5 % PLATELET 712 383 - 173 K/uL MPV 10.5 8.9 - 12.9 FL  
 NRBC 0.0 0  WBC ABSOLUTE NRBC 0.00 0.00 - 0.01 K/uL NEUTROPHILS 79 (H) 32 - 75 % LYMPHOCYTES 8 (L) 12 - 49 % MONOCYTES 10 5 - 13 % EOSINOPHILS 1 0 - 7 % BASOPHILS 0 0 - 1 % IMMATURE GRANULOCYTES 2 (H) 0.0 - 0.5 % ABS. NEUTROPHILS 8.1 (H) 1.8 - 8.0 K/UL  
 ABS. LYMPHOCYTES 0.8 0.8 - 3.5 K/UL  
 ABS. MONOCYTES 1.0 0.0 - 1.0 K/UL  
 ABS. EOSINOPHILS 0.1 0.0 - 0.4 K/UL  
 ABS. BASOPHILS 0.0 0.0 - 0.1 K/UL  
 ABS. IMM. GRANS. 0.2 (H) 0.00 - 0.04 K/UL  
 DF AUTOMATED Assessment/Plan:  
 
Principal Problem: 
  CHRISTY (acute kidney injury) ([facility] (10/24/2018) Active Problems: 
  Essential hypertension (7/20/2016) COPD exacerbation ([facility] (8/14/2010) Sepsis ([facility] (3/28/2018) Renal failure (10/24/2018) Hydronephrosis (10/24/2018) Ureteral obstruction (10/25/2018) Malignant neoplasm of lower lobe of left lung ([facility] (10/26/2018) 
 
  
___________________________________________________ PLAN: 
 
1.  D/Micah Rocephin for  Infection as final culture and sensitivity neg for bacteria 2. 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  Follow renal function with CHRISTY on CKD (61/3.21 adm) to now 51/1.98  ( 70/1.72 baseline in April) 4. Follow Hgb and WBC, 10.4 adm to 10.0 now 5. Follow Urine output which is good so far, IV decreased to 50 yesterday 6.  JA treatments for COPD 7. Cardizem and metoprolol for PAF 8. Home soon 
 
___________________________________________________ Attending Physician: Priscila Christina MD

## 2018-10-27 NOTE — PROGRESS NOTES
Pt had 3 high bp readings for 2200  Administration of PO hydralazine. (204/64) Assessed urine output and bladder scanned (0ml). Pt asymptomatic of high bp. Gave oral hydralazine and reassessed in approx 1 hr. BP was 206/82. Gave IV hydralazine PRN order. Reassessed Pt in approx 1 hr. Bp was 203/85. Hospitalist mariluz, charge Rn notified of situation. Quan Cowan MD gave order for IV Labetalol. When RN went to administer medication, the patient refused stating \"They didn't want to take anymore medications tonight. \" Pt made aware of risks of not taking medication. Pt stated understanding. Will continue to monitor pt. Status and vitals, if they do not refuse.  
                                                -Yola Kaufman Pt refused 3am vital signs.

## 2018-10-27 NOTE — PROGRESS NOTES
Name: Naty Hughes MRN: [id number] : 1935 Assessment: 
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 Not replaced recently as outpt. Now replaced inpt R sided hydroureteronephrosis- new; due to migration of R ureteral stent HTN: suboptimal control Plan/Recommendations: 
Ct IV NS at 50 ml/hr PRN IV hydralazine Avoid nephrotoxins ABx per 1ry team 
Remove warner tomorrow Am labs Subjective: 
\" I want to go home\". ROS:  
No nausea, no vomiting No chest pain, no shortness of breath Exam: 
Visit Vitals /47 Pulse (!) 58 Temp 98.5 °F (36.9 °C) Resp 17 Ht 5' 1.5\" (1.562 m) Wt 52.2 kg (115 lb) SpO2 94% BMI 21.38 kg/m² WB/WN in NAD Moist mm, no icterus Clear RRR No edema Alert awake Current Facility-Administered Medications Medication Dose Route Frequency Last Dose  labetalol (NORMODYNE;TRANDATE) 5 mg/mL injection  fluconazole (DIFLUCAN) tablet 100 mg  100 mg Oral DAILY 100 mg at 10/27/18 1055  acetaminophen (TYLENOL) tablet 650 mg  650 mg Oral Q4H  mg at 10/25/18 0700  aspirin chewable tablet 81 mg  81 mg Oral DAILY 81 mg at 10/27/18 1054  dilTIAZem CD (CARDIZEM CD) capsule 180 mg  180 mg Oral DAILY 180 mg at 10/27/18 1054  docusate sodium (COLACE) capsule 100 mg  100 mg Oral  mg at 10/27/18 1055  hydrALAZINE (APRESOLINE) tablet 100 mg  100 mg Oral  mg at 10/27/18 1055  metoprolol tartrate (LOPRESSOR) tablet 50 mg  50 mg Oral BID 50 mg at 10/27/18 1054  
 neomycin-polymyxin-dexamethasone (DEXACINE) 3.5 mg/g-10,000 unit/g-0.1 % ophthalmic ointment   Both Eyes QHS  PARoxetine (PAXIL) tablet 20 mg  20 mg Oral DAILY 20 mg at 10/27/18 1054  predniSONE (DELTASONE) tablet 10 mg  10 mg Oral DAILY WITH BREAKFAST 10 mg at 10/27/18 1054  temazepam (RESTORIL) capsule 15 mg  15 mg Oral QHS 15 mg at 10/26/18 2142  sodium chloride (NS) flush 5-10 mL  5-10 mL IntraVENous Q8H 10 mL at 10/27/18 9937  sodium chloride (NS) flush 5-10 mL  5-10 mL IntraVENous PRN    
 0.9% sodium chloride infusion  50 mL/hr IntraVENous CONTINUOUS 50 mL/hr at 10/27/18 1053  heparin (porcine) injection 5,000 Units  5,000 Units SubCUTAneous Q12H 5,000 Units at 10/27/18 1309  hydrALAZINE (APRESOLINE) 20 mg/mL injection 10 mg  10 mg IntraVENous Q6H PRN 10 mg at 10/27/18 1242  ondansetron (ZOFRAN) injection 4 mg  4 mg IntraVENous Q6H PRN  polyethylene glycol (MIRALAX) packet 17 g  17 g Oral DAILY 17 g at 10/26/18 0942  
 gabapentin (NEURONTIN) capsule 300 mg  300 mg Oral DAILY 300 mg at 10/27/18 1054 Labs/Data: 
 
Lab Results Component Value Date/Time WBC 10.2 10/27/2018 05:07 AM  
 Hemoglobin (POC) 12.6 06/24/2009 12:08 PM  
 HGB 10.0 (L) 10/27/2018 05:07 AM  
 Hematocrit (POC) 37 06/24/2009 12:08 PM  
 HCT 31.3 (L) 10/27/2018 05:07 AM  
 PLATELET 386 99/23/2277 05:07 AM  
 MCV 88.4 10/27/2018 05:07 AM  
 
 
Lab Results Component Value Date/Time Sodium 140 10/27/2018 05:07 AM  
 Potassium 4.0 10/27/2018 05:07 AM  
 Chloride 110 (H) 10/27/2018 05:07 AM  
 CO2 21 10/27/2018 05:07 AM  
 Anion gap 9 10/27/2018 05:07 AM  
 Glucose 102 (H) 10/27/2018 05:07 AM  
 BUN 51 (H) 10/27/2018 05:07 AM  
 Creatinine 1.98 (H) 10/27/2018 05:07 AM  
 BUN/Creatinine ratio 26 (H) 10/27/2018 05:07 AM  
 GFR est AA 29 (L) 10/27/2018 05:07 AM  
 GFR est non-AA 24 (L) 10/27/2018 05:07 AM  
 Calcium 8.9 10/27/2018 05:07 AM  
 
 
Wt Readings from Last 3 Encounters:  
10/24/18 52.2 kg (115 lb) 04/10/18 42.1 kg (92 lb 14.2 oz) 10/28/16 43.1 kg (95 lb) Intake/Output Summary (Last 24 hours) at 10/27/2018 1304 Last data filed at 10/27/2018 3008 Gross per 24 hour Intake 920 ml Output 1250 ml Net -330 ml Patient seen and examined. Chart reviewed. Labs, data and other pertinent notes reviewed in last 24 hrs. PMH/SH/FH reviewed and unchanged compared to H&P Discussed with pt and RN Marian Villanueva MD

## 2018-10-28 LAB
ALBUMIN SERPL-MCNC: 2.3 G/DL (ref 3.5–5)
ALBUMIN/GLOB SERPL: 0.6 {RATIO} (ref 1.1–2.2)
ALP SERPL-CCNC: 57 U/L (ref 45–117)
ALT SERPL-CCNC: 16 U/L (ref 12–78)
ANION GAP SERPL CALC-SCNC: 8 MMOL/L (ref 5–15)
AST SERPL-CCNC: 22 U/L (ref 15–37)
BILIRUB SERPL-MCNC: 0.2 MG/DL (ref 0.2–1)
BUN SERPL-MCNC: 41 MG/DL (ref 6–20)
BUN/CREAT SERPL: 28 (ref 12–20)
CALCIUM SERPL-MCNC: 9 MG/DL (ref 8.5–10.1)
CHLORIDE SERPL-SCNC: 110 MMOL/L (ref 97–108)
CO2 SERPL-SCNC: 23 MMOL/L (ref 21–32)
CREAT SERPL-MCNC: 1.48 MG/DL (ref 0.55–1.02)
GLOBULIN SER CALC-MCNC: 3.9 G/DL (ref 2–4)
GLUCOSE SERPL-MCNC: 90 MG/DL (ref 65–100)
MAGNESIUM SERPL-MCNC: 1.5 MG/DL (ref 1.6–2.4)
PHOSPHATE SERPL-MCNC: 2.9 MG/DL (ref 2.6–4.7)
POTASSIUM SERPL-SCNC: 3.9 MMOL/L (ref 3.5–5.1)
PROT SERPL-MCNC: 6.2 G/DL (ref 6.4–8.2)
SODIUM SERPL-SCNC: 141 MMOL/L (ref 136–145)

## 2018-10-28 PROCEDURE — 74011250636 HC RX REV CODE- 250/636: Performed by: INTERNAL MEDICINE

## 2018-10-28 PROCEDURE — 74011636637 HC RX REV CODE- 636/637: Performed by: INTERNAL MEDICINE

## 2018-10-28 PROCEDURE — 74011250637 HC RX REV CODE- 250/637: Performed by: INTERNAL MEDICINE

## 2018-10-28 PROCEDURE — 36415 COLL VENOUS BLD VENIPUNCTURE: CPT | Performed by: INTERNAL MEDICINE

## 2018-10-28 PROCEDURE — 65270000029 HC RM PRIVATE

## 2018-10-28 PROCEDURE — 83735 ASSAY OF MAGNESIUM: CPT | Performed by: INTERNAL MEDICINE

## 2018-10-28 PROCEDURE — 74011000250 HC RX REV CODE- 250: Performed by: INTERNAL MEDICINE

## 2018-10-28 PROCEDURE — 80053 COMPREHEN METABOLIC PANEL: CPT | Performed by: INTERNAL MEDICINE

## 2018-10-28 PROCEDURE — 84100 ASSAY OF PHOSPHORUS: CPT | Performed by: INTERNAL MEDICINE

## 2018-10-28 RX ORDER — HYDRALAZINE HYDROCHLORIDE 20 MG/ML
20 INJECTION INTRAMUSCULAR; INTRAVENOUS
Status: DISCONTINUED | OUTPATIENT
Start: 2018-10-28 | End: 2018-10-30 | Stop reason: HOSPADM

## 2018-10-28 RX ORDER — DILTIAZEM HYDROCHLORIDE 300 MG/1
300 CAPSULE, COATED, EXTENDED RELEASE ORAL DAILY
Status: DISCONTINUED | OUTPATIENT
Start: 2018-10-29 | End: 2018-10-30 | Stop reason: HOSPADM

## 2018-10-28 RX ORDER — SODIUM CHLORIDE 0.9 % (FLUSH) 0.9 %
5-10 SYRINGE (ML) INJECTION EVERY 8 HOURS
Status: DISCONTINUED | OUTPATIENT
Start: 2018-10-28 | End: 2018-10-30 | Stop reason: HOSPADM

## 2018-10-28 RX ORDER — SODIUM CHLORIDE 0.9 % (FLUSH) 0.9 %
5-10 SYRINGE (ML) INJECTION AS NEEDED
Status: DISCONTINUED | OUTPATIENT
Start: 2018-10-28 | End: 2018-10-30 | Stop reason: HOSPADM

## 2018-10-28 RX ORDER — HYDROCHLOROTHIAZIDE 25 MG/1
25 TABLET ORAL DAILY
Status: DISCONTINUED | OUTPATIENT
Start: 2018-10-28 | End: 2018-10-30 | Stop reason: HOSPADM

## 2018-10-28 RX ORDER — MAGNESIUM SULFATE HEPTAHYDRATE 40 MG/ML
2 INJECTION, SOLUTION INTRAVENOUS ONCE
Status: COMPLETED | OUTPATIENT
Start: 2018-10-28 | End: 2018-10-28

## 2018-10-28 RX ADMIN — TEMAZEPAM 15 MG: 15 CAPSULE ORAL at 22:09

## 2018-10-28 RX ADMIN — DILTIAZEM HYDROCHLORIDE 180 MG: 180 CAPSULE, COATED, EXTENDED RELEASE ORAL at 08:28

## 2018-10-28 RX ADMIN — HYDROCHLOROTHIAZIDE 25 MG: 25 TABLET ORAL at 18:41

## 2018-10-28 RX ADMIN — Medication 10 ML: at 22:10

## 2018-10-28 RX ADMIN — Medication 10 ML: at 06:50

## 2018-10-28 RX ADMIN — HEPARIN SODIUM 5000 UNITS: 5000 INJECTION INTRAVENOUS; SUBCUTANEOUS at 18:42

## 2018-10-28 RX ADMIN — HYDRALAZINE HYDROCHLORIDE 100 MG: 50 TABLET, FILM COATED ORAL at 14:53

## 2018-10-28 RX ADMIN — Medication 10 ML: at 14:45

## 2018-10-28 RX ADMIN — HEPARIN SODIUM 5000 UNITS: 5000 INJECTION INTRAVENOUS; SUBCUTANEOUS at 06:50

## 2018-10-28 RX ADMIN — ASPIRIN 81 MG CHEWABLE TABLET 81 MG: 81 TABLET CHEWABLE at 08:25

## 2018-10-28 RX ADMIN — HYDRALAZINE HYDROCHLORIDE 10 MG: 20 INJECTION INTRAMUSCULAR; INTRAVENOUS at 08:30

## 2018-10-28 RX ADMIN — Medication 10 ML: at 22:09

## 2018-10-28 RX ADMIN — GABAPENTIN 300 MG: 300 CAPSULE ORAL at 08:25

## 2018-10-28 RX ADMIN — HYDRALAZINE HYDROCHLORIDE 100 MG: 50 TABLET, FILM COATED ORAL at 22:09

## 2018-10-28 RX ADMIN — PAROXETINE 20 MG: 20 TABLET, FILM COATED ORAL at 08:24

## 2018-10-28 RX ADMIN — FLUCONAZOLE 100 MG: 100 TABLET ORAL at 08:26

## 2018-10-28 RX ADMIN — POLYETHYLENE GLYCOL 3350 17 G: 17 POWDER, FOR SOLUTION ORAL at 08:23

## 2018-10-28 RX ADMIN — HYDRALAZINE HYDROCHLORIDE 20 MG: 20 INJECTION INTRAMUSCULAR; INTRAVENOUS at 20:55

## 2018-10-28 RX ADMIN — MAGNESIUM SULFATE IN WATER 2 G: 40 INJECTION, SOLUTION INTRAVENOUS at 22:08

## 2018-10-28 RX ADMIN — ACETAMINOPHEN 650 MG: 325 TABLET ORAL at 22:22

## 2018-10-28 RX ADMIN — PREDNISONE 10 MG: 5 TABLET ORAL at 08:27

## 2018-10-28 RX ADMIN — METOPROLOL TARTRATE 50 MG: 50 TABLET ORAL at 14:53

## 2018-10-28 RX ADMIN — SODIUM CHLORIDE 50 ML/HR: 900 INJECTION, SOLUTION INTRAVENOUS at 08:22

## 2018-10-28 NOTE — PROGRESS NOTES
Name: Olivia Stanton MRN: [id number] : 1935 Assessment: 
CHRISTY on CKD-3: due to obstruction/infection-> r showing improvement s/p new stent. 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 Not replaced recently as outpt. Now replaced inpt R sided hydroureteronephrosis- new; due to migration of R ureteral stent HTN: suboptimal control Hypomagnesemia Plan/Recommendations: 
Stop IVF IV Mag sulfate 2gm x1 dose Resume HCTZ 25mg daily PRN IV hydralazine Avoid nephrotoxins ABx per 1ry team 
Am labs Subjective: 
\" I'm going home tomorrow\". Jacob removed earlier today. Patient reports no complaints. ROS:  
No nausea, no vomiting No chest pain, no shortness of breath Exam: 
Visit Vitals /64 (BP 1 Location: Left arm) Pulse (!) 56 Temp 98.7 °F (37.1 °C) Resp 16 Ht 5' 1.5\" (1.562 m) Wt 52.2 kg (115 lb) SpO2 94% BMI 21.38 kg/m² WB/WN in NAD Moist mm, no icterus Clear RRR No edema Alert awake Current Facility-Administered Medications Medication Dose Route Frequency Last Dose  sodium chloride (NS) flush 5-10 mL  5-10 mL IntraVENous Q8H 10 mL at 10/28/18 1445  sodium chloride (NS) flush 5-10 mL  5-10 mL IntraVENous PRN    
 [START ON 10/29/2018] dilTIAZem CD (CARDIZEM CD) capsule 300 mg  300 mg Oral DAILY  fluconazole (DIFLUCAN) tablet 100 mg  100 mg Oral DAILY 100 mg at 10/28/18 6632  acetaminophen (TYLENOL) tablet 650 mg  650 mg Oral Q4H  mg at 10/27/18 1727  aspirin chewable tablet 81 mg  81 mg Oral DAILY 81 mg at 10/28/18 0825  
 docusate sodium (COLACE) capsule 100 mg  100 mg Oral  mg at 10/27/18 172  hydrALAZINE (APRESOLINE) tablet 100 mg  100 mg Oral  mg at 10/28/18 1453  metoprolol tartrate (LOPRESSOR) tablet 50 mg  50 mg Oral BID 50 mg at 10/28/18 1453  neomycin-polymyxin-dexamethasone (DEXACINE) 3.5 mg/g-10,000 unit/g-0.1 % ophthalmic ointment   Both Eyes QHS  PARoxetine (PAXIL) tablet 20 mg  20 mg Oral DAILY 20 mg at 10/28/18 9916  predniSONE (DELTASONE) tablet 10 mg  10 mg Oral DAILY WITH BREAKFAST 10 mg at 10/28/18 8611  temazepam (RESTORIL) capsule 15 mg  15 mg Oral QHS 15 mg at 10/27/18 2334  sodium chloride (NS) flush 5-10 mL  5-10 mL IntraVENous Q8H 10 mL at 10/28/18 0650  sodium chloride (NS) flush 5-10 mL  5-10 mL IntraVENous PRN    
 heparin (porcine) injection 5,000 Units  5,000 Units SubCUTAneous Q12H 5,000 Units at 10/28/18 2243  hydrALAZINE (APRESOLINE) 20 mg/mL injection 10 mg  10 mg IntraVENous Q6H PRN 10 mg at 10/28/18 0830  
 ondansetron (ZOFRAN) injection 4 mg  4 mg IntraVENous Q6H PRN  polyethylene glycol (MIRALAX) packet 17 g  17 g Oral DAILY 17 g at 10/28/18 0823  
 gabapentin (NEURONTIN) capsule 300 mg  300 mg Oral DAILY 300 mg at 10/28/18 0825 Labs/Data: 
 
Lab Results Component Value Date/Time WBC 10.2 10/27/2018 05:07 AM  
 Hemoglobin (POC) 12.6 06/24/2009 12:08 PM  
 HGB 10.0 (L) 10/27/2018 05:07 AM  
 Hematocrit (POC) 37 06/24/2009 12:08 PM  
 HCT 31.3 (L) 10/27/2018 05:07 AM  
 PLATELET 469 00/87/1097 05:07 AM  
 MCV 88.4 10/27/2018 05:07 AM  
 
 
Lab Results Component Value Date/Time  Sodium 141 10/28/2018 03:14 AM  
 Potassium 3.9 10/28/2018 03:14 AM  
 Chloride 110 (H) 10/28/2018 03:14 AM  
 CO2 23 10/28/2018 03:14 AM  
 Anion gap 8 10/28/2018 03:14 AM  
 Glucose 90 10/28/2018 03:14 AM  
 BUN 41 (H) 10/28/2018 03:14 AM  
 Creatinine 1.48 (H) 10/28/2018 03:14 AM  
 BUN/Creatinine ratio 28 (H) 10/28/2018 03:14 AM  
 GFR est AA 41 (L) 10/28/2018 03:14 AM  
 GFR est non-AA 34 (L) 10/28/2018 03:14 AM  
 Calcium 9.0 10/28/2018 03:14 AM  
 
 
Wt Readings from Last 3 Encounters:  
10/24/18 52.2 kg (115 lb) 04/10/18 42.1 kg (92 lb 14.2 oz) 10/28/16 43.1 kg (95 lb) Intake/Output Summary (Last 24 hours) at 10/28/2018 1555 Last data filed at 10/28/2018 1105 Gross per 24 hour Intake 120 ml Output 1375 ml Net -1255 ml Patient seen and examined. Chart reviewed. Labs, data and other pertinent notes reviewed in last 24 hrs. PMH/SH/FH reviewed and unchanged compared to H&P Discussed with pt and RN Tamar Sanford MD

## 2018-10-28 NOTE — PROGRESS NOTES
PROGRESS NOTE 
 
NAME:  Naty Hughes :   1935 MRN:   [id number] Date/Time:  10/28/2018 10:51 AM 
Subjective:  
History:  Chart reviewed and patient seen and examined this AM and D/W her nurse and with her daughter by phone and all events noted. She was admitted on 10/24 with  Sepsis and R Hydronephrosis and had Stent replaced in R Ureter. This AM she notes resolution of yesterday's lower abdominal pain and denies N/V as well as she has no other /GI c/o. She denies SOB or cardio/respiratory c/o. She denies any other specific complaints and she remains much more oriented today. She knows me and A & O X 3. She has no other c/o on complete ROS although she still has some confusion during our conversation. Medications reviewed: 
Current Facility-Administered Medications Medication Dose Route Frequency  fluconazole (DIFLUCAN) tablet 100 mg  100 mg Oral DAILY  acetaminophen (TYLENOL) tablet 650 mg  650 mg Oral Q4H PRN  
 aspirin chewable tablet 81 mg  81 mg Oral DAILY  dilTIAZem CD (CARDIZEM CD) capsule 180 mg  180 mg Oral DAILY  docusate sodium (COLACE) capsule 100 mg  100 mg Oral BID  hydrALAZINE (APRESOLINE) tablet 100 mg  100 mg Oral TID  metoprolol tartrate (LOPRESSOR) tablet 50 mg  50 mg Oral BID  neomycin-polymyxin-dexamethasone (DEXACINE) 3.5 mg/g-10,000 unit/g-0.1 % ophthalmic ointment   Both Eyes QHS  PARoxetine (PAXIL) tablet 20 mg  20 mg Oral DAILY  predniSONE (DELTASONE) tablet 10 mg  10 mg Oral DAILY WITH BREAKFAST  temazepam (RESTORIL) capsule 15 mg  15 mg Oral QHS  sodium chloride (NS) flush 5-10 mL  5-10 mL IntraVENous Q8H  
 sodium chloride (NS) flush 5-10 mL  5-10 mL IntraVENous PRN  
 0.9% sodium chloride infusion  50 mL/hr IntraVENous CONTINUOUS  
 heparin (porcine) injection 5,000 Units  5,000 Units SubCUTAneous Q12H  hydrALAZINE (APRESOLINE) 20 mg/mL injection 10 mg  10 mg IntraVENous Q6H PRN  
  ondansetron (ZOFRAN) injection 4 mg  4 mg IntraVENous Q6H PRN  polyethylene glycol (MIRALAX) packet 17 g  17 g Oral DAILY  gabapentin (NEURONTIN) capsule 300 mg  300 mg Oral DAILY Objective:  
Vitals: 
Visit Vitals /58 (BP 1 Location: Left arm, BP Patient Position: At rest) Pulse (!) 56 Temp 98.3 °F (36.8 °C) Resp 16 Ht 5' 1.5\" (1.562 m) Wt 115 lb (52.2 kg) SpO2 99% BMI 21.38 kg/m² O2 Flow Rate (L/min): 2 l/min O2 Device: Room air Temp (24hrs), Av.5 °F (36.9 °C), Min:98.3 °F (36.8 °C), Max:98.7 °F (37.1 °C) Last 24hr Input/Output: 
 
Intake/Output Summary (Last 24 hours) at 10/28/2018 1051 Last data filed at 10/28/2018 1024 Gross per 24 hour Intake 480 ml Output 1775 ml Net -1295 ml PHYSICAL EXAM: 
General:     Alert, cooperative, no distress, appears stated age. Head:    Normocephalic, without obvious abnormality, atraumatic. Eyes:    Conjunctivae/corneas clear. PERRLA Nose:   Nares normal. No drainage or sinus tenderness. Throat:     Lips, mucosa, and tongue normal.  No Thrush Neck:   Supple, symmetrical,  no adenopathy, thyroid: non tender 
   no carotid bruit and no JVD. Back:     Symmetric,  No CVA tenderness. Lungs:    Clear to auscultation bilaterally with overall decreased breath sounds. No Wheezing or Rhonchi. No rales. Heart:    Regular rate and rhythm,  no murmur, rub or gallop. Abdomen:    Soft, non-tender except very minimal RL abdomen. Not distended. Bowel sounds normal. No masses Extremities:  Extremities normal, atraumatic, No cyanosis. No edema. No clubbing Lymph nodes:  Cervical, supraclavicular normal. 
Neurologic:  Mild decreased strength, Alert and oriented X 3 and knows me today. Skin:                 No rash Lab Data Reviewed: 
 
Recent Results (from the past 24 hour(s)) METABOLIC PANEL, COMPREHENSIVE Collection Time: 10/28/18  3:14 AM  
Result Value Ref Range  Sodium 141 136 - 145 mmol/L  
 Potassium 3.9 3.5 - 5.1 mmol/L Chloride 110 (H) 97 - 108 mmol/L  
 CO2 23 21 - 32 mmol/L Anion gap 8 5 - 15 mmol/L Glucose 90 65 - 100 mg/dL BUN 41 (H) 6 - 20 MG/DL Creatinine 1.48 (H) 0.55 - 1.02 MG/DL  
 BUN/Creatinine ratio 28 (H) 12 - 20 GFR est AA 41 (L) >60 ml/min/1.73m2 GFR est non-AA 34 (L) >60 ml/min/1.73m2 Calcium 9.0 8.5 - 10.1 MG/DL Bilirubin, total 0.2 0.2 - 1.0 MG/DL  
 ALT (SGPT) 16 12 - 78 U/L  
 AST (SGOT) 22 15 - 37 U/L Alk. phosphatase 57 45 - 117 U/L Protein, total 6.2 (L) 6.4 - 8.2 g/dL Albumin 2.3 (L) 3.5 - 5.0 g/dL Globulin 3.9 2.0 - 4.0 g/dL A-G Ratio 0.6 (L) 1.1 - 2.2 PHOSPHORUS Collection Time: 10/28/18  3:14 AM  
Result Value Ref Range Phosphorus 2.9 2.6 - 4.7 MG/DL MAGNESIUM Collection Time: 10/28/18  3:14 AM  
Result Value Ref Range Magnesium 1.5 (L) 1.6 - 2.4 mg/dL Assessment/Plan:  
 
Principal Problem: 
  CHRISTY (acute kidney injury) ([facility] (10/24/2018) Active Problems: 
  Essential hypertension (7/20/2016) COPD exacerbation ([facility] (8/14/2010) Sepsis ([facility] (3/28/2018) Renal failure (10/24/2018) Hydronephrosis (10/24/2018) Ureteral obstruction (10/25/2018) Malignant neoplasm of lower lobe of left lung ([facility] (10/26/2018) 
 
  
___________________________________________________ PLAN: 
 
1.  D/Micah Rocephin for  Infection as final culture and sensitivity neg for bacteria 2. 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 Follow renal function with CHRISTY on CKD (61/3.21 adm) to now 41/1.48  ( 70/1.72 baseline in April) 4. Follow Hgb and WBC, 10.4 adm to 10.0 now 5. Follow Urine output which is good so far, IV decreased to 50 on 10/26 and change to saline lock now 6.  JA treatments for COPD 7. Cardizem and metoprolol for PAF, Increase Cardizem dose with increased BP 
8. D/C Rosalinda 9.   On Hydralazine at 100 TID and Metoprolol 50 BID for BP 
 
 ___________________________________________________ Attending Physician: Jerica Vazquez MD

## 2018-10-28 NOTE — PROGRESS NOTES
Pt BP was 209/83 for 1900 vitals. IV hydralazine given. BP rechecked at 2148 was 204/86. Held 2200 oral hydralazine until 2330 because it was too close to time of IV hydralazine administration. BP taken again at 2336 208/93. PO hydralazine given.

## 2018-10-29 LAB
ALBUMIN SERPL-MCNC: 2.4 G/DL (ref 3.5–5)
ALBUMIN/GLOB SERPL: 0.6 {RATIO} (ref 1.1–2.2)
ALP SERPL-CCNC: 61 U/L (ref 45–117)
ALT SERPL-CCNC: 21 U/L (ref 12–78)
ANION GAP SERPL CALC-SCNC: 8 MMOL/L (ref 5–15)
APPEARANCE UR: ABNORMAL
AST SERPL-CCNC: 31 U/L (ref 15–37)
BACTERIA SPEC CULT: NORMAL
BACTERIA URNS QL MICRO: ABNORMAL /HPF
BASOPHILS # BLD: 0 K/UL (ref 0–0.1)
BASOPHILS NFR BLD: 0 % (ref 0–1)
BILIRUB SERPL-MCNC: 0.2 MG/DL (ref 0.2–1)
BILIRUB UR QL: NEGATIVE
BUN SERPL-MCNC: 39 MG/DL (ref 6–20)
BUN/CREAT SERPL: 27 (ref 12–20)
CALCIUM SERPL-MCNC: 9.4 MG/DL (ref 8.5–10.1)
CHLORIDE SERPL-SCNC: 109 MMOL/L (ref 97–108)
CO2 SERPL-SCNC: 23 MMOL/L (ref 21–32)
COLOR UR: ABNORMAL
CREAT SERPL-MCNC: 1.43 MG/DL (ref 0.55–1.02)
DIFFERENTIAL METHOD BLD: ABNORMAL
EOSINOPHIL # BLD: 0.3 K/UL (ref 0–0.4)
EOSINOPHIL NFR BLD: 3 % (ref 0–7)
EPITH CASTS URNS QL MICRO: ABNORMAL /LPF
ERYTHROCYTE [DISTWIDTH] IN BLOOD BY AUTOMATED COUNT: 16.2 % (ref 11.5–14.5)
GLOBULIN SER CALC-MCNC: 4.1 G/DL (ref 2–4)
GLUCOSE SERPL-MCNC: 92 MG/DL (ref 65–100)
GLUCOSE UR STRIP.AUTO-MCNC: NEGATIVE MG/DL
HCT VFR BLD AUTO: 32.1 % (ref 35–47)
HGB BLD-MCNC: 10.8 G/DL (ref 11.5–16)
HGB UR QL STRIP: NEGATIVE
IMM GRANULOCYTES # BLD: 0 K/UL (ref 0–0.04)
IMM GRANULOCYTES NFR BLD AUTO: 0 % (ref 0–0.5)
KETONES UR QL STRIP.AUTO: NEGATIVE MG/DL
LEUKOCYTE ESTERASE UR QL STRIP.AUTO: ABNORMAL
LYMPHOCYTES # BLD: 1.2 K/UL (ref 0.8–3.5)
LYMPHOCYTES NFR BLD: 14 % (ref 12–49)
MAGNESIUM SERPL-MCNC: 2.2 MG/DL (ref 1.6–2.4)
MCH RBC QN AUTO: 28.8 PG (ref 26–34)
MCHC RBC AUTO-ENTMCNC: 33.6 G/DL (ref 30–36.5)
MCV RBC AUTO: 85.6 FL (ref 80–99)
METAMYELOCYTES NFR BLD MANUAL: 1 %
MONOCYTES # BLD: 0.5 K/UL (ref 0–1)
MONOCYTES NFR BLD: 6 % (ref 5–13)
NEUTS SEG # BLD: 6.8 K/UL (ref 1.8–8)
NEUTS SEG NFR BLD: 76 % (ref 32–75)
NITRITE UR QL STRIP.AUTO: NEGATIVE
NRBC # BLD: 0.02 K/UL (ref 0–0.01)
NRBC BLD-RTO: 0.2 PER 100 WBC
PH UR STRIP: 5.5 [PH] (ref 5–8)
PHOSPHATE SERPL-MCNC: 2.8 MG/DL (ref 2.6–4.7)
PLATELET # BLD AUTO: 338 K/UL (ref 150–400)
PMV BLD AUTO: 10.4 FL (ref 8.9–12.9)
POTASSIUM SERPL-SCNC: 3.7 MMOL/L (ref 3.5–5.1)
PROT SERPL-MCNC: 6.5 G/DL (ref 6.4–8.2)
PROT UR STRIP-MCNC: 100 MG/DL
RBC # BLD AUTO: 3.75 M/UL (ref 3.8–5.2)
RBC #/AREA URNS HPF: ABNORMAL /HPF (ref 0–5)
RBC MORPH BLD: ABNORMAL
RBC MORPH BLD: ABNORMAL
SERVICE CMNT-IMP: NORMAL
SODIUM SERPL-SCNC: 140 MMOL/L (ref 136–145)
SP GR UR REFRACTOMETRY: 1.01 (ref 1–1.03)
UROBILINOGEN UR QL STRIP.AUTO: 0.2 EU/DL (ref 0.2–1)
WBC # BLD AUTO: 8.9 K/UL (ref 3.6–11)
WBC URNS QL MICRO: ABNORMAL /HPF (ref 0–4)

## 2018-10-29 PROCEDURE — 74011250637 HC RX REV CODE- 250/637: Performed by: INTERNAL MEDICINE

## 2018-10-29 PROCEDURE — 85027 COMPLETE CBC AUTOMATED: CPT | Performed by: INTERNAL MEDICINE

## 2018-10-29 PROCEDURE — 83735 ASSAY OF MAGNESIUM: CPT | Performed by: INTERNAL MEDICINE

## 2018-10-29 PROCEDURE — 36415 COLL VENOUS BLD VENIPUNCTURE: CPT | Performed by: INTERNAL MEDICINE

## 2018-10-29 PROCEDURE — 84100 ASSAY OF PHOSPHORUS: CPT | Performed by: INTERNAL MEDICINE

## 2018-10-29 PROCEDURE — 87086 URINE CULTURE/COLONY COUNT: CPT | Performed by: INTERNAL MEDICINE

## 2018-10-29 PROCEDURE — 65270000029 HC RM PRIVATE

## 2018-10-29 PROCEDURE — 81001 URINALYSIS AUTO W/SCOPE: CPT | Performed by: INTERNAL MEDICINE

## 2018-10-29 PROCEDURE — 74011636637 HC RX REV CODE- 636/637: Performed by: INTERNAL MEDICINE

## 2018-10-29 PROCEDURE — 87077 CULTURE AEROBIC IDENTIFY: CPT | Performed by: INTERNAL MEDICINE

## 2018-10-29 PROCEDURE — 80053 COMPREHEN METABOLIC PANEL: CPT | Performed by: INTERNAL MEDICINE

## 2018-10-29 PROCEDURE — 74011250636 HC RX REV CODE- 250/636: Performed by: INTERNAL MEDICINE

## 2018-10-29 PROCEDURE — 87186 SC STD MICRODIL/AGAR DIL: CPT | Performed by: INTERNAL MEDICINE

## 2018-10-29 RX ORDER — LISINOPRIL 20 MG/1
20 TABLET ORAL DAILY
Status: DISCONTINUED | OUTPATIENT
Start: 2018-10-29 | End: 2018-10-30 | Stop reason: HOSPADM

## 2018-10-29 RX ORDER — GABAPENTIN 300 MG/1
300 CAPSULE ORAL 2 TIMES DAILY
Status: DISCONTINUED | OUTPATIENT
Start: 2018-10-29 | End: 2018-10-30 | Stop reason: HOSPADM

## 2018-10-29 RX ORDER — CLONIDINE HYDROCHLORIDE 0.1 MG/1
0.1 TABLET ORAL 2 TIMES DAILY
Status: DISCONTINUED | OUTPATIENT
Start: 2018-10-29 | End: 2018-10-29

## 2018-10-29 RX ADMIN — HYDROCHLOROTHIAZIDE 25 MG: 25 TABLET ORAL at 08:10

## 2018-10-29 RX ADMIN — Medication 10 ML: at 14:59

## 2018-10-29 RX ADMIN — TEMAZEPAM 15 MG: 15 CAPSULE ORAL at 22:49

## 2018-10-29 RX ADMIN — HYDRALAZINE HYDROCHLORIDE 100 MG: 50 TABLET, FILM COATED ORAL at 17:01

## 2018-10-29 RX ADMIN — DILTIAZEM HYDROCHLORIDE 300 MG: 300 CAPSULE, COATED, EXTENDED RELEASE ORAL at 08:10

## 2018-10-29 RX ADMIN — Medication 10 ML: at 05:21

## 2018-10-29 RX ADMIN — GABAPENTIN 300 MG: 300 CAPSULE ORAL at 19:42

## 2018-10-29 RX ADMIN — ACETAMINOPHEN 650 MG: 325 TABLET ORAL at 20:46

## 2018-10-29 RX ADMIN — Medication 10 ML: at 22:50

## 2018-10-29 RX ADMIN — METOPROLOL TARTRATE 50 MG: 50 TABLET ORAL at 08:09

## 2018-10-29 RX ADMIN — Medication 10 ML: at 08:16

## 2018-10-29 RX ADMIN — HEPARIN SODIUM 5000 UNITS: 5000 INJECTION INTRAVENOUS; SUBCUTANEOUS at 05:20

## 2018-10-29 RX ADMIN — HYDRALAZINE HYDROCHLORIDE 100 MG: 50 TABLET, FILM COATED ORAL at 22:49

## 2018-10-29 RX ADMIN — PAROXETINE 20 MG: 20 TABLET, FILM COATED ORAL at 09:22

## 2018-10-29 RX ADMIN — GABAPENTIN 300 MG: 300 CAPSULE ORAL at 08:10

## 2018-10-29 RX ADMIN — ASPIRIN 81 MG CHEWABLE TABLET 81 MG: 81 TABLET CHEWABLE at 08:10

## 2018-10-29 RX ADMIN — HYDRALAZINE HYDROCHLORIDE 20 MG: 20 INJECTION INTRAMUSCULAR; INTRAVENOUS at 03:15

## 2018-10-29 RX ADMIN — Medication 10 ML: at 22:49

## 2018-10-29 RX ADMIN — Medication 10 ML: at 15:00

## 2018-10-29 RX ADMIN — HYDRALAZINE HYDROCHLORIDE 100 MG: 50 TABLET, FILM COATED ORAL at 08:10

## 2018-10-29 RX ADMIN — HEPARIN SODIUM 5000 UNITS: 5000 INJECTION INTRAVENOUS; SUBCUTANEOUS at 17:01

## 2018-10-29 RX ADMIN — PREDNISONE 10 MG: 5 TABLET ORAL at 08:06

## 2018-10-29 RX ADMIN — CLONIDINE HYDROCHLORIDE 0.1 MG: 0.1 TABLET ORAL at 08:06

## 2018-10-29 RX ADMIN — LISINOPRIL 20 MG: 20 TABLET ORAL at 14:57

## 2018-10-29 NOTE — PROGRESS NOTES
Name: Roberta Spear MRN: [id number] : 1935 Assessment: 
 
CHRISTY on CKD-3:  Secondary to obstruction/infection; improving s/p new stent. 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 Now Lisinopril 20 mg added. Watch lytes/creatinine and BP response to the addition of Lisinopril Subjective: 
disappointed at no discharge today. Tellez removed 10/28. Patient reports no complaints. ROS:  
No nausea, no vomiting No chest pain, no shortness of breath Exam: 
Visit Vitals /66 Comment:  PRN BP med given Pulse 62 Temp 98.2 °F (36.8 °C) Resp 18 Ht 5' 1.5\" (1.562 m) Wt 52.2 kg (115 lb) SpO2 99% BMI 21.38 kg/m² WB/WN in NAD Moist mm, no icterus Clear RRR No edema Alert awake Current Facility-Administered Medications Medication Dose Route Frequency Last Dose  sodium chloride (NS) flush 5-10 mL  5-10 mL IntraVENous Q8H 10 mL at 10/29/18 0521  
 sodium chloride (NS) flush 5-10 mL  5-10 mL IntraVENous PRN    
 dilTIAZem CD (CARDIZEM CD) capsule 300 mg  300 mg Oral DAILY  hydrALAZINE (APRESOLINE) 20 mg/mL injection 20 mg  20 mg IntraVENous Q6H PRN 20 mg at 10/29/18 0315  hydroCHLOROthiazide (HYDRODIURIL) tablet 25 mg  25 mg Oral DAILY 25 mg at 10/28/18 1841  fluconazole (DIFLUCAN) tablet 100 mg  100 mg Oral DAILY 100 mg at 10/28/18 5214  acetaminophen (TYLENOL) tablet 650 mg  650 mg Oral Q4H  mg at 10/28/18 2222  aspirin chewable tablet 81 mg  81 mg Oral DAILY 81 mg at 10/28/18 0825  
 docusate sodium (COLACE) capsule 100 mg  100 mg Oral  mg at 10/27/18 1727  hydrALAZINE (APRESOLINE) tablet 100 mg  100 mg Oral  mg at 10/28/18 2209  metoprolol tartrate (LOPRESSOR) tablet 50 mg  50 mg Oral BID 50 mg at 10/28/18 1453  neomycin-polymyxin-dexamethasone (DEXACINE) 3.5 mg/g-10,000 unit/g-0.1 % ophthalmic ointment   Both Eyes QHS  PARoxetine (PAXIL) tablet 20 mg  20 mg Oral DAILY 20 mg at 10/28/18 1501  predniSONE (DELTASONE) tablet 10 mg  10 mg Oral DAILY WITH BREAKFAST 10 mg at 10/28/18 0427  temazepam (RESTORIL) capsule 15 mg  15 mg Oral QHS 15 mg at 10/28/18 2209  sodium chloride (NS) flush 5-10 mL  5-10 mL IntraVENous Q8H 10 mL at 10/29/18 0521  
 sodium chloride (NS) flush 5-10 mL  5-10 mL IntraVENous PRN    
 heparin (porcine) injection 5,000 Units  5,000 Units SubCUTAneous Q12H 5,000 Units at 10/29/18 0520  
 ondansetron (ZOFRAN) injection 4 mg  4 mg IntraVENous Q6H PRN  polyethylene glycol (MIRALAX) packet 17 g  17 g Oral DAILY 17 g at 10/28/18 0823  
 gabapentin (NEURONTIN) capsule 300 mg  300 mg Oral DAILY 300 mg at 10/28/18 0825 Labs/Data: 
 
Lab Results Component Value Date/Time WBC 8.9 10/29/2018 03:22 AM  
 Hemoglobin (POC) 12.6 06/24/2009 12:08 PM  
 HGB 10.8 (L) 10/29/2018 03:22 AM  
 Hematocrit (POC) 37 06/24/2009 12:08 PM  
 HCT 32.1 (L) 10/29/2018 03:22 AM  
 PLATELET 250 56/55/1990 03:22 AM  
 MCV 85.6 10/29/2018 03:22 AM  
 
 
Lab Results Component Value Date/Time  Sodium 140 10/29/2018 03:22 AM  
 Potassium 3.7 10/29/2018 03:22 AM  
 Chloride 109 (H) 10/29/2018 03:22 AM  
 CO2 23 10/29/2018 03:22 AM  
 Anion gap 8 10/29/2018 03:22 AM  
 Glucose 92 10/29/2018 03:22 AM  
 BUN 39 (H) 10/29/2018 03:22 AM  
 Creatinine 1.43 (H) 10/29/2018 03:22 AM  
 BUN/Creatinine ratio 27 (H) 10/29/2018 03:22 AM  
 GFR est AA 43 (L) 10/29/2018 03:22 AM  
 GFR est non-AA 35 (L) 10/29/2018 03:22 AM  
 Calcium 9.4 10/29/2018 03:22 AM  
 
 
Wt Readings from Last 3 Encounters:  
10/24/18 52.2 kg (115 lb) 04/10/18 42.1 kg (92 lb 14.2 oz) 10/28/16 43.1 kg (95 lb) Intake/Output Summary (Last 24 hours) at 10/29/2018 3566 Last data filed at 10/28/2018 2351 Gross per 24 hour Intake 120 ml Output 1525 ml Net -1405 ml Patient seen and examined. Chart reviewed. Labs, data and other pertinent notes reviewed in last 24 hrs. PMH/SH/FH reviewed and unchanged compared to H&P Discussed with pt and RN Tiffanie Walls MD

## 2018-10-29 NOTE — PROGRESS NOTES
Gabapentin dose adjustment: PTA dose: 400 mg QID Current dose: 300 mg daily CrCl improved ~ 23 ml/min Gabapentin increased to 300 mg bid per renal dosing protocol.  
 
ERIK CherryD

## 2018-10-29 NOTE — PROGRESS NOTES
Patient HR 43, with monitor. was easily aroused. Apical 45. Dr Judi Hare called. telephonel order with readback is \"discontinue clonidine and start lisinopril 20 mg daily. \" HR stayed in the 40's for the day shift. Held Metoprolol 50mg due at 1800 per Dr. Michelle Cox via [facility]. General Surgery End of Shift Nursing Note Bedside shift change report given to Jermaine MURRY (oncoming nurse) by Andra Hancock RN (offgoing nurse). Report included the following information SBAR, Kardex and Intake/Output. Shift worked:   7a-730p Summary of shift:    see above note Issues for physician to address:   none Number times ambulated in hallway past shift: 0 Number of times OOB to chair past shift: 0 Pain Management: 
Current medication: see mar Patient states pain is manageable on current pain medication: YES 
 
GI: 
 
Current diet:  DIET RENAL Regular Tolerating current diet: YES Passing flatus: YES Last Bowel Movement: today Appearance: smear, Sallyvel Martinez Respiratory: 
 
Incentive Spirometer at bedside: YES Patient instructed on use: YES Patient Safety: 
 
Falls Score: 5 Bed Alarm On? No 
Sitter? No 
 
Leona Alvarez

## 2018-10-29 NOTE — PROGRESS NOTES
PROGRESS NOTE 
 
NAME:  Hailey Ramirez :   1935 MRN:   [id number] Date/Time:  10/29/2018 6:57 AM 
Subjective:  
History:  Chart reviewed and patient seen and examined this AM and D/W her nurse this AMand with her daughter by phone yesterday AM and all events noted. She was admitted on 10/24 with  Sepsis and R Hydronephrosis and had Stent replaced in R Ureter. This AM she notes resolution of her lower abdominal pain and denies N/V as well as she has no other /GI c/o except some dysuria now when passing urine w/o warner. She denies SOB or cardio/respiratory c/o. She denies any other specific complaints and she remains much more oriented today. She knows me and A & O X 3. She has no other c/o on complete ROS although she still has some confusion during our conversation. Medications reviewed: 
Current Facility-Administered Medications Medication Dose Route Frequency  sodium chloride (NS) flush 5-10 mL  5-10 mL IntraVENous Q8H  
 sodium chloride (NS) flush 5-10 mL  5-10 mL IntraVENous PRN  
 dilTIAZem CD (CARDIZEM CD) capsule 300 mg  300 mg Oral DAILY  hydrALAZINE (APRESOLINE) 20 mg/mL injection 20 mg  20 mg IntraVENous Q6H PRN  
 hydroCHLOROthiazide (HYDRODIURIL) tablet 25 mg  25 mg Oral DAILY  fluconazole (DIFLUCAN) tablet 100 mg  100 mg Oral DAILY  acetaminophen (TYLENOL) tablet 650 mg  650 mg Oral Q4H PRN  
 aspirin chewable tablet 81 mg  81 mg Oral DAILY  docusate sodium (COLACE) capsule 100 mg  100 mg Oral BID  hydrALAZINE (APRESOLINE) tablet 100 mg  100 mg Oral TID  metoprolol tartrate (LOPRESSOR) tablet 50 mg  50 mg Oral BID  neomycin-polymyxin-dexamethasone (DEXACINE) 3.5 mg/g-10,000 unit/g-0.1 % ophthalmic ointment   Both Eyes QHS  PARoxetine (PAXIL) tablet 20 mg  20 mg Oral DAILY  predniSONE (DELTASONE) tablet 10 mg  10 mg Oral DAILY WITH BREAKFAST  temazepam (RESTORIL) capsule 15 mg  15 mg Oral QHS  sodium chloride (NS) flush 5-10 mL  5-10 mL IntraVENous Q8H  
 sodium chloride (NS) flush 5-10 mL  5-10 mL IntraVENous PRN  
 heparin (porcine) injection 5,000 Units  5,000 Units SubCUTAneous Q12H  
 ondansetron (ZOFRAN) injection 4 mg  4 mg IntraVENous Q6H PRN  polyethylene glycol (MIRALAX) packet 17 g  17 g Oral DAILY  gabapentin (NEURONTIN) capsule 300 mg  300 mg Oral DAILY Objective:  
Vitals: 
Visit Vitals /67 Pulse (!) 56 Temp 97.9 °F (36.6 °C) Resp 18 Ht 5' 1.5\" (1.562 m) Wt 115 lb (52.2 kg) SpO2 94% BMI 21.38 kg/m² O2 Flow Rate (L/min): 2 l/min O2 Device: Room air Temp (24hrs), Av.2 °F (36.8 °C), Min:97.8 °F (36.6 °C), Max:98.7 °F (37.1 °C) Last 24hr Input/Output: 
 
Intake/Output Summary (Last 24 hours) at 10/29/2018 8112 Last data filed at 10/28/2018 2351 Gross per 24 hour Intake 120 ml Output 1525 ml Net -1405 ml PHYSICAL EXAM: 
General:     Alert, cooperative, no distress, appears stated age. Head:    Normocephalic, without obvious abnormality, atraumatic. Eyes:    Conjunctivae/corneas clear. PERRLA Nose:   Nares normal. No drainage or sinus tenderness. Throat:     Lips, mucosa, and tongue normal.  No Thrush Neck:   Supple, symmetrical,  no adenopathy, thyroid: non tender 
   no carotid bruit and no JVD. Back:     Symmetric,  No CVA tenderness. Lungs:    Clear to auscultation bilaterally with overall decreased breath sounds. No Wheezing or Rhonchi. No rales. Heart:    Regular rate and rhythm,  no murmur, rub or gallop. Abdomen:    Soft, non-tender except very minimal RL abdomen. Not distended. Bowel sounds normal. No masses Extremities:  Extremities normal, atraumatic, No cyanosis. No edema. No clubbing Lymph nodes:  Cervical, supraclavicular normal. 
Neurologic:  Mild decreased strength, Alert and oriented X 3 and knows me today. Skin:                 No rash Lab Data Reviewed: Recent Results (from the past 24 hour(s)) CBC W/O DIFF Collection Time: 10/29/18  3:22 AM  
Result Value Ref Range WBC 8.9 3.6 - 11.0 K/uL  
 RBC 3.75 (L) 3.80 - 5.20 M/uL  
 HGB 10.8 (L) 11.5 - 16.0 g/dL HCT 32.1 (L) 35.0 - 47.0 % MCV 85.6 80.0 - 99.0 FL  
 MCH 28.8 26.0 - 34.0 PG  
 MCHC 33.6 30.0 - 36.5 g/dL  
 RDW 16.2 (H) 11.5 - 14.5 % PLATELET 615 647 - 474 K/uL MPV 10.4 8.9 - 12.9 FL  
 NRBC 0.2 (H) 0  WBC ABSOLUTE NRBC 0.02 (H) 0.00 - 0.01 K/uL MAGNESIUM Collection Time: 10/29/18  3:22 AM  
Result Value Ref Range Magnesium 2.2 1.6 - 2.4 mg/dL PHOSPHORUS Collection Time: 10/29/18  3:22 AM  
Result Value Ref Range Phosphorus 2.8 2.6 - 4.7 MG/DL  
METABOLIC PANEL, COMPREHENSIVE Collection Time: 10/29/18  3:22 AM  
Result Value Ref Range Sodium 140 136 - 145 mmol/L Potassium 3.7 3.5 - 5.1 mmol/L Chloride 109 (H) 97 - 108 mmol/L  
 CO2 23 21 - 32 mmol/L Anion gap 8 5 - 15 mmol/L Glucose 92 65 - 100 mg/dL BUN 39 (H) 6 - 20 MG/DL Creatinine 1.43 (H) 0.55 - 1.02 MG/DL  
 BUN/Creatinine ratio 27 (H) 12 - 20 GFR est AA 43 (L) >60 ml/min/1.73m2 GFR est non-AA 35 (L) >60 ml/min/1.73m2 Calcium 9.4 8.5 - 10.1 MG/DL Bilirubin, total 0.2 0.2 - 1.0 MG/DL  
 ALT (SGPT) 21 12 - 78 U/L  
 AST (SGOT) 31 15 - 37 U/L Alk. phosphatase 61 45 - 117 U/L Protein, total 6.5 6.4 - 8.2 g/dL Albumin 2.4 (L) 3.5 - 5.0 g/dL Globulin 4.1 (H) 2.0 - 4.0 g/dL A-G Ratio 0.6 (L) 1.1 - 2.2 DIFFERENTIAL, AUTO Collection Time: 10/29/18  3:22 AM  
Result Value Ref Range NEUTROPHILS 76 (H) 32 - 75 % LYMPHOCYTES 14 12 - 49 % MONOCYTES 6 5 - 13 % EOSINOPHILS 3 0 - 7 % BASOPHILS 0 0 - 1 % METAMYELOCYTES 1 % IMMATURE GRANULOCYTES 0 0.0 - 0.5 % ABS. NEUTROPHILS 6.8 1.8 - 8.0 K/UL  
 ABS. LYMPHOCYTES 1.2 0.8 - 3.5 K/UL  
 ABS. MONOCYTES 0.5 0.0 - 1.0 K/UL  
 ABS. EOSINOPHILS 0.3 0.0 - 0.4 K/UL ABS. BASOPHILS 0.0 0.0 - 0.1 K/UL  
 ABS. IMM. GRANS. 0.0 0.00 - 0.04 K/UL  
 DF MANUAL    
 RBC COMMENTS ANISOCYTOSIS 1+ 
    
 RBC COMMENTS HELMET CELLS 1+ Assessment/Plan:  
 
Principal Problem: 
  CHRISTY (acute kidney injury) ([facility] (10/24/2018) Active Problems: 
  Essential hypertension (7/20/2016) COPD exacerbation ([facility] (8/14/2010) Sepsis ([facility] (3/28/2018) Renal failure (10/24/2018) Hydronephrosis (10/24/2018) Ureteral obstruction (10/25/2018) Malignant neoplasm of lower lobe of left lung ([facility] (10/26/2018) 
 
  
___________________________________________________ PLAN: 
 
1.  D/Micah Rocephin for  Infection as final culture and sensitivity neg for bacteria, but now dysuria w/o warner so recheck UA and Cx 2. 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 Follow renal function with CHRISTY on CKD (61/3.21 adm) to now 39/1.43  ( 70/1.72 baseline in April) 4. Follow Hgb and WBC, 10.4 adm to 10.0 now 5. Follow Urine output which is good so far, IV changed to saline lock yesterday 6.  JA treatments for COPD 7. Cardizem and metoprolol for PAF, Increased Cardizem dose with increased BP and Hctz added yesterday. Today add catapres 8. D/Micah Warner 9. On Hydralazine at 100 TID, HCTZ 25 mg QD and Metoprolol 50 BID for BP, Add Catapres 
 
___________________________________________________ Attending Physician: Vicenta Gongora MD

## 2018-10-29 NOTE — ROUTINE PROCESS
General Surgery End of Shift Nursing Note Bedside shift change report given to Jermaine RN (oncoming nurse) by Gildardo Graff RN (offgoing nurse). Report included the following information SBAR and Kardex. Shift worked:   7a-730p Summary of shift:    Tellez discontinued, patient used bedside commode with one person assist. Nothing unusual happened with patient during today. Issues for physician to address:   none Number times ambulated in hallway past shift: 0 Number of times OOB to chair past shift: 0 Pain Management: 
Current medication:tylenol Patient states pain is manageable on current pain medication: YES 
 
GI: 
 
Current diet:  DIET RENAL Regular Tolerating current diet: YES Passing flatus: YES Last Bowel Movement: today Appearance: soft brown, small amount Respiratory: 
 
Incentive Spirometer at bedside: YES Patient instructed on use: YES Patient Safety: 
 
Falls Score: 5 Bed Alarm On? No 
Sitter? No 
 
Rock Bologna

## 2018-10-30 VITALS
WEIGHT: 115 LBS | RESPIRATION RATE: 18 BRPM | TEMPERATURE: 98.2 F | DIASTOLIC BLOOD PRESSURE: 64 MMHG | OXYGEN SATURATION: 93 % | BODY MASS INDEX: 21.16 KG/M2 | SYSTOLIC BLOOD PRESSURE: 175 MMHG | HEART RATE: 65 BPM | HEIGHT: 62 IN

## 2018-10-30 LAB
ANION GAP SERPL CALC-SCNC: 9 MMOL/L (ref 5–15)
BUN SERPL-MCNC: 41 MG/DL (ref 6–20)
BUN/CREAT SERPL: 27 (ref 12–20)
CALCIUM SERPL-MCNC: 9.4 MG/DL (ref 8.5–10.1)
CHLORIDE SERPL-SCNC: 108 MMOL/L (ref 97–108)
CO2 SERPL-SCNC: 22 MMOL/L (ref 21–32)
CREAT SERPL-MCNC: 1.54 MG/DL (ref 0.55–1.02)
GLUCOSE SERPL-MCNC: 87 MG/DL (ref 65–100)
POTASSIUM SERPL-SCNC: 3.7 MMOL/L (ref 3.5–5.1)
SODIUM SERPL-SCNC: 139 MMOL/L (ref 136–145)

## 2018-10-30 PROCEDURE — 74011636637 HC RX REV CODE- 636/637: Performed by: INTERNAL MEDICINE

## 2018-10-30 PROCEDURE — 74011250637 HC RX REV CODE- 250/637: Performed by: INTERNAL MEDICINE

## 2018-10-30 PROCEDURE — 36415 COLL VENOUS BLD VENIPUNCTURE: CPT | Performed by: INTERNAL MEDICINE

## 2018-10-30 PROCEDURE — 74011250636 HC RX REV CODE- 250/636: Performed by: INTERNAL MEDICINE

## 2018-10-30 PROCEDURE — 80048 BASIC METABOLIC PNL TOTAL CA: CPT | Performed by: INTERNAL MEDICINE

## 2018-10-30 PROCEDURE — 90686 IIV4 VACC NO PRSV 0.5 ML IM: CPT | Performed by: INTERNAL MEDICINE

## 2018-10-30 PROCEDURE — 90471 IMMUNIZATION ADMIN: CPT

## 2018-10-30 RX ORDER — FLUCONAZOLE 100 MG/1
100 TABLET ORAL DAILY
Qty: 3 TAB | Refills: 0 | Status: SHIPPED | OUTPATIENT
Start: 2018-10-30 | End: 2018-11-02

## 2018-10-30 RX ORDER — LISINOPRIL 20 MG/1
20 TABLET ORAL DAILY
Qty: 30 TAB | Status: SHIPPED | OUTPATIENT
Start: 2018-10-30 | End: 2018-11-06 | Stop reason: SDUPTHER

## 2018-10-30 RX ORDER — DILTIAZEM HYDROCHLORIDE 300 MG/1
300 CAPSULE, COATED, EXTENDED RELEASE ORAL DAILY
Qty: 30 CAP | Status: SHIPPED | OUTPATIENT
Start: 2018-10-30 | End: 2019-10-29

## 2018-10-30 RX ADMIN — HYDROCHLOROTHIAZIDE 25 MG: 25 TABLET ORAL at 10:52

## 2018-10-30 RX ADMIN — GABAPENTIN 300 MG: 300 CAPSULE ORAL at 10:42

## 2018-10-30 RX ADMIN — HYDRALAZINE HYDROCHLORIDE 20 MG: 20 INJECTION INTRAMUSCULAR; INTRAVENOUS at 04:51

## 2018-10-30 RX ADMIN — Medication 10 ML: at 06:00

## 2018-10-30 RX ADMIN — ASPIRIN 81 MG CHEWABLE TABLET 81 MG: 81 TABLET CHEWABLE at 10:40

## 2018-10-30 RX ADMIN — LISINOPRIL 20 MG: 20 TABLET ORAL at 10:54

## 2018-10-30 RX ADMIN — INFLUENZA VIRUS VACCINE 0.5 ML: 15; 15; 15; 15 SUSPENSION INTRAMUSCULAR at 11:59

## 2018-10-30 RX ADMIN — HEPARIN SODIUM 5000 UNITS: 5000 INJECTION INTRAVENOUS; SUBCUTANEOUS at 04:46

## 2018-10-30 RX ADMIN — FLUCONAZOLE 100 MG: 100 TABLET ORAL at 10:42

## 2018-10-30 RX ADMIN — PAROXETINE 20 MG: 20 TABLET, FILM COATED ORAL at 10:41

## 2018-10-30 RX ADMIN — PREDNISONE 10 MG: 5 TABLET ORAL at 10:41

## 2018-10-30 RX ADMIN — DILTIAZEM HYDROCHLORIDE 300 MG: 300 CAPSULE, COATED, EXTENDED RELEASE ORAL at 10:52

## 2018-10-30 NOTE — DISCHARGE INSTRUCTIONS
[facility] [address]  [phone]      Patient Discharge Instructions    Kiley Mouthcard / [id number] : 1935    Admitted 10/24/2018 Discharged: 10/30/2018     Principal Problem:    CHRISTY (acute kidney injury) ([facility] (10/24/2018)    Active Problems:    Essential hypertension (2016)      COPD exacerbation ([address] (2010)      Sepsis ([facility] (3/28/2018)      Renal failure (10/24/2018)      Hydronephrosis (10/24/2018)      Ureteral obstruction (10/25/2018)      Malignant neoplasm of lower lobe of left lung ([facility] (10/26/2018)          Allergies   Allergen Reactions    Dilaudid [Hydromorphone] Unknown (comments)     Does not remember    Hydrocodone Nausea and Vomiting    Morphine Rash       · It is important that you take the medication exactly as they are prescribed. · Do not take other medications without consulting your doctor. What to do at Next Level of Care    Disposition:  Adult care facility    Recommended diet: General low sodium    Recommended activity: Up with assistance          Information obtained by :  I understand that if any problems occur once I am at home I am to contact my physician. I understand and acknowledge receipt of the instructions indicated above.                                                                                                                                            Physician's or R.N.'s Signature                                                                  Date/Time                                                                                                                                              Patient or Representative Signature                                                          Date/Time

## 2018-10-30 NOTE — PROGRESS NOTES
PROGRESS NOTE 
 
NAME:  Carline Sanderson :   1935 MRN:   [id number] Date/Time:  10/30/2018 7:10 AM 
Subjective:  
History:  Chart reviewed and patient seen and examined this AM and D/W her nurse this AMand with her daughter by phone yesterday AM and all events noted. She was admitted on 10/24 with  Sepsis and R Hydronephrosis and had Stent replaced in R Ureter. This AM she notes resolution of her lower abdominal pain and denies N/V as well as she has no other /GI, passing urine w/o warner. She denies SOB or cardio/respiratory c/o. She denies any other specific complaints and she remains much more oriented today. She knows me and A & O X 3. She has no other c/o on complete ROS although she still has some confusion during our conversation. Medications reviewed: 
Current Facility-Administered Medications Medication Dose Route Frequency  lisinopril (PRINIVIL, ZESTRIL) tablet 20 mg  20 mg Oral DAILY  gabapentin (NEURONTIN) capsule 300 mg  300 mg Oral BID  sodium chloride (NS) flush 5-10 mL  5-10 mL IntraVENous Q8H  
 sodium chloride (NS) flush 5-10 mL  5-10 mL IntraVENous PRN  
 dilTIAZem CD (CARDIZEM CD) capsule 300 mg  300 mg Oral DAILY  hydrALAZINE (APRESOLINE) 20 mg/mL injection 20 mg  20 mg IntraVENous Q6H PRN  
 hydroCHLOROthiazide (HYDRODIURIL) tablet 25 mg  25 mg Oral DAILY  fluconazole (DIFLUCAN) tablet 100 mg  100 mg Oral DAILY  acetaminophen (TYLENOL) tablet 650 mg  650 mg Oral Q4H PRN  
 aspirin chewable tablet 81 mg  81 mg Oral DAILY  docusate sodium (COLACE) capsule 100 mg  100 mg Oral BID  hydrALAZINE (APRESOLINE) tablet 100 mg  100 mg Oral TID  metoprolol tartrate (LOPRESSOR) tablet 50 mg  50 mg Oral BID  neomycin-polymyxin-dexamethasone (DEXACINE) 3.5 mg/g-10,000 unit/g-0.1 % ophthalmic ointment   Both Eyes QHS  PARoxetine (PAXIL) tablet 20 mg  20 mg Oral DAILY  predniSONE (DELTASONE) tablet 10 mg  10 mg Oral DAILY WITH BREAKFAST  temazepam (RESTORIL) capsule 15 mg  15 mg Oral QHS  sodium chloride (NS) flush 5-10 mL  5-10 mL IntraVENous Q8H  
 sodium chloride (NS) flush 5-10 mL  5-10 mL IntraVENous PRN  
 heparin (porcine) injection 5,000 Units  5,000 Units SubCUTAneous Q12H  
 ondansetron (ZOFRAN) injection 4 mg  4 mg IntraVENous Q6H PRN  polyethylene glycol (MIRALAX) packet 17 g  17 g Oral DAILY Objective:  
Vitals: 
Visit Vitals /50 Pulse (!) 53 Temp 97.9 °F (36.6 °C) Resp 18 Ht 5' 1.5\" (1.562 m) Wt 115 lb (52.2 kg) SpO2 94% BMI 21.38 kg/m² O2 Flow Rate (L/min): 2 l/min O2 Device: Room air Temp (24hrs), Av °F (36.7 °C), Min:97.3 °F (36.3 °C), Max:98.7 °F (37.1 °C) Last 24hr Input/Output: 
 
Intake/Output Summary (Last 24 hours) at 10/30/2018 0710 Last data filed at 10/29/2018 1755 Gross per 24 hour Intake 60 ml Output 200 ml Net -140 ml PHYSICAL EXAM: 
General:     Alert, cooperative, no distress, appears stated age. Head:    Normocephalic, without obvious abnormality, atraumatic. Eyes:    Conjunctivae/corneas clear. PERRLA Nose:   Nares normal. No drainage or sinus tenderness. Throat:     Lips, mucosa, and tongue normal.  No Thrush Neck:   Supple, symmetrical,  no adenopathy, thyroid: non tender 
   no carotid bruit and no JVD. Back:     Symmetric,  No CVA tenderness. Lungs:    Clear to auscultation bilaterally with overall decreased breath sounds. No Wheezing or Rhonchi. No rales. Heart:    Regular rate and rhythm,  no murmur, rub or gallop. Abdomen:    Soft, non-tender except very minimal RL abdomen. Not distended. Bowel sounds normal. No masses Extremities:  Extremities normal, atraumatic, No cyanosis. No edema. No clubbing Lymph nodes:  Cervical, supraclavicular normal. 
Neurologic:  Mild decreased strength, Alert and oriented X 3 and knows me today. Skin:                 No rash Lab Data Reviewed: 
 
Recent Results (from the past 24 hour(s)) URINALYSIS W/MICROSCOPIC Collection Time: 10/29/18  2:01 PM  
Result Value Ref Range Color YELLOW/STRAW Appearance CLOUDY (A) CLEAR Specific gravity 1.015 1.003 - 1.030    
 pH (UA) 5.5 5.0 - 8.0 Protein 100 (A) NEG mg/dL Glucose NEGATIVE  NEG mg/dL Ketone NEGATIVE  NEG mg/dL Bilirubin NEGATIVE  NEG Blood NEGATIVE  NEG Urobilinogen 0.2 0.2 - 1.0 EU/dL Nitrites NEGATIVE  NEG Leukocyte Esterase SMALL (A) NEG    
 WBC 20-50 0 - 4 /hpf  
 RBC 5-10 0 - 5 /hpf Epithelial cells FEW FEW /lpf Bacteria 2+ (A) NEG /hpf METABOLIC PANEL, BASIC Collection Time: 10/30/18  4:56 AM  
Result Value Ref Range Sodium 139 136 - 145 mmol/L Potassium 3.7 3.5 - 5.1 mmol/L Chloride 108 97 - 108 mmol/L  
 CO2 22 21 - 32 mmol/L Anion gap 9 5 - 15 mmol/L Glucose 87 65 - 100 mg/dL BUN 41 (H) 6 - 20 MG/DL Creatinine 1.54 (H) 0.55 - 1.02 MG/DL  
 BUN/Creatinine ratio 27 (H) 12 - 20 GFR est AA 39 (L) >60 ml/min/1.73m2 GFR est non-AA 32 (L) >60 ml/min/1.73m2 Calcium 9.4 8.5 - 10.1 MG/DL Assessment/Plan:  
 
Principal Problem: 
  CHRISTY (acute kidney injury) ([facility] (10/24/2018) Active Problems: 
  Essential hypertension (7/20/2016) COPD exacerbation ([facility] (8/14/2010) Sepsis ([facility] (3/28/2018) Renal failure (10/24/2018) Hydronephrosis (10/24/2018) Ureteral obstruction (10/25/2018) Malignant neoplasm of lower lobe of left lung ([facility] (10/26/2018) 
 
  
___________________________________________________ PLAN: 
 
1.  D/Micah Rocephin for  Infection as final culture and sensitivity neg for bacteria, but recheck UA some WBC but  Cx pending 2. 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 Follow renal function with CHRISTY on CKD (61/3.21 adm) to now 41/1.54 from yesterday 39/1.43  ( 70/1.72 baseline in April) 4. Follow Hgb and WBC, 10.4 adm to 10.2 now 5.  Follow Urine output which is good so far, IV changed to saline lock on 10/28 6.  JA treatments for COPD 7. Cardizem and metoprolol for PAF, Increased Cardizem dose with increased BP and Hctz added on 10/28. Yesterday added catapres but with decreased pulse changed to Lisinopril 8. D/Micah Rosalinda 9. On Hydralazine at 100 TID, HCTZ 25 mg QD, Lisinopril 20 QD and Metoprolol 50 BID for BP 
10. D/C w/ f/u 3 days at office 
 
___________________________________________________ Attending Physician: Ronnie Melendez MD

## 2018-10-30 NOTE — PROGRESS NOTES
[facility] to [address] [facility] 80 y.o.   female [address] 34   Room: 2134/01    hospitals 2 GENERAL SURGERY  Unit Phone# :  [phone] [facility] [address] [facility] [place] [address] [address] Dept: [phone] Loc: [id number] SITUATION Admitted:  10/24/2018         Attending Provider:  Estelle Rhodes MD    
 
Consultations:  IP CONSULT TO NEPHROLOGY 
IP CONSULT TO UROLOGY 
IP CONSULT TO ONCOLOGY 
 
PCP:  Estelle Rhodes MD   [phone] Treatment Team: Attending Provider: Estelle Rhodes MD; Consulting Provider: Kathleen Bolanos MD; Consulting Provider: Chloe Infante MD; Consulting Provider: Shanthi Ardon MD; Utilization Review: Pierre Ayala RN; Care Manager: Hermilo Hair Admitting Dx:  Renal failure CHRISTY (acute kidney injury) ([facility] [address] Hydronephrosis Principal Problem: CHRISTY (acute kidney injury) ([facility] [address] 6 Days Post-Op of  
Procedure(s): 
CYSTOSCOPY Extraction of uretral stent from bladder, placement of right uretral stent BY: Kathleen Bolanos MD             ON: 10/24/2018 Code Status: DNR Advance Directives:  
Advance Care Planning 10/25/2018 Patient's Healthcare Decision Maker is: -  
Primary Decision Maker Name -  
Primary Decision Maker Phone Number -  
Primary Decision Maker Relationship to Patient - Secondary Decision Maker Name - Secondary Decision Maker Phone Number - Secondary Decision Maker Relationship to Patient -  
Confirm Advance Directive Yes, on file Does the patient have other document types - (Send w/patient) Yes Not W Pt  
 
 
Isolation:  There are currently no Active Isolations       MDRO: No current active infections Pain Medications given:  none    Last dose:   
 
Special Equipment needed: no  Type of equipment: (Not currently on dialysis) (Not currently on dialysis) (Not currently on dialysis) BACKGROUND Allergies: Allergies Allergen Reactions  Dilaudid [Hydromorphone] Unknown (comments) Does not remember  Hydrocodone Nausea and Vomiting  Morphine Rash Past Medical History:  
Diagnosis Date  Hypertension  Ill-defined condition Ex Lap with Martinez Maria Ines patch of a perforated pyloric channel ulcer 7/19/16  Other ill-defined conditions(799.89) lipids  Other ill-defined conditions(799.89)   
 blood transfusion history Past Surgical History:  
Procedure Laterality Date  BYPASS GRAFT OTHR,FEM-FEM    
 BYPASS GRAFT OTHR,FEM-POP    
 right  HX HEENT    
 bilateral cataracts  HX ORTHOPAEDIC    
 right hip fracture/pinning  HX UROLOGICAL    
 right stent/kidney Medications Prior to Admission Medication Sig  
 docusate sodium (COLACE) 100 mg capsule Take 100 mg by mouth two (2) times a day.  gabapentin (NEURONTIN) 400 mg capsule Take 400 mg by mouth four (4) times daily.  neomycin-polymyxin-dexamethasone (DEXACINE) 3.5 mg/g-10,000 unit/g-0.1 % ophthalmic ointment Administer  to both eyes nightly.  PARoxetine (PAXIL) 20 mg tablet Take 20 mg by mouth daily.  temazepam (RESTORIL) 15 mg capsule Take 15 mg by mouth nightly.  cholecalciferol (VITAMIN D3) 50,000 unit capsule Take 50,000 Units by mouth every seven (7) days.  dilTIAZem CD (CARDIZEM CD) 180 mg ER capsule Take 1 Cap by mouth daily.  L. acidoph & paracasei- S therm- Bifido (HANG-Q/RISAQUAD) 8 billion cell cap cap Take 1 Cap by mouth daily.  metoprolol tartrate (LOPRESSOR) 50 mg tablet Take 1 Tab by mouth two (2) times a day.  predniSONE (DELTASONE) 10 mg tablet Take 1 Tab by mouth daily (with breakfast).  acetaminophen (TYLENOL) 650 mg CR tablet Take 650 mg by mouth every six (6) hours as needed for Pain.   
 hydrALAZINE (APRESOLINE) 100 mg tablet Take 1 Tab by mouth three (3) times daily.  aspirin 81 mg tablet Take 81 mg by mouth daily. Stopped for surgery 8-4-10  hydrochlorothiazide (HYDRODIURIL) 25 mg tablet Take 25 mg by mouth daily.  Menthol-Zinc Oxide (RISAMINE) 0.44-20.6 % oint Apply  to affected area daily as needed (rash).  loperamide (IMODIUM) 2 mg capsule Take 4 mg by mouth every eight (8) hours as needed for Diarrhea.  polyethylene glycol (MIRALAX) 17 gram packet Take 17 g by mouth daily as needed (constipation). Hard scripts included in transfer packet no Vaccinations: There is no immunization history on file for this patient. Readmission Risks Score: 27 The Charlson CoMorbitiy Index tool is an evidenced based tool that has more automatic generated information. The tool looks at many different items such as the age of the patient, how many times they were admitted in the last calendar year, current length of stay in the hospital and their diagnosis. All of these items are pulled automatically from information documented in the chart from various places and will generate a score that predicts whether a patient is at low (less than 13), medium (13-20) or high (21 or greater) risk of being readmitted. ASSESSMENT Temp: 97.8 °F (36.6 °C) (10/30/18 0819) Pulse (Heart Rate): (!) 57 (10/30/18 1036) Resp Rate: 18 (10/30/18 0819)           BP: 145/40 (10/30/18 1037) O2 Sat (%): 90 % (10/30/18 0819) Weight: 52.2 kg (115 lb)    Height: 5' 1.5\" (156.2 cm) (10/24/18 1010) If above not within 1 hour of discharge: 
 
BP:_____  P:____  R:____ T:_____ O2 Sat: ___%  O2: ______ Active Orders Diet DIET RENAL Regular Orientation: oriented to time, place, person and situation Active Behaviors: None Active Lines/Drains:  (Peg Tube / Tellez / CL or S/L?): no 
 
Urinary Status: Voiding     Last BM: Last Bowel Movement Date: (couple days ago ) Skin Integrity: Scars (comment), Intact Mobility: Slightly limited Weight Bearing Status: WBAT (Weight Bearing as Tolerated) Gait Training Assistive Device: Walker, rolling, Gait belt Ambulation - Level of Assistance: Contact guard assistance, Assist x2 Distance (ft): 2 Feet (ft) Lab Results Component Value Date/Time Glucose 87 10/30/2018 04:56 AM  
 INR 1.1 03/27/2018 06:12 PM  
 INR 1.0 09/10/2009 03:40 AM  
 HGB 10.8 (L) 10/29/2018 03:22 AM  
 HGB 10.0 (L) 10/27/2018 05:07 AM  
  
  RECOMMENDATION See After Visit Summary (AVS) for: · Discharge instructions · [facility] · Special equipment needed (entered pre-discharge by Care Management) · Medication Reconciliation · Follow up Appointment(s) Report given/sent by:  Deonte Mckeon Verbal report given to: Veronica Wheeler Estimated discharge time:  10/30/2018

## 2018-10-30 NOTE — PROGRESS NOTES
Reason for Admission:   Renal failure RRAT Score:  27 Resources/supports as identified by patient/family:   Pt currently resides at [facility] Top Challenges facing patient (as identified by patient/family and CM): Finances/Medication cost?  Pt has insurance to cover the cost of medication, hospital stay, and assisted living cost      
           
Transportation? Pt son will transport home on today, and to and from medical appointments Support system or lack thereof? Pt is a resident at assisted living facility. Pt son provides support to pt's needs Living arrangements? Pt resides in assisted living facility. Pt has been living at [facility] for 2yrs. Self-care/ADLs/Cognition? Pt is known to need assistance with daily needs:ADLs, and does not drive Current Advanced Directive/Advance Care Plan:  DNR Plan for utilizing home health:    Pt will return back to assisted living facility. Pt disclosed that she has been receiving therapy from [facility] [place]. Likelihood of readmission: HIGH Transition of Care Plan:               
CM completed d/c assessment with pt. Pt was alert and oriented, upon CM room visit. Pt reported that she has been a resident at [facility] for [employer]. Pt reported that her son will transport home on today. Pt nurse will review d/c plans and needs. CM has completed the needs of the pt at this time. Care Management Interventions PCP Verified by CM: Yes Mode of Transport at Discharge: Other (see comment)(pt's son to transport ) Transition of Care Consult (CM Consult): Assisted Living, Discharge Planning(Resident at [facility] [address] ) Discharge Durable Medical Equipment: No 
Physical Therapy Consult: Yes Occupational Therapy Consult: No 
 Speech Therapy Consult: No 
Current Support Network: Assisted Living, Lives Alone([place] ) Confirm Follow Up Transport: Family Plan discussed with Pt/Family/Caregiver: Yes Discharge Location Discharge Placement: Assisted Living([place]) GLEN Hansen CM 
[phone]

## 2018-10-30 NOTE — PROGRESS NOTES
PCP MARC appt scheduled with Dr. Estefania Juares on 11/5/2018 at 2:00pm. Appt added to S. MICHELE Abrams CM Specialist 
PCP MARC appt scheduled with [facility] to see PT in 24-48 hours after discharge at 9:00am. [facility]

## 2018-10-30 NOTE — PROGRESS NOTES
I have discussed and reviewed discharge instructions with the patient. The patient verbalized understanding. Pt discharged with belongings and instructions. One peripheral IV has been removed. Pt has one follow up appointment scheduled and is aware. No additional questions at this time.

## 2018-10-30 NOTE — PROGRESS NOTES
Name: Cecilia Alvares MRN: [id number] : 1935 Assessment: 
 
CHRISTY on CKD-3:  Secondary to obstruction/infection; improving s/p new stent. 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 Now on Lisinopril 20 mg. Watch lytes/creatinine and BP response to the addition of Lisinopril Will arrange a f/u apt to make sure creatinine and BP are doing ok. Subjective Discharged prior to my rounds today.

## 2018-10-31 ENCOUNTER — PATIENT OUTREACH (OUTPATIENT)
Dept: INTERNAL MEDICINE CLINIC | Age: 83
End: 2018-10-31

## 2018-10-31 LAB
BACTERIA SPEC CULT: ABNORMAL
BACTERIA SPEC CULT: ABNORMAL
CC UR VC: ABNORMAL
SERVICE CMNT-IMP: ABNORMAL

## 2018-10-31 NOTE — PROGRESS NOTES
Hospital Discharge Follow-Up Date/Time:  10/31/2018 11:59 AM 
 
Patient was admitted to [facility] on 10/24/18 and discharged on 10/30/18 for acute kidney injury. The physician discharge summary was available at the time of outreach. Patient was contacted within two business days of discharge. Top Challenges reviewed with the provider Finishing course of diflucan for candida albicans in urine ? Consider repeat U/A with reflex culture on return office visit Method of communication with provider :chart routing Inpatient RRAT score: 27 Was this a readmission? no  
Patient stated reason for the readmission: n/a Nurse Navigator (NN) contacted the patient by telephone to perform post hospital discharge assessment. Verified name and  with patient as identifiers. Provided introduction to self, and explanation of the Nurse Navigator role. Reviewed discharge instructions and red flags with patient who verbalized understanding. Patient given an opportunity to ask questions and does not have any further questions or concerns at this time. The patient agrees to contact the PCP office for questions related to their healthcare. NN provided contact information for future reference. Disease Specific:   COPD Summary of patient's top problems: 
1. S/P CHRITSY due to ureteral stent displacement. Stent replaced during hospital stay and renal function improved. 2. Finishing 7 day course of Diflucan for candida albicans found in urine during hospital stay Home Health orders at discharge: PT [address]: [facility] ( patient had used them prior to hospital stay Date of initial visit: not yet scheduled Durable Medical Equipment ordered/company: n/a Durable Medical Equipment received: n/a Barriers to care? none Advance Care Planning:  
Does patient have an Advance Directive:  reviewed and current Medication(s):  
 New Medications at Discharge: fluconazole, lisinopril Changed Medications at Discharge: diltiazem Discontinued Medications at Discharge: dexacine ophthalmic ointment, risamine ointment Medication reconciliation was performed with caregiver, who verbalizes understanding of administration of home medications. There were no barriers to obtaining medications identified at this time. Referral to Pharm D needed: no  
 
Current Outpatient Medications Medication Sig  
 dilTIAZem CD (CARDIZEM CD) 300 mg ER capsule Take 1 Cap by mouth daily.  fluconazole (DIFLUCAN) 100 mg tablet Take 1 Tab by mouth daily for 3 days. FDA advises cautious prescribing of oral fluconazole in pregnancy.  lisinopril (PRINIVIL, ZESTRIL) 20 mg tablet Take 1 Tab by mouth daily.  docusate sodium (COLACE) 100 mg capsule Take 100 mg by mouth two (2) times a day.  gabapentin (NEURONTIN) 400 mg capsule Take 400 mg by mouth four (4) times daily.  PARoxetine (PAXIL) 20 mg tablet Take 20 mg by mouth daily.  temazepam (RESTORIL) 15 mg capsule Take 15 mg by mouth nightly.  cholecalciferol (VITAMIN D3) 50,000 unit capsule Take 50,000 Units by mouth every seven (7) days.  polyethylene glycol (MIRALAX) 17 gram packet Take 17 g by mouth daily as needed (constipation).  L. acidoph & paracasei- S therm- Bifido (HANG-Q/RISAQUAD) 8 billion cell cap cap Take 1 Cap by mouth daily.  metoprolol tartrate (LOPRESSOR) 50 mg tablet Take 1 Tab by mouth two (2) times a day.  predniSONE (DELTASONE) 10 mg tablet Take 1 Tab by mouth daily (with breakfast).  acetaminophen (TYLENOL) 650 mg CR tablet Take 650 mg by mouth every six (6) hours as needed for Pain.  hydrALAZINE (APRESOLINE) 100 mg tablet Take 1 Tab by mouth three (3) times daily.  aspirin 81 mg tablet Take 81 mg by mouth daily. Stopped for surgery 8-4-10  hydrochlorothiazide (HYDRODIURIL) 25 mg tablet Take 25 mg by mouth daily.  loperamide (IMODIUM) 2 mg capsule Take 4 mg by mouth every eight (8) hours as needed for Diarrhea. No current facility-administered medications for this visit. There are no discontinued medications. [facility] follow up appointment(s):  
Future Appointments Date Time Provider Miroslava Espinoza 11/5/2018  2:00 PM Clary Markham MD [address] [place] [address],[facility], [id number] Non-[facility] follow up appointment(s): n/a [facility]:  n/a  
 
 
Goals  Understands red flags post discharge. 10/31/18-NN spoke with patient who was discharged from [facility] 10/30/18 for CHRISTY secondary to ureteral stent displacement and hydronephrosis. She resides in Assisted living at the [facility] in [place]. Discussed signs and symptoms for patient to be alert for that would indicate infection, such as fever, pain, nausea and vomiting. She will report any issues to nurses at the facility should they occur. Medications are given to her by nurses there. Medication reconciliation done with nurse, and they match up to AVS.  She will be finishing her fluconazole for candida albicans found in urine during hospital stay. She has f/u with PCP on 11/5/18.   NN will check with patient in 2 weeks to see how she is feeling/ vs

## 2018-10-31 NOTE — DISCHARGE SUMMARY
[facility] [address]    Hugo Jon.  MR#: [id number]  : 1935  ACCOUNT #: [de-identified]   ADMIT DATE: 10/24/2018  DISCHARGE DATE: 10/30/2018    FINAL DIAGNOSES:  1. Acute renal failure secondary to ureteral stent displacement and hydronephrosis. 2.  Hypertension. 3.  Chronic obstructive pulmonary disease. 4.   sepsis due to Candida albicans. 5.  Chronic kidney disease. 6.  Hydronephrosis. 7.  Renal obstruction. 8.  Past history of left lower lobe lung cancer treated with radiation therapy. CONSULTATIONS:  Urology. PROCEDURES:  Replacement of ureteral stent. COMPLICATIONS:  None. HOSPITAL COURSE:  For details of admission history and physical, please see admit note. Briefly, the patient is an 80-year-old white female who presented to the hospital with increasing abdominal discomfort and was noted to have marked renal abnormality on admission and was found to have a displaced ureteral stent. She was taken to the operating room on the first day and ureteral stent was replaced. Post-replacement of stent, her renal function continued to improve. Her urine cultures were sent. Bacterial studies were negative and did grow Candida albicans. She was initially placed on Rocephin during the first 3 days of hospitalization. Once the Candida was growing out of the urine, she was switched to Diflucan 100 mg daily, and at this point has received 4 days' worth of treatment for a 7-day course. Her Tellez catheter was removed 2 days ago and she has been passing her urine well and her renal function has remained stable with her BUN today of 41 and a creatinine of 1.54, which is better than her baseline creatinine. It is felt at this point that her maximal hospital benefit has been achieved and she can be discharged back to the adult living facility at [facility] where she is.   She did have a problem with blood pressure during hospitalization and blood pressure medications were adjusted. DISCHARGE MEDICATIONS:  Consist of a new medicine of lisinopril 20 mg daily, new medicine Diflucan 100 mg daily for an additional 3 days. Change of medication dose, Cardizem-CD increased to 300 mg daily. Other discharge medications are the same as per admission, which consist of Tylenol 650 mg every 4 hours as needed, aspirin 81 mg daily, vitamin D3 50,000 units every 7 days, Colace 100 mg b.i.d., Neurontin 400 mg 4 times daily, hydralazine 100 mg 3 times daily, hydrochlorothiazide 25 mg daily, Priscila-Q 1 tablet daily, Imodium 4 mg at bedtime as needed, Lopressor 50 mg b.i.d., Paxil 20 mg daily, MiraLax 1 packet daily as needed, prednisone 10 mg daily, Restoril 15 mg at bedtime. She will have followup by me in the office and have subsequent followup at the adult care facility where she is.     DISPOSITION:        MD SEAN Vidales/REID  D: 10/30/2018 07:23     T: 10/31/2018 06:00  JOB #: [id number]  CC: [id number] [employer]

## 2018-11-05 PROBLEM — N18.30 STAGE 3 CHRONIC KIDNEY DISEASE (HCC): Status: ACTIVE | Noted: 2018-11-05

## 2018-11-06 ENCOUNTER — OFFICE VISIT (OUTPATIENT)
Dept: INTERNAL MEDICINE CLINIC | Age: 83
End: 2018-11-06

## 2018-11-06 VITALS
HEART RATE: 44 BPM | TEMPERATURE: 98 F | WEIGHT: 122 LBS | HEIGHT: 62 IN | RESPIRATION RATE: 14 BRPM | BODY MASS INDEX: 22.45 KG/M2 | SYSTOLIC BLOOD PRESSURE: 186 MMHG | OXYGEN SATURATION: 91 % | DIASTOLIC BLOOD PRESSURE: 65 MMHG

## 2018-11-06 DIAGNOSIS — N18.30 STAGE 3 CHRONIC KIDNEY DISEASE (HCC): ICD-10-CM

## 2018-11-06 DIAGNOSIS — E44.0 MODERATE PROTEIN-CALORIE MALNUTRITION (HCC): ICD-10-CM

## 2018-11-06 DIAGNOSIS — N13.30 HYDRONEPHROSIS, UNSPECIFIED HYDRONEPHROSIS TYPE: ICD-10-CM

## 2018-11-06 DIAGNOSIS — N17.9 ACUTE RENAL FAILURE, UNSPECIFIED ACUTE RENAL FAILURE TYPE (HCC): Primary | ICD-10-CM

## 2018-11-06 DIAGNOSIS — I10 ESSENTIAL HYPERTENSION: ICD-10-CM

## 2018-11-06 DIAGNOSIS — N13.5 URETERAL OBSTRUCTION: ICD-10-CM

## 2018-11-06 DIAGNOSIS — N30.00 ACUTE CYSTITIS WITHOUT HEMATURIA: ICD-10-CM

## 2018-11-06 DIAGNOSIS — C34.32 MALIGNANT NEOPLASM OF LOWER LOBE OF LEFT LUNG (HCC): ICD-10-CM

## 2018-11-06 DIAGNOSIS — J44.1 COPD EXACERBATION (HCC): Chronic | ICD-10-CM

## 2018-11-06 LAB
BACTERIA UA POCT, BACTPOCT: ABNORMAL
BILIRUB UR QL STRIP: NEGATIVE
CASTS UA POCT: ABNORMAL
CLUE CELLS, CLUEPOCT: NEGATIVE
CRYSTALS UA POCT, CRYSPOCT: NEGATIVE
EPITHELIAL CELLS POCT: ABNORMAL
GLUCOSE UR-MCNC: NEGATIVE MG/DL
GRAN# POC: 11.2 K/UL (ref 2–7.8)
GRAN% POC: 87.7 % (ref 37–92)
HCT VFR BLD CALC: 33.9 % (ref 37–51)
HGB BLD-MCNC: 11.3 G/DL (ref 12–18)
KETONES P FAST UR STRIP-MCNC: NEGATIVE MG/DL
LY# POC: 0.8 K/UL (ref 0.6–4.1)
LY% POC: 7.1 % (ref 10–58.5)
MCH RBC QN: 28.5 PG (ref 26–32)
MCHC RBC-ENTMCNC: 33.3 G/DL (ref 30–36)
MCV RBC: 86 FL (ref 80–97)
MID #, POC: 0.6 K/UL (ref 0–1.8)
MID% POC: 5.2 % (ref 0.1–24)
MUCUS UA POCT, MUCPOCT: ABNORMAL
PH UR STRIP: 5 [PH] (ref 5–7)
PLATELET # BLD: 378 K/UL (ref 140–440)
PROT UR QL STRIP: ABNORMAL
RBC # BLD: 3.96 M/UL (ref 4.2–6.3)
RBC UA POCT, RBCPOCT: ABNORMAL
SP GR UR STRIP: 1.02 (ref 1.01–1.02)
TRICH UA POCT, TRICHPOC: NEGATIVE
UA UROBILINOGEN AMB POC: NORMAL (ref 0.2–1)
URINALYSIS CLARITY POC: ABNORMAL
URINALYSIS COLOR POC: YELLOW
URINE BLOOD POC: ABNORMAL
URINE CULT COMMENT, POCT: ABNORMAL
URINE LEUKOCYTES POC: ABNORMAL
URINE NITRITES POC: NEGATIVE
WBC # BLD: 12.6 K/UL (ref 4.1–10.9)
WBC UA POCT, WBCPOCT: ABNORMAL
YEAST UA POCT, YEASTPOC: NEGATIVE

## 2018-11-06 RX ORDER — LISINOPRIL 40 MG/1
40 TABLET ORAL DAILY
Qty: 30 TAB | Status: SHIPPED | OUTPATIENT
Start: 2018-11-06 | End: 2019-10-29

## 2018-11-06 NOTE — PROGRESS NOTES
Chief Complaint Patient presents with  
[facility] Follow Up  
  d/c 10/30/18 from [address]. Have you been to the ER, urgent care clinic since your last visit? Hospitalized since your last visit? Yes [address]. Have you seen or consulted any other health care providers outside of the [facility] since your last visit? Include any pap smears or colon screening.   no

## 2018-11-06 NOTE — PROGRESS NOTES
Hospital Follow Up (d/c 10/30/18 from [facility]) HPI: 
Kiley Urbina is a 80y.o. year old female who is here for a follow up visit for hospitalization transition of care. She was last seen by me on 10/30/2018 at time of hospital discharge. Discharged on: 10/30/18 Diagnosis in hospital: 1. Acute renal failure secondary to ureteral stent displacement with hydronephrosis 2. Right hydronephrosis 3. Chronic kidney disease 4. Hypertension 5. Fungal urinary tract infection 6. COPD 7. Renal obstruction 8. Prior history of left lower lobe lung cancer treated with radiation therapy Complications in hospital: No complications except transient bradycardia on Catapres added for blood pressure control Medication changes: Started lisinopril 20 mg daily, Diflucan 100 mg daily for 3 additional changes. Cardizem CD dose increased to 300 mg daily Discharge Summary reviewed. Today 11/6/2018 She reports the following: Since hospital discharge she has noted some burning which is passing her urine but she is noted no back pain or abdominal pain. She is noted no fevers or chills. She denies any urinary frequency or blood in the urine. She denies any other  complaints. There are no other GI complaints. She feels like her breathing is baseline and she denies any increase of her chronic dyspnea or other cardiorespiratory complaints. Her appetite remains good. She denies any change of her chronic arthritic complaints. She has no headaches, dizziness or neurological planes. She is taking her medications and then no other specific complaints. Visit Vitals /65 (BP 1 Location: Right arm, BP Patient Position: Sitting) Pulse (!) 44 Temp 98 °F (36.7 °C) (Oral) Resp 14 Ht 5' 1.5\" (1.562 m) Wt 122 lb (55.3 kg) SpO2 91% BMI 22.68 kg/m² Historical Data Past Medical History:  
Diagnosis Date  Hypertension  Ill-defined condition Ex Lap with Jayashree Parsonsidus patch of a perforated pyloric channel ulcer 7/19/16  Other ill-defined conditions(799.89) lipids  Other ill-defined conditions(799.89)   
 blood transfusion history Past Surgical History:  
Procedure Laterality Date  BYPASS GRAFT OTHR,FEM-FEM    
 BYPASS GRAFT OTHR,FEM-POP    
 right  HX HEENT    
 bilateral cataracts  HX ORTHOPAEDIC    
 right hip fracture/pinning  HX UROLOGICAL    
 right stent/kidney Outpatient Encounter Medications as of 11/6/2018 Medication Sig Dispense Refill  lisinopril (PRINIVIL, ZESTRIL) 40 mg tablet Take 1 Tab by mouth daily. 30 Tab prn  dilTIAZem CD (CARDIZEM CD) 300 mg ER capsule Take 1 Cap by mouth daily. 30 Cap prn  docusate sodium (COLACE) 100 mg capsule Take 100 mg by mouth two (2) times a day.  gabapentin (NEURONTIN) 400 mg capsule Take 400 mg by mouth four (4) times daily.  PARoxetine (PAXIL) 20 mg tablet Take 20 mg by mouth daily.  temazepam (RESTORIL) 15 mg capsule Take 15 mg by mouth nightly.  cholecalciferol (VITAMIN D3) 50,000 unit capsule Take 50,000 Units by mouth every seven (7) days.  loperamide (IMODIUM) 2 mg capsule Take 4 mg by mouth every eight (8) hours as needed for Diarrhea.  polyethylene glycol (MIRALAX) 17 gram packet Take 17 g by mouth daily as needed (constipation).  L. acidoph & paracasei- S therm- Bifido (HANG-Q/RISAQUAD) 8 billion cell cap cap Take 1 Cap by mouth daily. 30 Cap 0  
 metoprolol tartrate (LOPRESSOR) 50 mg tablet Take 1 Tab by mouth two (2) times a day. 60 Tab PRN  predniSONE (DELTASONE) 10 mg tablet Take 1 Tab by mouth daily (with breakfast). 30 Tab 0  
 acetaminophen (TYLENOL) 650 mg CR tablet Take 650 mg by mouth every six (6) hours as needed for Pain.  hydrALAZINE (APRESOLINE) 100 mg tablet Take 1 Tab by mouth three (3) times daily. 90 Tab prn  aspirin 81 mg tablet Take 81 mg by mouth daily. Stopped for surgery 8-4-10  hydrochlorothiazide (HYDRODIURIL) 25 mg tablet Take 25 mg by mouth daily.  [DISCONTINUED] lisinopril (PRINIVIL, ZESTRIL) 20 mg tablet Take 1 Tab by mouth daily. 30 Tab prn No facility-administered encounter medications on file as of 11/6/2018. Allergies Allergen Reactions  Dilaudid [Hydromorphone] Unknown (comments) Does not remember  Hydrocodone Nausea and Vomiting  Morphine Rash Social History Socioeconomic History  Marital status:  Spouse name: Not on file  Number of children: Not on file  Years of education: Not on file  Highest education level: Not on file Social Needs  Financial resource strain: Not on file  Food insecurity - worry: Not on file  Food insecurity - inability: Not on file  Transportation needs - medical: Not on file  Transportation needs - non-medical: Not on file Occupational History  Not on file Tobacco Use  Smoking status: Former Smoker Packs/day: 0.25  Smokeless tobacco: Never Used  Tobacco comment: stopped 2009 Substance and Sexual Activity  Alcohol use: No  
 Drug use: No  
 Sexual activity: Not on file Other Topics Concern  Not on file Social History Narrative  Not on file REVIEW OF SYSTEMS: 
General: negative for - chills or fever ENT: negative for - headaches, nasal congestion or tinnitus Eyes: no blurred or visual changes Neck: No stiffness or swollen nodes Respiratory: negative for - cough, hemoptysis, shortness of breath or wheezing Cardiovascular : negative for - chest pain, edema, palpitations or shortness of breath Gastrointestinal: negative for - abdominal pain, blood in stools, heartburn or nausea/vomiting Genito-Urinary: no trouble voiding, or hematuria. Positive for some dysuria Musculoskeletal: negative for - gait disturbance, joint pain, joint stiffness or joint swelling Neurological: no TIA or stroke symptoms Hematologic: no bruises, no bleeding Lymphatic: no swollen glands Integument: no lumps, mole changes, nail changes or rash Endocrine:no malaise/lethargy poly uria or polydipsia or unexpected weight changes Visit Vitals /65 (BP 1 Location: Right arm, BP Patient Position: Sitting) Pulse (!) 44 Temp 98 °F (36.7 °C) (Oral) Resp 14 Ht 5' 1.5\" (1.562 m) Wt 122 lb (55.3 kg) SpO2 91% BMI 22.68 kg/m² CONSTITUTIONAL: well , well nourished, appears age appropriate HEAD: normocephalic, atraumatic EYES: sclera anicteric, PERRL, EOMI 
ENMT:moist mucous membranes, pharynx clear Nares: w/o erythema or edema NECK: supple. Thyroid normal, No JVD or bruits RESPIRATORY: Chest: clear to ascultation and percussion with overall decreased breath sounds CARDIOVASCULAR: Heart: regular rate and rhythm no murmurs, rubs or gallops, PMI not displaced, no thrill GASTROINTESTINAL: Abdomen: non distended, soft, non-tender, bowel sounds normal 
HEMATOLOGIC: no petechiae or purpura LYMPHATIC: no lymphadenopathy MUSCULOSKELETAL: Extremities: no edema or active synovitis, pulse 1+ BACK; no point or CVAT INTEGUMENT: No unusual rashes or suspicious skin lesions noted. Nails appear normal. 
NEUROLOGIC: non-focal exam  
MENTAL STATUS: alert and oriented, appropriate affect PSYCHIATRIC: normal affect ASSESSMENT:  
1. Acute renal failure, unspecified acute renal failure type ([address] 2. Ureteral obstruction 3. Essential hypertension 4. Moderate protein-calorie malnutrition ([address] 5. COPD exacerbation ([address] 6. Malignant neoplasm of lower lobe of left lung ([address] 7. Hydronephrosis, unspecified hydronephrosis type 8. Stage 3 chronic kidney disease ([address] 9. Acute cystitis without hematuria Impression 1. Recent acute renal failure secondary to ureteral obstruction secondary to ureteral stent dislodgment and hydronephrosis resolved during hospitalization 2.  Underlying CKD repeat status is pending 3. Ureteral obstruction relieved by replacement of the stent 4. Hypertension accelerated and still not controlled today so at this point increase lisinopril from 20 mg to 40 mg daily 5. Malnutrition that is overall improved 6. COPD that is stable 7. Recent hospitalization with Candida urinary tract infection we will recheck urine with her symptoms today and see if there is a bacterial infection 8. Status post treatment of lung cancer with radiation therapy no evidence recurrence At this point I will notify regarding results of the BMP and CBC as well as a urine and culture and have increased her lisinopril from 20 mg daily to 40 mg daily. I have set her up for follow-up in a month. All of this is discussed with her son present with her today. She is a very frail patient with multiple medical problems at high risk of repeat hospitalization. I have discussed this with her and her son and I told him to notify me immediately if something seems to change. A total of 40 minutes spent on this post hospital visit today. High complexity decision making. PLAN: 
. Orders Placed This Encounter  CULTURE, URINE  
 METABOLIC PANEL, BASIC  
 AMB POC COMPLETE CBC,AUTOMATED ENTER  AMB POC URINALYSIS DIP STICK AUTO W/ MICRO  lisinopril (PRINIVIL, ZESTRIL) 40 mg tablet ATTENTION:  
This medical record was transcribed using an electronic medical records system. Although proofread, it may and can contain electronic and spelling errors. Other human spelling and other errors may be present. Corrections may be executed at a later time. Please feel free to contact us for any clarifications as needed. Follow-up Disposition: 
Return in about 4 weeks (around 12/4/2018). Misti Mtz MD 
 
Orders Placed This Encounter  CULTURE, URINE  
 METABOLIC PANEL, BASIC  
 AMB POC COMPLETE CBC,AUTOMATED ENTER  
  AMB POC URINALYSIS DIP STICK AUTO W/ MICRO  lisinopril (PRINIVIL, ZESTRIL) 40 mg tablet Sig: Take 1 Tab by mouth daily. Dispense:  30 Tab Refill:  prn No results found for any visits on 11/06/18. I have reviewed the patient's medical history in detail and updated the computerized patient record. We had a prolonged discussion about these complex clinical issues and went over the various important aspects to consider. All questions were answered. Advised her to call back or return to office if symptoms do not improve, change in nature, or persist. 
 
She was given an after visit summary or informed of [employer] [facility] which includes patient instructions, diagnoses, current medications, & vitals. She expressed understanding with the diagnosis and plan.

## 2018-11-06 NOTE — PATIENT INSTRUCTIONS
Urinary Tract Infection in Women: Care Instructions Your Care Instructions A urinary tract infection, or UTI, is a general term for an infection anywhere between the kidneys and the urethra (where urine comes out). Most UTIs are bladder infections. They often cause pain or burning when you urinate. UTIs are caused by bacteria and can be cured with antibiotics. Be sure to complete your treatment so that the infection goes away. Follow-up care is a key part of your treatment and safety. Be sure to make and go to all appointments, and call your doctor if you are having problems. It's also a good idea to know your test results and keep a list of the medicines you take. How can you care for yourself at home? · Take your antibiotics as directed. Do not stop taking them just because you feel better. You need to take the full course of antibiotics. · Drink extra water and other fluids for the next day or two. This may help wash out the bacteria that are causing the infection. (If you have kidney, heart, or liver disease and have to limit fluids, talk with your doctor before you increase your fluid intake.) · Avoid drinks that are carbonated or have caffeine. They can irritate the bladder. · Urinate often. Try to empty your bladder each time. · To relieve pain, take a hot bath or lay a heating pad set on low over your lower belly or genital area. Never go to sleep with a heating pad in place. To prevent UTIs · Drink plenty of water each day. This helps you urinate often, which clears bacteria from your system. (If you have kidney, heart, or liver disease and have to limit fluids, talk with your doctor before you increase your fluid intake.) · Urinate when you need to. · Urinate right after you have sex. · Change sanitary pads often. · Avoid douches, bubble baths, feminine hygiene sprays, and other feminine hygiene products that have deodorants. · After going to the bathroom, wipe from front to back. When should you call for help? Call your doctor now or seek immediate medical care if: 
  · Symptoms such as fever, chills, nausea, or vomiting get worse or appear for the first time.  
  · You have new pain in your back just below your rib cage. This is called flank pain.  
  · There is new blood or pus in your urine.  
  · You have any problems with your antibiotic medicine.  
 Watch closely for changes in your health, and be sure to contact your doctor if: 
  · You are not getting better after taking an antibiotic for 2 days.  
  · Your symptoms go away but then come back. Where can you learn more? Go to http://romeo-jessie.info/. Enter U952 in the search box to learn more about \"Urinary Tract Infection in Women: Care Instructions. \" Current as of: March 21, 2018 Content Version: 11.8 © 7478-1625 [employer]. Care instructions adapted under license by [employer] (which disclaims liability or warranty for this information). If you have questions about a medical condition or this instruction, always ask your healthcare professional. [address] any warranty or liability for your use of this information.

## 2018-11-07 LAB
BUN SERPL-MCNC: 43 MG/DL (ref 8–27)
BUN/CREAT SERPL: 27 (ref 12–28)
CALCIUM SERPL-MCNC: 10.4 MG/DL (ref 8.7–10.3)
CHLORIDE SERPL-SCNC: 102 MMOL/L (ref 96–106)
CO2 SERPL-SCNC: 23 MMOL/L (ref 20–29)
CREAT SERPL-MCNC: 1.57 MG/DL (ref 0.57–1)
GLUCOSE SERPL-MCNC: 115 MG/DL (ref 65–99)
POTASSIUM SERPL-SCNC: 5 MMOL/L (ref 3.5–5.2)
SODIUM SERPL-SCNC: 141 MMOL/L (ref 134–144)

## 2018-11-08 ENCOUNTER — TELEPHONE (OUTPATIENT)
Dept: INTERNAL MEDICINE CLINIC | Age: 83
End: 2018-11-08

## 2018-11-08 LAB — BACTERIA UR CULT: NORMAL

## 2018-11-08 NOTE — TELEPHONE ENCOUNTER
Call received from Nurse Practioner Yobani Becerra regarding patient's recent visit. Reviewed her office note and informed labs were pending. Asked us to fax a copy to [phone] when available.

## 2018-11-12 ENCOUNTER — APPOINTMENT (OUTPATIENT)
Dept: GENERAL RADIOLOGY | Age: 83
DRG: 516 | End: 2018-11-12
Attending: EMERGENCY MEDICINE
Payer: MEDICARE

## 2018-11-12 ENCOUNTER — APPOINTMENT (OUTPATIENT)
Dept: CT IMAGING | Age: 83
DRG: 516 | End: 2018-11-12
Attending: EMERGENCY MEDICINE
Payer: MEDICARE

## 2018-11-12 ENCOUNTER — APPOINTMENT (OUTPATIENT)
Dept: GENERAL RADIOLOGY | Age: 83
DRG: 516 | End: 2018-11-12
Attending: PHYSICIAN ASSISTANT
Payer: MEDICARE

## 2018-11-12 ENCOUNTER — HOSPITAL ENCOUNTER (INPATIENT)
Age: 83
LOS: 8 days | Discharge: HOME HEALTH | DRG: 516 | End: 2018-11-20
Attending: EMERGENCY MEDICINE | Admitting: INTERNAL MEDICINE
Payer: MEDICARE

## 2018-11-12 DIAGNOSIS — J44.9 CHRONIC OBSTRUCTIVE PULMONARY DISEASE, UNSPECIFIED COPD TYPE (HCC): Chronic | ICD-10-CM

## 2018-11-12 DIAGNOSIS — D72.829 LEUKOCYTOSIS, UNSPECIFIED TYPE: ICD-10-CM

## 2018-11-12 DIAGNOSIS — N39.0 COMPLICATED UTI (URINARY TRACT INFECTION): Primary | ICD-10-CM

## 2018-11-12 DIAGNOSIS — N18.30 STAGE 3 CHRONIC KIDNEY DISEASE (HCC): ICD-10-CM

## 2018-11-12 DIAGNOSIS — I10 ESSENTIAL HYPERTENSION: ICD-10-CM

## 2018-11-12 DIAGNOSIS — S22.000A CLOSED COMPRESSION FRACTURE OF THORACIC VERTEBRA, INITIAL ENCOUNTER (HCC): ICD-10-CM

## 2018-11-12 DIAGNOSIS — E44.0 MODERATE PROTEIN-CALORIE MALNUTRITION (HCC): ICD-10-CM

## 2018-11-12 DIAGNOSIS — I48.0 PAF (PAROXYSMAL ATRIAL FIBRILLATION) (HCC): ICD-10-CM

## 2018-11-12 LAB
ALBUMIN SERPL-MCNC: 2.8 G/DL (ref 3.5–5)
ALBUMIN/GLOB SERPL: 0.7 {RATIO} (ref 1.1–2.2)
ALP SERPL-CCNC: 70 U/L (ref 45–117)
ALT SERPL-CCNC: 23 U/L (ref 12–78)
ANION GAP SERPL CALC-SCNC: 8 MMOL/L (ref 5–15)
APPEARANCE UR: CLEAR
AST SERPL-CCNC: 21 U/L (ref 15–37)
BACTERIA URNS QL MICRO: ABNORMAL /HPF
BASOPHILS # BLD: 0 K/UL (ref 0–0.1)
BASOPHILS NFR BLD: 0 % (ref 0–1)
BILIRUB SERPL-MCNC: 0.6 MG/DL (ref 0.2–1)
BILIRUB UR QL: NEGATIVE
BUN SERPL-MCNC: 37 MG/DL (ref 6–20)
BUN/CREAT SERPL: 28 (ref 12–20)
CALCIUM SERPL-MCNC: 10.1 MG/DL (ref 8.5–10.1)
CHLORIDE SERPL-SCNC: 103 MMOL/L (ref 97–108)
CO2 SERPL-SCNC: 27 MMOL/L (ref 21–32)
COLOR UR: ABNORMAL
CREAT SERPL-MCNC: 1.3 MG/DL (ref 0.55–1.02)
DIFFERENTIAL METHOD BLD: ABNORMAL
EOSINOPHIL # BLD: 0.2 K/UL (ref 0–0.4)
EOSINOPHIL NFR BLD: 1 % (ref 0–7)
EPITH CASTS URNS QL MICRO: ABNORMAL /LPF
ERYTHROCYTE [DISTWIDTH] IN BLOOD BY AUTOMATED COUNT: 17.2 % (ref 11.5–14.5)
GLOBULIN SER CALC-MCNC: 4.1 G/DL (ref 2–4)
GLUCOSE SERPL-MCNC: 125 MG/DL (ref 65–100)
GLUCOSE UR STRIP.AUTO-MCNC: NEGATIVE MG/DL
HCT VFR BLD AUTO: 38.5 % (ref 35–47)
HGB BLD-MCNC: 12.3 G/DL (ref 11.5–16)
HGB UR QL STRIP: NEGATIVE
IMM GRANULOCYTES # BLD: 0.2 K/UL (ref 0–0.04)
IMM GRANULOCYTES NFR BLD AUTO: 1 % (ref 0–0.5)
KETONES UR QL STRIP.AUTO: NEGATIVE MG/DL
LEUKOCYTE ESTERASE UR QL STRIP.AUTO: ABNORMAL
LYMPHOCYTES # BLD: 0.3 K/UL (ref 0.8–3.5)
LYMPHOCYTES NFR BLD: 2 % (ref 12–49)
MCH RBC QN AUTO: 28.7 PG (ref 26–34)
MCHC RBC AUTO-ENTMCNC: 31.9 G/DL (ref 30–36.5)
MCV RBC AUTO: 90 FL (ref 80–99)
MONOCYTES # BLD: 0.7 K/UL (ref 0–1)
MONOCYTES NFR BLD: 4 % (ref 5–13)
NEUTS SEG # BLD: 15.6 K/UL (ref 1.8–8)
NEUTS SEG NFR BLD: 92 % (ref 32–75)
NITRITE UR QL STRIP.AUTO: POSITIVE
NRBC # BLD: 0 K/UL (ref 0–0.01)
NRBC BLD-RTO: 0 PER 100 WBC
OTHER,OTHU: ABNORMAL
PH UR STRIP: 5.5 [PH] (ref 5–8)
PLATELET # BLD AUTO: 212 K/UL (ref 150–400)
PMV BLD AUTO: 11 FL (ref 8.9–12.9)
POTASSIUM SERPL-SCNC: 4.1 MMOL/L (ref 3.5–5.1)
PROT SERPL-MCNC: 6.9 G/DL (ref 6.4–8.2)
PROT UR STRIP-MCNC: >300 MG/DL
RBC # BLD AUTO: 4.28 M/UL (ref 3.8–5.2)
RBC #/AREA URNS HPF: ABNORMAL /HPF (ref 0–5)
RBC MORPH BLD: ABNORMAL
SODIUM SERPL-SCNC: 138 MMOL/L (ref 136–145)
SP GR UR REFRACTOMETRY: 1.02 (ref 1–1.03)
UA: UC IF INDICATED,UAUC: ABNORMAL
UROBILINOGEN UR QL STRIP.AUTO: 1 EU/DL (ref 0.2–1)
WBC # BLD AUTO: 17 K/UL (ref 3.6–11)
WBC URNS QL MICRO: ABNORMAL /HPF (ref 0–4)

## 2018-11-12 PROCEDURE — 74011250637 HC RX REV CODE- 250/637

## 2018-11-12 PROCEDURE — 87086 URINE CULTURE/COLONY COUNT: CPT

## 2018-11-12 PROCEDURE — 36415 COLL VENOUS BLD VENIPUNCTURE: CPT

## 2018-11-12 PROCEDURE — 74011250636 HC RX REV CODE- 250/636: Performed by: INTERNAL MEDICINE

## 2018-11-12 PROCEDURE — 70450 CT HEAD/BRAIN W/O DYE: CPT

## 2018-11-12 PROCEDURE — 72100 X-RAY EXAM L-S SPINE 2/3 VWS: CPT

## 2018-11-12 PROCEDURE — 65270000029 HC RM PRIVATE

## 2018-11-12 PROCEDURE — 99284 EMERGENCY DEPT VISIT MOD MDM: CPT

## 2018-11-12 PROCEDURE — 80053 COMPREHEN METABOLIC PANEL: CPT

## 2018-11-12 PROCEDURE — 74011250636 HC RX REV CODE- 250/636: Performed by: EMERGENCY MEDICINE

## 2018-11-12 PROCEDURE — 73502 X-RAY EXAM HIP UNI 2-3 VIEWS: CPT

## 2018-11-12 PROCEDURE — 74011000258 HC RX REV CODE- 258: Performed by: EMERGENCY MEDICINE

## 2018-11-12 PROCEDURE — 74176 CT ABD & PELVIS W/O CONTRAST: CPT

## 2018-11-12 PROCEDURE — 74011250637 HC RX REV CODE- 250/637: Performed by: INTERNAL MEDICINE

## 2018-11-12 PROCEDURE — 85025 COMPLETE CBC W/AUTO DIFF WBC: CPT

## 2018-11-12 PROCEDURE — 81001 URINALYSIS AUTO W/SCOPE: CPT

## 2018-11-12 RX ORDER — PREDNISONE 10 MG/1
10 TABLET ORAL
Status: DISCONTINUED | OUTPATIENT
Start: 2018-11-13 | End: 2018-11-20 | Stop reason: HOSPADM

## 2018-11-12 RX ORDER — ONDANSETRON 2 MG/ML
4 INJECTION INTRAMUSCULAR; INTRAVENOUS
Status: DISCONTINUED | OUTPATIENT
Start: 2018-11-12 | End: 2018-11-20 | Stop reason: HOSPADM

## 2018-11-12 RX ORDER — SODIUM CHLORIDE 0.9 % (FLUSH) 0.9 %
5-10 SYRINGE (ML) INJECTION EVERY 8 HOURS
Status: DISCONTINUED | OUTPATIENT
Start: 2018-11-12 | End: 2018-11-20 | Stop reason: HOSPADM

## 2018-11-12 RX ORDER — METOPROLOL TARTRATE 50 MG/1
50 TABLET ORAL 2 TIMES DAILY
Status: DISCONTINUED | OUTPATIENT
Start: 2018-11-12 | End: 2018-11-20 | Stop reason: HOSPADM

## 2018-11-12 RX ORDER — ACETAMINOPHEN 325 MG/1
650 TABLET ORAL
Status: DISCONTINUED | OUTPATIENT
Start: 2018-11-12 | End: 2018-11-20 | Stop reason: HOSPADM

## 2018-11-12 RX ORDER — DILTIAZEM HYDROCHLORIDE 300 MG/1
300 CAPSULE, COATED, EXTENDED RELEASE ORAL DAILY
Status: DISCONTINUED | OUTPATIENT
Start: 2018-11-13 | End: 2018-11-20 | Stop reason: HOSPADM

## 2018-11-12 RX ORDER — LISINOPRIL 20 MG/1
40 TABLET ORAL DAILY
Status: DISCONTINUED | OUTPATIENT
Start: 2018-11-13 | End: 2018-11-20 | Stop reason: HOSPADM

## 2018-11-12 RX ORDER — HYDRALAZINE HYDROCHLORIDE 25 MG/1
100 TABLET, FILM COATED ORAL 3 TIMES DAILY
Status: DISCONTINUED | OUTPATIENT
Start: 2018-11-12 | End: 2018-11-20 | Stop reason: HOSPADM

## 2018-11-12 RX ORDER — SODIUM CHLORIDE 0.9 % (FLUSH) 0.9 %
5-10 SYRINGE (ML) INJECTION AS NEEDED
Status: DISCONTINUED | OUTPATIENT
Start: 2018-11-12 | End: 2018-11-20 | Stop reason: HOSPADM

## 2018-11-12 RX ORDER — LEVOFLOXACIN 5 MG/ML
750 INJECTION, SOLUTION INTRAVENOUS
Status: DISCONTINUED | OUTPATIENT
Start: 2018-11-12 | End: 2018-11-15 | Stop reason: DRUGHIGH

## 2018-11-12 RX ORDER — GUAIFENESIN 100 MG/5ML
81 LIQUID (ML) ORAL DAILY
Status: DISCONTINUED | OUTPATIENT
Start: 2018-11-13 | End: 2018-11-20 | Stop reason: HOSPADM

## 2018-11-12 RX ORDER — LABETALOL HYDROCHLORIDE 5 MG/ML
20 INJECTION, SOLUTION INTRAVENOUS
Status: DISCONTINUED | OUTPATIENT
Start: 2018-11-12 | End: 2018-11-12

## 2018-11-12 RX ORDER — PAROXETINE HYDROCHLORIDE 20 MG/1
20 TABLET, FILM COATED ORAL DAILY
Status: DISCONTINUED | OUTPATIENT
Start: 2018-11-13 | End: 2018-11-20 | Stop reason: HOSPADM

## 2018-11-12 RX ORDER — ACETAMINOPHEN 325 MG/1
650 TABLET ORAL
Status: COMPLETED | OUTPATIENT
Start: 2018-11-12 | End: 2018-11-12

## 2018-11-12 RX ORDER — DOCUSATE SODIUM 100 MG/1
100 CAPSULE, LIQUID FILLED ORAL 2 TIMES DAILY
Status: DISCONTINUED | OUTPATIENT
Start: 2018-11-12 | End: 2018-11-20 | Stop reason: HOSPADM

## 2018-11-12 RX ORDER — TEMAZEPAM 15 MG/1
15 CAPSULE ORAL
Status: DISCONTINUED | OUTPATIENT
Start: 2018-11-12 | End: 2018-11-20 | Stop reason: HOSPADM

## 2018-11-12 RX ORDER — POLYETHYLENE GLYCOL 3350 17 G/17G
17 POWDER, FOR SOLUTION ORAL
Status: DISCONTINUED | OUTPATIENT
Start: 2018-11-12 | End: 2018-11-20 | Stop reason: HOSPADM

## 2018-11-12 RX ORDER — ACETAMINOPHEN 325 MG/1
TABLET ORAL
Status: COMPLETED
Start: 2018-11-12 | End: 2018-11-12

## 2018-11-12 RX ADMIN — Medication 10 ML: at 21:07

## 2018-11-12 RX ADMIN — LEVOFLOXACIN 750 MG: 5 INJECTION, SOLUTION INTRAVENOUS at 20:49

## 2018-11-12 RX ADMIN — TEMAZEPAM 15 MG: 15 CAPSULE ORAL at 21:05

## 2018-11-12 RX ADMIN — GABAPENTIN 400 MG: 100 CAPSULE ORAL at 21:06

## 2018-11-12 RX ADMIN — METOPROLOL TARTRATE 50 MG: 50 TABLET, FILM COATED ORAL at 21:06

## 2018-11-12 RX ADMIN — HYDRALAZINE HYDROCHLORIDE 100 MG: 25 TABLET, FILM COATED ORAL at 21:05

## 2018-11-12 RX ADMIN — PIPERACILLIN SODIUM,TAZOBACTAM SODIUM 3.38 G: 3; .375 INJECTION, POWDER, FOR SOLUTION INTRAVENOUS at 16:53

## 2018-11-12 RX ADMIN — ACETAMINOPHEN 650 MG: 325 TABLET ORAL at 14:56

## 2018-11-12 RX ADMIN — AMPICILLIN SODIUM 2 G: 2 INJECTION, POWDER, FOR SOLUTION INTRAMUSCULAR; INTRAVENOUS at 17:35

## 2018-11-12 NOTE — ED NOTES
TRANSFER - OUT REPORT: 
 
Verbal report given to Demetria Wilson RN (name) on Naty Hughes  being transferred to Sherice HUGGINS RN(unit) for routine progression of care Report consisted of patients Situation, Background, Assessment and  
Recommendations(SBAR). Information from the following report(s) SBAR, Kardex, ED Summary, Intake/Output, MAR, Recent Results, Med Rec Status and Cardiac Rhythm Sinus Merleen Feathers was reviewed with the receiving nurse. Lines:  
Peripheral IV 11/12/18 Left Wrist (Active) Site Assessment Clean, dry, & intact 11/12/2018  4:51 PM  
Phlebitis Assessment 0 11/12/2018  4:51 PM  
Infiltration Assessment 0 11/12/2018  4:51 PM  
Dressing Status Clean, dry, & intact 11/12/2018  4:51 PM  
Dressing Type Tape;Transparent 11/12/2018  4:51 PM  
Hub Color/Line Status Blue;Flushed 11/12/2018  4:51 PM  
  
 
Opportunity for questions and clarification was provided. Patient transported with: 
 Tech (Transport)

## 2018-11-12 NOTE — ED PROVIDER NOTES
EMERGENCY DEPARTMENT HISTORY AND PHYSICAL EXAM 
 
 
Date: 11/12/2018 Patient Name: Kaur Cervantes History of Presenting Illness Chief Complaint Patient presents with  Fall  
  pt arrives by EMS from the [address], reports GLF out of bed on Sunday morning, per EMS pt is non ambulatory, BP systolic was 055, sat up and dropped to 150's, pt  complains of headache, denies LOC, pt is alert and oriented x3,  for EMS  
 Headache History Provided By: Patient and family HPI: Kaur Cervantes, 80 y.o. female with PMHx significant for HTN, presents via EMS to the ED for evaluation after fall yesterday at 0700. Pt states she fell out of bed and hit her head. Pt c/o constant back pain and associated HA s/p fall. She states back pain is exacerbated when laying flat. Family states [facility] called this morning for constant HA, confusion and elevated SBP at 200 and advised pt come to ED. Family notes pt has been c/o dysuria x a few days and chart review shows pt was placed on Diflucan on 11/8, no abx listed in note. Pt states she is wheelchair bound and transfers with assistance. She endorses taking ASA but denies other blood thinner use. Family reports hx of R hip fx. Pt specifically denies any fever, rash, SOB, CP, cough, congestion. There are no other complaints, changes, or physical findings at this time. PCP: Sravan hCoe MD 
 
Current Facility-Administered Medications Medication Dose Route Frequency Provider Last Rate Last Dose  ampicillin (OMNIPEN) 2 g in 0.9% sodium chloride (MBP/ADV) 100 mL  2 g IntraVENous NOW Danielle Rouse DO      
 acetaminophen (TYLENOL) tablet 650 mg  650 mg Oral Q4H PRN Dirk Rivera MD      
[address] [place] [address] [START ON 11/13/2018] aspirin tablet 81 mg  81 mg Oral DAILY MadayAna MD      
[address] [place] [address] Elsie Prow ON 11/13/2018] dilTIAZem CD (CARDIZEM CD) capsule 300 mg  300 mg Oral DAILY Dirk Rivera MD      
  docusate sodium (COLACE) capsule 100 mg  100 mg Oral BID Dayami Massey MD      
 gabapentin (NEURONTIN) capsule 400 mg  400 mg Oral QID Dayami Massey MD      
 hydrALAZINE (APRESOLINE) tablet 100 mg  100 mg Oral TID Dayami Massey MD      
[facility] [START ON 11/13/2018] lactobac ac& pc-s.therm-b.anim (HANG Q/RISAQUAD)  1 Cap Oral DAILY Maday, Storm Allen MD      
[facility] Jj Doing ON 11/13/2018] lisinopril (PRINIVIL, ZESTRIL) tablet 40 mg  40 mg Oral DAILY Maday, MD Ana      
 metoprolol tartrate (LOPRESSOR) tablet 50 mg  50 mg Oral BID Dayami Massey MD      
[facility] [START ON 11/13/2018] PARoxetine (PAXIL) tablet 20 mg  20 mg Oral DAILY Maday, MD Ana      
 polyethylene glycol (MIRALAX) packet 17 g  17 g Oral DAILY PRN Ana Quarles MD      
 temazepam (RESTORIL) capsule 15 mg  15 mg Oral QHS Maday, Storm Allen MD      
[facility] [START ON 11/13/2018] predniSONE (DELTASONE) tablet 10 mg  10 mg Oral DAILY WITH BREAKFAST Maday, MD Ana      
 sodium chloride (NS) flush 5-10 mL  5-10 mL IntraVENous Q8H Maday, MD Ana      
 sodium chloride (NS) flush 5-10 mL  5-10 mL IntraVENous PRN Ana Quarles MD      
 acetaminophen (TYLENOL) tablet 650 mg  650 mg Oral Q4H PRN MadayAna MD      
 ondansetron (ZOFRAN) injection 4 mg  4 mg IntraVENous Q4H PRN Dayami Massye MD      
[facility] [START ON 11/13/2018] ampicillin (OMNIPEN) 2 g in 0.9% sodium chloride (MBP/ADV) 100 mL  2 g IntraVENous Q12H Maday, MD Ana      
 levoFLOXacin (LEVAQUIN) 750 mg in D5W IVPB  750 mg IntraVENous Q48H Maday, Storm Allen MD      
 [START ON 11/13/2018] furosemide (LASIX) tablet 60 mg  60 mg Oral DAILY MadayAna MD      
 nitroglycerin (NITROBID) 2 % ointment 1 Inch  1 Inch Topical Q6H PRN Dayami Massey MD      
 
Current Outpatient Medications Medication Sig Dispense Refill  lisinopril (PRINIVIL, ZESTRIL) 40 mg tablet Take 1 Tab by mouth daily. 30 Tab prn  dilTIAZem CD (CARDIZEM CD) 300 mg ER capsule Take 1 Cap by mouth daily. 30 Cap prn  docusate sodium (COLACE) 100 mg capsule Take 100 mg by mouth two (2) times a day.  gabapentin (NEURONTIN) 400 mg capsule Take 400 mg by mouth four (4) times daily.  PARoxetine (PAXIL) 20 mg tablet Take 20 mg by mouth daily.  temazepam (RESTORIL) 15 mg capsule Take 15 mg by mouth nightly.  cholecalciferol (VITAMIN D3) 50,000 unit capsule Take 50,000 Units by mouth every seven (7) days.  loperamide (IMODIUM) 2 mg capsule Take 4 mg by mouth every eight (8) hours as needed for Diarrhea.  polyethylene glycol (MIRALAX) 17 gram packet Take 17 g by mouth daily as needed (constipation).  L. acidoph & paracasei- S therm- Bifido (HANG-Q/RISAQUAD) 8 billion cell cap cap Take 1 Cap by mouth daily. 30 Cap 0  
 metoprolol tartrate (LOPRESSOR) 50 mg tablet Take 1 Tab by mouth two (2) times a day. 60 Tab PRN  predniSONE (DELTASONE) 10 mg tablet Take 1 Tab by mouth daily (with breakfast). 30 Tab 0  
 acetaminophen (TYLENOL) 650 mg CR tablet Take 650 mg by mouth every six (6) hours as needed for Pain.  hydrALAZINE (APRESOLINE) 100 mg tablet Take 1 Tab by mouth three (3) times daily. 90 Tab prn  aspirin 81 mg tablet Take 81 mg by mouth daily. Stopped for surgery 8-4-10  hydrochlorothiazide (HYDRODIURIL) 25 mg tablet Take 25 mg by mouth daily. Past History Past Medical History: 
Past Medical History:  
Diagnosis Date  Hypertension  Ill-defined condition Ex Lap with Levie Child patch of a perforated pyloric channel ulcer 7/19/16  Other ill-defined conditions(799.89) lipids  Other ill-defined conditions(799.89)   
 blood transfusion history Past Surgical History: 
Past Surgical History:  
Procedure Laterality Date  BYPASS GRAFT OTHR,FEM-FEM    
 BYPASS GRAFT OTHR,FEM-POP    
 right  HX HEENT    
 bilateral cataracts  HX ORTHOPAEDIC    
 right hip fracture/pinning  HX UROLOGICAL    
 right stent/kidney Family History: 
History reviewed. No pertinent family history. Social History: 
Social History Tobacco Use  Smoking status: Former Smoker Packs/day: 0.25  Smokeless tobacco: Never Used  Tobacco comment: stopped 2009 Substance Use Topics  Alcohol use: No  
 Drug use: No  
 
 
Allergies: Allergies Allergen Reactions  Dilaudid [Hydromorphone] Unknown (comments) Does not remember  Hydrocodone Nausea and Vomiting  Morphine Rash Review of Systems Review of Systems Constitutional: Negative for chills and fever. HENT: Negative for congestion and sore throat. Eyes: Negative for visual disturbance. Respiratory: Negative for cough and shortness of breath. Cardiovascular: Negative for chest pain and leg swelling. Gastrointestinal: Negative for abdominal pain, blood in stool, diarrhea and nausea. Endocrine: Negative for polyuria. Genitourinary: Positive for dysuria. Negative for flank pain, vaginal bleeding and vaginal discharge. Musculoskeletal: Positive for back pain. Negative for myalgias. Skin: Negative for rash. Allergic/Immunologic: Negative for immunocompromised state. Neurological: Positive for headaches. Negative for weakness. Psychiatric/Behavioral: Negative for confusion. Physical Exam  
Physical Exam  
Constitutional: She is oriented to person, place, and time. She appears well-developed and well-nourished. HENT:  
Head: Normocephalic and atraumatic. Moist mucous membranes Eyes: Conjunctivae are normal. Pupils are equal, round, and reactive to light. Right eye exhibits no discharge. Left eye exhibits no discharge. Neck: Normal range of motion. Neck supple. No tracheal deviation present. Cardiovascular: Normal rate, regular rhythm and normal heart sounds. No murmur heard.  
Pulmonary/Chest: Effort normal and breath sounds normal. No respiratory distress. She has no wheezes. She has no rales. Abdominal: Soft. Bowel sounds are normal. There is no tenderness. There is no rebound and no guarding. Musculoskeletal: Normal range of motion. She exhibits no edema, tenderness or deformity. RLE shortened and external rotated, no pain with leg roll, FROM. No L or T spine tenderness. Neurological: She is alert and oriented to person, place, and time. 4/5 bilateral LE strength with hip flexion, dorsi and plantar flexion. Skin: Skin is warm and dry. No rash noted. No erythema. Psychiatric: Her behavior is normal.  
Nursing note and vitals reviewed. Diagnostic Study Results Labs - Recent Results (from the past 12 hour(s)) CBC WITH AUTOMATED DIFF Collection Time: 11/12/18 12:15 PM  
Result Value Ref Range WBC 17.0 (H) 3.6 - 11.0 K/uL  
 RBC 4.28 3.80 - 5.20 M/uL  
 HGB 12.3 11.5 - 16.0 g/dL HCT 38.5 35.0 - 47.0 % MCV 90.0 80.0 - 99.0 FL  
 MCH 28.7 26.0 - 34.0 PG  
 MCHC 31.9 30.0 - 36.5 g/dL  
 RDW 17.2 (H) 11.5 - 14.5 % PLATELET 652 466 - 295 K/uL MPV 11.0 8.9 - 12.9 FL  
 NRBC 0.0 0  WBC ABSOLUTE NRBC 0.00 0.00 - 0.01 K/uL NEUTROPHILS 92 (H) 32 - 75 % LYMPHOCYTES 2 (L) 12 - 49 % MONOCYTES 4 (L) 5 - 13 % EOSINOPHILS 1 0 - 7 % BASOPHILS 0 0 - 1 % IMMATURE GRANULOCYTES 1 (H) 0.0 - 0.5 % ABS. NEUTROPHILS 15.6 (H) 1.8 - 8.0 K/UL  
 ABS. LYMPHOCYTES 0.3 (L) 0.8 - 3.5 K/UL  
 ABS. MONOCYTES 0.7 0.0 - 1.0 K/UL  
 ABS. EOSINOPHILS 0.2 0.0 - 0.4 K/UL  
 ABS. BASOPHILS 0.0 0.0 - 0.1 K/UL  
 ABS. IMM. GRANS. 0.2 (H) 0.00 - 0.04 K/UL  
 DF AUTOMATED    
 RBC COMMENTS NORMOCYTIC, NORMOCHROMIC METABOLIC PANEL, COMPREHENSIVE Collection Time: 11/12/18 12:15 PM  
Result Value Ref Range Sodium 138 136 - 145 mmol/L Potassium 4.1 3.5 - 5.1 mmol/L Chloride 103 97 - 108 mmol/L  
 CO2 27 21 - 32 mmol/L Anion gap 8 5 - 15 mmol/L Glucose 125 (H) 65 - 100 mg/dL  BUN 37 (H) 6 - 20 MG/DL  
 Creatinine 1.30 (H) 0.55 - 1.02 MG/DL  
 BUN/Creatinine ratio 28 (H) 12 - 20 GFR est AA 48 (L) >60 ml/min/1.73m2 GFR est non-AA 39 (L) >60 ml/min/1.73m2 Calcium 10.1 8.5 - 10.1 MG/DL Bilirubin, total 0.6 0.2 - 1.0 MG/DL  
 ALT (SGPT) 23 12 - 78 U/L  
 AST (SGOT) 21 15 - 37 U/L Alk. phosphatase 70 45 - 117 U/L Protein, total 6.9 6.4 - 8.2 g/dL Albumin 2.8 (L) 3.5 - 5.0 g/dL Globulin 4.1 (H) 2.0 - 4.0 g/dL A-G Ratio 0.7 (L) 1.1 - 2.2 URINALYSIS W/ REFLEX CULTURE Collection Time: 11/12/18  2:57 PM  
Result Value Ref Range Color ORANGE Appearance CLEAR CLEAR Specific gravity 1.019 1.003 - 1.030    
 pH (UA) 5.5 5.0 - 8.0 Protein >300 (A) NEG mg/dL Glucose NEGATIVE  NEG mg/dL Ketone NEGATIVE  NEG mg/dL Bilirubin NEGATIVE  NEG Blood NEGATIVE  NEG Urobilinogen 1.0 0.2 - 1.0 EU/dL Nitrites POSITIVE (A) NEG Leukocyte Esterase SMALL (A) NEG    
 WBC 5-10 0 - 4 /hpf  
 RBC 5-10 0 - 5 /hpf Epithelial cells MODERATE (A) FEW /lpf Bacteria 1+ (A) NEG /hpf  
 UA:UC IF INDICATED URINE CULTURE ORDERED (A) CNI Other: Renal Epithelial cells Present Radiologic Studies -  
CT ABD PELV WO CONT Final Result XR HIP RT W OR WO PELV 2-3 VWS Final Result XR SPINE LUMB 2 OR 3 V Final Result CT HEAD WO CONT Final Result CT Results  (Last 48 hours)  
          
 11/12/18 1623  CT ABD PELV WO CONT Final result Impression:  IMPRESSION:  
   
1. Moderate acute T12 compression fracture with moderate localized spinal  
stenosis. No other fractures demonstrated. 2. No acute findings in the abdomen or pelvis. 3. Small bilateral pleural effusions. 4. Suspect cholelithiasis. Narrative:  EXAM:  CT ABD PELV WO CONT INDICATION: Abdominal pain  , status post ground level fall COMPARISON: CT of 10/24/2018 CONTRAST:  None.   
   
TECHNIQUE:   
 Thin axial images were obtained through the abdomen and pelvis. Coronal and  
sagittal reconstructions were generated. Oral contrast was not administered. CT  
dose reduction was achieved through use of a standardized protocol tailored for  
this examination and automatic exposure control for dose modulation. The absence of intravenous contrast material reduces the sensitivity for  
evaluation of the solid parenchymal organs of the abdomen. FINDINGS:   
LUNG BASES: Very small bilateral pleural effusions, left larger than right. Partially visualized density in the left lung which likely represents post  
therapy changes. 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   
INCIDENTALLY IMAGED HEART AND MEDIASTINUM: Unremarkable. LIVER: No mass or biliary dilatation. GALLBLADDER: Moderately distended. Suspect gallstones in the dependent portion. SPLEEN: No mass. PANCREAS: No mass or ductal dilatation. ADRENALS: Unremarkable. KIDNEYS/URETERS: Right ureteral stent in place. No hydronephrosis. Numerous  
bilateral renal cysts including multiple hyperdense cysts. STOMACH: Unremarkable. SMALL BOWEL: No dilatation or wall thickening. COLON: No dilatation or wall thickening. APPENDIX: Nondistended. PERITONEUM: No ascites or pneumoperitoneum. RETROPERITONEUM: Unchanged suprarenal abdominal aortic aneurysm. Aorto iliac  
graft and right iliac stent. REPRODUCTIVE ORGANS: Unremarkable. URINARY BLADDER: No mass or calculus. BONES: Diffuse osteoporosis. Acute moderate compression fracture of T12 with  
buckling of the posterior cortex superiorly resulting in moderate spinal  
stenosis at the level of the fracture. No other compression fractures. Unchanged  
heterogeneity of the sacrum. ADDITIONAL COMMENTS: N/A  
   
  
 11/12/18 1254  CT HEAD WO CONT Final result Impression:  IMPRESSION:  No significant abnormalities. Narrative:  EXAMINATION:  CT HEAD WO CONT CLINICAL INFORMATION:  GLF, head injury COMPARISON:  2/14/2015 TECHNIQUE: Routine axial head CT was performed. IV contrast was not  
administered. Sagittal and coronal reconstructions were generated. CT dose reduction was achieved through use of a standardized protocol tailored  
for this examination and automatic exposure control for dose modulation. FINDINGS:  
No acute infarct, hemorrhage or mass. VENTRICULAR SYSTEM:  Normal for age. BASAL CISTERNS:  Patent. BRAIN PARENCHYMA:  No significant abnormalities. MIDLINE SHIFT:  None. CALVARIUM/ SKULL BASE: Intact. PARANASAL SINUSES AND MASTOID AIR CELLS: Clear. VISUALIZED ORBITS: No significant abnormalities. SELLA: No enlargement. Initial Result:  
Impression:  
 IMPRESSION:  No acute findings. Postsurgical and degenerative changes in the 
right hip. Robert Riley Narrative: EXAM:  XR HIP RT W OR WO PELV 2-3 VWS 
 
INDICATION:   hip fracture, fall. COMPARISON: 8/29/2016. FINDINGS: An AP view of the pelvis and a frogleg lateral view of the right hip 
demonstrate previous surgical fixation of a right proximal femoral fracture, 
with its plate and screw in place. There is severe osteoarthritis of the right 
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 There is no 
acute fracture or dislocation. .  A right iliac vascular stent is noted along 
with a right ureteral stent. Initial Result:  
Impression:  
 IMPRESSION:   
Moderate acute T12 compression fracture. Narrative: EXAM:  XR SPINE LUMB 2 OR 3 V 
 
INDICATION:  pain, fall yesterday COMPARISON: CT abdomen and pelvis of 1220 4/20/2018 FINDINGS:  
AP, lateral and spot lateral views of the lumbar spine demonstrate a moderate 
acute compression fracture of T12 which is new since 10/24/2018. There is mild 
buckling of the T12 posterior cortex.  The lumbar vertebrae appear intact.  The 
 right ureteral stent and a right iliac vascular stent are noted.  There is facet 
joint hypertrophy in the lower lumbar spine. There is minimal degenerative 
change of the lumbar spine. Medical Decision Making I am the first provider for this patient. I reviewed the vital signs, available nursing notes, past medical history, past surgical history, family history and social history. Vital Signs-Reviewed the patient's vital signs. Patient Vitals for the past 12 hrs: 
 Temp Pulse Resp BP SpO2  
11/12/18 1656 98.2 °F (36.8 °C) (!) 55 13 198/59 99 % 11/12/18 1452  80 16 188/53 99 % 11/12/18 1132 99.1 °F (37.3 °C) 78 18 189/64 95 % Pulse Oximetry Analysis - 100% on RA Cardiac Monitor:  
Rate: 80 bpm 
 
Records Reviewed: Nursing Notes, Old Medical Records, Previous electrocardiograms, Previous Radiology Studies and Previous Laboratory Studies Provider Notes (Medical Decision Making): DDx: CHI, intracranial bleed, concussion, UTI, electrolyte disturbance, lumbar/thoracic spine fx, hip fx 
 
ED Course:  
Initial assessment performed. The patients presenting problems have been discussed, and they are in agreement with the care plan formulated and outlined with them. I have encouraged them to ask questions as they arise throughout their visit. 2:47 PM 
Rectal temperature 98.6. External hemorrhoids noted. Written by SILVIA Benson, as dictated by [employer] DO. 
 
3:46 PM 
Urine cultures reviewed. Pseudomonas, enterococcus grown in last culture. Written by SILVIA Benson, as dictated by [employer]DO. 
 
CONSULT NOTE:  
4:16 PM 
[id number] spoke with Dr. Ruby Curtis, Specialty: Hospitalist 
Discussed pt's hx, disposition, and available diagnostic and imaging results. Reviewed care plans. Consultant will evaluate pt for admission. Written by SILVIA Benson, as dictated by [employer]DO.  
 
Consult Note: 
4:19 PM 
 Ze Cartagena DO spoke with Justa Rivas Specialty: Pharmacy Discussed pts hx, disposition, and available diagnostic and imaging results. Reviewed care plans. Recommends adding ampicillin. Critical Care Time:  
none Disposition: 
5:03 PM 
Patient is being admitted to the hospital. The results of their tests and reasons for their admission have been discussed with them and/or available family. They convey agreement and understanding for the need to be admitted and for their admission diagnosis. Consultation has been made with the inpatient physician specialist for hospitalization. PLAN: 
1. Admit to hospitalist.  
 
Return to ED if worse Diagnosis Clinical Impression: 1. Complicated UTI (urinary tract infection) 2. Stage 3 chronic kidney disease ([facility] [address] Attestations: This note is prepared by Hugo Leon, acting as Scribe for Ze Cartagena DO. Ze Cartagena DO: The scribe's documentation has been prepared under my direction and personally reviewed by me in its entirety. I confirm that the note above accurately reflects all work, treatment, procedures, and medical decision making performed by me.

## 2018-11-12 NOTE — H&P
Hospitalist Admission NoteNAME: Timo Zuluaga :  1935 MRN:  [id number] Date/Time:  2018 4:56 PM 
 
Patient PCP: Michael Staples MD 
______________________________________________________________________ Given the patient's current clinical presentation, I have a high level of concern for decompensation if discharged from the emergency department. Complex decision making was performed, which includes reviewing the patient's available past medical records, laboratory results, and x-ray films. My assessment of this patient's clinical condition and my plan of care is as follows. Assessment / Plan: 
Complicated UTI: Patient with Pseudomonas and Enterococcus Faecalis Group D UTI diagnosed on 10/29 UCx. Patient also with right ureteral stent replacement this hospital stay due to stent displacement with associated CHRISTY Leukocytosis: no sepsis criteria met, temperature 99.1 in the ED 
-admit to inpatient, telemetry 
-Patient started on Zosyn in the ED as well as ampicillin per 10/29 UCx results 
-will continue ampicillin and stat Levaquin 
-await UCx 
-CT A/P ordered in ED, will follow Hypertension: 
-continue patient's PTA antihypertensives with exception that with patient CKD I have changed her HCTZ to Lasix 
-prn nitropaste ordered CKD stage III: 
-renal function at baseline, continue to monitor Fall on 18:  
-PT/OT ordered 
-CT Head: \"IMPRESSION:  No significant abnormalities. \" 
 
T12 compression fracture: Patient also report lower back pain and XR back was obtained which showed: \"IMPRESSION:  Moderate acute T12 compression fracture. \" 
-tylenol prn pain (note opiate sensitivities on allergy list) 
-patient to discuss further with Dr. Megan Dennis regarding consideration for Kyphoplasty COPD without Exacerbation Chronic Prednisone Use Past history of left lower lobe lung cancer treated with radiation therapy 
-continue daily Prednisone Code Status: DNR, has DDNR Surrogate Decision Maker: Son 
 
DVT Prophylaxis: SCDs GI Prophylaxis: not indicated Baseline: mobilizes via wheelchair Subjective: CHIEF COMPLAINT: fall yesterday HISTORY OF PRESENT ILLNESS:    
Elder Jadon is a 80 y.o.  female who presents with fall yesterday. Patient lives at the [facility] and does not ambulate. Patient reports that she was trying to transition from bed to wheelchair on the morning of 11/11 to go to the restroom and fell hitting her head. She also notes being on pyridium since Wednesday for Dysuria. Patient denies any fevers. She was recently hospitalized for complicated UTI where Pseudomonas and E. Faecalis UTI (both with drug resistance patterns) were diagnosed. She also had CHRISTY related to displacement of right ureteral stent which was exchanged during this hospital stay. Patient denies any abdominal pain but has had decreased PO intake. We were asked to admit for work up and evaluation of the above problems. Past Medical History:  
Diagnosis Date  Hypertension  Ill-defined condition Ex Lap with Junella Roche patch of a perforated pyloric channel ulcer 7/19/16  Other ill-defined conditions(799.89) lipids  Other ill-defined conditions(799.89)   
 blood transfusion history Past Surgical History:  
Procedure Laterality Date  BYPASS GRAFT OTHR,FEM-FEM    
 BYPASS GRAFT OTHR,FEM-POP    
 right  HX HEENT    
 bilateral cataracts  HX ORTHOPAEDIC    
 right hip fracture/pinning  HX UROLOGICAL    
 right stent/kidney Social History Tobacco Use  Smoking status: Former Smoker Packs/day: 0.25  Smokeless tobacco: Never Used  Tobacco comment: stopped 2009 Substance Use Topics  Alcohol use: No  
  
Family history: patient unsure of any family history of illnesses Allergies Allergen Reactions  Dilaudid [Hydromorphone] Unknown (comments) Does not remember  Hydrocodone Nausea and Vomiting  Morphine Rash Prior to Admission medications Medication Sig Start Date End Date Taking? Authorizing Provider  
lisinopril (PRINIVIL, ZESTRIL) 40 mg tablet Take 1 Tab by mouth daily. 11/6/18   Merlin Fay MD  
dilTIAZem CD (CARDIZEM CD) 300 mg ER capsule Take 1 Cap by mouth daily. 10/30/18   Merlin Fay MD  
docusate sodium (COLACE) 100 mg capsule Take 100 mg by mouth two (2) times a day. Baldev Banda MD  
gabapentin (NEURONTIN) 400 mg capsule Take 400 mg by mouth four (4) times daily. Baldev Banda MD  
PARoxetine (PAXIL) 20 mg tablet Take 20 mg by mouth daily. Baldev Banda MD  
temazepam (RESTORIL) 15 mg capsule Take 15 mg by mouth nightly. Baldev Banda MD  
cholecalciferol (VITAMIN D3) 50,000 unit capsule Take 50,000 Units by mouth every seven (7) days. Baldev Banda MD  
loperamide (IMODIUM) 2 mg capsule Take 4 mg by mouth every eight (8) hours as needed for Diarrhea. Baldev Banda MD  
polyethylene glycol (MIRALAX) 17 gram packet Take 17 g by mouth daily as needed (constipation). Baldev Banda MD  
LMitra acidoph & paracasei- S therm- Bifido (HANG-Q/RISAQUAD) 8 billion cell cap cap Take 1 Cap by mouth daily. 4/12/18   Merlin Fay MD  
metoprolol tartrate (LOPRESSOR) 50 mg tablet Take 1 Tab by mouth two (2) times a day. 4/12/18   Merlin Fay MD  
predniSONE (DELTASONE) 10 mg tablet Take 1 Tab by mouth daily (with breakfast). 4/12/18   Merlin Fay MD  
acetaminophen (TYLENOL) 650 mg CR tablet Take 650 mg by mouth every six (6) hours as needed for Pain. Provider, Historical  
hydrALAZINE (APRESOLINE) 100 mg tablet Take 1 Tab by mouth three (3) times daily. 9/13/16   Merlin Fay MD  
aspirin 81 mg tablet Take 81 mg by mouth daily. Stopped for surgery 8-4-10  8/5/10   Provider, Historical  
hydrochlorothiazide (HYDRODIURIL) 25 mg tablet Take 25 mg by mouth daily.  8/5/10   Provider, Historical  
 
 
 REVIEW OF SYSTEMS:    
Total of 12 systems reviewed as follows:   
   POSITIVE= underlined text  Negative = text not underlined General:  fever, chills, sweats, generalized weakness, weight loss/gain,  
   loss of appetite Eyes:    blurred vision, eye pain, loss of vision, double vision ENT:    rhinorrhea, pharyngitis Respiratory:   cough, sputum production, SOB, MONTANO, wheezing, pleuritic pain  
Cardiology:   chest pain, palpitations, orthopnea, PND, edema, syncope Gastrointestinal:  abdominal pain , N/V, diarrhea, dysphagia, constipation, bleeding Genitourinary:  frequency, urgency, dysuria, hematuria, incontinence Muskuloskeletal :  arthralgia, myalgia, back pain Hematology:  easy bruising, nose or gum bleeding, lymphadenopathy Dermatological: rash, ulceration, pruritis, color change / jaundice Endocrine:   hot flashes or polydipsia Neurological:  headache, dizziness, confusion, focal weakness, paresthesia, Speech difficulties, memory loss Psychological: Feelings of anxiety, depression, agitation Objective: VITALS:   
Visit Vitals /53 (BP 1 Location: Right arm, BP Patient Position: At rest) Pulse 80 Temp 99.1 °F (37.3 °C) Resp 16 Wt 55.3 kg (122 lb) SpO2 99% BMI 22.68 kg/m² PHYSICAL EXAM: 
 
General:    Alert, cooperative, no distress, appears stated age. HEENT: Atraumatic, anicteric sclerae, pink conjunctivae No oral ulcers, mucosa dry, throat clear, dentition fair Neck:  Supple, symmetrical,  Thyroid not enlarged Lungs:   Clear to auscultation bilaterally on anterior and lateral assessment. No Wheezing or Rhonchi. No rales. Chest wall:  No tenderness  No Accessory muscle use. Heart:   Regular  Rhythm with bradycardia,  No  murmur   No edema Abdomen:   Soft, non-tender. Not distended. Bowel sounds present Extremities: No cyanosis. No clubbing,   
  Skin turgor decreased, Capillary refill normal, Radial dial pulse 2+ Skin:     Not pale. Not Jaundiced  No rashes Psych:  Fair insight. Not depressed. Not anxious or agitated. Neurologic: EOMs intact. No facial asymmetry. No aphasia or slurred speech. Symmetrical strength, No focal neurologic deficits noted. Alert and oriented.  
 
_______________________________________________________________________ Care Plan discussed with: 
  Comments Patient x Family RN x Care Manager Consultant:     
_______________________________________________________________________ Expected  Disposition:  
Home with Family HH/PT/OT/RN   
SNF/LTC ? [facility]   
________________________________________________________________________ TOTAL TIME:  70 Minutes Critical Care Provided     Minutes non procedure based Comments  
 x Reviewed previous records  
>50% of visit spent in counseling and coordination of care x Discussion with patient and/or family and questions answered 
  
 
________________________________________________________________________ Signed: Amy Anderson MD 
 
Procedures: see electronic medical records for all procedures/Xrays and details which were not copied into this note but were reviewed prior to creation of Plan. LAB DATA REVIEWED:   
Recent Results (from the past 24 hour(s)) CBC WITH AUTOMATED DIFF Collection Time: 11/12/18 12:15 PM  
Result Value Ref Range WBC 17.0 (H) 3.6 - 11.0 K/uL  
 RBC 4.28 3.80 - 5.20 M/uL  
 HGB 12.3 11.5 - 16.0 g/dL HCT 38.5 35.0 - 47.0 % MCV 90.0 80.0 - 99.0 FL  
 MCH 28.7 26.0 - 34.0 PG  
 MCHC 31.9 30.0 - 36.5 g/dL  
 RDW 17.2 (H) 11.5 - 14.5 % PLATELET 663 640 - 076 K/uL MPV 11.0 8.9 - 12.9 FL  
 NRBC 0.0 0  WBC ABSOLUTE NRBC 0.00 0.00 - 0.01 K/uL NEUTROPHILS 92 (H) 32 - 75 % LYMPHOCYTES 2 (L) 12 - 49 % MONOCYTES 4 (L) 5 - 13 % EOSINOPHILS 1 0 - 7 % BASOPHILS 0 0 - 1 % IMMATURE GRANULOCYTES 1 (H) 0.0 - 0.5 % ABS. NEUTROPHILS 15.6 (H) 1.8 - 8.0 K/UL  
 ABS. LYMPHOCYTES 0.3 (L) 0.8 - 3.5 K/UL  
 ABS. MONOCYTES 0.7 0.0 - 1.0 K/UL  
 ABS. EOSINOPHILS 0.2 0.0 - 0.4 K/UL  
 ABS. BASOPHILS 0.0 0.0 - 0.1 K/UL  
 ABS. IMM. GRANS. 0.2 (H) 0.00 - 0.04 K/UL  
 DF AUTOMATED    
 RBC COMMENTS NORMOCYTIC, NORMOCHROMIC METABOLIC PANEL, COMPREHENSIVE Collection Time: 11/12/18 12:15 PM  
Result Value Ref Range Sodium 138 136 - 145 mmol/L Potassium 4.1 3.5 - 5.1 mmol/L Chloride 103 97 - 108 mmol/L  
 CO2 27 21 - 32 mmol/L Anion gap 8 5 - 15 mmol/L Glucose 125 (H) 65 - 100 mg/dL BUN 37 (H) 6 - 20 MG/DL Creatinine 1.30 (H) 0.55 - 1.02 MG/DL  
 BUN/Creatinine ratio 28 (H) 12 - 20 GFR est AA 48 (L) >60 ml/min/1.73m2 GFR est non-AA 39 (L) >60 ml/min/1.73m2 Calcium 10.1 8.5 - 10.1 MG/DL Bilirubin, total 0.6 0.2 - 1.0 MG/DL  
 ALT (SGPT) 23 12 - 78 U/L  
 AST (SGOT) 21 15 - 37 U/L Alk. phosphatase 70 45 - 117 U/L Protein, total 6.9 6.4 - 8.2 g/dL Albumin 2.8 (L) 3.5 - 5.0 g/dL Globulin 4.1 (H) 2.0 - 4.0 g/dL A-G Ratio 0.7 (L) 1.1 - 2.2 URINALYSIS W/ REFLEX CULTURE Collection Time: 11/12/18  2:57 PM  
Result Value Ref Range Color ORANGE Appearance CLEAR CLEAR Specific gravity 1.019 1.003 - 1.030    
 pH (UA) 5.5 5.0 - 8.0 Protein >300 (A) NEG mg/dL Glucose NEGATIVE  NEG mg/dL Ketone NEGATIVE  NEG mg/dL Bilirubin NEGATIVE  NEG Blood NEGATIVE  NEG Urobilinogen 1.0 0.2 - 1.0 EU/dL Nitrites POSITIVE (A) NEG Leukocyte Esterase SMALL (A) NEG    
 WBC 5-10 0 - 4 /hpf  
 RBC 5-10 0 - 5 /hpf Epithelial cells MODERATE (A) FEW /lpf Bacteria 1+ (A) NEG /hpf  
 UA:UC IF INDICATED URINE CULTURE ORDERED (A) CNI Other: Renal Epithelial cells Present

## 2018-11-13 PROBLEM — S22.000A CLOSED COMPRESSION FRACTURE OF THORACIC VERTEBRA (HCC): Status: ACTIVE | Noted: 2018-11-13

## 2018-11-13 LAB
ALBUMIN SERPL-MCNC: 2.5 G/DL (ref 3.5–5)
ALBUMIN/GLOB SERPL: 0.6 {RATIO} (ref 1.1–2.2)
ALP SERPL-CCNC: 63 U/L (ref 45–117)
ALT SERPL-CCNC: 20 U/L (ref 12–78)
ANION GAP SERPL CALC-SCNC: 10 MMOL/L (ref 5–15)
AST SERPL-CCNC: 20 U/L (ref 15–37)
BASOPHILS # BLD: 0 K/UL (ref 0–0.1)
BASOPHILS NFR BLD: 0 % (ref 0–1)
BILIRUB SERPL-MCNC: 0.5 MG/DL (ref 0.2–1)
BUN SERPL-MCNC: 36 MG/DL (ref 6–20)
BUN/CREAT SERPL: 25 (ref 12–20)
CALCIUM SERPL-MCNC: 9.4 MG/DL (ref 8.5–10.1)
CHLORIDE SERPL-SCNC: 103 MMOL/L (ref 97–108)
CO2 SERPL-SCNC: 26 MMOL/L (ref 21–32)
CREAT SERPL-MCNC: 1.43 MG/DL (ref 0.55–1.02)
DIFFERENTIAL METHOD BLD: ABNORMAL
EOSINOPHIL # BLD: 0.2 K/UL (ref 0–0.4)
EOSINOPHIL NFR BLD: 2 % (ref 0–7)
ERYTHROCYTE [DISTWIDTH] IN BLOOD BY AUTOMATED COUNT: 16.9 % (ref 11.5–14.5)
GLOBULIN SER CALC-MCNC: 3.9 G/DL (ref 2–4)
GLUCOSE SERPL-MCNC: 94 MG/DL (ref 65–100)
HCT VFR BLD AUTO: 36.9 % (ref 35–47)
HGB BLD-MCNC: 11.8 G/DL (ref 11.5–16)
IMM GRANULOCYTES # BLD: 0.1 K/UL (ref 0–0.04)
IMM GRANULOCYTES NFR BLD AUTO: 1 % (ref 0–0.5)
LYMPHOCYTES # BLD: 0.6 K/UL (ref 0.8–3.5)
LYMPHOCYTES NFR BLD: 5 % (ref 12–49)
MCH RBC QN AUTO: 28.9 PG (ref 26–34)
MCHC RBC AUTO-ENTMCNC: 32 G/DL (ref 30–36.5)
MCV RBC AUTO: 90.4 FL (ref 80–99)
MONOCYTES # BLD: 1.1 K/UL (ref 0–1)
MONOCYTES NFR BLD: 9 % (ref 5–13)
NEUTS SEG # BLD: 10.8 K/UL (ref 1.8–8)
NEUTS SEG NFR BLD: 84 % (ref 32–75)
NRBC # BLD: 0 K/UL (ref 0–0.01)
NRBC BLD-RTO: 0 PER 100 WBC
PLATELET # BLD AUTO: 190 K/UL (ref 150–400)
PMV BLD AUTO: 11 FL (ref 8.9–12.9)
POTASSIUM SERPL-SCNC: 3.8 MMOL/L (ref 3.5–5.1)
PROT SERPL-MCNC: 6.4 G/DL (ref 6.4–8.2)
RBC # BLD AUTO: 4.08 M/UL (ref 3.8–5.2)
SODIUM SERPL-SCNC: 139 MMOL/L (ref 136–145)
WBC # BLD AUTO: 12.9 K/UL (ref 3.6–11)

## 2018-11-13 PROCEDURE — 74011250637 HC RX REV CODE- 250/637: Performed by: INTERNAL MEDICINE

## 2018-11-13 PROCEDURE — 74011250636 HC RX REV CODE- 250/636: Performed by: INTERNAL MEDICINE

## 2018-11-13 PROCEDURE — 74011636637 HC RX REV CODE- 636/637: Performed by: INTERNAL MEDICINE

## 2018-11-13 PROCEDURE — 97530 THERAPEUTIC ACTIVITIES: CPT | Performed by: PHYSICAL THERAPIST

## 2018-11-13 PROCEDURE — G8987 SELF CARE CURRENT STATUS: HCPCS

## 2018-11-13 PROCEDURE — 97165 OT EVAL LOW COMPLEX 30 MIN: CPT

## 2018-11-13 PROCEDURE — 77030038269 HC DRN EXT URIN PURWCK BARD -A

## 2018-11-13 PROCEDURE — 80053 COMPREHEN METABOLIC PANEL: CPT

## 2018-11-13 PROCEDURE — G8988 SELF CARE GOAL STATUS: HCPCS

## 2018-11-13 PROCEDURE — 74011000258 HC RX REV CODE- 258: Performed by: INTERNAL MEDICINE

## 2018-11-13 PROCEDURE — 36415 COLL VENOUS BLD VENIPUNCTURE: CPT

## 2018-11-13 PROCEDURE — 85025 COMPLETE CBC W/AUTO DIFF WBC: CPT

## 2018-11-13 PROCEDURE — 97161 PT EVAL LOW COMPLEX 20 MIN: CPT | Performed by: PHYSICAL THERAPIST

## 2018-11-13 PROCEDURE — 65270000029 HC RM PRIVATE

## 2018-11-13 RX ORDER — IPRATROPIUM BROMIDE AND ALBUTEROL SULFATE 2.5; .5 MG/3ML; MG/3ML
3 SOLUTION RESPIRATORY (INHALATION)
Status: DISCONTINUED | OUTPATIENT
Start: 2018-11-13 | End: 2018-11-20 | Stop reason: HOSPADM

## 2018-11-13 RX ADMIN — GABAPENTIN 400 MG: 100 CAPSULE ORAL at 16:56

## 2018-11-13 RX ADMIN — GABAPENTIN 400 MG: 100 CAPSULE ORAL at 21:09

## 2018-11-13 RX ADMIN — HYDRALAZINE HYDROCHLORIDE 100 MG: 25 TABLET, FILM COATED ORAL at 09:00

## 2018-11-13 RX ADMIN — HYDRALAZINE HYDROCHLORIDE 100 MG: 25 TABLET, FILM COATED ORAL at 17:00

## 2018-11-13 RX ADMIN — GABAPENTIN 400 MG: 100 CAPSULE ORAL at 12:36

## 2018-11-13 RX ADMIN — Medication 10 ML: at 05:44

## 2018-11-13 RX ADMIN — HYDRALAZINE HYDROCHLORIDE 100 MG: 25 TABLET, FILM COATED ORAL at 21:10

## 2018-11-13 RX ADMIN — GABAPENTIN 400 MG: 100 CAPSULE ORAL at 09:00

## 2018-11-13 RX ADMIN — NITROGLYCERIN 1 INCH: 20 OINTMENT TOPICAL at 12:36

## 2018-11-13 RX ADMIN — DILTIAZEM HYDROCHLORIDE 300 MG: 300 CAPSULE, COATED, EXTENDED RELEASE ORAL at 09:00

## 2018-11-13 RX ADMIN — TEMAZEPAM 15 MG: 15 CAPSULE ORAL at 21:10

## 2018-11-13 RX ADMIN — PREDNISONE 10 MG: 10 TABLET ORAL at 08:00

## 2018-11-13 RX ADMIN — ASPIRIN 81 MG CHEWABLE TABLET 81 MG: 81 TABLET CHEWABLE at 09:00

## 2018-11-13 RX ADMIN — NITROGLYCERIN 1 INCH: 20 OINTMENT TOPICAL at 04:07

## 2018-11-13 RX ADMIN — FUROSEMIDE 60 MG: 40 TABLET ORAL at 09:00

## 2018-11-13 RX ADMIN — ACETAMINOPHEN 650 MG: 325 TABLET ORAL at 16:57

## 2018-11-13 RX ADMIN — PAROXETINE HYDROCHLORIDE 20 MG: 20 TABLET, FILM COATED ORAL at 09:00

## 2018-11-13 RX ADMIN — LISINOPRIL 40 MG: 20 TABLET ORAL at 09:00

## 2018-11-13 RX ADMIN — METOPROLOL TARTRATE 50 MG: 50 TABLET, FILM COATED ORAL at 17:00

## 2018-11-13 RX ADMIN — AMPICILLIN SODIUM 2 G: 2 INJECTION, POWDER, FOR SOLUTION INTRAMUSCULAR; INTRAVENOUS at 05:43

## 2018-11-13 RX ADMIN — METOPROLOL TARTRATE 50 MG: 50 TABLET, FILM COATED ORAL at 09:00

## 2018-11-13 RX ADMIN — AMPICILLIN SODIUM 2 G: 2 INJECTION, POWDER, FOR SOLUTION INTRAMUSCULAR; INTRAVENOUS at 16:57

## 2018-11-13 RX ADMIN — Medication 1 CAPSULE: at 09:00

## 2018-11-13 RX ADMIN — Medication 10 ML: at 21:10

## 2018-11-13 NOTE — PROGRESS NOTES
General Surgery End of Shift Nursing Note Bedside shift change report given to [facility] Lucina Chou (oncoming nurse) by Roby Pop RN (offgoing nurse). Report included the following information SBAR, Kardex, Procedure Summary, Intake/Output, MAR, Accordion, Recent Results, Med Rec Status and Cardiac Rhythm NSR, SB. Shift worked:   7p-7a Summary of shift:    Patient is forgetful, arms bruised, reportedly sever[place]l [place] the [place]. Antibiotics on time, voiding into bedpan. Bed alarm on , Daughter abdias patton for a while last night. Patient poor medication history/memory. Issues for physician to address:   Progressing, Patient has 1 \" NTG paste to left deltoid for HTN. Number times ambulated in hallway past shift: 0 Number of times OOB to chair past shift: 1 Pain Management: 
Current medication: no pain Patient states pain is manageable on current pain medication: YES 
 
GI: 
 
Current diet:  DIET REGULAR 
DIET ONE TIME MESSAGE Tolerating current diet: YES Passing flatus: NO 
Last Bowel Movement: several days ago Appearance: unknown Respiratory: 
 
Incentive Spirometer at bedside: YES Patient instructed on use: YES Patient Safety: 
 
Falls Score: 3 Bed Alarm On? Yes Sitter?  No 
 
David Sommers, RN

## 2018-11-13 NOTE — INTERDISCIPLINARY ROUNDS
Interdisciplinary Rounds were completed on this patient. Rounds included nursing, clinical care leader, pharmacy, and case management. Patient had the following concerns: Questions about surgery Goals for the day will include: ABX, awaiting urine culture Anticipated discharge date: TBD \

## 2018-11-13 NOTE — ROUTINE PROCESS
-Please complete MRI History and Safety Screening Form for this patient using [facility]AR[facility] only under Orders Requiring a Screening Form: 
 

## 2018-11-13 NOTE — PROGRESS NOTES
Problem: Self Care Deficits Care Plan (Adult) Goal: *Acute Goals and Plan of Care (Insert Text) Occupational Therapy Goals Initiated 11/13/2018 1. Patient will perform grooming sitting on EOB with supervision/set-up within 7 day(s). 2.  Patient will perform bathing sitting on EOB with minimal assistance/contact guard assist within 7 day(s). 3.  Patient will perform lower body dressing with moderate assistance  within 7 day(s). 4.  Patient will perform toilet transfers with maximal assistance within 7 day(s). 5.  Patient will perform all aspects of toileting with maximal assistance within 7 day(s). 6.  Patient will participate in upper extremity therapeutic exercise/activities with supervision/set-up for 5 minutes within 7 day(s). 7.  Patient will utilize energy conservation techniques during functional activities with verbal cues within 7 day(s). Occupational Therapy EVALUATION Patient: Osman Harden (5 y.o. female) Date: 11/13/2018 Primary Diagnosis: Complicated UTI (urinary tract infection) Leukocytosis Precautions: fall ASSESSMENT : 
Based on the objective data described below, the patient presents with generalized weakness, decreased endurance, strength, functional mobility and ADLs. Pt was living at [facility] at the [facility] and she was able to transfer to and from surfaces to her wheelchair prior. Pt is wheelchair bound. Pt was cleared to be seen for therapy and all VSS and pt was supine in bed and friend in room with her. Pt was encouraged to log roll and come to sitting. She was max of 2 for log rolling and max of 2 for supine < >sit. Pt was able to sit on EOb with SBA with her hands in her lap and was able to challenge her balance and raise her hands over her head and did not lose her balance. Pt was left supine in bed with pillow under her legs and stated that her pain was about a 6. Recommend that pt have further therapy at discharge at rehab at [facility]. Bala Hollingsworth Patient will benefit from skilled intervention to address the above impairments. Patients rehabilitation potential is considered to be Good Factors which may influence rehabilitation potential include:  
[x]             None noted []             Mental ability/status []             Medical condition []             Home/family situation and support systems []             Safety awareness []             Pain tolerance/management 
[]             Other: PLAN : 
Recommendations and Planned Interventions: 
[x]               Self Care Training                  []        Therapeutic Activities [x]               Functional Mobility Training    []        Cognitive Retraining 
[x]               Therapeutic Exercises           [x]        Endurance Activities []               Balance Training                   []        Neuromuscular Re-Education []               Visual/Perceptual Training     [x]   Home Safety Training 
[x]               Patient Education                 [x]        Family Training/Education []               Other (comment): Frequency/Duration: Patient will be followed by occupational therapy 4 times a week to address goals. Discharge Recommendations: Tee López Further Equipment Recommendations for Discharge: tbd SUBJECTIVE:  
Patient stated I dont want to sit up. I didn't get any sleep last night.  OBJECTIVE DATA SUMMARY:  
HISTORY:  
Past Medical History:  
Diagnosis Date  Hypertension  Ill-defined condition Ex Lap with Dauna Grist patch of a perforated pyloric channel ulcer 7/19/16  Other ill-defined conditions(799.89) lipids  Other ill-defined conditions(799.89)   
 blood transfusion history Past Surgical History:  
Procedure Laterality Date  BYPASS GRAFT OTHR,FEM-FEM    
 BYPASS GRAFT OTHR,FEM-POP    
 right  HX HEENT    
 bilateral cataracts  HX ORTHOPAEDIC    
 right hip fracture/pinning  HX UROLOGICAL    
 right stent/kidney Prior Level of Function/Environment/Context: pt lives at [facility] at the crossings and was independent with ADLs and was wheelchair bound and was not walking. Expanded or extensive additional review of patient history:  
 
Home Situation Home Environment: Skilled nursing facility Care Facility Name: [facility] One[facility]Two [facility] Residence: Other (Comment) Living Alone: No 
Support Systems: Skilled nursing facility Patient Expects to be Discharged to[de-identified] Skilled nursing facility Current DME Used/Available at Home: Matteo Speak Hand dominance: RightEXAMINATION OF PERFORMANCE DEFICITS: 
Cognitive/Behavioral Status: 
Neurologic State: Alert Orientation Level: Oriented X4 Cognition: Follows commands Perception: Appears intact Perseveration: No perseveration noted Safety/Judgement: Awareness of environment Skin: in good health Edema: none noted Hearing: Auditory Auditory Impairment: None Vision/Perceptual:   
    
    
    
 intact Range of Motion: 
 
AROM: Generally decreased, functional 
PROM: Generally decreased, functional 
  
  
  
  
  
  
Strength: 
 
Strength: Generally decreased, functional 
  
  
  
  
Coordination: 
Coordination: Within functional limits Fine Motor Skills-Upper: Left Intact; Right Intact Gross Motor Skills-Upper: Left Intact; Right Intact Tone & Sensation: 
 
Tone: Normal 
Sensation: Intact Balance: 
Sitting: Impaired; Without support Sitting - Static: Fair (occasional) Sitting - Dynamic: Poor (constant support) Functional Mobility and Transfers for ADLs:Bed Mobility: 
Rolling: Maximum assistance;Assist x2 Supine to Sit: Maximum assistance;Assist x2 Sit to Supine: Maximum assistance;Assist x2 Scooting: Maximum assistance;Assist x2 Transfers: ADL Assessment: 
Feeding: Setup Oral Facial Hygiene/Grooming: Setup Bathing: Maximum assistance Upper Body Dressing: Moderate assistance;Minimum assistance Lower Body Dressing: Maximum assistance Toileting: Maximum assistance Cognitive Retraining Safety/Judgement: Awareness of environment Functional Measure: 
Barthel Index: 
 
Bathin Bladder: 0 Bowels: 5 Groomin Dressin Feedin Mobility: 0 Stairs: 0 Toilet Use: 0 Transfer (Bed to Chair and Back): 0 Total: 15 Barthel and G-code impairment scale: 
Percentage of impairment CH 
0% CI 
1-19% CJ 
20-39% CK 
40-59% CL 
60-79% CM 
80-99% CN 
100% Barthel Score 0-100 100 99-80 79-60 59-40 20-39 1-19 
 0 Barthel Score 0-20 20 17-19 13-16 9-12 5-8 1-4 0 The Barthel ADL Index: Guidelines 1. The index should be used as a record of what a patient does, not as a record of what a patient could do. 2. The main aim is to establish degree of independence from any help, physical or verbal, however minor and for whatever reason. 3. The need for supervision renders the patient not independent. 4. A patient's performance should be established using the best available evidence. Asking the patient, friends/relatives and nurses are the usual sources, but direct observation and common sense are also important. However direct testing is not needed. 5. Usually the patient's performance over the preceding 24-48 hours is important, but occasionally longer periods will be relevant. 6. Middle categories imply that the patient supplies over 50 per cent of the effort. 7. Use of aids to be independent is allowed. Xenia Valle., Barthel, D.W. (6704). Functional evaluation: the Barthel Index. [facility] (14)2. CARINA Washington, Jessica Wyatt., Suzie Bazan., [place], [address] [place] [address] (). Measuring the change indisability after inpatient rehabilitation; comparison of the responsiveness of the Barthel Index and Functional [place] Measure. Journal of Neurology, Neurosurgery, and Psychiatry, 66(4), 146-891.  
ROSE MARY Tinoco, PAULO Sanchez, Hesham Salinas, M.A. (2004.) Assessment of post-stroke quality of life in cost-effectiveness studies: The usefulness of the Barthel Index and the EuroQoL-5D. [facility], 13, 019-64 G codes: In compliance with CMSs Claims Based Outcome Reporting, the following G-code set was chosen for this patient based on their primary functional limitation being treated: The outcome measure chosen to determine the severity of the functional limitation was the Barthel with a score of 15/100 which was correlated with the impairment scale. ? Self Care:  
  - CURRENT STATUS: CM - 80%-99% impaired, limited or restricted  - GOAL STATUS: CL - 60%-79% impaired, limited or restricted  - D/C STATUS:  ---------------To be determined--------------- Occupational Therapy Evaluation Charge Determination History Examination Decision-Making MEDIUM Complexity : Expanded review of history including physical, cognitive and psychosocial  history  LOW Complexity : 1-3 performance deficits relating to physical, cognitive , or psychosocial skils that result in activity limitations and / or participation restrictions  LOW Complexity : No comorbidities that affect functional and no verbal or physical assistance needed to complete eval tasks Based on the above components, the patient evaluation is determined to be of the following complexity level: LOW Activity Tolerance:  
fair Please refer to the flowsheet for vital signs taken during this treatment. After treatment:  
[] Patient left in no apparent distress sitting up in chair 
[x] Patient left in no apparent distress in bed 
[x] Call bell left within reach [x] Nursing notified 
[x] Caregiver present 
[] Bed alarm activated COMMUNICATION/EDUCATION:  
The patients plan of care was discussed with: Physical Therapist, Registered Nurse and family. [x] Home safety education was provided and the patient/caregiver indicated understanding. [x] Patient/family have participated as able in goal setting and plan of care. [x] Patient/family agree to work toward stated goals and plan of care. [] Patient understands intent and goals of therapy, but is neutral about his/her participation. [] Patient is unable to participate in goal setting and plan of care. This patients plan of care is appropriate for delegation to [facility]. Thank you for this referral. 
Eduin Morgan, OT Time Calculation: 16 mins

## 2018-11-13 NOTE — PROGRESS NOTES
Report received from ED and pt arrived to room at 1906. Bedside report given to oncoming RN Bobo and dual skin assessment performed. Skin is intact - pt has a pink non blanchable area on sacrum ( old, healed pressure ulcer). Pt in NAD, meal tray provided, daughter at bedside.

## 2018-11-13 NOTE — PROGRESS NOTES
Problem: Mobility Impaired (Adult and Pediatric) Goal: *Acute Goals and Plan of Care (Insert Text) Physical Therapy Goals Initiated 11/13/2018 1. Patient will move from supine to sit and sit to supine  in bed with supervision within 7 day(s). 2.  Patient will transfer from bed to chair and chair to bed with maxA using the least restrictive device within 7 day(s). 3.  Patient will perform sit to stand with maxA x 2 within 7 day(s). 4.  Patient will demo supine exercises without cues for technique within 7 days. physical Therapy EVALUATION Patient: Chris Ferguson (22 y.o. female) Date: 11/13/2018 Primary Diagnosis: Complicated UTI (urinary tract infection) Leukocytosis Precautions:   Fall ASSESSMENT : 
Based on the objective data described below, the patient presents with decreased functional mobility from baseline level of function. Prior to admit patient [facility]. Patient states she has not been ambulatory but was working with therapy 3x/week on transferring in/out of her w/c. Sounds like she has been quite dependent recently with mobility. Currently awaiting kyphoplasty and has high pain levels. Only agreeable to sit EOB. Supine to sit with maxA x 2 and extra time to complete task. Fair sitting balance on EOB and reports high pain levels. MaxA x 2 to return to supine. Based on current level of function recommend SNF level rehab to return patient to previous level of function. Otherwise return to [facility] but will certainly need increased level of care. Patient will benefit from skilled intervention to address the above impairments. Patients rehabilitation potential is considered to be Good Factors which may influence rehabilitation potential include:  
[x]         None noted 
[]         Mental ability/status []         Medical condition 
[]         Home/family situation and support systems 
[]         Safety awareness []         Pain tolerance/management 
[]         Other: PLAN : 
Recommendations and Planned Interventions: 
[x]           Bed Mobility Training             [x]    Neuromuscular Re-Education 
[x]           Transfer Training                   []    Orthotic/Prosthetic Training 
[x]           Gait Training                         []    Modalities [x]           Therapeutic Exercises           []    Edema Management/Control 
[x]           Therapeutic Activities            [x]    Patient and Family Training/Education 
[]           Other (comment): Frequency/Duration: Patient will be followed by physical therapy  3 times a week to address goals. Discharge Recommendations: Tee López Further Equipment Recommendations for Discharge: TBD SUBJECTIVE:  
Patient stated I don't know if I can do all of that today.  OBJECTIVE DATA SUMMARY:  
HISTORY:   
Past Medical History:  
Diagnosis Date  Hypertension  Ill-defined condition Ex Lap with Dereck Mortimer patch of a perforated pyloric channel ulcer 7/19/16  Other ill-defined conditions(799.89) lipids  Other ill-defined conditions(799.89)   
 blood transfusion history Past Surgical History:  
Procedure Laterality Date  BYPASS GRAFT OTHR,FEM-FEM    
 BYPASS GRAFT OTHR,FEM-POP    
 right  HX HEENT    
 bilateral cataracts  HX ORTHOPAEDIC    
 right hip fracture/pinning  HX UROLOGICAL    
 right stent/kidney Prior Level of Function/Home Situation: non-ambulatory at baseline. Lives in [facility] and uses w/c for mobility. Was previously able to transfer herself but most recently has needed assistance Personal factors and/or comorbidities impacting plan of care:  
 
Home Situation Home Environment: Skilled nursing facility Care Facility Name: [facility] Residence: Other (Comment) Living Alone: No 
Support Systems: Skilled nursing facility Patient Expects to be Discharged to[de-identified] Skilled nursing facility Current DME Used/Available at Home: Jesica Wes EXAMINATION/PRESENTATION/DECISION MAKING: Critical Behavior: 
Neurologic State: Alert Orientation Level: Oriented X4 Cognition: Follows commands Safety/Judgement: Awareness of environment Hearing: Auditory Auditory Impairment: None Range Of Motion: 
AROM: Generally decreased, functional 
  
  
  
PROM: Generally decreased, functional 
  
  
  
Strength:   
Strength: Generally decreased, functional 
  
  
  
  
  
  
Tone & Sensation:  
Tone: Normal 
  
  
  
  
Sensation: Intact Coordination: 
Coordination: Within functional limits Vision:  
  
Functional Mobility: 
Bed Mobility: 
Rolling: Maximum assistance;Assist x2 Supine to Sit: Maximum assistance;Assist x2 Sit to Supine: Maximum assistance;Assist x2 Scooting: Maximum assistance;Assist x2 Transfers: 
  
  
     
  
     
  
  
Balance:  
Sitting: Impaired; Without support Sitting - Static: Fair (occasional) Sitting - Dynamic: Poor (constant support)Ambulation/Gait Training:  
  
 
Functional Measure: 
Barthel Index: 
 
Bathin Bladder: 0 Bowels: 5 Groomin Dressin Feedin Mobility: 0 Stairs: 0 Toilet Use: 0 Transfer (Bed to Chair and Back): 0 Total: 15 Barthel and G-code impairment scale: 
Percentage of impairment CH 
0% CI 
1-19% CJ 
20-39% CK 
40-59% CL 
60-79% CM 
80-99% CN 
100% Barthel Score 0-100 100 99-80 79-60 59-40 20-39 1-19 
 0 Barthel Score 0-20 20 17-19 13-16 9-12 5-8 1-4 0 The Barthel ADL Index: Guidelines 1. The index should be used as a record of what a patient does, not as a record of what a patient could do. 2. The main aim is to establish degree of independence from any help, physical or verbal, however minor and for whatever reason. 3. The need for supervision renders the patient not independent.  
4. A patient's performance should be established using the best available evidence. Asking the patient, friends/relatives and nurses are the usual sources, but direct observation and common sense are also important. However direct testing is not needed. 5. Usually the patient's performance over the preceding 24-48 hours is important, but occasionally longer periods will be relevant. 6. Middle categories imply that the patient supplies over 50 per cent of the effort. 7. Use of aids to be independent is allowed. You Dawkins., Barthel, D.W. (2225). Functional evaluation: the Barthel Index. [facility] (14)2. Juan Spann evan CARINA Rosales, Marquez Sánchez., Lesley Land., Harvinder, [facility] (1999). Measuring the change indisability after inpatient rehabilitation; comparison of the responsiveness of the Barthel Index and Functional [place] Measure. Journal of Neurology, Neurosurgery, and Psychiatry, 66(4), 968-292. Ifrah Mccabe NCOLLEEN.MICHELE, PAULO Sanchez, & Jeyson Gary MMitraA. (2004.) Assessment of post-stroke quality of life in cost-effectiveness studies: The usefulness of the Barthel Index and the EuroQoL-5D. [facility], 13, 577-74 G codes: In compliance with CMSs Claims Based Outcome Reporting, the following G-code set was chosen for this patient based on their primary functional limitation being treated: The outcome measure chosen to determine the severity of the functional limitation was the Barthel with a score of 15/100 which was correlated with the impairment scale. ? Mobility - Walking and Moving Around:  
  - CURRENT STATUS: CM - 80%-99% impaired, limited or restricted  - GOAL STATUS: CL - 60%-79% impaired, limited or restricted  - D/C STATUS:  ---------------To be determined---------------  
  
 
 
Pain: 
 reports pain in back in sitting but does not rate Activity Tolerance: VSS Please refer to the flowsheet for vital signs taken during this treatment. After treatment: []         Patient left in no apparent distress sitting up in chair 
[x]         Patient left in no apparent distress in bed 
[x]         Call bell left within reach [x]         Nursing notified 
[]         Caregiver present [x]         Bed alarm activated COMMUNICATION/EDUCATION:  
The patients plan of care was discussed with: Physical Therapist, Occupational Therapist and Registered Nurse. [x]         Fall prevention education was provided and the patient/caregiver indicated understanding. [x]         Patient/family have participated as able in goal setting and plan of care. [x]         Patient/family agree to work toward stated goals and plan of care. []         Patient understands intent and goals of therapy, but is neutral about his/her participation. []         Patient is unable to participate in goal setting and plan of care. Thank you for this referral. 
Christine Montaño, PT, DPT Time Calculation: 15 mins

## 2018-11-13 NOTE — PROGRESS NOTES
Reason for Admission:   Fall out of bed at [facility], hit head, T12 compression fracture with complicated UTI in addition. RRAT Score:     30 Resources/supports as identified by patient/family:   Supported by staff at [facility], has [facility] services with [facility], children are involved in care Top Challenges facing patient (as identified by patient/family and CM): Finances/Medication cost?      No concerns at this time Transportation? Pt does not drive, son assists with transport or facility assists Support system or lack thereof? supported Living arrangements? Lives [facility] room [facility] Self-care/ADLs/Cognition? Alert/oriented, needs assist with ADL's and dependent for IADL's Current Advanced Directive/Advance Care Plan:   
                       
Plan for utilizing home health: Pt is open to [facility] for PT/OT. Will need resumption orders at discharge depending on disposition. Pt has stayed at [facility] in past, does not want to return if rehab is needed. Has stayed at [facility] as well. DME includes wheelchair. Likelihood of readmission: high Transition of Care Plan:    Pt 80year old female. CM met with patient introduced self and role. Demographics verified. Medications supplied by the [facility]. Pt is a readmit, previously admitted 10/24-10/30 for renal failure, uretetal stent displacement. Discharged back to [place] with follow up with PCP. CM will continue to follow for discharge needs. Care Management Interventions PCP Verified by CM: Yes(active with dr Susu Calixto) Mode of Transport at Discharge: Other (see comment)(son will transport at discharge at this time pending hospital course) Transition of Care Consult (CM Consult): Discharge Planning([facility] is needed post op ) Discharge Durable Medical Equipment: No(wheelchair bound at facility) Physical Therapy Consult: Yes Occupational Therapy Consult: Yes Speech Therapy Consult: No 
Current Support Network: Assisted Living(Lives at [facility]) Confirm Follow Up Transport: Family(son transports to follow up appt) Plan discussed with Pt/Family/Caregiver: Yes VIVIANA CoppolaN, RN Care Manager [id number] 
[phone]

## 2018-11-13 NOTE — PROGRESS NOTES
CM received phone call from Joselito Cook with [facility]. Pt is open to them for SN/PT/OT, if returning to [place] from [address] will need resumption orders. Ivone Crenshaw can be contacted at [phone] with any questions or needs. VIVIANA Del AngelN, RN Care Manager [address] 
[phone]

## 2018-11-13 NOTE — PROGRESS NOTES
PROGRESS NOTE 
 
NAME:  Hailey Ramirez :   1935 MRN:   [id number] Date/Time:  2018 5:47 AM 
Subjective:  
History:  Chart reviewed and patient seen and examined this AM and D/W her nurse and all events noted. She was admitted yesterday with a fall out of bed and has acute T 12 compression fracture. She also hit her head but no LOC and CT head negative. She in addition was found to have a recurrent UTI and mild Leukocytosis although asymptomatic from UTI. She has no SOB except her chronic dyspnea and no other cardio/respiratory c/o. She has no GI c/o ans well as no  c/o. There are no neurologic c/o except generalized weakness and she has no other c/o on complete ROS. Medications reviewed: 
Current Facility-Administered Medications Medication Dose Route Frequency  acetaminophen (TYLENOL) tablet 650 mg  650 mg Oral Q4H PRN  
 aspirin chewable tablet 81 mg  81 mg Oral DAILY  dilTIAZem CD (CARDIZEM CD) capsule 300 mg  300 mg Oral DAILY  docusate sodium (COLACE) capsule 100 mg  100 mg Oral BID  
 gabapentin (NEURONTIN) capsule 400 mg  400 mg Oral QID  hydrALAZINE (APRESOLINE) tablet 100 mg  100 mg Oral TID  lactobac ac& pc-s.therm-b.anim (HANG Q/RISAQUAD)  1 Cap Oral DAILY  lisinopril (PRINIVIL, ZESTRIL) tablet 40 mg  40 mg Oral DAILY  metoprolol tartrate (LOPRESSOR) tablet 50 mg  50 mg Oral BID  PARoxetine (PAXIL) tablet 20 mg  20 mg Oral DAILY  polyethylene glycol (MIRALAX) packet 17 g  17 g Oral DAILY PRN  
 temazepam (RESTORIL) capsule 15 mg  15 mg Oral QHS  predniSONE (DELTASONE) tablet 10 mg  10 mg Oral DAILY WITH BREAKFAST  sodium chloride (NS) flush 5-10 mL  5-10 mL IntraVENous Q8H  
 sodium chloride (NS) flush 5-10 mL  5-10 mL IntraVENous PRN  
 acetaminophen (TYLENOL) tablet 650 mg  650 mg Oral Q4H PRN  
 ondansetron (ZOFRAN) injection 4 mg  4 mg IntraVENous Q4H PRN  
  ampicillin (OMNIPEN) 2 g in 0.9% sodium chloride (MBP/ADV) 100 mL  2 g IntraVENous Q12H  levoFLOXacin (LEVAQUIN) 750 mg in D5W IVPB  750 mg IntraVENous Q48H  
 furosemide (LASIX) tablet 60 mg  60 mg Oral DAILY  nitroglycerin (NITROBID) 2 % ointment 1 Inch  1 Inch Topical Q6H PRN Objective:  
Vitals: 
Visit Vitals /55 (BP 1 Location: Left arm, BP Patient Position: At rest) Comment: applied 1 inch NTG Paste to Left Deltoid Pulse (!) 55 Temp 98.6 °F (37 °C) Resp 16 Wt 122 lb (55.3 kg) SpO2 92% Comment: Using the adhesive probe BMI 22.68 kg/m² O2 Device: Room air Temp (24hrs), Av.6 °F (37 °C), Min:98.1 °F (36.7 °C), Max:99.1 °F (37.3 °C) Last 24hr Input/Output: 
 
Intake/Output Summary (Last 24 hours) at 2018 0529 Last data filed at 2018 1805 Gross per 24 hour Intake 100 ml Output  Net 100 ml PHYSICAL EXAM: 
General:     Alert, cooperative, no distress, appears stated age. Head:    Normocephalic, without obvious abnormality, atraumatic. Eyes:    Conjunctivae/corneas clear. PERRLA Nose:   Nares normal. No drainage or sinus tenderness. Throat:     Lips, mucosa, and tongue normal.  No Thrush Neck:   Supple, symmetrical,  no adenopathy, thyroid: non tender 
   no carotid bruit and no JVD. Back:     Symmetric,  No CVA tenderness. Lungs:    Clear to auscultation bilaterally except occ rhonchi and has decreased BS. No Wheezing. No rales. Heart:    Regular rate and rhythm,  no murmur, rub or gallop. Abdomen:    Soft, non-tender. Not distended. Bowel sounds normal. No masses Extremities:  Extremities normal, atraumatic, No cyanosis. No edema. + clubbing Lymph nodes:  Cervical, supraclavicular normal. 
Neurologic:  Normal strength, Alert and oriented X 3. Skin:                 No rash Lab Data Reviewed: 
 
Recent Results (from the past 24 hour(s)) CBC WITH AUTOMATED DIFF  Collection Time: 18 12:15 PM  
 Result Value Ref Range WBC 17.0 (H) 3.6 - 11.0 K/uL  
 RBC 4.28 3.80 - 5.20 M/uL  
 HGB 12.3 11.5 - 16.0 g/dL HCT 38.5 35.0 - 47.0 % MCV 90.0 80.0 - 99.0 FL  
 MCH 28.7 26.0 - 34.0 PG  
 MCHC 31.9 30.0 - 36.5 g/dL  
 RDW 17.2 (H) 11.5 - 14.5 % PLATELET 321 353 - 140 K/uL MPV 11.0 8.9 - 12.9 FL  
 NRBC 0.0 0  WBC ABSOLUTE NRBC 0.00 0.00 - 0.01 K/uL NEUTROPHILS 92 (H) 32 - 75 % LYMPHOCYTES 2 (L) 12 - 49 % MONOCYTES 4 (L) 5 - 13 % EOSINOPHILS 1 0 - 7 % BASOPHILS 0 0 - 1 % IMMATURE GRANULOCYTES 1 (H) 0.0 - 0.5 % ABS. NEUTROPHILS 15.6 (H) 1.8 - 8.0 K/UL  
 ABS. LYMPHOCYTES 0.3 (L) 0.8 - 3.5 K/UL  
 ABS. MONOCYTES 0.7 0.0 - 1.0 K/UL  
 ABS. EOSINOPHILS 0.2 0.0 - 0.4 K/UL  
 ABS. BASOPHILS 0.0 0.0 - 0.1 K/UL  
 ABS. IMM. GRANS. 0.2 (H) 0.00 - 0.04 K/UL  
 DF AUTOMATED    
 RBC COMMENTS NORMOCYTIC, NORMOCHROMIC METABOLIC PANEL, COMPREHENSIVE Collection Time: 11/12/18 12:15 PM  
Result Value Ref Range Sodium 138 136 - 145 mmol/L Potassium 4.1 3.5 - 5.1 mmol/L Chloride 103 97 - 108 mmol/L  
 CO2 27 21 - 32 mmol/L Anion gap 8 5 - 15 mmol/L Glucose 125 (H) 65 - 100 mg/dL BUN 37 (H) 6 - 20 MG/DL Creatinine 1.30 (H) 0.55 - 1.02 MG/DL  
 BUN/Creatinine ratio 28 (H) 12 - 20 GFR est AA 48 (L) >60 ml/min/1.73m2 GFR est non-AA 39 (L) >60 ml/min/1.73m2 Calcium 10.1 8.5 - 10.1 MG/DL Bilirubin, total 0.6 0.2 - 1.0 MG/DL  
 ALT (SGPT) 23 12 - 78 U/L  
 AST (SGOT) 21 15 - 37 U/L Alk. phosphatase 70 45 - 117 U/L Protein, total 6.9 6.4 - 8.2 g/dL Albumin 2.8 (L) 3.5 - 5.0 g/dL Globulin 4.1 (H) 2.0 - 4.0 g/dL A-G Ratio 0.7 (L) 1.1 - 2.2 URINALYSIS W/ REFLEX CULTURE Collection Time: 11/12/18  2:57 PM  
Result Value Ref Range Color ORANGE Appearance CLEAR CLEAR Specific gravity 1.019 1.003 - 1.030    
 pH (UA) 5.5 5.0 - 8.0 Protein >300 (A) NEG mg/dL Glucose NEGATIVE  NEG mg/dL Ketone NEGATIVE  NEG mg/dL Bilirubin NEGATIVE  NEG Blood NEGATIVE  NEG Urobilinogen 1.0 0.2 - 1.0 EU/dL Nitrites POSITIVE (A) NEG Leukocyte Esterase SMALL (A) NEG    
 WBC 5-10 0 - 4 /hpf  
 RBC 5-10 0 - 5 /hpf Epithelial cells MODERATE (A) FEW /lpf Bacteria 1+ (A) NEG /hpf  
 UA:UC IF INDICATED URINE CULTURE ORDERED (A) CNI Other: Renal Epithelial cells Present METABOLIC PANEL, COMPREHENSIVE Collection Time: 11/13/18  3:00 AM  
Result Value Ref Range Sodium 139 136 - 145 mmol/L Potassium 3.8 3.5 - 5.1 mmol/L Chloride 103 97 - 108 mmol/L  
 CO2 26 21 - 32 mmol/L Anion gap 10 5 - 15 mmol/L Glucose 94 65 - 100 mg/dL BUN 36 (H) 6 - 20 MG/DL Creatinine 1.43 (H) 0.55 - 1.02 MG/DL  
 BUN/Creatinine ratio 25 (H) 12 - 20 GFR est AA 43 (L) >60 ml/min/1.73m2 GFR est non-AA 35 (L) >60 ml/min/1.73m2 Calcium 9.4 8.5 - 10.1 MG/DL Bilirubin, total 0.5 0.2 - 1.0 MG/DL  
 ALT (SGPT) 20 12 - 78 U/L  
 AST (SGOT) 20 15 - 37 U/L Alk. phosphatase 63 45 - 117 U/L Protein, total 6.4 6.4 - 8.2 g/dL Albumin 2.5 (L) 3.5 - 5.0 g/dL Globulin 3.9 2.0 - 4.0 g/dL A-G Ratio 0.6 (L) 1.1 - 2.2    
CBC WITH AUTOMATED DIFF Collection Time: 11/13/18  3:00 AM  
Result Value Ref Range WBC 12.9 (H) 3.6 - 11.0 K/uL  
 RBC 4.08 3.80 - 5.20 M/uL  
 HGB 11.8 11.5 - 16.0 g/dL HCT 36.9 35.0 - 47.0 % MCV 90.4 80.0 - 99.0 FL  
 MCH 28.9 26.0 - 34.0 PG  
 MCHC 32.0 30.0 - 36.5 g/dL  
 RDW 16.9 (H) 11.5 - 14.5 % PLATELET 476 099 - 332 K/uL MPV 11.0 8.9 - 12.9 FL  
 NRBC 0.0 0  WBC ABSOLUTE NRBC 0.00 0.00 - 0.01 K/uL NEUTROPHILS 84 (H) 32 - 75 % LYMPHOCYTES 5 (L) 12 - 49 % MONOCYTES 9 5 - 13 % EOSINOPHILS 2 0 - 7 % BASOPHILS 0 0 - 1 % IMMATURE GRANULOCYTES 1 (H) 0.0 - 0.5 % ABS. NEUTROPHILS 10.8 (H) 1.8 - 8.0 K/UL  
 ABS. LYMPHOCYTES 0.6 (L) 0.8 - 3.5 K/UL  
 ABS. MONOCYTES 1.1 (H) 0.0 - 1.0 K/UL  
 ABS. EOSINOPHILS 0.2 0.0 - 0.4 K/UL ABS. BASOPHILS 0.0 0.0 - 0.1 K/UL  
 ABS. IMM. GRANS. 0.1 (H) 0.00 - 0.04 K/UL  
 DF AUTOMATED Assessment/Plan:  
 
Principal Problem: 
  Complicated UTI (urinary tract infection) (11/12/2018) Active Problems: 
  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 
 
  
___________________________________________________ PLAN: 
 
1.  T 12 Kyphoplasty for Fracture 2. Continue Ampicillin and Levaquin for UTI pending culture 3. Priscila Q 
4. Follow Leukocytosis (12.9 adm ) to 17.0 now 5. Follow renal function (37/1.30 adm) to 36/1.43 now 6. Hydralazine, Metoprolol, Cardizem and Lisinopril for HTN with NTP added on adm 7. Mobilize 8.  JA PRN for COPD 
 
 
35 minutes spent in direct care of this high complexity patient today 
 
 
___________________________________________________ Attending Physician: Bhargav Winter MD

## 2018-11-14 ENCOUNTER — APPOINTMENT (OUTPATIENT)
Dept: MRI IMAGING | Age: 83
DRG: 516 | End: 2018-11-14
Attending: INTERNAL MEDICINE
Payer: MEDICARE

## 2018-11-14 LAB
ANION GAP SERPL CALC-SCNC: 11 MMOL/L (ref 5–15)
BACTERIA SPEC CULT: ABNORMAL
BACTERIA SPEC CULT: ABNORMAL
BASOPHILS # BLD: 0 K/UL (ref 0–0.1)
BASOPHILS NFR BLD: 0 % (ref 0–1)
BUN SERPL-MCNC: 42 MG/DL (ref 6–20)
BUN/CREAT SERPL: 21 (ref 12–20)
CALCIUM SERPL-MCNC: 9.7 MG/DL (ref 8.5–10.1)
CC UR VC: ABNORMAL
CHLORIDE SERPL-SCNC: 101 MMOL/L (ref 97–108)
CO2 SERPL-SCNC: 25 MMOL/L (ref 21–32)
CREAT SERPL-MCNC: 2 MG/DL (ref 0.55–1.02)
DIFFERENTIAL METHOD BLD: ABNORMAL
EOSINOPHIL # BLD: 0.2 K/UL (ref 0–0.4)
EOSINOPHIL NFR BLD: 2 % (ref 0–7)
ERYTHROCYTE [DISTWIDTH] IN BLOOD BY AUTOMATED COUNT: 16.7 % (ref 11.5–14.5)
GLUCOSE SERPL-MCNC: 106 MG/DL (ref 65–100)
HCT VFR BLD AUTO: 37.9 % (ref 35–47)
HGB BLD-MCNC: 12.3 G/DL (ref 11.5–16)
IMM GRANULOCYTES # BLD: 0.2 K/UL (ref 0–0.04)
IMM GRANULOCYTES NFR BLD AUTO: 2 % (ref 0–0.5)
LYMPHOCYTES # BLD: 0.8 K/UL (ref 0.8–3.5)
LYMPHOCYTES NFR BLD: 6 % (ref 12–49)
MCH RBC QN AUTO: 28.8 PG (ref 26–34)
MCHC RBC AUTO-ENTMCNC: 32.5 G/DL (ref 30–36.5)
MCV RBC AUTO: 88.8 FL (ref 80–99)
MONOCYTES # BLD: 1.1 K/UL (ref 0–1)
MONOCYTES NFR BLD: 8 % (ref 5–13)
NEUTS SEG # BLD: 11.1 K/UL (ref 1.8–8)
NEUTS SEG NFR BLD: 82 % (ref 32–75)
NRBC # BLD: 0 K/UL (ref 0–0.01)
NRBC BLD-RTO: 0 PER 100 WBC
PLATELET # BLD AUTO: 162 K/UL (ref 150–400)
PMV BLD AUTO: 11.9 FL (ref 8.9–12.9)
POTASSIUM SERPL-SCNC: 3.8 MMOL/L (ref 3.5–5.1)
RBC # BLD AUTO: 4.27 M/UL (ref 3.8–5.2)
SERVICE CMNT-IMP: ABNORMAL
SODIUM SERPL-SCNC: 137 MMOL/L (ref 136–145)
WBC # BLD AUTO: 13.5 K/UL (ref 3.6–11)

## 2018-11-14 PROCEDURE — 72146 MRI CHEST SPINE W/O DYE: CPT

## 2018-11-14 PROCEDURE — 77030038269 HC DRN EXT URIN PURWCK BARD -A

## 2018-11-14 PROCEDURE — 85025 COMPLETE CBC W/AUTO DIFF WBC: CPT

## 2018-11-14 PROCEDURE — 74011000258 HC RX REV CODE- 258: Performed by: INTERNAL MEDICINE

## 2018-11-14 PROCEDURE — 97530 THERAPEUTIC ACTIVITIES: CPT

## 2018-11-14 PROCEDURE — 74011250636 HC RX REV CODE- 250/636: Performed by: INTERNAL MEDICINE

## 2018-11-14 PROCEDURE — 74011636637 HC RX REV CODE- 636/637: Performed by: INTERNAL MEDICINE

## 2018-11-14 PROCEDURE — 65270000029 HC RM PRIVATE

## 2018-11-14 PROCEDURE — 36415 COLL VENOUS BLD VENIPUNCTURE: CPT

## 2018-11-14 PROCEDURE — 80048 BASIC METABOLIC PNL TOTAL CA: CPT

## 2018-11-14 PROCEDURE — 74011250637 HC RX REV CODE- 250/637: Performed by: INTERNAL MEDICINE

## 2018-11-14 RX ORDER — DEXTROSE, SODIUM CHLORIDE, AND POTASSIUM CHLORIDE 5; .45; .075 G/100ML; G/100ML; G/100ML
50 INJECTION INTRAVENOUS CONTINUOUS
Status: DISCONTINUED | OUTPATIENT
Start: 2018-11-14 | End: 2018-11-20 | Stop reason: HOSPADM

## 2018-11-14 RX ADMIN — ACETAMINOPHEN 650 MG: 325 TABLET ORAL at 08:30

## 2018-11-14 RX ADMIN — GABAPENTIN 400 MG: 100 CAPSULE ORAL at 17:04

## 2018-11-14 RX ADMIN — HYDRALAZINE HYDROCHLORIDE 100 MG: 25 TABLET, FILM COATED ORAL at 08:32

## 2018-11-14 RX ADMIN — PREDNISONE 10 MG: 10 TABLET ORAL at 08:31

## 2018-11-14 RX ADMIN — TEMAZEPAM 15 MG: 15 CAPSULE ORAL at 22:03

## 2018-11-14 RX ADMIN — Medication 1 CAPSULE: at 08:32

## 2018-11-14 RX ADMIN — DOCUSATE SODIUM 100 MG: 100 CAPSULE, LIQUID FILLED ORAL at 17:04

## 2018-11-14 RX ADMIN — AMPICILLIN SODIUM 2 G: 2 INJECTION, POWDER, FOR SOLUTION INTRAMUSCULAR; INTRAVENOUS at 17:06

## 2018-11-14 RX ADMIN — Medication 10 ML: at 05:09

## 2018-11-14 RX ADMIN — PAROXETINE HYDROCHLORIDE 20 MG: 20 TABLET, FILM COATED ORAL at 08:31

## 2018-11-14 RX ADMIN — Medication 10 ML: at 22:04

## 2018-11-14 RX ADMIN — HYDRALAZINE HYDROCHLORIDE 100 MG: 25 TABLET, FILM COATED ORAL at 17:04

## 2018-11-14 RX ADMIN — METOPROLOL TARTRATE 50 MG: 50 TABLET, FILM COATED ORAL at 08:31

## 2018-11-14 RX ADMIN — DILTIAZEM HYDROCHLORIDE 300 MG: 300 CAPSULE, COATED, EXTENDED RELEASE ORAL at 08:30

## 2018-11-14 RX ADMIN — LISINOPRIL 40 MG: 20 TABLET ORAL at 08:31

## 2018-11-14 RX ADMIN — GABAPENTIN 400 MG: 100 CAPSULE ORAL at 08:31

## 2018-11-14 RX ADMIN — ASPIRIN 81 MG CHEWABLE TABLET 81 MG: 81 TABLET CHEWABLE at 08:30

## 2018-11-14 RX ADMIN — METOPROLOL TARTRATE 50 MG: 50 TABLET, FILM COATED ORAL at 17:04

## 2018-11-14 RX ADMIN — AMPICILLIN SODIUM 2 G: 2 INJECTION, POWDER, FOR SOLUTION INTRAMUSCULAR; INTRAVENOUS at 05:09

## 2018-11-14 RX ADMIN — LEVOFLOXACIN 750 MG: 5 INJECTION, SOLUTION INTRAVENOUS at 22:04

## 2018-11-14 RX ADMIN — HYDRALAZINE HYDROCHLORIDE 100 MG: 25 TABLET, FILM COATED ORAL at 22:03

## 2018-11-14 RX ADMIN — DEXTROSE MONOHYDRATE, SODIUM CHLORIDE, AND POTASSIUM CHLORIDE 50 ML/HR: 50; 4.5; .745 INJECTION, SOLUTION INTRAVENOUS at 08:32

## 2018-11-14 RX ADMIN — GABAPENTIN 400 MG: 100 CAPSULE ORAL at 12:53

## 2018-11-14 RX ADMIN — FUROSEMIDE 60 MG: 40 TABLET ORAL at 08:31

## 2018-11-14 RX ADMIN — GABAPENTIN 400 MG: 100 CAPSULE ORAL at 22:03

## 2018-11-14 NOTE — PROGRESS NOTES
Bedside and Verbal shift change report given to Flora Lepe (oncoming nurse) by Lorne Mayorga (offgoing nurse). Report included the following information SBAR, Kardex and MAR. Statement Selected

## 2018-11-14 NOTE — PROGRESS NOTES
Problem: Mobility Impaired (Adult and Pediatric) Goal: *Acute Goals and Plan of Care (Insert Text) Physical Therapy Goals Initiated 11/13/2018 1. Patient will move from supine to sit and sit to supine  in bed with supervision within 7 day(s). 2.  Patient will transfer from bed to chair and chair to bed with maxA using the least restrictive device within 7 day(s). 3.  Patient will perform sit to stand with maxA x 2 within 7 day(s). 4.  Patient will demo supine exercises without cues for technique within 7 days. physical Therapy TREATMENT Patient: Kiley Urbina (12 y.o. female) Date: 11/14/2018 Diagnosis: Complicated UTI (urinary tract infection) Leukocytosis Complicated UTI (urinary tract infection) Precautions: Fall Chart, physical therapy assessment, plan of care and goals were reviewed. ASSESSMENT: 
Pt cleared by nurse to mobilize. Pt received in bed supine. Pt agreeable to getting to chair to eat lunch. Pt performed supine to sit at min A x2. Pt performed sit to stand transfer at min A with additional time and cueing for hand placement. Pt able to take a few steps over to recliner at min A with RW with constant cueing. Pt left up in chair with all needs met to eat lunch. Pt will benefit from SNF rehab to improve strength and endurance for safe mobility. Progression toward goals: 
[]    Improving appropriately and progressing toward goals [x]    Improving slowly and progressing toward goals 
[]    Not making progress toward goals and plan of care will be adjusted PLAN: 
Patient continues to benefit from skilled intervention to address the above impairments. Continue treatment per established plan of care. Discharge Recommendations:  Tee López Further Equipment Recommendations for Discharge:  TBD by rehab SUBJECTIVE:  
Patient stated I've had a long day.  OBJECTIVE DATA SUMMARY:  
Critical Behavior: 
Neurologic State: Alert Orientation Level: Oriented X4 Cognition: Memory loss Safety/Judgement: Awareness of environment Functional Mobility Training: 
Bed Mobility: 
Rolling: Moderate assistance Supine to Sit: Minimum assistance;Assist x2 Scooting: Moderate assistance Transfers: 
Sit to Stand: Minimum assistance; Moderate assistance; Additional time Stand to Sit: Contact guard assistance; Additional time Bed to Chair: Minimum assistance; Additional time;Assist x1 Balance: 
Sitting: Intact Sitting - Static: Fair (occasional) Sitting - Dynamic: Poor (constant support) Standing: Impaired Standing - Static: Fair;Constant support Standing - Dynamic : PoorAmbulation/Gait Training: 
  
  
  
  
  Pt able to take a few steps over to recliner with RW at min A Pain: 
  
 Pt reported having pain with all mobility Activity Tolerance: Pt with improved mobility tolerance. After treatment:  
[x]    Patient left in no apparent distress sitting up in chair 
[]    Patient left in no apparent distress in bed 
[x]    Call bell left within reach [x]    Nursing notified 
[]    Caregiver present 
[]    Bed alarm activated COMMUNICATION/COLLABORATION:  
The patients plan of care was discussed with: Registered Nurse Aziza Ramos Time Calculation: 17 mins

## 2018-11-14 NOTE — PROGRESS NOTES
Spoke with MD Zainab Jimenez about WBC needing to trend down before proceeding with Kyphoplasty per MD Alma Landa.

## 2018-11-14 NOTE — PROGRESS NOTES
PROGRESS NOTE 
 
NAME:  Chris Query :   1935 MRN:   [id number] Date/Time:  2018 7:20 AM 
Subjective:  
History:  Chart reviewed and patient seen and examined this AM and D/W her nurse and her daughter and all events noted. She was admitted on  with a fall out of bed and has acute T 12 compression fracture. She also hit her head but no LOC and CT head was negative. She in addition was found to have a recurrent UTI and mild Leukocytosis although asymptomatic from UTI. She has no SOB except her chronic dyspnea and no other cardio/respiratory c/o. She has no GI c/o as well as no  c/o. There are no neurologic c/o except generalized weakness and she has no other c/o on complete ROS. She is a little more confused this AM during my exam. 
 
Medications reviewed: 
Current Facility-Administered Medications Medication Dose Route Frequency  albuterol-ipratropium (DUO-NEB) 2.5 MG-0.5 MG/3 ML  3 mL Nebulization Q4H PRN  
 acetaminophen (TYLENOL) tablet 650 mg  650 mg Oral Q4H PRN  
 aspirin chewable tablet 81 mg  81 mg Oral DAILY  dilTIAZem CD (CARDIZEM CD) capsule 300 mg  300 mg Oral DAILY  docusate sodium (COLACE) capsule 100 mg  100 mg Oral BID  
 gabapentin (NEURONTIN) capsule 400 mg  400 mg Oral QID  hydrALAZINE (APRESOLINE) tablet 100 mg  100 mg Oral TID  lactobac ac& pc-s.therm-b.anim (HANG Q/RISAQUAD)  1 Cap Oral DAILY  lisinopril (PRINIVIL, ZESTRIL) tablet 40 mg  40 mg Oral DAILY  metoprolol tartrate (LOPRESSOR) tablet 50 mg  50 mg Oral BID  PARoxetine (PAXIL) tablet 20 mg  20 mg Oral DAILY  polyethylene glycol (MIRALAX) packet 17 g  17 g Oral DAILY PRN  
 temazepam (RESTORIL) capsule 15 mg  15 mg Oral QHS  predniSONE (DELTASONE) tablet 10 mg  10 mg Oral DAILY WITH BREAKFAST  sodium chloride (NS) flush 5-10 mL  5-10 mL IntraVENous Q8H  
 sodium chloride (NS) flush 5-10 mL  5-10 mL IntraVENous PRN  
  acetaminophen (TYLENOL) tablet 650 mg  650 mg Oral Q4H PRN  
 ondansetron (ZOFRAN) injection 4 mg  4 mg IntraVENous Q4H PRN  
 ampicillin (OMNIPEN) 2 g in 0.9% sodium chloride (MBP/ADV) 100 mL  2 g IntraVENous Q12H  levoFLOXacin (LEVAQUIN) 750 mg in D5W IVPB  750 mg IntraVENous Q48H  
 furosemide (LASIX) tablet 60 mg  60 mg Oral DAILY  nitroglycerin (NITROBID) 2 % ointment 1 Inch  1 Inch Topical Q6H PRN Objective:  
Vitals: 
Visit Vitals /65 (BP 1 Location: Left arm, BP Patient Position: At rest) Pulse (!) 54 Temp 98.9 °F (37.2 °C) Resp 18 Wt 122 lb (55.3 kg) SpO2 100% BMI 22.68 kg/m² O2 Device: Room air Temp (24hrs), Av.5 °F (36.9 °C), Min:98 °F (36.7 °C), Max:98.9 °F (37.2 °C) Last 24hr Input/Output: 
 
Intake/Output Summary (Last 24 hours) at 2018 0720 Last data filed at 2018 7308 Gross per 24 hour Intake  Output 500 ml Net -500 ml PHYSICAL EXAM: 
General:     Alert, cooperative, no distress, appears stated age. Head:    Normocephalic, without obvious abnormality, atraumatic. Eyes:    Conjunctivae/corneas clear. PERRLA Nose:   Nares normal. No drainage or sinus tenderness. Throat:     Lips, mucosa, and tongue normal.  No Thrush Neck:   Supple, symmetrical,  no adenopathy, thyroid: non tender 
   no carotid bruit and no JVD. Back:     Symmetric,  No CVA tenderness. Lungs:    Clear to auscultation bilaterally except occ rhonchi and has decreased BS. No Wheezing. No rales. Heart:    Regular rate and rhythm,  no murmur, rub or gallop. Abdomen:    Soft, non-tender. Not distended. Bowel sounds normal. No masses Extremities:  Extremities normal, atraumatic, No cyanosis. No edema. + clubbing Lymph nodes:  Cervical, supraclavicular normal. 
Neurologic:  Normal strength, Alert and oriented X 3 although more confused today. Skin:                 No rash Lab Data Reviewed: 
 
Recent Results (from the past 24 hour(s)) METABOLIC PANEL, BASIC Collection Time: 11/14/18  2:42 AM  
Result Value Ref Range Sodium 137 136 - 145 mmol/L Potassium 3.8 3.5 - 5.1 mmol/L Chloride 101 97 - 108 mmol/L  
 CO2 25 21 - 32 mmol/L Anion gap 11 5 - 15 mmol/L Glucose 106 (H) 65 - 100 mg/dL BUN 42 (H) 6 - 20 MG/DL Creatinine 2.00 (H) 0.55 - 1.02 MG/DL  
 BUN/Creatinine ratio 21 (H) 12 - 20 GFR est AA 29 (L) >60 ml/min/1.73m2 GFR est non-AA 24 (L) >60 ml/min/1.73m2 Calcium 9.7 8.5 - 10.1 MG/DL  
CBC WITH AUTOMATED DIFF Collection Time: 11/14/18  2:42 AM  
Result Value Ref Range WBC 13.5 (H) 3.6 - 11.0 K/uL  
 RBC 4.27 3.80 - 5.20 M/uL  
 HGB 12.3 11.5 - 16.0 g/dL HCT 37.9 35.0 - 47.0 % MCV 88.8 80.0 - 99.0 FL  
 MCH 28.8 26.0 - 34.0 PG  
 MCHC 32.5 30.0 - 36.5 g/dL  
 RDW 16.7 (H) 11.5 - 14.5 % PLATELET 546 015 - 544 K/uL MPV 11.9 8.9 - 12.9 FL  
 NRBC 0.0 0  WBC ABSOLUTE NRBC 0.00 0.00 - 0.01 K/uL NEUTROPHILS 82 (H) 32 - 75 % LYMPHOCYTES 6 (L) 12 - 49 % MONOCYTES 8 5 - 13 % EOSINOPHILS 2 0 - 7 % BASOPHILS 0 0 - 1 % IMMATURE GRANULOCYTES 2 (H) 0.0 - 0.5 % ABS. NEUTROPHILS 11.1 (H) 1.8 - 8.0 K/UL  
 ABS. LYMPHOCYTES 0.8 0.8 - 3.5 K/UL  
 ABS. MONOCYTES 1.1 (H) 0.0 - 1.0 K/UL  
 ABS. EOSINOPHILS 0.2 0.0 - 0.4 K/UL  
 ABS. BASOPHILS 0.0 0.0 - 0.1 K/UL  
 ABS. IMM. GRANS. 0.2 (H) 0.00 - 0.04 K/UL  
 DF AUTOMATED Assessment/Plan:  
 
Principal Problem: 
  Complicated UTI (urinary tract infection) (11/12/2018) Active Problems: 
  Essential hypertension (7/20/2016) PAF (paroxysmal atrial fibrillation) ([facility] (3/30/2018) COPD (chronic obstructive pulmonary disease) ([facility] (8/14/2010) Leukocytosis (4/8/2018) Stage 3 chronic kidney disease ([facility] (11/5/2018) Closed compression fracture of thoracic vertebra ([facility] (11/13/2018) 
 
  
___________________________________________________ PLAN: 
 
 1.  T 12 Kyphoplasty for Fracture; however need MRI first which was delayed until today 2. Continue Ampicillin and Levaquin for UTI pending culture, no results yet 3. Priscila Q 
4. Follow Leukocytosis (12.9 adm ) to 13.1 now 5. Follow renal function (37/1.30 adm) to 42/2.0 now 6. Hydralazine, Metoprolol, Cardizem and Lisinopril for HTN with NTP added on adm 7. Mobilize 8.  JA PRN for COPD 
9. Will give some fluids IV with increased Creat. 
 
 
___________________________________________________ Attending Physician: Dora Chakraborty MD

## 2018-11-14 NOTE — PROGRESS NOTES
CM reviewed medical record. Met with patient and discussed PT/OT recommendations for SNF. Pt is agreeable, SNF list left with patient to review with children. Pending surgery, will need to re evaluate PT/OT recommendations post op. Usman Taveras, VIVIANAN, RN Care Manager [facility] 
[phone]

## 2018-11-15 LAB
ANION GAP SERPL CALC-SCNC: 10 MMOL/L (ref 5–15)
APPEARANCE UR: CLEAR
BACTERIA URNS QL MICRO: NEGATIVE /HPF
BASOPHILS # BLD: 0 K/UL (ref 0–0.1)
BASOPHILS NFR BLD: 0 % (ref 0–1)
BILIRUB UR QL: NEGATIVE
BUN SERPL-MCNC: 44 MG/DL (ref 6–20)
BUN/CREAT SERPL: 22 (ref 12–20)
CALCIUM SERPL-MCNC: 9 MG/DL (ref 8.5–10.1)
CHLORIDE SERPL-SCNC: 100 MMOL/L (ref 97–108)
CO2 SERPL-SCNC: 26 MMOL/L (ref 21–32)
COLOR UR: ABNORMAL
CREAT SERPL-MCNC: 2.03 MG/DL (ref 0.55–1.02)
DIFFERENTIAL METHOD BLD: ABNORMAL
EOSINOPHIL # BLD: 0.3 K/UL (ref 0–0.4)
EOSINOPHIL NFR BLD: 3 % (ref 0–7)
EPITH CASTS URNS QL MICRO: ABNORMAL /LPF
ERYTHROCYTE [DISTWIDTH] IN BLOOD BY AUTOMATED COUNT: 16.9 % (ref 11.5–14.5)
GLUCOSE SERPL-MCNC: 105 MG/DL (ref 65–100)
GLUCOSE UR STRIP.AUTO-MCNC: NEGATIVE MG/DL
HCT VFR BLD AUTO: 38.2 % (ref 35–47)
HGB BLD-MCNC: 12.2 G/DL (ref 11.5–16)
HGB UR QL STRIP: NEGATIVE
HYALINE CASTS URNS QL MICRO: ABNORMAL /LPF (ref 0–5)
IMM GRANULOCYTES # BLD: 0.2 K/UL (ref 0–0.04)
IMM GRANULOCYTES NFR BLD AUTO: 2 % (ref 0–0.5)
KETONES UR QL STRIP.AUTO: NEGATIVE MG/DL
LEUKOCYTE ESTERASE UR QL STRIP.AUTO: NEGATIVE
LYMPHOCYTES # BLD: 0.8 K/UL (ref 0.8–3.5)
LYMPHOCYTES NFR BLD: 8 % (ref 12–49)
MCH RBC QN AUTO: 29 PG (ref 26–34)
MCHC RBC AUTO-ENTMCNC: 31.9 G/DL (ref 30–36.5)
MCV RBC AUTO: 90.7 FL (ref 80–99)
MONOCYTES # BLD: 0.9 K/UL (ref 0–1)
MONOCYTES NFR BLD: 10 % (ref 5–13)
NEUTS SEG # BLD: 7.2 K/UL (ref 1.8–8)
NEUTS SEG NFR BLD: 77 % (ref 32–75)
NITRITE UR QL STRIP.AUTO: NEGATIVE
NRBC # BLD: 0 K/UL (ref 0–0.01)
NRBC BLD-RTO: 0 PER 100 WBC
PH UR STRIP: 5.5 [PH] (ref 5–8)
PLATELET # BLD AUTO: 157 K/UL (ref 150–400)
PMV BLD AUTO: 11.2 FL (ref 8.9–12.9)
POTASSIUM SERPL-SCNC: 3.6 MMOL/L (ref 3.5–5.1)
PROT UR STRIP-MCNC: 100 MG/DL
RBC # BLD AUTO: 4.21 M/UL (ref 3.8–5.2)
RBC #/AREA URNS HPF: ABNORMAL /HPF (ref 0–5)
RBC MORPH BLD: ABNORMAL
SODIUM SERPL-SCNC: 136 MMOL/L (ref 136–145)
SP GR UR REFRACTOMETRY: 1.02 (ref 1–1.03)
UA: UC IF INDICATED,UAUC: ABNORMAL
UROBILINOGEN UR QL STRIP.AUTO: 0.2 EU/DL (ref 0.2–1)
WBC # BLD AUTO: 9.4 K/UL (ref 3.6–11)
WBC URNS QL MICRO: ABNORMAL /HPF (ref 0–4)

## 2018-11-15 PROCEDURE — 74011250636 HC RX REV CODE- 250/636: Performed by: INTERNAL MEDICINE

## 2018-11-15 PROCEDURE — 36415 COLL VENOUS BLD VENIPUNCTURE: CPT

## 2018-11-15 PROCEDURE — 81001 URINALYSIS AUTO W/SCOPE: CPT

## 2018-11-15 PROCEDURE — 80048 BASIC METABOLIC PNL TOTAL CA: CPT

## 2018-11-15 PROCEDURE — 74011000258 HC RX REV CODE- 258: Performed by: INTERNAL MEDICINE

## 2018-11-15 PROCEDURE — 97535 SELF CARE MNGMENT TRAINING: CPT

## 2018-11-15 PROCEDURE — 85025 COMPLETE CBC W/AUTO DIFF WBC: CPT

## 2018-11-15 PROCEDURE — 97530 THERAPEUTIC ACTIVITIES: CPT

## 2018-11-15 PROCEDURE — 74011636637 HC RX REV CODE- 636/637: Performed by: INTERNAL MEDICINE

## 2018-11-15 PROCEDURE — 74011250637 HC RX REV CODE- 250/637: Performed by: INTERNAL MEDICINE

## 2018-11-15 PROCEDURE — 65270000029 HC RM PRIVATE

## 2018-11-15 PROCEDURE — 97116 GAIT TRAINING THERAPY: CPT

## 2018-11-15 PROCEDURE — 77030038269 HC DRN EXT URIN PURWCK BARD -A

## 2018-11-15 RX ORDER — LEVOFLOXACIN 5 MG/ML
500 INJECTION, SOLUTION INTRAVENOUS
Status: COMPLETED | OUTPATIENT
Start: 2018-11-16 | End: 2018-11-19

## 2018-11-15 RX ORDER — GABAPENTIN 100 MG/1
200 CAPSULE ORAL 4 TIMES DAILY
Status: DISCONTINUED | OUTPATIENT
Start: 2018-11-15 | End: 2018-11-20 | Stop reason: HOSPADM

## 2018-11-15 RX ADMIN — DEXTROSE MONOHYDRATE, SODIUM CHLORIDE, AND POTASSIUM CHLORIDE 50 ML/HR: 50; 4.5; .745 INJECTION, SOLUTION INTRAVENOUS at 09:11

## 2018-11-15 RX ADMIN — Medication 10 ML: at 06:22

## 2018-11-15 RX ADMIN — Medication 10 ML: at 21:57

## 2018-11-15 RX ADMIN — TEMAZEPAM 15 MG: 15 CAPSULE ORAL at 21:57

## 2018-11-15 RX ADMIN — GABAPENTIN 200 MG: 100 CAPSULE ORAL at 18:27

## 2018-11-15 RX ADMIN — AMPICILLIN SODIUM 2 G: 2 INJECTION, POWDER, FOR SOLUTION INTRAMUSCULAR; INTRAVENOUS at 04:28

## 2018-11-15 RX ADMIN — HYDRALAZINE HYDROCHLORIDE 100 MG: 25 TABLET, FILM COATED ORAL at 18:27

## 2018-11-15 RX ADMIN — PAROXETINE HYDROCHLORIDE 20 MG: 20 TABLET, FILM COATED ORAL at 09:12

## 2018-11-15 RX ADMIN — HYDRALAZINE HYDROCHLORIDE 100 MG: 25 TABLET, FILM COATED ORAL at 21:57

## 2018-11-15 RX ADMIN — METOPROLOL TARTRATE 50 MG: 50 TABLET, FILM COATED ORAL at 18:27

## 2018-11-15 RX ADMIN — ACETAMINOPHEN 650 MG: 325 TABLET ORAL at 18:35

## 2018-11-15 RX ADMIN — Medication 1 CAPSULE: at 09:12

## 2018-11-15 RX ADMIN — GABAPENTIN 200 MG: 100 CAPSULE ORAL at 09:11

## 2018-11-15 RX ADMIN — DOCUSATE SODIUM 100 MG: 100 CAPSULE, LIQUID FILLED ORAL at 18:27

## 2018-11-15 RX ADMIN — PREDNISONE 10 MG: 10 TABLET ORAL at 09:12

## 2018-11-15 RX ADMIN — AMPICILLIN SODIUM 2 G: 2 INJECTION, POWDER, FOR SOLUTION INTRAMUSCULAR; INTRAVENOUS at 18:35

## 2018-11-15 RX ADMIN — LISINOPRIL 40 MG: 20 TABLET ORAL at 09:12

## 2018-11-15 RX ADMIN — DILTIAZEM HYDROCHLORIDE 300 MG: 300 CAPSULE, COATED, EXTENDED RELEASE ORAL at 09:13

## 2018-11-15 RX ADMIN — GABAPENTIN 200 MG: 100 CAPSULE ORAL at 12:27

## 2018-11-15 RX ADMIN — HYDRALAZINE HYDROCHLORIDE 100 MG: 25 TABLET, FILM COATED ORAL at 09:13

## 2018-11-15 RX ADMIN — FUROSEMIDE 60 MG: 40 TABLET ORAL at 09:12

## 2018-11-15 RX ADMIN — METOPROLOL TARTRATE 50 MG: 50 TABLET, FILM COATED ORAL at 09:13

## 2018-11-15 RX ADMIN — DOCUSATE SODIUM 100 MG: 100 CAPSULE, LIQUID FILLED ORAL at 09:13

## 2018-11-15 RX ADMIN — GABAPENTIN 200 MG: 100 CAPSULE ORAL at 21:57

## 2018-11-15 RX ADMIN — ACETAMINOPHEN 650 MG: 325 TABLET ORAL at 11:35

## 2018-11-15 RX ADMIN — ASPIRIN 81 MG CHEWABLE TABLET 81 MG: 81 TABLET CHEWABLE at 09:12

## 2018-11-15 NOTE — PROGRESS NOTES
PROGRESS NOTE 
 
NAME:  Mariangel Cordero :   1935 MRN:   [id number] Date/Time:  11/15/2018 7:28 AM 
Subjective:  
History:  Chart reviewed and patient seen and examined this AM and D/W her nurse and her daughter and all events noted. She was admitted on  after a fall out of bed and has acute T 12 compression fracture. She also hit her head but no LOC and CT head was negative. She in addition was found to have a recurrent UTI and mild Leukocytosis although asymptomatic from UTI. She has no SOB except her chronic dyspnea and no other cardio/respiratory c/o. She has no GI c/o as well as no  c/o. There are no neurologic c/o except generalized weakness and she has no other c/o on complete ROS. She is a little less confused this AM during my exam. 
 
Medications reviewed: 
Current Facility-Administered Medications Medication Dose Route Frequency  dextrose 5% - 0.45% NaCl with KCl 10 mEq/L infusion  50 mL/hr IntraVENous CONTINUOUS  
 albuterol-ipratropium (DUO-NEB) 2.5 MG-0.5 MG/3 ML  3 mL Nebulization Q4H PRN  
 acetaminophen (TYLENOL) tablet 650 mg  650 mg Oral Q4H PRN  
 aspirin chewable tablet 81 mg  81 mg Oral DAILY  dilTIAZem CD (CARDIZEM CD) capsule 300 mg  300 mg Oral DAILY  docusate sodium (COLACE) capsule 100 mg  100 mg Oral BID  
 gabapentin (NEURONTIN) capsule 400 mg  400 mg Oral QID  hydrALAZINE (APRESOLINE) tablet 100 mg  100 mg Oral TID  lactobac ac& pc-s.therm-b.anim (HANG Q/RISAQUAD)  1 Cap Oral DAILY  lisinopril (PRINIVIL, ZESTRIL) tablet 40 mg  40 mg Oral DAILY  metoprolol tartrate (LOPRESSOR) tablet 50 mg  50 mg Oral BID  PARoxetine (PAXIL) tablet 20 mg  20 mg Oral DAILY  polyethylene glycol (MIRALAX) packet 17 g  17 g Oral DAILY PRN  
 temazepam (RESTORIL) capsule 15 mg  15 mg Oral QHS  predniSONE (DELTASONE) tablet 10 mg  10 mg Oral DAILY WITH BREAKFAST  sodium chloride (NS) flush 5-10 mL  5-10 mL IntraVENous Q8H  
  sodium chloride (NS) flush 5-10 mL  5-10 mL IntraVENous PRN  
 acetaminophen (TYLENOL) tablet 650 mg  650 mg Oral Q4H PRN  
 ondansetron (ZOFRAN) injection 4 mg  4 mg IntraVENous Q4H PRN  
 ampicillin (OMNIPEN) 2 g in 0.9% sodium chloride (MBP/ADV) 100 mL  2 g IntraVENous Q12H  levoFLOXacin (LEVAQUIN) 750 mg in D5W IVPB  750 mg IntraVENous Q48H  
 furosemide (LASIX) tablet 60 mg  60 mg Oral DAILY  nitroglycerin (NITROBID) 2 % ointment 1 Inch  1 Inch Topical Q6H PRN Objective:  
Vitals: 
Visit Vitals /49 (BP 1 Location: Left arm, BP Patient Position: At rest) Pulse 60 Temp 97.9 °F (36.6 °C) Resp 18 Wt 122 lb (55.3 kg) SpO2 92% BMI 22.68 kg/m² O2 Device: Room air Temp (24hrs), Av.2 °F (36.8 °C), Min:97.9 °F (36.6 °C), Max:98.4 °F (36.9 °C) Last 24hr Input/Output: 
 
Intake/Output Summary (Last 24 hours) at 11/15/2018 7881 Last data filed at 11/15/2018 3659 Gross per 24 hour Intake 1874.16 ml Output 600 ml Net 1274.16 ml PHYSICAL EXAM: 
General:     Alert, cooperative, no distress, appears stated age. Head:    Normocephalic, without obvious abnormality, atraumatic. Eyes:    Conjunctivae/corneas clear. PERRLA Nose:   Nares normal. No drainage or sinus tenderness. Throat:     Lips, mucosa, and tongue normal.  No Thrush Neck:   Supple, symmetrical,  no adenopathy, thyroid: non tender 
   no carotid bruit and no JVD. Back:     Symmetric,  No CVA tenderness. Lungs:    Clear to auscultation bilaterally except occ rhonchi and has decreased BS. No Wheezing. No rales. Heart:    Regular rate and rhythm,  no murmur, rub or gallop. Abdomen:    Soft, non-tender. Not distended. Bowel sounds normal. No masses Extremities:  Extremities normal, atraumatic, No cyanosis. No edema. + clubbing Lymph nodes:  Cervical, supraclavicular normal. 
Neurologic:  Normal strength, Alert and oriented X 3 although more confused today. Skin:                 No rash Lab Data Reviewed: 
 
No results found for this or any previous visit (from the past 24 hour(s)). Assessment/Plan:  
 
Principal Problem: 
  Complicated UTI (urinary tract infection) (11/12/2018) Active Problems: 
  Essential hypertension (7/20/2016) PAF (paroxysmal [facility] fibrillation) ([facility] (3/30/2018) COPD (chronic obstructive pulmonary disease) ([facility] (8/14/2010) Leukocytosis (4/8/2018) Stage 3 chronic kidney disease ([facility] (11/5/2018) Closed compression fracture of thoracic vertebra ([facility] (11/13/2018) 
 
  
___________________________________________________ PLAN: 
 
1.  T 12 Kyphoplasty for Fracture; MRI confirms acute fracture but kyphoplasty delayed until WBC improved, Some increase of WBC possibly due to her Prednisone 2. Continue Ampicillin and Levaquin for UTI pending culture, Possible Pseudomonas 3. Priscila Q 
4. Follow Leukocytosis (12.9 adm ) to 13.1 yesterday, today pending 5. Follow renal function (37/1.30 adm) to 42/2.0 yesterday 6. Hydralazine, Metoprolol, Cardizem and Lisinopril for HTN with NTP added on adm 7. Mobilize 8.  JA PRN for COPD 9. Continue fluids IV with increased Creat. 
 
 
___________________________________________________ Attending Physician: Tash Walls MD

## 2018-11-15 NOTE — PROGRESS NOTES
CrCL is 16.8 ml/min Will adjust gabapentin to 200 mg PO 4 times daily per protocol and renal function Thank you, Denise Silverio, PHARMD

## 2018-11-15 NOTE — PROGRESS NOTES
Spiritual Care Partner Volunteer visited patient in Gen Surg on November 15, 2016. Documented by: 
Dave Hurtado, MPS, [address],  [facility]  Paging Service  [phone])

## 2018-11-15 NOTE — PROGRESS NOTES
Pharmacy Automatic Renal Dosing Protocol - Antimicrobials Indication for Antimicrobials: UTI Current Regimen of Each Antimicrobial: 
Levofloxacin 750 mg IV q48h (Start Date 18; Day # 3/7) Ampicillin 2g IV q12h (Start Date 18; Day # 3/7) Previous Antimicrobial Therapy: 
 
Significant Cultures:  
18 UCx- mixed petra= FINAL Radiology / Imaging results: (X-ray, CT scan or MRI): 
 CT scan: 
IMPRESSION: 
  
1. Moderate acute T12 compression fracture with moderate localized spinal 
stenosis. No other fractures demonstrated. 2. No acute findings in the abdomen or pelvis. 3. Small bilateral pleural effusions. 4. Suspect cholelithiasis. Paralysis, amputations, malnutrition: n/a Labs: 
Recent Labs 18 
0242 18 
0300 18 [address] CREA 2.00* 1.43* 1.30* BUN 42* 36* 37* WBC 13.5* 12.9* 17.0* Temp (24hrs), Av.1 °F (36.7 °C), Min:97.9 °F (36.6 °C), Max:98.4 °F (36.9 °C) Creatinine Clearance (mL/min) or Dialysis: 16.8 ml/min Impression/Plan: · Will adjust levofloxacin dose to 500 mg IV every 48 hours due to renal function per protocol. · Antimicrobial stop date X7 days · BMP is ordered Pharmacy will follow daily and adjust medications as appropriate for renal function and/or serum levels. Thank you, Bhavana Villarreal, ERIKD 
 
Recommended duration of therapy 
[email or web address]/[facility]/Pharmacy/Clinical%20Companion/Duration%[email or web address]. docx Renal Dosing 
[email or web address]/[facility]/Pharmacy/Clinical%20Companion/Renal%[email or web address]. pdf

## 2018-11-15 NOTE — PROGRESS NOTES
Problem: Self Care Deficits Care Plan (Adult) Goal: *Acute Goals and Plan of Care (Insert Text) Occupational Therapy Goals Initiated 11/13/2018 1. Patient will perform grooming sitting on EOB with supervision/set-up within 7 day(s). 2.  Patient will perform bathing sitting on EOB with minimal assistance/contact guard assist within 7 day(s). 3.  Patient will perform lower body dressing with moderate assistance  within 7 day(s). 4.  Patient will perform toilet transfers with maximal assistance within 7 day(s). 5.  Patient will perform all aspects of toileting with maximal assistance within 7 day(s). 6.  Patient will participate in upper extremity therapeutic exercise/activities with supervision/set-up for 5 minutes within 7 day(s). 7.  Patient will utilize energy conservation techniques during functional activities with verbal cues within 7 day(s). Occupational Therapy TREATMENT Patient: Dhruv Carrasco (42 y.o. female) Date: 11/15/2018 Diagnosis: Complicated UTI (urinary tract infection) Leukocytosis Complicated UTI (urinary tract infection) Precautions: Fall, DNR, Skin Chart, occupational therapy assessment, plan of care, and goals were reviewed. ASSESSMENT: 
Patient received in bed willing to work and reporting poor appetite; lunch meal in front of her with limited intake. S grooming with encouragement; max A dynamic sitting balance, limited by 8/10 back pain: \"I can't take pain meds that help due to allergies so I just live with it. \" Performed B UE AROM HEP; Mod VC needed for full AROM, but overall tolerated well considering pain level; performed in supported sitting. Expect she will benefit from therapy upon discharge- she reports she has [facility] in [facility] at [facility]. Progression toward goals: 
[x]       Improving appropriately and progressing toward goals 
[]       Improving slowly and progressing toward goals []       Not making progress toward goals and plan of care will be adjusted PLAN: 
Patient continues to benefit from skilled intervention to address the above impairments. Continue treatment per established plan of care. Discharge Recommendations:  Home Health Further Equipment Recommendations for Discharge:  TBA SUBJECTIVE:  
Patient stated I can only take Tylenol and it doesn't help.  OBJECTIVE DATA SUMMARY:  
Cognitive/Behavioral Status: 
Neurologic State: Alert Orientation Level: Oriented to person;Oriented to place;Oriented to situation;Disoriented to time Cognition: Memory loss; Follows commands; Impaired decision making Perception: Appears intact Perseveration: Perseverates during conversation Safety/Judgement: Decreased insight into deficits Functional Mobility and Transfers for ADLs:Bed Mobility: 
Rolling: Minimum assistance Supine to Sit: Minimum assistance; Additional time Sit to Supine: Minimum assistance Scooting: Moderate assistance; Additional time Transfers: 
Sit to Stand: Moderate assistance; Additional time;Assist x1 Functional Transfers Toilet Transfer : Maximum assistance(inferred based on dynamic sitting balance) Balance: 
Sitting: Intact Sitting - Static: Fair (occasional) Sitting - Dynamic: Poor (constant support) Standing: Impaired Standing - Static: Fair;Constant support Standing - Dynamic : Poor ADL Intervention: 
Feeding Feeding Assistance: Supervision/set-up(decreased appetite noted) Grooming Grooming Assistance: Supervision/set up Washing Face: Supervision/set-up Brushing/Combing Hair: Supervision/set-up(encouragement; encouraged to do more as exercise) Cognitive Retraining Safety/Judgement: Decreased insight into deficits Therapeutic Exercises:  
 
EXERCISE Sets Reps Active Active Assist  
Passive Comments Shoulder flexion 3 5 [x]           []           []           No increased balck pain with AROM Elbow flexion 3 5 [x]           []           []             
Scapular elevation/depression 3 5 [x]           []           []           Trained health benefits of HEP consistently done as lifetime habit imani for w/c bound patients  
   []           []           []             
 
Pain: 
Pain Scale 1: Numeric (0 - 10) Pain Intensity 1: 10 
Pain Location 1: Back Pain Orientation 1: Posterior Pain Description 1: Aching Pain Intervention(s) 1: Medication (see MAR) Activity Tolerance:  
Limited by back pain Please refer to the flowsheet for vital signs taken during this treatment. After treatment:  
[] Patient left in no apparent distress sitting up in chair 
[x] Patient left in no apparent new distress in bed 
[x] Call bell left within reach [x] Nursing notified 
[] Caregiver present 
[] Bed alarm activated COMMUNICATION/COLLABORATION:  
The patients plan of care was discussed with: Physical Therapist and Registered Nurse Oriana Hamilton OTR/L Time Calculation: 29 mins

## 2018-11-15 NOTE — PROGRESS NOTES
CM reviewed medical record and met with patient during IDR's. Pt for surgery tomorrow. CM will follow for discharge recommendations after surgery. VIVIANA AcostaN, RN Care Manager [facility] 
[phone]

## 2018-11-15 NOTE — PROGRESS NOTES
Problem: Mobility Impaired (Adult and Pediatric) Goal: *Acute Goals and Plan of Care (Insert Text) Physical Therapy Goals Initiated 11/13/2018 1. Patient will move from supine to sit and sit to supine  in bed with supervision within 7 day(s). 2.  Patient will transfer from bed to chair and chair to bed with maxA using the least restrictive device within 7 day(s). 3.  Patient will perform sit to stand with maxA x 2 within 7 day(s). 4.  Patient will demo supine exercises without cues for technique within 7 days. physical Therapy TREATMENT Patient: Justin Johns (89 y.o. female) Date: 11/15/2018 Diagnosis: Complicated UTI (urinary tract infection) Leukocytosis Complicated UTI (urinary tract infection) Precautions: Fall, DNR, Skin Chart, physical therapy assessment, plan of care and goals were reviewed. ASSESSMENT: 
Pt cleared by nurse to mobilize. Pt received in bed supine. Pt agreeable to getting to [facility]. Pt performed supine to sit at min A with additional time. Pt performed x2 sit to stand tranfers at mod A with additional time. Pt able to take steps to [facility] and back to bed 3ft and 2ft with RW at min A. Pt with requiring constant cueing to stand upright and due to increased trunk flexion. Pt returned back to beds supine. Pt will benefit from SNF rehab to improve strength and endurance for safe functional mobility. Progression toward goals: 
[x]    Improving appropriately and progressing toward goals 
[]    Improving slowly and progressing toward goals 
[]    Not making progress toward goals and plan of care will be adjusted PLAN: 
Patient continues to benefit from skilled intervention to address the above impairments. Continue treatment per established plan of care. Discharge Recommendations:  Tee L[facility]pez Further Equipment Recommendations for Discharge:  TBD by rehab SUBJECTIVE:  
Patient stated My back hurts so bad.  OBJECTIVE DATA SUMMARY:  
 Critical Behavior: 
Neurologic State: Alert Orientation Level: Oriented to person, Oriented to place, Oriented to situation, Disoriented to time Cognition: Memory loss, Poor safety awareness, Follows commands Safety/Judgement: Awareness of environment Functional Mobility Training: 
Bed Mobility: 
Rolling: Minimum assistance Supine to Sit: Minimum assistance; Additional time Sit to Supine: Minimum assistance Scooting: Moderate assistance; Additional time Transfers: 
Sit to Stand: Moderate assistance; Additional time;Assist x1 Stand to Sit: Minimum assistance; Additional time Balance: 
Sitting: Intact Sitting - Static: Fair (occasional) Sitting - Dynamic: Poor (constant support) Standing: Impaired Standing - Static: Fair;Constant support Standing - Dynamic : PoorAmbulation/Gait Training: 
Distance (ft): 5 Feet (ft)(3ft adn 2ft) Assistive Device: Gait belt;Walker, rolling Ambulation - Level of Assistance: Minimal assistance; Additional time;Assist x1 Gait Abnormalities: Decreased step clearance; Step to gait(Trunk flexion) Base of Support: Narrowed Speed/Marilyn: Shuffled; Slow Step Length: Left shortened;Right shortened Pain: 
Pain Scale 1: Numeric (0 - 10) Pain Intensity 1: 10 
Pain Location 1: Back Pain Orientation 1: Posterior Pain Description 1: Aching Pain Intervention(s) 1: Medication (see MAR) Activity Tolerance: Pt with improved mobility tolerance today although with increased pain. After treatment:  
[]    Patient left in no apparent distress sitting up in chair 
[x]    Patient left in no apparent distress in bed 
[x]    Call bell left within reach [x]    Nursing notified 
[]    Caregiver present 
[]    Bed alarm activated COMMUNICATION/COLLABORATION:  
The patients plan of care was discussed with: Registered Nurse Sera Isaac Time Calculation: 25 mins

## 2018-11-16 ENCOUNTER — APPOINTMENT (OUTPATIENT)
Dept: INTERVENTIONAL RADIOLOGY/VASCULAR | Age: 83
DRG: 516 | End: 2018-11-16
Attending: INTERNAL MEDICINE
Payer: MEDICARE

## 2018-11-16 LAB
ANION GAP SERPL CALC-SCNC: 10 MMOL/L (ref 5–15)
BASOPHILS # BLD: 0 K/UL (ref 0–0.1)
BASOPHILS NFR BLD: 0 % (ref 0–1)
BUN SERPL-MCNC: 54 MG/DL (ref 6–20)
BUN/CREAT SERPL: 24 (ref 12–20)
CALCIUM SERPL-MCNC: 8.3 MG/DL (ref 8.5–10.1)
CHLORIDE SERPL-SCNC: 102 MMOL/L (ref 97–108)
CO2 SERPL-SCNC: 24 MMOL/L (ref 21–32)
CREAT SERPL-MCNC: 2.28 MG/DL (ref 0.55–1.02)
DIFFERENTIAL METHOD BLD: ABNORMAL
EOSINOPHIL # BLD: 0.1 K/UL (ref 0–0.4)
EOSINOPHIL NFR BLD: 1 % (ref 0–7)
ERYTHROCYTE [DISTWIDTH] IN BLOOD BY AUTOMATED COUNT: 16.8 % (ref 11.5–14.5)
GLUCOSE SERPL-MCNC: 106 MG/DL (ref 65–100)
HCT VFR BLD AUTO: 34.2 % (ref 35–47)
HGB BLD-MCNC: 11 G/DL (ref 11.5–16)
IMM GRANULOCYTES # BLD: 0.2 K/UL (ref 0–0.04)
IMM GRANULOCYTES NFR BLD AUTO: 2 % (ref 0–0.5)
LYMPHOCYTES # BLD: 0.8 K/UL (ref 0.8–3.5)
LYMPHOCYTES NFR BLD: 8 % (ref 12–49)
MCH RBC QN AUTO: 28.9 PG (ref 26–34)
MCHC RBC AUTO-ENTMCNC: 32.2 G/DL (ref 30–36.5)
MCV RBC AUTO: 90 FL (ref 80–99)
MONOCYTES # BLD: 1.2 K/UL (ref 0–1)
MONOCYTES NFR BLD: 12 % (ref 5–13)
NEUTS SEG # BLD: 7.3 K/UL (ref 1.8–8)
NEUTS SEG NFR BLD: 77 % (ref 32–75)
NRBC # BLD: 0 K/UL (ref 0–0.01)
NRBC BLD-RTO: 0 PER 100 WBC
PLATELET # BLD AUTO: 167 K/UL (ref 150–400)
PMV BLD AUTO: 11.7 FL (ref 8.9–12.9)
POTASSIUM SERPL-SCNC: 3.8 MMOL/L (ref 3.5–5.1)
RBC # BLD AUTO: 3.8 M/UL (ref 3.8–5.2)
SODIUM SERPL-SCNC: 136 MMOL/L (ref 136–145)
WBC # BLD AUTO: 9.5 K/UL (ref 3.6–11)

## 2018-11-16 PROCEDURE — 77030003666 HC NDL SPINAL BD -A

## 2018-11-16 PROCEDURE — 74011250637 HC RX REV CODE- 250/637: Performed by: INTERNAL MEDICINE

## 2018-11-16 PROCEDURE — 77030021783 HC SYS CEM DEL MEDT -D

## 2018-11-16 PROCEDURE — 0PS43ZZ REPOSITION THORACIC VERTEBRA, PERCUTANEOUS APPROACH: ICD-10-PCS | Performed by: RADIOLOGY

## 2018-11-16 PROCEDURE — 77030030399

## 2018-11-16 PROCEDURE — 36415 COLL VENOUS BLD VENIPUNCTURE: CPT

## 2018-11-16 PROCEDURE — 74011250636 HC RX REV CODE- 250/636: Performed by: RADIOLOGY

## 2018-11-16 PROCEDURE — 77030021782 HC SYS CEM CART DEL KYPH -C

## 2018-11-16 PROCEDURE — 0PU43JZ SUPPLEMENT THORACIC VERTEBRA WITH SYNTHETIC SUBSTITUTE, PERCUTANEOUS APPROACH: ICD-10-PCS | Performed by: RADIOLOGY

## 2018-11-16 PROCEDURE — 74011636320 HC RX REV CODE- 636/320: Performed by: RADIOLOGY

## 2018-11-16 PROCEDURE — 80048 BASIC METABOLIC PNL TOTAL CA: CPT

## 2018-11-16 PROCEDURE — 85025 COMPLETE CBC W/AUTO DIFF WBC: CPT

## 2018-11-16 PROCEDURE — 74011000258 HC RX REV CODE- 258: Performed by: INTERNAL MEDICINE

## 2018-11-16 PROCEDURE — 99153 MOD SED SAME PHYS/QHP EA: CPT

## 2018-11-16 PROCEDURE — C1713 ANCHOR/SCREW BN/BN,TIS/BN: HCPCS

## 2018-11-16 PROCEDURE — 74011000250 HC RX REV CODE- 250: Performed by: RADIOLOGY

## 2018-11-16 PROCEDURE — 65270000029 HC RM PRIVATE

## 2018-11-16 PROCEDURE — 77030034842 HC TAMP SPN BN INFL EXP II KYPH -I

## 2018-11-16 PROCEDURE — 74011250636 HC RX REV CODE- 250/636: Performed by: INTERNAL MEDICINE

## 2018-11-16 PROCEDURE — 74011636637 HC RX REV CODE- 636/637: Performed by: INTERNAL MEDICINE

## 2018-11-16 RX ORDER — HEPARIN SODIUM 200 [USP'U]/100ML
400 INJECTION, SOLUTION INTRAVENOUS ONCE
Status: COMPLETED | OUTPATIENT
Start: 2018-11-16 | End: 2018-11-16

## 2018-11-16 RX ORDER — CEFAZOLIN SODIUM/WATER 2 G/20 ML
2 SYRINGE (ML) INTRAVENOUS ONCE
Status: DISCONTINUED | OUTPATIENT
Start: 2018-11-16 | End: 2018-11-16

## 2018-11-16 RX ORDER — SODIUM CHLORIDE 0.9 % (FLUSH) 0.9 %
5-10 SYRINGE (ML) INJECTION EVERY 8 HOURS
Status: DISCONTINUED | OUTPATIENT
Start: 2018-11-16 | End: 2018-11-20 | Stop reason: HOSPADM

## 2018-11-16 RX ORDER — SODIUM CHLORIDE 0.9 % (FLUSH) 0.9 %
5-10 SYRINGE (ML) INJECTION AS NEEDED
Status: DISCONTINUED | OUTPATIENT
Start: 2018-11-16 | End: 2018-11-20 | Stop reason: HOSPADM

## 2018-11-16 RX ORDER — KETOROLAC TROMETHAMINE 30 MG/ML
15-30 INJECTION, SOLUTION INTRAMUSCULAR; INTRAVENOUS AS NEEDED
Status: DISCONTINUED | OUTPATIENT
Start: 2018-11-16 | End: 2018-11-16

## 2018-11-16 RX ORDER — MIDAZOLAM HYDROCHLORIDE 1 MG/ML
5 INJECTION, SOLUTION INTRAMUSCULAR; INTRAVENOUS
Status: DISCONTINUED | OUTPATIENT
Start: 2018-11-16 | End: 2018-11-16

## 2018-11-16 RX ORDER — SODIUM CHLORIDE 9 MG/ML
25 INJECTION, SOLUTION INTRAVENOUS CONTINUOUS
Status: DISCONTINUED | OUTPATIENT
Start: 2018-11-16 | End: 2018-11-16

## 2018-11-16 RX ORDER — LIDOCAINE HYDROCHLORIDE 20 MG/ML
20 INJECTION, SOLUTION INFILTRATION; PERINEURAL ONCE
Status: COMPLETED | OUTPATIENT
Start: 2018-11-16 | End: 2018-11-16

## 2018-11-16 RX ORDER — FENTANYL CITRATE 50 UG/ML
100 INJECTION, SOLUTION INTRAMUSCULAR; INTRAVENOUS
Status: DISCONTINUED | OUTPATIENT
Start: 2018-11-16 | End: 2018-11-16

## 2018-11-16 RX ORDER — DIPHENHYDRAMINE HYDROCHLORIDE 50 MG/ML
25-50 INJECTION, SOLUTION INTRAMUSCULAR; INTRAVENOUS ONCE
Status: DISPENSED | OUTPATIENT
Start: 2018-11-16 | End: 2018-11-17

## 2018-11-16 RX ORDER — BUPIVACAINE HYDROCHLORIDE 5 MG/ML
10 INJECTION, SOLUTION EPIDURAL; INTRACAUDAL ONCE
Status: COMPLETED | OUTPATIENT
Start: 2018-11-16 | End: 2018-11-16

## 2018-11-16 RX ORDER — DIPHENHYDRAMINE HCL 25 MG
25 CAPSULE ORAL ONCE
Status: DISCONTINUED | OUTPATIENT
Start: 2018-11-16 | End: 2018-11-16

## 2018-11-16 RX ADMIN — BUPIVACAINE HYDROCHLORIDE 50 MG: 5 INJECTION, SOLUTION EPIDURAL; INTRACAUDAL; PERINEURAL at 13:40

## 2018-11-16 RX ADMIN — ASPIRIN 81 MG CHEWABLE TABLET 81 MG: 81 TABLET CHEWABLE at 08:32

## 2018-11-16 RX ADMIN — DOCUSATE SODIUM 100 MG: 100 CAPSULE, LIQUID FILLED ORAL at 08:32

## 2018-11-16 RX ADMIN — LEVOFLOXACIN 500 MG: 5 INJECTION, SOLUTION INTRAVENOUS at 21:52

## 2018-11-16 RX ADMIN — LIDOCAINE HYDROCHLORIDE 200 MG: 20 INJECTION, SOLUTION INFILTRATION; PERINEURAL at 13:40

## 2018-11-16 RX ADMIN — FENTANYL CITRATE 25 MCG: 50 INJECTION, SOLUTION INTRAMUSCULAR; INTRAVENOUS at 13:31

## 2018-11-16 RX ADMIN — Medication 1 CAPSULE: at 08:32

## 2018-11-16 RX ADMIN — FENTANYL CITRATE 25 MCG: 50 INJECTION, SOLUTION INTRAMUSCULAR; INTRAVENOUS at 13:40

## 2018-11-16 RX ADMIN — PAROXETINE HYDROCHLORIDE 20 MG: 20 TABLET, FILM COATED ORAL at 08:32

## 2018-11-16 RX ADMIN — Medication 10 ML: at 21:52

## 2018-11-16 RX ADMIN — DOCUSATE SODIUM 100 MG: 100 CAPSULE, LIQUID FILLED ORAL at 17:38

## 2018-11-16 RX ADMIN — METOPROLOL TARTRATE 50 MG: 50 TABLET, FILM COATED ORAL at 08:32

## 2018-11-16 RX ADMIN — Medication 10 ML: at 17:38

## 2018-11-16 RX ADMIN — HYDRALAZINE HYDROCHLORIDE 100 MG: 25 TABLET, FILM COATED ORAL at 21:52

## 2018-11-16 RX ADMIN — SODIUM CHLORIDE 25 ML/HR: 900 INJECTION, SOLUTION INTRAVENOUS at 12:38

## 2018-11-16 RX ADMIN — AMPICILLIN SODIUM 2 G: 2 INJECTION, POWDER, FOR SOLUTION INTRAMUSCULAR; INTRAVENOUS at 05:09

## 2018-11-16 RX ADMIN — MIDAZOLAM HYDROCHLORIDE 1 MG: 1 INJECTION, SOLUTION INTRAMUSCULAR; INTRAVENOUS at 13:19

## 2018-11-16 RX ADMIN — TEMAZEPAM 15 MG: 15 CAPSULE ORAL at 21:52

## 2018-11-16 RX ADMIN — MIDAZOLAM HYDROCHLORIDE 1 MG: 1 INJECTION, SOLUTION INTRAMUSCULAR; INTRAVENOUS at 13:38

## 2018-11-16 RX ADMIN — FENTANYL CITRATE 50 MCG: 50 INJECTION, SOLUTION INTRAMUSCULAR; INTRAVENOUS at 13:19

## 2018-11-16 RX ADMIN — LISINOPRIL 40 MG: 20 TABLET ORAL at 08:32

## 2018-11-16 RX ADMIN — DILTIAZEM HYDROCHLORIDE 300 MG: 300 CAPSULE, COATED, EXTENDED RELEASE ORAL at 08:32

## 2018-11-16 RX ADMIN — Medication 10 ML: at 05:09

## 2018-11-16 RX ADMIN — GABAPENTIN 200 MG: 100 CAPSULE ORAL at 17:37

## 2018-11-16 RX ADMIN — HYDRALAZINE HYDROCHLORIDE 100 MG: 25 TABLET, FILM COATED ORAL at 17:39

## 2018-11-16 RX ADMIN — PREDNISONE 10 MG: 10 TABLET ORAL at 08:32

## 2018-11-16 RX ADMIN — HEPARIN SODIUM IN SODIUM CHLORIDE 200 UNITS: 200 INJECTION INTRAVENOUS at 13:40

## 2018-11-16 RX ADMIN — IOPAMIDOL 3 ML: 612 INJECTION, SOLUTION INTRATHECAL at 13:40

## 2018-11-16 RX ADMIN — HYDRALAZINE HYDROCHLORIDE 100 MG: 25 TABLET, FILM COATED ORAL at 08:31

## 2018-11-16 RX ADMIN — GABAPENTIN 200 MG: 100 CAPSULE ORAL at 08:32

## 2018-11-16 RX ADMIN — MIDAZOLAM HYDROCHLORIDE 1 MG: 1 INJECTION, SOLUTION INTRAMUSCULAR; INTRAVENOUS at 13:31

## 2018-11-16 RX ADMIN — ONDANSETRON 4 MG: 2 INJECTION INTRAMUSCULAR; INTRAVENOUS at 12:36

## 2018-11-16 RX ADMIN — GABAPENTIN 200 MG: 100 CAPSULE ORAL at 21:52

## 2018-11-16 NOTE — PROGRESS NOTES
Pt has a MEWS of three. Pt hypertensive: 204/93. Will administer pt's antihypertensive medications and re-check Bp.

## 2018-11-16 NOTE — PROGRESS NOTES
2000: Bedside and Verbal shift change report given to Saud Guo (oncoming nurse) by Natasha Lin (offgoing nurse). Report included the following information SBAR.  
 
2113: Fredrick Joy changed. Next change before 0913. 
 
0000: NPO per order. 0252: Pst and lav drawn.

## 2018-11-16 NOTE — PROGRESS NOTES
Dr. Robbin Saez at bedside for pre procedure consult and consent. MD aware of all patients current lab values and medicated administrations. Questions reviewed with understanding. Consent obtained and witnessed.

## 2018-11-16 NOTE — PROGRESS NOTES
PROGRESS NOTE 
 
NAME:  Millie Siu :   1935 MRN:   [id number] Date/Time:  2018 7:27 AM 
Subjective:  
History:  Chart reviewed and patient seen and examined this AM and D/W her nurse and all events noted. She was admitted on  after a fall out of bed and has acute T 12 compression fracture. She also hit her head but no LOC and CT head was negative. She in addition was found to have a recurrent UTI and mild Leukocytosis although asymptomatic from UTI and culture so far not remarkable for significant infection. She has no SOB except her chronic dyspnea and no other cardio/respiratory c/o. She has no GI c/o as well as no  c/o. There are no neurologic c/o except generalized weakness and she has no other c/o on complete ROS except her back pain. Medications reviewed: 
Current Facility-Administered Medications Medication Dose Route Frequency  levoFLOXacin (LEVAQUIN) 500 mg in D5W IVPB  500 mg IntraVENous Q48H  
 gabapentin (NEURONTIN) capsule 200 mg  200 mg Oral QID  dextrose 5% - 0.45% NaCl with KCl 10 mEq/L infusion  50 mL/hr IntraVENous CONTINUOUS  
 albuterol-ipratropium (DUO-NEB) 2.5 MG-0.5 MG/3 ML  3 mL Nebulization Q4H PRN  
 acetaminophen (TYLENOL) tablet 650 mg  650 mg Oral Q4H PRN  
 aspirin chewable tablet 81 mg  81 mg Oral DAILY  dilTIAZem CD (CARDIZEM CD) capsule 300 mg  300 mg Oral DAILY  docusate sodium (COLACE) capsule 100 mg  100 mg Oral BID  hydrALAZINE (APRESOLINE) tablet 100 mg  100 mg Oral TID  lactobac ac& pc-s.therm-b.anim (HANG Q/RISAQUAD)  1 Cap Oral DAILY  lisinopril (PRINIVIL, ZESTRIL) tablet 40 mg  40 mg Oral DAILY  metoprolol tartrate (LOPRESSOR) tablet 50 mg  50 mg Oral BID  PARoxetine (PAXIL) tablet 20 mg  20 mg Oral DAILY  polyethylene glycol (MIRALAX) packet 17 g  17 g Oral DAILY PRN  
 temazepam (RESTORIL) capsule 15 mg  15 mg Oral QHS  predniSONE (DELTASONE) tablet 10 mg  10 mg Oral DAILY WITH BREAKFAST  sodium chloride (NS) flush 5-10 mL  5-10 mL IntraVENous Q8H  
 sodium chloride (NS) flush 5-10 mL  5-10 mL IntraVENous PRN  
 acetaminophen (TYLENOL) tablet 650 mg  650 mg Oral Q4H PRN  
 ondansetron (ZOFRAN) injection 4 mg  4 mg IntraVENous Q4H PRN  
 ampicillin (OMNIPEN) 2 g in 0.9% sodium chloride (MBP/ADV) 100 mL  2 g IntraVENous Q12H  furosemide (LASIX) tablet 60 mg  60 mg Oral DAILY  nitroglycerin (NITROBID) 2 % ointment 1 Inch  1 Inch Topical Q6H PRN Objective:  
Vitals: 
Visit Vitals /59 Pulse (!) 58 Temp 98.4 °F (36.9 °C) Resp 17 Wt 122 lb (55.3 kg) SpO2 92% BMI 22.68 kg/m² O2 Device: Room air Temp (24hrs), Av °F (36.7 °C), Min:97.3 °F (36.3 °C), Max:98.5 °F (36.9 °C) Last 24hr Input/Output: 
 
Intake/Output Summary (Last 24 hours) at 2018 2833 Last data filed at 2018 5839 Gross per 24 hour Intake 1635 ml Output 800 ml Net 835 ml PHYSICAL EXAM: 
General:     Alert, cooperative, no distress, appears stated age. Head:    Normocephalic, without obvious abnormality, atraumatic. Eyes:    Conjunctivae/corneas clear. PERRLA Nose:   Nares normal. No drainage or sinus tenderness. Throat:     Lips, mucosa, and tongue normal.  No Thrush Neck:   Supple, symmetrical,  no adenopathy, thyroid: non tender 
   no carotid bruit and no JVD. Back:     Symmetric,  No CVA tenderness. Lungs:    Clear to auscultation bilaterally except occ rhonchi and has decreased BS. No Wheezing. No rales. Heart:    Regular rate and rhythm,  no murmur, rub or gallop. Abdomen:    Soft, non-tender. Not distended. Bowel sounds normal. No masses Extremities:  Extremities normal, atraumatic, No cyanosis. No edema. + clubbing Lymph nodes:  Cervical, supraclavicular normal. 
Neurologic:  Normal strength, Alert and oriented X 3 although more confused today. Skin:                 No rash Lab Data Reviewed: 
 
Recent Results (from the past 24 hour(s)) CBC WITH AUTOMATED DIFF Collection Time: 11/15/18  7:57 AM  
Result Value Ref Range WBC 9.4 3.6 - 11.0 K/uL  
 RBC 4.21 3.80 - 5.20 M/uL  
 HGB 12.2 11.5 - 16.0 g/dL HCT 38.2 35.0 - 47.0 % MCV 90.7 80.0 - 99.0 FL  
 MCH 29.0 26.0 - 34.0 PG  
 MCHC 31.9 30.0 - 36.5 g/dL  
 RDW 16.9 (H) 11.5 - 14.5 % PLATELET 763 104 - 208 K/uL MPV 11.2 8.9 - 12.9 FL  
 NRBC 0.0 0  WBC ABSOLUTE NRBC 0.00 0.00 - 0.01 K/uL NEUTROPHILS 77 (H) 32 - 75 % LYMPHOCYTES 8 (L) 12 - 49 % MONOCYTES 10 5 - 13 % EOSINOPHILS 3 0 - 7 % BASOPHILS 0 0 - 1 % IMMATURE GRANULOCYTES 2 (H) 0.0 - 0.5 % ABS. NEUTROPHILS 7.2 1.8 - 8.0 K/UL  
 ABS. LYMPHOCYTES 0.8 0.8 - 3.5 K/UL  
 ABS. MONOCYTES 0.9 0.0 - 1.0 K/UL  
 ABS. EOSINOPHILS 0.3 0.0 - 0.4 K/UL  
 ABS. BASOPHILS 0.0 0.0 - 0.1 K/UL  
 ABS. IMM. GRANS. 0.2 (H) 0.00 - 0.04 K/UL  
 DF AUTOMATED    
 RBC COMMENTS NORMOCYTIC, NORMOCHROMIC METABOLIC PANEL, BASIC Collection Time: 11/15/18  7:57 AM  
Result Value Ref Range Sodium 136 136 - 145 mmol/L Potassium 3.6 3.5 - 5.1 mmol/L Chloride 100 97 - 108 mmol/L  
 CO2 26 21 - 32 mmol/L Anion gap 10 5 - 15 mmol/L Glucose 105 (H) 65 - 100 mg/dL BUN 44 (H) 6 - 20 MG/DL Creatinine 2.03 (H) 0.55 - 1.02 MG/DL  
 BUN/Creatinine ratio 22 (H) 12 - 20 GFR est AA 28 (L) >60 ml/min/1.73m2 GFR est non-AA 23 (L) >60 ml/min/1.73m2 Calcium 9.0 8.5 - 10.1 MG/DL URINALYSIS W/ REFLEX CULTURE Collection Time: 11/15/18 10:03 AM  
Result Value Ref Range Color YELLOW/STRAW Appearance CLEAR CLEAR Specific gravity 1.017 1.003 - 1.030    
 pH (UA) 5.5 5.0 - 8.0 Protein 100 (A) NEG mg/dL Glucose NEGATIVE  NEG mg/dL Ketone NEGATIVE  NEG mg/dL Bilirubin NEGATIVE  NEG Blood NEGATIVE  NEG Urobilinogen 0.2 0.2 - 1.0 EU/dL Nitrites NEGATIVE  NEG  Leukocyte Esterase NEGATIVE  NEG    
 WBC 0-4 0 - 4 /hpf  
 RBC 0-5 0 - 5 /hpf  
 Epithelial cells MODERATE (A) FEW /lpf Bacteria NEGATIVE  NEG /hpf  
 UA:UC IF INDICATED CULTURE NOT INDICATED BY UA RESULT CNI Hyaline cast 2-5 0 - 5 /lpf  
CBC WITH AUTOMATED DIFF Collection Time: 11/16/18  2:52 AM  
Result Value Ref Range WBC 9.5 3.6 - 11.0 K/uL  
 RBC 3.80 3.80 - 5.20 M/uL  
 HGB 11.0 (L) 11.5 - 16.0 g/dL HCT 34.2 (L) 35.0 - 47.0 % MCV 90.0 80.0 - 99.0 FL  
 MCH 28.9 26.0 - 34.0 PG  
 MCHC 32.2 30.0 - 36.5 g/dL  
 RDW 16.8 (H) 11.5 - 14.5 % PLATELET 456 183 - 352 K/uL MPV 11.7 8.9 - 12.9 FL  
 NRBC 0.0 0  WBC ABSOLUTE NRBC 0.00 0.00 - 0.01 K/uL NEUTROPHILS 77 (H) 32 - 75 % LYMPHOCYTES 8 (L) 12 - 49 % MONOCYTES 12 5 - 13 % EOSINOPHILS 1 0 - 7 % BASOPHILS 0 0 - 1 % IMMATURE GRANULOCYTES 2 (H) 0.0 - 0.5 % ABS. NEUTROPHILS 7.3 1.8 - 8.0 K/UL  
 ABS. LYMPHOCYTES 0.8 0.8 - 3.5 K/UL  
 ABS. MONOCYTES 1.2 (H) 0.0 - 1.0 K/UL  
 ABS. EOSINOPHILS 0.1 0.0 - 0.4 K/UL  
 ABS. BASOPHILS 0.0 0.0 - 0.1 K/UL  
 ABS. IMM. GRANS. 0.2 (H) 0.00 - 0.04 K/UL  
 DF AUTOMATED METABOLIC PANEL, BASIC Collection Time: 11/16/18  2:52 AM  
Result Value Ref Range Sodium 136 136 - 145 mmol/L Potassium 3.8 3.5 - 5.1 mmol/L Chloride 102 97 - 108 mmol/L  
 CO2 24 21 - 32 mmol/L Anion gap 10 5 - 15 mmol/L Glucose 106 (H) 65 - 100 mg/dL BUN 54 (H) 6 - 20 MG/DL Creatinine 2.28 (H) 0.55 - 1.02 MG/DL  
 BUN/Creatinine ratio 24 (H) 12 - 20 GFR est AA 25 (L) >60 ml/min/1.73m2 GFR est non-AA 21 (L) >60 ml/min/1.73m2 Calcium 8.3 (L) 8.5 - 10.1 MG/DL Assessment/Plan:  
 
Principal Problem: 
  Complicated UTI (urinary tract infection) (11/12/2018) Active Problems: 
  Essential hypertension (7/20/2016) PAF (paroxysmal atrial fibrillation) ([facility] (3/30/2018) COPD (chronic obstructive pulmonary disease) ([facility] (8/14/2010) Leukocytosis (4/8/2018) Stage 3 chronic kidney disease ([facility] (11/5/2018) Closed compression fracture of thoracic vertebra ([facility] (11/13/2018) 
 
  
___________________________________________________ PLAN: 
 
1.  T 12 Kyphoplasty for Fracture; MRI confirms acute fracture but kyphoplasty delayed until WBC improved and they are now normal, Some increase of WBC possibly due to her Prednisone 2. Continue Levaquin for UTI pending final culture as some Pseudomonas, D/C Ampicillin 3. Priscila Q 
4. Follow Leukocytosis (12.9 adm ) to 9.5  today 5. Follow renal function (37/1.30 adm) to 54/2.28 today 6. Hydralazine, Metoprolol, Cardizem and Lisinopril for HTN with NTP added on adm 7. Mobilize 8.  JA PRN for COPD 9. Continue fluids IV with increased Creat. and increase rate to 75 
10. D/C Lasix 
 
 
___________________________________________________ Attending Physician: Dora Chakraborty MD

## 2018-11-16 NOTE — ROUTINE PROCESS
1354- Report and update of kyphoplasty in progress received. 1430- Pt in recovery. Pt alert, denies pain. VSS. RA sats 89%, 02 at 2lpm remains. Pt able to push BLE and has good BUE . 1500-02 remains at 2lpm. Pt alert VSS. 1530- Report to [facility] primary nurse on unit. Medications given, procedure and orders reviewed with nurse.

## 2018-11-17 LAB
ANION GAP SERPL CALC-SCNC: 8 MMOL/L (ref 5–15)
BUN SERPL-MCNC: 53 MG/DL (ref 6–20)
BUN/CREAT SERPL: 29 (ref 12–20)
CALCIUM SERPL-MCNC: 8.5 MG/DL (ref 8.5–10.1)
CHLORIDE SERPL-SCNC: 102 MMOL/L (ref 97–108)
CO2 SERPL-SCNC: 24 MMOL/L (ref 21–32)
CREAT SERPL-MCNC: 1.85 MG/DL (ref 0.55–1.02)
ERYTHROCYTE [DISTWIDTH] IN BLOOD BY AUTOMATED COUNT: 16.9 % (ref 11.5–14.5)
GLUCOSE SERPL-MCNC: 95 MG/DL (ref 65–100)
HCT VFR BLD AUTO: 35.4 % (ref 35–47)
HGB BLD-MCNC: 11 G/DL (ref 11.5–16)
MCH RBC QN AUTO: 28.5 PG (ref 26–34)
MCHC RBC AUTO-ENTMCNC: 31.1 G/DL (ref 30–36.5)
MCV RBC AUTO: 91.7 FL (ref 80–99)
NRBC # BLD: 0 K/UL (ref 0–0.01)
NRBC BLD-RTO: 0 PER 100 WBC
PLATELET # BLD AUTO: 171 K/UL (ref 150–400)
PMV BLD AUTO: 11.6 FL (ref 8.9–12.9)
POTASSIUM SERPL-SCNC: 4.3 MMOL/L (ref 3.5–5.1)
RBC # BLD AUTO: 3.86 M/UL (ref 3.8–5.2)
SODIUM SERPL-SCNC: 134 MMOL/L (ref 136–145)
WBC # BLD AUTO: 8.5 K/UL (ref 3.6–11)

## 2018-11-17 PROCEDURE — 77010033678 HC OXYGEN DAILY

## 2018-11-17 PROCEDURE — 74011636637 HC RX REV CODE- 636/637: Performed by: INTERNAL MEDICINE

## 2018-11-17 PROCEDURE — 74011250636 HC RX REV CODE- 250/636: Performed by: INTERNAL MEDICINE

## 2018-11-17 PROCEDURE — 85027 COMPLETE CBC AUTOMATED: CPT

## 2018-11-17 PROCEDURE — 36415 COLL VENOUS BLD VENIPUNCTURE: CPT

## 2018-11-17 PROCEDURE — 94760 N-INVAS EAR/PLS OXIMETRY 1: CPT

## 2018-11-17 PROCEDURE — 65270000029 HC RM PRIVATE

## 2018-11-17 PROCEDURE — 80048 BASIC METABOLIC PNL TOTAL CA: CPT

## 2018-11-17 PROCEDURE — 74011250637 HC RX REV CODE- 250/637: Performed by: INTERNAL MEDICINE

## 2018-11-17 RX ADMIN — Medication 10 ML: at 10:31

## 2018-11-17 RX ADMIN — TEMAZEPAM 15 MG: 15 CAPSULE ORAL at 21:58

## 2018-11-17 RX ADMIN — DOCUSATE SODIUM 100 MG: 100 CAPSULE, LIQUID FILLED ORAL at 10:31

## 2018-11-17 RX ADMIN — PAROXETINE HYDROCHLORIDE 20 MG: 20 TABLET, FILM COATED ORAL at 10:31

## 2018-11-17 RX ADMIN — GABAPENTIN 200 MG: 100 CAPSULE ORAL at 18:06

## 2018-11-17 RX ADMIN — Medication 1 CAPSULE: at 10:31

## 2018-11-17 RX ADMIN — Medication 10 ML: at 06:00

## 2018-11-17 RX ADMIN — GABAPENTIN 200 MG: 100 CAPSULE ORAL at 21:58

## 2018-11-17 RX ADMIN — HYDRALAZINE HYDROCHLORIDE 100 MG: 25 TABLET, FILM COATED ORAL at 21:58

## 2018-11-17 RX ADMIN — LISINOPRIL 40 MG: 20 TABLET ORAL at 10:31

## 2018-11-17 RX ADMIN — GABAPENTIN 200 MG: 100 CAPSULE ORAL at 10:31

## 2018-11-17 RX ADMIN — DEXTROSE MONOHYDRATE, SODIUM CHLORIDE, AND POTASSIUM CHLORIDE 75 ML/HR: 50; 4.5; .745 INJECTION, SOLUTION INTRAVENOUS at 01:41

## 2018-11-17 RX ADMIN — HYDRALAZINE HYDROCHLORIDE 100 MG: 25 TABLET, FILM COATED ORAL at 10:31

## 2018-11-17 RX ADMIN — DEXTROSE MONOHYDRATE, SODIUM CHLORIDE, AND POTASSIUM CHLORIDE 75 ML/HR: 50; 4.5; .745 INJECTION, SOLUTION INTRAVENOUS at 12:30

## 2018-11-17 RX ADMIN — HYDRALAZINE HYDROCHLORIDE 100 MG: 25 TABLET, FILM COATED ORAL at 18:07

## 2018-11-17 RX ADMIN — DILTIAZEM HYDROCHLORIDE 300 MG: 300 CAPSULE, COATED, EXTENDED RELEASE ORAL at 10:31

## 2018-11-17 RX ADMIN — Medication 10 ML: at 21:58

## 2018-11-17 RX ADMIN — METOPROLOL TARTRATE 50 MG: 50 TABLET, FILM COATED ORAL at 18:07

## 2018-11-17 RX ADMIN — PREDNISONE 10 MG: 10 TABLET ORAL at 10:31

## 2018-11-17 RX ADMIN — ASPIRIN 81 MG CHEWABLE TABLET 81 MG: 81 TABLET CHEWABLE at 10:31

## 2018-11-17 RX ADMIN — DOCUSATE SODIUM 100 MG: 100 CAPSULE, LIQUID FILLED ORAL at 18:06

## 2018-11-17 NOTE — ROUTINE PROCESS
Bedside and Verbal shift change report given to Liudmila Arnold RN (oncoming nurse) by Huy Bennett RN (offgoing nurse). Report included the following information SBAR, Kardex, Intake/Output and MAR.

## 2018-11-17 NOTE — PROGRESS NOTES
PROGRESS NOTE 
 
NAME:  Hedy Roemro :   1935 MRN:   [id number] Date/Time:  2018 10:58 AM 
Subjective:  
History:  Chart reviewed and patient seen and examined this AM and D/W her nurse and all events noted. She was admitted on  after a fall out of bed and has acute T 12 compression fracture. She also hit her head but no LOC and CT head was negative. She in addition was found to have a recurrent UTI and mild Leukocytosis although asymptomatic from UTI and culture so far not remarkable for significant infection. She has no SOB except her chronic dyspnea and no other cardio/respiratory c/o. She has no GI c/o as well as no  c/o. There are no neurologic c/o except generalized weakness and she has no other c/o on complete ROS except her back pain which is better now S/P kyphoplasty done yesterday. Medications reviewed: 
Current Facility-Administered Medications Medication Dose Route Frequency  sodium chloride (NS) flush 5-10 mL  5-10 mL IntraVENous Q8H  
 sodium chloride (NS) flush 5-10 mL  5-10 mL IntraVENous PRN  
 levoFLOXacin (LEVAQUIN) 500 mg in D5W IVPB  500 mg IntraVENous Q48H  
 gabapentin (NEURONTIN) capsule 200 mg  200 mg Oral QID  dextrose 5% - 0.45% NaCl with KCl 10 mEq/L infusion  75 mL/hr IntraVENous CONTINUOUS  
 albuterol-ipratropium (DUO-NEB) 2.5 MG-0.5 MG/3 ML  3 mL Nebulization Q4H PRN  
 acetaminophen (TYLENOL) tablet 650 mg  650 mg Oral Q4H PRN  
 aspirin chewable tablet 81 mg  81 mg Oral DAILY  dilTIAZem CD (CARDIZEM CD) capsule 300 mg  300 mg Oral DAILY  docusate sodium (COLACE) capsule 100 mg  100 mg Oral BID  hydrALAZINE (APRESOLINE) tablet 100 mg  100 mg Oral TID  lactobac ac& pc-s.therm-b.anim (HANG Q/RISAQUAD)  1 Cap Oral DAILY  lisinopril (PRINIVIL, ZESTRIL) tablet 40 mg  40 mg Oral DAILY  metoprolol tartrate (LOPRESSOR) tablet 50 mg  50 mg Oral BID  PARoxetine (PAXIL) tablet 20 mg  20 mg Oral DAILY  polyethylene glycol (MIRALAX) packet 17 g  17 g Oral DAILY PRN  
 temazepam (RESTORIL) capsule 15 mg  15 mg Oral QHS  predniSONE (DELTASONE) tablet 10 mg  10 mg Oral DAILY WITH BREAKFAST  sodium chloride (NS) flush 5-10 mL  5-10 mL IntraVENous Q8H  
 sodium chloride (NS) flush 5-10 mL  5-10 mL IntraVENous PRN  
 acetaminophen (TYLENOL) tablet 650 mg  650 mg Oral Q4H PRN  
 ondansetron (ZOFRAN) injection 4 mg  4 mg IntraVENous Q4H PRN  
 nitroglycerin (NITROBID) 2 % ointment 1 Inch  1 Inch Topical Q6H PRN Objective:  
Vitals: 
Visit Vitals /53 (BP 1 Location: Left arm, BP Patient Position: At rest) Pulse 60 Temp 98.4 °F (36.9 °C) Resp 18 Wt 122 lb (55.3 kg) SpO2 92% BMI 22.68 kg/m² O2 Flow Rate (L/min): 2 l/min O2 Device: Nasal cannula Temp (24hrs), Av.1 °F (36.7 °C), Min:97.7 °F (36.5 °C), Max:98.5 °F (36.9 °C) Last 24hr Input/Output: 
 
Intake/Output Summary (Last 24 hours) at 2018 1057 Last data filed at 2018 0700 Gross per 24 hour Intake 2373.33 ml Output 1025 ml Net 1348.33 ml PHYSICAL EXAM: 
General:     Alert, cooperative, no distress, appears stated age. Head:    Normocephalic, without obvious abnormality, atraumatic. Eyes:    Conjunctivae/corneas clear. PERRLA Nose:   Nares normal. No drainage or sinus tenderness. Throat:     Lips, mucosa, and tongue normal.  No Thrush Neck:   Supple, symmetrical,  no adenopathy, thyroid: non tender 
   no carotid bruit and no JVD. Back:     Symmetric,  No CVA tenderness. Lungs:    Clear to auscultation bilaterally except occ rhonchi and has decreased BS. No Wheezing. No rales. Heart:    Regular rate and rhythm,  no murmur, rub or gallop. Abdomen:    Soft, non-tender. Not distended. Bowel sounds normal. No masses Extremities:  Extremities normal, atraumatic, No cyanosis. No edema. + clubbing Lymph nodes:  Cervical, supraclavicular normal. 
 Neurologic:  Generalized non focal decreased strength, Alert and oriented X 3 and overall less confused today. Skin:                 No rash Lab Data Reviewed: 
 
Recent Results (from the past 24 hour(s)) CBC W/O DIFF Collection Time: 11/17/18  4:44 AM  
Result Value Ref Range WBC 8.5 3.6 - 11.0 K/uL  
 RBC 3.86 3.80 - 5.20 M/uL  
 HGB 11.0 (L) 11.5 - 16.0 g/dL HCT 35.4 35.0 - 47.0 % MCV 91.7 80.0 - 99.0 FL  
 MCH 28.5 26.0 - 34.0 PG  
 MCHC 31.1 30.0 - 36.5 g/dL  
 RDW 16.9 (H) 11.5 - 14.5 % PLATELET 701 426 - 418 K/uL MPV 11.6 8.9 - 12.9 FL  
 NRBC 0.0 0  WBC ABSOLUTE NRBC 0.00 0.00 - 0.01 K/uL METABOLIC PANEL, BASIC Collection Time: 11/17/18  4:44 AM  
Result Value Ref Range Sodium 134 (L) 136 - 145 mmol/L Potassium 4.3 3.5 - 5.1 mmol/L Chloride 102 97 - 108 mmol/L  
 CO2 24 21 - 32 mmol/L Anion gap 8 5 - 15 mmol/L Glucose 95 65 - 100 mg/dL BUN 53 (H) 6 - 20 MG/DL Creatinine 1.85 (H) 0.55 - 1.02 MG/DL  
 BUN/Creatinine ratio 29 (H) 12 - 20 GFR est AA 32 (L) >60 ml/min/1.73m2 GFR est non-AA 26 (L) >60 ml/min/1.73m2 Calcium 8.5 8.5 - 10.1 MG/DL Assessment/Plan:  
 
Principal Problem: 
  Complicated UTI (urinary tract infection) (11/12/2018) Active Problems: 
  Essential hypertension (7/20/2016) PAF (paroxysmal atrial fibrillation) ([facility] (3/30/2018) COPD (chronic obstructive pulmonary disease) ([facility] (8/14/2010) Leukocytosis (4/8/2018) Stage 3 chronic kidney disease ([facility] (11/5/2018) [facility] (11/13/2018) 
 
  
___________________________________________________ PLAN: 
 
1.  T 12 Kyphoplasty for Fracture completed yesterday 2. Continue Levaquin for UTI pending final culture as some Pseudomonas, D/Micah Ampicillin 3. Priscila Q 
4. Follow Leukocytosis (12.9 adm ) to 11.0  today 5. Follow renal function (37/1.30 adm) to 54/2.28 yesterday to 53/1.85 today 6.  Hydralazine, Metoprolol, Cardizem and Lisinopril for HTN with NTP added on adm 7. Mobilize with PT 
8.  JA PRN for COPD 9. Continue fluids IV with increased Creat. and increased rate to 75 yesterday 10. D/Micah Lasix yesterday 
 
 
___________________________________________________ Attending Physician: Alexandra Chan MD

## 2018-11-18 LAB
ANION GAP SERPL CALC-SCNC: 8 MMOL/L (ref 5–15)
BUN SERPL-MCNC: 50 MG/DL (ref 6–20)
BUN/CREAT SERPL: 30 (ref 12–20)
CALCIUM SERPL-MCNC: 9.1 MG/DL (ref 8.5–10.1)
CHLORIDE SERPL-SCNC: 105 MMOL/L (ref 97–108)
CO2 SERPL-SCNC: 24 MMOL/L (ref 21–32)
CREAT SERPL-MCNC: 1.64 MG/DL (ref 0.55–1.02)
GLUCOSE SERPL-MCNC: 112 MG/DL (ref 65–100)
POTASSIUM SERPL-SCNC: 4.7 MMOL/L (ref 3.5–5.1)
SODIUM SERPL-SCNC: 137 MMOL/L (ref 136–145)

## 2018-11-18 PROCEDURE — 74011636637 HC RX REV CODE- 636/637: Performed by: INTERNAL MEDICINE

## 2018-11-18 PROCEDURE — 74011250636 HC RX REV CODE- 250/636: Performed by: INTERNAL MEDICINE

## 2018-11-18 PROCEDURE — 77030037878 HC DRSG MEPILEX >48IN BORD MOLN -B

## 2018-11-18 PROCEDURE — 77010033678 HC OXYGEN DAILY

## 2018-11-18 PROCEDURE — 36415 COLL VENOUS BLD VENIPUNCTURE: CPT

## 2018-11-18 PROCEDURE — 80048 BASIC METABOLIC PNL TOTAL CA: CPT

## 2018-11-18 PROCEDURE — 65270000029 HC RM PRIVATE

## 2018-11-18 PROCEDURE — 94760 N-INVAS EAR/PLS OXIMETRY 1: CPT

## 2018-11-18 PROCEDURE — 74011250637 HC RX REV CODE- 250/637: Performed by: INTERNAL MEDICINE

## 2018-11-18 RX ADMIN — METOPROLOL TARTRATE 50 MG: 50 TABLET, FILM COATED ORAL at 11:22

## 2018-11-18 RX ADMIN — LISINOPRIL 40 MG: 20 TABLET ORAL at 11:23

## 2018-11-18 RX ADMIN — Medication 10 ML: at 21:49

## 2018-11-18 RX ADMIN — HYDRALAZINE HYDROCHLORIDE 100 MG: 25 TABLET, FILM COATED ORAL at 21:49

## 2018-11-18 RX ADMIN — DOCUSATE SODIUM 100 MG: 100 CAPSULE, LIQUID FILLED ORAL at 18:48

## 2018-11-18 RX ADMIN — PREDNISONE 10 MG: 10 TABLET ORAL at 11:22

## 2018-11-18 RX ADMIN — PAROXETINE HYDROCHLORIDE 20 MG: 20 TABLET, FILM COATED ORAL at 11:23

## 2018-11-18 RX ADMIN — Medication 1 CAPSULE: at 11:23

## 2018-11-18 RX ADMIN — Medication 10 ML: at 05:21

## 2018-11-18 RX ADMIN — GABAPENTIN 200 MG: 100 CAPSULE ORAL at 21:49

## 2018-11-18 RX ADMIN — HYDRALAZINE HYDROCHLORIDE 100 MG: 25 TABLET, FILM COATED ORAL at 11:23

## 2018-11-18 RX ADMIN — DEXTROSE MONOHYDRATE, SODIUM CHLORIDE, AND POTASSIUM CHLORIDE 75 ML/HR: 50; 4.5; .745 INJECTION, SOLUTION INTRAVENOUS at 05:21

## 2018-11-18 RX ADMIN — Medication 10 ML: at 13:23

## 2018-11-18 RX ADMIN — HYDRALAZINE HYDROCHLORIDE 100 MG: 25 TABLET, FILM COATED ORAL at 16:53

## 2018-11-18 RX ADMIN — DOCUSATE SODIUM 100 MG: 100 CAPSULE, LIQUID FILLED ORAL at 11:23

## 2018-11-18 RX ADMIN — GABAPENTIN 200 MG: 100 CAPSULE ORAL at 18:47

## 2018-11-18 RX ADMIN — TEMAZEPAM 15 MG: 15 CAPSULE ORAL at 21:49

## 2018-11-18 RX ADMIN — ASPIRIN 81 MG CHEWABLE TABLET 81 MG: 81 TABLET CHEWABLE at 11:23

## 2018-11-18 RX ADMIN — ACETAMINOPHEN 650 MG: 325 TABLET ORAL at 11:26

## 2018-11-18 RX ADMIN — DILTIAZEM HYDROCHLORIDE 300 MG: 300 CAPSULE, COATED, EXTENDED RELEASE ORAL at 11:22

## 2018-11-18 RX ADMIN — GABAPENTIN 200 MG: 100 CAPSULE ORAL at 13:21

## 2018-11-18 RX ADMIN — LEVOFLOXACIN 500 MG: 5 INJECTION, SOLUTION INTRAVENOUS at 21:48

## 2018-11-18 RX ADMIN — GABAPENTIN 200 MG: 100 CAPSULE ORAL at 11:22

## 2018-11-18 NOTE — ROUTINE PROCESS
Bedside and Verbal shift change report given to Dileep De La Fuente RN (oncoming nurse) by David Euceda RN (offgoing nurse). Report included the following information SBAR, Kardex, Intake/Output and MAR.

## 2018-11-18 NOTE — PROGRESS NOTES
Spiritual Care Assessment/Progress Note [facility] 
 
 
NAME: Barry Garcia      MRN: [id number] AGE: 80 y.o. SEX: female Religion Affiliation: Gnosticist  
Language: English  
 
11/18/2018     Total Time (in minutes): 15 Spiritual Assessment begun in [facility] through conversation with: 
  
    [x]Patient        [] Family    [] Friend(s) Reason for Consult: Interdisciplinary rounds, patient floor Spiritual beliefs: (Please include comment if needed) [x] Identifies with a jairon tradition: Adventism    
   [] Supported by a jairon community:        
   [] Claims no spiritual orientation:       
   [] Seeking spiritual identity:            
   [] Adheres to an individual form of spirituality:       
   [] Not able to assess:                   
 
    
Identified resources for coping:  
   [] Prayer                           
   [] Music                  [] Guided Imagery [x] Family/friends                 [] Pet visits [] Devotional reading                         [] Unknown 
   [] Other:                                         
 
 
Interventions offered during this visit: (See comments for more details) Patient Interventions: Affirmation of jairon, Affirmation of emotions/emotional suffering, Catharsis/review of pertinent events in supportive environment, Coping skills reviewed/reinforced, Iconic (affirming the presence of God/Higher Power), Prayer (assurance of) Plan of Care: 
 
 [x] Support spiritual and/or cultural needs  
 [] Support AMD and/or advance care planning process    
 [] Support grieving process 
 [] Coordinate Rites and/or Rituals  
 [] Coordination with community clergy [] No spiritual needs identified at this time 
 [] Detailed Plan of Care below (See Comments)  [] Make referral to Music Therapy 
[] Make referral to Pet Therapy    
[] Make referral to Addiction services 
[] Make referral to [facility] [] Make referral to Spiritual Care Partner 
[] No future visits requested       
[] Follow up visits as needed Comments: Patient lying in bed, resting and watching television. Alert and oriented to person, place, and time. Good eye contact, friendly, kind. Provided ministry of presence and spiritual listening. Says she hopes to be able to return home soon. Lives at [facility] living [facility] Spoke about her life at the [facility] as well as some past history. Expressing gratitude and thankfulness that she was not more seriously injured after her recent falls. Appeared comforted and encouraged as a result of this visit and expressed gratitude for this visit. Visited by Rev. Chiquita Davis, [address] [facility]  paging service: [phone]) Tan Curry

## 2018-11-18 NOTE — PROGRESS NOTES
PROGRESS NOTE 
 
NAME:  Darrick Gresham :   1935 MRN:   [id number] Date/Time:  2018 11:45 AM 
Subjective:  
History:  Chart reviewed and patient seen and examined this AM and D/W her nurse and all events noted. She was admitted on  after a fall out of bed and sustained an acute T 12 compression fracture. She also hit her head but no LOC and CT head was negative. She in addition was found to have a recurrent UTI and mild Leukocytosis although asymptomatic from UTI and culture so far not remarkable for significant infection. She has no SOB except her chronic dyspnea and no other cardio/respiratory c/o. She has no GI c/o as well as no  c/o. There are no neurologic c/o except generalized weakness and she has no other c/o on complete ROS except her back pain which is better now S/P kyphoplasty done on . She has been refusing to get OOB to chair per her nurse. Last PT note from 11/15 as I see no note from PT yesterday (1st day post kyphoplasty). Medications reviewed: 
Current Facility-Administered Medications Medication Dose Route Frequency  sodium chloride (NS) flush 5-10 mL  5-10 mL IntraVENous Q8H  
 sodium chloride (NS) flush 5-10 mL  5-10 mL IntraVENous PRN  
 levoFLOXacin (LEVAQUIN) 500 mg in D5W IVPB  500 mg IntraVENous Q48H  
 gabapentin (NEURONTIN) capsule 200 mg  200 mg Oral QID  dextrose 5% - 0.45% NaCl with KCl 10 mEq/L infusion  75 mL/hr IntraVENous CONTINUOUS  
 albuterol-ipratropium (DUO-NEB) 2.5 MG-0.5 MG/3 ML  3 mL Nebulization Q4H PRN  
 acetaminophen (TYLENOL) tablet 650 mg  650 mg Oral Q4H PRN  
 aspirin chewable tablet 81 mg  81 mg Oral DAILY  dilTIAZem CD (CARDIZEM CD) capsule 300 mg  300 mg Oral DAILY  docusate sodium (COLACE) capsule 100 mg  100 mg Oral BID  hydrALAZINE (APRESOLINE) tablet 100 mg  100 mg Oral TID  lactobac ac& pc-s.therm-b.anim (HANG Q/RISAQUAD)  1 Cap Oral DAILY  lisinopril (PRINIVIL, ZESTRIL) tablet 40 mg  40 mg Oral DAILY  metoprolol tartrate (LOPRESSOR) tablet 50 mg  50 mg Oral BID  PARoxetine (PAXIL) tablet 20 mg  20 mg Oral DAILY  polyethylene glycol (MIRALAX) packet 17 g  17 g Oral DAILY PRN  
 temazepam (RESTORIL) capsule 15 mg  15 mg Oral QHS  predniSONE (DELTASONE) tablet 10 mg  10 mg Oral DAILY WITH BREAKFAST  sodium chloride (NS) flush 5-10 mL  5-10 mL IntraVENous Q8H  
 sodium chloride (NS) flush 5-10 mL  5-10 mL IntraVENous PRN  
 acetaminophen (TYLENOL) tablet 650 mg  650 mg Oral Q4H PRN  
 ondansetron (ZOFRAN) injection 4 mg  4 mg IntraVENous Q4H PRN  
 nitroglycerin (NITROBID) 2 % ointment 1 Inch  1 Inch Topical Q6H PRN Objective:  
Vitals: 
Visit Vitals /54 (BP 1 Location: Left arm, BP Patient Position: At rest) Pulse 61 Temp 98.1 °F (36.7 °C) Resp 16 Wt 122 lb (55.3 kg) SpO2 92% BMI 22.68 kg/m² O2 Flow Rate (L/min): 2 l/min O2 Device: Nasal cannula Temp (24hrs), Av °F (36.7 °C), Min:97.4 °F (36.3 °C), Max:98.3 °F (36.8 °C) Last 24hr Input/Output: 
 
Intake/Output Summary (Last 24 hours) at 2018 1145 Last data filed at 2018 1030 Gross per 24 hour Intake 2130 ml Output 800 ml Net 1330 ml PHYSICAL EXAM: 
General:     Alert, cooperative, no distress, appears stated age. Head:    Normocephalic, without obvious abnormality, atraumatic. Eyes:    Conjunctivae/corneas clear. PERRLA Nose:   Nares normal. No drainage or sinus tenderness. Throat:     Lips, mucosa, and tongue normal.  No Thrush Neck:   Supple, symmetrical,  no adenopathy, thyroid: non tender 
   no carotid bruit and no JVD. Back:     Symmetric,  No CVA tenderness. Lungs:    Clear to auscultation bilaterally except occ rhonchi and has decreased BS. No Wheezing. No rales. Heart:    Regular rate and rhythm,  no murmur, rub or gallop. Abdomen:    Soft, non-tender. Not distended.   Bowel sounds normal. No masses Extremities:  Extremities normal, atraumatic, No cyanosis. No edema. + clubbing Lymph nodes:  Cervical, supraclavicular normal. 
Neurologic:  Generalized non focal decreased strength, Alert and oriented X 3 and overall less confused today. Skin:                 No rash Lab Data Reviewed: 
 
Recent Results (from the past 24 hour(s)) METABOLIC PANEL, BASIC Collection Time: 11/18/18  4:48 AM  
Result Value Ref Range Sodium 137 136 - 145 mmol/L Potassium 4.7 3.5 - 5.1 mmol/L Chloride 105 97 - 108 mmol/L  
 CO2 24 21 - 32 mmol/L Anion gap 8 5 - 15 mmol/L Glucose 112 (H) 65 - 100 mg/dL BUN 50 (H) 6 - 20 MG/DL Creatinine 1.64 (H) 0.55 - 1.02 MG/DL  
 BUN/Creatinine ratio 30 (H) 12 - 20 GFR est AA 36 (L) >60 ml/min/1.73m2 GFR est non-AA 30 (L) >60 ml/min/1.73m2 Calcium 9.1 8.5 - 10.1 MG/DL Assessment/Plan:  
 
Principal Problem: 
  Complicated UTI (urinary tract infection) (11/12/2018) Active Problems: 
  Essential hypertension (7/20/2016) PAF (paroxysmal atrial fibrillation) ([facility] (3/30/2018) COPD (chronic obstructive pulmonary disease) ([facility] (8/14/2010) Leukocytosis (4/8/2018) Stage [facility] (11/5/2018) Closed compression fracture of thoracic vertebra ([facility] (11/13/2018) 
 
  
___________________________________________________ PLAN: 
 
1.  T 12 Kyphoplasty for Fracture completed 11/16 2. Continue Levaquin for UTI pending final culture as some Pseudomonas, D/Micah Ampicillin 3. Priscila Q 
4. Follow Leukocytosis (12.9 adm ) to 11.0  yesterday 5. Follow renal function (37/1.30 adm) to 54/2.28 on 11/16 to 50/1.64 today 6. Hydralazine, Metoprolol, Cardizem and Lisinopril for HTN with NTP added on adm 7. Mobilize with PT prior to discharge 8.  JA PRN for COPD 9. Continue fluids IV with increased Creat. and increased rate to 75 two days ago so decrease to 50 now 10. D/Micah Lasix 11/16 ___________________________________________________ Attending Physician: Tawana Rivera MD

## 2018-11-19 LAB
ANION GAP SERPL CALC-SCNC: 7 MMOL/L (ref 5–15)
BUN SERPL-MCNC: 46 MG/DL (ref 6–20)
BUN/CREAT SERPL: 32 (ref 12–20)
CALCIUM SERPL-MCNC: 8.9 MG/DL (ref 8.5–10.1)
CHLORIDE SERPL-SCNC: 108 MMOL/L (ref 97–108)
CO2 SERPL-SCNC: 23 MMOL/L (ref 21–32)
CREAT SERPL-MCNC: 1.43 MG/DL (ref 0.55–1.02)
GLUCOSE SERPL-MCNC: 99 MG/DL (ref 65–100)
POTASSIUM SERPL-SCNC: 5 MMOL/L (ref 3.5–5.1)
SODIUM SERPL-SCNC: 138 MMOL/L (ref 136–145)

## 2018-11-19 PROCEDURE — 74011250637 HC RX REV CODE- 250/637: Performed by: INTERNAL MEDICINE

## 2018-11-19 PROCEDURE — 97530 THERAPEUTIC ACTIVITIES: CPT

## 2018-11-19 PROCEDURE — 80048 BASIC METABOLIC PNL TOTAL CA: CPT

## 2018-11-19 PROCEDURE — 97535 SELF CARE MNGMENT TRAINING: CPT | Performed by: OCCUPATIONAL THERAPIST

## 2018-11-19 PROCEDURE — 77010033678 HC OXYGEN DAILY

## 2018-11-19 PROCEDURE — 65270000029 HC RM PRIVATE

## 2018-11-19 PROCEDURE — 74011636637 HC RX REV CODE- 636/637: Performed by: INTERNAL MEDICINE

## 2018-11-19 PROCEDURE — 74011250636 HC RX REV CODE- 250/636: Performed by: INTERNAL MEDICINE

## 2018-11-19 PROCEDURE — 36415 COLL VENOUS BLD VENIPUNCTURE: CPT

## 2018-11-19 PROCEDURE — 77030038269 HC DRN EXT URIN PURWCK BARD -A

## 2018-11-19 PROCEDURE — 94760 N-INVAS EAR/PLS OXIMETRY 1: CPT

## 2018-11-19 RX ADMIN — Medication 10 ML: at 21:36

## 2018-11-19 RX ADMIN — ASPIRIN 81 MG CHEWABLE TABLET 81 MG: 81 TABLET CHEWABLE at 10:38

## 2018-11-19 RX ADMIN — METOPROLOL TARTRATE 50 MG: 50 TABLET, FILM COATED ORAL at 09:00

## 2018-11-19 RX ADMIN — HYDRALAZINE HYDROCHLORIDE 100 MG: 25 TABLET, FILM COATED ORAL at 10:39

## 2018-11-19 RX ADMIN — Medication 10 ML: at 06:00

## 2018-11-19 RX ADMIN — LISINOPRIL 40 MG: 20 TABLET ORAL at 10:37

## 2018-11-19 RX ADMIN — Medication 1 CAPSULE: at 10:37

## 2018-11-19 RX ADMIN — METOPROLOL TARTRATE 50 MG: 50 TABLET, FILM COATED ORAL at 18:00

## 2018-11-19 RX ADMIN — DILTIAZEM HYDROCHLORIDE 300 MG: 300 CAPSULE, COATED, EXTENDED RELEASE ORAL at 10:38

## 2018-11-19 RX ADMIN — DOCUSATE SODIUM 100 MG: 100 CAPSULE, LIQUID FILLED ORAL at 18:55

## 2018-11-19 RX ADMIN — GABAPENTIN 200 MG: 100 CAPSULE ORAL at 21:30

## 2018-11-19 RX ADMIN — DEXTROSE MONOHYDRATE, SODIUM CHLORIDE, AND POTASSIUM CHLORIDE 50 ML/HR: 50; 4.5; .745 INJECTION, SOLUTION INTRAVENOUS at 14:24

## 2018-11-19 RX ADMIN — GABAPENTIN 200 MG: 100 CAPSULE ORAL at 10:38

## 2018-11-19 RX ADMIN — HYDRALAZINE HYDROCHLORIDE 100 MG: 25 TABLET, FILM COATED ORAL at 21:30

## 2018-11-19 RX ADMIN — GABAPENTIN 200 MG: 100 CAPSULE ORAL at 18:55

## 2018-11-19 RX ADMIN — PREDNISONE 10 MG: 10 TABLET ORAL at 10:37

## 2018-11-19 RX ADMIN — DOCUSATE SODIUM 100 MG: 100 CAPSULE, LIQUID FILLED ORAL at 10:38

## 2018-11-19 RX ADMIN — GABAPENTIN 200 MG: 100 CAPSULE ORAL at 14:12

## 2018-11-19 RX ADMIN — Medication 10 ML: at 10:41

## 2018-11-19 RX ADMIN — PAROXETINE HYDROCHLORIDE 20 MG: 20 TABLET, FILM COATED ORAL at 10:37

## 2018-11-19 RX ADMIN — Medication 10 ML: at 10:40

## 2018-11-19 RX ADMIN — TEMAZEPAM 15 MG: 15 CAPSULE ORAL at 21:30

## 2018-11-19 NOTE — PROGRESS NOTES
Problem: Self Care Deficits Care Plan (Adult) Goal: *Acute Goals and Plan of Care (Insert Text) Occupational Therapy Goals Initiated 11/13/2018 1. Patient will perform grooming sitting on EOB with supervision/set-up within 7 day(s). 2.  Patient will perform bathing sitting on EOB with minimal assistance/contact guard assist within 7 day(s). 3.  Patient will perform lower body dressing with moderate assistance  within 7 day(s). 4.  Patient will perform toilet transfers with maximal assistance within 7 day(s). 5.  Patient will perform all aspects of toileting with maximal assistance within 7 day(s). 6.  Patient will participate in upper extremity therapeutic exercise/activities with supervision/set-up for 5 minutes within 7 day(s). 7.  Patient will utilize energy conservation techniques during functional activities with verbal cues within 7 day(s). Occupational Therapy TREATMENT Patient: Kaur Cervantes (18 y.o. female) Date: 11/19/2018 Diagnosis: Complicated UTI (urinary tract infection) Leukocytosis Complicated UTI (urinary tract infection) Precautions: Fall, DNR, Skin Chart, occupational therapy assessment, plan of care, and goals were reviewed. ASSESSMENT: 
Patient is making progress in self-care, currently requiring overall S to Mod A and cues for safety. She indicates that the staff at [facility] will be able to assist her with ADL if needed. There is conflicting information in the chart as far as whether or not she was receiving some assistance before (patient is a poor historian and has given different information to different people). If they are able to provide Mod A for lower body bathing and dressing and Min A for functional transfers, she could return to the assisted living and get home health therapy. If not, she will need to go to a skilled nursing facility for a brief stay to enable her to safely transition back at her prior level. Progression toward goals: 
[]       Improving appropriately and progressing toward goals [x]       Improving slowly and progressing toward goals 
[]       Not making progress toward goals and plan of care will be adjusted PLAN: 
Patient continues to benefit from skilled intervention to address the above impairments. Continue treatment per established plan of care. Discharge Recommendations:  [facility] (see above) Further Equipment Recommendations for Discharge:  None anticipated SUBJECTIVE:  
Patient stated I learned my lesson.  (about getting up by herself) OBJECTIVE DATA SUMMARY:  
Cognitive/Behavioral Status: 
Neurologic State: Alert;Confused Orientation Level: Disoriented to time;Disoriented to situation;Oriented to person;Oriented to place Cognition: Decreased attention/concentration; Follows commands; Impaired decision making Perception: Appears intact Perseveration: No perseveration noted Safety/Judgement: Awareness of environment;Decreased insight into deficits; Decreased awareness of need for safety Functional Mobility and Transfers for ADLs:Bed Mobility: 
Rolling: Contact guard assistance; Additional time(cues for log roll) Supine to Sit: Contact guard assistance; Additional time Scooting: Contact guard assistance; Additional time Transfers: 
Sit to Stand: Minimum assistance; Additional time Functional Transfers Toilet Transfer : Minimum assistance; Additional time Cues: Verbal cues provided;Visual cues provided Adaptive Equipment: Bedside commode Bed to Chair: Minimum assistance; Additional time(SPT) Balance: 
Sitting: Impaired Sitting - Static: Good (unsupported) Sitting - Dynamic: Fair (occasional) Standing: Impaired Standing - Static: Fair Standing - Dynamic : Fair ADL Intervention: 
  
 
Grooming Brushing/Combing Hair: Supervision/set-up(seated in chair) Upper Body Dressing Assistance Hospital Gown: Supervision/ set-up Lower Body Dressing Assistance Socks: Minimum assistance (in bed) Pants:  Moderate assistance (simulated) Leg Crossed Method Used: No 
Position Performed: Long sitting on bed Cues: Verbal cues provided;Visual cues provided Cognitive Retraining Safety/Judgement: Awareness of environment;Decreased insight into deficits; Decreased awareness of need for safety Pain: 
Pain Scale 1: Numeric (0 - 10) Pain Intensity 1: 0 Activity Tolerance:  
Please refer to the flowsheet for vital signs taken during this treatment. After treatment:  
[x] Patient left in no apparent distress sitting up in chair 
[] Patient left in no apparent distress in bed 
[x] Call bell left within reach [x] Nursing notified 
[] Caregiver present [x] Bed alarm -pad in place in the chair, no alarm box present COMMUNICATION/COLLABORATION:  
The patients plan of care was discussed with: Physical Therapist and Registered Nurse Jess Sarkar OTR/L Time Calculation: 19 mins

## 2018-11-19 NOTE — PROGRESS NOTES
Assumed care of patient at [address] [place] [address], pt had no complaints of pain and rested comfortably. Pt refused turning but RN and PCT attempted several repositions. Pt remained prone but with pillows we shifted her weight. Labs sent.

## 2018-11-19 NOTE — PROGRESS NOTES
CM acknowledges consult for inpt rehab need for patient. PT/OT recommendations prior to kyphoplasty was SNF. CM had discussed with pt and she was willing at this time. Will need new eval recommendations post op. CM will follow for recommendations. CM met with patient. She does not want to go to SNF, has been to [facility] x3 and does not want to go back. Wants to go back to [facility] with her [facility] services as they were before. Is agreeable to allow PT/OT to work with her today but wants home today. CM spoke with OT and they reviewed orders, still open to their services. Will forward message to team to get pt seen asap. CM phoned Dr. Charlotte Petty and updated on pt stance. Concern for safety without rehab. Will follow for PT/OT and will speak with patient again. Update @ 11:30am - CM spoke PT and they felt pt did better and spoke with patient about calling for any assistance needed and not trying mobilization on own at facility. Pt agreed. Waiting for official PT/OT notes for today. CM spoke with  BODØ at [facility], discussed recommendations. BODØ needed to speak to her boss and will call CM back. CM will await response. Update @ 12:30pm - CM spoke with BODØ at [facility]. They would prefer she receive 2 weeks of rehab prior to returning to them. Update @ 1:50pm- CM spoke with pt and discussed conversation with PT and [facility]. Pt wants to think about it, does not want to call children and discuss with them. CM phoned Dr. Charlotte Petty office, updated on above, suggested to offer [facility] as he goes there and she may like that. CM spoke with pt and son in room and offered suggestion. Son wants to talk to [facility] to discuss level of care and will get back to CM. Update @ 3:15pm - CM received call from Tash Parham at [facility]. Pt is cleared to return to them with [facility] services in place by [facility].  DRE phoned Dr. Angelique Cervantes office to discuss discharge. Voicemail message left. CM placed referral to [facility] and sent resumption of care orders. Update @ 3:50pm - no call back from Dr. Angelique Cervantes, called office again, spoke with nurse Brenton Gregory. Updated- can go back to [facility] with James Guardado, questioned when/if she needs follow up with Dr. Adelia Merrill for kyphoplasty, she will relay message and have Dr. Angelique Cervantes call CM. AVS updated. Update @ 4:17pm - [address] has received orders and has accepted. Bedside nurse notified if discharged tonight, needs to fax discharge instructions with med list to [facility] at [phone], attention to Lashanda Mcdermott. Daughter is here and is willing to transport with assistance. Charge nurse Stephania aware too. Madelyn Zacarias, BSN, RN Care Manager [id number] 
[phone]

## 2018-11-19 NOTE — PROGRESS NOTES
Problem: Mobility Impaired (Adult and Pediatric) Goal: *Acute Goals and Plan of Care (Insert Text) Physical Therapy Goals Initiated 11/13/2018 1. Patient will move from supine to sit and sit to supine  in bed with supervision within 7 day(s). 2.  Patient will transfer from bed to chair and chair to bed with maxA using the least restrictive device within 7 day(s). 3.  Patient will perform sit to stand with maxA x 2 within 7 day(s). 4.  Patient will demo supine exercises without cues for technique within 7 days. physical Therapy TREATMENT Patient: Carline Sanderson (18 y.o. female) Date: 11/19/2018 Diagnosis: Complicated UTI (urinary tract infection) Leukocytosis Complicated UTI (urinary tract infection) Precautions: Fall, DNR, Skin Chart, physical therapy assessment, plan of care and goals were reviewed. ASSESSMENT: 
Patient in bed on arrival and agreeable to PT. She is very motivated to return home today and verbalized she does not want to discharge to SNF rehab. Patient post kyphoplasty and reports improvements in pain. She demonstrated improvements in mobility, min A overall. Patient will require assistance for all transfers and she reports staff at her assisted living facility will be able to provide. Patient education on the importance for fall prevention/safety and she verbalized understanding/agreement. She states she is receiving HH RN, PT, OT and would recommend continued services if AL staff is able to provide the needed assistance. If not, then patient would be unsafe to return home and recommend SNF rehab to increase independence and safety prior to returning home. Progression toward goals: 
[]    Improving appropriately and progressing toward goals [x]    Improving slowly and progressing toward goals 
[]    Not making progress toward goals and plan of care will be adjusted PLAN: 
Patient continues to benefit from skilled intervention to address the above impairments. Continue treatment per established plan of care. Discharge Recommendations:  Home Health- continuation. With increased staff assistance at [facility]  VS SNF rehab Further Equipment Recommendations for Discharge:  None, patient has needed equipment SUBJECTIVE:  
Patient stated I was told I can go home today.  OBJECTIVE DATA SUMMARY:  
Critical Behavior: 
Neurologic State: Alert, Confused Orientation Level: Disoriented to situation, Disoriented to time Cognition: Decreased attention/concentration, Decreased command following, Impaired decision making Safety/Judgement: Decreased insight into deficits Functional Mobility Training: 
Bed Mobility: 
Rolling: Contact guard assistance; Additional time(cues for log roll) Supine to Sit: Contact guard assistance; Additional time Scooting: Contact guard assistance; Additional time Transfers: 
Sit to Stand: Minimum assistance; Additional time Stand to Sit: Minimum assistance; Additional time Bed to Chair: Minimum assistance; Additional time(SPT) Safety concerns with hand placement Balance: 
Sitting: Impaired Sitting - Static: Good (unsupported) Sitting - Dynamic: Fair (occasional) Standing: Impaired Standing - Static: Fair Standing - Dynamic : FairAmbulation/Gait Training: 
  
  
  
  
  
  
  
  
  
  
  
  
  
  
  
  
  
 Does not ambulate at baseline Pain: 
Pain Scale 1: Numeric (0 - 10) Pain Intensity 1: 0 Activity Tolerance:  
No s/s of VS distress After treatment:  
[x]    Patient left in no apparent distress sitting up in chair 
[]    Patient left in no apparent distress in bed 
[x]    Call bell left within reach [x]    Nursing notified 
[]    Caregiver present 
[]    Bed alarm activated- pad in place, no alarm box present COMMUNICATION/COLLABORATION:  
The patients plan of care was discussed with: Occupational Therapist and Registered Nurse Danilo Woodall, PT, DPT 
 Time Calculation: 20 mins

## 2018-11-19 NOTE — PROGRESS NOTES
PT's BP is 158/44 and HR is 57. I called Dr. Charlotte Petty. Awaiting  call back. Dr. Charlotte Petty ordered to administe pateint's Meoprolol

## 2018-11-19 NOTE — PROGRESS NOTES
General Surgery End of Shift Nursing Note Bedside shift change report given to Sarika Gavin (oncoming nurse) by Josue Rodriguez (offgoing nurse). Report included the following information SBAR, Kardex, Intake/Output, MAR and Recent Results. Shift worked:   D Summary of shift:    0 Issues for physician to address:   0 Number times ambulated in hallway past shift: 0 Number of times OOB to chair past shift: 0 Pain Management: 
Current medication: 0 Patient states pain is manageable on current pain medication: YES 
 
GI: 
 
Current diet:  DIET REGULAR Tolerating current diet: YES Passing flatus: YES Last Bowel Movement: yesterday Appearance: 0 Respiratory: 
 
Incentive Spirometer at bedside: YES Patient instructed on use: YES Patient Safety: 
 
Falls Score: 2 Bed Alarm On? No 
Sitter?  No 
 
Jesica Murrell RN

## 2018-11-19 NOTE — PROGRESS NOTES
Problem: Falls - Risk of 
Goal: *Absence of Falls Document Jessica Martinez Fall Risk and appropriate interventions in the flowsheet. Fall Risk Interventions: 
Mobility Interventions: Bed/chair exit alarm, OT consult for ADLs, Patient to call before getting OOB, PT Consult for mobility concerns, PT Consult for assist device competence Mentation Interventions: Adequate sleep, hydration, pain control Medication Interventions: Bed/chair exit alarm, Patient to call before getting OOB Elimination Interventions: Bed/chair exit alarm, Call light in reach, Patient to call for help with toileting needs History of Falls Interventions: Door open when patient unattended, Room close to nurse's station Problem: Patient Education: Go to Patient Education Activity Goal: Patient/Family Education Outcome: Progressing Towards Goal 
Call bell within reach, siderails upx3. PT & OT ordered.

## 2018-11-19 NOTE — PROGRESS NOTES
PROGRESS NOTE 
 
NAME:  Esther Castellanos :   1935 MRN:   [id number] Date/Time:  2018 6:52 AM 
Subjective:  
History:  Chart reviewed and patient seen and examined this AM and D/W her nurse and all events noted. She was admitted on  after a fall out of bed and sustained an acute T 12 compression fracture. She also hit her head but no LOC and CT head was negative. She in addition was found to have a recurrent UTI and mild Leukocytosis although asymptomatic from UTI and culture so far not remarkable for significant infection. She has no SOB except her chronic dyspnea and no other cardio/respiratory c/o. She has no GI c/o as well as no  c/o. There are no neurologic c/o except generalized weakness and she has no other c/o on complete ROS except her back pain which is better now S/P kyphoplasty done on . She had been refusing to get OOB to chair per her nurse, but di get up yesterday afternoon although she has not walked. Last PT note from 11/15 as I see no note from PT on  (1st day post kyphoplasty). Medications reviewed: 
Current Facility-Administered Medications Medication Dose Route Frequency  sodium chloride (NS) flush 5-10 mL  5-10 mL IntraVENous Q8H  
 sodium chloride (NS) flush 5-10 mL  5-10 mL IntraVENous PRN  
 gabapentin (NEURONTIN) capsule 200 mg  200 mg Oral QID  dextrose 5% - 0.45% NaCl with KCl 10 mEq/L infusion  50 mL/hr IntraVENous CONTINUOUS  
 albuterol-ipratropium (DUO-NEB) 2.5 MG-0.5 MG/3 ML  3 mL Nebulization Q4H PRN  
 acetaminophen (TYLENOL) tablet 650 mg  650 mg Oral Q4H PRN  
 aspirin chewable tablet 81 mg  81 mg Oral DAILY  dilTIAZem CD (CARDIZEM CD) capsule 300 mg  300 mg Oral DAILY  docusate sodium (COLACE) capsule 100 mg  100 mg Oral BID  hydrALAZINE (APRESOLINE) tablet 100 mg  100 mg Oral TID  lactobac ac& pc-s.therm-b.anim (HANG Q/RISAQUAD)  1 Cap Oral DAILY  lisinopril (PRINIVIL, ZESTRIL) tablet 40 mg  40 mg Oral DAILY  metoprolol tartrate (LOPRESSOR) tablet 50 mg  50 mg Oral BID  PARoxetine (PAXIL) tablet 20 mg  20 mg Oral DAILY  polyethylene glycol (MIRALAX) packet 17 g  17 g Oral DAILY PRN  
 temazepam (RESTORIL) capsule 15 mg  15 mg Oral QHS  predniSONE (DELTASONE) tablet 10 mg  10 mg Oral DAILY WITH BREAKFAST  sodium chloride (NS) flush 5-10 mL  5-10 mL IntraVENous Q8H  
 sodium chloride (NS) flush 5-10 mL  5-10 mL IntraVENous PRN  
 acetaminophen (TYLENOL) tablet 650 mg  650 mg Oral Q4H PRN  
 ondansetron (ZOFRAN) injection 4 mg  4 mg IntraVENous Q4H PRN  
 nitroglycerin (NITROBID) 2 % ointment 1 Inch  1 Inch Topical Q6H PRN Objective:  
Vitals: 
Visit Vitals /50 (BP 1 Location: Left arm, BP Patient Position: At rest) Pulse (!) 58 Temp 98.6 °F (37 °C) Resp 17 Wt 122 lb (55.3 kg) SpO2 96% BMI 22.68 kg/m² O2 Flow Rate (L/min): 2 l/min O2 Device: Room air Temp (24hrs), Av.5 °F (36.9 °C), Min:98 °F (36.7 °C), Max:98.9 °F (37.2 °C) Last 24hr Input/Output: 
 
Intake/Output Summary (Last 24 hours) at 2018 8947 Last data filed at 2018 1859 Gross per 24 hour Intake 1473.33 ml Output 850 ml Net 623.33 ml PHYSICAL EXAM: 
General:     Alert, cooperative, no distress, appears stated age. Head:    Normocephalic, without obvious abnormality, atraumatic. Eyes:    Conjunctivae/corneas clear. PERRLA Nose:   Nares normal. No drainage or sinus tenderness. Throat:     Lips, mucosa, and tongue normal.  No Thrush Neck:   Supple, symmetrical,  no adenopathy, thyroid: non tender 
   no carotid bruit and no JVD. Back:     Symmetric,  No CVA tenderness. Lungs:    Clear to auscultation bilaterally except occ rhonchi and has decreased BS. No Wheezing. No rales. Heart:    Regular rate and rhythm,  no murmur, rub or gallop. Abdomen:    Soft, non-tender. Not distended.   Bowel sounds normal. No masses Extremities:  Extremities normal, atraumatic, No cyanosis. No edema. + clubbing Lymph nodes:  Cervical, supraclavicular normal. 
Neurologic:  Generalized non focal decreased strength, Alert and oriented X 3 and overall less confused today. Skin:                 No rash Lab Data Reviewed: 
 
Recent Results (from the past 24 hour(s)) METABOLIC PANEL, BASIC Collection Time: 11/19/18  5:01 AM  
Result Value Ref Range Sodium 138 136 - 145 mmol/L Potassium 5.0 3.5 - 5.1 mmol/L Chloride 108 97 - 108 mmol/L  
 CO2 23 21 - 32 mmol/L Anion gap 7 5 - 15 mmol/L Glucose 99 65 - 100 mg/dL BUN 46 (H) 6 - 20 MG/DL Creatinine 1.43 (H) 0.55 - 1.02 MG/DL  
 BUN/Creatinine ratio 32 (H) 12 - 20 GFR est AA 43 (L) >60 ml/min/1.73m2 GFR est non-AA 35 (L) >60 ml/min/1.73m2 Calcium 8.9 8.5 - 10.1 MG/DL Assessment/Plan:  
 
Principal Problem: 
  Complicated UTI (urinary tract infection) (11/12/2018) Active Problems: 
  Essential hypertension (7/20/2016) PA[facility]oxysmal [facility]) ([facility] (3/30/2018) COPD (chronic obstructive pulmonary disease) ([facility] (8/14/2010) Leukocytosis (4/8/2018) Stage 3 chronic kidney disease ([facility] (11/5/2018) Closed compression fracture of thoracic vertebra ([facility] (11/13/2018) 
 
  
___________________________________________________ PLAN: 
 
1.  T 12 Kyphoplasty for Fracture completed 11/16 2. Discontinue Levaquin for UTI 3. Priscila Q 
4. Follow Leukocytosis (12.9 adm ) to 11.0  yesterday 5. Follow renal function (37/1.30 adm) to 54/2.28 on 11/16 to 46/1.43 today 6. Hydralazine, Metoprolol, Cardizem and Lisinopril for HTN with NTP added on adm 7. Needs to mobilize with PT prior to discharge, ? Does she need Rehab and does her adult facility have Rehab available 8.  JA PRN for COPD 9. Continue fluids IV with increased Creat. and  decreased to 50 
10. D/Micah Lasix 11/16 ___________________________________________________ Attending Physician: Qian Spence MD

## 2018-11-20 VITALS
HEART RATE: 63 BPM | SYSTOLIC BLOOD PRESSURE: 162 MMHG | WEIGHT: 122 LBS | OXYGEN SATURATION: 92 % | TEMPERATURE: 97.8 F | DIASTOLIC BLOOD PRESSURE: 50 MMHG | RESPIRATION RATE: 19 BRPM | BODY MASS INDEX: 22.68 KG/M2

## 2018-11-20 LAB
ANION GAP SERPL CALC-SCNC: 7 MMOL/L (ref 5–15)
BASOPHILS # BLD: 0.1 K/UL (ref 0–0.1)
BASOPHILS NFR BLD: 1 % (ref 0–1)
BUN SERPL-MCNC: 39 MG/DL (ref 6–20)
BUN/CREAT SERPL: 30 (ref 12–20)
CALCIUM SERPL-MCNC: 9.4 MG/DL (ref 8.5–10.1)
CHLORIDE SERPL-SCNC: 106 MMOL/L (ref 97–108)
CO2 SERPL-SCNC: 24 MMOL/L (ref 21–32)
CREAT SERPL-MCNC: 1.32 MG/DL (ref 0.55–1.02)
DIFFERENTIAL METHOD BLD: ABNORMAL
EOSINOPHIL # BLD: 0.1 K/UL (ref 0–0.4)
EOSINOPHIL NFR BLD: 1 % (ref 0–7)
ERYTHROCYTE [DISTWIDTH] IN BLOOD BY AUTOMATED COUNT: 16.8 % (ref 11.5–14.5)
GLUCOSE SERPL-MCNC: 95 MG/DL (ref 65–100)
HCT VFR BLD AUTO: 34.8 % (ref 35–47)
HGB BLD-MCNC: 11.4 G/DL (ref 11.5–16)
IMM GRANULOCYTES # BLD: 0.2 K/UL (ref 0–0.04)
IMM GRANULOCYTES NFR BLD AUTO: 2 % (ref 0–0.5)
LYMPHOCYTES # BLD: 1 K/UL (ref 0.8–3.5)
LYMPHOCYTES NFR BLD: 8 % (ref 12–49)
MCH RBC QN AUTO: 29.3 PG (ref 26–34)
MCHC RBC AUTO-ENTMCNC: 32.8 G/DL (ref 30–36.5)
MCV RBC AUTO: 89.5 FL (ref 80–99)
MONOCYTES # BLD: 1.1 K/UL (ref 0–1)
MONOCYTES NFR BLD: 9 % (ref 5–13)
NEUTS SEG # BLD: 9.9 K/UL (ref 1.8–8)
NEUTS SEG NFR BLD: 79 % (ref 32–75)
NRBC # BLD: 0 K/UL (ref 0–0.01)
NRBC BLD-RTO: 0 PER 100 WBC
PLATELET # BLD AUTO: 259 K/UL (ref 150–400)
PMV BLD AUTO: 10.7 FL (ref 8.9–12.9)
POTASSIUM SERPL-SCNC: 4.6 MMOL/L (ref 3.5–5.1)
RBC # BLD AUTO: 3.89 M/UL (ref 3.8–5.2)
RBC MORPH BLD: ABNORMAL
SODIUM SERPL-SCNC: 137 MMOL/L (ref 136–145)
WBC # BLD AUTO: 12.4 K/UL (ref 3.6–11)

## 2018-11-20 PROCEDURE — 36415 COLL VENOUS BLD VENIPUNCTURE: CPT

## 2018-11-20 PROCEDURE — 85025 COMPLETE CBC W/AUTO DIFF WBC: CPT

## 2018-11-20 PROCEDURE — 74011636637 HC RX REV CODE- 636/637: Performed by: INTERNAL MEDICINE

## 2018-11-20 PROCEDURE — 80048 BASIC METABOLIC PNL TOTAL CA: CPT

## 2018-11-20 PROCEDURE — 94760 N-INVAS EAR/PLS OXIMETRY 1: CPT

## 2018-11-20 PROCEDURE — 74011250637 HC RX REV CODE- 250/637: Performed by: INTERNAL MEDICINE

## 2018-11-20 RX ORDER — AMLODIPINE BESYLATE 5 MG/1
5 TABLET ORAL ONCE
Status: COMPLETED | OUTPATIENT
Start: 2018-11-20 | End: 2018-11-20

## 2018-11-20 RX ADMIN — Medication 10 ML: at 06:17

## 2018-11-20 RX ADMIN — DOCUSATE SODIUM 100 MG: 100 CAPSULE, LIQUID FILLED ORAL at 08:10

## 2018-11-20 RX ADMIN — GABAPENTIN 200 MG: 100 CAPSULE ORAL at 08:10

## 2018-11-20 RX ADMIN — DILTIAZEM HYDROCHLORIDE 300 MG: 300 CAPSULE, COATED, EXTENDED RELEASE ORAL at 08:10

## 2018-11-20 RX ADMIN — Medication 1 CAPSULE: at 08:10

## 2018-11-20 RX ADMIN — PREDNISONE 10 MG: 10 TABLET ORAL at 08:10

## 2018-11-20 RX ADMIN — LISINOPRIL 40 MG: 20 TABLET ORAL at 08:10

## 2018-11-20 RX ADMIN — METOPROLOL TARTRATE 50 MG: 50 TABLET, FILM COATED ORAL at 08:10

## 2018-11-20 RX ADMIN — AMLODIPINE BESYLATE 5 MG: 5 TABLET ORAL at 00:42

## 2018-11-20 RX ADMIN — HYDRALAZINE HYDROCHLORIDE 100 MG: 25 TABLET, FILM COATED ORAL at 08:10

## 2018-11-20 RX ADMIN — ASPIRIN 81 MG CHEWABLE TABLET 81 MG: 81 TABLET CHEWABLE at 08:10

## 2018-11-20 RX ADMIN — PAROXETINE HYDROCHLORIDE 20 MG: 20 TABLET, FILM COATED ORAL at 08:10

## 2018-11-20 NOTE — PROGRESS NOTES
CM noted pt did not discharge last night. Acknowledges discharge order this am. Pt called CM, discussed discharge plan. Pt son called CM, discussed discharge plan, He is not available and neither is his sister to transport to facility. Requesting [facility]. CM phoned [facility] to notify of discharge plan. They have wheelchair transport for their patients. Ranjeet Guerrero will check to see if available. CM spoke with patient and she is agreeable to [facility] [place] than waiting on [facility]. Shireen phoned back and [facility] will be available after 10am. They will call when [facility] is leaving and will bring pt wheelchair in their [facility]. Bedside nurse will take patient down in [facility] wheelchair to main entrance  and meet the [facility] [place] there. Pt will be transferred to her wheelchair and then taken back to [facility]. Report called by Bedside nurse. Discharge instructions with med list faxed to facility as requested to [phone]. 2nd IM reviewed, signed and placed on chart with copy to the patient. Bedside nurse aware of discharge plan. No further needs from CM. [facility] notified of discharge thru alllscripts. Care Management Interventions PCP Verified by CM: Yes(active with dr Pablo Newman) Mode of Transport at Discharge: Other (see comment)(son will transport at discharge at this time pending hospital course) Transition of Care Consult (CM Consult): Home Health [address] [place] [address] Road: No 
Reason Outside [facility]: Patient already serviced by other home care/hospice agency(pt open to [facility] for SN,PT,OT) Discharge Durable Medical Equipment: No(nothing new) Physical Therapy Consult: Yes Occupational Therapy Consult: Yes Speech Therapy Consult: No 
Current Support Network: Assisted Living(Lives at [facility]) Confirm Follow Up Transport: Family(son transports to follow up appt) Plan discussed with Pt/Family/Caregiver: Yes Discharge Location Discharge Placement: Long Term Care(resident of the [facility]) VIVIANA YoonN, RN Care Manager [facility] 
[phone]

## 2018-11-20 NOTE — DISCHARGE INSTRUCTIONS
[facility] [address]  [phone]      Patient Discharge Instructions    Toyin Hernandez / [id number] : 1935    Admitted 2018 Discharged: 18     Principal Problem:    Complicated UTI (urinary tract infection) (2018)    Active Problems:    Essential hypertension (2016)      PAF (paroxysmal atrial fibrillation) ([facility] (3/30/2018)      COPD (chronic obstructive pulmonary disease) ([facility] (2010)      Leukocytosis (2018)      Stage 3 chronic kidney disease ([facility] (2018)      Closed compression fracture of thoracic vertebra ([facility] (2018)          Allergies   Allergen Reactions    Dilaudid [Hydromorphone] Unknown (comments)     Does not remember    Hydrocodone Nausea and Vomiting    Morphine Rash       · It is important that you take the medication exactly as they are prescribed. · Do not take other medications without consulting your doctor. What to do at Next Level of Care    Disposition:  Adult Care Home with Home health and PT, Refused SNF Rehab    Recommended diet: Cardiac    Recommended activity: Up with PT and with assistance          Information obtained by :  I understand that if any problems occur once I am at home I am to contact my physician. I understand and acknowledge receipt of the instructions indicated above.                                                                                                                                            Physician's or R.N.'s Signature                                                                  Date/Time                                                                                                                                              Patient or Representative Signature                                                          Date/Time

## 2018-11-20 NOTE — PROGRESS NOTES
Problem: Falls - Risk of 
Goal: *Absence of Falls Patient will use call bell for assistance with ambulation during the shift. Outcome: Resolved/Met Date Met: 11/20/18 Fall Risk Interventions: 
Mobility Interventions: Communicate number of staff needed for ambulation/transfer Mentation Interventions: Adequate sleep, hydration, pain control, Door open when patient unattended, Room close to nurse's station Medication Interventions: Teach patient to arise slowly, Patient to call before getting OOB Elimination Interventions: Call light in reach History of Falls Interventions: Room close to nurse's station Problem: Pressure Injury - Risk of 
Goal: *Prevention of pressure injury Document Rashid Scale and appropriate interventions in the flowsheet. Pressure Injury Interventions: 
Sensory Interventions: Assess changes in LOC Moisture Interventions: Absorbent underpads Activity Interventions: Increase time out of bed Mobility Interventions: HOB 30 degrees or less Nutrition Interventions: Document food/fluid/supplement intake Friction and Shear Interventions: HOB 30 degrees or less

## 2018-11-20 NOTE — PROGRESS NOTES
Assumed care of patient at [address] [place] [address]. Pt in stable condition, w/ an elevated BP. MD aware, pt given Norvasc at 0230 a r/t a BP of 187/90. Pt decreased to current BP and MD aware. Md stated that is baseline and she is going to be elevated. Pt remains asymptomatic. Current Vitals:  
Blood pressure 187/50, pulse (!) 57, temperature 98 °F (36.7 °C), resp. rate 17, weight 55.3 kg (122 lb), SpO2 98 %.

## 2018-11-21 ENCOUNTER — TELEPHONE (OUTPATIENT)
Dept: INTERNAL MEDICINE CLINIC | Age: 83
End: 2018-11-21

## 2018-11-21 NOTE — TELEPHONE ENCOUNTER
Talked to patient's daughter and the plan is for her to resume care at [facility] with the physician at the facility. States they prefer not to take her out of the facility due to exposure to germs and when they take her out they feel she gets sick. Does not wish to schedule a f/up at the office.

## 2018-11-21 NOTE — DISCHARGE SUMMARY
[facility] [address]    Nay Covarrubias.  MR#: [id number]  : 1935  ACCOUNT #: [de-identified]   ADMIT DATE: 2018  DISCHARGE DATE: 2018    FINAL DIAGNOSES:  1. T12 compression fracture treated with kyphoplasty done on 2018. 2.  Complicated urinary tract infection. 3.  Hypertension. 4.  Paroxysmal atrial fibrillation. 5.  Chronic obstructive pulmonary disease. 6.  Chronic kidney disease, stage III. CONSULTATIONS:  Interventional Radiology, Dr. Tarah Sanderson. PROCEDURE PERFORMED:  T12 kyphoplasty. COMPLICATIONS:  None. HOSPITAL COURSE:  For details of admission history and physical, please see admit note. Briefly, the patient is an 77-year-old, white female who is a resident of an adult assisted-living facility who had a fall out of bed and sustained a T12 compression fracture at which time she presented to the Emergency Room. Workup including a CT scan followed by an MRI revealed this was an acute fracture and the need of kyphoplasty. She did have a complicated recurrent UTI at admission, and Interventional Radiology wanted her white count improved to normal prior to doing the kyphoplasty. Thus, kyphoplasty was delayed until . She did have some work with Physical Therapy but was having some difficulty getting around before the kyphoplasty, and then, she had recurrent session with Physical Therapy yesterday on  at which time her improvement in mobility was noted. It was initially recommended that she go to a skilled nursing facility for rehabilitation; however, she declined to go there, and after the family's discussion with the adult care facility she is at, they have agreed to accept her with Home Health physical therapy, although I do recommend a skilled nursing facility. The patient as noted has refused that.       DISPOSITION:  At this point, she will be discharged to [facility] with [address] [place] [address] Caleb Higuera physical therapy, and I have asked that she not get up without assistance at risk of further falls. She had treatment of urinary tract infection initially with ampicillin and Levaquin and subsequently with Levaquin. That has all been discontinued, and urine has cleared at this point. At this point, she did have some problems with labile blood pressure, which she has had for quite some time, and she is on maximal dose of Apresoline, as well as lisinopril and Cardizem, as well as Hydrodiuril, and it is felt at this point that can be further managed and adjusted as an outpatient. She will be discharged today. DISCHARGE MEDICATIONS:  Unchanged from admission. They consist of Tylenol 650 mg every 4 hours as needed, aspirin 81 mg daily, vitamin D3 of 50 units by mouth once weekly, diltiazem  mg daily, Colace 100 mg twice daily, Neurontin 400 mg 4 times daily, hydralazine 100 mg 3 times daily, Hydrodiuril 25 mg daily, Priscila-Q 1 tablet daily, lisinopril 40 mg daily, Imodium 4 mg every 8 hours as needed p.r.n. for diarrhea, Lopressor 50 mg b.i.d., Paxil 20 mg daily, MiraLax 1 packet daily as needed, prednisone 10 mg daily, Restoril 15 mg at bedtime, jet nebulizer treatments with DuoNeb every 4 hours as needed. FOLLOWUP:  She will have followup by me in the office in 3 days.       MD SEAN Snyder/MICHAEL  D: 11/20/2018 07:09     T: 11/21/2018 07:36  JOB #: [id number]

## 2018-12-06 PROBLEM — Z00.00 MEDICARE ANNUAL WELLNESS VISIT, INITIAL: Status: ACTIVE | Noted: 2018-12-06

## 2018-12-06 NOTE — PROGRESS NOTES
This is an Initial Medicare Annual Wellness Exam (AWV) (Performed 12 months after IPPE or effective date of Medicare Part B enrollment, Once in a lifetime) I have reviewed the patient's medical history in detail and updated the computerized patient record. She presents today for her initial annual Medicare wellness examination screening questionnaire completed by her son. She is also here in follow-up of her multiple medical problems include hypertension, hyperlipidemia, COPD, paroxysmal atrial fibrillation, peripheral vascular disease status post revascularization, a prior vertebral compression fracture, history of ureteral obstruction with right ureteral stent, CKD stage III, recent complicated urinary tract infection, recent hospitalization for sepsis, malnutrition, and other multiple medical problems. She currently denies any chest pain, shortness of breath, palpitations, PND, orthopnea or cardiorespiratory complaints unless she tries to do a whole lot which she really does not do now. If she did try to walk she used to get short of breath but she really not walking today without someone helping her. She currently denies any headaches, dizziness, focal weakness or neurologic complaints. She denies any GI or  complaints include no urinary frequency or dysuria. She denies any change of her chronic arthritic complaints. She is taking her medications and currently lives in assisted living and is not getting up and moving without someone helping her. She has no other complaints on complete review of systems. All of this is confirmed by her son who is present with her. History Past Medical History:  
Diagnosis Date  Hypertension  Ill-defined condition Ex Lap with Josephus Dryer patch of a perforated pyloric channel ulcer 7/19/16  Other ill-defined conditions(799.89) lipids  Other ill-defined conditions(799.89)   
 blood transfusion history Past Surgical History: Procedure Laterality Date  BYPASS GRAFT OTHR,FEM-FEM    
 BYPASS GRAFT OTHR,FEM-POP    
 right  HX HEENT    
 bilateral cataracts  HX ORTHOPAEDIC    
 right hip fracture/pinning  HX UROLOGICAL    
 right stent/kidney Current Outpatient Medications Medication Sig Dispense Refill  chlorthalidone (HYGROTEN) 25 mg tablet Take 1 Tab by mouth daily. 30 Tab prn  traMADol (ULTRAM) 50 mg tablet Take 1 Tab by mouth every six (6) hours as needed for Pain. Max Daily Amount: 200 mg. 60 Tab 0  
 lisinopril (PRINIVIL, ZESTRIL) 40 mg tablet Take 1 Tab by mouth daily. 30 Tab prn  dilTIAZem CD (CARDIZEM CD) 300 mg ER capsule Take 1 Cap by mouth daily. 30 Cap prn  docusate sodium (COLACE) 100 mg capsule Take 100 mg by mouth two (2) times a day.  gabapentin (NEURONTIN) 400 mg capsule Take 400 mg by mouth four (4) times daily.  PARoxetine (PAXIL) 20 mg tablet Take 20 mg by mouth daily.  temazepam (RESTORIL) 15 mg capsule Take 15 mg by mouth nightly.  cholecalciferol (VITAMIN D3) 50,000 unit capsule Take 50,000 Units by mouth every seven (7) days.  loperamide (IMODIUM) 2 mg capsule Take 4 mg by mouth every eight (8) hours as needed for Diarrhea.  polyethylene glycol (MIRALAX) 17 gram packet Take 17 g by mouth daily as needed (constipation).  L. acidoph & paracasei- S therm- Bifido (HANG-Q/RISAQUAD) 8 billion cell cap cap Take 1 Cap by mouth daily. 30 Cap 0  
 metoprolol tartrate (LOPRESSOR) 50 mg tablet Take 1 Tab by mouth two (2) times a day. 60 Tab PRN  predniSONE (DELTASONE) 10 mg tablet Take 1 Tab by mouth daily (with breakfast). 30 Tab 0  
 acetaminophen (TYLENOL) 650 mg CR tablet Take 650 mg by mouth every six (6) hours as needed for Pain.  hydrALAZINE (APRESOLINE) 100 mg tablet Take 1 Tab by mouth three (3) times daily. 90 Tab prn  aspirin 81 mg tablet Take 81 mg by mouth daily. Stopped for surgery 8-4-10  hydrochlorothiazide (HYDRODIURIL) 25 mg tablet Take 25 mg by mouth daily. Allergies Allergen Reactions  Dilaudid [Hydromorphone] Unknown (comments) Does not remember  Hydrocodone Nausea and Vomiting  Morphine Rash History reviewed. No pertinent family history. Social History Tobacco Use  Smoking status: Former Smoker Packs/day: 0.25  Smokeless tobacco: Never Used  Tobacco comment: stopped 2009 Substance Use Topics  Alcohol use: No  
 
Patient Active Problem List  
Diagnosis Code  COPD (chronic obstructive pulmonary disease) ([facility]) J44.9  Acute prepyloric ulcer K25.3  Essential hypertension I10  
 Mixed hyperlipidemia E78.2  Peripheral vascular disease ([facility]) I73.9  Gastritis K29.70  Acute hypoxemic respiratory failure ([facility]) J96.01  
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  
 PAF (paroxysmal atrial fibrillation) ([facility]) I48.0  Moderate protein-calorie malnutrition ([facility]) E44.0  Decubitus ulcer of sacral region, stage 2 L89.152  Leukocytosis D72.829  
 Hydronephrosis N13.30  Ureteral obstruction N13.5  Stage 3 chronic kidney disease ([facility]) N18.3  Acute cystitis without hematuria N30.00  Complicated UTI (urinary tract infection) N39.0  Closed compression fracture of thoracic vertebra ([facility]) S22.000A  Medicare annual wellness visit, initial Z00.00 Depression Risk Factor Screening: PHQ over the last two weeks 12/7/2018 Little interest or pleasure in doing things Not at all Feeling down, depressed, irritable, or hopeless Not at all Total Score PHQ 2 0 Alcohol Risk Factor Screening: You do not drink alcohol or very rarely. Functional Ability and Level of Safety:  
 
Hearing Loss Hearing is good. Activities of Daily Living The home contains: no safety equipment. and wheelchair Patient needs help with:  transportation, shopping, preparing meals, laundry, housework, managing medications, managing money, dressing, bathing, hygiene, bathroom needs and walking Fall Risk Fall Risk Assessment, last 12 mths 12/7/2018 Able to walk? Yes Fall in past 12 months? Yes Fall with injury? Yes  
Number of falls in past 12 months 2 Fall Risk Score 3 Abuse Screen Patient is not abused Cognitive Screening Evaluation of Cognitive Function: 
Has your family/caregiver stated any concerns about your memory: yes Normal, Abnormal 
 
ROS: 
 
Constitutional: She denies fevers, weight loss, sweats. Eyes: No blurred or double vision. ENT: No difficulty with swallowing, taste, speech or smell. NECK: no stiffness swelling or lymph node enlargement Respiratory: No cough wheezing or change of her chronic shortness of breath. Cardiovascular: Denies chest pain, palpitations, unexplained indigestion or syncope. Breast: She has noted no masses or lumps and no discharge or axillary swelling Gastrointestinal:  No changes in bowel movements, no abdominal pain, no bloating. Genitourinary: No discharge or abnormal bleeding or pain Extremities: No change of her chronic joint pain, stiffness or swelling. Now uses a wheelchair and has people assist with transfers Neurological:  No numbness, tingling, burring paresthesias or loss of motor strength. No syncope, dizziness or frequent headache Skin:  No recent rashes or mole changes. Psychiatric/Behavioral:  Negative for depression. The patient is not nervous/anxious. HEMATOLOGIC: no easy bruising or bleeding gums Endocrine: no sweats of urinary frequency or excessive thirst 
 
Vitals:  
 12/07/18 1304 BP: (!) 216/49 Pulse: (!) 52 Resp: 14 SpO2: 92% Weight: 122 lb (55.3 kg) Height: 5' 1.5\" (1.562 m) PainSc:   2 PainLoc: Back PHYSICAL EXAM: 
 
General appearance - alert, well appearing, and in no distress Mental status - alert, oriented to person, place, and time HEENT: 
Ears - bilateral TM's and external ear canals clear Eyes - pupillary responses were normal.  Extraocular muscle function intact. Lids and conjunctiva not injected. Fundoscopic exam revealed sharp disc margins. eye movements intact Pharynx- clear with teeth in good repair. No masses were noted Neck - supple without thyromegaly or burit. No JVD noted Lungs - clear to auscultation and percussion with overall decreased breath sounds Cardiac- normal rate, regular rhythm without murmurs. PMI not displaced. No gallop, rub or click Breast: deferred to GYN Abdomen - flat, soft, non-tender without palpable organomegaly or mass. No pulsatile mass was felt, and not bruit was heard. Bowel sounds were active  Female - deferred to GYN Rectal - deferred to GYN Extremities -  no clubbing cyanosis or edema Lymphatics - no palpable lymphadenopathy, no hepatosplenomegaly Peripheral vascular - Dorsalis pedis and posterior tibial pulses felt without difficulty Skin - no rash or unusual mole change noted Neurological - Cranial nerves II-XII grossly intact. Motor strength 5/5. DTR's 2+ and symmetric. Station and gait normal 
Back exam - full range of motion, no tenderness, palpable spasm or pain on motion Musculoskeletal - no joint tenderness, deformity or swelling. Confined to wheelchair Hematologic: no purpura, petechiae or bruising Patient Care Team  
Patient Care Team: 
Ewelina Catalan MD as PCP - General (Internal Medicine) Morena Mccoy, RN as Ambulatory Care Navigator (Internal Medicine) Kylie Mota MD (Urology) Yudy Garcia MD (Nephrology) Assessment/Plan Education and counseling provided: 
Are appropriate based on today's review and evaluation ASSESSMENT:  
1. Essential hypertension 2. Mixed hyperlipidemia 3. Moderate protein-calorie malnutrition ([address] 4. PAF (paroxysmal atrial fibrillation) ([facility] 5. Closed compression fracture of thoracic vertebra, initial encounter ([facility] 6. Chronic obstructive pulmonary disease, unspecified COPD type ([facility] 7. Stage 3 chronic kidney disease ([facility] 8. Medicare annual wellness visit, initial   
9. Primary osteoarthritis involving multiple joints 10. Complicated UTI (urinary tract infection) 11. Peripheral vascular disease ([facility] 12. Primary lung large cell carcinoma, left ([facility] 13. Sepsis, due to unspecified organism ([facility] Impression 1. Hypertension that is not controlled although blood pressure is difficult to tell on her because of stiff vessels. Blood pressure initially by the nurse was 216/49 a repeat by me checking closely starts at 216 and is 76 although I do hear a change in the blood pressure at 156 suggesting that her blood pressures probably really in the 150s over 76. At this point I am going to add chlorthalidone 25 mg daily to her current regimen of lisinopril 40 daily Cardizem  daily Lopressor 50 daily and hydralazine 100 mg 3 times daily. I will noted previous attempt of treatment of her hypertension with Catapres while she was in the hospital led to marked bradycardia. 2.  Hyperlipidemia repeat status pending prior labs reviewed and I will make adjustments if necessary. 3.  Malnutrition weight not obtained today since she is in a wheelchair but she appears to be about the same as she recently has been I will start to check and see when her albumin looks like 4. Paroxysmal atrial fibrillation in sinus rhythm today. 5.  Prior vertebral compression fracture we will check a vitamin D. 
6.  COPD chronic but stable 7. CKD stage III repeat status pending 8. DJD since she is still having a lot of discomfort in her neck particularly at night if she sits up a lot of given a prescription for tramadol to use sparingly warned of possible drowsiness 9.  Recent complicated urinary tract infection follow-up urine pending 10. Peripheral vascular disease has no evidence of ischemia at the present time 11. Prior left lower lobe lung carcinoma recent CT scan revealed no evidence recurrence 12. Recent hospitalization for sepsis follow-up urine and blood count pending Medicare initial annual wellness examination screening questionnaire was completed today. The results were reviewed with her and her son present with her and their questions were answered. Lifestyle recommendations and modifications discussed and made. I will call the lab results and make further recommendations or adjustments if necessary. Follow-up as scheduled for 1 month or sooner if there is a problem. High complexity decision making on this highly complex patient today with office visit extending 40 minutes not inclusive of the time spent on Medicare wellness examination. PLAN: 
. Orders Placed This Encounter  T4, FREE  
 METABOLIC PANEL, COMPREHENSIVE  
 TSH 3RD GENERATION  
 LIPID PANEL  
 VITAMIN D, 25 HYDROXY  AMB POC COMPLETE CBC,AUTOMATED ENTER  AMB POC URINALYSIS DIP STICK AUTO W/ MICRO  AMB POC EKG ROUTINE W/ 12 LEADS, INTER & REP  
 DISCONTD: chlorthalidone (HYGROTEN) 25 mg tablet  DISCONTD: traMADol (ULTRAM) 50 mg tablet  chlorthalidone (HYGROTEN) 25 mg tablet  traMADol (ULTRAM) 50 mg tablet ATTENTION:  
This medical record was transcribed using an electronic medical records system. Although proofread, it may and can contain electronic and spelling errors. Other human spelling and other errors may be present. Corrections may be executed at a later time. Please feel free to contact us for any clarifications as needed. Follow-up Disposition: 
Return in about 4 weeks (around 1/4/2019). Brennan Martinez MD  
 
Health Maintenance Due Topic Date Due  
 DTaP/Tdap/Td series (1 - Tdap) 12/14/1956  Shingrix Vaccine Age 50> (1 of 2) 12/14/1985  GLAUCOMA SCREENING Q2Y  12/14/2000

## 2018-12-07 ENCOUNTER — OFFICE VISIT (OUTPATIENT)
Dept: INTERNAL MEDICINE CLINIC | Age: 83
End: 2018-12-07

## 2018-12-07 VITALS
SYSTOLIC BLOOD PRESSURE: 216 MMHG | WEIGHT: 122 LBS | OXYGEN SATURATION: 92 % | DIASTOLIC BLOOD PRESSURE: 49 MMHG | HEART RATE: 52 BPM | RESPIRATION RATE: 14 BRPM | BODY MASS INDEX: 22.45 KG/M2 | HEIGHT: 62 IN

## 2018-12-07 DIAGNOSIS — A41.9 SEPSIS, DUE TO UNSPECIFIED ORGANISM: ICD-10-CM

## 2018-12-07 DIAGNOSIS — R31.9 URINARY TRACT INFECTION WITH HEMATURIA, SITE UNSPECIFIED: ICD-10-CM

## 2018-12-07 DIAGNOSIS — E44.0 MODERATE PROTEIN-CALORIE MALNUTRITION (HCC): ICD-10-CM

## 2018-12-07 DIAGNOSIS — C34.92: ICD-10-CM

## 2018-12-07 DIAGNOSIS — Z00.00 MEDICARE ANNUAL WELLNESS VISIT, INITIAL: ICD-10-CM

## 2018-12-07 DIAGNOSIS — E78.2 MIXED HYPERLIPIDEMIA: ICD-10-CM

## 2018-12-07 DIAGNOSIS — M15.9 PRIMARY OSTEOARTHRITIS INVOLVING MULTIPLE JOINTS: ICD-10-CM

## 2018-12-07 DIAGNOSIS — I10 ESSENTIAL HYPERTENSION: Primary | ICD-10-CM

## 2018-12-07 DIAGNOSIS — N39.0 URINARY TRACT INFECTION WITH HEMATURIA, SITE UNSPECIFIED: ICD-10-CM

## 2018-12-07 DIAGNOSIS — S22.000A CLOSED COMPRESSION FRACTURE OF THORACIC VERTEBRA, INITIAL ENCOUNTER (HCC): ICD-10-CM

## 2018-12-07 DIAGNOSIS — I48.0 PAF (PAROXYSMAL ATRIAL FIBRILLATION) (HCC): ICD-10-CM

## 2018-12-07 DIAGNOSIS — I73.9 PERIPHERAL VASCULAR DISEASE (HCC): ICD-10-CM

## 2018-12-07 DIAGNOSIS — N39.0 COMPLICATED UTI (URINARY TRACT INFECTION): ICD-10-CM

## 2018-12-07 DIAGNOSIS — J44.9 CHRONIC OBSTRUCTIVE PULMONARY DISEASE, UNSPECIFIED COPD TYPE (HCC): Chronic | ICD-10-CM

## 2018-12-07 DIAGNOSIS — N18.30 STAGE 3 CHRONIC KIDNEY DISEASE (HCC): ICD-10-CM

## 2018-12-07 LAB
BACTERIA UA POCT, BACTPOCT: ABNORMAL
BILIRUB UR QL STRIP: NEGATIVE
CASTS UA POCT: ABNORMAL
CLUE CELLS, CLUEPOCT: NEGATIVE
CRYSTALS UA POCT, CRYSPOCT: NEGATIVE
EPITHELIAL CELLS POCT: ABNORMAL
GLUCOSE UR-MCNC: NEGATIVE MG/DL
GRAN# POC: 10.1 K/UL (ref 2–7.8)
GRAN% POC: 89.5 % (ref 37–92)
HCT VFR BLD CALC: 37.6 % (ref 37–51)
HGB BLD-MCNC: 12.2 G/DL (ref 12–18)
KETONES P FAST UR STRIP-MCNC: NEGATIVE MG/DL
LY# POC: 0.7 K/UL (ref 0.6–4.1)
LY% POC: 7.1 % (ref 10–58.5)
MCH RBC QN: 29.7 PG (ref 26–32)
MCHC RBC-ENTMCNC: 32.5 G/DL (ref 30–36)
MCV RBC: 91 FL (ref 80–97)
MID #, POC: 0.3 K/UL (ref 0–1.8)
MID% POC: 3.4 % (ref 0.1–24)
MUCUS UA POCT, MUCPOCT: ABNORMAL
PH UR STRIP: 6 [PH] (ref 5–7)
PLATELET # BLD: 286 K/UL (ref 140–440)
PROT UR QL STRIP: ABNORMAL
RBC # BLD: 4.11 M/UL (ref 4.2–6.3)
RBC UA POCT, RBCPOCT: ABNORMAL
SP GR UR STRIP: 1.02 (ref 1.01–1.02)
TRICH UA POCT, TRICHPOC: NEGATIVE
UA UROBILINOGEN AMB POC: NORMAL (ref 0.2–1)
URINALYSIS CLARITY POC: ABNORMAL
URINALYSIS COLOR POC: YELLOW
URINE BLOOD POC: ABNORMAL
URINE CULT COMMENT, POCT: ABNORMAL
URINE LEUKOCYTES POC: ABNORMAL
URINE NITRITES POC: NEGATIVE
WBC # BLD: 11.1 K/UL (ref 4.1–10.9)
WBC UA POCT, WBCPOCT: ABNORMAL
YEAST UA POCT, YEASTPOC: ABNORMAL

## 2018-12-07 RX ORDER — CHLORTHALIDONE 25 MG/1
25 TABLET ORAL DAILY
Qty: 30 TAB | Status: SHIPPED | OUTPATIENT
Start: 2018-12-07

## 2018-12-07 RX ORDER — CHLORTHALIDONE 25 MG/1
25 TABLET ORAL DAILY
Qty: 30 TAB | Status: SHIPPED | OUTPATIENT
Start: 2018-12-07 | End: 2018-12-07 | Stop reason: SDUPTHER

## 2018-12-07 RX ORDER — TRAMADOL HYDROCHLORIDE 50 MG/1
50 TABLET ORAL
Qty: 60 TAB | Refills: 0 | Status: SHIPPED | OUTPATIENT
Start: 2018-12-07

## 2018-12-07 RX ORDER — TRAMADOL HYDROCHLORIDE 50 MG/1
50 TABLET ORAL
Qty: 60 TAB | Refills: 0 | Status: SHIPPED | OUTPATIENT
Start: 2018-12-07 | End: 2018-12-07 | Stop reason: SDUPTHER

## 2018-12-07 NOTE — PROGRESS NOTES
Chief Complaint Patient presents with  
[facility] Follow Up In [address] x 9 days for a fall 1. Have you been to the ER, urgent care clinic since your last visit? [facility] your last [id number] 2. Have you seen or consulted any other health care providers outside of the [address] [place] [address] since your last visit? Include any pap smears or colon screening.   no

## 2018-12-07 NOTE — PATIENT INSTRUCTIONS
Learning About ACE Inhibitors Introduction ACE (angiotensin-converting enzyme) inhibitors stop the release of an enzyme. This enzyme makes your blood vessels smaller. Without it, your blood vessels relax and get bigger. This lowers your blood pressure. These medicines also increase how much water and salt go into your urine. This also lowers blood pressure. You may take this kind of medicine if you have high blood pressure. Or you may take it if you have heart problems, kidney problems, or diabetes. If you have coronary artery disease, this medicine can help prevent heart attacks and strokes. Examples · Benazepril (Lotensin) · Lisinopril (Prinivil, Zestril) · Ramipril (Altace) This is not a complete list. 
Possible side effects Side effects may include: · A cough. · Low blood pressure. This can make you feel dizzy or weak. · Too much potassium in your body. · Swelling. This may be a sign of an allergic reaction. You may have other side effects or reactions not listed here. Check the information that comes with your medicine. What to know about taking this medicine · ACE inhibitors can cause a dry cough. Talk to your doctor if you have a dry cough. You may need a different medicine. · These medicines can cause an allergic reaction. This can cause a little swelling. Or it can cause red bumps on your skin that hurt. In rare cases, the swelling may make it hard for you to breathe. · Do not take this medicine if you are pregnant. And don't take it if you plan to become pregnant. · Take your medicines exactly as prescribed. Call your doctor if you think you are having a problem with your medicine. · Check with your doctor or pharmacist before you use any other medicines. This includes over-the-counter medicines. Make sure your doctor knows all of the medicines, vitamins, herbal products, and supplements you take. Taking some medicines together can cause problems. · You may need regular blood tests. Where can you learn more? Go to http://romeo-jessie.info/. Enter P050 in the search box to learn more about \"Learning About ACE Inhibitors. \" Current as of: December 6, 2017 Content Version: 11.8 © 1040-5977 [employer]. Care instructions adapted under license by [employer] (which disclaims liability or warranty for this information). If you have questions about a medical condition or this instruction, always ask your healthcare professional. [place] [address] any warranty or liability for your use of this information.

## 2018-12-08 LAB
25(OH)D3+25(OH)D2 SERPL-MCNC: 49 NG/ML (ref 30–100)
ALBUMIN SERPL-MCNC: 3.9 G/DL (ref 3.5–4.7)
ALBUMIN/GLOB SERPL: 1.5 {RATIO} (ref 1.2–2.2)
ALP SERPL-CCNC: 102 IU/L (ref 39–117)
ALT SERPL-CCNC: 8 IU/L (ref 0–32)
AST SERPL-CCNC: 16 IU/L (ref 0–40)
BACTERIA UR CULT: NORMAL
BILIRUB SERPL-MCNC: <0.2 MG/DL (ref 0–1.2)
BUN SERPL-MCNC: 38 MG/DL (ref 8–27)
BUN/CREAT SERPL: 25 (ref 12–28)
CALCIUM SERPL-MCNC: 10.3 MG/DL (ref 8.7–10.3)
CHLORIDE SERPL-SCNC: 106 MMOL/L (ref 96–106)
CHOLEST SERPL-MCNC: 373 MG/DL (ref 100–199)
CO2 SERPL-SCNC: 24 MMOL/L (ref 20–29)
CREAT SERPL-MCNC: 1.55 MG/DL (ref 0.57–1)
GLOBULIN SER CALC-MCNC: 2.6 G/DL (ref 1.5–4.5)
GLUCOSE SERPL-MCNC: 113 MG/DL (ref 65–99)
HDLC SERPL-MCNC: 111 MG/DL
LDLC SERPL CALC-MCNC: 186 MG/DL (ref 0–99)
POTASSIUM SERPL-SCNC: 4.3 MMOL/L (ref 3.5–5.2)
PROT SERPL-MCNC: 6.5 G/DL (ref 6–8.5)
SODIUM SERPL-SCNC: 143 MMOL/L (ref 134–144)
T4 FREE SERPL-MCNC: 1.27 NG/DL (ref 0.82–1.77)
TRIGL SERPL-MCNC: 379 MG/DL (ref 0–149)
TSH SERPL DL<=0.005 MIU/L-ACNC: 0.53 UIU/ML (ref 0.45–4.5)
VLDLC SERPL CALC-MCNC: 76 MG/DL (ref 5–40)

## 2018-12-10 NOTE — PROGRESS NOTES
Lab is okay except for markedly elevated cholesterol and LDL. With vascular disease I will put her on Pravachol 40 mg a day. I know she was previously on a statin I am not sure where that got stopped.

## 2018-12-11 ENCOUNTER — TELEPHONE (OUTPATIENT)
Dept: INTERNAL MEDICINE CLINIC | Age: 83
End: 2018-12-11

## 2018-12-11 RX ORDER — PRAVASTATIN SODIUM 40 MG/1
40 TABLET ORAL
Qty: 30 TAB | Refills: 11 | Status: SHIPPED | OUTPATIENT
Start: 2018-12-11 | End: 2019-10-29

## 2018-12-11 NOTE — TELEPHONE ENCOUNTER
Spoke to daughter and rx generated per order of Dr. Gabrielle Hernandez for Pravastain 40 mg daily. Faxed to the facility at [phone].

## 2018-12-11 NOTE — TELEPHONE ENCOUNTER
RX refill request from the patient/pharmacy. Patient last seen 12- with labs, and next appt. scheduled for 01-  Requested Prescriptions     Pending Prescriptions Disp Refills    pravastatin (PRAVACHOL) 40 mg tablet 30 Tab 11     Sig: Take 1 Tab by mouth nightly. Selena Copper

## 2018-12-11 NOTE — TELEPHONE ENCOUNTER
----- Message from Tova Richardson MD sent at 12/10/2018 11:46 AM EST -----  Lab is okay except for markedly elevated cholesterol and LDL. With vascular disease I will put her on Pravachol 40 mg a day. I know she was previously on a statin I am not sure where that got stopped.

## 2018-12-24 DIAGNOSIS — I73.9 PERIPHERAL VASCULAR DISEASE (HCC): Primary | ICD-10-CM

## 2018-12-24 RX ORDER — TEMAZEPAM 15 MG/1
15 CAPSULE ORAL
Qty: 30 CAP | Refills: 0 | Status: SHIPPED | OUTPATIENT
Start: 2018-12-24 | End: 2019-01-16 | Stop reason: SDUPTHER

## 2018-12-24 NOTE — TELEPHONE ENCOUNTER
Last Refill: 12/1/2018  Last visit:12/7/2018      Requested Prescriptions     Pending Prescriptions Disp Refills    temazepam (RESTORIL) 15 mg capsule 30 Cap 0     Sig: Take 1 Cap by mouth nightly. Max Daily Amount: 15 mg.

## 2019-01-04 ENCOUNTER — OFFICE VISIT (OUTPATIENT)
Dept: INTERNAL MEDICINE CLINIC | Age: 84
End: 2019-01-04

## 2019-01-04 VITALS
DIASTOLIC BLOOD PRESSURE: 54 MMHG | RESPIRATION RATE: 18 BRPM | OXYGEN SATURATION: 94 % | WEIGHT: 114.8 LBS | BODY MASS INDEX: 21.12 KG/M2 | SYSTOLIC BLOOD PRESSURE: 158 MMHG | HEART RATE: 62 BPM | HEIGHT: 62 IN

## 2019-01-04 DIAGNOSIS — N39.0 COMPLICATED UTI (URINARY TRACT INFECTION): ICD-10-CM

## 2019-01-04 DIAGNOSIS — I10 ESSENTIAL HYPERTENSION: Primary | ICD-10-CM

## 2019-01-04 DIAGNOSIS — N18.30 STAGE 3 CHRONIC KIDNEY DISEASE (HCC): ICD-10-CM

## 2019-01-04 DIAGNOSIS — J44.9 CHRONIC OBSTRUCTIVE PULMONARY DISEASE, UNSPECIFIED COPD TYPE (HCC): Chronic | ICD-10-CM

## 2019-01-04 LAB
BACTERIA UA POCT, BACTPOCT: ABNORMAL
BILIRUB UR QL STRIP: NEGATIVE
CASTS UA POCT: ABNORMAL
CLUE CELLS, CLUEPOCT: NEGATIVE
CRYSTALS UA POCT, CRYSPOCT: NEGATIVE
EPITHELIAL CELLS POCT: ABNORMAL
GLUCOSE UR-MCNC: NEGATIVE MG/DL
GRAN# POC: 12.1 K/UL (ref 2–7.8)
GRAN% POC: 90.7 % (ref 37–92)
HCT VFR BLD CALC: 39.6 % (ref 37–51)
HGB BLD-MCNC: 13.2 G/DL (ref 12–18)
KETONES P FAST UR STRIP-MCNC: NEGATIVE MG/DL
LY# POC: 0.8 K/UL (ref 0.6–4.1)
LY% POC: 6.1 % (ref 10–58.5)
MCH RBC QN: 30.4 PG (ref 26–32)
MCHC RBC-ENTMCNC: 33.3 G/DL (ref 30–36)
MCV RBC: 91 FL (ref 80–97)
MID #, POC: 0.4 K/UL (ref 0–1.8)
MID% POC: 3.2 % (ref 0.1–24)
MUCUS UA POCT, MUCPOCT: ABNORMAL
PH UR STRIP: 5 [PH] (ref 5–7)
PLATELET # BLD: 250 K/UL (ref 140–440)
PROT UR QL STRIP: ABNORMAL
RBC # BLD: 4.35 M/UL (ref 4.2–6.3)
RBC UA POCT, RBCPOCT: ABNORMAL
SP GR UR STRIP: 1.02 (ref 1.01–1.02)
TRICH UA POCT, TRICHPOC: NEGATIVE
UA UROBILINOGEN AMB POC: NORMAL (ref 0.2–1)
URINALYSIS CLARITY POC: ABNORMAL
URINALYSIS COLOR POC: YELLOW
URINE BLOOD POC: ABNORMAL
URINE CULT COMMENT, POCT: ABNORMAL
URINE LEUKOCYTES POC: ABNORMAL
URINE NITRITES POC: NEGATIVE
WBC # BLD: 13.3 K/UL (ref 4.1–10.9)
WBC UA POCT, WBCPOCT: ABNORMAL
YEAST UA POCT, YEASTPOC: ABNORMAL

## 2019-01-04 RX ORDER — HYDRALAZINE HYDROCHLORIDE 100 MG/1
200 TABLET, FILM COATED ORAL 2 TIMES DAILY
Qty: 120 TAB | Status: SHIPPED | OUTPATIENT
Start: 2019-01-04 | End: 2019-09-27

## 2019-01-04 NOTE — PATIENT INSTRUCTIONS
Learning About ACE Inhibitors Introduction ACE (angiotensin-converting enzyme) inhibitors stop the release of an enzyme. This enzyme makes your blood vessels smaller. Without it, your blood vessels relax and get bigger. This lowers your blood pressure. These medicines also increase how much water and salt go into your urine. This also lowers blood pressure. You may take this kind of medicine if you have high blood pressure. Or you may take it if you have heart problems, kidney problems, or diabetes. If you have coronary artery disease, this medicine can help prevent heart attacks and strokes. Examples · Benazepril (Lotensin) · Lisinopril (Prinivil, Zestril) · Ramipril (Altace) This is not a complete list. 
Possible side effects Side effects may include: · A cough. · Low blood pressure. This can make you feel dizzy or weak. · Too much potassium in your body. · Swelling. This may be a sign of an allergic reaction. You may have other side effects or reactions not listed here. Check the information that comes with your medicine. What to know about taking this medicine · ACE inhibitors can cause a dry cough. Talk to your doctor if you have a dry cough. You may need a different medicine. · These medicines can cause an allergic reaction. This can cause a little swelling. Or it can cause red bumps on your skin that hurt. In rare cases, the swelling may make it hard for you to breathe. · Do not take this medicine if you are pregnant. And don't take it if you plan to become pregnant. · Take your medicines exactly as prescribed. Call your doctor if you think you are having a problem with your medicine. · Check with your doctor or pharmacist before you use any other medicines. This includes over-the-counter medicines. Make sure your doctor knows all of the medicines, vitamins, herbal products, and supplements you take. Taking some medicines together can cause problems. · You may need regular blood tests. Where can you learn more? Go to http://romeo-jessie.info/. Enter P050 in the search box to learn more about \"Learning About ACE Inhibitors. \" Current as of: December 6, 2017 Content Version: 11.8 © 6387-2927 [employer]. Care instructions adapted under license by [employer] (which disclaims liability or warranty for this information). If you have questions about a medical condition or this instruction, always ask your healthcare professional. [place] [address] any warranty or liability for your use of this information.

## 2019-01-04 NOTE — PROGRESS NOTES
Chief Complaint Patient presents with  Hypertension 4 week follow up  COPD  Chronic Kidney Disease SUBJECTIVE: 
 
Darryl Jacobs is a 80 y.o. female who returns in follow-up accompanied by her son for evaluation of her hypertension and recurrent UTIs as well as other medical problems. She has been taking her medication as directed and she is actually now walking some with the nurse at the [facility] care facility that she is in. She denies any current urinary complaints but did bring any urine sample. She denies any lightheadedness or dizziness. She denies any chest pain, shortness of breath, palpitations, PND, orthopnea or change of her chronic dyspnea or any other cardiorespiratory complaints. She denies any GI complaints. There are no change of any arthritic complaints and no new neurologic complaints and no other complaints on complete review of systems. Current Outpatient Medications Medication Sig Dispense Refill  hydrALAZINE (APRESOLINE) 100 mg tablet Take 2 Tabs by mouth two (2) times a day. 120 Tab prn  temazepam (RESTORIL) 15 mg capsule Take 1 Cap by mouth nightly. Max Daily Amount: 15 mg. 30 Cap 0  
 pravastatin (PRAVACHOL) 40 mg tablet Take 1 Tab by mouth nightly. 30 Tab 11  
 chlorthalidone (HYGROTEN) 25 mg tablet Take 1 Tab by mouth daily. 30 Tab prn  traMADol (ULTRAM) 50 mg tablet Take 1 Tab by mouth every six (6) hours as needed for Pain. Max Daily Amount: 200 mg. 60 Tab 0  
 lisinopril (PRINIVIL, ZESTRIL) 40 mg tablet Take 1 Tab by mouth daily. 30 Tab prn  dilTIAZem CD (CARDIZEM CD) 300 mg ER capsule Take 1 Cap by mouth daily. 30 Cap prn  docusate sodium (COLACE) 100 mg capsule Take 100 mg by mouth two (2) times a day.  gabapentin (NEURONTIN) 400 mg capsule Take 400 mg by mouth four (4) times daily.  PARoxetine (PAXIL) 20 mg tablet Take 20 mg by mouth daily.  cholecalciferol (VITAMIN D3) 50,000 unit capsule Take 50,000 Units by mouth every seven (7) days.  loperamide (IMODIUM) 2 mg capsule Take 4 mg by mouth every eight (8) hours as needed for Diarrhea.  polyethylene glycol (MIRALAX) 17 gram packet Take 17 g by mouth daily as needed (constipation).  L. acidoph & paracasei- S therm- Bifido (HANG-Q/RISAQUAD) 8 billion cell cap cap Take 1 Cap by mouth daily. 30 Cap 0  
 metoprolol tartrate (LOPRESSOR) 50 mg tablet Take 1 Tab by mouth two (2) times a day. 60 Tab PRN  predniSONE (DELTASONE) 10 mg tablet Take 1 Tab by mouth daily (with breakfast). 30 Tab 0  
 acetaminophen (TYLENOL) 650 mg CR tablet Take 500 mg by mouth every six (6) hours as needed for Pain.  aspirin 81 mg tablet Take 81 mg by mouth daily. Stopped for surgery 8-4-10  hydrochlorothiazide (HYDRODIURIL) 25 mg tablet Take 100 mg by mouth daily. Past Medical History:  
Diagnosis Date  Hypertension  Ill-defined condition Ex Lap with [employer] patch of a perforated pyloric channel ulcer 7/19/16  Other ill-defined conditions(799.89) lipids  Other ill-defined conditions(799.89)   
 blood transfusion history Past Surgical History:  
Procedure Laterality Date  BYPASS GRAFT OTHR,FEM-FEM    
 BYPASS GRAFT OTHR,FEM-POP    
 right  HX HEENT    
 bilateral cataracts  HX ORTHOPAEDIC    
 right hip fracture/pinning  HX UROLOGICAL    
 right stent/kidney Allergies Allergen Reactions  Dilaudid [Hydromorphone] Unknown (comments) Does not remember  Hydrocodone Nausea and Vomiting  Morphine Rash REVIEW OF SYSTEMS: 
General: negative for - chills or fever, or weight loss or gain ENT: negative for - headaches, nasal congestion or tinnitus Eyes: no blurred or visual changes Neck: No stiffness or swollen nodes Respiratory: negative for - cough, hemoptysis, change of her chronic shortness of breath or wheezing Cardiovascular : negative for - chest pain, edema, palpitations or shortness of breath Gastrointestinal: negative for - abdominal pain, blood in stools, heartburn or nausea/vomiting Genito-Urinary: no dysuria, trouble voiding, or hematuria Musculoskeletal: negative for - gait disturbance, joint pain, joint stiffness or joint swelling Neurological: no TIA or stroke symptoms Hematologic: no bruises, no bleeding Lymphatic: no swollen glands Integument: no lumps, mole changes, nail changes or rash Endocrine:no malaise/lethargy poly uria or polydipsia or unexpected weight changes Social History Socioeconomic History  Marital status:  Spouse name: Not on file  Number of children: Not on file  Years of education: Not on file  Highest education level: Not on file Tobacco Use  Smoking status: Former Smoker Packs/day: 0.25  Smokeless tobacco: Never Used  Tobacco comment: stopped 2009 Substance and Sexual Activity  Alcohol use: No  
 Drug use: No  
 
History reviewed. No pertinent family history. OBJECTIVE:  
 
Visit Vitals /54 Pulse 62 Resp 18 Ht 5' 1.5\" (1.562 m) Wt 114 lb 12.8 oz (52.1 kg) SpO2 94% BMI 21.34 kg/m² CONSTITUTIONAL:   well nourished, appears age appropriate EYES: sclera anicteric, PERRL, EOMI 
ENMT:nares clear, moist mucous membranes, pharynx clear NECK: supple. Thyroid normal, No JVD or bruits RESPIRATORY: Chest: clear to ascultation and percussion, normal inspiratory effort CARDIOVASCULAR: Heart: regular rate and rhythm no murmurs, rubs or gallops, PMI not displaced, No thrills GASTROINTESTINAL: Abdomen: non distended, soft, non tender, bowel sounds normal 
HEMATOLOGIC: no purpura, petechiae or bruising LYMPHATIC: No lymph node enlargemant MUSCULOSKELETAL: Extremities: no edema or active synovitis, pulse 1+ INTEGUMENT: No unusual rashes or suspicious skin lesions noted.  Nails appear normal. 
 PERIPHERAL VASCULAR: normal pulses femoral, PT and DP NEUROLOGIC: non-focal exam, A & O X 3 PSYCHIATRIC:, appropriate affect ASSESSMENT:  
1. Essential hypertension 2. Stage 3 chronic kidney disease ([facility] 3. Chronic obstructive pulmonary disease, unspecified COPD type ([facility] 4. Complicated UTI (urinary tract infection) Impression 1. Hypertension that is not quite controlled but improved at this point I will increase hydralazine from 100 mg 3 times daily to 200 mg twice daily and will reassess in a month 2. CKD stage III repeat status pending 3 COPD stable 4. Complicated urinary tract infection repeat urine and culture pending I will recheck her myself again in 1 month or sooner should there be a problem. PLAN: 
. Orders Placed This Encounter  CULTURE, URINE  
 METABOLIC PANEL, BASIC  
 AMB POC COMPLETE CBC,AUTOMATED ENTER  AMB POC URINALYSIS DIP STICK AUTO W/ MICRO  hydrALAZINE (APRESOLINE) 100 mg tablet ATTENTION:  
This medical record was transcribed using an electronic medical records system. Although proofread, it may and can contain electronic and spelling errors. Other human spelling and other errors may be present. Corrections may be executed at a later time. Please feel free to contact us for any clarifications as needed. Follow-up Disposition: 
Return in about 4 weeks (around 2/1/2019). No results found for any visits on 01/04/19. Robert Castro MD 
 
The patient verbalized understanding of the problems and plans as explained.

## 2019-01-04 NOTE — PROGRESS NOTES
Chief Complaint Patient presents with  Hypertension 4 week follow up  COPD  Chronic Kidney Disease 1. Have you been to the ER, urgent care clinic since your last visit? Hospitalized since your last visit? No 
 
2. Have you seen or consulted any other health care providers outside of the [address] [place] [address] since your last visit? Include any pap smears or colon screening. No 
Visit Vitals /64 (BP 1 Location: Left arm, BP Patient Position: Sitting) Pulse 62 Resp 18 Ht 5' 1.5\" (1.562 m) Wt 114 lb 12.8 oz (52.1 kg) SpO2 94% BMI 21.34 kg/m²

## 2019-01-05 LAB
BUN SERPL-MCNC: 50 MG/DL (ref 8–27)
BUN/CREAT SERPL: 32 (ref 12–28)
CALCIUM SERPL-MCNC: 10.7 MG/DL (ref 8.7–10.3)
CHLORIDE SERPL-SCNC: 102 MMOL/L (ref 96–106)
CO2 SERPL-SCNC: 23 MMOL/L (ref 20–29)
CREAT SERPL-MCNC: 1.57 MG/DL (ref 0.57–1)
GLUCOSE SERPL-MCNC: 106 MG/DL (ref 65–99)
POTASSIUM SERPL-SCNC: 4.4 MMOL/L (ref 3.5–5.2)
SODIUM SERPL-SCNC: 144 MMOL/L (ref 134–144)

## 2019-01-08 LAB
BACTERIA UR CULT: ABNORMAL
BACTERIA UR CULT: ABNORMAL

## 2019-01-09 ENCOUNTER — TELEPHONE (OUTPATIENT)
Dept: INTERNAL MEDICINE CLINIC | Age: 84
End: 2019-01-09

## 2019-01-09 NOTE — TELEPHONE ENCOUNTER
----- Message from Yong Hinton MD sent at 1/7/2019  5:07 PM EST -----  Kidney function is stable, urine cultures pending

## 2019-01-16 DIAGNOSIS — I73.9 PERIPHERAL VASCULAR DISEASE (HCC): ICD-10-CM

## 2019-01-16 RX ORDER — TEMAZEPAM 15 MG/1
15 CAPSULE ORAL
Qty: 30 CAP | Refills: 5 | Status: SHIPPED | OUTPATIENT
Start: 2019-01-16 | End: 2019-02-13 | Stop reason: SDUPTHER

## 2019-01-16 NOTE — TELEPHONE ENCOUNTER
RX refill request from the patient/pharmacy. Patient last seen 01- with labs, and next appt. scheduled for 02-  Requested Prescriptions     Pending Prescriptions Disp Refills    temazepam (RESTORIL) 15 mg capsule 30 Cap 5     Sig: Take 1 Cap by mouth nightly. Max Daily Amount: 15 mg.   .

## 2019-01-31 NOTE — PROGRESS NOTES
Chief Complaint Patient presents with  Blood Pressure Check  
  follow up SUBJECTIVE: 
 
Ashley Craft is a 80 y.o. female who comes in today accompanied by her son in follow-up of her medical problems include hypertension, paroxysmal atrial fibrillation, COPD with recent hospitalization for hypoxemic respiratory failure, CKD stage III, anemia, recent problems with acute renal failure resolved, protein calorie malnutrition and sepsis all of which have resolved. She currently seems to be doing well as far as her appetite. She is doing some walking at the adult home where she lives now. She denies any chest pain, change of her chronic shortness of breath, palpitations, PND, orthopnea or cardiorespiratory complaints. She denies any current GI or  complaints. She denies any headaches, dizziness or neurologic complaints. She denies any change of her chronic arthritic complaints. Current Outpatient Medications Medication Sig Dispense Refill  temazepam (RESTORIL) 15 mg capsule Take 1 Cap by mouth nightly. Max Daily Amount: 15 mg. 30 Cap 5  hydrALAZINE (APRESOLINE) 100 mg tablet Take 2 Tabs by mouth two (2) times a day. 120 Tab prn  pravastatin (PRAVACHOL) 40 mg tablet Take 1 Tab by mouth nightly. 30 Tab 11  
 chlorthalidone (HYGROTEN) 25 mg tablet Take 1 Tab by mouth daily. 30 Tab prn  traMADol (ULTRAM) 50 mg tablet Take 1 Tab by mouth every six (6) hours as needed for Pain. Max Daily Amount: 200 mg. 60 Tab 0  
 lisinopril (PRINIVIL, ZESTRIL) 40 mg tablet Take 1 Tab by mouth daily. 30 Tab prn  dilTIAZem CD (CARDIZEM CD) 300 mg ER capsule Take 1 Cap by mouth daily. 30 Cap prn  docusate sodium (COLACE) 100 mg capsule Take 100 mg by mouth two (2) times a day.  gabapentin (NEURONTIN) 400 mg capsule Take 400 mg by mouth four (4) times daily.  PARoxetine (PAXIL) 20 mg tablet Take 20 mg by mouth daily.  cholecalciferol (VITAMIN D3) 50,000 unit capsule Take 50,000 Units by mouth every seven (7) days.  loperamide (IMODIUM) 2 mg capsule Take 4 mg by mouth every eight (8) hours as needed for Diarrhea.  polyethylene glycol (MIRALAX) 17 gram packet Take 17 g by mouth daily as needed (constipation).  L. acidoph & paracasei- S therm- Bifido (HANG-Q/RISAQUAD) 8 billion cell cap cap Take 1 Cap by mouth daily. 30 Cap 0  
 metoprolol tartrate (LOPRESSOR) 50 mg tablet Take 1 Tab by mouth two (2) times a day. 60 Tab PRN  predniSONE (DELTASONE) 10 mg tablet Take 1 Tab by mouth daily (with breakfast). 30 Tab 0  
 acetaminophen (TYLENOL) 650 mg CR tablet Take 500 mg by mouth every six (6) hours as needed for Pain.  aspirin 81 mg tablet Take 81 mg by mouth daily. Stopped for surgery 8-4-10  hydrochlorothiazide (HYDRODIURIL) 25 mg tablet Take 100 mg by mouth daily. Past Medical History:  
Diagnosis Date  Hypertension  Ill-defined condition Ex Lap with [employer] patch of a perforated pyloric channel ulcer 7/19/16  Other ill-defined conditions(799.89) lipids  Other ill-defined conditions(799.89)   
 blood transfusion history Past Surgical History:  
Procedure Laterality Date  BYPASS GRAFT OTHR,FEM-FEM    
 BYPASS GRAFT OTHR,FEM-POP    
 right  HX HEENT    
 bilateral cataracts  HX ORTHOPAEDIC    
 right hip fracture/pinning  HX UROLOGICAL    
 right stent/kidney Allergies Allergen Reactions  Dilaudid [Hydromorphone] Unknown (comments) Does not remember  Hydrocodone Nausea and Vomiting  Morphine Rash REVIEW OF SYSTEMS: 
General: negative for - chills or fever, or weight loss or gain ENT: negative for - headaches, nasal congestion or tinnitus Eyes: no blurred or visual changes Neck: No stiffness or swollen nodes Respiratory: negative for - cough, hemoptysis, change of her chronic shortness of breath or wheezing Cardiovascular : negative for - chest pain, edema, palpitations or shortness of breath Gastrointestinal: negative for - abdominal pain, blood in stools, heartburn or nausea/vomiting Genito-Urinary: no dysuria, trouble voiding, or hematuria Musculoskeletal: negative for - gait disturbance, change of her chronic joint pain, joint stiffness or joint swelling Neurological: no TIA or stroke symptoms Hematologic: no bruises, no bleeding Lymphatic: no swollen glands Integument: no lumps, mole changes, nail changes or rash Endocrine:no malaise/lethargy poly uria or polydipsia or unexpected weight changes Social History Socioeconomic History  Marital status:  Spouse name: Not on file  Number of children: Not on file  Years of education: Not on file  Highest education level: Not on file Tobacco Use  Smoking status: Former Smoker Packs/day: 0.25  Smokeless tobacco: Never Used  Tobacco comment: stopped 2009 Substance and Sexual Activity  Alcohol use: No  
 Drug use: No  
 
History reviewed. No pertinent family history. OBJECTIVE:  
 
Visit Vitals /52 Pulse (!) 43 Temp 98.3 °F (36.8 °C) (Oral) Resp 18 Ht 5' 1.5\" (1.562 m) Wt 114 lb 6.4 oz (51.9 kg) SpO2 95% BMI 21.27 kg/m² CONSTITUTIONAL:   well nourished, appears age appropriate EYES: sclera anicteric, PERRL, EOMI 
ENMT:nares clear, moist mucous membranes, pharynx clear NECK: supple. Thyroid normal, No JVD or bruits RESPIRATORY: Chest: clear to ascultation and percussion except occasional scattered rhonchi and overall decreased breath sounds CARDIOVASCULAR: Heart: regular rate and rhythm no murmurs, rubs or gallops, PMI not displaced, No thrills GASTROINTESTINAL: Abdomen: non distended, soft, non tender, bowel sounds normal 
HEMATOLOGIC: no purpura, petechiae or bruising LYMPHATIC: No lymph node enlargemant MUSCULOSKELETAL: Extremities: no edema or active synovitis, pulse 1+. Using a wheelchair today but can stand without difficulty INTEGUMENT: No unusual rashes or suspicious skin lesions noted. Nails appear normal. 
PERIPHERAL VASCULAR: normal pulses femoral, PT and DP NEUROLOGIC: non-focal exam, A & O X 3 PSYCHIATRIC:, appropriate affect ASSESSMENT:  
1. Essential hypertension 2. Stage 3 chronic kidney disease ([facility] [address] 3. Chronic obstructive pulmonary disease, unspecified COPD type ([facility] [address] 4. PAF (paroxysmal atrial fibrillation) ([facility] 5. Moderate protein-calorie malnutrition ([facility] 6. Leukocytosis, unspecified type 7. Recurrent UTI Impression 1. Hypertension that is controlled so continue current therapy which is doing well presently. 2.  CKD 3 stage III repeat status is pending 3. COPD that is currently stable 4. Paroxysmal atrial fibrillation now in sinus rhythm 5. Malnutrition we will see what the status is ever metabolic panel today. Her weight today is stable at 114 6. Leukocytosis repeat status is improved slightly today that is done stat with white blood count down to 12.4 from 13.3 on last check. 7.  Recurrent UTI we will see what that status is today All of the above discussed with her son present with her today. Since she is stable at the present time we will tentatively set up follow-up in 3 months with the understanding that I will see her prior to that should there be any complications or changes in status. I will call the lab results in the interim. PLAN: 
. Orders Placed This Encounter  METABOLIC PANEL, BASIC  METABOLIC PANEL, COMPREHENSIVE  
 AMB POC COMPLETE CBC,AUTOMATED ENTER  AMB POC URINALYSIS DIP STICK AUTO W/ MICRO   
 
 
 
ATTENTION:  
This medical record was transcribed using an electronic medical records system. Although proofread, it may and can contain electronic and spelling errors. Other human spelling and other errors may be present. Corrections may be executed at a later time. Please feel free to contact us for any clarifications as needed. Follow-up Disposition: 
Return in about 3 months (around 5/1/2019). Results for orders placed or performed in visit on 02/01/19 AMB POC COMPLETE CBC,AUTOMATED ENTER Result Value Ref Range WBC (POC) 12.4 (A) 4.1 - 10.9 K/uL  
 RBC (POC) 3.88 (A) 4.20 - 6.30 M/uL  
 HGB (POC) 11.8 (A) 12.0 - 18.0 g/dL HCT (POC) 35.4 (A) 37.0 - 51.0 %  
 MCV (POC) 91.0 80.0 - 97.0 fL  
 MCH (POC) 30.5 26.0 - 32.0 pg  
 MCHC (POC) 33.5 30.0 - 36.0 g/dL PLATELET (POC) 736.9 140.0 - 440.0 K/uL  
 ABS. LYMPHS (POC) 0.7 0.6 - 4.1 K/uL LYMPHOCYTES (POC) 5.8 (A) 10.0 - 58.5 % Mid # (POC) 0.3 0.0 - 1.8 K/uL MID% POC 3.1 0.1 - 24.0 %  
 ABS. GRANS (POC) 11.4 (A) 2.0 - 7.8 K/uL GRANULOCYTES (POC) 91.1 37.0 - 92.0 % AMB POC URINALYSIS DIP STICK AUTO W/ MICRO Result Value Ref Range Color (UA POC) Clarity (UA POC) Glucose (UA POC)  Negative Bilirubin (UA POC)  Negative Ketones (UA POC)  Negative Specific gravity (UA POC)  1.010 - 1.025 Blood (UA POC)  Negative pH (UA POC)  5.0 - 7.0 Protein (UA POC)  Negative Urobilinogen (UA POC)  0.2 - 1 Nitrites (UA POC)  Negative Leukocyte esterase (UA POC)  Negative Epithelial cells (UA POC) Mucus (UA POC) WBCs (UA POC) RBCs (UA POC) Casts (UA POC)  Negative Crystals (UA POC)  Negative Clue Cells (UA POC) Trichomonas (UA POC) Yeast (UA POC) Bacteria (UA POC)  Negative URINE CULT COMMENT (UA POC) Ethan Tamez MD 
 
The patient verbalized understanding of the problems and plans as explained.

## 2019-02-01 ENCOUNTER — OFFICE VISIT (OUTPATIENT)
Dept: INTERNAL MEDICINE CLINIC | Age: 84
End: 2019-02-01

## 2019-02-01 ENCOUNTER — DOCUMENTATION ONLY (OUTPATIENT)
Dept: INTERNAL MEDICINE CLINIC | Age: 84
End: 2019-02-01

## 2019-02-01 VITALS
OXYGEN SATURATION: 95 % | WEIGHT: 114.4 LBS | TEMPERATURE: 98.3 F | HEART RATE: 43 BPM | SYSTOLIC BLOOD PRESSURE: 126 MMHG | DIASTOLIC BLOOD PRESSURE: 52 MMHG | HEIGHT: 62 IN | BODY MASS INDEX: 21.05 KG/M2 | RESPIRATION RATE: 18 BRPM

## 2019-02-01 DIAGNOSIS — J44.9 CHRONIC OBSTRUCTIVE PULMONARY DISEASE, UNSPECIFIED COPD TYPE (HCC): Chronic | ICD-10-CM

## 2019-02-01 DIAGNOSIS — R31.9 URINARY TRACT INFECTION WITH HEMATURIA, SITE UNSPECIFIED: ICD-10-CM

## 2019-02-01 DIAGNOSIS — E44.0 MODERATE PROTEIN-CALORIE MALNUTRITION (HCC): ICD-10-CM

## 2019-02-01 DIAGNOSIS — I10 ESSENTIAL HYPERTENSION: Primary | ICD-10-CM

## 2019-02-01 DIAGNOSIS — I48.0 PAF (PAROXYSMAL ATRIAL FIBRILLATION) (HCC): ICD-10-CM

## 2019-02-01 DIAGNOSIS — D72.829 LEUKOCYTOSIS, UNSPECIFIED TYPE: ICD-10-CM

## 2019-02-01 DIAGNOSIS — N39.0 RECURRENT UTI: ICD-10-CM

## 2019-02-01 DIAGNOSIS — N18.30 STAGE 3 CHRONIC KIDNEY DISEASE (HCC): ICD-10-CM

## 2019-02-01 DIAGNOSIS — N39.0 URINARY TRACT INFECTION WITH HEMATURIA, SITE UNSPECIFIED: ICD-10-CM

## 2019-02-01 LAB
BACTERIA UA POCT, BACTPOCT: ABNORMAL
BILIRUB UR QL STRIP: NEGATIVE
CASTS UA POCT: NEGATIVE
CLUE CELLS, CLUEPOCT: NEGATIVE
CRYSTALS UA POCT, CRYSPOCT: NEGATIVE
EPITHELIAL CELLS POCT: ABNORMAL
GLUCOSE UR-MCNC: NEGATIVE MG/DL
GRAN# POC: 11.4 K/UL (ref 2–7.8)
GRAN% POC: 91.1 % (ref 37–92)
HCT VFR BLD CALC: 35.4 % (ref 37–51)
HGB BLD-MCNC: 11.8 G/DL (ref 12–18)
KETONES P FAST UR STRIP-MCNC: NEGATIVE MG/DL
LY# POC: 0.7 K/UL (ref 0.6–4.1)
LY% POC: 5.8 % (ref 10–58.5)
MCH RBC QN: 30.5 PG (ref 26–32)
MCHC RBC-ENTMCNC: 33.5 G/DL (ref 30–36)
MCV RBC: 91 FL (ref 80–97)
MID #, POC: 0.3 K/UL (ref 0–1.8)
MID% POC: 3.1 % (ref 0.1–24)
MUCUS UA POCT, MUCPOCT: ABNORMAL
PH UR STRIP: 6 [PH] (ref 5–7)
PLATELET # BLD: 217 K/UL (ref 140–440)
PROT UR QL STRIP: ABNORMAL
RBC # BLD: 3.88 M/UL (ref 4.2–6.3)
RBC UA POCT, RBCPOCT: ABNORMAL
SP GR UR STRIP: 1.02 (ref 1.01–1.02)
TRICH UA POCT, TRICHPOC: NEGATIVE
UA UROBILINOGEN AMB POC: NORMAL (ref 0.2–1)
URINALYSIS CLARITY POC: CLEAR
URINALYSIS COLOR POC: ABNORMAL
URINE BLOOD POC: ABNORMAL
URINE CULT COMMENT, POCT: ABNORMAL
URINE LEUKOCYTES POC: ABNORMAL
URINE NITRITES POC: NEGATIVE
WBC # BLD: 12.4 K/UL (ref 4.1–10.9)
WBC UA POCT, WBCPOCT: ABNORMAL
YEAST UA POCT, YEASTPOC: NEGATIVE

## 2019-02-01 NOTE — PATIENT INSTRUCTIONS
Chronic Obstructive Pulmonary Disease (COPD): Care Instructions Your Care Instructions Chronic obstructive pulmonary disease (COPD) is a general term for a group of lung diseases, including emphysema and chronic bronchitis. People with COPD have decreased airflow in and out of the lungs, which makes it hard to breathe. The airways also can get clogged with thick mucus. Cigarette smoking is a major cause of COPD. Although there is no cure for COPD, you can slow its progress. Following your treatment plan and taking care of yourself can help you feel better and live longer. Follow-up care is a key part of your treatment and safety. Be sure to make and go to all appointments, and call your doctor if you are having problems. It's also a good idea to know your test results and keep a list of the medicines you take. How can you care for yourself at home? 
 Staying healthy 
  · Do not smoke. This is the most important step you can take to prevent more damage to your lungs. If you need help quitting, talk to your doctor about stop-smoking programs and medicines. These can increase your chances of quitting for good.  
  · Avoid colds and flu. Get a pneumococcal vaccine shot. If you have had one before, ask your doctor whether you need a second dose. Get the flu vaccine every fall. If you must be around people with colds or the flu, wash your hands often.  
  · Avoid secondhand smoke, air pollution, and high altitudes. Also avoid cold, dry air and hot, humid air. Stay at home with your windows closed when air pollution is bad.  
 Medicines and oxygen therapy 
  · Take your medicines exactly as prescribed. Call your doctor if you think you are having a problem with your medicine.  
  · You may be taking medicines such as: 
? Bronchodilators. These help open your airways and make breathing easier.  Bronchodilators are either short-acting (work for 6 to 9 hours) or long-acting (work for 24 hours). You inhale most bronchodilators, so they start to act quickly. Always carry your quick-relief inhaler with you in case you need it while you are away from home. ? Corticosteroids (prednisone, budesonide). These reduce airway inflammation. They come in pill or inhaled form. You must take these medicines every day for them to work well.  
  · A spacer may help you get more inhaled medicine to your lungs. Ask your doctor or pharmacist if a spacer is right for you. If it is, ask how to use it properly.  
  · Do not take any vitamins, over-the-counter medicine, or herbal products without talking to your doctor first.  
  · If your doctor prescribed antibiotics, take them as directed. Do not stop taking them just because you feel better. You need to take the full course of antibiotics.  
  · Oxygen therapy boosts the amount of oxygen in your blood and helps you breathe easier. Use the flow rate your doctor has recommended, and do not change it without talking to your doctor first.  
Activity 
  · Get regular exercise. Walking is an easy way to get exercise. Start out slowly, and walk a little more each day.  
  · Pay attention to your breathing. You are exercising too hard if you cannot talk while you are exercising.  
  · Take short rest breaks when doing household chores and other activities.  
  · Learn breathing methodssuch as breathing through pursed lipsto help you become less short of breath.  
  · If your doctor has not set you up with a pulmonary rehabilitation program, talk to him or her about whether rehab is right for you. Rehab includes exercise programs, education about your disease and how to manage it, help with diet and other changes, and emotional support. Diet 
  · Eat regular, healthy meals. Use bronchodilators about 1 hour before you eat to make it easier to eat.  Eat several small meals instead of three large ones. Drink beverages at the end of the meal. Avoid foods that are hard to chew.  
  · Eat foods that contain protein so that you do not lose muscle mass.  
  · Talk with your doctor if you gain too much weight or if you lose weight without trying.  
 Mental health 
  · Talk to your family, friends, or a therapist about your feelings. It is normal to feel frightened, angry, hopeless, helpless, and even guilty. Talking openly about bad feelings can help you cope. If these feelings last, talk to your doctor. When should you call for help? Call 911 anytime you think you may need emergency care. For example, call if: 
  · You have severe trouble breathing.  
 Call your doctor now or seek immediate medical care if: 
  · You have new or worse trouble breathing.  
  · You cough up blood.  
  · You have a fever.  
 Watch closely for changes in your health, and be sure to contact your doctor if: 
  · You cough more deeply or more often, especially if you notice more mucus or a change in the color of your mucus.  
  · You have new or worse swelling in your legs or belly.  
  · You are not getting better as expected. Where can you learn more? Go to http://romeo-jessie.info/. Karla Morales in the search box to learn more about \"Chronic Obstructive Pulmonary Disease (COPD): Care Instructions. \" Current as of: September 5, 2018 Content Version: 11.9 © 1839-2512 [employer]. Care instructions adapted under license by [employer] (which disclaims liability or warranty for this information). If you have questions about a medical condition or this instruction, always ask your healthcare professional. [address] any warranty or liability for your use of this information.

## 2019-02-01 NOTE — PROGRESS NOTES
Chief Complaint Patient presents with  Blood Pressure Check  
  follow up 1. Have you been to the ER, urgent care clinic since your last visit? Hospitalized since your last visit? No 
 
2. Have you seen or consulted any other health care providers outside of the [address] [place] [address] since your last visit? Include any pap smears or colon screening. Yes, Radiation Doctor. Morena Garciadonta on 1/25/19 for her 3 month check up for the radiation on lung. Visit Vitals /50 (BP 1 Location: Left arm, BP Patient Position: Sitting) Pulse (!) 43 Temp 98.3 °F (36.8 °C) (Oral) Resp 18 Ht 5' 1.5\" (1.562 m) Wt 114 lb 6.4 oz (51.9 kg) SpO2 95% BMI 21.27 kg/m²

## 2019-02-02 LAB
ALBUMIN SERPL-MCNC: 4.1 G/DL (ref 3.5–4.7)
ALBUMIN/GLOB SERPL: 1.6 {RATIO} (ref 1.2–2.2)
ALP SERPL-CCNC: 57 IU/L (ref 39–117)
ALT SERPL-CCNC: 11 IU/L (ref 0–32)
AST SERPL-CCNC: 17 IU/L (ref 0–40)
BACTERIA UR CULT: NORMAL
BILIRUB SERPL-MCNC: <0.2 MG/DL (ref 0–1.2)
BUN SERPL-MCNC: 45 MG/DL (ref 8–27)
BUN/CREAT SERPL: 23 (ref 12–28)
CALCIUM SERPL-MCNC: 10.2 MG/DL (ref 8.7–10.3)
CHLORIDE SERPL-SCNC: 101 MMOL/L (ref 96–106)
CO2 SERPL-SCNC: 25 MMOL/L (ref 20–29)
CREAT SERPL-MCNC: 1.97 MG/DL (ref 0.57–1)
GLOBULIN SER CALC-MCNC: 2.5 G/DL (ref 1.5–4.5)
GLUCOSE SERPL-MCNC: 111 MG/DL (ref 65–99)
POTASSIUM SERPL-SCNC: 4.8 MMOL/L (ref 3.5–5.2)
PROT SERPL-MCNC: 6.6 G/DL (ref 6–8.5)
SODIUM SERPL-SCNC: 140 MMOL/L (ref 134–144)

## 2019-02-04 NOTE — ACP (ADVANCE CARE PLANNING)
====Pia Montilla Invitation====    Patient was invited to [facility] on this date and given the information folder for review. Recommended appointment with [facility] Roldan facilitator for ACP conversation regarding advance directives. [x] Yes  [] No  Referral sent to [facility] Choices team member or Coordinator for follow-up    [] Yes  [x] No  Patient scheduled an appointment.        Site of Referral:  [facility]

## 2019-02-05 NOTE — PROGRESS NOTES
The urine culture is negative and the kidney blood test as well as blood counts are stable. No treatment changes needed.

## 2019-02-13 DIAGNOSIS — I73.9 PERIPHERAL VASCULAR DISEASE (HCC): ICD-10-CM

## 2019-02-13 RX ORDER — TEMAZEPAM 15 MG/1
15 CAPSULE ORAL
Qty: 30 CAP | Refills: 5 | Status: SHIPPED | OUTPATIENT
Start: 2019-02-13 | End: 2019-09-11 | Stop reason: SDUPTHER

## 2019-02-13 NOTE — TELEPHONE ENCOUNTER
RX refill request from the patient/pharmacy. Patient last seen 02- with labs, and next appt. scheduled for 05-  Requested Prescriptions     Pending Prescriptions Disp Refills    temazepam (RESTORIL) 15 mg capsule 30 Cap 5     Sig: Take 1 Cap by mouth nightly. Max Daily Amount: 15 mg.   .

## 2019-03-13 RX ORDER — LOPERAMIDE HYDROCHLORIDE 2 MG/1
4 CAPSULE ORAL
Qty: 60 CAP | Refills: 2 | Status: SHIPPED | OUTPATIENT
Start: 2019-03-13

## 2019-03-13 NOTE — TELEPHONE ENCOUNTER
RX refill request from the patient/pharmacy. Patient last seen 02- with labs, and next appt. scheduled for 05-  Requested Prescriptions     Pending Prescriptions Disp Refills    loperamide (IMODIUM) 2 mg capsule 60 Cap 2     Sig: Take 2 Caps by mouth every eight (8) hours as needed for Diarrhea. Jeny Campbell

## 2019-04-08 ENCOUNTER — APPOINTMENT (OUTPATIENT)
Dept: CT IMAGING | Age: 84
End: 2019-04-08
Attending: EMERGENCY MEDICINE
Payer: MEDICARE

## 2019-04-08 ENCOUNTER — HOSPITAL ENCOUNTER (EMERGENCY)
Age: 84
Discharge: HOME | End: 2019-04-08
Attending: EMERGENCY MEDICINE
Payer: MEDICARE

## 2019-04-08 ENCOUNTER — APPOINTMENT (OUTPATIENT)
Dept: GENERAL RADIOLOGY | Age: 84
End: 2019-04-08
Attending: EMERGENCY MEDICINE
Payer: MEDICARE

## 2019-04-08 ENCOUNTER — APPOINTMENT (OUTPATIENT)
Dept: GENERAL RADIOLOGY | Age: 84
End: 2019-04-08
Attending: PHYSICIAN ASSISTANT
Payer: MEDICARE

## 2019-04-08 VITALS
TEMPERATURE: 99.7 F | DIASTOLIC BLOOD PRESSURE: 42 MMHG | SYSTOLIC BLOOD PRESSURE: 161 MMHG | WEIGHT: 115 LBS | HEIGHT: 62 IN | BODY MASS INDEX: 21.16 KG/M2 | OXYGEN SATURATION: 94 % | HEART RATE: 55 BPM | RESPIRATION RATE: 19 BRPM

## 2019-04-08 DIAGNOSIS — N30.01 ACUTE CYSTITIS WITH HEMATURIA: Primary | ICD-10-CM

## 2019-04-08 DIAGNOSIS — M25.512 ARTHRALGIA OF LEFT SHOULDER REGION: ICD-10-CM

## 2019-04-08 DIAGNOSIS — L30.9 DERMATITIS OF FACE: ICD-10-CM

## 2019-04-08 DIAGNOSIS — M50.30 DDD (DEGENERATIVE DISC DISEASE), CERVICAL: ICD-10-CM

## 2019-04-08 LAB
ALBUMIN SERPL-MCNC: 3.4 G/DL (ref 3.5–5)
ALBUMIN/GLOB SERPL: 0.9 {RATIO} (ref 1.1–2.2)
ALP SERPL-CCNC: 61 U/L (ref 45–117)
ALT SERPL-CCNC: 16 U/L (ref 12–78)
AMORPH CRY URNS QL MICRO: ABNORMAL
ANION GAP SERPL CALC-SCNC: 5 MMOL/L (ref 5–15)
APPEARANCE UR: ABNORMAL
AST SERPL-CCNC: 21 U/L (ref 15–37)
ATRIAL RATE: 55 BPM
BACTERIA URNS QL MICRO: ABNORMAL /HPF
BASOPHILS # BLD: 0.1 K/UL (ref 0–0.1)
BASOPHILS NFR BLD: 0 % (ref 0–1)
BILIRUB SERPL-MCNC: 0.4 MG/DL (ref 0.2–1)
BILIRUB UR QL: NEGATIVE
BUN SERPL-MCNC: 46 MG/DL (ref 6–20)
BUN/CREAT SERPL: 29 (ref 12–20)
CALCIUM SERPL-MCNC: 10 MG/DL (ref 8.5–10.1)
CALCULATED P AXIS, ECG09: 61 DEGREES
CALCULATED R AXIS, ECG10: -42 DEGREES
CALCULATED T AXIS, ECG11: 85 DEGREES
CHLORIDE SERPL-SCNC: 106 MMOL/L (ref 97–108)
CK SERPL-CCNC: 33 U/L (ref 26–192)
CO2 SERPL-SCNC: 25 MMOL/L (ref 21–32)
COLOR UR: ABNORMAL
CREAT SERPL-MCNC: 1.6 MG/DL (ref 0.55–1.02)
DIAGNOSIS, 93000: NORMAL
DIFFERENTIAL METHOD BLD: ABNORMAL
EOSINOPHIL # BLD: 0.2 K/UL (ref 0–0.4)
EOSINOPHIL NFR BLD: 1 % (ref 0–7)
EPITH CASTS URNS QL MICRO: ABNORMAL /LPF
ERYTHROCYTE [DISTWIDTH] IN BLOOD BY AUTOMATED COUNT: 15.1 % (ref 11.5–14.5)
GLOBULIN SER CALC-MCNC: 3.9 G/DL (ref 2–4)
GLUCOSE SERPL-MCNC: 130 MG/DL (ref 65–100)
GLUCOSE UR STRIP.AUTO-MCNC: NEGATIVE MG/DL
HCT VFR BLD AUTO: 41.5 % (ref 35–47)
HGB BLD-MCNC: 13.4 G/DL (ref 11.5–16)
HGB UR QL STRIP: ABNORMAL
HYALINE CASTS URNS QL MICRO: ABNORMAL /LPF (ref 0–5)
IMM GRANULOCYTES # BLD AUTO: 0.2 K/UL (ref 0–0.04)
IMM GRANULOCYTES NFR BLD AUTO: 2 % (ref 0–0.5)
KETONES UR QL STRIP.AUTO: NEGATIVE MG/DL
LACTATE BLD-SCNC: 1.3 MMOL/L (ref 0.4–2)
LEUKOCYTE ESTERASE UR QL STRIP.AUTO: ABNORMAL
LYMPHOCYTES # BLD: 0.9 K/UL (ref 0.8–3.5)
LYMPHOCYTES NFR BLD: 6 % (ref 12–49)
MCH RBC QN AUTO: 29.8 PG (ref 26–34)
MCHC RBC AUTO-ENTMCNC: 32.3 G/DL (ref 30–36.5)
MCV RBC AUTO: 92.2 FL (ref 80–99)
MONOCYTES # BLD: 1.6 K/UL (ref 0–1)
MONOCYTES NFR BLD: 11 % (ref 5–13)
NEUTS SEG # BLD: 11.7 K/UL (ref 1.8–8)
NEUTS SEG NFR BLD: 80 % (ref 32–75)
NITRITE UR QL STRIP.AUTO: NEGATIVE
NRBC # BLD: 0 K/UL (ref 0–0.01)
NRBC BLD-RTO: 0 PER 100 WBC
P-R INTERVAL, ECG05: 168 MS
PH UR STRIP: 5 [PH] (ref 5–8)
PLATELET # BLD AUTO: 210 K/UL (ref 150–400)
PMV BLD AUTO: 11.2 FL (ref 8.9–12.9)
POTASSIUM SERPL-SCNC: 4.3 MMOL/L (ref 3.5–5.1)
PROT SERPL-MCNC: 7.3 G/DL (ref 6.4–8.2)
PROT UR STRIP-MCNC: 100 MG/DL
Q-T INTERVAL, ECG07: 426 MS
QRS DURATION, ECG06: 86 MS
QTC CALCULATION (BEZET), ECG08: 407 MS
RBC # BLD AUTO: 4.5 M/UL (ref 3.8–5.2)
RBC #/AREA URNS HPF: ABNORMAL /HPF (ref 0–5)
SODIUM SERPL-SCNC: 136 MMOL/L (ref 136–145)
SP GR UR REFRACTOMETRY: 1.01 (ref 1–1.03)
TROPONIN I SERPL-MCNC: <0.05 NG/ML
TROPONIN I SERPL-MCNC: <0.05 NG/ML
UA: UC IF INDICATED,UAUC: ABNORMAL
UROBILINOGEN UR QL STRIP.AUTO: 0.2 EU/DL (ref 0.2–1)
VENTRICULAR RATE, ECG03: 55 BPM
WBC # BLD AUTO: 14.6 K/UL (ref 3.6–11)
WBC URNS QL MICRO: >100 /HPF (ref 0–4)
YEAST BUDDING URNS QL: PRESENT
YEAST URNS QL MICRO: PRESENT

## 2019-04-08 PROCEDURE — 87186 SC STD MICRODIL/AGAR DIL: CPT

## 2019-04-08 PROCEDURE — 36415 COLL VENOUS BLD VENIPUNCTURE: CPT

## 2019-04-08 PROCEDURE — 85025 COMPLETE CBC W/AUTO DIFF WBC: CPT

## 2019-04-08 PROCEDURE — 84484 ASSAY OF TROPONIN QUANT: CPT

## 2019-04-08 PROCEDURE — 77030005563 HC CATH URETH INT MMGH -A

## 2019-04-08 PROCEDURE — 99285 EMERGENCY DEPT VISIT HI MDM: CPT

## 2019-04-08 PROCEDURE — 96366 THER/PROPH/DIAG IV INF ADDON: CPT

## 2019-04-08 PROCEDURE — 72125 CT NECK SPINE W/O DYE: CPT

## 2019-04-08 PROCEDURE — 87086 URINE CULTURE/COLONY COUNT: CPT

## 2019-04-08 PROCEDURE — 71045 X-RAY EXAM CHEST 1 VIEW: CPT

## 2019-04-08 PROCEDURE — 87077 CULTURE AEROBIC IDENTIFY: CPT

## 2019-04-08 PROCEDURE — 74176 CT ABD & PELVIS W/O CONTRAST: CPT

## 2019-04-08 PROCEDURE — 74011250637 HC RX REV CODE- 250/637: Performed by: EMERGENCY MEDICINE

## 2019-04-08 PROCEDURE — 87040 BLOOD CULTURE FOR BACTERIA: CPT

## 2019-04-08 PROCEDURE — 80053 COMPREHEN METABOLIC PANEL: CPT

## 2019-04-08 PROCEDURE — 74011250636 HC RX REV CODE- 250/636: Performed by: EMERGENCY MEDICINE

## 2019-04-08 PROCEDURE — 93005 ELECTROCARDIOGRAM TRACING: CPT

## 2019-04-08 PROCEDURE — 82550 ASSAY OF CK (CPK): CPT

## 2019-04-08 PROCEDURE — 83605 ASSAY OF LACTIC ACID: CPT

## 2019-04-08 PROCEDURE — 73030 X-RAY EXAM OF SHOULDER: CPT

## 2019-04-08 PROCEDURE — 74011000258 HC RX REV CODE- 258: Performed by: EMERGENCY MEDICINE

## 2019-04-08 PROCEDURE — 96365 THER/PROPH/DIAG IV INF INIT: CPT

## 2019-04-08 PROCEDURE — 81001 URINALYSIS AUTO W/SCOPE: CPT

## 2019-04-08 RX ORDER — GUAIFENESIN 100 MG/5ML
162 LIQUID (ML) ORAL
Status: COMPLETED | OUTPATIENT
Start: 2019-04-08 | End: 2019-04-08

## 2019-04-08 RX ORDER — LIDOCAINE 50 MG/G
PATCH TOPICAL
Qty: 15 EACH | Refills: 0 | Status: SHIPPED | OUTPATIENT
Start: 2019-04-08 | End: 2019-05-03 | Stop reason: ALTCHOICE

## 2019-04-08 RX ORDER — ACETAMINOPHEN 325 MG/1
650 TABLET ORAL
Status: COMPLETED | OUTPATIENT
Start: 2019-04-08 | End: 2019-04-08

## 2019-04-08 RX ORDER — HYDROCORTISONE 0.5 %
OINTMENT (GRAM) TOPICAL 2 TIMES DAILY
Qty: 1 TUBE | Refills: 0 | Status: SHIPPED | OUTPATIENT
Start: 2019-04-08 | End: 2019-04-18

## 2019-04-08 RX ORDER — CEPHALEXIN 500 MG/1
500 CAPSULE ORAL 3 TIMES DAILY
Qty: 21 CAP | Refills: 0 | Status: SHIPPED | OUTPATIENT
Start: 2019-04-08 | End: 2019-04-15

## 2019-04-08 RX ORDER — HYDRALAZINE HYDROCHLORIDE 50 MG/1
100 TABLET, FILM COATED ORAL
Status: COMPLETED | OUTPATIENT
Start: 2019-04-08 | End: 2019-04-08

## 2019-04-08 RX ADMIN — CEFTRIAXONE SODIUM 1 G: 1 INJECTION, POWDER, FOR SOLUTION INTRAMUSCULAR; INTRAVENOUS at 14:24

## 2019-04-08 RX ADMIN — HYDRALAZINE HYDROCHLORIDE 100 MG: 50 TABLET, FILM COATED ORAL at 11:18

## 2019-04-08 RX ADMIN — ACETAMINOPHEN 650 MG: 325 TABLET ORAL at 11:18

## 2019-04-08 RX ADMIN — ASPIRIN 81 MG 162 MG: 81 TABLET ORAL at 11:18

## 2019-04-08 NOTE — ED TRIAGE NOTES
Patient in JET via wheelchair. She arrived to ED via EMS from the [address] with c/o left shoulder pain since 0400 this morning. Patient reports mild nausea this morning as well. Patient also woke this morning with swelling to her right eye. Patient has DNR form with her from the [address].

## 2019-04-08 NOTE — DISCHARGE INSTRUCTIONS
Patient Education        Atopic Dermatitis: Care Instructions  Your Care Instructions  Atopic dermatitis (also called eczema) is a skin problem that causes intense itching and a red, raised rash. In severe cases, the rash develops clear fluid-filled blisters. The rash is not contagious. People with this condition seem to have very sensitive immune systems that are likely to react to things that cause allergies. The immune system is the body's way of fighting infection. There is no cure for atopic dermatitis, but you may be able to control it with care at home. Follow-up care is a key part of your treatment and safety. Be sure to make and go to all appointments, and call your doctor if you are having problems. It's also a good idea to know your test results and keep a list of the medicines you take. How can you care for yourself at home? · Use moisturizer at least twice a day. · If your doctor prescribes a cream, use it as directed. If your doctor prescribes other medicine, take it exactly as directed. · Wash the affected area with water only. Soap can make dryness and itching worse. Pat dry. · Apply a moisturizer after bathing. Use a cream such as Lubriderm, Moisturel, or Cetaphil that does not irritate the skin or cause a rash. Apply the cream while your skin is still damp after lightly drying with a towel. · Use cold, wet cloths to reduce itching. · Keep cool, and stay out of the sun. · If itching affects your normal activities, an over-the-counter antihistamine, such as diphenhydramine (Benadryl) or loratadine (Claritin) may help. Read and follow all instructions on the label. When should you call for help? Call your doctor now or seek immediate medical care if:    · Your rash gets worse and you have a fever.     · You have new blisters or bruises, or the rash spreads and looks like a sunburn.     · You have signs of infection, such as:  ? Increased pain, swelling, warmth, or redness.   ? Red streaks leading from the rash. ? Pus draining from the rash. ? A fever.     · You have crusting or oozing sores.     · You have joint aches or body aches along with your rash.    Watch closely for changes in your health, and be sure to contact your doctor if:    · Your rash does not clear up after 2 to 3 weeks of home treatment.     · Itching interferes with your sleep or daily activities. Where can you learn more? Go to http://romeo-jessie.info/. Enter K261 in the search box to learn more about \"Atopic Dermatitis: Care Instructions. \"  Current as of: April 17, 2018  Content Version: 11.9  © 7990-9596 [employer]. Care instructions adapted under license by [employer] (which disclaims liability or warranty for this information). If you have questions about a medical condition or this instruction, always ask your healthcare professional. [place] [address] any warranty or liability for your use of this information. Patient Education        Cervical Disc Disease: Care Instructions  Your Care Instructions    Cervical disc disease results from damage, disease, or wear and tear to the discs between the bones (vertebra) in your neck. The discs act as shock absorbers for the spine and keep the spine flexible. When a disc is damaged, it can bulge out and press against the nerve roots or spinal cord. This is sometimes called a herniated or \"slipped disc. \" This pressure can cause pain and numbness or tingling in your arms and hands. It can also cause weakness in your legs. An accident can damage a disc and cause it to break open (rupture). Aging and hard physical work can also cause damage to cervical discs. The first treatments for cervical disc disease include physical therapy, special neck exercises, heat, and pain medicine. If these fail, your doctor may inject steroids and pain medicine into your neck.  Surgery is usually done only if other treatments have not worked. Follow-up care is a key part of your treatment and safety. Be sure to make and go to all appointments, and call your doctor if you are having problems. It's also a good idea to know your test results and keep a list of the medicines you take. How can you care for yourself at home? · Take pain medicines exactly as directed. ? If the doctor gave you a prescription medicine for pain, take it as prescribed. ? If you are not taking a prescription pain medicine, ask your doctor if you can take an over-the-counter medicine. · Don't spend too long in one position. Take short breaks to move around and change positions. · Wear a seat belt and shoulder harness when you are in a car. · Sleep with a pillow under your head and neck that keeps your neck straight. · Follow your doctor's instructions for gentle neck-stretching exercises. · Do not smoke. Smoking can slow healing of your discs. If you need help quitting, talk to your doctor about stop-smoking programs and medicines. These can increase your chances of quitting for good. · Avoid strenuous work or exercise until your doctor says it is okay. When should you call for help? Call 911 anytime you think you may need emergency care. For example, call if:    · You are unable to move an arm or a leg at all.   [facility] your doctor now or seek immediate medical care if:    · You have new or worse symptoms in your arms, legs, belly, or buttocks. Symptoms may include:  ? Numbness or tingling. ? Weakness. ? Pain.     · You lose bladder or bowel control.    Watch closely for changes in your health, and be sure to contact your doctor if:    · You do not get better as expected. Where can you learn more? Go to http://romeo-jessie.info/. Enter N118 in the search box to learn more about \"Cervical Disc Disease: Care Instructions. \"  Current as of: September 20, 2018  Content Version: 11.9  © 3116-4157 [employer].  Care instructions adapted under license by [employer] (which disclaims liability or warranty for this information). If you have questions about a medical condition or this instruction, always ask your healthcare professional. [address] any warranty or liability for your use of this information. Patient Education        Urinary Tract Infection in Women: Care Instructions  Your Care Instructions    A urinary tract infection, or UTI, is a general term for an infection anywhere between the kidneys and the urethra (where urine comes out). Most UTIs are bladder infections. They often cause pain or burning when you urinate. UTIs are caused by bacteria and can be cured with antibiotics. Be sure to complete your treatment so that the infection goes away. Follow-up care is a key part of your treatment and safety. Be sure to make and go to all appointments, and call your doctor if you are having problems. It's also a good idea to know your test results and keep a list of the medicines you take. How can you care for yourself at home? · Take your antibiotics as directed. Do not stop taking them just because you feel better. You need to take the full course of antibiotics. · Drink extra water and other fluids for the next day or two. This may help wash out the bacteria that are causing the infection. (If you have kidney, heart, or liver disease and have to limit fluids, talk with your doctor before you increase your fluid intake.)  · Avoid drinks that are carbonated or have caffeine. They can irritate the bladder. · Urinate often. Try to empty your bladder each time. · To relieve pain, take a hot bath or lay a heating pad set on low over your lower belly or genital area. Never go to sleep with a heating pad in place. To prevent UTIs  · Drink plenty of water each day. This helps you urinate often, which clears bacteria from your system.  (If you have kidney, heart, or liver disease and have to limit fluids, talk with your doctor before you increase your fluid intake.)  · Urinate when you need to. · Urinate right after you have sex. · Change sanitary pads often. · Avoid douches, bubble baths, feminine hygiene sprays, and other feminine hygiene products that have deodorants. · After going to the bathroom, wipe from front to back. When should you call for help? Call your doctor now or seek immediate medical care if:    · Symptoms such as fever, chills, nausea, or vomiting get worse or appear for the first time.     · You have new pain in your back just below your rib cage. This is called flank pain.     · There is new blood or pus in your urine.     · You have any problems with your antibiotic medicine.    Watch closely for changes in your health, and be sure to contact your doctor if:    · You are not getting better after taking an antibiotic for 2 days.     · Your symptoms go away but then come back. Where can you learn more? Go to http://romeo-jessie.info/. Enter X098 in the search box to learn more about \"Urinary Tract Infection in Women: Care Instructions. \"  Current as of: March 20, 2018  Content Version: 11.9  © 0627-6158 [employer] Care instructions adapted under license by [employer] (which disclaims liability or warranty for this information). If you have questions about a medical condition or this instruction, always ask your healthcare professional. [place] [address] any warranty or liability for your use of this information.

## 2019-04-08 NOTE — ED PROVIDER NOTES
EMERGENCY DEPARTMENT HISTORY AND PHYSICAL EXAM 
 
 
Date: 4/8/2019 Patient Name: Deonte Farias History of Presenting Illness Chief Complaint Patient presents with  Shoulder Pain  
  [place] patient is complaining of left shoulder pain that started this morning at 0400, patient does not know why it is hurting  Itchy Eye  
  pts right eye is swollen, but states \" it is swollen like this every morning\" History Provided By: Patient HPI: Deonte Farias, 80 y.o. female with PMHx significant for hypertension, presents via EMS to the ED with cc of left shoulder pain onset approximately 4 AM.  Patient states she was getting up for her normal day when she noticed pain in her left shoulder. Somewhat sharp in nature, denies any radiation. She reports pain is exacerbated by elevation of the extremity. She did have a fall approximately 3 and half weeks ago, however denies any shoulder pain from that remote injury. She denies chest pain, abdominal pain, shortness of breath, or headache. She does complain of generalized weakness. She denies any fever or chills. She has not taken her morning blood pressure medication. She also complains of right eye irritation, mostly the skin below the right eye. She denies any pruritus, denies any matting or crusting in the morning. She states that the periorbital skin of the right eye feels irritated. She denies any pain of the eye or globe itself. There are no other complaints, changes, or physical findings at this time. PCP: Wilda Wade MD 
 
No current facility-administered medications on file prior to encounter. Current Outpatient Medications on File Prior to Encounter Medication Sig Dispense Refill  temazepam (RESTORIL) 15 mg capsule Take 1 Cap by mouth nightly. Max Daily Amount: 15 mg. 30 Cap 5  hydrALAZINE (APRESOLINE) 100 mg tablet Take 2 Tabs by mouth two (2) times a day. 120 Tab prn  pravastatin (PRAVACHOL) 40 mg tablet Take 1 Tab by mouth nightly. 30 Tab 11  
 chlorthalidone (HYGROTEN) 25 mg tablet Take 1 Tab by mouth daily. 30 Tab prn  traMADol (ULTRAM) 50 mg tablet Take 1 Tab by mouth every six (6) hours as needed for Pain. Max Daily Amount: 200 mg. 60 Tab 0  
 lisinopril (PRINIVIL, ZESTRIL) 40 mg tablet Take 1 Tab by mouth daily. 30 Tab prn  dilTIAZem CD (CARDIZEM CD) 300 mg ER capsule Take 1 Cap by mouth daily. 30 Cap prn  
 gabapentin (NEURONTIN) 400 mg capsule Take 400 mg by mouth four (4) times daily.  PARoxetine (PAXIL) 20 mg tablet Take 20 mg by mouth daily.  cholecalciferol (VITAMIN D3) 50,000 unit capsule Take 50,000 Units by mouth every seven (7) days.  polyethylene glycol (MIRALAX) 17 gram packet Take 17 g by mouth daily as needed (constipation).  L. acidoph & paracasei- S therm- Bifido (HANG-Q/RISAQUAD) 8 billion cell cap cap Take 1 Cap by mouth daily. 30 Cap 0  
 metoprolol tartrate (LOPRESSOR) 50 mg tablet Take 1 Tab by mouth two (2) times a day. 60 Tab PRN  predniSONE (DELTASONE) 10 mg tablet Take 1 Tab by mouth daily (with breakfast). 30 Tab 0  
 acetaminophen (TYLENOL) 650 mg CR tablet Take 500 mg by mouth every six (6) hours as needed for Pain.  aspirin 81 mg tablet Take 81 mg by mouth daily. Stopped for surgery 8-4-10  loperamide (IMODIUM) 2 mg capsule Take 2 Caps by mouth every eight (8) hours as needed for Diarrhea. 60 Cap 2  
 docusate sodium (COLACE) 100 mg capsule Take 100 mg by mouth two (2) times a day. Past History Past Medical History: 
Past Medical History:  
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   
 blood transfusion history Past Surgical History: 
Past Surgical History:  
Procedure Laterality Date  BYPASS GRAFT OTHR,FEM-FEM    
 BYPASS GRAFT OTHR,FEM-POP    
 right  HX HEENT    
 bilateral cataracts  HX ORTHOPAEDIC    
 right hip fracture/pinning  HX UROLOGICAL    
 right stent/kidney Family History: 
History reviewed. No pertinent family history. Social History: 
Social History Tobacco Use  Smoking status: Former Smoker Packs/day: 0.25  Smokeless tobacco: Never Used  Tobacco comment: stopped 2009 Substance Use Topics  Alcohol use: No  
 Drug use: No  
 
 
Allergies: Allergies Allergen Reactions  Dilaudid [Hydromorphone] Unknown (comments) Does not remember  Hydrocodone Nausea and Vomiting  Morphine Rash Review of Systems Review of Systems Constitutional: Negative for chills and fever. HENT: Negative for congestion. Eyes: Negative for pain, discharge (or matting), redness (periorbital skin), itching and visual disturbance. Respiratory: Negative for cough, chest tightness and shortness of breath. Cardiovascular: Negative for chest pain and leg swelling. Gastrointestinal: Negative for abdominal pain and vomiting. Endocrine: Negative for polyuria. Genitourinary: Negative for dysuria and frequency. Musculoskeletal: Positive for arthralgias (L shoulder). Negative for myalgias. Skin: Positive for color change (R periorbital skin). Allergic/Immunologic: Negative for immunocompromised state. Neurological: Negative for numbness. Physical Exam  
Physical Exam  
Nursing note and vitals reviewed. General appearance: non-toxic, no distress Eyes: PERRL, EOMI, conjunctiva normal, anicteric sclera, mild erythema of the right periorbital skin; appears somewhat dry and eczematous HEENT: mucous membranes moist, oropharynx is clear Pulmonary: clear to auscultation bilaterally Cardiac: Bradycardic and regular rhythm, no murmurs, gallops, or rubs, 2+DP pulses, 2+ radial pulses Abdomen: soft, nontender, nondistended, bowel sounds present MSK: no pre-tibial edema, increased pain with left shoulder elevation greater than 70 degrees Neuro: Alert, answers questions appropriately Skin: capillary refill brisk, ecchymosis to the left lateral humerus Diagnostic Study Results Labs - Recent Results (from the past 12 hour(s)) EKG, 12 LEAD, INITIAL Collection Time: 04/08/19  9:05 AM  
Result Value Ref Range Ventricular Rate 55 BPM  
 Atrial Rate 55 BPM  
 P-R Interval 168 ms QRS Duration 86 ms  
 Q-T Interval 426 ms  
 QTC Calculation (Bezet) 407 ms Calculated P Axis 61 degrees Calculated R Axis -42 degrees Calculated T Axis 85 degrees Diagnosis Sinus bradycardia Left axis deviation Anterior infarct , age undetermined ST & T wave abnormality, consider lateral ischemia When compared with ECG of 24-OCT-2018 10:17, Anterior infarct is now present T wave inversion less evident in Anterolateral leads Confirmed by Lindi Gilford ([id number]) on 4/8/2019 12:41:38 PM 
  
POC LACTIC ACID Collection Time: 04/08/19  9:12 AM  
Result Value Ref Range Lactic Acid (POC) 1.30 0.40 - 2.00 mmol/L  
CBC WITH AUTOMATED DIFF Collection Time: 04/08/19  9:37 AM  
Result Value Ref Range WBC 14.6 (H) 3.6 - 11.0 K/uL  
 RBC 4.50 3.80 - 5.20 M/uL  
 HGB 13.4 11.5 - 16.0 g/dL HCT 41.5 35.0 - 47.0 % MCV 92.2 80.0 - 99.0 FL  
 MCH 29.8 26.0 - 34.0 PG  
 MCHC 32.3 30.0 - 36.5 g/dL  
 RDW 15.1 (H) 11.5 - 14.5 % PLATELET 733 278 - 544 K/uL MPV 11.2 8.9 - 12.9 FL  
 NRBC 0.0 0  WBC ABSOLUTE NRBC 0.00 0.00 - 0.01 K/uL NEUTROPHILS 80 (H) 32 - 75 % LYMPHOCYTES 6 (L) 12 - 49 % MONOCYTES 11 5 - 13 % EOSINOPHILS 1 0 - 7 % BASOPHILS 0 0 - 1 % IMMATURE GRANULOCYTES 2 (H) 0.0 - 0.5 % ABS. NEUTROPHILS 11.7 (H) 1.8 - 8.0 K/UL  
 ABS. LYMPHOCYTES 0.9 0.8 - 3.5 K/UL  
 ABS. MONOCYTES 1.6 (H) 0.0 - 1.0 K/UL  
 ABS. EOSINOPHILS 0.2 0.0 - 0.4 K/UL ABS. BASOPHILS 0.1 0.0 - 0.1 K/UL  
 ABS. IMM. GRANS. 0.2 (H) 0.00 - 0.04 K/UL  
 DF AUTOMATED METABOLIC PANEL, COMPREHENSIVE Collection Time: 04/08/19 11:42 AM  
Result Value Ref Range Sodium 136 136 - 145 mmol/L Potassium 4.3 3.5 - 5.1 mmol/L Chloride 106 97 - 108 mmol/L  
 CO2 25 21 - 32 mmol/L Anion gap 5 5 - 15 mmol/L Glucose 130 (H) 65 - 100 mg/dL BUN 46 (H) 6 - 20 MG/DL Creatinine 1.60 (H) 0.55 - 1.02 MG/DL  
 BUN/Creatinine ratio 29 (H) 12 - 20 GFR est AA 37 (L) >60 ml/min/1.73m2 GFR est non-AA 31 (L) >60 ml/min/1.73m2 Calcium 10.0 8.5 - 10.1 MG/DL Bilirubin, total 0.4 0.2 - 1.0 MG/DL  
 ALT (SGPT) 16 12 - 78 U/L  
 AST (SGOT) 21 15 - 37 U/L Alk. phosphatase 61 45 - 117 U/L Protein, total 7.3 6.4 - 8.2 g/dL Albumin 3.4 (L) 3.5 - 5.0 g/dL Globulin 3.9 2.0 - 4.0 g/dL A-G Ratio 0.9 (L) 1.1 - 2.2 CK Collection Time: 04/08/19 11:42 AM  
Result Value Ref Range CK 33 26 - 192 U/L  
TROPONIN I Collection Time: 04/08/19 11:42 AM  
Result Value Ref Range Troponin-I, Qt. <0.05 <0.05 ng/mL URINALYSIS W/ REFLEX CULTURE Collection Time: 04/08/19 12:42 PM  
Result Value Ref Range Color YELLOW/STRAW Appearance CLOUDY (A) CLEAR Specific gravity 1.014 1.003 - 1.030    
 pH (UA) 5.0 5.0 - 8.0 Protein 100 (A) NEG mg/dL Glucose NEGATIVE  NEG mg/dL Ketone NEGATIVE  NEG mg/dL Bilirubin NEGATIVE  NEG Blood TRACE (A) NEG Urobilinogen 0.2 0.2 - 1.0 EU/dL Nitrites NEGATIVE  NEG Leukocyte Esterase LARGE (A) NEG    
 WBC >100 (H) 0 - 4 /hpf  
 RBC 0-5 0 - 5 /hpf Epithelial cells FEW FEW /lpf Bacteria 1+ (A) NEG /hpf  
 UA:UC IF INDICATED URINE CULTURE ORDERED (A) CNI Amorphous Crystals 3+ (A) NEG Hyaline cast 0-2 0 - 5 /lpf Budding yeast PRESENT (A) NEG Yeast w/hyphae PRESENT (A) NEG    
TROPONIN I Collection Time: 04/08/19  1:50 PM  
Result Value Ref Range Troponin-I, Qt. <0.05 <0.05 ng/mL Radiologic Studies -  
XR SHOULDER LT AP/LAT MIN 2 V Final Result IMPRESSION: No acute abnormality. CT SPINE CERV WO CONT Final Result IMPRESSION: Multilevel cervical degenerative disc disease. No acute finding. XR CHEST PORT Final Result IMPRESSION: No acute finding. CT ABD PELV WO CONT    (Results Pending) CT Results  (Last 48 hours) 04/08/19 1049  CT SPINE CERV WO CONT Final result Impression:  IMPRESSION: Multilevel cervical degenerative disc disease. No acute finding. Narrative:  INDICATION: neck pain/shoulder pain left EXAM: Axial unenhanced CT of the cervical spine is performed with 2D coronal and  
sagittal reformatted images provided. CT dose reduction was achieved through use  
of a standardized protocol tailored for this examination and automatic exposure  
control for dose modulation. FINDINGS: There is no fracture or significant subluxation. There is multilevel  
degenerative disc and facet disease, severe at levels C4-C7. There is no  
prevertebral soft tissue swelling. Visualized thyroid and neck soft tissues are  
unremarkable for age. CXR Results  (Last 48 hours) 04/08/19 0942  XR CHEST PORT Final result Impression:  IMPRESSION: No acute finding. Narrative:  EXAM: Portable CXR. 0930 hours. COMPARISON: 10/24/2018. INDICATION: Sepsis FINDINGS:  
There is no acute pulmonary finding. Scarlike linear density in the left midlung  
persists. Heart size is top normal. There is no pulmonary edema. There is no  
pneumothorax, midline shift or apparent pleural effusion. Medical Decision Making I am the first provider for this patient. I reviewed the vital signs, available nursing notes, past medical history, past surgical history, family history and social history. Vital Signs-Reviewed the patient's vital signs. Patient Vitals for the past 12 hrs: 
 Temp Pulse Resp BP SpO2  
04/08/19 1545  (!) 55 19 161/42 94 % 04/08/19 1530  (!) 54 16 152/81 93 % 04/08/19 1515  (!) 55 20 (!) 152/98 93 % 04/08/19 1500  (!) 56 15 (!) 166/35 94 % 04/08/19 1430  (!) 54 18 175/48 94 % 04/08/19 1415  (!) 55 14 142/46 92 % 04/08/19 1400  (!) 55 16 136/46 93 % 04/08/19 1345  (!) 56 17 141/45 93 % 04/08/19 1330  (!) 54 14 (!) 123/39 95 % 04/08/19 1315  (!) 56 13 120/41 93 % 04/08/19 1300  (!) 55 13 125/57 95 % 04/08/19 1245  (!) 55 14 139/43 96 % 04/08/19 1230  (!) 57 14 151/50 95 % 04/08/19 1215  (!) 57 21 142/50 95 % 04/08/19 1200  (!) 57 16 154/56 95 % 04/08/19 1152  (!) 56 19 152/53 94 % 04/08/19 1145  (!) 58 17 177/42 96 % 04/08/19 1130  60 15 (!) 219/67 93 % 04/08/19 1125  60 15 (!) 221/67 93 % 04/08/19 1030  (!) 56 14 198/71 94 % 04/08/19 1015  (!) 57 15 (!) 202/78 95 % 04/08/19 1000  (!) 52 13 (!) 218/54 93 % 04/08/19 0945  (!) 50 14 197/66 94 % 04/08/19 0930    192/49 92 % 04/08/19 0856 99.7 °F (37.6 °C) (!) 122 20 138/67 96 % Pulse Oximetry Analysis - 96% on room air Cardiac Monitor:  
Rate: 59 bpm 
Rhythm: sinus bradycardia EKG interpretation: (Preliminary) 9:05 AM 
Sinus bradycardia, left axis deviation, rate 55, , QRS 86, QTc 407, nonspecific ST-T changes, no ST elevation or ST depression. Similar to EKG of 10/24/2018. Records Reviewed: Nursing Notes, Old Medical Records and Previous electrocardiograms Provider Notes (Medical Decision Making):  
80-year-old female presents with complains of left shoulder arthralgia, as well as irritation around the right eye. Left shoulder pain is reproducible on exam with shoulder elevation. EKG does not show any obvious ischemic changes. Will check x-ray, labs, urinalysis. Skin inferior to the right eye appears somewhat eczematous and dry.   She denies zeyad pain or itching. May require trial of topical corticosteroid. Essentially no suspicion for periorbital cellulitis. Somewhat doubt ACS in this clinical setting, will check cardiac enzymes. ED Course:  
Initial assessment performed. The patients presenting problems have been discussed, and they are in agreement with the care plan formulated and outlined with them. I have encouraged them to ask questions as they arise throughout their visit. ED Course as of Apr 08 1637 Mon Apr 08, 2019  
1442 Patient resting comfortably. Updated on lab and imaging results. [] 1443 Blood pressures reviewed, markedly improved. Patient was given her home hydralazine dose after initial evaluation.  
 [] ED Course User Index [FH] Makenzie Pino MD  
 
 
Critical Care Time:  
0 Disposition: 
Discharge Note: 
5:45 PM 
The patient has been re-evaluated and is ready for discharge. Reviewed available results with patient. Counseled patient on diagnosis and care plan. Patient has expressed understanding, and all questions have been answered. Patient agrees with plan and agrees to follow up as recommended, or to return to the ED if their symptoms worsen. Discharge instructions have been provided and explained to the patient, along with reasons to return to the ED. PLAN: 
1. Discharge Medication List as of 4/8/2019  5:41 PM  
  
START taking these medications Details  
cephALEXin (KEFLEX) 500 mg capsule Take 1 Cap by mouth three (3) times daily for 7 days. , Print, Disp-21 Cap, R-0  
  
hydrocortisone (CORTIZONE) 0.5 % ointment Apply  to affected area two (2) times a day for 10 days. Apply to affected area under the right eye; DO NOT APPLY IN THE EYE, Print, Disp-1 Tube, R-0  
  
lidocaine (LIDODERM) 5 % Apply patch to the affected area (L shoulder) for 12 hours a day and remove for 12 hours a day., Print, Disp-15 Each, R-0  
  
  
CONTINUE these medications which have NOT CHANGED Details temazepam (RESTORIL) 15 mg capsule Take 1 Cap by mouth nightly. Max Daily Amount: 15 mg., Print, Disp-30 Cap, R-5  
  
hydrALAZINE (APRESOLINE) 100 mg tablet Take 2 Tabs by mouth two (2) times a day., Print, Disp-120 Tab, R-prn  
  
pravastatin (PRAVACHOL) 40 mg tablet Take 1 Tab by mouth nightly. , Print, Disp-30 Tab, R-11  
  
chlorthalidone (HYGROTEN) 25 mg tablet Take 1 Tab by mouth daily. , Print, Disp-30 Tab, R-prn  
  
traMADol (ULTRAM) 50 mg tablet Take 1 Tab by mouth every six (6) hours as needed for Pain. Max Daily Amount: 200 mg., Print, Disp-60 Tab, R-0  
  
lisinopril (PRINIVIL, ZESTRIL) 40 mg tablet Take 1 Tab by mouth daily. , Normal, Disp-30 Tab, R-prn  
  
dilTIAZem CD (CARDIZEM CD) 300 mg ER capsule Take 1 Cap by mouth daily. , Normal, Disp-30 Cap, R-prn  
  
gabapentin (NEURONTIN) 400 mg capsule Take 400 mg by mouth four (4) times daily. , Historical Med PARoxetine (PAXIL) 20 mg tablet Take 20 mg by mouth daily. , Historical Med  
  
cholecalciferol (VITAMIN D3) 50,000 unit capsule Take 50,000 Units by mouth every seven (7) days. , Historical Med  
  
polyethylene glycol (MIRALAX) 17 gram packet Take 17 g by mouth daily as needed (constipation). , Historical Med  
  
L. acidoph & paracasei- S therm- Bifido (HANG-Q/RISAQUAD) 8 billion cell cap cap Take 1 Cap by mouth daily. , Normal, Disp-30 Cap, R-0  
  
metoprolol tartrate (LOPRESSOR) 50 mg tablet Take 1 Tab by mouth two (2) times a day., Normal, Disp-60 Tab, R-PRN  
  
predniSONE (DELTASONE) 10 mg tablet Take 1 Tab by mouth daily (with breakfast). , Normal, Disp-30 Tab, R-0  
  
acetaminophen (TYLENOL) 650 mg CR tablet Take 500 mg by mouth every six (6) hours as needed for Pain., Historical Med  
  
aspirin 81 mg tablet Take 81 mg by mouth daily.  Stopped for surgery 8-4-10 , Historical Med  
  
loperamide (IMODIUM) 2 mg capsule Take 2 Caps by mouth every eight (8) hours as needed for Diarrhea., Print, Disp-60 Cap, R-2  
  
 docusate sodium (COLACE) 100 mg capsule Take 100 mg by mouth two (2) times a day., Historical Med 2. Follow-up Information Follow up With Specialties Details Why Contact Info Avtar West MD Internal Medicine Schedule an appointment as soon as possible for a visit in 2 days  [facility] 
[phone] \[facility]\"" EMERGENCY DEPT Emergency Medicine Go in 1 day If symptoms worsen [address] [facility] 
[phone] Return to ED if worse Diagnosis Clinical Impression: 1. Acute cystitis with hematuria 2. Arthralgia of left shoulder region 3. DDD (degenerative disc disease), cervical   
4. Dermatitis of face Attestations:

## 2019-04-11 RX ORDER — NITROFURANTOIN 25; 75 MG/1; MG/1
100 CAPSULE ORAL 2 TIMES DAILY
Qty: 10 CAP | Refills: 0 | Status: SHIPPED | OUTPATIENT
Start: 2019-04-11 | End: 2019-04-16

## 2019-04-14 LAB
BACTERIA SPEC CULT: NORMAL
BACTERIA SPEC CULT: NORMAL
SERVICE CMNT-IMP: NORMAL
SERVICE CMNT-IMP: NORMAL

## 2019-05-02 NOTE — PROGRESS NOTES
Chief Complaint Patient presents with  Hypertension 3 month f/u SUBJECTIVE: 
 
John Boggs is a 80 y.o. female who returns in follow-up for medical problems include hypertension, hyperlipidemia, paroxysmal atrial fibrillation, peripheral vascular disease, COPD, DJD, CKD stage III and other medical problems. She is taking her medications and trying to follow her diet and is pretty much unable to walk and get exercise. She currently denies any chest pain or increase of her chronic dyspnea or other cardiorespiratory complaints. She denies any GI or  complaints. She denies any headaches, dizziness or neurologic complaints. She has no change of her chronic arthritic complaints and no other complaints on complete review of systems. Current Outpatient Medications Medication Sig Dispense Refill  loperamide (IMODIUM) 2 mg capsule Take 2 Caps by mouth every eight (8) hours as needed for Diarrhea. 60 Cap 2  
 temazepam (RESTORIL) 15 mg capsule Take 1 Cap by mouth nightly. Max Daily Amount: 15 mg. 30 Cap 5  hydrALAZINE (APRESOLINE) 100 mg tablet Take 2 Tabs by mouth two (2) times a day. 120 Tab prn  pravastatin (PRAVACHOL) 40 mg tablet Take 1 Tab by mouth nightly. 30 Tab 11  
 chlorthalidone (HYGROTEN) 25 mg tablet Take 1 Tab by mouth daily. 30 Tab prn  traMADol (ULTRAM) 50 mg tablet Take 1 Tab by mouth every six (6) hours as needed for Pain. Max Daily Amount: 200 mg. 60 Tab 0  
 lisinopril (PRINIVIL, ZESTRIL) 40 mg tablet Take 1 Tab by mouth daily. 30 Tab prn  dilTIAZem CD (CARDIZEM CD) 300 mg ER capsule Take 1 Cap by mouth daily. 30 Cap prn  
 gabapentin (NEURONTIN) 400 mg capsule Take 400 mg by mouth four (4) times daily.  PARoxetine (PAXIL) 20 mg tablet Take 20 mg by mouth daily.  cholecalciferol (VITAMIN D3) 50,000 unit capsule Take 50,000 Units by mouth every seven (7) days.     
 L. acidoph & paracasei- S therm- Bifido (HANG-Q/RISAQUAD) 8 billion cell cap cap Take 1 Cap by mouth daily. 30 Cap 0  
 metoprolol tartrate (LOPRESSOR) 50 mg tablet Take 1 Tab by mouth two (2) times a day. 60 Tab PRN  predniSONE (DELTASONE) 10 mg tablet Take 1 Tab by mouth daily (with breakfast). 30 Tab 0  
 acetaminophen (TYLENOL) 650 mg CR tablet Take 500 mg by mouth every six (6) hours as needed for Pain.  aspirin 81 mg tablet Take 81 mg by mouth daily. Stopped for surgery 8-4-10     
 docusate sodium (COLACE) 100 mg capsule Take 100 mg by mouth two (2) times a day.  polyethylene glycol (MIRALAX) 17 gram packet Take 17 g by mouth daily as needed (constipation). Past Medical History:  
Diagnosis Date  Hypertension  Ill-defined condition Ex Lap with Talia Lehman patch of a perforated pyloric channel ulcer 7/19/16  Other ill-defined conditions(799.89) lipids  Other ill-defined conditions(799.89)   
 blood transfusion history Past Surgical History:  
Procedure Laterality Date  BYPASS GRAFT OTHR,FEM-FEM    
 BYPASS GRAFT OTHR,FEM-POP    
 right  HX HEENT    
 bilateral cataracts  HX ORTHOPAEDIC    
 right hip fracture/pinning  HX UROLOGICAL    
 right stent/kidney Allergies Allergen Reactions  Dilaudid [Hydromorphone] Unknown (comments) Does not remember  Hydrocodone Nausea and Vomiting  Morphine Rash REVIEW OF SYSTEMS: 
General: negative for - chills or fever, or weight loss or gain ENT: negative for - headaches, nasal congestion or tinnitus Eyes: no blurred or visual changes Neck: No stiffness or swollen nodes Respiratory: negative for - cough, hemoptysis, shortness of breath or wheezing Cardiovascular : negative for - chest pain, edema, palpitations or shortness of breath Gastrointestinal: negative for - abdominal pain, blood in stools, heartburn or nausea/vomiting Genito-Urinary: no dysuria, trouble voiding, or hematuria Musculoskeletal: negative for - gait disturbance, joint pain, joint stiffness or joint swelling Neurological: no TIA or stroke symptoms Hematologic: no bruises, no bleeding Lymphatic: no swollen glands Integument: no lumps, mole changes, nail changes or rash Endocrine:no malaise/lethargy poly uria or polydipsia or unexpected weight changes Social History Socioeconomic History  Marital status:  Spouse name: Not on file  Number of children: Not on file  Years of education: Not on file  Highest education level: Not on file Tobacco Use  Smoking status: Former Smoker Packs/day: 0.25  Smokeless tobacco: Never Used  Tobacco comment: stopped 2009 Substance and Sexual Activity  Alcohol use: No  
 Drug use: No  
 
History reviewed. No pertinent family history. OBJECTIVE:  
 
Visit Vitals /56 Pulse (!) 51 Temp 98.3 °F (36.8 °C) (Oral) Resp 14 Ht 5' 2\" (1.575 m) Wt 121 lb (54.9 kg) SpO2 98% BMI 22.13 kg/m² CONSTITUTIONAL:   well nourished, appears age appropriate EYES: sclera anicteric, PERRL, EOMI 
ENMT:nares clear, moist mucous membranes, pharynx clear NECK: supple. Thyroid normal, No JVD or bruits RESPIRATORY: Chest: clear to ascultation and percussion, normal inspiratory effort CARDIOVASCULAR: Heart: regular rate and rhythm 2/6 holosystolic murmur without rubs or gallops, PMI not displaced, No thrills GASTROINTESTINAL: Abdomen: non distended, soft, non tender, bowel sounds normal 
HEMATOLOGIC: no purpura, petechiae or bruising LYMPHATIC: No lymph node enlargemant MUSCULOSKELETAL: Extremities: no edema or active synovitis, pulse 1+ INTEGUMENT: No unusual rashes or suspicious skin lesions noted. Nails appear normal. 
PERIPHERAL VASCULAR: normal pulses femoral, PT and DP NEUROLOGIC: non-focal exam, A & O X 3 PSYCHIATRIC:, appropriate affect ASSESSMENT:  
1. Essential hypertension 2. Mixed hyperlipidemia 3. PAF (paroxysmal atrial fibrillation) ([facility] [address] 4. Stage 3 chronic kidney disease ([facility] [address] 5. Moderate protein-calorie malnutrition ([facility] [address] 6. Chronic obstructive pulmonary disease, unspecified COPD type ([facility] [address] 7. Recurrent UTI Impression 1. Hypertension that is poorly controlled so I am going to try increasing hydralazine to 200 mg 3 times a day and see if that will control her blood pressure she is already on 300 mg Cardizem CD daily as well last 40 mg lisinopril daily 25 mg Hygroton daily and metoprolol 50 mg daily. 2.  Hyperlipidemia prior lab reviewed and repeat status pending and I will make adjustments if necessary. 3.  Paroxysmal atrial fibrillation in sinus rhythm now 4. CKD stage III repeat status pending 5   Malnutrition weight is actually improved so I think were okay there 6. COPD that is her major debilitating factor. 7.  Recurrent UTI per her request a urine and urine culture obtained and pending. I will call the lab and make further recommendations or adjustments if necessary. All of the above discussed with her son present with her today. Follow-up in 4 weeks regarding hypertension in 3 months regarding other medical problems. Adult home forms filled out today. PLAN: 
. Orders Placed This Encounter  CULTURE, URINE  
 METABOLIC PANEL, COMPREHENSIVE ([facility])  LIPID PANEL ([facility])  CK ([facility])  URINALYSIS W/MICROSCOPIC ([facility]) ATTENTION:  
This medical record was transcribed using an electronic medical records system. Although proofread, it may and can contain electronic and spelling errors. Other human spelling and other errors may be present. Corrections may be executed at a later time. Please feel free to contact us for any clarifications as needed. Follow-up and Dispositions · Return in about 3 months (around 8/3/2019). No results found for any visits on 05/03/19. Reed Mendoza MD 
 
 The patient verbalized understanding of the problems and plans as explained.

## 2019-05-03 ENCOUNTER — OFFICE VISIT (OUTPATIENT)
Dept: INTERNAL MEDICINE CLINIC | Age: 84
End: 2019-05-03

## 2019-05-03 VITALS
RESPIRATION RATE: 14 BRPM | WEIGHT: 121 LBS | SYSTOLIC BLOOD PRESSURE: 182 MMHG | OXYGEN SATURATION: 98 % | HEART RATE: 51 BPM | DIASTOLIC BLOOD PRESSURE: 56 MMHG | HEIGHT: 62 IN | TEMPERATURE: 98.3 F | BODY MASS INDEX: 22.26 KG/M2

## 2019-05-03 DIAGNOSIS — E44.0 MODERATE PROTEIN-CALORIE MALNUTRITION (HCC): ICD-10-CM

## 2019-05-03 DIAGNOSIS — I48.0 PAF (PAROXYSMAL ATRIAL FIBRILLATION) (HCC): ICD-10-CM

## 2019-05-03 DIAGNOSIS — J44.9 CHRONIC OBSTRUCTIVE PULMONARY DISEASE, UNSPECIFIED COPD TYPE (HCC): Chronic | ICD-10-CM

## 2019-05-03 DIAGNOSIS — N39.0 RECURRENT UTI: ICD-10-CM

## 2019-05-03 DIAGNOSIS — N18.30 STAGE 3 CHRONIC KIDNEY DISEASE (HCC): ICD-10-CM

## 2019-05-03 DIAGNOSIS — E78.2 MIXED HYPERLIPIDEMIA: ICD-10-CM

## 2019-05-03 DIAGNOSIS — I10 ESSENTIAL HYPERTENSION: Primary | ICD-10-CM

## 2019-05-03 LAB
A-G RATIO,AGRAT: 1.5 RATIO
ALBUMIN SERPL-MCNC: 4.3 G/DL (ref 3.9–5.4)
ALP SERPL-CCNC: 56 U/L (ref 38–126)
ALT SERPL-CCNC: 21 U/L (ref 9–52)
ANION GAP SERPL CALC-SCNC: 15 MMOL/L
AST SERPL W P-5'-P-CCNC: 31 U/L (ref 14–36)
BACTERIA,BACTU: ABNORMAL
BILIRUB SERPL-MCNC: 0.5 MG/DL (ref 0.2–1.3)
BILIRUB UR QL: NEGATIVE
BUN SERPL-MCNC: 52 MG/DL (ref 7–17)
BUN/CREATININE RATIO,BUCR: 33 RATIO
CALCIUM SERPL-MCNC: 11.2 MG/DL (ref 8.4–10.2)
CHLORIDE SERPL-SCNC: 110 MMOL/L (ref 98–107)
CHOL/HDL RATIO,CHHD: 2 RATIO (ref 0–4)
CHOLEST SERPL-MCNC: 229 MG/DL (ref 0–200)
CK SERPL-CCNC: 36 U/L (ref 30–135)
CLARITY: CLEAR
CO2 SERPL-SCNC: 21 MMOL/L (ref 22–32)
COLOR UR: ABNORMAL
CREAT SERPL-MCNC: 1.6 MG/DL (ref 0.7–1.2)
GLOBULIN,GLOB: 2.9
GLUCOSE 24H UR-MRATE: NEGATIVE G/(24.H)
GLUCOSE SERPL-MCNC: 92 MG/DL (ref 65–105)
HDLC SERPL-MCNC: 133 MG/DL (ref 35–130)
HGB UR QL STRIP: ABNORMAL
KETONES UR QL STRIP.AUTO: NEGATIVE
LDL/HDL RATIO,LDHD: 0 RATIO
LDLC SERPL CALC-MCNC: 55 MG/DL (ref 0–130)
LEUKOCYTE ESTERASE: ABNORMAL
Lab: ABNORMAL
NITRITE UR QL STRIP.AUTO: NEGATIVE
PH UR STRIP: 6 [PH] (ref 5–7)
POTASSIUM SERPL-SCNC: 4.9 MMOL/L (ref 3.6–5)
PROT SERPL-MCNC: 7.2 G/DL (ref 6.3–8.2)
PROT UR STRIP-MCNC: ABNORMAL MG/DL
RBC #/AREA URNS HPF: ABNORMAL #/HPF
SODIUM SERPL-SCNC: 146 MMOL/L (ref 137–145)
SP GR UR REFRACTOMETRY: 1.01 (ref 1–1.03)
TRIGL SERPL-MCNC: 203 MG/DL (ref 0–200)
UROBILINOGEN UR QL STRIP.AUTO: NEGATIVE
VLDLC SERPL CALC-MCNC: 41 MG/DL
WBC URNS QL MICRO: ABNORMAL #/HPF

## 2019-05-03 NOTE — PATIENT INSTRUCTIONS

## 2019-05-03 NOTE — PROGRESS NOTES
Chief Complaint Patient presents with  Hypertension 3 month f/u 1. Have you been to the ER, urgent care clinic since your last visit? Hospitalized since your last visit? Yes ER at [facility] fr left should pain and neck pain in April. 2. Have you seen or consulted any other health care providers outside of the [address] [place] [address] since your last visit? Include any pap smears or colon screening.   no

## 2019-05-04 LAB — BACTERIA UR CULT: NORMAL

## 2019-05-06 ENCOUNTER — TELEPHONE (OUTPATIENT)
Dept: INTERNAL MEDICINE CLINIC | Age: 84
End: 2019-05-06

## 2019-06-03 ENCOUNTER — TELEPHONE (OUTPATIENT)
Dept: INTERNAL MEDICINE CLINIC | Age: 84
End: 2019-06-03

## 2019-06-03 PROBLEM — R11.0 NAUSEA: Status: ACTIVE | Noted: 2019-06-03

## 2019-06-03 NOTE — PROGRESS NOTES
Subjective:   Huber Cortez is a 80 y.o. female      Chief Complaint   Patient presents with    Nausea     x 4 days    Shoulder Pain     left shoulder pain x 1 day        History of present illness: She presents today brought in by her caretaker complaining of nausea is been present for the past 4 days with some vomiting for 3 days although she has had no vomiting since Zofran was started yesterday. She does have some mild abdominal pain. She has noted no fevers or chills although she does have a low-grade fever today. She denies any diarrhea. She notes no urinary frequency or dysuria although she has had recurrent urinary tract infections in the past.  She is noting some bilateral shoulder pain the right worse than the left to the point where she has a hard time dressing herself and does note some pain across the neck. She has had no falls or history of trauma.     Patient Active Problem List   Diagnosis Code    COPD (chronic obstructive pulmonary disease) (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    PAF (paroxysmal atrial fibrillation) (HCC) I48.0    Moderate protein-calorie malnutrition (HCC) E44.0    Decubitus ulcer of sacral region, stage 2 L89.152    Leukocytosis D72.829    Hydronephrosis N13.30    Ureteral obstruction N13.5    Stage 3 chronic kidney disease (HCC) N18.3    Acute cystitis without hematuria K70.11    Complicated UTI (urinary tract infection) N39.0    Closed compression fracture of thoracic vertebra ([address] S22.000A    Medicare annual wellness visit, initial Z00.00    Nausea & vomiting R11.2    Fever R50.9    Cough R05      Past Medical History: Diagnosis Date    Hypertension     Ill-defined condition     Ex Lap with Rhunette Dotter patch of a perforated pyloric channel ulcer 7/19/16    Other ill-defined conditions(799.89)     lipids    Other ill-defined conditions(799.89)     blood transfusion history      Allergies   Allergen Reactions    Dilaudid [Hydromorphone] Unknown (comments)     Does not remember    Hydrocodone Nausea and Vomiting    Morphine Rash      History reviewed. No pertinent family history. Social History     Socioeconomic History    Marital status:      Spouse name: Not on file    Number of children: Not on file    Years of education: Not on file    Highest education level: Not on file   Occupational History    Not on file   Social Needs    Financial resource strain: Not on file    Food insecurity:     Worry: Not on file     Inability: Not on file    Transportation needs:     Medical: Not on file     Non-medical: Not on file   Tobacco Use    Smoking status: Former Smoker     Packs/day: 0.25    Smokeless tobacco: Never Used    Tobacco comment: stopped 2009   Substance and Sexual Activity    Alcohol use: No    Drug use: No    Sexual activity: Not on file   Lifestyle    Physical activity:     Days per week: Not on file     Minutes per session: Not on file    Stress: Not on file   Relationships    Social connections:     Talks on phone: Not on file     Gets together: Not on file     Attends Evangelical service: Not on file     Active member of club or organization: Not on file     Attends meetings of clubs or organizations: Not on file     Relationship status: Not on file    Intimate partner violence:     Fear of current or ex partner: Not on file     Emotionally abused: Not on file     Physically abused: Not on file     Forced sexual activity: Not on file   Other Topics Concern    Not on file   Social History Narrative    Not on file     Prior to Admission medications    Medication Sig Start Date End Date Taking? Authorizing Provider   loperamide (IMODIUM) 2 mg capsule Take 2 Caps by mouth every eight (8) hours as needed for Diarrhea. 3/13/19  Yes Irvin Riojas MD   temazepam (RESTORIL) 15 mg capsule Take 1 Cap by mouth nightly. Max Daily Amount: 15 mg. 2/13/19  Yes Irvin Riojas MD   hydrALAZINE (APRESOLINE) 100 mg tablet Take 2 Tabs by mouth two (2) times a day. 1/4/19  Yes Irvin Riojas MD   pravastatin (PRAVACHOL) 40 mg tablet Take 1 Tab by mouth nightly. 12/11/18  Yes Irvin Riojas MD   chlorthalidone (HYGROTEN) 25 mg tablet Take 1 Tab by mouth daily. 12/7/18  Yes Irvin Riojas MD   traMADol Lerry Fruit) 50 mg tablet Take 1 Tab by mouth every six (6) hours as needed for Pain. Max Daily Amount: 200 mg. 12/7/18  Yes Irvin Riojas MD   lisinopril (PRINIVIL, ZESTRIL) 40 mg tablet Take 1 Tab by mouth daily. 11/6/18  Yes Irvin Riojas MD   dilTIAZem CD (CARDIZEM CD) 300 mg ER capsule Take 1 Cap by mouth daily. 10/30/18  Yes Irvin Riojas MD   docusate sodium (COLACE) 100 mg capsule Take 100 mg by mouth two (2) times a day. Yes Veronika, MD Baldev   gabapentin (NEURONTIN) 400 mg capsule Take 400 mg by mouth four (4) times daily. Yes Veronika, MD Baldev   PARoxetine (PAXIL) 20 mg tablet Take 20 mg by mouth daily. Yes Veronika, MD Baldev   cholecalciferol (VITAMIN D3) 50,000 unit capsule Take 50,000 Units by mouth every seven (7) days. Yes Veronika, MD Baldev   polyethylene glycol (MIRALAX) 17 gram packet Take 17 g by mouth daily as needed (constipation). Yes Veronika, MD AMBER De Paz acidoph & paracasei- S therm- Bifido (HANG-Q/RISAQUAD) 8 billion cell cap cap Take 1 Cap by mouth daily. 4/12/18  Yes Irvin Riojas MD   metoprolol tartrate (LOPRESSOR) 50 mg tablet Take 1 Tab by mouth two (2) times a day. 4/12/18  Yes Irvin Riojas MD   predniSONE (DELTASONE) 10 mg tablet Take 1 Tab by mouth daily (with breakfast).  4/12/18  Yes Irvin Riojas MD   acetaminophen (TYLENOL) 650 mg CR tablet Take 500 mg by mouth every six (6) hours as needed for Pain. Yes Provider, Historical   aspirin 81 mg tablet Take 81 mg by mouth daily. Stopped for surgery 8-4-10  8/5/10  Yes Provider, Historical        Review of Systems              Constitutional:  She denies fever, weight loss, sweats or fatigue. EYES: No blurred or double vision,               ENT: no nasal congestion, no headache or dizziness. No difficulty with               swallowing, taste, speech or smell. Respiratory:  No cough, wheezing or shortness of breath. No sputum production. Cardiac:  Denies chest pain, palpitations, unexplained indigestion, syncope, edema, PND or orthopnea. GI:  No changes in bowel movements, no bloating or heartburn. Brian Shillings Positive abdominal pain with nausea vomiting  :  No frequency or dysuria. Denies incontinence or sexual dysfunction. Extremities:  No joint pain, stiffness or swelling except bilateral shoulder pain  Back:.no pain or soreness  Skin:  No recent rashes or mole changes. Neurological:  No numbness, tingling, burning paresthesias or loss of motor strength. No syncope, dizziness, frequent headaches or memory loss. Hematologic:  No easy bruising  Lymphatic: No lymph node enlargement    Objective:     Vitals:    06/04/19 1141   BP: 124/48   Pulse: 60   Resp: 19   Temp: 99.1 °F (37.3 °C)   TempSrc: Oral   SpO2: 91%   Height: 5' 2\" (1.575 m)   PainSc:   4   PainLoc: Neck       Body mass index is 22.13 kg/m². Physical Examination:              General Appearance:  Well-developed, well-nourished, no acute distress. HEENT:      Ears:  The TMs and ear canals were clear. Eyes:  The pupillary responses were normal.  Extraocular muscle function intact. Lids and conjunctiva not injected. Funduscopic exam revealed sharp disc margins. Nares: Clear w/o edema or erythema  Pharynx:  Clear with teeth in good repair. No masses were noted.       Neck:  Supple without thyromegaly or adenopathy. No JVD noted. No carotid                bruits. Lungs:  Clear to auscultation and percussion. Cardiac:  Regular rate and rhythm without murmur. PMI not displaced. No gallop, rub or click. Abdominal: Soft, non-tender, no hepata-spleenomegally or masses  Extremities:  No clubbing, cyanosis or edema. .  Mild discomfort with range of motion of shoulders bilateral  Skin:  No rash or unusual mole changes noted. Lymph Nodes:  None felt in the cervical, supraclavicular, axillary or inguinal region. Neurological: . DTRs 2+ and symmetric. Station and gait normal.   Hematologic:   No purpura or petechiae        Assessment/Plan:         1. Nausea    2. Fever, unspecified fever cause    3. Cough        Impressions/Plan:  Impression  1. Nausea vomiting with fever and white count of 19,200 she is admitted to [facility] [address] see admit note for details. Orders Placed This Encounter    XR ABD (AP AND ERECT OR DECUB)    XR CHEST PA LAT    CBC WITH AUTOMATED DIFF ([facility])    URINALYSIS W/O MICRO ([facility])       Follow-up and Dispositions    · Return TBD. No results found for any visits on 06/04/19. Silvia Person MD    The patient was given after the visit summary the patient verbalized an understanding of the plans and problems as explained.

## 2019-06-03 NOTE — TELEPHONE ENCOUNTER
Spoke to Joceline Orozco at [facility]. Patient reported that she vomited once yesterday but the staff did not note any problem. Also experienced some nausea and again this morning when they got her up. Discussed with Dr. Hitesh Murray and will try Zofran ODT 4 mg one every 6 hours prn but also that if any other symptoms occur she would need to be seen. Rony Naidu to contact the patient's family regarding.

## 2019-06-04 ENCOUNTER — OFFICE VISIT (OUTPATIENT)
Dept: INTERNAL MEDICINE CLINIC | Age: 84
End: 2019-06-04

## 2019-06-04 ENCOUNTER — HOSPITAL ENCOUNTER (INPATIENT)
Age: 84
LOS: 6 days | Discharge: NURSING, REHAB OR LONG-TERM CARE | DRG: 690 | End: 2019-06-10
Attending: INTERNAL MEDICINE | Admitting: INTERNAL MEDICINE
Payer: MEDICARE

## 2019-06-04 VITALS
HEART RATE: 60 BPM | DIASTOLIC BLOOD PRESSURE: 48 MMHG | TEMPERATURE: 99.1 F | RESPIRATION RATE: 19 BRPM | SYSTOLIC BLOOD PRESSURE: 124 MMHG | HEIGHT: 62 IN | BODY MASS INDEX: 22.13 KG/M2 | OXYGEN SATURATION: 91 %

## 2019-06-04 DIAGNOSIS — N39.0 COMPLICATED UTI (URINARY TRACT INFECTION): ICD-10-CM

## 2019-06-04 DIAGNOSIS — R05.9 COUGH: ICD-10-CM

## 2019-06-04 DIAGNOSIS — N18.30 STAGE 3 CHRONIC KIDNEY DISEASE (HCC): ICD-10-CM

## 2019-06-04 DIAGNOSIS — R11.2 NON-INTRACTABLE VOMITING WITH NAUSEA, UNSPECIFIED VOMITING TYPE: ICD-10-CM

## 2019-06-04 DIAGNOSIS — I10 ESSENTIAL HYPERTENSION: ICD-10-CM

## 2019-06-04 DIAGNOSIS — E78.2 MIXED HYPERLIPIDEMIA: ICD-10-CM

## 2019-06-04 DIAGNOSIS — R11.0 NAUSEA: Primary | ICD-10-CM

## 2019-06-04 DIAGNOSIS — J44.9 CHRONIC OBSTRUCTIVE PULMONARY DISEASE, UNSPECIFIED COPD TYPE (HCC): Chronic | ICD-10-CM

## 2019-06-04 DIAGNOSIS — R50.9 FEVER, UNSPECIFIED FEVER CAUSE: ICD-10-CM

## 2019-06-04 DIAGNOSIS — E44.0 MODERATE PROTEIN-CALORIE MALNUTRITION (HCC): ICD-10-CM

## 2019-06-04 DIAGNOSIS — I48.0 PAF (PAROXYSMAL ATRIAL FIBRILLATION) (HCC): ICD-10-CM

## 2019-06-04 LAB
ALBUMIN SERPL-MCNC: 3 G/DL (ref 3.5–5)
ALBUMIN/GLOB SERPL: 0.8 {RATIO} (ref 1.1–2.2)
ALP SERPL-CCNC: 59 U/L (ref 45–117)
ALT SERPL-CCNC: 16 U/L (ref 12–78)
ANION GAP SERPL CALC-SCNC: 5 MMOL/L (ref 5–15)
AST SERPL-CCNC: 22 U/L (ref 15–37)
BASOPHILS # BLD: 0 K/UL (ref 0–0.1)
BASOPHILS NFR BLD: 0 % (ref 0–1)
BILIRUB SERPL-MCNC: 0.5 MG/DL (ref 0.2–1)
BUN SERPL-MCNC: 45 MG/DL (ref 6–20)
BUN/CREAT SERPL: 23 (ref 12–20)
CALCIUM SERPL-MCNC: 9.7 MG/DL (ref 8.5–10.1)
CHLORIDE SERPL-SCNC: 104 MMOL/L (ref 97–108)
CO2 SERPL-SCNC: 28 MMOL/L (ref 21–32)
CREAT SERPL-MCNC: 1.99 MG/DL (ref 0.55–1.02)
DIFFERENTIAL METHOD BLD: ABNORMAL
EOSINOPHIL # BLD: 0.1 K/UL (ref 0–0.4)
EOSINOPHIL NFR BLD: 1 % (ref 0–7)
ERYTHROCYTE [DISTWIDTH] IN BLOOD BY AUTOMATED COUNT: 14.5 %
ERYTHROCYTE [DISTWIDTH] IN BLOOD BY AUTOMATED COUNT: 14.8 % (ref 11.5–14.5)
GLOBULIN SER CALC-MCNC: 3.7 G/DL (ref 2–4)
GLUCOSE SERPL-MCNC: 152 MG/DL (ref 65–100)
HCT VFR BLD AUTO: 35.6 % (ref 37–51)
HCT VFR BLD AUTO: 36 % (ref 35–47)
HGB BLD-MCNC: 11.1 G/DL (ref 11.5–16)
HGB BLD-MCNC: 11.4 G/DL (ref 12–18)
IMM GRANULOCYTES # BLD AUTO: 0.2 K/UL (ref 0–0.04)
IMM GRANULOCYTES NFR BLD AUTO: 1 % (ref 0–0.5)
LYMPHOCYTES # BLD: 0.9 K/UL (ref 0.8–3.5)
LYMPHOCYTES ABSOLUTE: 1.2 K/UL (ref 0.6–4.1)
LYMPHOCYTES NFR BLD: 4 % (ref 12–49)
LYMPHOCYTES NFR BLD: 6.1 % (ref 10–58.5)
MCH RBC QN AUTO: 29.8 PG (ref 26–34)
MCH RBC QN AUTO: 30.5 PG (ref 26–32)
MCHC RBC AUTO-ENTMCNC: 30.8 G/DL (ref 30–36.5)
MCHC RBC AUTO-ENTMCNC: 32 G/DL (ref 30–36)
MCV RBC AUTO: 95.1 FL (ref 80–97)
MCV RBC AUTO: 96.5 FL (ref 80–99)
MONOCYTES # BLD: 1.4 K/UL (ref 0–1)
MONOCYTES ABS-DIF,2141: 1.2 K/UL (ref 0–1.8)
MONOCYTES NFR BLD: 6 % (ref 0.1–24)
MONOCYTES NFR BLD: 7 % (ref 5–13)
NEUTROPHILS # BLD: 87.9 % (ref 37–92)
NEUTROPHILS ABS,2156: 16.9 K/UL (ref 2–7.8)
NEUTS SEG # BLD: 17.2 K/UL (ref 1.8–8)
NEUTS SEG NFR BLD: 87 % (ref 32–75)
NRBC # BLD: 0 K/UL (ref 0–0.01)
NRBC BLD-RTO: 0 PER 100 WBC
PLATELET # BLD AUTO: 239 K/UL (ref 150–400)
PLATELET # BLD AUTO: 295 K/UL (ref 140–440)
PMV BLD AUTO: 10.6 FL (ref 8.9–12.9)
PMV BLD AUTO: 8.4 FL
POTASSIUM SERPL-SCNC: 3.7 MMOL/L (ref 3.5–5.1)
PROT SERPL-MCNC: 6.7 G/DL (ref 6.4–8.2)
RBC # BLD AUTO: 3.73 M/UL (ref 3.8–5.2)
RBC # BLD AUTO: 3.74 M/UL (ref 4.2–6.3)
SODIUM SERPL-SCNC: 137 MMOL/L (ref 136–145)
WBC # BLD AUTO: 19.2 K/UL (ref 4.1–10.9)
WBC # BLD AUTO: 19.8 K/UL (ref 3.6–11)

## 2019-06-04 PROCEDURE — 85025 COMPLETE CBC W/AUTO DIFF WBC: CPT

## 2019-06-04 PROCEDURE — 80053 COMPREHEN METABOLIC PANEL: CPT

## 2019-06-04 PROCEDURE — 74011250637 HC RX REV CODE- 250/637: Performed by: INTERNAL MEDICINE

## 2019-06-04 PROCEDURE — 74011250636 HC RX REV CODE- 250/636: Performed by: INTERNAL MEDICINE

## 2019-06-04 PROCEDURE — 65270000015 HC RM PRIVATE ONCOLOGY

## 2019-06-04 PROCEDURE — 36415 COLL VENOUS BLD VENIPUNCTURE: CPT

## 2019-06-04 PROCEDURE — 87040 BLOOD CULTURE FOR BACTERIA: CPT

## 2019-06-04 PROCEDURE — 94760 N-INVAS EAR/PLS OXIMETRY 1: CPT

## 2019-06-04 PROCEDURE — 74011000258 HC RX REV CODE- 258: Performed by: INTERNAL MEDICINE

## 2019-06-04 PROCEDURE — 76937 US GUIDE VASCULAR ACCESS: CPT

## 2019-06-04 RX ORDER — SODIUM CHLORIDE 0.9 % (FLUSH) 0.9 %
5-40 SYRINGE (ML) INJECTION AS NEEDED
Status: DISCONTINUED | OUTPATIENT
Start: 2019-06-04 | End: 2019-06-10 | Stop reason: HOSPADM

## 2019-06-04 RX ORDER — HYDRALAZINE HYDROCHLORIDE 50 MG/1
200 TABLET, FILM COATED ORAL 2 TIMES DAILY
Status: DISCONTINUED | OUTPATIENT
Start: 2019-06-04 | End: 2019-06-10 | Stop reason: HOSPADM

## 2019-06-04 RX ORDER — SODIUM CHLORIDE 0.9 % (FLUSH) 0.9 %
5-40 SYRINGE (ML) INJECTION EVERY 8 HOURS
Status: DISCONTINUED | OUTPATIENT
Start: 2019-06-04 | End: 2019-06-10 | Stop reason: HOSPADM

## 2019-06-04 RX ORDER — LISINOPRIL 40 MG/1
40 TABLET ORAL DAILY
Status: DISCONTINUED | OUTPATIENT
Start: 2019-06-05 | End: 2019-06-10 | Stop reason: HOSPADM

## 2019-06-04 RX ORDER — TRAMADOL HYDROCHLORIDE 50 MG/1
50 TABLET ORAL
Status: DISCONTINUED | OUTPATIENT
Start: 2019-06-04 | End: 2019-06-10 | Stop reason: HOSPADM

## 2019-06-04 RX ORDER — POLYETHYLENE GLYCOL 3350 17 G/17G
17 POWDER, FOR SOLUTION ORAL
Status: DISCONTINUED | OUTPATIENT
Start: 2019-06-04 | End: 2019-06-10 | Stop reason: HOSPADM

## 2019-06-04 RX ORDER — ACETAMINOPHEN 325 MG/1
650 TABLET ORAL
Status: DISCONTINUED | OUTPATIENT
Start: 2019-06-04 | End: 2019-06-10 | Stop reason: HOSPADM

## 2019-06-04 RX ORDER — ASPIRIN 81 MG/1
81 TABLET ORAL DAILY
Status: DISCONTINUED | OUTPATIENT
Start: 2019-06-05 | End: 2019-06-10 | Stop reason: HOSPADM

## 2019-06-04 RX ORDER — LEVOFLOXACIN 5 MG/ML
500 INJECTION, SOLUTION INTRAVENOUS EVERY 24 HOURS
Status: DISCONTINUED | OUTPATIENT
Start: 2019-06-04 | End: 2019-06-04

## 2019-06-04 RX ORDER — ENOXAPARIN SODIUM 100 MG/ML
30 INJECTION SUBCUTANEOUS EVERY 24 HOURS
Status: DISCONTINUED | OUTPATIENT
Start: 2019-06-04 | End: 2019-06-04

## 2019-06-04 RX ORDER — PRAVASTATIN SODIUM 40 MG/1
40 TABLET ORAL
Status: DISCONTINUED | OUTPATIENT
Start: 2019-06-04 | End: 2019-06-10 | Stop reason: HOSPADM

## 2019-06-04 RX ORDER — SODIUM CHLORIDE 450 MG/100ML
100 INJECTION, SOLUTION INTRAVENOUS CONTINUOUS
Status: DISCONTINUED | OUTPATIENT
Start: 2019-06-04 | End: 2019-06-10 | Stop reason: HOSPADM

## 2019-06-04 RX ORDER — PAROXETINE HYDROCHLORIDE 20 MG/1
20 TABLET, FILM COATED ORAL DAILY
Status: DISCONTINUED | OUTPATIENT
Start: 2019-06-05 | End: 2019-06-10 | Stop reason: HOSPADM

## 2019-06-04 RX ORDER — DOCUSATE SODIUM 100 MG/1
100 CAPSULE, LIQUID FILLED ORAL 2 TIMES DAILY
Status: DISCONTINUED | OUTPATIENT
Start: 2019-06-04 | End: 2019-06-10 | Stop reason: HOSPADM

## 2019-06-04 RX ORDER — ONDANSETRON 4 MG/1
4 TABLET, ORALLY DISINTEGRATING ORAL
Status: DISCONTINUED | OUTPATIENT
Start: 2019-06-04 | End: 2019-06-10 | Stop reason: HOSPADM

## 2019-06-04 RX ORDER — TEMAZEPAM 15 MG/1
15 CAPSULE ORAL
Status: DISCONTINUED | OUTPATIENT
Start: 2019-06-04 | End: 2019-06-10 | Stop reason: HOSPADM

## 2019-06-04 RX ORDER — LEVOFLOXACIN 5 MG/ML
500 INJECTION, SOLUTION INTRAVENOUS
Status: DISCONTINUED | OUTPATIENT
Start: 2019-06-06 | End: 2019-06-07

## 2019-06-04 RX ORDER — ERGOCALCIFEROL 1.25 MG/1
50000 CAPSULE ORAL
Status: DISCONTINUED | OUTPATIENT
Start: 2019-06-04 | End: 2019-06-10 | Stop reason: HOSPADM

## 2019-06-04 RX ORDER — HEPARIN SODIUM 5000 [USP'U]/ML
5000 INJECTION, SOLUTION INTRAVENOUS; SUBCUTANEOUS EVERY 12 HOURS
Status: DISCONTINUED | OUTPATIENT
Start: 2019-06-05 | End: 2019-06-10 | Stop reason: HOSPADM

## 2019-06-04 RX ORDER — METOPROLOL TARTRATE 50 MG/1
50 TABLET ORAL 2 TIMES DAILY
Status: DISCONTINUED | OUTPATIENT
Start: 2019-06-04 | End: 2019-06-10 | Stop reason: HOSPADM

## 2019-06-04 RX ORDER — PREDNISONE 10 MG/1
10 TABLET ORAL
Status: DISCONTINUED | OUTPATIENT
Start: 2019-06-05 | End: 2019-06-10 | Stop reason: HOSPADM

## 2019-06-04 RX ORDER — LOPERAMIDE HYDROCHLORIDE 2 MG/1
4 CAPSULE ORAL
Status: DISCONTINUED | OUTPATIENT
Start: 2019-06-04 | End: 2019-06-10 | Stop reason: HOSPADM

## 2019-06-04 RX ADMIN — GABAPENTIN 400 MG: 100 CAPSULE ORAL at 23:13

## 2019-06-04 RX ADMIN — ENOXAPARIN SODIUM 30 MG: 30 INJECTION SUBCUTANEOUS at 16:23

## 2019-06-04 RX ADMIN — DOCUSATE SODIUM 100 MG: 100 CAPSULE, LIQUID FILLED ORAL at 18:29

## 2019-06-04 RX ADMIN — METOPROLOL TARTRATE 50 MG: 50 TABLET ORAL at 18:29

## 2019-06-04 RX ADMIN — LEVOFLOXACIN 500 MG: 5 INJECTION, SOLUTION INTRAVENOUS at 18:31

## 2019-06-04 RX ADMIN — CEFTRIAXONE 1 G: 1 INJECTION, POWDER, FOR SOLUTION INTRAMUSCULAR; INTRAVENOUS at 17:47

## 2019-06-04 RX ADMIN — Medication 10 ML: at 23:14

## 2019-06-04 RX ADMIN — SODIUM CHLORIDE 75 ML/HR: 450 INJECTION, SOLUTION INTRAVENOUS at 17:42

## 2019-06-04 RX ADMIN — GABAPENTIN 400 MG: 100 CAPSULE ORAL at 16:22

## 2019-06-04 RX ADMIN — Medication 10 ML: at 17:44

## 2019-06-04 RX ADMIN — PRAVASTATIN SODIUM 40 MG: 40 TABLET ORAL at 23:13

## 2019-06-04 RX ADMIN — Medication 10 ML: at 17:38

## 2019-06-04 RX ADMIN — ERGOCALCIFEROL 50000 UNITS: 1.25 CAPSULE ORAL at 16:22

## 2019-06-04 RX ADMIN — TEMAZEPAM 15 MG: 15 CAPSULE ORAL at 23:13

## 2019-06-04 RX ADMIN — HYDRALAZINE HYDROCHLORIDE 200 MG: 50 TABLET, FILM COATED ORAL at 18:29

## 2019-06-04 NOTE — H&P
ADMISSION NOTE    NAME:  Goldy Garcia   :   1935   MRN:   [id number]     Date/Time:  2019 12:58 PM  Subjective:   CHIEF COMPLAINT: Nausea vomiting    HISTORY OF PRESENT ILLNESS:     Jacob Rodrigues is a 80 y.o.  white female who presents with for evaluation of nausea over the past 4 days and she had vomiting for 3 days in a row up until Zofran was started yesterday. She has had problems in the past with recurrent complicated urinary tract infections and has a permanent right ureteral stent in place. She has noted no dysuria or abdominal pain. She notes no back pain or flank pain. She does note bilateral shoulder pain and neck pain. She has not noted any fevers or chills although her temperature is slightly elevated here at 99.1. She denies any coughing, chest pain, palpitations, PND, shortness of breath or other cardiovascular complaints other than her chronic dyspnea. Her O2 sat here is 91% on room air. She denies any headaches, dizziness or neurologic complaints except weakness but that has not changed any significantly. She has no other complaints noted. She is found here in office to have a white count greater than 19,000 and is felt prudent admitted to the hospital for further evaluation treatment of probably what is a recurrent complicated urinary tract infection.         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 16    Other ill-defined conditions(799.89)     lipids    Other ill-defined conditions(799.89)     blood transfusion history        Past Surgical History:   Procedure Laterality Date    BYPASS GRAFT OTHR,FEM-FEM      BYPASS GRAFT OTHR,FEM-POP      right    HX HEENT      bilateral cataracts    HX ORTHOPAEDIC      right hip fracture/pinning    HX UROLOGICAL      right stent/kidney       Social History     Tobacco Use    Smoking status: Former Smoker     Packs/day: 0.25    Smokeless tobacco: Never Used    Tobacco comment: stopped 2009   Substance Use Topics    Alcohol use: No        No family history on file. Allergies   Allergen Reactions    Dilaudid [Hydromorphone] Unknown (comments)     Does not remember    Hydrocodone Nausea and Vomiting    Morphine Rash        Prior to Admission medications    Medication Sig Start Date End Date Taking? Authorizing Provider   loperamide (IMODIUM) 2 mg capsule Take 2 Caps by mouth every eight (8) hours as needed for Diarrhea. 3/13/19   Jessica Barros MD   temazepam (RESTORIL) 15 mg capsule Take 1 Cap by mouth nightly. Max Daily Amount: 15 mg. 2/13/19   Jessica Barros MD   hydrALAZINE (APRESOLINE) 100 mg tablet Take 2 Tabs by mouth two (2) times a day. 1/4/19   Jessica Barros MD   pravastatin (PRAVACHOL) 40 mg tablet Take 1 Tab by mouth nightly. 12/11/18   Jessica Barros MD   chlorthalidone (HYGROTEN) 25 mg tablet Take 1 Tab by mouth daily. 12/7/18   Jessica Barros MD   traMADol Swartz Haring) 50 mg tablet Take 1 Tab by mouth every six (6) hours as needed for Pain. Max Daily Amount: 200 mg. 12/7/18   Jessica Barros MD   lisinopril (PRINIVIL, ZESTRIL) 40 mg tablet Take 1 Tab by mouth daily. 11/6/18   Jessica Barros MD   dilTIAZem CD (CARDIZEM CD) 300 mg ER capsule Take 1 Cap by mouth daily. 10/30/18   Jessica Barros MD   docusate sodium (COLACE) 100 mg capsule Take 100 mg by mouth two (2) times a day. Baldev Banda MD   gabapentin (NEURONTIN) 400 mg capsule Take 400 mg by mouth four (4) times daily. Baldev Banda MD   PARoxetine (PAXIL) 20 mg tablet Take 20 mg by mouth daily. Baldev Banda MD   cholecalciferol (VITAMIN D3) 50,000 unit capsule Take 50,000 Units by mouth every seven (7) days. Baldev Banda MD   polyethylene glycol (MIRALAX) 17 gram packet Take 17 g by mouth daily as needed (constipation).     Other, Phys, MD   L. acidoph & paracasei- S therm- Bifido (HANG-Q/RISAQUAD) 8 billion cell cap cap Take 1 Cap by mouth daily. 4/12/18   Mary Champion MD   metoprolol tartrate (LOPRESSOR) 50 mg tablet Take 1 Tab by mouth two (2) times a day. 4/12/18   Mary Champion MD   predniSONE (DELTASONE) 10 mg tablet Take 1 Tab by mouth daily (with breakfast). 4/12/18   Mary Champion MD   acetaminophen (TYLENOL) 650 mg CR tablet Take 500 mg by mouth every six (6) hours as needed for Pain. Provider, Historical   aspirin 81 mg tablet Take 81 mg by mouth daily. Stopped for surgery 8-4-10  8/5/10   Provider, Historical       REVIEW OF SYSTEMS:    Constitutional:  negative for fevers, chills, anorexia and weight loss  Eyes:   negative for visual disturbance and irritation  ENT:   negative for hearing loss, tinnitus, nasal congestion, epistaxis, sore throat  Neck:              negative for stiffness or swollen glands  Respiratory:  negative for cough, hemoptysis, pleurisy/chest pain, wheezing or dyspnea on exertion  Cards:   negative for chest pain, palpitations, orthopnea, PND, lower extremity edema  GI:   Positive for nausea vomiting. Negative for dysphagia, diarrhea, constipation and abdominal pain  Genitourinary: negative for frequency, dysuria and hematuria  Integument:  negative for rash and pruritus  Hematologic:  negative for easy bruising and bleeding  Lymphatic:      negative for swollen lymph nodes or night sweats  Musculoskel: negative for myalgias, arthralgias, back pain and muscle weakness  Neurological:  negative for headaches, dizziness, vertigo, memory problems and gait problems  Behavl/Psych:  negative for anxiety, depression and illegal drug usage      Objective:   VITALS:    Visit Vitals  /42 (BP 1 Location: Right arm, BP Patient Position: At rest)   Pulse (!) 55   Temp 99.6 °F (37.6 °C)   Resp 20   SpO2 (!) 89%       PHYSICAL EXAM:   General:    Alert, cooperative, no distress, appears stated age. Head:   Normocephalic, without obvious abnormality, atraumatic.   Eyes: Conjunctivae/corneas clear. PERRL  Nose:  Nares normal. No drainage or sinus tenderness. Throat:    Lips, mucosa, and tongue normal.  No Thrush  Neck:  Supple, symmetrical,  no adenopathy, thyroid: non tender    no carotid bruit and no JVD. Back:    Symmetric,  No CVA tenderness. Lungs:   Clear to auscultation bilaterally with overall decreased breath sounds. No Wheezing or Rhonchi. No rales. Chest wall:  No tenderness or deformity. No Accessory muscle use. Heart:   Regular rate and rhythm,  no murmur, rub or gallop. Abdomen:   Soft, non-tender. Not distended. Bowel sounds normal. No masses  Extremities: Extremities normal, atraumatic, No cyanosis. No edema. No clubbing  Skin:     Texture, turgor normal. No rashes or lesions. Not Jaundiced  Lymph nodes: Cervical, supraclavicular normal.  Psych:  Good insight. Not depressed. Not anxious or agitated. Neurologic: EOMs intact. No facial asymmetry. No aphasia or slurred speech. Normal   strength, Alert and oriented X 3. Musculoskeletal: Bilateral shoulder discomfort range of motion right greater than left without erythema increased temperature.       LAB DATA REVIEWED:    Recent Results (from the past 24 hour(s))   CBC WITH AUTOMATED DIFF    Collection Time: 06/04/19 12:09 PM   Result Value Ref Range    WBC 19.2 (HH) 4.1 - 10.9 K/uL    RBC 3.74 (L) 4.20 - 6.30 M/uL    HGB 11.4 (L) 12.0 - 18.0 g/dL    HCT 35.6 (L) 37.0 - 51.0 %    MCH 30.5 26.0 - 32.0 pg    MCHC 32.0 30.0 - 36.0 g/dL    MCV 95.1 80.0 - 97.0 fL    RDW 14.5 %    PLATELET 060.9 354.9 - 440.0 K/uL    MEAN PLATELET VOLUME 8.4 fL    Neutrophils abs 16.9 (H) 2.0 - 7.8 K/uL    Neutrophils 87.9 37.0 - 92.0 %    Monocytes abs-DIF 1.2 0.0 - 1.8 K/uL    MONOCYTES 6.0 0.1 - 24.0 %    Lymphocytes Absolute 1.2 0.6 - 4.1 K/uL    LYMPHOCYTES 6.1 (L) 10.0 - 58.5 %       Assessment/Plan:      Active Problems:    COPD (chronic obstructive pulmonary disease) ([facility]) (8/14/2010)      PAF (paroxysmal atrial fibrillation) ([facility] (3/30/2018)      Stage 3 chronic kidney disease ([facility] (76/4/6406)      Complicated UTI (urinary tract infection) (11/12/2018)      Nausea & vomiting (6/3/2019)      Fever (6/4/2019)       ___________________________________________________  PLAN:    Risk of deterioration:  []Low    [x]Moderate  []High    1.  Obtain urine and blood cultures  2. Start Levaquin and Rocephin pending cultures  3. Check electrolytes which are currently pending  4. IV hydration  5. Supplemental oxygen as needed as noted O2 sat now 91%  6. Check nebulizer treatments as needed for COPD  7. Continue current antihypertensive treatment and follow blood pressure  8.   Follow renal function as previous has had acute renal failure and does have CKD stage III    Prophylaxis:  [x]Lovenox  []Coumadin  []Hep SQ  []SCDs  []H2B/PPI    Disposition:  []Home w/ Family   []HH PT,OT,RN   [x]SNF/LTC   []SAH/Rehab    Discussed Code Status:    [x]Full Code      []DNR     ___________________________________________________    Care Plan discussed with:    [x]Patient   [x]Family     []Specialist :    ___________________________________________________  Admitting Physician: Princess Angel MD

## 2019-06-04 NOTE — PROGRESS NOTES
Chief Complaint   Patient presents with    Nausea     x 4 days    Shoulder Pain     left shoulder pain x 1 day     Visit Vitals  /48 (BP 1 Location: Left arm, BP Patient Position: Sitting)   Pulse 60   Temp 99.1 °F (37.3 °C) (Oral)   Resp 19   Ht 5' 2\" (1.575 m)   SpO2 91%   BMI 22.13 kg/m²     1. Have you been to the ER, urgent care clinic since your last visit? Hospitalized since your last visit? No    2. Have you seen or consulted any other health care providers outside of the [address] [place] [address] since your last visit? Include any pap smears or colon screening.  No

## 2019-06-04 NOTE — PROGRESS NOTES
Pharmacy - Enoxaparin (Lovenox®) Monitoring      Indication: DVT prophylaxis     Current Dose: Enoxaparin 30 mg subcutaneously every 24 hours    Creatinine Clearance (mL/min): 16.9 ml/min    Labs:  Recent Labs     06/04/19  1429 06/04/19  1209   CREA 1.99*  --    HGB 11.1* 11.4*    295.0     Wt Readings from Last 1 Encounters:   06/04/19 52.7 kg (116 lb 3.2 oz)     Ht Readings from Last 1 Encounters:   06/04/19 157.5 cm (62\")       Impression/Plan:   Change to heparin 5000 units subcutaneously every 12 hours per protocol for patients with CrCl <20 ml/min and low weight patients <60 kg     Thanks,  KEY Arndt

## 2019-06-04 NOTE — PROGRESS NOTES
Reason for Admission:   Complicated UTI               RRAT Score:    30             Resources/supports as identified by patient/family:                   Top Challenges facing patient (as identified by patient/family and CM): Finances/Medication cost?     No concerns voiced regarding costs of medication during initial assessment. Transportation? Pt doesn't drive. Family to transport at time of d/c. Support system or lack thereof? Support system consists of family and friends. Living arrangements? Resident at [facility] facility. Self-care/ADLs/Cognition? Pt requires some assistance w/ADL's from staff at [facility]. Current Advanced Directive/Advance Care Plan:  DNR on file dated  4/9/2019. Pt voiced she's uncertain if she's done a POA. Further stated her children (son/daughter) are her decision makers. Plan for utilizing home health:    [facility] in the past.                        Likelihood of readmission: High                 Transition of Care Plan:   Pt to discharge back to [facility]. Family to provide transportation. SW to notify NATASHA Cochran of current hospital admission. Pt to f/u w/PCP for post hospital discharge visit. SW to continue to assist.    Care Management Interventions  PCP Verified by CM: Yes(6/4/2019)  Mode of Transport at Discharge:  Other (see comment)(Family)  Transition of Care Consult (CM Consult): Assisted Living(Is a resident at [facility].)  Current Support Network: Assisted Living  Confirm Follow Up Transport: Family  Plan discussed with Pt/Family/Caregiver: Yes  Discharge Location  Discharge Placement: [address] [place]  [phone]

## 2019-06-04 NOTE — PROGRESS NOTES
Pharmacy Automatic Renal Dosing Protocol - Antimicrobials    Indication for Antimicrobials: UTI     Current Regimen of Each Antimicrobial:  Ceftriaxone 1 gm iv q24h (Start Date ; Day # 1)  Levofloxacin 500 mg iv q24h (Start Date ; Day #1)    Significant Cultures:   None    Labs:  Recent Labs     19  1429 19  1209   CREA 1.99*  --    BUN 45*  --    WBC 19.8* 19.2*     Temp (24hrs), Av.4 °F (37.4 °C), Min:99.1 °F (37.3 °C), Max:99.6 °F (37.6 °C)    Creatinine Clearance (mL/min) or Dialysis: 16.9 ml/min    Impression/Plan:   Change levofloxacin dose to 500 mg IV q48hr per renal dosing protocol  Antimicrobial stop date TBD     Pharmacy will follow daily and adjust medications as appropriate for renal function and/or serum levels.     Thank you,  KEY Cerna

## 2019-06-04 NOTE — PATIENT INSTRUCTIONS
Learning About Fever  What is a fever? A fever is a high body temperature. It's one way your body fights being sick. A fever shows that the body is responding to infection or other illnesses, both minor and severe. A fever is a symptom, not an illness by itself. A fever can be a sign that you are ill, but most fevers are not caused by a serious problem. You may have a fever with a minor illness, such as a cold. But sometimes a very serious infection may cause little or no fever. It is important to look at other symptoms, other conditions you have, and how you feel in general. In children, notice how they act and see what symptoms they complain of. What is a normal body temperature? A normal body temperature is about 98. 6ºF. Some people have a normal temperature that is a little higher or a little lower than this. Your temperature may be a little lower in the morning than it is later in the day. It may go up during hot weather or when you exercise, wear heavy clothes, or take a hot bath. Your temperature may also be different depending on how you take it. A temperature taken in the mouth (oral) or under the arm may be a little lower than your core temperature (rectal). What is a fever temperature? A core temperature of 100.4°F or above is considered a fever. What can cause a fever? A fever may be caused by:  · Infections. This is the most common cause of a fever. Examples of infections that can cause a fever include the flu, a kidney infection, or pneumonia. · Some medicines. · Severe trauma or injury, such as a heart attack, stroke, heatstroke, or burns. · Other medical conditions, such as arthritis and some cancers. How can you treat a fever at home? · Ask your doctor if you can take an over-the-counter pain medicine, such as acetaminophen (Tylenol), ibuprofen (Advil, Motrin), or naproxen (Aleve). Be safe with medicines. Read and follow all instructions on the label.   · To prevent dehydration, drink plenty of fluids. Choose water and other caffeine-free clear liquids until you feel better. If you have kidney, heart, or liver disease and have to limit fluids, talk with your doctor before you increase the amount of fluids you drink. Follow-up care is a key part of your treatment and safety. Be sure to make and go to all appointments, and call your doctor if you are having problems. It's also a good idea to know your test results and keep a list of the medicines you take. Where can you learn more? Go to http://romeo-jessie.info/. Enter G462 in the search box to learn more about \"Learning About Fever. \"  Current as of: September 23, 2018  Content Version: 11.9  © 8456-6985 [employer]. Care instructions adapted under license by [employer] (which disclaims liability or warranty for this information). If you have questions about a medical condition or this instruction, always ask your healthcare professional. [address] any warranty or liability for your use of this information.

## 2019-06-04 NOTE — PROGRESS NOTES
Oncology End of Shift Note      Bedside shift change report given to Brenda Rosas RN (incoming nurse) by Teja Russell (outgoing nurse) on [facility]. Report included the following information SBAR, Kardex, Intake/Output, MAR, Accordion and Recent Results. Shift Summary: patient direct admit to 1135. PIV inserted which then infiltrated. Awaited PICC team to insert new IV. Labs and blood cultures obtained and sent to lab. IV fluids started. IV antibiotics hung. Patient denies pain. Requesting staff let her sleep. Placed on 2L NC to stay above 90%      Issues for Physician to Address:  PT/OT consult. Patient on Cardiac Monitoring?     [] Yes  [x] No    Rhythm:          Shift Events        Teja Russell

## 2019-06-05 ENCOUNTER — APPOINTMENT (OUTPATIENT)
Dept: ULTRASOUND IMAGING | Age: 84
DRG: 690 | End: 2019-06-05
Attending: INTERNAL MEDICINE
Payer: MEDICARE

## 2019-06-05 LAB
ANION GAP SERPL CALC-SCNC: 7 MMOL/L (ref 5–15)
APPEARANCE UR: ABNORMAL
BACTERIA URNS QL MICRO: ABNORMAL /HPF
BILIRUB UR QL: NEGATIVE
BUN SERPL-MCNC: 48 MG/DL (ref 6–20)
BUN/CREAT SERPL: 25 (ref 12–20)
CALCIUM SERPL-MCNC: 8.7 MG/DL (ref 8.5–10.1)
CHLORIDE SERPL-SCNC: 104 MMOL/L (ref 97–108)
CO2 SERPL-SCNC: 23 MMOL/L (ref 21–32)
COLOR UR: ABNORMAL
CREAT SERPL-MCNC: 1.92 MG/DL (ref 0.55–1.02)
EPITH CASTS URNS QL MICRO: ABNORMAL /LPF
ERYTHROCYTE [DISTWIDTH] IN BLOOD BY AUTOMATED COUNT: 14.6 % (ref 11.5–14.5)
GLUCOSE SERPL-MCNC: 96 MG/DL (ref 65–100)
GLUCOSE UR STRIP.AUTO-MCNC: NEGATIVE MG/DL
HCT VFR BLD AUTO: 32.1 % (ref 35–47)
HGB BLD-MCNC: 9.9 G/DL (ref 11.5–16)
HGB UR QL STRIP: NEGATIVE
KETONES UR QL STRIP.AUTO: NEGATIVE MG/DL
LEUKOCYTE ESTERASE UR QL STRIP.AUTO: ABNORMAL
MCH RBC QN AUTO: 29.8 PG (ref 26–34)
MCHC RBC AUTO-ENTMCNC: 30.8 G/DL (ref 30–36.5)
MCV RBC AUTO: 96.7 FL (ref 80–99)
NITRITE UR QL STRIP.AUTO: NEGATIVE
NRBC # BLD: 0 K/UL (ref 0–0.01)
NRBC BLD-RTO: 0 PER 100 WBC
PH UR STRIP: 5 [PH] (ref 5–8)
PLATELET # BLD AUTO: 192 K/UL (ref 150–400)
PMV BLD AUTO: 10.5 FL (ref 8.9–12.9)
POTASSIUM SERPL-SCNC: 3.7 MMOL/L (ref 3.5–5.1)
PROT UR STRIP-MCNC: 100 MG/DL
RBC # BLD AUTO: 3.32 M/UL (ref 3.8–5.2)
RBC #/AREA URNS HPF: ABNORMAL /HPF (ref 0–5)
SODIUM SERPL-SCNC: 134 MMOL/L (ref 136–145)
SP GR UR REFRACTOMETRY: 1.01 (ref 1–1.03)
UROBILINOGEN UR QL STRIP.AUTO: 0.2 EU/DL (ref 0.2–1)
WBC # BLD AUTO: 10.9 K/UL (ref 3.6–11)
WBC URNS QL MICRO: ABNORMAL /HPF (ref 0–4)
YEAST URNS QL MICRO: PRESENT

## 2019-06-05 PROCEDURE — 97162 PT EVAL MOD COMPLEX 30 MIN: CPT

## 2019-06-05 PROCEDURE — 87077 CULTURE AEROBIC IDENTIFY: CPT

## 2019-06-05 PROCEDURE — 87086 URINE CULTURE/COLONY COUNT: CPT

## 2019-06-05 PROCEDURE — 74011250637 HC RX REV CODE- 250/637: Performed by: INTERNAL MEDICINE

## 2019-06-05 PROCEDURE — 74011636637 HC RX REV CODE- 636/637: Performed by: INTERNAL MEDICINE

## 2019-06-05 PROCEDURE — 51798 US URINE CAPACITY MEASURE: CPT

## 2019-06-05 PROCEDURE — 97530 THERAPEUTIC ACTIVITIES: CPT

## 2019-06-05 PROCEDURE — 36415 COLL VENOUS BLD VENIPUNCTURE: CPT

## 2019-06-05 PROCEDURE — 74011250636 HC RX REV CODE- 250/636: Performed by: INTERNAL MEDICINE

## 2019-06-05 PROCEDURE — 87186 SC STD MICRODIL/AGAR DIL: CPT

## 2019-06-05 PROCEDURE — 81001 URINALYSIS AUTO W/SCOPE: CPT

## 2019-06-05 PROCEDURE — 85027 COMPLETE CBC AUTOMATED: CPT

## 2019-06-05 PROCEDURE — 74011000258 HC RX REV CODE- 258: Performed by: INTERNAL MEDICINE

## 2019-06-05 PROCEDURE — 80048 BASIC METABOLIC PNL TOTAL CA: CPT

## 2019-06-05 PROCEDURE — 65270000015 HC RM PRIVATE ONCOLOGY

## 2019-06-05 PROCEDURE — 76770 US EXAM ABDO BACK WALL COMP: CPT

## 2019-06-05 PROCEDURE — 77010033678 HC OXYGEN DAILY

## 2019-06-05 PROCEDURE — 94760 N-INVAS EAR/PLS OXIMETRY 1: CPT

## 2019-06-05 RX ORDER — ZOLPIDEM TARTRATE 5 MG/1
5 TABLET ORAL
Status: DISCONTINUED | OUTPATIENT
Start: 2019-06-05 | End: 2019-06-10 | Stop reason: HOSPADM

## 2019-06-05 RX ADMIN — PRAVASTATIN SODIUM 40 MG: 40 TABLET ORAL at 21:57

## 2019-06-05 RX ADMIN — ZOLPIDEM TARTRATE 5 MG: 5 TABLET ORAL at 21:57

## 2019-06-05 RX ADMIN — CEFTRIAXONE 1 G: 1 INJECTION, POWDER, FOR SOLUTION INTRAMUSCULAR; INTRAVENOUS at 13:43

## 2019-06-05 RX ADMIN — DOCUSATE SODIUM 100 MG: 100 CAPSULE, LIQUID FILLED ORAL at 17:15

## 2019-06-05 RX ADMIN — GABAPENTIN 400 MG: 100 CAPSULE ORAL at 17:15

## 2019-06-05 RX ADMIN — GABAPENTIN 400 MG: 100 CAPSULE ORAL at 21:57

## 2019-06-05 RX ADMIN — Medication 1 CAPSULE: at 11:13

## 2019-06-05 RX ADMIN — METOPROLOL TARTRATE 50 MG: 50 TABLET ORAL at 17:15

## 2019-06-05 RX ADMIN — TEMAZEPAM 15 MG: 15 CAPSULE ORAL at 21:57

## 2019-06-05 RX ADMIN — TRAMADOL HYDROCHLORIDE 50 MG: 50 TABLET, FILM COATED ORAL at 11:32

## 2019-06-05 RX ADMIN — DOCUSATE SODIUM 100 MG: 100 CAPSULE, LIQUID FILLED ORAL at 11:14

## 2019-06-05 RX ADMIN — Medication 10 ML: at 13:43

## 2019-06-05 RX ADMIN — GABAPENTIN 400 MG: 100 CAPSULE ORAL at 11:13

## 2019-06-05 RX ADMIN — ACETAMINOPHEN 650 MG: 325 TABLET ORAL at 17:15

## 2019-06-05 RX ADMIN — HYDRALAZINE HYDROCHLORIDE 200 MG: 50 TABLET, FILM COATED ORAL at 17:15

## 2019-06-05 RX ADMIN — PAROXETINE 20 MG: 20 TABLET, FILM COATED ORAL at 11:13

## 2019-06-05 RX ADMIN — HYDRALAZINE HYDROCHLORIDE 200 MG: 50 TABLET, FILM COATED ORAL at 11:19

## 2019-06-05 RX ADMIN — SODIUM CHLORIDE 75 ML/HR: 450 INJECTION, SOLUTION INTRAVENOUS at 06:15

## 2019-06-05 RX ADMIN — HEPARIN SODIUM 5000 UNITS: 5000 INJECTION INTRAVENOUS; SUBCUTANEOUS at 17:18

## 2019-06-05 RX ADMIN — Medication 10 ML: at 21:59

## 2019-06-05 RX ADMIN — LISINOPRIL 40 MG: 40 TABLET ORAL at 11:19

## 2019-06-05 RX ADMIN — Medication 10 ML: at 06:17

## 2019-06-05 RX ADMIN — DILTIAZEM HYDROCHLORIDE 300 MG: 180 CAPSULE, COATED, EXTENDED RELEASE ORAL at 11:19

## 2019-06-05 RX ADMIN — METOPROLOL TARTRATE 50 MG: 50 TABLET ORAL at 11:17

## 2019-06-05 RX ADMIN — GABAPENTIN 400 MG: 100 CAPSULE ORAL at 13:40

## 2019-06-05 RX ADMIN — ASPIRIN 81 MG: 81 TABLET ORAL at 11:13

## 2019-06-05 RX ADMIN — PREDNISONE 10 MG: 10 TABLET ORAL at 11:13

## 2019-06-05 NOTE — PROGRESS NOTES
Orders received, chart reviewed and patient evaluated by physical therapy. Recommend patient to discharge to the [facility] with PT services pending progress with skilled acute care physical therapy. Recommend with nursing patient to complete as able in order to maintain strength, endurance and independence: OOB to chair 3x/day with 2 person. Thank you for your assistance. Full evaluation to follow.

## 2019-06-05 NOTE — PROGRESS NOTES
Problem: Mobility Impaired (Adult and Pediatric)  Goal: *Acute Goals and Plan of Care (Insert Text)  Description  Physical Therapy Goals  Initiated 6/5/2019  1. Patient will move from supine to sit and sit to supine  in bed with modified independence within 7 day(s). 2.  Patient will transfer from bed to chair and chair to bed with minimal assistance/contact guard assist using the least restrictive device within 7 day(s). 3.  Patient will perform sit to stand with moderate assistance  within 7 day(s). 4.  Patient will ambulate with moderate assistance  for 15 feet with the least restrictive device within 7 day(s). Outcome: Progressing Towards Goal    PHYSICAL THERAPY EVALUATION  Patient: Renetta Back (89 y.o. female)  Date: 6/5/2019  Primary Diagnosis: Complicated UTI (urinary tract infection) [N39.0]        Precautions: Fall       ASSESSMENT :  Based on the objective data described below, the patient presents with generalized weakness, decreased activity tolerance, impaired balance, hypoxia on RA, and overall decreased functional mobility. Pt was received in supine and needed encouragement to work with therapy, cleared by nursing to mobilize and on 2L. Placed on RA and dropped to 87% with activity. She performed all mobility with overall mod A. Noted fair sitting balance while on EOB. She declined any further participation and declined attempting to transfer to the chair. She was returned to supine and 2L placed back on pt. She reports she was getting PT at the [facility] and they were working on ambulation with RW and her wheelchair following. Recommending return to the [facility] and resume PT. Patient will benefit from skilled intervention to address the above impairments. Patient?s rehabilitation potential is considered to be Fair  Factors which may influence rehabilitation potential include:   ? None noted  ? Mental ability/status  ? Medical condition  ? Home/family situation and support systems  ? Safety awareness  ? Pain tolerance/management  ? Other:      PLAN :  Recommendations and Planned Interventions:  ?           Bed Mobility Training             ? Neuromuscular Re-Education  ? Transfer Training                   ? Orthotic/Prosthetic Training  ? Gait Training                         ? Modalities  ? Therapeutic Exercises           ? Edema Management/Control  ? Therapeutic Activities            ? Patient and Family Training/Education  ? Other (comment):    Frequency/Duration: Patient will be followed by physical therapy  3 times a week to address goals. Discharge Recommendations: [place] with PT services   Further Equipment Recommendations for Discharge: none      SUBJECTIVE:   Patient stated ?i don't feel like getting to the chair. ?    OBJECTIVE DATA SUMMARY:   HISTORY:    Past Medical History:   Diagnosis Date    Hypertension     Ill-defined condition     Ex Lap with Nathanel Broom patch of a perforated pyloric channel ulcer 7/19/16    Other ill-defined conditions(799.89)     lipids    Other ill-defined conditions(799.89)     blood transfusion history     Past Surgical History:   Procedure Laterality Date    BYPASS GRAFT OTHR,FEM-FEM      BYPASS GRAFT OTHR,FEM-POP      right    HX HEENT      bilateral cataracts    HX ORTHOPAEDIC      right hip fracture/pinning    HX UROLOGICAL      right stent/kidney     Prior Level of Function/Home Situation: pt [facility] and was using a wheelchair for all mobility except when working with PT, working on ambulation with RW. In the assisted section.    Personal factors and/or comorbidities impacting plan of care:     Home Situation  Home Environment: Private residence  One/Two Story Residence: One story  Living Alone: No  Support Systems: [employer]regation / [employer] community, Family member(s)  Patient Expects to be Discharged to[employer] residence  Current DME Used/Available at Home: Wheelchair  Tub or Shower Type: Shower    EXAMINATION/PRESENTATION/DECISION MAKING:   Critical Behavior:  Neurologic State: Alert  Orientation Level: Oriented X4  Cognition: Follows commands     Hearing: Auditory  Auditory Impairment: None  Skin:  intact  Edema: none  Range Of Motion:  AROM: Generally decreased, functional           PROM: Generally decreased, functional           Strength:    Strength: Generally decreased, functional                    Tone & Sensation:   Tone: Normal                              Coordination:  Coordination: Within functional limits  Vision:      Functional Mobility:  Bed Mobility:  Rolling: Minimum assistance  Supine to Sit: Moderate assistance  Sit to Supine: Moderate assistance  Scooting: Moderate assistance  Transfers:        Pt declined                     Balance:   Sitting: Impaired  Sitting - Static: Fair (occasional)  Sitting - Dynamic: Fair (occasional)  Ambulation/Gait Training:         Pt declined                                            Functional Measure:  Barthel Index:    Bathin  Bladder: 5  Bowels: 10  Groomin  Dressin  Feeding: 10  Mobility: 0  Stairs: 0  Toilet Use: 5  Transfer (Bed to Chair and Back): 5  Total: 45/100       Percentage of impairment   0%   1-19%   20-39%   40-59%   60-79%   80-99%   100%   Barthel Score 0-100 100 99-80 79-60 59-40 20-39 1-19   0     The Barthel ADL Index: Guidelines  1. The index should be used as a record of what a patient does, not as a record of what a patient could do. 2. The main aim is to establish degree of independence from any help, physical or verbal, however minor and for whatever reason. 3. The need for supervision renders the patient not independent. 4. A patient's performance should be established using the best available evidence.  Asking the patient, friends/relatives and nurses are the usual sources, but direct observation and common sense are also important. However direct testing is not needed. 5. Usually the patient's performance over the preceding 24-48 hours is important, but occasionally longer periods will be relevant. 6. Middle categories imply that the patient supplies over 50 per cent of the effort. 7. Use of aids to be independent is allowed. Nonie Freshwater., Barthel, D.W. (4060). Functional evaluation: the Barthel Index. [facility] (14)2. Talia Lehman evan CARINA Rosales, Cristina Cerda., [facility] [place], [address] [place] [address] (1999). Measuring the change indisability after inpatient rehabilitation; comparison of the responsiveness of the Barthel Index and Functional [place] Measure. Journal of Neurology, Neurosurgery, and Psychiatry, 66(4), 628-228. ROSE MARY Mcmahon, PAULO Sanchez, & Tiara Hi M.A. (2004.) Assessment of post-stroke quality of life in cost-effectiveness studies: The usefulness of the Barthel Index and the EuroQoL-5D. Quality of Life Research, 15, 420-12            Physical Therapy Evaluation Charge Determination   History Examination Presentation Decision-Making   HIGH Complexity :3+ comorbidities / personal factors will impact the outcome/ POC  MEDIUM Complexity : 3 Standardized tests and measures addressing body structure, function, activity limitation and / or participation in recreation  MEDIUM Complexity : Evolving with changing characteristics  Other outcome measures barthel  MEDIUM      Based on the above components, the patient evaluation is determined to be of the following complexity level: MEDIUM    Pain:                    Activity Tolerance:   Oxygen dropped to 87% on RA with activity   Please refer to the flowsheet for vital signs taken during this treatment. After treatment:   ?         Patient left in no apparent distress sitting up in chair  ? Patient left in no apparent distress in bed  ? Call bell left within reach  ? Nursing notified  ? Caregiver present  ?          Bed alarm activated    COMMUNICATION/EDUCATION:   The patient?s plan of care was discussed with: Registered Nurse. ?         Fall prevention education was provided and the patient/caregiver indicated understanding. ? Patient/family have participated as able in goal setting and plan of care. ?         Patient/family agree to work toward stated goals and plan of care. ?         Patient understands intent and goals of therapy, but is neutral about his/her participation. ? Patient is unable to participate in goal setting and plan of care.     Thank you for this referral.  Marcial Gabriel, PT, DPT   Time Calculation: 17 mins

## 2019-06-05 NOTE — PROGRESS NOTES
MARC Plan:    A) Pt to discharge back to [facility]. B) Referral for [place] services w/[facility]. 15:20 pm, Referral/order for therapy services sent to [facility]. Pavithra To, MSW  [phone]    Freedom of Choice offered re: therapy services. [facility] their facility. Pavithra To, MSW  [phone]    Inbound call from Delilah Zamora (Rehab Director) for [facility]. SW informed a referral will be needed for PT/OT. [facility] doesn't provide SN. Fax order to [phone]. If patient needs SN upon discharge, pt will have to choose a [place] agency.        Pavithra To, MSANDREW  [phone]

## 2019-06-05 NOTE — PROGRESS NOTES
Oncology End of Shift Note      Bedside shift change report given to Kota Vasquez RN (incoming nurse) by Jose D Whiteside (outgoing nurse) on [facility]. Report included the following information SBAR, Kardex, Intake/Output, MAR and Recent Results. Shift Summary: bladder scanned pt at 0530 as her purewick canister was empty and her brief was dry. Scanned for 650 ml. Got a order to straight cath, got out 750. Labs drawn, no other changes. Issues for Physician to Address:  Urine retention? Patient on Cardiac Monitoring?     [] Yes  [x] No  Rhythm:          Shift Events  See above      Jose D Whiteside

## 2019-06-05 NOTE — CDMP QUERY
Dr. Priyank Bay:    Patient admitted with \"UTI\", noted to have \"O2 Sat 89%\". If possible, please document in progress notes and d/c summary if you are evaluating and/or treating any of the following:      ?      Acute hypoxic respiratory failure, POA  ? COPD with acute exacerbation, POA  ? COPD not in exacerbation, POA  ? Other Explanation of clinical findings  ? Clinically Undetermined (no explanation for clinical findings)    The medical record reflects the following:       Risk Factors: N/V x 3-4 days; UTI; Hx of COPD     Clinical Indicators: RR: 20; O2 sats 89-91% on RA. ..93% on 2L NC; noted: clear to auscultation B/L with overall decreased breath sounds     Treatment: Supplemental O2 to keep O2 sats > 91%; Neb treatments as needed;      Thank you,    July Guerrero  [facility]  [phone]

## 2019-06-05 NOTE — PROGRESS NOTES
Oncology End of Shift Note      Bedside shift change report given to Harmony Marie RN (incoming nurse) by Jin Tinoco (outgoing nurse) on [facility]. Report included the following information SBAR, Kardex, Procedure Summary, Intake/Output, MAR, Accordion and Recent Results. Shift Summary: retroperitoneal US completed, patient c/o neck pain - PRN tramadol given      Issues for Physician to Address:       Patient on Cardiac Monitoring?     [] Yes  [x] No    Rhythm:          Shift Events        Jin Tinoco

## 2019-06-05 NOTE — PROGRESS NOTES
PROGRESS NOTE    NAME:  Jennifer Lofton   :   1935   MRN:   [id number]     Date/Time:  2019 5:49 AM  Subjective:   History:  Chart reviewed and patient seen and examined this AM and D/W her nurse and all events noted. She presented to the office yesterday with a 4 day history of N/V and was admitted with a WBC > 19K for presumed UTI and possible  Sepsis. She has been on Rocephin and Levaquin overnight and has improvement of her Nausea w/o vomiting. She has no abdominal pain or other GI c/o and there are no  c/o. She has no increase of her chronic dyspnea and no other cardiac or respiratory c/o. She has no other c/o on complete ROS except generalized weakness.       Medications reviewed:  Current Facility-Administered Medications   Medication Dose Route Frequency    acetaminophen (TYLENOL) tablet 650 mg  650 mg Oral Q4H PRN    aspirin delayed-release tablet 81 mg  81 mg Oral DAILY    ergocalciferol capsule 50,000 Units  50,000 Units Oral Q7D    dilTIAZem CD (CARDIZEM CD) capsule 300 mg  300 mg Oral DAILY    docusate sodium (COLACE) capsule 100 mg  100 mg Oral BID    gabapentin (NEURONTIN) capsule 400 mg  400 mg Oral QID    hydrALAZINE (APRESOLINE) tablet 200 mg  200 mg Oral BID    lactobac ac& pc-s.therm-b.anim (HANG Q/RISAQUAD)  1 Cap Oral DAILY    lisinopril (PRINIVIL, ZESTRIL) tablet 40 mg  40 mg Oral DAILY    loperamide (IMODIUM) capsule 4 mg  4 mg Oral Q8H PRN    metoprolol tartrate (LOPRESSOR) tablet 50 mg  50 mg Oral BID    PARoxetine (PAXIL) tablet 20 mg  20 mg Oral DAILY    polyethylene glycol (MIRALAX) packet 17 g  17 g Oral DAILY PRN    pravastatin (PRAVACHOL) tablet 40 mg  40 mg Oral QHS    predniSONE (DELTASONE) tablet 10 mg  10 mg Oral DAILY WITH BREAKFAST    temazepam (RESTORIL) capsule 15 mg  15 mg Oral QHS    traMADol (ULTRAM) tablet 50 mg  50 mg Oral Q6H PRN    sodium chloride (NS) flush 5-40 mL  5-40 mL IntraVENous Q8H    sodium chloride (NS) flush 5-40 mL 5-40 mL IntraVENous PRN    cefTRIAXone (ROCEPHIN) 1 g in 0.9% sodium chloride (MBP/ADV) 50 mL  1 g IntraVENous Q24H    0.45% sodium chloride infusion  75 mL/hr IntraVENous CONTINUOUS    heparin (porcine) injection 5,000 Units  5,000 Units SubCUTAneous Q12H    [START ON 2019] levoFLOXacin (LEVAQUIN) 500 mg in D5W IVPB  500 mg IntraVENous Q48H    ondansetron (ZOFRAN ODT) tablet 4 mg  4 mg Oral Q8H PRN        Objective:   Vitals:  Visit Vitals  /68 (BP 1 Location: Right arm, BP Patient Position: At rest)   Pulse (!) 54   Temp 98.4 °F (36.9 °C)   Resp 20   Wt 116 lb 3.2 oz (52.7 kg)   SpO2 92%   BMI 21.25 kg/m²    O2 Flow Rate (L/min): 2 l/min O2 Device: Nasal cannula Temp (24hrs), Av °F (37.2 °C), Min:98.4 °F (36.9 °C), Max:99.6 °F (37.6 °C)      Last 24hr Input/Output:  No intake or output data in the 24 hours ending 19 0549     PHYSICAL EXAM:  General:     Thin frail WF, alert, cooperative, no distress, appears stated age. Head:    Normocephalic, without obvious abnormality, atraumatic. Eyes:    Conjunctivae/corneas clear. PERRLA  Nose:   Nares normal. No drainage or sinus tenderness. Throat:     Lips, mucosa, and tongue normal.  No Thrush  Neck:   Supple, symmetrical,  no adenopathy, thyroid: non tender     no carotid bruit and no JVD. Back:     Symmetric,  No CVA tenderness. Lungs:    Clear to auscultation bilaterally. No Wheezing or Rhonchi. No rales. Heart:    Regular rate and rhythm,  no murmur, rub or gallop. Abdomen:    Soft, non-tender. Not distended. Bowel sounds normal. No masses  Extremities:  Extremities normal, atraumatic, No cyanosis. No edema. No clubbing  Lymph nodes:  Cervical, supraclavicular normal.  Neurologic:  Normal strength, Alert and oriented X 3.    Skin:                 No rash      Lab Data Reviewed:    Recent Results (from the past 24 hour(s))   CBC WITH AUTOMATED DIFF    Collection Time: 19 12:09 PM   Result Value Ref Range    WBC 19.2 (HH) 4.1 - 10.9 K/uL    RBC 3.74 (L) 4.20 - 6.30 M/uL    HGB 11.4 (L) 12.0 - 18.0 g/dL    HCT 35.6 (L) 37.0 - 51.0 %    MCH 30.5 26.0 - 32.0 pg    MCHC 32.0 30.0 - 36.0 g/dL    MCV 95.1 80.0 - 97.0 fL    RDW 14.5 %    PLATELET 207.7 342.4 - 440.0 K/uL    MEAN PLATELET VOLUME 8.4 fL    Neutrophils abs 16.9 (H) 2.0 - 7.8 K/uL    Neutrophils 87.9 37.0 - 92.0 %    Monocytes abs-DIF 1.2 0.0 - 1.8 K/uL    MONOCYTES 6.0 0.1 - 24.0 %    Lymphocytes Absolute 1.2 0.6 - 4.1 K/uL    LYMPHOCYTES 6.1 (L) 10.0 - 80.0 %   METABOLIC PANEL, COMPREHENSIVE    Collection Time: 06/04/19  2:29 PM   Result Value Ref Range    Sodium 137 136 - 145 mmol/L    Potassium 3.7 3.5 - 5.1 mmol/L    Chloride 104 97 - 108 mmol/L    CO2 28 21 - 32 mmol/L    Anion gap 5 5 - 15 mmol/L    Glucose 152 (H) 65 - 100 mg/dL    BUN 45 (H) 6 - 20 MG/DL    Creatinine 1.99 (H) 0.55 - 1.02 MG/DL    BUN/Creatinine ratio 23 (H) 12 - 20      GFR est AA 29 (L) >60 ml/min/1.73m2    GFR est non-AA 24 (L) >60 ml/min/1.73m2    Calcium 9.7 8.5 - 10.1 MG/DL    Bilirubin, total 0.5 0.2 - 1.0 MG/DL    ALT (SGPT) 16 12 - 78 U/L    AST (SGOT) 22 15 - 37 U/L    Alk. phosphatase 59 45 - 117 U/L    Protein, total 6.7 6.4 - 8.2 g/dL    Albumin 3.0 (L) 3.5 - 5.0 g/dL    Globulin 3.7 2.0 - 4.0 g/dL    A-G Ratio 0.8 (L) 1.1 - 2.2     CBC WITH AUTOMATED DIFF    Collection Time: 06/04/19  2:29 PM   Result Value Ref Range    WBC 19.8 (H) 3.6 - 11.0 K/uL    RBC 3.73 (L) 3.80 - 5.20 M/uL    HGB 11.1 (L) 11.5 - 16.0 g/dL    HCT 36.0 35.0 - 47.0 %    MCV 96.5 80.0 - 99.0 FL    MCH 29.8 26.0 - 34.0 PG    MCHC 30.8 30.0 - 36.5 g/dL    RDW 14.8 (H) 11.5 - 14.5 %    PLATELET 434 290 - 710 K/uL    MPV 10.6 8.9 - 12.9 FL    NRBC 0.0 0  WBC    ABSOLUTE NRBC 0.00 0.00 - 0.01 K/uL    NEUTROPHILS 87 (H) 32 - 75 %    LYMPHOCYTES 4 (L) 12 - 49 %    MONOCYTES 7 5 - 13 %    EOSINOPHILS 1 0 - 7 %    BASOPHILS 0 0 - 1 %    IMMATURE GRANULOCYTES 1 (H) 0.0 - 0.5 %    ABS.  NEUTROPHILS 17.2 (H) 1.8 - 8.0 K/UL ABS. LYMPHOCYTES 0.9 0.8 - 3.5 K/UL    ABS. MONOCYTES 1.4 (H) 0.0 - 1.0 K/UL    ABS. EOSINOPHILS 0.1 0.0 - 0.4 K/UL    ABS. BASOPHILS 0.0 0.0 - 0.1 K/UL    ABS. IMM. GRANS. 0.2 (H) 0.00 - 0.04 K/UL    DF AUTOMATED     CBC W/O DIFF    Collection Time: 06/05/19  5:31 AM   Result Value Ref Range    WBC 10.9 3.6 - 11.0 K/uL    RBC 3.32 (L) 3.80 - 5.20 M/uL    HGB 9.9 (L) 11.5 - 16.0 g/dL    HCT 32.1 (L) 35.0 - 47.0 %    MCV 96.7 80.0 - 99.0 FL    MCH 29.8 26.0 - 34.0 PG    MCHC 30.8 30.0 - 36.5 g/dL    RDW 14.6 (H) 11.5 - 14.5 %    PLATELET 947 230 - 993 K/uL    MPV 10.5 8.9 - 12.9 FL    NRBC 0.0 0  WBC    ABSOLUTE NRBC 0.00 0.00 - 0.01 K/uL         Assessment/Plan:     Principal Problem:    Complicated UTI (urinary tract infection) (11/12/2018)    Active Problems:    COPD (chronic obstructive pulmonary disease) ([facility]) (8/14/2010)      PAF (paroxysmal atrial fibrillation) ([facility] (3/30/2018)      Stage 3 chronic kidney disease ([facility] (11/5/2018)      Nausea & vomiting (6/3/2019)      Fever (6/4/2019)       ___________________________________________________  PLAN:    1. Follow-up on urine and blood cultures  2. Continue Levaquin and Rocephin pending cultures  3. Follow electrolytes which are currently pending today  4. IV hydration  5. Supplemental oxygen as needed as noted O2 sat now 92% on 2 L  6. Jet nebulizer treatments as needed for COPD  7. Continue current antihypertensive treatment and follow blood pressure  8. Follow renal function as previous has had acute renal failure and does have CKD stage III, adm 45/1.99 to 48/1.92 now  9. WBC improved to 10.9  10.  Renal Sono today      35 minutes spent in direct care of this high complexity patient today.            ___________________________________________________    Attending Physician: Purvi Harris MD

## 2019-06-05 NOTE — PROGRESS NOTES
Patient requesting med at bedtime to help her sleep. Says this is her normal regimen but cannot remember which med she takes. Dr Martita Toure paged. New orders received.

## 2019-06-06 LAB
ANION GAP SERPL CALC-SCNC: 10 MMOL/L (ref 5–15)
BASOPHILS # BLD: 0 K/UL (ref 0–0.1)
BASOPHILS NFR BLD: 0 % (ref 0–1)
BUN SERPL-MCNC: 47 MG/DL (ref 6–20)
BUN/CREAT SERPL: 20 (ref 12–20)
CALCIUM SERPL-MCNC: 8.7 MG/DL (ref 8.5–10.1)
CHLORIDE SERPL-SCNC: 102 MMOL/L (ref 97–108)
CO2 SERPL-SCNC: 22 MMOL/L (ref 21–32)
CREAT SERPL-MCNC: 2.35 MG/DL (ref 0.55–1.02)
DIFFERENTIAL METHOD BLD: ABNORMAL
EOSINOPHIL # BLD: 0 K/UL (ref 0–0.4)
EOSINOPHIL NFR BLD: 0 % (ref 0–7)
ERYTHROCYTE [DISTWIDTH] IN BLOOD BY AUTOMATED COUNT: 14.6 % (ref 11.5–14.5)
GLUCOSE SERPL-MCNC: 104 MG/DL (ref 65–100)
HCT VFR BLD AUTO: 32.6 % (ref 35–47)
HGB BLD-MCNC: 10.2 G/DL (ref 11.5–16)
IMM GRANULOCYTES # BLD AUTO: 0.1 K/UL (ref 0–0.04)
IMM GRANULOCYTES NFR BLD AUTO: 1 % (ref 0–0.5)
LYMPHOCYTES # BLD: 0.5 K/UL (ref 0.8–3.5)
LYMPHOCYTES NFR BLD: 5 % (ref 12–49)
MCH RBC QN AUTO: 29.7 PG (ref 26–34)
MCHC RBC AUTO-ENTMCNC: 31.3 G/DL (ref 30–36.5)
MCV RBC AUTO: 95 FL (ref 80–99)
MONOCYTES # BLD: 1 K/UL (ref 0–1)
MONOCYTES NFR BLD: 9 % (ref 5–13)
NEUTS SEG # BLD: 9.2 K/UL (ref 1.8–8)
NEUTS SEG NFR BLD: 85 % (ref 32–75)
NRBC # BLD: 0 K/UL (ref 0–0.01)
NRBC BLD-RTO: 0 PER 100 WBC
PLATELET # BLD AUTO: 216 K/UL (ref 150–400)
PMV BLD AUTO: 10.6 FL (ref 8.9–12.9)
POTASSIUM SERPL-SCNC: 3.9 MMOL/L (ref 3.5–5.1)
RBC # BLD AUTO: 3.43 M/UL (ref 3.8–5.2)
RBC MORPH BLD: ABNORMAL
SODIUM SERPL-SCNC: 134 MMOL/L (ref 136–145)
WBC # BLD AUTO: 10.8 K/UL (ref 3.6–11)

## 2019-06-06 PROCEDURE — 77010033678 HC OXYGEN DAILY

## 2019-06-06 PROCEDURE — 74011636637 HC RX REV CODE- 636/637: Performed by: INTERNAL MEDICINE

## 2019-06-06 PROCEDURE — 74011250637 HC RX REV CODE- 250/637: Performed by: INTERNAL MEDICINE

## 2019-06-06 PROCEDURE — 94760 N-INVAS EAR/PLS OXIMETRY 1: CPT

## 2019-06-06 PROCEDURE — 74011250636 HC RX REV CODE- 250/636: Performed by: INTERNAL MEDICINE

## 2019-06-06 PROCEDURE — 36415 COLL VENOUS BLD VENIPUNCTURE: CPT

## 2019-06-06 PROCEDURE — 65270000015 HC RM PRIVATE ONCOLOGY

## 2019-06-06 PROCEDURE — 80048 BASIC METABOLIC PNL TOTAL CA: CPT

## 2019-06-06 PROCEDURE — 74011000258 HC RX REV CODE- 258: Performed by: INTERNAL MEDICINE

## 2019-06-06 PROCEDURE — 85025 COMPLETE CBC W/AUTO DIFF WBC: CPT

## 2019-06-06 RX ADMIN — LISINOPRIL 40 MG: 40 TABLET ORAL at 09:47

## 2019-06-06 RX ADMIN — METOPROLOL TARTRATE 50 MG: 50 TABLET ORAL at 17:05

## 2019-06-06 RX ADMIN — Medication 10 ML: at 05:45

## 2019-06-06 RX ADMIN — PRAVASTATIN SODIUM 40 MG: 40 TABLET ORAL at 21:40

## 2019-06-06 RX ADMIN — DOCUSATE SODIUM 100 MG: 100 CAPSULE, LIQUID FILLED ORAL at 09:47

## 2019-06-06 RX ADMIN — Medication 1 CAPSULE: at 09:47

## 2019-06-06 RX ADMIN — Medication 10 ML: at 21:40

## 2019-06-06 RX ADMIN — HEPARIN SODIUM 5000 UNITS: 5000 INJECTION INTRAVENOUS; SUBCUTANEOUS at 17:06

## 2019-06-06 RX ADMIN — SODIUM CHLORIDE 75 ML/HR: 450 INJECTION, SOLUTION INTRAVENOUS at 09:49

## 2019-06-06 RX ADMIN — PREDNISONE 10 MG: 10 TABLET ORAL at 09:47

## 2019-06-06 RX ADMIN — CEFTRIAXONE 1 G: 1 INJECTION, POWDER, FOR SOLUTION INTRAMUSCULAR; INTRAVENOUS at 13:04

## 2019-06-06 RX ADMIN — LEVOFLOXACIN 500 MG: 5 INJECTION, SOLUTION INTRAVENOUS at 18:23

## 2019-06-06 RX ADMIN — HEPARIN SODIUM 5000 UNITS: 5000 INJECTION INTRAVENOUS; SUBCUTANEOUS at 05:44

## 2019-06-06 RX ADMIN — HYDRALAZINE HYDROCHLORIDE 200 MG: 50 TABLET, FILM COATED ORAL at 09:47

## 2019-06-06 RX ADMIN — ASPIRIN 81 MG: 81 TABLET ORAL at 09:46

## 2019-06-06 RX ADMIN — GABAPENTIN 400 MG: 100 CAPSULE ORAL at 09:47

## 2019-06-06 RX ADMIN — METOPROLOL TARTRATE 50 MG: 50 TABLET ORAL at 09:47

## 2019-06-06 RX ADMIN — GABAPENTIN 400 MG: 100 CAPSULE ORAL at 21:40

## 2019-06-06 RX ADMIN — DILTIAZEM HYDROCHLORIDE 300 MG: 180 CAPSULE, COATED, EXTENDED RELEASE ORAL at 09:46

## 2019-06-06 RX ADMIN — DOCUSATE SODIUM 100 MG: 100 CAPSULE, LIQUID FILLED ORAL at 17:05

## 2019-06-06 RX ADMIN — PAROXETINE 20 MG: 20 TABLET, FILM COATED ORAL at 09:47

## 2019-06-06 RX ADMIN — HYDRALAZINE HYDROCHLORIDE 200 MG: 50 TABLET, FILM COATED ORAL at 17:05

## 2019-06-06 RX ADMIN — GABAPENTIN 400 MG: 100 CAPSULE ORAL at 17:05

## 2019-06-06 RX ADMIN — GABAPENTIN 400 MG: 100 CAPSULE ORAL at 13:04

## 2019-06-06 RX ADMIN — TEMAZEPAM 15 MG: 15 CAPSULE ORAL at 21:40

## 2019-06-06 RX ADMIN — SODIUM CHLORIDE 75 ML/HR: 450 INJECTION, SOLUTION INTRAVENOUS at 17:08

## 2019-06-06 NOTE — PROGRESS NOTES
PROGRESS NOTE    NAME:  Alphonso Vargas   :   1935   MRN:   [id number]     Date/Time:  2019 7:24 AM  Subjective:   History:  Chart reviewed and patient seen and examined this AM and D/W her nurse and all events noted. She presented to the office on  with a 4 day history of N/V and was admitted with a WBC > 19K for presumed UTI and possible  Sepsis. She has been on Rocephin and Levaquin since admission and has improvement of her Nausea w/o vomiting. She has no abdominal pain or other GI c/o and there are no  c/o although some urinary retention overnight she has now voided. She has no increase of her chronic dyspnea and no other cardiac or respiratory c/o. She has no other c/o on complete ROS except generalized weakness.       Medications reviewed:  Current Facility-Administered Medications   Medication Dose Route Frequency    zolpidem (AMBIEN) tablet 5 mg  5 mg Oral QHS PRN    acetaminophen (TYLENOL) tablet 650 mg  650 mg Oral Q4H PRN    aspirin delayed-release tablet 81 mg  81 mg Oral DAILY    ergocalciferol capsule 50,000 Units  50,000 Units Oral Q7D    dilTIAZem CD (CARDIZEM CD) capsule 300 mg  300 mg Oral DAILY    docusate sodium (COLACE) capsule 100 mg  100 mg Oral BID    gabapentin (NEURONTIN) capsule 400 mg  400 mg Oral QID    hydrALAZINE (APRESOLINE) tablet 200 mg  200 mg Oral BID    lactobac ac& pc-s.therm-b.anim (HANG Q/RISAQUAD)  1 Cap Oral DAILY    lisinopril (PRINIVIL, ZESTRIL) tablet 40 mg  40 mg Oral DAILY    loperamide (IMODIUM) capsule 4 mg  4 mg Oral Q8H PRN    metoprolol tartrate (LOPRESSOR) tablet 50 mg  50 mg Oral BID    PARoxetine (PAXIL) tablet 20 mg  20 mg Oral DAILY    polyethylene glycol (MIRALAX) packet 17 g  17 g Oral DAILY PRN    pravastatin (PRAVACHOL) tablet 40 mg  40 mg Oral QHS    predniSONE (DELTASONE) tablet 10 mg  10 mg Oral DAILY WITH BREAKFAST    temazepam (RESTORIL) capsule 15 mg  15 mg Oral QHS    traMADol (ULTRAM) tablet 50 mg  50 mg Oral Q6H PRN    sodium chloride (NS) flush 5-40 mL  5-40 mL IntraVENous Q8H    sodium chloride (NS) flush 5-40 mL  5-40 mL IntraVENous PRN    cefTRIAXone (ROCEPHIN) 1 g in 0.9% sodium chloride (MBP/ADV) 50 mL  1 g IntraVENous Q24H    0.45% sodium chloride infusion  75 mL/hr IntraVENous CONTINUOUS    heparin (porcine) injection 5,000 Units  5,000 Units SubCUTAneous Q12H    levoFLOXacin (LEVAQUIN) 500 mg in D5W IVPB  500 mg IntraVENous Q48H    ondansetron (ZOFRAN ODT) tablet 4 mg  4 mg Oral Q8H PRN        Objective:   Vitals:  Visit Vitals  /50 (BP 1 Location: Right arm, BP Patient Position: At rest)   Pulse (!) 50   Temp 98.2 °F (36.8 °C)   Resp 20   Wt 116 lb 3.2 oz (52.7 kg)   SpO2 96%   Breastfeeding? No   BMI 21.25 kg/m²    O2 Flow Rate (L/min): 2 l/min O2 Device: Nasal cannula Temp (24hrs), Av.1 °F (37.3 °C), Min:98.2 °F (36.8 °C), Max:100 °F (37.8 °C)      Last 24hr Input/Output:    Intake/Output Summary (Last 24 hours) at 2019 0724  Last data filed at 2019 1421  Gross per 24 hour   Intake    Output 200 ml   Net -200 ml        PHYSICAL EXAM:  General:     Thin frail WF, alert, cooperative, no distress, appears stated age. Head:    Normocephalic, without obvious abnormality, atraumatic. Eyes:    Conjunctivae/corneas clear. PERRLA  Nose:   Nares normal. No drainage or sinus tenderness. Throat:     Lips, mucosa, and tongue normal.  No Thrush  Neck:   Supple, symmetrical,  no adenopathy, thyroid: non tender     no carotid bruit and no JVD. Back:     Symmetric,  No CVA tenderness. Lungs:    Clear to auscultation bilaterally. No Wheezing or Rhonchi. No rales. Heart:    Regular rate and rhythm,  no murmur, rub or gallop. Abdomen:    Soft, non-tender. Not distended. Bowel sounds normal. No masses  Extremities:  Extremities normal, atraumatic, No cyanosis. No edema. No clubbing  Lymph nodes:  Cervical, supraclavicular normal.  Neurologic:  Normal strength, Alert and oriented X 3. Skin:                 No rash      Lab Data Reviewed:    Recent Results (from the past 24 hour(s))   CBC WITH AUTOMATED DIFF    Collection Time: 06/06/19  5:44 AM   Result Value Ref Range    WBC 10.8 3.6 - 11.0 K/uL    RBC 3.43 (L) 3.80 - 5.20 M/uL    HGB 10.2 (L) 11.5 - 16.0 g/dL    HCT 32.6 (L) 35.0 - 47.0 %    MCV 95.0 80.0 - 99.0 FL    MCH 29.7 26.0 - 34.0 PG    MCHC 31.3 30.0 - 36.5 g/dL    RDW 14.6 (H) 11.5 - 14.5 %    PLATELET 349 797 - 135 K/uL    MPV 10.6 8.9 - 12.9 FL    NRBC 0.0 0  WBC    ABSOLUTE NRBC 0.00 0.00 - 0.01 K/uL    NEUTROPHILS 85 (H) 32 - 75 %    LYMPHOCYTES 5 (L) 12 - 49 %    MONOCYTES 9 5 - 13 %    EOSINOPHILS 0 0 - 7 %    BASOPHILS 0 0 - 1 %    IMMATURE GRANULOCYTES 1 (H) 0.0 - 0.5 %    ABS. NEUTROPHILS 9.2 (H) 1.8 - 8.0 K/UL    ABS. LYMPHOCYTES 0.5 (L) 0.8 - 3.5 K/UL    ABS. MONOCYTES 1.0 0.0 - 1.0 K/UL    ABS. EOSINOPHILS 0.0 0.0 - 0.4 K/UL    ABS. BASOPHILS 0.0 0.0 - 0.1 K/UL    ABS. IMM.  GRANS. 0.1 (H) 0.00 - 0.04 K/UL    DF SMEAR SCANNED      RBC COMMENTS NORMOCYTIC, NORMOCHROMIC     METABOLIC PANEL, BASIC    Collection Time: 06/06/19  5:44 AM   Result Value Ref Range    Sodium 134 (L) 136 - 145 mmol/L    Potassium 3.9 3.5 - 5.1 mmol/L    Chloride 102 97 - 108 mmol/L    CO2 22 21 - 32 mmol/L    Anion gap 10 5 - 15 mmol/L    Glucose 104 (H) 65 - 100 mg/dL    BUN 47 (H) 6 - 20 MG/DL    Creatinine 2.35 (H) 0.55 - 1.02 MG/DL    BUN/Creatinine ratio 20 12 - 20      GFR est AA 24 (L) >60 ml/min/1.73m2    GFR est non-AA 20 (L) >60 ml/min/1.73m2    Calcium 8.7 8.5 - 10.1 MG/DL         Assessment/Plan:     Principal Problem:    Complicated UTI (urinary tract infection) (11/12/2018)    Active Problems:    COPD (chronic obstructive pulmonary disease) ([facility]) (8/14/2010)      PAF (paroxysmal atrial fibrillation) ([facility]) (3/30/2018)      Stage 3 chronic kidney disease ([facility]) (11/5/2018)      Nausea & vomiting (6/3/2019)      Fever (6/4/2019) ___________________________________________________  PLAN:    1. Follow-up on urine and blood cultures  2. Continue Levaquin and Rocephin pending cultures  3. Follow electrolytes which are currently OK  4. IV hydration  5. Supplemental oxygen as needed as noted O2 sat now 96% on 2 L  6. Jet nebulizer treatments as needed for COPD  7. Continue current antihypertensive treatment and follow blood pressure  8. Follow renal function as previous has had acute renal failure and does have CKD stage III, adm 45/1.99 to 48/1.92, now 47/2.35  9. WBC improved to 10.8  10.  Renal Sono reviewed and w/o obstruction                 ___________________________________________________    Attending Physician: Shona Gonzalez MD

## 2019-06-06 NOTE — PROGRESS NOTES
Physical Therapy Note    Patient declines PT stating, \"I'm not doing therapy today. Doctor said I'm going home tommorow and I will get therapy there. \" Patient has been receiving  PT at the [address].     Thank you,   Chasidy BEGUMT

## 2019-06-06 NOTE — PROGRESS NOTES
Oncology End of Shift Note      Bedside shift change report given to Jorge Alberto Oconnell RN (incoming nurse) by Douglas Jimenez (outgoing nurse) on [employer]. Report included the following information SBAR, Kardex, Intake/Output, MAR and Recent Results. Shift Summary: pt slept most of the night. Bladder scanned at 0530, pt had 360 in bladder. Received order to straight cath, pt urinated before cath could be performed. 140 in bladder post void. Issues for Physician to Address:  Voiding issues at night? Same thing happened last night, but is fine during the day     Patient on Cardiac Monitoring?     [] Yes  [x] No    Rhythm:          Shift Events  See above      Douglas Jimenez

## 2019-06-07 LAB
ANION GAP SERPL CALC-SCNC: 7 MMOL/L (ref 5–15)
BACTERIA SPEC CULT: ABNORMAL
BACTERIA SPEC CULT: ABNORMAL
BASOPHILS # BLD: 0 K/UL (ref 0–0.1)
BASOPHILS NFR BLD: 0 % (ref 0–1)
BUN SERPL-MCNC: 53 MG/DL (ref 6–20)
BUN/CREAT SERPL: 22 (ref 12–20)
CALCIUM SERPL-MCNC: 8.7 MG/DL (ref 8.5–10.1)
CC UR VC: ABNORMAL
CHLORIDE SERPL-SCNC: 102 MMOL/L (ref 97–108)
CO2 SERPL-SCNC: 22 MMOL/L (ref 21–32)
CREAT SERPL-MCNC: 2.4 MG/DL (ref 0.55–1.02)
DIFFERENTIAL METHOD BLD: ABNORMAL
EOSINOPHIL # BLD: 0.1 K/UL (ref 0–0.4)
EOSINOPHIL NFR BLD: 1 % (ref 0–7)
ERYTHROCYTE [DISTWIDTH] IN BLOOD BY AUTOMATED COUNT: 14.2 % (ref 11.5–14.5)
GLUCOSE SERPL-MCNC: 95 MG/DL (ref 65–100)
HCT VFR BLD AUTO: 30 % (ref 35–47)
HGB BLD-MCNC: 9.6 G/DL (ref 11.5–16)
IMM GRANULOCYTES # BLD AUTO: 0.1 K/UL (ref 0–0.04)
IMM GRANULOCYTES NFR BLD AUTO: 1 % (ref 0–0.5)
LYMPHOCYTES # BLD: 0.5 K/UL (ref 0.8–3.5)
LYMPHOCYTES NFR BLD: 5 % (ref 12–49)
MCH RBC QN AUTO: 30.1 PG (ref 26–34)
MCHC RBC AUTO-ENTMCNC: 32 G/DL (ref 30–36.5)
MCV RBC AUTO: 94 FL (ref 80–99)
MONOCYTES # BLD: 0.9 K/UL (ref 0–1)
MONOCYTES NFR BLD: 9 % (ref 5–13)
NEUTS SEG # BLD: 8.4 K/UL (ref 1.8–8)
NEUTS SEG NFR BLD: 85 % (ref 32–75)
NRBC # BLD: 0 K/UL (ref 0–0.01)
NRBC BLD-RTO: 0 PER 100 WBC
PLATELET # BLD AUTO: 237 K/UL (ref 150–400)
PMV BLD AUTO: 10.3 FL (ref 8.9–12.9)
POTASSIUM SERPL-SCNC: 4.1 MMOL/L (ref 3.5–5.1)
RBC # BLD AUTO: 3.19 M/UL (ref 3.8–5.2)
SERVICE CMNT-IMP: ABNORMAL
SODIUM SERPL-SCNC: 131 MMOL/L (ref 136–145)
WBC # BLD AUTO: 9.9 K/UL (ref 3.6–11)

## 2019-06-07 PROCEDURE — 97530 THERAPEUTIC ACTIVITIES: CPT

## 2019-06-07 PROCEDURE — 36415 COLL VENOUS BLD VENIPUNCTURE: CPT

## 2019-06-07 PROCEDURE — 74011250636 HC RX REV CODE- 250/636: Performed by: INTERNAL MEDICINE

## 2019-06-07 PROCEDURE — 74011000258 HC RX REV CODE- 258: Performed by: INTERNAL MEDICINE

## 2019-06-07 PROCEDURE — 80048 BASIC METABOLIC PNL TOTAL CA: CPT

## 2019-06-07 PROCEDURE — 85025 COMPLETE CBC W/AUTO DIFF WBC: CPT

## 2019-06-07 PROCEDURE — 65270000015 HC RM PRIVATE ONCOLOGY

## 2019-06-07 PROCEDURE — 74011250637 HC RX REV CODE- 250/637: Performed by: INTERNAL MEDICINE

## 2019-06-07 PROCEDURE — 74011636637 HC RX REV CODE- 636/637: Performed by: INTERNAL MEDICINE

## 2019-06-07 PROCEDURE — 94760 N-INVAS EAR/PLS OXIMETRY 1: CPT

## 2019-06-07 RX ORDER — AMOXICILLIN 250 MG/1
500 CAPSULE ORAL DAILY
Status: DISCONTINUED | OUTPATIENT
Start: 2019-06-07 | End: 2019-06-08

## 2019-06-07 RX ADMIN — HEPARIN SODIUM 5000 UNITS: 5000 INJECTION INTRAVENOUS; SUBCUTANEOUS at 18:00

## 2019-06-07 RX ADMIN — METOPROLOL TARTRATE 50 MG: 50 TABLET ORAL at 10:29

## 2019-06-07 RX ADMIN — ASPIRIN 81 MG: 81 TABLET ORAL at 10:29

## 2019-06-07 RX ADMIN — PAROXETINE 20 MG: 20 TABLET, FILM COATED ORAL at 10:29

## 2019-06-07 RX ADMIN — METOPROLOL TARTRATE 50 MG: 50 TABLET ORAL at 18:00

## 2019-06-07 RX ADMIN — LISINOPRIL 40 MG: 40 TABLET ORAL at 10:29

## 2019-06-07 RX ADMIN — SODIUM CHLORIDE 100 ML/HR: 450 INJECTION, SOLUTION INTRAVENOUS at 12:58

## 2019-06-07 RX ADMIN — HYDRALAZINE HYDROCHLORIDE 200 MG: 50 TABLET, FILM COATED ORAL at 18:00

## 2019-06-07 RX ADMIN — DILTIAZEM HYDROCHLORIDE 300 MG: 180 CAPSULE, COATED, EXTENDED RELEASE ORAL at 10:29

## 2019-06-07 RX ADMIN — ZOLPIDEM TARTRATE 5 MG: 5 TABLET ORAL at 21:12

## 2019-06-07 RX ADMIN — DOCUSATE SODIUM 100 MG: 100 CAPSULE, LIQUID FILLED ORAL at 18:00

## 2019-06-07 RX ADMIN — GABAPENTIN 400 MG: 100 CAPSULE ORAL at 12:55

## 2019-06-07 RX ADMIN — GABAPENTIN 400 MG: 100 CAPSULE ORAL at 10:29

## 2019-06-07 RX ADMIN — AMOXICILLIN 500 MG: 250 CAPSULE ORAL at 12:55

## 2019-06-07 RX ADMIN — PRAVASTATIN SODIUM 40 MG: 40 TABLET ORAL at 21:13

## 2019-06-07 RX ADMIN — HYDRALAZINE HYDROCHLORIDE 200 MG: 50 TABLET, FILM COATED ORAL at 10:29

## 2019-06-07 RX ADMIN — HEPARIN SODIUM 5000 UNITS: 5000 INJECTION INTRAVENOUS; SUBCUTANEOUS at 06:02

## 2019-06-07 RX ADMIN — PREDNISONE 10 MG: 10 TABLET ORAL at 10:29

## 2019-06-07 RX ADMIN — Medication 10 ML: at 21:13

## 2019-06-07 RX ADMIN — GABAPENTIN 400 MG: 100 CAPSULE ORAL at 18:00

## 2019-06-07 RX ADMIN — TEMAZEPAM 15 MG: 15 CAPSULE ORAL at 21:13

## 2019-06-07 RX ADMIN — GABAPENTIN 400 MG: 100 CAPSULE ORAL at 21:13

## 2019-06-07 RX ADMIN — Medication 1 CAPSULE: at 10:29

## 2019-06-07 RX ADMIN — DOCUSATE SODIUM 100 MG: 100 CAPSULE, LIQUID FILLED ORAL at 10:29

## 2019-06-07 RX ADMIN — Medication 10 ML: at 06:02

## 2019-06-07 NOTE — PROGRESS NOTES
Problem: Falls - Risk of  Goal: *Absence of Falls  Description  Document Rosalbalieloisa Mtz Fall Risk and appropriate interventions in the flowsheet. Outcome: Progressing Towards Goal     Problem: Patient Education: Go to Patient Education Activity  Goal: Patient/Family Education  Outcome: Progressing Towards Goal     Problem: Pressure Injury - Risk of  Goal: *Prevention of pressure injury  Description  Document Rashid Scale and appropriate interventions in the flowsheet.   Outcome: Progressing Towards Goal     Problem: Patient Education: Go to Patient Education Activity  Goal: Patient/Family Education  Outcome: Progressing Towards Goal     Problem: Patient Education: Go to Patient Education Activity  Goal: Patient/Family Education  Outcome: Progressing Towards Goal

## 2019-06-07 NOTE — PROGRESS NOTES
Pharmacy Automatic Renal Dosing Protocol - Antimicrobials    Indication for Antimicrobials: UTI (complicated), possible  sepsis    Current Regimen of Each Antimicrobial:  Amoxicillin 500mg po daily x10 days; start ; through     Previous Abx:  Ceftriaxone 1 gm IV q24h (Start Date ; Day # 3)  Levofloxacin 500 mg IV q48h  (Start Date ; Day #3)    Significant Cultures:   : Blood = NGSF (pending)  : Urine =  >100k possible Enterococcus sp (pending)    Radiology / Imaging results: (X-ray, CT scan or MRI):   : CXR: No pneumonia  : XR Abd: Nonspecific gas pattern. No evidence of bowel obstruction. Paralysis, amputations, malnutrition:     Labs:  Recent Labs     19  0336 19  0544 19  0531   CREA 2.40* 2.35* 1.92*   BUN 53* 47* 48*   WBC 9.9 10.8 10.9     Temp (24hrs), Av.2 °F (36.8 °C), Min:97.9 °F (36.6 °C), Max:98.7 °F (37.1 °C)    Creatinine Clearance (mL/min) or Dialysis: 14 ml/min    Impression/Plan:   Enterococcus sp in Urine (pending)  CTX & LVQ changed to Amoxicillin 500mg po daily x10 days  Pending final cx  Daily BMP     Pharmacy will follow daily and adjust medications as appropriate for renal function and/or serum levels. Thank you,  Khalida Jennings [facility]    Recommended duration of therapy  [email or web address]/Pharmacy/Clinical%20Companion/[email or web address]. docx    Renal Dosing  [email or web address]/Pharmacy/[email or web address]/[email or web address]. pdf

## 2019-06-07 NOTE — PROGRESS NOTES
MARC Plan:    A) return to [facility]. B) [facility]. Resumption of Care Orders for PT/OT faxed over to [phone] to [facility] AT [facility].      Naresh Sanches, MSW  [phone]

## 2019-06-07 NOTE — PROGRESS NOTES
Problem: Mobility Impaired (Adult and Pediatric)  Goal: *Acute Goals and Plan of Care (Insert Text)  Description  Physical Therapy Goals  Initiated 6/5/2019  1. Patient will move from supine to sit and sit to supine  in bed with modified independence within 7 day(s). 2.  Patient will transfer from bed to chair and chair to bed with minimal assistance/contact guard assist using the least restrictive device within 7 day(s). 3.  Patient will perform sit to stand with moderate assistance  within 7 day(s). 4.  Patient will ambulate with moderate assistance  for 15 feet with the least restrictive device within 7 day(s). Outcome: Progressing Towards Goal   PHYSICAL THERAPY TREATMENT  Patient: Huber Cortez (89 y.o. female)  Date: 6/7/2019  Diagnosis: Complicated UTI (urinary tract infection) [F03.5] Complicated UTI (urinary tract infection)       Precautions:    Chart, physical therapy assessment, plan of care and goals were reviewed. ASSESSMENT:  Pt cleared by nurse to mobilize. Pt received in bed supine. Pt agreeable to therapy. Pt performed supine to sit at mod A/Huy x2 with cueing for sequencing. Pt able to sit EOB unsupported once feet on floor. Pt performed stand pivot transfer at max A x2. Pt able to take a step to chair with cueing . Pt left up in chair with all needs met. Pt will benefit it from PT at [facility] to improve strength and safe functional mobility. Progression toward goals:  ?    Improving appropriately and progressing toward goals  ? Improving slowly and progressing toward goals  ? Not making progress toward goals and plan of care will be adjusted     PLAN:  Patient continues to benefit from skilled intervention to address the above impairments. Continue treatment per established plan of care. Discharge Recommendations:  Therapy at [facility]    Further Equipment Recommendations for Discharge:  none      SUBJECTIVE:   Patient stated ? I told that girl yesterday I was leaving.?    OBJECTIVE DATA SUMMARY:   Critical Behavior:  Neurologic State: Alert  Orientation Level: Oriented X4  Cognition: Follows commands     Functional Mobility Training:  Bed Mobility:  Rolling: Minimum assistance  Supine to Sit: Moderate assistance;Minimum assistance     Scooting: Moderate assistance        Transfers:           Stand Pivot Transfers: Maximum assistance  Bed to Chair: Maximum assistance;Assist x2                    Balance:  Sitting: Intact; With support  Sitting - Static: Fair (occasional)  Sitting - Dynamic: Fair (occasional)  Standing: Impaired  Standing - Static: Poor;Constant support  Standing - Dynamic : Poor  Ambulation/Gait Training:         Pt able to take one step over to step to chair at max A x2. Pain:  Pain Scale 1: Numeric (0 - 10)  Pain Intensity 1: 0              Activity Tolerance:   Pt requiring max A x2 for safe tranfers. After treatment:   ?    Patient left in no apparent distress sitting up in chair  ? Patient left in no apparent distress in bed  ? Call bell left within reach  ? Nursing notified  ? Caregiver present  ?     Bed alarm activated    COMMUNICATION/COLLABORATION:   The patient?s plan of care was discussed with: Registered Nurse    Kaushik Pope PTA   Time Calculation: 14 mins

## 2019-06-08 LAB
ANION GAP SERPL CALC-SCNC: 8 MMOL/L (ref 5–15)
BASOPHILS # BLD: 0 K/UL (ref 0–0.1)
BASOPHILS NFR BLD: 0 % (ref 0–1)
BUN SERPL-MCNC: 50 MG/DL (ref 6–20)
BUN/CREAT SERPL: 28 (ref 12–20)
CALCIUM SERPL-MCNC: 9 MG/DL (ref 8.5–10.1)
CHLORIDE SERPL-SCNC: 107 MMOL/L (ref 97–108)
CO2 SERPL-SCNC: 21 MMOL/L (ref 21–32)
CREAT SERPL-MCNC: 1.81 MG/DL (ref 0.55–1.02)
DIFFERENTIAL METHOD BLD: ABNORMAL
EOSINOPHIL # BLD: 0.1 K/UL (ref 0–0.4)
EOSINOPHIL NFR BLD: 1 % (ref 0–7)
ERYTHROCYTE [DISTWIDTH] IN BLOOD BY AUTOMATED COUNT: 14.6 % (ref 11.5–14.5)
GLUCOSE SERPL-MCNC: 94 MG/DL (ref 65–100)
HCT VFR BLD AUTO: 31.8 % (ref 35–47)
HGB BLD-MCNC: 10.1 G/DL (ref 11.5–16)
IMM GRANULOCYTES # BLD AUTO: 0.1 K/UL (ref 0–0.04)
IMM GRANULOCYTES NFR BLD AUTO: 2 % (ref 0–0.5)
LYMPHOCYTES # BLD: 0.6 K/UL (ref 0.8–3.5)
LYMPHOCYTES NFR BLD: 7 % (ref 12–49)
MCH RBC QN AUTO: 29.8 PG (ref 26–34)
MCHC RBC AUTO-ENTMCNC: 31.8 G/DL (ref 30–36.5)
MCV RBC AUTO: 93.8 FL (ref 80–99)
MONOCYTES # BLD: 0.9 K/UL (ref 0–1)
MONOCYTES NFR BLD: 11 % (ref 5–13)
NEUTS SEG # BLD: 6.7 K/UL (ref 1.8–8)
NEUTS SEG NFR BLD: 79 % (ref 32–75)
NRBC # BLD: 0 K/UL (ref 0–0.01)
NRBC BLD-RTO: 0 PER 100 WBC
PLATELET # BLD AUTO: 248 K/UL (ref 150–400)
PMV BLD AUTO: 10.5 FL (ref 8.9–12.9)
POTASSIUM SERPL-SCNC: 4.1 MMOL/L (ref 3.5–5.1)
RBC # BLD AUTO: 3.39 M/UL (ref 3.8–5.2)
SODIUM SERPL-SCNC: 136 MMOL/L (ref 136–145)
WBC # BLD AUTO: 8.5 K/UL (ref 3.6–11)

## 2019-06-08 PROCEDURE — 85025 COMPLETE CBC W/AUTO DIFF WBC: CPT

## 2019-06-08 PROCEDURE — 94760 N-INVAS EAR/PLS OXIMETRY 1: CPT

## 2019-06-08 PROCEDURE — 36415 COLL VENOUS BLD VENIPUNCTURE: CPT

## 2019-06-08 PROCEDURE — 74011000258 HC RX REV CODE- 258: Performed by: INTERNAL MEDICINE

## 2019-06-08 PROCEDURE — 65270000015 HC RM PRIVATE ONCOLOGY

## 2019-06-08 PROCEDURE — 74011250636 HC RX REV CODE- 250/636: Performed by: INTERNAL MEDICINE

## 2019-06-08 PROCEDURE — 74011250637 HC RX REV CODE- 250/637: Performed by: INTERNAL MEDICINE

## 2019-06-08 PROCEDURE — 74011636637 HC RX REV CODE- 636/637: Performed by: INTERNAL MEDICINE

## 2019-06-08 PROCEDURE — 80048 BASIC METABOLIC PNL TOTAL CA: CPT

## 2019-06-08 RX ORDER — AMOXICILLIN 250 MG/1
500 CAPSULE ORAL EVERY 12 HOURS
Status: DISCONTINUED | OUTPATIENT
Start: 2019-06-08 | End: 2019-06-10 | Stop reason: HOSPADM

## 2019-06-08 RX ORDER — IPRATROPIUM BROMIDE AND ALBUTEROL SULFATE 2.5; .5 MG/3ML; MG/3ML
3 SOLUTION RESPIRATORY (INHALATION)
Status: DISCONTINUED | OUTPATIENT
Start: 2019-06-08 | End: 2019-06-10 | Stop reason: HOSPADM

## 2019-06-08 RX ADMIN — AMOXICILLIN 500 MG: 250 CAPSULE ORAL at 22:07

## 2019-06-08 RX ADMIN — HEPARIN SODIUM 5000 UNITS: 5000 INJECTION INTRAVENOUS; SUBCUTANEOUS at 18:32

## 2019-06-08 RX ADMIN — PAROXETINE 20 MG: 20 TABLET, FILM COATED ORAL at 09:44

## 2019-06-08 RX ADMIN — PRAVASTATIN SODIUM 40 MG: 40 TABLET ORAL at 22:07

## 2019-06-08 RX ADMIN — ZOLPIDEM TARTRATE 5 MG: 5 TABLET ORAL at 22:07

## 2019-06-08 RX ADMIN — GABAPENTIN 400 MG: 100 CAPSULE ORAL at 12:54

## 2019-06-08 RX ADMIN — GABAPENTIN 400 MG: 100 CAPSULE ORAL at 18:26

## 2019-06-08 RX ADMIN — ASPIRIN 81 MG: 81 TABLET ORAL at 09:42

## 2019-06-08 RX ADMIN — GABAPENTIN 400 MG: 100 CAPSULE ORAL at 22:06

## 2019-06-08 RX ADMIN — METOPROLOL TARTRATE 50 MG: 50 TABLET ORAL at 18:27

## 2019-06-08 RX ADMIN — DOCUSATE SODIUM 100 MG: 100 CAPSULE, LIQUID FILLED ORAL at 09:43

## 2019-06-08 RX ADMIN — HYDRALAZINE HYDROCHLORIDE 200 MG: 50 TABLET, FILM COATED ORAL at 18:27

## 2019-06-08 RX ADMIN — SODIUM CHLORIDE 100 ML/HR: 450 INJECTION, SOLUTION INTRAVENOUS at 20:03

## 2019-06-08 RX ADMIN — TEMAZEPAM 15 MG: 15 CAPSULE ORAL at 22:06

## 2019-06-08 RX ADMIN — PREDNISONE 10 MG: 10 TABLET ORAL at 09:44

## 2019-06-08 RX ADMIN — HYDRALAZINE HYDROCHLORIDE 200 MG: 50 TABLET, FILM COATED ORAL at 09:43

## 2019-06-08 RX ADMIN — Medication 10 ML: at 22:07

## 2019-06-08 RX ADMIN — Medication 10 ML: at 13:26

## 2019-06-08 RX ADMIN — DILTIAZEM HYDROCHLORIDE 300 MG: 180 CAPSULE, COATED, EXTENDED RELEASE ORAL at 09:42

## 2019-06-08 RX ADMIN — SODIUM CHLORIDE 100 ML/HR: 450 INJECTION, SOLUTION INTRAVENOUS at 09:49

## 2019-06-08 RX ADMIN — LISINOPRIL 40 MG: 40 TABLET ORAL at 09:43

## 2019-06-08 RX ADMIN — METOPROLOL TARTRATE 50 MG: 50 TABLET ORAL at 09:44

## 2019-06-08 RX ADMIN — HEPARIN SODIUM 5000 UNITS: 5000 INJECTION INTRAVENOUS; SUBCUTANEOUS at 07:10

## 2019-06-08 RX ADMIN — GABAPENTIN 400 MG: 100 CAPSULE ORAL at 09:43

## 2019-06-08 RX ADMIN — DOCUSATE SODIUM 100 MG: 100 CAPSULE, LIQUID FILLED ORAL at 18:26

## 2019-06-08 RX ADMIN — AMOXICILLIN 500 MG: 250 CAPSULE ORAL at 09:41

## 2019-06-08 RX ADMIN — Medication 10 ML: at 07:10

## 2019-06-08 RX ADMIN — Medication 1 CAPSULE: at 09:43

## 2019-06-08 NOTE — PROGRESS NOTES
Oncology End of Shift Note      Bedside shift change report given to Walter Oliva RN (incoming nurse) by Tin Wasserman (outgoing nurse) on [facility]. Report included the following information SBAR, Kardex and MAR. Shift Summary:   Patient tolerated care well throughout shift. No new complaints, no complaints of pain. Patient family present during the afternoon. Patient consumed most of their meals and had a bowel movement. Md notified of vitals (see MAR note). Issues for Physician to Address:       Patient on Cardiac Monitoring?     [] Yes  [x] No    Rhythm:          Shift Events        Tin Wasserman

## 2019-06-08 NOTE — PROGRESS NOTES
INTERNAL MEDICINE ATTENDING NOTE    S: Ms. Alphonso Vargas is a patient of Tessa Magana MD and was seen by me today during rounds. At this time, she is resting comfortably. Nausea is better. The patient has no new complaints today. ROS otherwise unremarkable except as noted elsewhere. O: Blood pressure 162/58, pulse 69, temperature 98.4 °F (36.9 °C), resp. rate 20, weight 116 lb 3.2 oz (52.7 kg), SpO2 95 %, not currently breastfeeding. Gen: Patient is in no acute distress. Lungs: CTAB. Heart: RRR. Abd: S, NT, ND, BS present. Extremities: Warm. Recent Results (from the past 12 hour(s))   METABOLIC PANEL, BASIC    Collection Time: 06/08/19  4:39 AM   Result Value Ref Range    Sodium 136 136 - 145 mmol/L    Potassium 4.1 3.5 - 5.1 mmol/L    Chloride 107 97 - 108 mmol/L    CO2 21 21 - 32 mmol/L    Anion gap 8 5 - 15 mmol/L    Glucose 94 65 - 100 mg/dL    BUN 50 (H) 6 - 20 MG/DL    Creatinine 1.81 (H) 0.55 - 1.02 MG/DL    BUN/Creatinine ratio 28 (H) 12 - 20      GFR est AA 32 (L) >60 ml/min/1.73m2    GFR est non-AA 27 (L) >60 ml/min/1.73m2    Calcium 9.0 8.5 - 10.1 MG/DL   CBC WITH AUTOMATED DIFF    Collection Time: 06/08/19  4:39 AM   Result Value Ref Range    WBC 8.5 3.6 - 11.0 K/uL    RBC 3.39 (L) 3.80 - 5.20 M/uL    HGB 10.1 (L) 11.5 - 16.0 g/dL    HCT 31.8 (L) 35.0 - 47.0 %    MCV 93.8 80.0 - 99.0 FL    MCH 29.8 26.0 - 34.0 PG    MCHC 31.8 30.0 - 36.5 g/dL    RDW 14.6 (H) 11.5 - 14.5 %    PLATELET 473 785 - 607 K/uL    MPV 10.5 8.9 - 12.9 FL    NRBC 0.0 0  WBC    ABSOLUTE NRBC 0.00 0.00 - 0.01 K/uL    NEUTROPHILS 79 (H) 32 - 75 %    LYMPHOCYTES 7 (L) 12 - 49 %    MONOCYTES 11 5 - 13 %    EOSINOPHILS 1 0 - 7 %    BASOPHILS 0 0 - 1 %    IMMATURE GRANULOCYTES 2 (H) 0.0 - 0.5 %    ABS. NEUTROPHILS 6.7 1.8 - 8.0 K/UL    ABS. LYMPHOCYTES 0.6 (L) 0.8 - 3.5 K/UL    ABS. MONOCYTES 0.9 0.0 - 1.0 K/UL    ABS. EOSINOPHILS 0.1 0.0 - 0.4 K/UL    ABS. BASOPHILS 0.0 0.0 - 0.1 K/UL    ABS. IMM. GRANS. 0.1 (H) 0.00 - 0.04 K/UL    DF AUTOMATED          A / P:   1. Complicated UTI (urinary tract infection) (11/12/2018): On appropriate antibiotics. 2. COPD (chronic obstructive pulmonary disease) ([facility] (8/14/2010): Prn duonebs added. 3. PAF (paroxysmal atrial fibrillation) ([facility] (3/30/2018):   4. CHRISTY on Stage 3 chronic kidney disease ([facility] (11/5/2018): Better today. 5. Nausea & vomiting (6/3/2019): Improved.    6. Fever (6/4/2019)     Duane Cavazos MD, FACP  Best contact is via Pager: [phone], or via hospital  at [phone]

## 2019-06-09 LAB
ANION GAP SERPL CALC-SCNC: 6 MMOL/L (ref 5–15)
BUN SERPL-MCNC: 42 MG/DL (ref 6–20)
BUN/CREAT SERPL: 29 (ref 12–20)
CALCIUM SERPL-MCNC: 9 MG/DL (ref 8.5–10.1)
CHLORIDE SERPL-SCNC: 109 MMOL/L (ref 97–108)
CO2 SERPL-SCNC: 22 MMOL/L (ref 21–32)
CREAT SERPL-MCNC: 1.43 MG/DL (ref 0.55–1.02)
GLUCOSE SERPL-MCNC: 86 MG/DL (ref 65–100)
POTASSIUM SERPL-SCNC: 4.3 MMOL/L (ref 3.5–5.1)
SODIUM SERPL-SCNC: 137 MMOL/L (ref 136–145)

## 2019-06-09 PROCEDURE — 74011636637 HC RX REV CODE- 636/637: Performed by: INTERNAL MEDICINE

## 2019-06-09 PROCEDURE — 80048 BASIC METABOLIC PNL TOTAL CA: CPT

## 2019-06-09 PROCEDURE — 94760 N-INVAS EAR/PLS OXIMETRY 1: CPT

## 2019-06-09 PROCEDURE — 36415 COLL VENOUS BLD VENIPUNCTURE: CPT

## 2019-06-09 PROCEDURE — 65270000015 HC RM PRIVATE ONCOLOGY

## 2019-06-09 PROCEDURE — 74011250636 HC RX REV CODE- 250/636: Performed by: INTERNAL MEDICINE

## 2019-06-09 PROCEDURE — 74011000258 HC RX REV CODE- 258: Performed by: INTERNAL MEDICINE

## 2019-06-09 PROCEDURE — 74011250637 HC RX REV CODE- 250/637: Performed by: INTERNAL MEDICINE

## 2019-06-09 RX ADMIN — AMOXICILLIN 500 MG: 250 CAPSULE ORAL at 08:36

## 2019-06-09 RX ADMIN — Medication 1 CAPSULE: at 08:37

## 2019-06-09 RX ADMIN — GABAPENTIN 400 MG: 100 CAPSULE ORAL at 21:01

## 2019-06-09 RX ADMIN — GABAPENTIN 400 MG: 100 CAPSULE ORAL at 18:42

## 2019-06-09 RX ADMIN — Medication 10 ML: at 12:54

## 2019-06-09 RX ADMIN — DOCUSATE SODIUM 100 MG: 100 CAPSULE, LIQUID FILLED ORAL at 08:36

## 2019-06-09 RX ADMIN — METOPROLOL TARTRATE 50 MG: 50 TABLET ORAL at 18:42

## 2019-06-09 RX ADMIN — LISINOPRIL 40 MG: 40 TABLET ORAL at 08:37

## 2019-06-09 RX ADMIN — SODIUM CHLORIDE 100 ML/HR: 450 INJECTION, SOLUTION INTRAVENOUS at 20:05

## 2019-06-09 RX ADMIN — ASPIRIN 81 MG: 81 TABLET ORAL at 08:36

## 2019-06-09 RX ADMIN — PRAVASTATIN SODIUM 40 MG: 40 TABLET ORAL at 21:01

## 2019-06-09 RX ADMIN — HYDRALAZINE HYDROCHLORIDE 200 MG: 50 TABLET, FILM COATED ORAL at 18:42

## 2019-06-09 RX ADMIN — METOPROLOL TARTRATE 50 MG: 50 TABLET ORAL at 08:37

## 2019-06-09 RX ADMIN — AMOXICILLIN 500 MG: 250 CAPSULE ORAL at 20:02

## 2019-06-09 RX ADMIN — TEMAZEPAM 15 MG: 15 CAPSULE ORAL at 21:01

## 2019-06-09 RX ADMIN — DOCUSATE SODIUM 100 MG: 100 CAPSULE, LIQUID FILLED ORAL at 18:42

## 2019-06-09 RX ADMIN — GABAPENTIN 400 MG: 100 CAPSULE ORAL at 12:42

## 2019-06-09 RX ADMIN — HEPARIN SODIUM 5000 UNITS: 5000 INJECTION INTRAVENOUS; SUBCUTANEOUS at 20:01

## 2019-06-09 RX ADMIN — DILTIAZEM HYDROCHLORIDE 300 MG: 180 CAPSULE, COATED, EXTENDED RELEASE ORAL at 08:36

## 2019-06-09 RX ADMIN — HYDRALAZINE HYDROCHLORIDE 200 MG: 50 TABLET, FILM COATED ORAL at 08:37

## 2019-06-09 RX ADMIN — SODIUM CHLORIDE 100 ML/HR: 450 INJECTION, SOLUTION INTRAVENOUS at 05:36

## 2019-06-09 RX ADMIN — ZOLPIDEM TARTRATE 5 MG: 5 TABLET ORAL at 21:01

## 2019-06-09 RX ADMIN — GABAPENTIN 400 MG: 100 CAPSULE ORAL at 08:37

## 2019-06-09 RX ADMIN — PAROXETINE 20 MG: 20 TABLET, FILM COATED ORAL at 08:37

## 2019-06-09 RX ADMIN — Medication 10 ML: at 05:37

## 2019-06-09 RX ADMIN — PREDNISONE 10 MG: 10 TABLET ORAL at 08:37

## 2019-06-09 RX ADMIN — TRAMADOL HYDROCHLORIDE 50 MG: 50 TABLET, FILM COATED ORAL at 20:02

## 2019-06-09 NOTE — PROGRESS NOTES
Oncology End of Shift Note      Bedside shift change report given to Asif Cardoso RN (incoming nurse) by La Isaacs (outgoing nurse) on [facility]. Report included the following information SBAR, Kardex and MAR. Shift Summary:   Patient tolerated care fairly well throughout shift, no new complains. Family present at bedside in the evening. The patients blood pressure was elevated during morning vitals. Dr. Maria Dolores Edmondson notified and stated \"continue to monitor. \" Patient consumed most of their meals. Education provided regarding medications. Issues for Physician to Address:       Patient on Cardiac Monitoring? [] Yes  [x] No    Rhythm:          Shift Events  Informed Dr weinstein of elevated BP MD stated \"Give morning meds. \"  -Informed MD of updated BP (See Flowsheet) Md stated to monitor BP (see flow sheet). Talat Riley

## 2019-06-09 NOTE — PROGRESS NOTES
INTERNAL MEDICINE ATTENDING NOTE    S: Ms. Jennifer Lofton is a patient of Abbi Patton MD and was seen by me today during rounds. At this time, she is resting comfortably. Nausea is better. The patient has no new complaints today. ROS otherwise unremarkable except as noted elsewhere. O: Blood pressure 190/65, pulse 62, temperature 98 °F (36.7 °C), resp. rate 18, weight 116 lb 3.2 oz (52.7 kg), SpO2 92 %, not currently breastfeeding. Gen: Patient is in no acute distress. Lungs: CTAB. Heart: RRR. Abd: S, NT, ND, BS present. Extremities: Warm. Recent Results (from the past 12 hour(s))   METABOLIC PANEL, BASIC    Collection Time: 06/09/19  5:38 AM   Result Value Ref Range    Sodium 137 136 - 145 mmol/L    Potassium 4.3 3.5 - 5.1 mmol/L    Chloride 109 (H) 97 - 108 mmol/L    CO2 22 21 - 32 mmol/L    Anion gap 6 5 - 15 mmol/L    Glucose 86 65 - 100 mg/dL    BUN 42 (H) 6 - 20 MG/DL    Creatinine 1.43 (H) 0.55 - 1.02 MG/DL    BUN/Creatinine ratio 29 (H) 12 - 20      GFR est AA 42 (L) >60 ml/min/1.73m2    GFR est non-AA 35 (L) >60 ml/min/1.73m2    Calcium 9.0 8.5 - 10.1 MG/DL        A / P:   1. Complicated UTI (urinary tract infection) (11/12/2018): On appropriate antibiotics. 2. COPD (chronic obstructive pulmonary disease) ([facility] (8/14/2010): Prn duonebs added. 3. PAF (paroxysmal atrial fibrillation) ([facility] (3/30/2018):   4. CHRISTY on Stage 3 chronic kidney disease ([facility] (11/5/2018): Better today. 5. Nausea & vomiting (6/3/2019): Improved.    6. Fever (6/4/2019)     Tori Brooks MD, FACP  Best contact is via Pager: [phone], or via hospital  at [phone]

## 2019-06-10 VITALS
WEIGHT: 116.2 LBS | OXYGEN SATURATION: 98 % | BODY MASS INDEX: 21.25 KG/M2 | DIASTOLIC BLOOD PRESSURE: 62 MMHG | SYSTOLIC BLOOD PRESSURE: 198 MMHG | RESPIRATION RATE: 18 BRPM | HEART RATE: 61 BPM | TEMPERATURE: 98 F

## 2019-06-10 LAB
ANION GAP SERPL CALC-SCNC: 4 MMOL/L (ref 5–15)
BACTERIA SPEC CULT: NORMAL
BUN SERPL-MCNC: 41 MG/DL (ref 6–20)
BUN/CREAT SERPL: 26 (ref 12–20)
CALCIUM SERPL-MCNC: 9.2 MG/DL (ref 8.5–10.1)
CHLORIDE SERPL-SCNC: 110 MMOL/L (ref 97–108)
CO2 SERPL-SCNC: 24 MMOL/L (ref 21–32)
CREAT SERPL-MCNC: 1.55 MG/DL (ref 0.55–1.02)
GLUCOSE SERPL-MCNC: 90 MG/DL (ref 65–100)
POTASSIUM SERPL-SCNC: 4.4 MMOL/L (ref 3.5–5.1)
SERVICE CMNT-IMP: NORMAL
SODIUM SERPL-SCNC: 138 MMOL/L (ref 136–145)

## 2019-06-10 PROCEDURE — 94760 N-INVAS EAR/PLS OXIMETRY 1: CPT

## 2019-06-10 PROCEDURE — 80048 BASIC METABOLIC PNL TOTAL CA: CPT

## 2019-06-10 PROCEDURE — 74011636637 HC RX REV CODE- 636/637: Performed by: INTERNAL MEDICINE

## 2019-06-10 PROCEDURE — 74011250637 HC RX REV CODE- 250/637: Performed by: INTERNAL MEDICINE

## 2019-06-10 PROCEDURE — 36415 COLL VENOUS BLD VENIPUNCTURE: CPT

## 2019-06-10 PROCEDURE — 74011000258 HC RX REV CODE- 258: Performed by: INTERNAL MEDICINE

## 2019-06-10 PROCEDURE — 74011250636 HC RX REV CODE- 250/636: Performed by: INTERNAL MEDICINE

## 2019-06-10 RX ORDER — AMOXICILLIN 500 MG/1
500 CAPSULE ORAL EVERY 12 HOURS
Qty: 6 CAP | Refills: 0 | Status: SHIPPED | OUTPATIENT
Start: 2019-06-10 | End: 2019-07-02 | Stop reason: ALTCHOICE

## 2019-06-10 RX ADMIN — Medication 1 CAPSULE: at 09:28

## 2019-06-10 RX ADMIN — ASPIRIN 81 MG: 81 TABLET ORAL at 09:28

## 2019-06-10 RX ADMIN — METOPROLOL TARTRATE 50 MG: 50 TABLET ORAL at 09:28

## 2019-06-10 RX ADMIN — SODIUM CHLORIDE 100 ML/HR: 450 INJECTION, SOLUTION INTRAVENOUS at 07:57

## 2019-06-10 RX ADMIN — GABAPENTIN 400 MG: 100 CAPSULE ORAL at 12:58

## 2019-06-10 RX ADMIN — HEPARIN SODIUM 5000 UNITS: 5000 INJECTION INTRAVENOUS; SUBCUTANEOUS at 06:20

## 2019-06-10 RX ADMIN — GABAPENTIN 400 MG: 100 CAPSULE ORAL at 09:29

## 2019-06-10 RX ADMIN — DILTIAZEM HYDROCHLORIDE 300 MG: 180 CAPSULE, COATED, EXTENDED RELEASE ORAL at 09:28

## 2019-06-10 RX ADMIN — SODIUM CHLORIDE 100 ML/HR: 450 INJECTION, SOLUTION INTRAVENOUS at 06:21

## 2019-06-10 RX ADMIN — LISINOPRIL 40 MG: 40 TABLET ORAL at 09:29

## 2019-06-10 RX ADMIN — PREDNISONE 10 MG: 10 TABLET ORAL at 09:28

## 2019-06-10 RX ADMIN — PAROXETINE 20 MG: 20 TABLET, FILM COATED ORAL at 09:29

## 2019-06-10 RX ADMIN — DOCUSATE SODIUM 100 MG: 100 CAPSULE, LIQUID FILLED ORAL at 09:29

## 2019-06-10 RX ADMIN — AMOXICILLIN 500 MG: 250 CAPSULE ORAL at 09:28

## 2019-06-10 RX ADMIN — HYDRALAZINE HYDROCHLORIDE 200 MG: 50 TABLET, FILM COATED ORAL at 09:29

## 2019-06-10 NOTE — PROGRESS NOTES
Problem: Falls - Risk of  Goal: *Absence of Falls  Description  Document Gabriel Roblero Fall Risk and appropriate interventions in the flowsheet. Outcome: Progressing Towards Goal     Problem: Patient Education: Go to Patient Education Activity  Goal: Patient/Family Education  Outcome: Progressing Towards Goal     Problem: Pressure Injury - Risk of  Goal: *Prevention of pressure injury  Description  Document Rashid Scale and appropriate interventions in the flowsheet.   Outcome: Progressing Towards Goal     Problem: Patient Education: Go to Patient Education Activity  Goal: Patient/Family Education  Outcome: Progressing Towards Goal     Problem: Patient Education: Go to Patient Education Activity  Goal: Patient/Family Education  Outcome: Progressing Towards Goal

## 2019-06-10 NOTE — DISCHARGE INSTRUCTIONS
[address] [place] [address]  [phone]      Patient Discharge Instructions    Ev Mckeon / [id number] : 1935    Admitted 2019 Discharged: 6/10/2019     Principal Problem:    Complicated UTI (urinary tract infection) (2018)    Active Problems:    COPD (chronic obstructive pulmonary disease) ([facility] (2010)      PAF (paroxysmal atrial fibrillation) ([facility] (3/30/2018)      Stage 3 chronic kidney disease ([facility] (2018)      Nausea & vomiting (6/3/2019)      Fever (2019)          Allergies   Allergen Reactions    Dilaudid [Hydromorphone] Unknown (comments)     Does not remember    Hydrocodone Nausea and Vomiting    Morphine Rash       · It is important that you take the medication exactly as they are prescribed. · Do not take other medications without consulting your doctor. What to do at Next Level of Care    Disposition:  Home, LTC, assisted living    Recommended diet: No added salt    Recommended activity: Usual          Information obtained by :  I understand that if any problems occur once I am at home I am to contact my physician. I understand and acknowledge receipt of the instructions indicated above.                                                                                                                                            Physician's or R.N.'s Signature                                                                  Date/Time                                                                                                                                              Patient or Representative Signature                                                          Date/Time

## 2019-06-11 ENCOUNTER — PATIENT OUTREACH (OUTPATIENT)
Dept: INTERNAL MEDICINE CLINIC | Age: 84
End: 2019-06-11

## 2019-06-11 NOTE — PROGRESS NOTES
Hospital Discharge Follow-Up      Date/Time:  2019 10:36 AM    Patient was admitted to [facility] on 19 and discharged on  for complicated UTI. The physician discharge summary was available at the time of outreach. Patient was contacted within two business days of discharge. Top Challenges reviewed with the provider   BUN at baseline of 41, down from 53  Creatinine 1.55 , down from 2.40  WBC 8.5, down from 19.8    Patient finishing up Amoxicillin as prescribed upon discharge         Method of communication with provider :chart routing    Inpatient RRAT score: 32  Was this a readmission? no   Patient stated reason for the readmission: n/a    Nurse Navigator (NN) contacted the caregiver by telephone to perform post hospital discharge assessment. Verified name and  with caregiver as identifiers. Provided introduction to self, and explanation of the Nurse Navigator role. Reviewed discharge instructions and red flags with caregiver who verbalized understanding. Caregiver given an opportunity to ask questions and does not have any further questions or concerns at this time. The caregiver agrees to contact the PCP office for questions related to their healthcare. NN provided contact information for future reference. Disease Specific:   N/A    Summary of patient's top problems:  1. Patient presented with nausea, vomiting and elevated white count of 19,000. Admitted to hospital.  Urine culture showed enterococcus and patient treated with Levaquin and Rocephin IV, then switched to ampicillin by mouth. Dehydration resolved with IV fluids and patient's BUN returned to baseline. Discharged back to assisted living with p.o. Amoxicillin and resumption of PT/OT.         Home Health orders at discharge: PT, [address]: [facility] [place] (resumption of care)  Date of initial visit: 19    Durable Medical Equipment ordered/company: Cov[employer] Equipment received: n/a    Barriers to care? none    Advance Care Planning:   Does patient have an Advance Directive:  reviewed and current     Medication(s):   New Medications at Discharge: amoxicillin  Changed Medications at Discharge: n/a  Discontinued Medications at Discharge: n/a    Medication reconciliation was performed with caregiver, who verbalizes understanding of administration of home medications. There were no barriers to obtaining medications identified at this time. Referral to Pharm D needed: no     Current Outpatient Medications   Medication Sig    amoxicillin (AMOXIL) 500 mg capsule Take 1 Cap by mouth every twelve (12) hours.  loperamide (IMODIUM) 2 mg capsule Take 2 Caps by mouth every eight (8) hours as needed for Diarrhea.  temazepam (RESTORIL) 15 mg capsule Take 1 Cap by mouth nightly. Max Daily Amount: 15 mg.    hydrALAZINE (APRESOLINE) 100 mg tablet Take 2 Tabs by mouth two (2) times a day.  pravastatin (PRAVACHOL) 40 mg tablet Take 1 Tab by mouth nightly.  chlorthalidone (HYGROTEN) 25 mg tablet Take 1 Tab by mouth daily.  traMADol (ULTRAM) 50 mg tablet Take 1 Tab by mouth every six (6) hours as needed for Pain. Max Daily Amount: 200 mg.    lisinopril (PRINIVIL, ZESTRIL) 40 mg tablet Take 1 Tab by mouth daily.  dilTIAZem CD (CARDIZEM CD) 300 mg ER capsule Take 1 Cap by mouth daily.  docusate sodium (COLACE) 100 mg capsule Take 100 mg by mouth two (2) times a day.  gabapentin (NEURONTIN) 400 mg capsule Take 400 mg by mouth four (4) times daily.  PARoxetine (PAXIL) 20 mg tablet Take 20 mg by mouth daily.  cholecalciferol (VITAMIN D3) 50,000 unit capsule Take 50,000 Units by mouth every seven (7) days.  polyethylene glycol (MIRALAX) 17 gram packet Take 17 g by mouth daily as needed (constipation).  L. acidoph & paracasei- S therm- Bifido (HANG-Q/RISAQUAD) 8 billion cell cap cap Take 1 Cap by mouth daily.     metoprolol tartrate (LOPRESSOR) 50 mg tablet Take 1 Tab by mouth two (2) times a day.  predniSONE (DELTASONE) 10 mg tablet Take 1 Tab by mouth daily (with breakfast).  acetaminophen (TYLENOL) 650 mg CR tablet Take 500 mg by mouth every six (6) hours as needed for Pain.  aspirin 81 mg tablet Take 81 mg by mouth daily. Stopped for surgery 8-4-10      No current facility-administered medications for this visit. There are no discontinued medications. [facility] follow up appointment(s):   Future Appointments   Date Time Provider Miroslava Espinoza   6/14/2019  1:10 PM Mirian Fuentes MD [address]   8/2/2019 10:20 AM Mirian Fuentes MD [address]      Non-[facility] follow up appointment(s): n/a  [facility]:  n/a       Goals      Returns to baseline activity level. 6/11/19-NN spoke to patient's daughter and nurse at [facility] [address] [facility] post discharge. Patient is doing well per her daughter and Delfino Woodall, floor nurse. She is taking her antibiotic as prescribed and has had no evidence of fever, nausea or vomiting. She has resumed her PT/OT with [facility] for strengthening. She has a f/u visit with PCP on 6/14/19. NN told daughter and nurse at facility to call should she have any symptoms over the next few days that might indicate further infection.   NN will check with patient's nurse Delfino Woodall in two weeks to be sure she continues to do well. / vs

## 2019-06-11 NOTE — DISCHARGE SUMMARY
[address] SUMMARY    Name:  Tha Bunch  MR#:  [id number]  :  1935  ACCOUNT #:  [de-identified]  ADMIT DATE:  2019  DISCHARGE DATE:  06/10/2019    FINAL DIAGNOSES:  1. Complicated urinary tract infection with enterococcus. 2.  Chronic obstructive pulmonary disease. 3.  Paroxysmal atrial fibrillation. 4.  Chronic kidney disease, stage III. 5.  Nausea and vomiting secondary to complicated urinary tract infection with enterococcus, resolved. 6.  Fever secondary to complicated urinary tract infection with enterococcus, resolved. For details of admission history and physical, please see admit note. HOSPITAL COURSE:  Briefly, the patient is an 49-year-old white female, resident of [facility], who presented to the office on the day of admission with nausea, vomiting, and was found to have urinary tract infection with elevated white count around 19,000, and it was felt prudent to admit her to the hospital.  She was admitted to the hospital, started on Levaquin and Rocephin to cover urinary tract infection, and blood culture and urine culture were obtained. Blood cultures returned negative. Urine culture did return positive for enterococcus. It was appropriately sensitive to Levaquin and also sensitive to ampicillin. She was treated with Levaquin for the first 4 days of hospitalization, subsequently switched to p.o. ampicillin and did quite well with that. She also had significant problems with dehydration on admission, and her electrolytes were corrected with IV fluids, and BUN and creatinine returned to her baseline. At the time of discharge, BUN 42, creatinine of 1.43, and other laboratory studies were remarkable for hemoglobin of 10.1 and white count of 8,500.   It was felt at this point that maximal hospital benefit had been achieved, and she would be discharged home on a new prescription for amoxicillin 500 mg b.i.d. for an additional 3 days as well as her admission medications, [place] 25 mg daily, hydralazine 100 mg b.i.d., lisinopril 40 mg daily, Priscila-Q one tablet by mouth daily, Paxil 20 mg daily, Pravachol 40 mg daily, prednisone 10 mg daily, Restoril 15 mg at bedtime p.r.n., tramadol 50 mg q.6 hours p.r.n., supplemental Tylenol 650 mg q.i.d. p.r.n., aspirin 81 mg daily, vitamin D3 of 50,000 units by mouth once weekly, Cardizem  mg daily, Colace 100 mg b.i.d., gabapentin 400 mg 4 times daily, metoprolol 50 mg b.i.d., MiraLax one packet daily. She will have followup by me in the office in about 4 days.       Steve Rodriguez MD      [id number]  D:  06/10/2019 7:22  T:  06/10/2019 8:55  JOB #:  [id number]  CC:  [facility]

## 2019-06-13 NOTE — CDMP QUERY
Dr. Zulema Damon:  
 
Patient admitted with \"Complicated UTI\". Documentation reflects \"Possible  Sepsis\" in Progress note(s) dated 6/6 and 6/7. If possible, please specify in the progress notes and d/c summary if \"Sepsis POA\" was: 
 
? Ruled out after study ? Still Suspected after study ? Confirmed after study The medical record reflects the following: 
  Risk Factors: Hx of recurrent complicated urinary tract infections and has a permanent right ureteral stent in place. .. Janay Traore C/o Dysuria and Abd pain Clinical Indicators: RR: 20; HR: 47-54; O2 sats: 89-91% on RA; WBC: 19.2; Bands: 17.2; UA: le: large; wbc: ; bact: 1+; Ucx: >100,000 colonies/ml; ENTEROCOCCUS FAECALIS GROUP D Treatment: IV Levaquin and Rocephin; Blood and Urine Cultures; IV hydration; Supplemental O2 Thank you, Bryson Holloway [facility] 
[phone]

## 2019-06-14 ENCOUNTER — OFFICE VISIT (OUTPATIENT)
Dept: INTERNAL MEDICINE CLINIC | Age: 84
End: 2019-06-14

## 2019-06-14 VITALS
DIASTOLIC BLOOD PRESSURE: 76 MMHG | HEIGHT: 62 IN | SYSTOLIC BLOOD PRESSURE: 138 MMHG | RESPIRATION RATE: 18 BRPM | HEART RATE: 53 BPM | OXYGEN SATURATION: 90 % | BODY MASS INDEX: 21.25 KG/M2 | TEMPERATURE: 98.2 F

## 2019-06-14 DIAGNOSIS — I10 ESSENTIAL HYPERTENSION: ICD-10-CM

## 2019-06-14 DIAGNOSIS — N39.0 RECURRENT UTI: ICD-10-CM

## 2019-06-14 DIAGNOSIS — R11.2 NON-INTRACTABLE VOMITING WITH NAUSEA, UNSPECIFIED VOMITING TYPE: ICD-10-CM

## 2019-06-14 DIAGNOSIS — E44.0 MODERATE PROTEIN-CALORIE MALNUTRITION (HCC): ICD-10-CM

## 2019-06-14 DIAGNOSIS — N18.30 STAGE 3 CHRONIC KIDNEY DISEASE (HCC): ICD-10-CM

## 2019-06-14 DIAGNOSIS — A41.9 SEPSIS, DUE TO UNSPECIFIED ORGANISM: Primary | ICD-10-CM

## 2019-06-14 DIAGNOSIS — I48.0 PAF (PAROXYSMAL ATRIAL FIBRILLATION) (HCC): ICD-10-CM

## 2019-06-14 DIAGNOSIS — J44.9 CHRONIC OBSTRUCTIVE PULMONARY DISEASE, UNSPECIFIED COPD TYPE (HCC): Chronic | ICD-10-CM

## 2019-06-14 LAB
ANION GAP SERPL CALC-SCNC: 9 MMOL/L
BACTERIA,BACTU: ABNORMAL
BILIRUB UR QL: NEGATIVE
BUN SERPL-MCNC: 34 MG/DL (ref 7–17)
CALCIUM SERPL-MCNC: 10.6 MG/DL (ref 8.4–10.2)
CHLORIDE SERPL-SCNC: 104 MMOL/L (ref 98–107)
CLARITY: CLEAR
CO2 SERPL-SCNC: 26 MMOL/L (ref 22–32)
COLOR UR: ABNORMAL
CREAT SERPL-MCNC: 1.5 MG/DL (ref 0.7–1.2)
ERYTHROCYTE [DISTWIDTH] IN BLOOD BY AUTOMATED COUNT: 13.8 %
GLUCOSE 24H UR-MRATE: NEGATIVE G/(24.H)
GLUCOSE SERPL-MCNC: 88 MG/DL (ref 65–105)
HCT VFR BLD AUTO: 33.6 % (ref 37–51)
HGB BLD-MCNC: 10.8 G/DL (ref 12–18)
HGB UR QL STRIP: NEGATIVE
KETONES UR QL STRIP.AUTO: NEGATIVE
LEUKOCYTE ESTERASE: ABNORMAL
LYMPHOCYTES ABSOLUTE: 1.4 K/UL (ref 0.6–4.1)
LYMPHOCYTES NFR BLD: 14.4 % (ref 10–58.5)
Lab: ABNORMAL
MCH RBC QN AUTO: 30.2 PG (ref 26–32)
MCHC RBC AUTO-ENTMCNC: 32.1 G/DL (ref 30–36)
MCV RBC AUTO: 93.8 FL (ref 80–97)
MONOCYTES ABS-DIF,2141: 0.9 K/UL (ref 0–1.8)
MONOCYTES NFR BLD: 9.7 % (ref 0.1–24)
NEUTROPHILS # BLD: 75.9 % (ref 37–92)
NEUTROPHILS ABS,2156: 7.2 K/UL (ref 2–7.8)
NITRITE UR QL STRIP.AUTO: NEGATIVE
PH UR STRIP: 5 [PH] (ref 5–7)
PLATELET # BLD AUTO: 335 K/UL (ref 140–440)
PMV BLD AUTO: 8.2 FL
POTASSIUM SERPL-SCNC: 4.2 MMOL/L (ref 3.6–5)
PROT UR STRIP-MCNC: ABNORMAL MG/DL
RBC # BLD AUTO: 3.58 M/UL (ref 4.2–6.3)
RBC #/AREA URNS HPF: 0 #/HPF
SODIUM SERPL-SCNC: 139 MMOL/L (ref 137–145)
SP GR UR REFRACTOMETRY: 1.01 (ref 1–1.03)
UROBILINOGEN UR QL STRIP.AUTO: NEGATIVE
WBC # BLD AUTO: 9.5 K/UL (ref 4.1–10.9)
WBC URNS QL MICRO: ABNORMAL #/HPF

## 2019-06-14 NOTE — PROGRESS NOTES
Transitions Of Care ([facility] 6/4/19-6/10/19 for white cell count)       HPI:  Sangita Tapia is a 80y.o. year old female who is here for a follow up visit for hospitalization transition of care. She was last seen by me on 6/4/2019 at the office and on 6/10/2019 at time of hospital discharge. Discharged on: 6/10/2019    Diagnosis in hospital:  1. Complicated urinary tract infection  2. Sepsis  3. Nausea vomiting secondary to #1 and 2  4. CKD stage III  5. Paroxysmal atrial fibrillation  6. COPD  7. Malnutrition    Complications in hospital: No complications    Medication changes: Amoxicillin 500 mg 3 times daily for an additional 3 days and other medicines unchanged from prior to admission    Discharge Summary reviewed. Today 6/14/2019      She reports the following: Since discharge from hospital she has had no further nausea vomiting and has no abdominal pain. She does note that her appetite is okay. She notes no urinary frequency or dysuria. She denies any chest pain, shortness of breath, palpitations, PND, orthopnea or cardiorespiratory complaints except for chronic dyspnea which is not changed. She denies any GI or  complaints. She denies any headaches, dizziness or neurologic complaints except for generalized weakness. She has no change of her chronic arthritic complaints. She claims to be taking the medication and trying to follow her diet and she has no other specific complaints.           Visit Vitals  /76   Pulse (!) 53   Temp 98.2 °F (36.8 °C) (Oral)   Resp 18   Ht 5' 2\" (1.575 m)   SpO2 90%   BMI 21.25 kg/m²       Historical Data    Past Medical History:   Diagnosis Date    Hypertension     Ill-defined condition     Ex Lap with Nicole Brow patch of a perforated pyloric channel ulcer 7/19/16    Other ill-defined conditions(799.89)     lipids    Other ill-defined conditions(799.89)     blood transfusion history       Past Surgical History:   Procedure Laterality Date    BYPASS GRAFT OTHR,FEM-FEM      BYPASS GRAFT OTHR,FEM-POP      right    HX HEENT      bilateral cataracts    HX ORTHOPAEDIC      right hip fracture/pinning    HX UROLOGICAL      right stent/kidney       Outpatient Encounter Medications as of 6/14/2019   Medication Sig Dispense Refill    amoxicillin (AMOXIL) 500 mg capsule Take 1 Cap by mouth every twelve (12) hours. 6 Cap 0    loperamide (IMODIUM) 2 mg capsule Take 2 Caps by mouth every eight (8) hours as needed for Diarrhea. 60 Cap 2    temazepam (RESTORIL) 15 mg capsule Take 1 Cap by mouth nightly. Max Daily Amount: 15 mg. 30 Cap 5    hydrALAZINE (APRESOLINE) 100 mg tablet Take 2 Tabs by mouth two (2) times a day. 120 Tab prn    pravastatin (PRAVACHOL) 40 mg tablet Take 1 Tab by mouth nightly. 30 Tab 11    chlorthalidone (HYGROTEN) 25 mg tablet Take 1 Tab by mouth daily. 30 Tab prn    traMADol (ULTRAM) 50 mg tablet Take 1 Tab by mouth every six (6) hours as needed for Pain. Max Daily Amount: 200 mg. 60 Tab 0    lisinopril (PRINIVIL, ZESTRIL) 40 mg tablet Take 1 Tab by mouth daily. 30 Tab prn    dilTIAZem CD (CARDIZEM CD) 300 mg ER capsule Take 1 Cap by mouth daily. 30 Cap prn    docusate sodium (COLACE) 100 mg capsule Take 100 mg by mouth two (2) times a day.  gabapentin (NEURONTIN) 400 mg capsule Take 400 mg by mouth four (4) times daily.  PARoxetine (PAXIL) 20 mg tablet Take 20 mg by mouth daily.  cholecalciferol (VITAMIN D3) 50,000 unit capsule Take 50,000 Units by mouth every seven (7) days.  polyethylene glycol (MIRALAX) 17 gram packet Take 17 g by mouth daily as needed (constipation).  L. acidoph & paracasei- S therm- Bifido (HANG-Q/RISAQUAD) 8 billion cell cap cap Take 1 Cap by mouth daily. 30 Cap 0    metoprolol tartrate (LOPRESSOR) 50 mg tablet Take 1 Tab by mouth two (2) times a day. 60 Tab PRN    predniSONE (DELTASONE) 10 mg tablet Take 1 Tab by mouth daily (with breakfast).  30 Tab 0    acetaminophen (TYLENOL) 650 mg CR tablet Take 500 mg by mouth every six (6) hours as needed for Pain.  aspirin 81 mg tablet Take 81 mg by mouth daily. Stopped for surgery 8-4-10        No facility-administered encounter medications on file as of 6/14/2019.          Allergies   Allergen Reactions    Dilaudid [Hydromorphone] Unknown (comments)     Does not remember    Hydrocodone Nausea and Vomiting    Morphine Rash        Social History     Socioeconomic History    Marital status:      Spouse name: Not on file    Number of children: Not on file    Years of education: Not on file    Highest education level: Not on file   Occupational History    Not on file   Social Needs    Financial resource strain: Not on file    Food insecurity:     Worry: Not on file     Inability: Not on file    Transportation needs:     Medical: Not on file     Non-medical: Not on file   Tobacco Use    Smoking status: Former Smoker     Packs/day: 0.25    Smokeless tobacco: Never Used    Tobacco comment: stopped 2009   Substance and Sexual Activity    Alcohol use: No    Drug use: No    Sexual activity: Not on file   Lifestyle    Physical activity:     Days per week: Not on file     Minutes per session: Not on file    Stress: Not on file   Relationships    Social connections:     Talks on phone: Not on file     Gets together: Not on file     Attends Nondenominational service: Not on file     Active member of club or organization: Not on file     Attends meetings of clubs or organizations: Not on file     Relationship status: Not on file    Intimate partner violence:     Fear of current or ex partner: Not on file     Emotionally abused: Not on file     Physically abused: Not on file     Forced sexual activity: Not on file   Other Topics Concern    Not on file   Social History Narrative    Not on file      REVIEW OF SYSTEMS:  General: negative for - chills or fever  ENT: negative for - headaches, nasal congestion or tinnitus  Eyes: no blurred or visual changes  Neck: No stiffness or swollen nodes  Respiratory: negative for - cough, hemoptysis, shortness of breath or wheezing  Cardiovascular : negative for - chest pain, edema, palpitations or shortness of breath  Gastrointestinal: negative for - abdominal pain, blood in stools, heartburn or nausea/vomiting  Genito-Urinary: no dysuria, trouble voiding, or hematuria  Musculoskeletal: negative for - gait disturbance, joint pain, joint stiffness or joint swelling  Neurological: no TIA or stroke symptoms  Hematologic: no bruises, no bleeding  Lymphatic: no swollen glands  Integument: no lumps, mole changes, nail changes or rash  Endocrine:no malaise/lethargy poly uria or polydipsia or unexpected weight changes      Visit Vitals  /76   Pulse (!) 53   Temp 98.2 °F (36.8 °C) (Oral)   Resp 18   Ht 5' 2\" (1.575 m)   SpO2 90%   BMI 21.25 kg/m²     CONSTITUTIONAL: Thin frail white female, appears age appropriate  HEAD: normocephalic, atraumatic  EYES: sclera anicteric, PERRL, EOMI  ENMT:moist mucous membranes, pharynx clear          Nares: w/o erythema or edema  NECK: supple. Thyroid normal, No JVD or bruits  RESPIRATORY: Chest: clear to ascultation and percussion   CARDIOVASCULAR: Heart: regular rate and rhythm no murmurs, rubs or gallops, PMI not displaced, no thrill  GASTROINTESTINAL: Abdomen: non distended, soft, non-tender, bowel sounds normal  HEMATOLOGIC: no petechiae or purpura  LYMPHATIC: no lymphadenopathy  MUSCULOSKELETAL: Extremities: no edema or active synovitis, pulse 1+   BACK; no point or CVAT  INTEGUMENT: No unusual rashes or suspicious skin lesions noted. Nails appear normal.  NEUROLOGIC: non-focal exam   MENTAL STATUS: alert and oriented, appropriate affect   PSYCHIATRIC: normal affect    ASSESSMENT:   1. Sepsis, due to unspecified organism ([address]    2. Non-intractable vomiting with nausea, unspecified vomiting type    3. Stage 3 chronic kidney disease ([address]    4.  PAF (paroxysmal atrial fibrillation) ([facility]    5. Moderate protein-calorie malnutrition ([facility]    6. Essential hypertension    7. Chronic obstructive pulmonary disease, unspecified COPD type ([facility]    8. Recurrent UTI      Impression  1. Sepsis secondary to urinary tract infection completed course of antibiotics for enterococcus during hospitalization. 2.  Nausea vomiting from urinary tract infection resolved  3. CKD stage III repeat status pending at time of discharge BUN 42 creatinine 1.43  4. Paroxysmal atrial fibrillation currently in sinus rhythm  5. Malnutrition I did encourage her to continue to work on getting her appetite up and her weight up  6. Hypertension she does have a labile component repeat checked by me was good although initially up by the nurse  7. COPD that is stable  8. Recurrent urinary tract infection follow-up urine and culture pending  All of the above discussed with her son present with her today. I will call with lab results and make further recommendations or adjustments as necessary. Follow-up set up for 4 to 6 weeks or sooner should to be a problem. Note this patient has had high risk of repeat hospitalization for multiple reasons for her multiple ongoing active chronic medical problems. She is instructed to call me at the first sign that she is knows something is not right before she develops full-blown problems that precipitated on her last hospitalization when she presented with a white count 19,000    PLAN:  .  Orders Placed This Encounter    CULTURE, URINE    CBC WITH AUTOMATED DIFF ([facility] [employer] [facility])    METABOLIC PANEL, BASIC ([facility])    URINALYSIS W/MICROSCOPIC ([facility] [employer] [facility])         ATTENTION:   This medical record was transcribed using an electronic medical records system. Although proofread, it may and can contain electronic and spelling errors. Other human spelling and other errors may be present. Corrections may be executed at a later time.   Please feel free to contact us for any clarifications as needed. Follow-up and Dispositions    · Return in about 1 month (around 7/12/2019). Ivy Siemens, MD    Orders Placed This Encounter    CULTURE, URINE    CBC WITH AUTOMATED DIFF ([facility])    METABOLIC PANEL, BASIC ([facility])    URINALYSIS W/MICROSCOPIC ([facility])          No results found for any visits on 06/14/19. I have reviewed the patient's medical history in detail and updated the computerized patient record. We had a prolonged discussion about these complex clinical issues and went over the various important aspects to consider. All questions were answered. Advised her to call back or return to office if symptoms do not improve, change in nature, or persist.    She was given an after visit summary or informed of [facility] which includes patient instructions, diagnoses, current medications, & vitals. She expressed understanding with the diagnosis and plan.

## 2019-06-14 NOTE — PATIENT INSTRUCTIONS

## 2019-06-14 NOTE — PROGRESS NOTES
Diogo Suarez  Identified pt with two pt identifiers(name and ). Chief Complaint   Patient presents with    Transitions Of Care     [facility]       1. Have you been to the ER, urgent care clinic since your last visit? Hospitalized since your last visit? Yes, [facility] on 19-6/10/19 for white cell count. 2. Have you seen or consulted any other health care providers outside of the [address] [place] [address] since your last visit? Include any pap smears or colon screening. No      Health Maintenance Topics with due status: Overdue       Topic Date Due    DTaP/Tdap/Td series 1956    Shingrix Vaccine Age 50> 1985    GLAUCOMA SCREENING Q2Y 2000     Health Maintenance Topics with due status: Not Due       Topic Last Completion Date    Influenza Age 5 to Adult 10/30/2018    MEDICARE YEARLY EXAM 2018     Health Maintenance Topics with due status: Completed       Topic Last Completion Date    Bone Densitometry (Dexa) Screening 2014    Pneumococcal 65+ years 2015           Medication reconciliation up to date and corrected with patient at this time. Today's provider has been notified of reason for visit, vitals and flowsheets obtained on patients. Reviewed record in preparation for visit, huddled with provider and have obtained necessary documentation.         Wt Readings from Last 3 Encounters:   19 116 lb 3.2 oz (52.7 kg)   19 121 lb (54.9 kg)   19 115 lb (52.2 kg)     Temp Readings from Last 3 Encounters:   19 98.2 °F (36.8 °C) (Oral)   06/10/19 98 °F (36.7 °C)   19 99.1 °F (37.3 °C) (Oral)     BP Readings from Last 3 Encounters:   19 180/70   06/10/19 198/62   19 124/48     Pulse Readings from Last 3 Encounters:   19 (!) 53   06/10/19 61   19 60     Vitals:    19 1317   BP: 180/70   Pulse: (!) 53   Resp: 18   Temp: 98.2 °F (36.8 °C)   TempSrc: Oral   SpO2: 90%   Height: 5' 2\" (1.575 m)   PainSc:   0 - No pain         Learning Assessment:  :     No flowsheet data found. Depression Screening:  :     3 most recent PHQ Screens 6/4/2019   Little interest or pleasure in doing things Not at all   Feeling down, depressed, irritable, or hopeless Not at all   Total Score PHQ 2 0       No flowsheet data found. Fall Risk Assessment:  :     Fall Risk Assessment, last 12 mths 6/4/2019   Able to walk? Yes   Fall in past 12 months? No   Fall with injury? -   Number of falls in past 12 months -   Fall Risk Score -       Abuse Screening:  :     Abuse Screening Questionnaire 6/14/2019   Do you ever feel afraid of your partner? N   Are you in a relationship with someone who physically or mentally threatens you? N   Is it safe for you to go home?  Y       ADL Screening:  :     ADL Assessment 6/14/2019   Feeding yourself No Help Needed   Getting from bed to chair Help Needed   Getting dressed Help Needed   Bathing or showering Help Needed   Walk across the room (includes cane/walker) Help Needed   Using the telphone No Help Needed   Taking your medications Help Needed   Preparing meals Help Needed   Managing money (expenses/bills) Help Needed   Moderately strenuous housework (laundry) Help Needed   Shopping for personal items (toiletries/medicines) Help Needed   Shopping for groceries Help Needed   Driving Help Needed   Climbing a flight of stairs Help Needed   Getting to places beyond walking distances Help Needed

## 2019-06-16 LAB — BACTERIA UR CULT: NORMAL

## 2019-06-27 ENCOUNTER — PATIENT OUTREACH (OUTPATIENT)
Dept: INTERNAL MEDICINE CLINIC | Age: 84
End: 2019-06-27

## 2019-06-27 NOTE — PROGRESS NOTES
Goals      Returns to baseline activity level. 6/27/19-NN spoke with nurse, ANKIT, at the [facility]. She states patient is doing well and is still receiving PT/OT at the facility. No indications of any return of infection at this time since finishing her antibiotic. NN will be available as needed. / vs    6/11/19-NN spoke to patient's daughter and nurse at [facility] assisted living facility post discharge. Patient is doing well per her daughter and NAKIT, floor nurse. She is taking her antibiotic as prescribed and has had no evidence of fever, nausea or vomiting. She has resumed her PT/OT with [facility] for strengthening. She has a f/u visit with PCP on 6/14/19. NN told daughter and nurse at facility to call should she have any symptoms over the next few days that might indicate further infection.   NN will check with patient's nurse BODØ in two weeks to be sure she continues to do well. / vs

## 2019-07-02 ENCOUNTER — OFFICE VISIT (OUTPATIENT)
Dept: INTERNAL MEDICINE CLINIC | Age: 84
End: 2019-07-02

## 2019-07-02 VITALS
DIASTOLIC BLOOD PRESSURE: 82 MMHG | OXYGEN SATURATION: 93 % | RESPIRATION RATE: 19 BRPM | TEMPERATURE: 98.3 F | SYSTOLIC BLOOD PRESSURE: 138 MMHG | HEART RATE: 55 BPM

## 2019-07-02 DIAGNOSIS — N39.0 RECURRENT UTI: ICD-10-CM

## 2019-07-02 DIAGNOSIS — M19.011 PRIMARY OSTEOARTHRITIS OF RIGHT SHOULDER: ICD-10-CM

## 2019-07-02 DIAGNOSIS — M25.511 ACUTE PAIN OF RIGHT SHOULDER: Primary | ICD-10-CM

## 2019-07-02 RX ORDER — METHYLPREDNISOLONE ACETATE 40 MG/ML
40 INJECTION, SUSPENSION INTRA-ARTICULAR; INTRALESIONAL; INTRAMUSCULAR; SOFT TISSUE ONCE
Qty: 1 VIAL | Refills: 0
Start: 2019-07-02 | End: 2019-07-02

## 2019-07-02 NOTE — PROGRESS NOTES
Chief Complaint   Patient presents with    Shoulder Pain     rt arm/shoulder pain     Visit Vitals  /50 (BP 1 Location: Left arm, BP Patient Position: Sitting)   Pulse (!) 55   Temp 98.3 °F (36.8 °C) (Oral)   Resp 19   SpO2 93%     1. Have you been to the ER, urgent care clinic since your last visit? Hospitalized since your last visit? No    2. Have you seen or consulted any other health care providers outside of the [address] [place] [address] since your last visit? Include any pap smears or colon screening.  7/1/19 Dr. Echols-urology

## 2019-07-02 NOTE — PATIENT INSTRUCTIONS
Arthritis: Care Instructions Your Care Instructions Arthritis, also called osteoarthritis, is a breakdown of the cartilage that cushions your joints. When the cartilage wears down, your bones rub against each other. This causes pain and stiffness. Many people have some arthritis as they age. Arthritis most often affects the joints of the spine, hands, hips, knees, or feet. You can take simple measures to protect your joints, ease your pain, and help you stay active. Follow-up care is a key part of your treatment and safety. Be sure to make and go to all appointments, and call your doctor if you are having problems. It's also a good idea to know your test results and keep a list of the medicines you take. How can you care for yourself at home? · Stay at a healthy weight. Being overweight puts extra strain on your joints. · Talk to your doctor or physical therapist about exercises that will help ease joint pain. ? Stretch. You may enjoy gentle forms of yoga to help keep your joints and muscles flexible. ? Walk instead of jog. Other types of exercise that are less stressful on the joints include riding a bicycle, swimming, lashell chi, or water exercise. ? Lift weights. Strong muscles help reduce stress on your joints. Stronger thigh muscles, for example, take some of the stress off of the knees and hips. Learn the right way to lift weights so you do not make joint pain worse. · Take your medicines exactly as prescribed. Call your doctor if you think you are having a problem with your medicine. · Take pain medicines exactly as directed. ? If the doctor gave you a prescription medicine for pain, take it as prescribed. ? If you are not taking a prescription pain medicine, ask your doctor if you can take an over-the-counter medicine. · Use a cane, crutch, walker, or another device if you need help to get around. These can help rest your joints.  You also can use other things to make life easier, such as a higher toilet seat and padded handles on kitchen utensils. · Do not sit in low chairs, which can make it hard to get up. · Put heat or cold on your sore joints as needed. Use whichever helps you most. You also can take turns with hot and cold packs. ? Apply heat 2 or 3 times a day for 20 to 30 minutesusing a heating pad, hot shower, or hot packto relieve pain and stiffness. ? Put ice or a cold pack on your sore joint for 10 to 20 minutes at a time. Put a thin cloth between the ice and your skin. When should you call for help? Call your doctor now or seek immediate medical care if: 
  · You have sudden swelling, warmth, or pain in any joint.  
  · You have joint pain and a fever or rash.  
  · You have such bad pain that you cannot use a joint.  
 Watch closely for changes in your health, and be sure to contact your doctor if: 
  · You have mild joint symptoms that continue even with more than 6 weeks of care at home.  
  · You have stomach pain or other problems with your medicine. Where can you learn more? Go to http://romeo-jessie.info/. Enter S815 in the search box to learn more about \"Arthritis: Care Instructions. \" Current as of: Nellie 10, 2018 Content Version: 11.9 © 2313-8873 [employer]. Care instructions adapted under license by [employer] (which disclaims liability or warranty for this information). If you have questions about a medical condition or this instruction, always ask your healthcare professional. [place] [address] any warranty or liability for your use of this information.

## 2019-07-02 NOTE — PROGRESS NOTES
Subjective:   Lynn Maya is a 80 y.o. female      Chief Complaint   Patient presents with    Shoulder Pain     rt arm/shoulder pain        History of present illness: She presents complaints of pain in her right shoulder is gone to the point where she has a hard time moving it. She denies any falls in the shoulder pain today she had fallen someone would have had to help her up because she could not get up on her own if she had fallen. She notes that she is previous had a cortisone shot and that is helped and she would like that again. She also is due to have. Her ureteral stent replaced and has a note from the urologist for me to indicate how long she can stop the aspirin before the procedure. She has no other current urinary complaints and there are no current cardiorespiratory complaints. She has no other current complaints.     Patient Active Problem List   Diagnosis Code    COPD (chronic obstructive pulmonary disease) ([facility] J44.9    Acute prepyloric ulcer K25.3    Essential hypertension I10    Mixed hyperlipidemia E78.2    Peripheral vascular disease ([facility] [address] [facility] I73.9    Gastritis K29.70    Acute hypoxemic respiratory failure (HCC) J96.01    Sepsis ([facility] [address] [facility] A41.9    Pneumonia J18.9    Acute renal failure (ARF) ([facility] [address] [facility] N17.9    Primary lung large cell carcinoma, left ([facility]) 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    PAF (paroxysmal atrial fibrillation) ([facility]) I48.0    Moderate protein-calorie malnutrition ([facility]) E44.0    Decubitus ulcer of sacral region, stage 2 L89.152    Leukocytosis D72.829    Hydronephrosis N13.30    Ureteral obstruction N13.5    Stage 3 chronic kidney disease (HCC) N18.3    Acute cystitis without hematuria L77.17    Complicated UTI (urinary tract infection) N39.0    Closed compression fracture of thoracic vertebra ([facility]) S22.000A    Medicare annual wellness visit, initial Z00.00    Recurrent UTI N39.0    Acute pain of right shoulder M25.511    Primary osteoarthritis of right shoulder M19.011 Past Medical History:   Diagnosis Date    Hypertension     Ill-defined condition     Ex Lap with Diane Angles patch of a perforated pyloric channel ulcer 7/19/16    Other ill-defined conditions(799.89)     lipids    Other ill-defined conditions(799.89)     blood transfusion history      Allergies   Allergen Reactions    Dilaudid [Hydromorphone] Unknown (comments)     Does not remember    Hydrocodone Nausea and Vomiting    Morphine Rash      History reviewed. No pertinent family history.    Social History     Socioeconomic History    Marital status:      Spouse name: Not on file    Number of children: Not on file    Years of education: Not on file    Highest education level: Not on file   Occupational History    Not on file   Social Needs    Financial resource strain: Not on file    Food insecurity:     Worry: Not on file     Inability: Not on file    Transportation needs:     Medical: Not on file     Non-medical: Not on file   Tobacco Use    Smoking status: Former Smoker     Packs/day: 0.25    Smokeless tobacco: Never Used    Tobacco comment: stopped 2009   Substance and Sexual Activity    Alcohol use: No    Drug use: No    Sexual activity: Not on file   Lifestyle    Physical activity:     Days per week: Not on file     Minutes per session: Not on file    Stress: Not on file   Relationships    Social connections:     Talks on phone: Not on file     Gets together: Not on file     Attends [employer] service: Not on file     Active member of club or organization: Not on file     Attends meetings of clubs or organizations: Not on file     Relationship status: Not on file    Intimate partner violence:     Fear of current or ex partner: Not on file     Emotionally abused: Not on file     Physically abused: Not on file     Forced sexual activity: Not on file   Other Topics Concern    Not on file   Social History Narrative    Not on file     Prior to Admission medications    Medication Sig Start Date End Date Taking? Authorizing Provider   methylPREDNISolone acetate (DEPO-MEDROL) 40 mg/mL injection 1 mL by IntraMUSCular route once for 1 dose. 7/2/19 7/2/19 Yes Checo Linda MD   loperamide (IMODIUM) 2 mg capsule Take 2 Caps by mouth every eight (8) hours as needed for Diarrhea. 3/13/19  Yes Checo Linda MD   temazepam (RESTORIL) 15 mg capsule Take 1 Cap by mouth nightly. Max Daily Amount: 15 mg. 2/13/19  Yes Checo Linda MD   hydrALAZINE (APRESOLINE) 100 mg tablet Take 2 Tabs by mouth two (2) times a day. 1/4/19  Yes Checo Linda MD   pravastatin (PRAVACHOL) 40 mg tablet Take 1 Tab by mouth nightly. 12/11/18  Yes Checo Linda MD   chlorthalidone (HYGROTEN) 25 mg tablet Take 1 Tab by mouth daily. 12/7/18  Yes Checo Linda MD   traMADol [employer]) 50 mg tablet Take 1 Tab by mouth every six (6) hours as needed for Pain. Max Daily Amount: 200 mg. 12/7/18  Yes Checo Linda MD   lisinopril (PRINIVIL, ZESTRIL) 40 mg tablet Take 1 Tab by mouth daily. 11/6/18  Yes Checo Linda MD   dilTIAZem CD (CARDIZEM CD) 300 mg ER capsule Take 1 Cap by mouth daily. 10/30/18  Yes Checo Linda MD   docusate sodium (COLACE) 100 mg capsule Take 100 mg by mouth two (2) times a day. Yes Veronika, MD Baldev   gabapentin (NEURONTIN) 400 mg capsule Take 400 mg by mouth four (4) times daily. Yes Veronika, MD Baldev   PARoxetine (PAXIL) 20 mg tablet Take 20 mg by mouth daily. Yes Veronika, MD Baldev   cholecalciferol (VITAMIN D3) 50,000 unit capsule Take 50,000 Units by mouth every seven (7) days. Yes Veronika, MD Baldev   polyethylene glycol (MIRALAX) 17 gram packet Take 17 g by mouth daily as needed (constipation). Yes Veronika, MD Baldev   LMitra acidoph & paracasei- S therm- Bifido (HANG-Q/RISAQUAD) 8 billion cell cap cap Take 1 Cap by mouth daily. 4/12/18  Yes Checo Linda MD   metoprolol tartrate (LOPRESSOR) 50 mg tablet Take 1 Tab by mouth two (2) times a day.  4/12/18  Yes Cherry Garnica MD   predniSONE (DELTASONE) 10 mg tablet Take 1 Tab by mouth daily (with breakfast). 4/12/18  Yes Cherry Garnica MD   acetaminophen (TYLENOL) 650 mg CR tablet Take 500 mg by mouth every six (6) hours as needed for Pain. Yes Provider, Historical   aspirin 81 mg tablet Take 81 mg by mouth daily. Stopped for surgery 8-4-10  8/5/10  Yes Provider, Historical        Review of Systems              Constitutional:  She denies fever, weight loss, sweats or fatigue. EYES: No blurred or double vision,               ENT: no nasal congestion, no headache or dizziness. No difficulty with               swallowing, taste, speech or smell. Respiratory:  No cough, wheezing or shortness of breath. No sputum production. Cardiac:  Denies chest pain, palpitations, unexplained indigestion, syncope, edema, PND or orthopnea. GI:  No changes in bowel movements, no abdominal pain, no bloating, anorexia, nausea, vomiting or heartburn. :  No frequency or dysuria. Denies incontinence or sexual dysfunction. Extremities:  No joint pain, stiffness or swelling except right shoulder as noted  Back:.no pain or soreness  Skin:  No recent rashes or mole changes. Neurological:  No numbness, tingling, burning paresthesias or loss of motor strength. No syncope, dizziness, frequent headaches or memory loss. Hematologic:  No easy bruising  Lymphatic: No lymph node enlargement    Objective:     Vitals:    07/02/19 1452 07/02/19 1544   BP: 158/50 138/82   Pulse: (!) 55    Resp: 19    Temp: 98.3 °F (36.8 °C)    TempSrc: Oral    SpO2: 93%    PainSc:   4    PainLoc: Arm        There is no height or weight on file to calculate BMI. Physical Examination:              General Appearance:  Well-developed, well-nourished, no acute distress. HEENT:      Ears:  The TMs and ear canals were clear. Eyes:  The pupillary responses were normal.  Extraocular muscle function intact.   Lids and conjunctiva not injected. Funduscopic exam revealed sharp disc margins. Nares: Clear w/o edema or erythema  Pharynx:  Clear with teeth in good repair. No masses were noted. Neck:  Supple without thyromegaly or adenopathy. No JVD noted. No carotid                bruits. Lungs:  Clear to auscultation and percussion. Cardiac:  Regular rate and rhythm without murmur. PMI not displaced. No gallop, rub or click. Abdominal: Soft, non-tender, no hepata-spleenomegally or masses  Extremities:  No clubbing, cyanosis or edema. Positive pain to palpation right shoulder with marked increased discomfort range of motion but no obvious bony deformity erythema increased temperature. Skin:  No rash or unusual mole changes noted. Lymph Nodes:  None felt in the cervical, supraclavicular, axillary or inguinal region. Neurological: . DTRs 2+ and symmetric. Station and gait normal.   Hematologic:   No purpura or petechiae        Assessment/Plan:         1. Acute pain of right shoulder    2. Primary osteoarthritis of right shoulder    3. Recurrent UTI        Impressions/Plan:  Impression  1. Acute right shoulder pain  2. DJD right shoulder I reviewed recent x-ray done of the chest which shows no acute process in the area of the clavicle and proximal humerus. We discussed treatment options and per her request elected injection. After informed consent and Betadine scrub the right shoulder centered posteriorly injected with Depo-Medrol 40 mg and a cc lidocaine which tolerated quite well. 3.  Recurrent UTIs with ureteral stent to be removed I have signed a form that she can hold her aspirin for 5 days prior to procedure  I recheck her again myself at her prior scheduled appointment or sooner should to be a problem. Orders Placed This Encounter    DRAIN/INJECT LARGE JOINT/BURSA    methylPREDNISolone acetate (DEPO-MEDROL) 40 mg/mL injection           No results found for any visits on 07/02/19.       Sandie Wiley MD    The patient was given after the visit summary the patient verbalized an understanding of the plans and problems as explained.

## 2019-07-09 ENCOUNTER — HOSPITAL ENCOUNTER (OUTPATIENT)
Dept: PREADMISSION TESTING | Age: 84
Discharge: HOME | End: 2019-07-09
Attending: UROLOGY
Payer: MEDICARE

## 2019-07-09 VITALS
HEIGHT: 62 IN | WEIGHT: 115.08 LBS | OXYGEN SATURATION: 96 % | RESPIRATION RATE: 16 BRPM | BODY MASS INDEX: 21.18 KG/M2 | TEMPERATURE: 97.8 F | HEART RATE: 45 BPM

## 2019-07-09 LAB
ATRIAL RATE: 45 BPM
CALCULATED P AXIS, ECG09: 66 DEGREES
CALCULATED R AXIS, ECG10: -13 DEGREES
CALCULATED T AXIS, ECG11: 71 DEGREES
DIAGNOSIS, 93000: NORMAL
P-R INTERVAL, ECG05: 186 MS
Q-T INTERVAL, ECG07: 500 MS
QRS DURATION, ECG06: 90 MS
QTC CALCULATION (BEZET), ECG08: 432 MS
VENTRICULAR RATE, ECG03: 45 BPM

## 2019-07-09 PROCEDURE — 93005 ELECTROCARDIOGRAM TRACING: CPT

## 2019-07-09 RX ORDER — ONDANSETRON 4 MG/1
4 TABLET, FILM COATED ORAL
COMMUNITY
End: 2019-10-29

## 2019-07-09 NOTE — ADVANCED PRACTICE NURSE
Preliminary EKG from 7/9/19 reviewed with Dr. Vale Sandifer, anesthesiologist. Planned procedure, PMHx, medications, functional status reviewed. Pt ok to proceed with planned procedure per Dr. Vale Sandifer.

## 2019-07-09 NOTE — PERIOP NOTES
[facility]  Preoperative Instructions        Surgery Date 7/10/19          Time of Arrival 0645    Contact #: Nicol Morales, daughter ([phone])    1. On the day of your surgery, please report to the Surgical Services Registration Desk and sign in at your designated time. The Surgery Center is located to the right of the Emergency Room. 2. You must have someone with you to drive you home. You should not drive a car for 24 hours following surgery. Please make arrangements for a friend or family member to stay with you for the first 24 hours after your surgery. 3. Do not have anything to eat or drink (including water, gum, mints, coffee, juice) after midnight 7/9/2019. ? This may not apply to medications prescribed by your physician. ?(Please note below the special instructions with medications to take the morning of your procedure.)    4. We recommend you do not drink any alcoholic beverages for 24 hours before and after your surgery. 5. Contact your surgeons office for instructions on the following medications: non-steroidal anti-inflammatory drugs (i.e. Advil, Aleve), vitamins, and supplements. (Some surgeons will want you to stop these medications prior to surgery and others may allow you to take them)  **If you are currently taking Plavix, Coumadin, Aspirin and/or other blood-thinning agents, contact your surgeon for instructions. ** Your surgeon will partner with the physician prescribing these medications to determine if it is safe to stop or if you need to continue taking. Please do not stop taking these medications without instructions from your surgeon    6. Wear comfortable clothes. Wear glasses instead of contacts. Do not bring any money or jewelry. Please bring picture ID, insurance card, and any prearranged co-payment or hospital payment. Do not wear make-up, particularly mascara the morning of your surgery.   Do not wear nail polish, particularly if you are having foot /hand surgery. Wear your hair loose or down, no ponytails, buns, ramin pins or clips. All body piercings must be removed. Please shower with antibacterial soap for three consecutive days before and on the morning of surgery, but do not apply any lotions, powders or deodorants after the shower on the day of surgery. Please use a fresh towels after each shower. Please sleep in clean clothes and change bed linens the night before surgery. Please do not shave for 48 hours prior to surgery. Shaving of the face is acceptable. 7. You should understand that if you do not follow these instructions your surgery may be cancelled. If your physical condition changes (I.e. fever, cold or flu) please contact your surgeon as soon as possible. 8. It is important that you be on time. If a situation occurs where you may be late, please call [phone] (OR Holding Area). 9. If you have any questions and or problems, please call [phone] (Pre-admission Testing). 10. Your surgery time may be subject to change. You will receive a phone call the evening prior if your time changes. 11.  If having outpatient surgery, you must have someone to drive you here, stay with you during the duration of your stay, and to drive you home at time of discharge. 12.   In an effort to improve the efficiency, privacy, and safety for all of our Pre-op patients visitors are not allowed in the Holding area. Once you arrive and are registered your family/visitors will be asked to remain in the waiting room. The Pre-op staff will get you from the Surgical Waiting Area and will explain to you and your family/visitors that the Pre-op phase is beginning. The staff will answer any questions and provide instructions for tracking of the patient, by use of the existing tracking number and color-coded status board in the waiting room.   At this time the staff will also ask for your designated spokesperson information in the event that the physician or staff need to provide an update or obtain any pertinent information. The designated spokesperson will be notified if the physician needs to speak to family during the pre-operative phase. If at any time your family/visitors has questions or concerns they may approach the volunteer desk in the waiting area for assistance. Special Instructions:    MEDICATIONS TO TAKE THE MORNING OF SURGERY WITH A SIP OF WATER:  Chlorthalidone, diltiazem, gabapentin, hydralazine, metoprolol, paroxetine, Prednisone, Tylenol as needed, Zofran as needed, Tramadol as needed. I understand a pre-operative phone call will be made to verify my surgery time. In the event that I am not available, I give permission for a message to be left on my answering service and/or with another person?   yes         ___________________      __________   _________    (Signature of Patient)             (Witness)                (Date and Time)

## 2019-07-10 ENCOUNTER — PATIENT OUTREACH (OUTPATIENT)
Dept: INTERNAL MEDICINE CLINIC | Age: 84
End: 2019-07-10

## 2019-07-10 ENCOUNTER — ANESTHESIA EVENT (OUTPATIENT)
Dept: SURGERY | Age: 84
End: 2019-07-10
Payer: MEDICARE

## 2019-07-10 ENCOUNTER — ANESTHESIA (OUTPATIENT)
Dept: SURGERY | Age: 84
End: 2019-07-10
Payer: MEDICARE

## 2019-07-10 ENCOUNTER — APPOINTMENT (OUTPATIENT)
Dept: GENERAL RADIOLOGY | Age: 84
End: 2019-07-10
Attending: UROLOGY
Payer: MEDICARE

## 2019-07-10 ENCOUNTER — HOSPITAL ENCOUNTER (OUTPATIENT)
Age: 84
Setting detail: OUTPATIENT SURGERY
Discharge: HOME | End: 2019-07-10
Attending: UROLOGY | Admitting: UROLOGY
Payer: MEDICARE

## 2019-07-10 VITALS
SYSTOLIC BLOOD PRESSURE: 181 MMHG | HEIGHT: 62 IN | TEMPERATURE: 98 F | HEART RATE: 52 BPM | DIASTOLIC BLOOD PRESSURE: 43 MMHG | OXYGEN SATURATION: 95 % | RESPIRATION RATE: 16 BRPM | BODY MASS INDEX: 20.97 KG/M2 | WEIGHT: 113.98 LBS

## 2019-07-10 PROCEDURE — 77030020782 HC GWN BAIR PAWS FLX 3M -B

## 2019-07-10 PROCEDURE — 74420 UROGRAPHY RTRGR +-KUB: CPT

## 2019-07-10 PROCEDURE — 77030018836 HC SOL IRR NACL ICUM -A: Performed by: UROLOGY

## 2019-07-10 PROCEDURE — 77030032490 HC SLV COMPR SCD KNE COVD -B: Performed by: UROLOGY

## 2019-07-10 PROCEDURE — 76210000020 HC REC RM PH II FIRST 0.5 HR: Performed by: UROLOGY

## 2019-07-10 PROCEDURE — 76060000032 HC ANESTHESIA 0.5 TO 1 HR: Performed by: UROLOGY

## 2019-07-10 PROCEDURE — C1769 GUIDE WIRE: HCPCS | Performed by: UROLOGY

## 2019-07-10 PROCEDURE — 77030018832 HC SOL IRR H20 ICUM -A: Performed by: UROLOGY

## 2019-07-10 PROCEDURE — 74011250636 HC RX REV CODE- 250/636

## 2019-07-10 PROCEDURE — 74011250636 HC RX REV CODE- 250/636: Performed by: ANESTHESIOLOGY

## 2019-07-10 PROCEDURE — 76010000138 HC OR TIME 0.5 TO 1 HR: Performed by: UROLOGY

## 2019-07-10 PROCEDURE — 74011250636 HC RX REV CODE- 250/636: Performed by: UROLOGY

## 2019-07-10 PROCEDURE — 76210000063 HC OR PH I REC FIRST 0.5 HR: Performed by: UROLOGY

## 2019-07-10 PROCEDURE — C2617 STENT, NON-COR, TEM W/O DEL: HCPCS | Performed by: UROLOGY

## 2019-07-10 PROCEDURE — 77030012961 HC IRR KT CYSTO/TUR ICUM -A: Performed by: UROLOGY

## 2019-07-10 DEVICE — BLACK SILICONE FILIFORM DBL PIGTAIL URETERAL STENT SET WIRE GUIDE WITH HYDROPHILIC COATING
Type: IMPLANTABLE DEVICE | Site: URETER | Status: FUNCTIONAL
Brand: BLACK SILICONE

## 2019-07-10 RX ORDER — SODIUM CHLORIDE, SODIUM LACTATE, POTASSIUM CHLORIDE, CALCIUM CHLORIDE 600; 310; 30; 20 MG/100ML; MG/100ML; MG/100ML; MG/100ML
25 INJECTION, SOLUTION INTRAVENOUS CONTINUOUS
Status: DISCONTINUED | OUTPATIENT
Start: 2019-07-10 | End: 2019-07-10 | Stop reason: HOSPADM

## 2019-07-10 RX ORDER — LEVOFLOXACIN 5 MG/ML
500 INJECTION, SOLUTION INTRAVENOUS ONCE
Status: COMPLETED | OUTPATIENT
Start: 2019-07-10 | End: 2019-07-10

## 2019-07-10 RX ORDER — SODIUM CHLORIDE 0.9 % (FLUSH) 0.9 %
5-40 SYRINGE (ML) INJECTION EVERY 8 HOURS
Status: DISCONTINUED | OUTPATIENT
Start: 2019-07-10 | End: 2019-07-10 | Stop reason: HOSPADM

## 2019-07-10 RX ORDER — MORPHINE SULFATE 10 MG/ML
2 INJECTION, SOLUTION INTRAMUSCULAR; INTRAVENOUS
Status: DISCONTINUED | OUTPATIENT
Start: 2019-07-10 | End: 2019-07-10 | Stop reason: HOSPADM

## 2019-07-10 RX ORDER — SODIUM CHLORIDE 0.9 % (FLUSH) 0.9 %
5-40 SYRINGE (ML) INJECTION AS NEEDED
Status: DISCONTINUED | OUTPATIENT
Start: 2019-07-10 | End: 2019-07-10 | Stop reason: HOSPADM

## 2019-07-10 RX ORDER — FENTANYL CITRATE 50 UG/ML
INJECTION, SOLUTION INTRAMUSCULAR; INTRAVENOUS AS NEEDED
Status: DISCONTINUED | OUTPATIENT
Start: 2019-07-10 | End: 2019-07-10 | Stop reason: HOSPADM

## 2019-07-10 RX ORDER — LIDOCAINE HYDROCHLORIDE 10 MG/ML
0.1 INJECTION, SOLUTION EPIDURAL; INFILTRATION; INTRACAUDAL; PERINEURAL AS NEEDED
Status: DISCONTINUED | OUTPATIENT
Start: 2019-07-10 | End: 2019-07-10 | Stop reason: HOSPADM

## 2019-07-10 RX ORDER — FENTANYL CITRATE 50 UG/ML
25 INJECTION, SOLUTION INTRAMUSCULAR; INTRAVENOUS
Status: DISCONTINUED | OUTPATIENT
Start: 2019-07-10 | End: 2019-07-10 | Stop reason: HOSPADM

## 2019-07-10 RX ORDER — ONDANSETRON 2 MG/ML
4 INJECTION INTRAMUSCULAR; INTRAVENOUS AS NEEDED
Status: DISCONTINUED | OUTPATIENT
Start: 2019-07-10 | End: 2019-07-10 | Stop reason: HOSPADM

## 2019-07-10 RX ORDER — DIPHENHYDRAMINE HYDROCHLORIDE 50 MG/ML
12.5 INJECTION, SOLUTION INTRAMUSCULAR; INTRAVENOUS
Status: DISCONTINUED | OUTPATIENT
Start: 2019-07-10 | End: 2019-07-10 | Stop reason: HOSPADM

## 2019-07-10 RX ORDER — CEFUROXIME AXETIL 500 MG/1
250 TABLET ORAL 2 TIMES DAILY
Qty: 6 TAB | Refills: 0 | Status: SHIPPED | OUTPATIENT
Start: 2019-07-10 | End: 2019-07-13

## 2019-07-10 RX ORDER — PROPOFOL 10 MG/ML
INJECTION, EMULSION INTRAVENOUS AS NEEDED
Status: DISCONTINUED | OUTPATIENT
Start: 2019-07-10 | End: 2019-07-10 | Stop reason: HOSPADM

## 2019-07-10 RX ORDER — PHENAZOPYRIDINE HYDROCHLORIDE 100 MG/1
100 TABLET, FILM COATED ORAL
Qty: 9 TAB | Refills: 0 | Status: SHIPPED | OUTPATIENT
Start: 2019-07-10 | End: 2019-07-13

## 2019-07-10 RX ADMIN — PROPOFOL 20 MG: 10 INJECTION, EMULSION INTRAVENOUS at 08:35

## 2019-07-10 RX ADMIN — PROPOFOL 10 MG: 10 INJECTION, EMULSION INTRAVENOUS at 08:43

## 2019-07-10 RX ADMIN — PROPOFOL 20 MG: 10 INJECTION, EMULSION INTRAVENOUS at 08:37

## 2019-07-10 RX ADMIN — LEVOFLOXACIN 500 MG: 5 INJECTION, SOLUTION INTRAVENOUS at 08:35

## 2019-07-10 RX ADMIN — PROPOFOL 20 MG: 10 INJECTION, EMULSION INTRAVENOUS at 08:45

## 2019-07-10 RX ADMIN — PROPOFOL 10 MG: 10 INJECTION, EMULSION INTRAVENOUS at 08:47

## 2019-07-10 RX ADMIN — PROPOFOL 20 MG: 10 INJECTION, EMULSION INTRAVENOUS at 08:49

## 2019-07-10 RX ADMIN — FENTANYL CITRATE 10 MCG: 50 INJECTION, SOLUTION INTRAMUSCULAR; INTRAVENOUS at 08:35

## 2019-07-10 RX ADMIN — SODIUM CHLORIDE, SODIUM LACTATE, POTASSIUM CHLORIDE, AND CALCIUM CHLORIDE 25 ML/HR: 600; 310; 30; 20 INJECTION, SOLUTION INTRAVENOUS at 07:37

## 2019-07-10 RX ADMIN — PROPOFOL 20 MG: 10 INJECTION, EMULSION INTRAVENOUS at 08:41

## 2019-07-10 NOTE — PROGRESS NOTES
Patient has graduated from the Transitions of Care Coordination  program on 7/10/19. Patient's symptoms are stable at this time. Patient/family has the ability to self-manage. Care management goals have been completed at this time. No further nurse navigator follow up scheduled. Goals Addressed                 This Visit's Progress     COMPLETED: Returns to baseline activity level. 6/27/19-NN spoke with nurse, Jeanne Espinoza, at the [facility]. She states patient is doing well and is still receiving PT/OT at the facility. No indications of any return of infection at this time since finishing her antibiotic. NN will be available as needed. / vs    6/11/19-NN spoke to patient's daughter and nurse at The [facility] assisted living facility post discharge. Patient is doing well per her daughter and Jeanne Espinoza, floor nurse. She is taking her antibiotic as prescribed and has had no evidence of fever, nausea or vomiting. She has resumed her PT/OT with [facility] for strengthening. She has a f/u visit with PCP on 6/14/19. NN told daughter and nurse at facility to call should she have any symptoms over the next few days that might indicate further infection. NN will check with patient's nurse Jeanne Espinoza in two weeks to be sure she continues to do well. / vs            Pt has nurse navigator's contact information for any further questions, concerns, or needs.   Patients upcoming visits:    Future Appointments   Date Time Provider Miroslava Espinoza   8/2/2019 10:20 AM Kitty Smith MD [address]

## 2019-07-10 NOTE — H&P
Denys Aldana is an 80year old female who presents today for \"talkd about stent exchange\". She returns for follow-up. Ms Susan Vieyra returns for follow up. History of chronic right hydronephrosis. 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 She's been living with an indwelling stent and doing well for over 5 years now. She is doing well. She reports no urinary tract infections. Denies flank pain, fever, nausea vomiting or chills. She denies any incontinence. Patient with a lung lesion that she is getting follow-up for CT scans every 3-6 months. Son brings report that reveals a complex renal cyst. Renal US 06/05/19 revealed bilateral renal cysts with right nephroureteral stent and no hydronephrosis. PAST MEDICAL HISTORY:    Allergies: MORPHINE (Critical)  CODEINE (Critical)  HYDROCODONE (Moderate)  DENIES: Latex, Shellfish, X-Ray Dye, Iodine. Medications: PRAVASTATIN SODIUM 40 MG ORAL TABLET (PRAVASTATIN SODIUM) ; Route: ORAL  METOPROLOL SUCCINATE ER 50 MG ORAL TABLET EXTENDED RELEASE 24 HOUR (METOPROLOL SUCCINATE) ; Route: ORAL  CHLORTHALIDONE 25 MG ORAL TABLET (CHLORTHALIDONE) ; Route: ORAL  TRAMADOL HCL 50 MG ORAL TABLET (TRAMADOL HCL) 1 daily; Route: ORAL  MIRALAX ORAL POWDER (POLYETHYLENE GLYCOL 3350) as needed; Route: ORAL  LOPERAMIDE HCL 2 MG ORAL CAPSULE (LOPERAMIDE HCL) as needed; Route: ORAL  VITAMIN D3 62535 UNIT ORAL CAPSULE (CHOLECALCIFEROL) 1 weekly; Route: ORAL  RISAQUAD ORAL CAPSULE (PROBIOTIC PRODUCT) 1 daioy; Route: ORAL  PREDNISONE 10 MG ORAL TABLET (PREDNISONE) 1 in am; Route: ORAL  PAROXETINE HCL 20 MG ORAL TABLET (PAROXETINE HCL) 1 diaoy; Route: ORAL  METOPROLOL SUCCINATE ER 50 MG ORAL TABLET EXTENDED RELEASE 24 HOUR (METOPROLOL SUCCINATE) 1 twice aday; Route: ORAL  DILTIAZEM HCL ER BEADS 300 MG ORAL CAPSULE EXTENDED RELEASE 24 HOUR (DILTIAZEM HCL ER BEADS) 1 dailoy;  Route: ORAL  LISINOPRIL 40 MG ORAL TABLET (LISINOPRIL) TAKE 1 TABLET BY MOUTH EVERY DAY; Route: ORAL  CHLORTHALIDONE 25 MG ORAL TABLET (CHLORTHALIDONE) TAKE 1 TABLET BY MOUTH EVERY DAY; Route: ORAL  TEMAZEPAM 15 MG ORAL CAPSULE (TEMAZEPAM) 1x Daily; Route: ORAL  ACETAMINOPHEN  MG ORAL TABLET EXTENDED RELEASE (ACETAMINOPHEN) PRN; Route: ORAL  HYDRALAZINE  MG ORAL TABLET (HYDRALAZINE HCL) 3x Daily; Route: ORAL  ADULT ASPIRIN LOW STRENGTH 81 MG ORAL TABLET DISINTEGRATING (ASPIRIN) 1x Daily; Route: ORAL  GABAPENTIN 600 MG ORAL TABLET (GABAPENTIN) 4x Daily; Route: ORAL    Problems: Complex renal cyst (ICD-753.10) (BEA86-N29.1)  V72.84 UNSPECIFIED PRE-OPERATIVE EXAMINATION (ICD-V72.84)  UTI (ICD-599.0) (RUO60-S78.0)  591 HYDRONEPHROSIS (ICD-591) (VDH89-I27.30)  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 DENIES: Heart Disease, Pacemaker/Defibrillator, Diabetes, Bowel Problems, Stroke/Seizure, Bleeding Problems, HIV, or Hepatitis. Surgeries: Kidney Surgery, Cataract Surgery, and Other Abdominal Surgery. Family History: DENIES: Kidney cancer, Kidney disease, Kidney stones, Breast cancer, Uterine cancer, Cervical cancer, Ovarian cancer. Social History: Retired. . Smoking status: former smoker. Does not drink alcohol. System Review: Admits to: Dry Skin. DENIES: Unexplained Weight Loss, Dry Eyes, Dry Mouth, Leg Swelling, Shortness of Breath, Constipation, Involuntary Urine Loss, Lower Extremity Weakness, Difficulty Walking, Psychiatric Problems, Impaired Sex Drive, Easy Bleeding, Rash.      EXAMINATION: Appearance: well-developed NAD Eyes: conjunctivae and lids normal Respiratory: breathing easily Musculoskelatal: no deformity Skin: warm and dry Neuro: grossly intact Psych: normal affect       URINALYSIS from Voided specimen  Urine Dip: pH: 5.0, Bld: Mod NH, LE: ++, Nit: Neg, Prot: ++++, Ket: Neg, Gluc: Neg  Urine Micro: WBC: 6-10, RBC: 3-5, Bacteria: 11-25    IMPRESSION:    1. COMPLEX RENAL CYST (YAP99-S08.1): Patient reassured. 2. 591 HYDRONEPHROSIS (CFA40-Y86.30) - Unchanged: Indwelling strent. 3. 593.3 STRICTURE - URETER (PIX15-H26.5) - Unchanged: Plan right sided 24 cm 12-month Cook double-J stent change. Advised to stay well hydrated.

## 2019-07-10 NOTE — PROGRESS NOTES
Patient discharged to home. Iv removed.   Discharge instructions, script, and medication education done with patient and daughter

## 2019-07-10 NOTE — DISCHARGE INSTRUCTIONS
Patient Education        Cystoscopy: What to Expect at [facility]    A cystoscopy is a procedure that lets a doctor look inside of the bladder and the urethra. The urethra is the tube that carries urine from the bladder to outside the body. The doctor uses a thin, lighted tool called a cystoscope. Your bladder is filled with fluid. This stretches the bladder so that your doctor can look closely at the inside of your bladder. After the cystoscopy, your urethra may be sore at first, and it may burn when you urinate for the first few days after the procedure. You may feel the need to urinate more often, and your urine may be pink. These symptoms should get better in 1 or 2 days. You will probably be able to go back to most of your usual activities in 1 or 2 days. This care sheet gives you a general idea about how long it will take for you to recover. But each person recovers at a different pace. Follow the steps below to get better as quickly as possible. How can you care for yourself at home? Activity    · Rest when you feel tired. Getting enough sleep will help you recover.     · Try to walk each day. Start by walking a little more than you did the day before. Bit by bit, increase the amount you walk. Walking boosts blood flow and helps prevent pneumonia and constipation.     · Avoid strenuous activities, such as bicycle riding, jogging, weight lifting, or aerobic exercise, until your doctor says it is okay.     · Ask your doctor when you can drive again.     · Most people are able to return to work within 1 or 2 days after the procedure.     · You may shower and take baths as usual.     · Ask your doctor when it is okay for you to have sex. Diet    · You can eat your normal diet. If your stomach is upset, try bland, low-fat foods like plain rice, broiled chicken, toast, and yogurt.     · Drink plenty of fluids (unless your doctor tells you not to).    Medicines    · Take pain medicines exactly as directed. ? If the doctor gave you a prescription medicine for pain, take it as prescribed. ? If you are not taking a prescription pain medicine, ask your doctor if you can take an over-the-counter medicine.     · If you think your pain medicine is making you sick to your stomach:  ? Take your medicine after meals (unless your doctor has told you not to). ? Ask your doctor for a different pain medicine.     · If your doctor prescribed antibiotics, take them as directed. Do not stop taking them just because you feel better. You need to take the full course of antibiotics. Follow-up care is a key part of your treatment and safety. Be sure to make and go to all appointments, and call your doctor if you are having problems. It's also a good idea to know your test results and keep a list of the medicines you take. When should you call for help? Call 911 anytime you think you may need emergency care. For example, call if:    · You passed out (lost consciousness).     · You have severe trouble breathing.     · You have sudden chest pain and shortness of breath, or you cough up blood.     · You have severe belly pain.    Call your doctor now or seek immediate medical care if:    · You are sick to your stomach or cannot keep fluids down.     · Your urine is still red or you see blood clots after you have urinated several times.     · You have trouble passing urine or stool, especially if you have pain or swelling in your lower belly.     · You have signs of a blood clot, such as:  ? Pain in your calf, back of the knee, thigh, or groin. ? Redness and swelling in your leg or groin.     · You develop a fever or severe chills.     · You have pain in your back just below your rib cage. This is called flank pain.    Watch closely for changes in your health, and be sure to contact your doctor if:    · You have pain or burning when you urinate.  A burning feeling is normal for a day or two after the test, but call if it does not get better.     · You have a frequent urge to urinate but can pass only small amounts of urine.     · Your urine is pink, red, or cloudy, or smells bad. It is normal for the urine to have a pinkish color for a few days after the test, but call if it does not get better. Where can you learn more? Go to http://romeo-jessie.info/. Enter H483 in the search box to learn more about \"Cystoscopy: What to Expect at Home. \"  Current as of: March 20, 2018  Content Version: 11.9  © 3002-6052 [employer]. Care instructions adapted under license by [employer] (which disclaims liability or warranty for this information). If you have questions about a medical condition or this instruction, always ask your healthcare professional. [address] any warranty or liability for your use of this information. TO PREVENT AN INFECTION      1. [address] YOUR HANDS     To prevent infection, good handwashing is the most important thing you or your caregiver can do.  Wash your hands with soap and water or use the hand  we gave you before you touch any wounds. 2. SHOWER     Use the antibacterial soap we gave you when you take a shower.  Shower with this soap until your wounds are healed.  To reach all areas of your body, you may need someone to help you.  Dont forget to clean your belly button with every shower. 3.  USE CLEAN SHEETS     Use freshly cleaned sheets on your bed after surgery.  To keep the surgery site clean, do not allow pets to sleep with you while your wound is still healing. 4. STOP SMOKING     Stop smoking, or at least cut back on smoking     Smoking slows your healing. 5.  CONTROL YOUR BLOOD SUGAR     High blood sugars slow wound healing. If you are diabetic, control your blood sugar levels before and after your surgery.  DISCHARGE SUMMARY from Nurse    The following personal items are in your possession at time of discharge:    Dental Appliances: None  Visual Aid: None  Home Medications: None  Jewelry: None  Clothing: None (left in in pt. room)  Other Valuables: None             PATIENT INSTRUCTIONS:    After general anesthesia or intravenous sedation, for 24 hours or while taking prescription Narcotics:  · Limit your activities  · Do not drive and operate hazardous machinery  · Do not make important personal or business decisions  · Do  not drink alcoholic beverages  · If you have not urinated within 8 hours after discharge, please contact your surgeon on call. Report the following to your surgeon:  · Excessive pain, swelling, redness or odor of or around the surgical area  · Temperature over 100.5  · Nausea and vomiting lasting longer than 4 hours or if unable to take medications  · Any signs of decreased circulation or nerve impairment to extremity: change in color, persistent  numbness, tingling, coldness or increase pain  · Any questions    To prevent infection remember to refer to your handout on handwashing given to you by your nurse. *  Please give a list of your current medications to your Primary Care Provider. *  Please update this list whenever your medications are discontinued, doses are      changed, or new medications (including over-the-counter products) are added. *  Please carry medication information at all times in case of emergency situations. These are general instructions for a healthy lifestyle:    No smoking/ No tobacco products/ Avoid exposure to second hand smoke    Surgeon General's Warning:  Quitting smoking now greatly reduces serious risk to your health.     Obesity, smoking, and sedentary lifestyle greatly increases your risk for illness    A healthy diet, regular physical exercise & weight monitoring are important for maintaining a healthy lifestyle    You may be retaining fluid if you have a history of heart failure or if you experience any of the following symptoms:  Weight gain of 3 pounds or more overnight or 5 pounds in a week, increased swelling in our hands or feet or shortness of breath while lying flat in bed. Please call your doctor as soon as you notice any of these symptoms; do not wait until your next office visit. Recognize signs and symptoms of STROKE:    F-face looks uneven    A-arms unable to move or move unevenly    S-speech slurred or non-existent    T-time-call 911 as soon as signs and symptoms begin-DO NOT go       Back to bed or wait to see if you get better-TIME IS BRAIN. The discharge information has been reviewed with the patient. The patient verbalized understanding.

## 2019-07-10 NOTE — PERIOP NOTES
Dr Kate Isaac informed vital signs. No order received. 0802 Medication profile reviewed with [facility] at [employer].

## 2019-07-10 NOTE — ANESTHESIA POSTPROCEDURE EVALUATION
Procedure(s):  CYSTOSCOPY RIGHT STENT EXCHANGE. general, total IV anesthesia    Anesthesia Post Evaluation        Patient location during evaluation: PACU  Note status: Adequate. Level of consciousness: responsive to verbal stimuli and sleepy but conscious  Pain management: satisfactory to patient  Airway patency: patent  Anesthetic complications: no  Cardiovascular status: acceptable  Respiratory status: acceptable  Hydration status: acceptable  Comments: +Post-Anesthesia Evaluation and Assessment    Patient: Alonso Castellon MRN: [id number]  SSN: [id number]   YOB: 1935  Age: 80 y.o. Sex: female      Cardiovascular Function/Vital Signs    /83   Pulse (!) 52   Temp 36.7 °C (98 °F)   Resp 12   Ht 5' 1.5\" (1.562 m)   Wt 51.7 kg (113 lb 15.7 oz)   SpO2 95%   BMI 21.19 kg/m²     Patient is status post Procedure(s):  CYSTOSCOPY RIGHT STENT EXCHANGE. Nausea/Vomiting: Controlled. Postoperative hydration reviewed and adequate. Pain:  Pain Scale 1: Numeric (0 - 10) (07/10/19 0909)  Pain Intensity 1: 0 (07/10/19 0909)   Managed. Neurological Status:   Neuro (WDL): Within Defined Limits (07/10/19 0909)   At baseline. Mental Status and Level of Consciousness: Arousable. Pulmonary Status:   O2 Device: Room air (07/10/19 0909)   Adequate oxygenation and airway patent. Complications related to anesthesia: None    Post-anesthesia assessment completed. No concerns. Signed By: Barbara Rabago MD    7/10/2019  Post anesthesia nausea and vomiting:  controlled      Vitals Value Taken Time   /56 7/10/2019  9:25 AM   Temp 36.7 °C (98 °F) 7/10/2019  9:09 AM   Pulse 54 7/10/2019  9:27 AM   Resp 16 7/10/2019  9:27 AM   SpO2 94 % 7/10/2019  9:27 AM   Vitals shown include unvalidated device data.

## 2019-07-10 NOTE — PERIOP NOTES
TRANSFER - OUT REPORT:    Verbal report given to LOVELY Yuen(name) on Gopal Spindle  being transferred to phase II(unit) for routine post - op       Report consisted of patients Situation, Background, Assessment and   Recommendations(SBAR). Information from the following report(s) SBAR, Kardex, OR Summary and MAR was reviewed with the receiving nurse. Lines:   Peripheral IV 07/10/19 Anterior; Left Forearm (Active)   Site Assessment Clean, dry, & intact 7/10/2019  9:09 AM   Phlebitis Assessment 0 7/10/2019  9:09 AM   Infiltration Assessment 0 7/10/2019  9:09 AM   Dressing Status Clean, dry, & intact 7/10/2019  9:09 AM   Dressing Type Transparent 7/10/2019  9:09 AM   Hub Color/Line Status Blue; Infusing 7/10/2019  9:09 AM        Opportunity for questions and clarification was provided.       Patient transported with:   Registered Nurse

## 2019-07-10 NOTE — OP NOTES
[address]  OPERATIVE REPORT    Name:  Colette Wick  MR#:  [id number]  :  1935  ACCOUNT #:  [de-identified]  DATE OF SERVICE:  07/10/2019    PREOPERATIVE DIAGNOSIS:  Right ureteral stricture with hydronephrosis. POSTOPERATIVE DIAGNOSIS:  Right ureteral stricture with hydronephrosis. PROCEDURE PERFORMED:  Exchange of right 6 x 24 cm Cook 12-month double-J stent and and use of intermittent fluoroscopy. SURGEON:  Javy Cabrera MD    ASSISTANT:  None. ANESTHESIA:  General.    COMPLICATIONS:  None. SPECIMENS REMOVED:  Right stent. IMPLANTS:  Right stent. ESTIMATED BLOOD LOSS:  None. PROCEDURE:  After informed consent, the patient received preoperative antibiotics and placed on the operating table in supine position. After adequate induction of general anesthetic, she was placed in lithotomy position and all pressure points were carefully padded. The vaginal area was prepped and draped in sterile fashion. A full time-out was accomplished. A 21-Frisian cystoscope was advanced under direct visualization. The bladder was surveyed and found to be free of disease. The stent was grasped and gently pulled out from the meatus. I back loaded a Glidewire under fluoroscopic guidance and the stent was removed over this without any difficulty whatsoever. I then reinserted the cystoscope and over the wire per the Seldinger technique, and placed a 6 x 24 cm Cook 12-month double-J stent under fluoroscopic guidance. The wire was removed. There was good curl in the kidney and in the bladder. The bladder was surveyed and found to be free of any injury. It was drained. The scope was removed. She tolerated the procedure well. No complications.       MD JONES [id number]  D:  07/10/2019 9:14  T:  07/10/2019 11:01  JOB #:  [id number]  CC:  MD Siobhan Rosado MD       [facility]

## 2019-07-10 NOTE — PERIOP NOTES
Handoff Report from Operating Room to PACU    Report received from [facility] and [facility] regarding Peña Rodgers. Surgeon(s):  Elena Jarvis MD  And Procedure(s) (LRB):  CYSTOSCOPY RIGHT STENT EXCHANGE (N/A)  confirmed   with allergies discussed. Anesthesia type, drugs, patient history, complications, estimated blood loss, vital signs, intake and output, and lines and temperature were reviewed.

## 2019-07-10 NOTE — ANESTHESIA PREPROCEDURE EVALUATION
Anesthetic History     PONV          Review of Systems / Medical History  Patient summary reviewed, nursing notes reviewed and pertinent labs reviewed    Pulmonary    COPD      Smoker      Comments: Smoker - 0.25 ppd 15 pack yr history  Primary lung large cell carcinoma, left    Neuro/Psych         Psychiatric history and dementia     Cardiovascular    Hypertension: well controlled        Dysrhythmias : atrial fibrillation  PAD and hyperlipidemia    Exercise tolerance: <4 METS  Comments: S/P fem-pop    Had her beta blocker on schedule this morning   GI/Hepatic/Renal         Renal disease: ARF and CRI  PUD    Comments: Hydronephrosis  ARF  CRI, Stage IV Endo/Other        Arthritis and cancer     Other Findings   Comments: Hydronephrosis  Ureteral obstruction             Physical Exam    Airway  Mallampati: III  TM Distance: > 6 cm  Neck ROM: normal range of motion   Mouth opening: Diminished (comment)     Cardiovascular    Rhythm: irregular  Rate: abnormal        Comments: Bradycardic Dental    Dentition: Full lower dentures and Full upper dentures     Pulmonary  Breath sounds clear to auscultation               Abdominal  GI exam deferred       Other Findings            Anesthetic Plan    ASA: 3  Anesthesia type: general and total IV anesthesia    Monitoring Plan: BIS      Induction: Intravenous  Anesthetic plan and risks discussed with: Patient

## 2019-08-01 PROBLEM — D64.9 ANEMIA: Status: ACTIVE | Noted: 2019-08-01

## 2019-08-02 ENCOUNTER — OFFICE VISIT (OUTPATIENT)
Dept: INTERNAL MEDICINE CLINIC | Age: 84
End: 2019-08-02

## 2019-08-02 VITALS
TEMPERATURE: 97.6 F | DIASTOLIC BLOOD PRESSURE: 64 MMHG | HEIGHT: 62 IN | HEART RATE: 48 BPM | RESPIRATION RATE: 18 BRPM | SYSTOLIC BLOOD PRESSURE: 138 MMHG | OXYGEN SATURATION: 95 % | BODY MASS INDEX: 21.19 KG/M2

## 2019-08-02 DIAGNOSIS — J44.9 CHRONIC OBSTRUCTIVE PULMONARY DISEASE, UNSPECIFIED COPD TYPE (HCC): Chronic | ICD-10-CM

## 2019-08-02 DIAGNOSIS — N18.30 STAGE 3 CHRONIC KIDNEY DISEASE (HCC): ICD-10-CM

## 2019-08-02 DIAGNOSIS — N39.0 RECURRENT UTI: ICD-10-CM

## 2019-08-02 DIAGNOSIS — E44.0 MODERATE PROTEIN-CALORIE MALNUTRITION (HCC): ICD-10-CM

## 2019-08-02 DIAGNOSIS — D64.9 ANEMIA, UNSPECIFIED TYPE: ICD-10-CM

## 2019-08-02 DIAGNOSIS — I10 ESSENTIAL HYPERTENSION: Primary | ICD-10-CM

## 2019-08-02 DIAGNOSIS — E78.2 MIXED HYPERLIPIDEMIA: ICD-10-CM

## 2019-08-02 DIAGNOSIS — I48.0 PAF (PAROXYSMAL ATRIAL FIBRILLATION) (HCC): ICD-10-CM

## 2019-08-02 LAB
A-G RATIO,AGRAT: 1.6 RATIO
ALBUMIN SERPL-MCNC: 4.1 G/DL (ref 3.9–5.4)
ALP SERPL-CCNC: 72 U/L (ref 38–126)
ALT SERPL-CCNC: 17 U/L (ref 9–52)
ANION GAP SERPL CALC-SCNC: 13 MMOL/L
AST SERPL W P-5'-P-CCNC: 28 U/L (ref 14–36)
BACTERIA,BACTU: ABNORMAL
BILIRUB SERPL-MCNC: 0.3 MG/DL (ref 0.2–1.3)
BILIRUB UR QL: NEGATIVE
BUN SERPL-MCNC: 57 MG/DL (ref 7–17)
BUN/CREATININE RATIO,BUCR: 30 RATIO
CALCIUM SERPL-MCNC: 11 MG/DL (ref 8.4–10.2)
CHLORIDE SERPL-SCNC: 101 MMOL/L (ref 98–107)
CHOL/HDL RATIO,CHHD: 2 RATIO (ref 0–4)
CHOLEST SERPL-MCNC: 239 MG/DL (ref 0–200)
CK SERPL-CCNC: 55 U/L (ref 30–135)
CLARITY: CLEAR
CO2 SERPL-SCNC: 26 MMOL/L (ref 22–32)
COLOR UR: ABNORMAL
CREAT SERPL-MCNC: 1.9 MG/DL (ref 0.7–1.2)
ERYTHROCYTE [DISTWIDTH] IN BLOOD BY AUTOMATED COUNT: 14.6 %
GLOBULIN,GLOB: 2.5
GLUCOSE 24H UR-MRATE: NEGATIVE G/(24.H)
GLUCOSE SERPL-MCNC: 88 MG/DL (ref 65–105)
HCT VFR BLD AUTO: 38.2 % (ref 37–51)
HDLC SERPL-MCNC: 117 MG/DL (ref 35–130)
HGB BLD-MCNC: 12 G/DL (ref 12–18)
HGB UR QL STRIP: NEGATIVE
KETONES UR QL STRIP.AUTO: NEGATIVE
LDL/HDL RATIO,LDHD: 1 RATIO
LDLC SERPL CALC-MCNC: 80 MG/DL (ref 0–130)
LEUKOCYTE ESTERASE: ABNORMAL
LYMPHOCYTES ABSOLUTE: 1 K/UL (ref 0.6–4.1)
LYMPHOCYTES NFR BLD: 8.9 % (ref 10–58.5)
MCH RBC QN AUTO: 30.7 PG (ref 26–32)
MCHC RBC AUTO-ENTMCNC: 31.4 G/DL (ref 30–36)
MCV RBC AUTO: 97.8 FL (ref 80–97)
MONOCYTES ABS-DIF,2141: 1 K/UL (ref 0–1.8)
MONOCYTES NFR BLD: 8.2 % (ref 0.1–24)
NEUTROPHILS # BLD: 82.9 % (ref 37–92)
NEUTROPHILS ABS,2156: 9.6 K/UL (ref 2–7.8)
NITRITE UR QL STRIP.AUTO: NEGATIVE
PH UR STRIP: 6 [PH] (ref 5–7)
PLATELET # BLD AUTO: 251 K/UL (ref 140–440)
PMV BLD AUTO: 8.3 FL
POTASSIUM SERPL-SCNC: 4.7 MMOL/L (ref 3.6–5)
PROT SERPL-MCNC: 6.6 G/DL (ref 6.3–8.2)
PROT UR STRIP-MCNC: ABNORMAL MG/DL
RBC # BLD AUTO: 3.91 M/UL (ref 4.2–6.3)
RBC #/AREA URNS HPF: ABNORMAL #/HPF
SODIUM SERPL-SCNC: 140 MMOL/L (ref 137–145)
SP GR UR REFRACTOMETRY: 1.01 (ref 1–1.03)
TRIGL SERPL-MCNC: 211 MG/DL (ref 0–200)
UROBILINOGEN UR QL STRIP.AUTO: NEGATIVE
VLDLC SERPL CALC-MCNC: 42 MG/DL
WBC # BLD AUTO: 11.6 K/UL (ref 4.1–10.9)
WBC URNS QL MICRO: ABNORMAL #/HPF

## 2019-08-02 NOTE — PROGRESS NOTES
Chief Complaint   Patient presents with    Hypertension     3 month follow up        SUBJECTIVE:    Peña Rodgers is a 80 y.o. female who returns in follow-up for medical problems include hypertension, hyperlipidemia, paroxysmal atrial fibrillation, COPD, carcinoma lung, recent hospitalization with acute renal failure on CKD stage III, recurrent UTI, DJD and other medical problems. She is taking her medications and trying to follow her diet and remain physically active. She is accompanied by her son today. She currently denies any chest pain, shortness of breath, palpitations, PND, orthopnea or other cardiorespiratory complaints. She notes no GI or  complaints. She denies any headaches, dizziness or neurologic complaints. She has no current arthritic complaints other than her chronic joint aches and pains which are unchanged. There are no other complaints on complete review of systems. She does want to check a urine to make sure is not infected. Current Outpatient Medications   Medication Sig Dispense Refill    ondansetron hcl (ZOFRAN) 4 mg tablet Take 4 mg by mouth every eight (8) hours as needed for Nausea.  loperamide (IMODIUM) 2 mg capsule Take 2 Caps by mouth every eight (8) hours as needed for Diarrhea. 60 Cap 2    temazepam (RESTORIL) 15 mg capsule Take 1 Cap by mouth nightly. Max Daily Amount: 15 mg. 30 Cap 5    hydrALAZINE (APRESOLINE) 100 mg tablet Take 2 Tabs by mouth two (2) times a day. 120 Tab prn    pravastatin (PRAVACHOL) 40 mg tablet Take 1 Tab by mouth nightly. 30 Tab 11    chlorthalidone (HYGROTEN) 25 mg tablet Take 1 Tab by mouth daily. 30 Tab prn    traMADol (ULTRAM) 50 mg tablet Take 1 Tab by mouth every six (6) hours as needed for Pain. Max Daily Amount: 200 mg. 60 Tab 0    lisinopril (PRINIVIL, ZESTRIL) 40 mg tablet Take 1 Tab by mouth daily. 30 Tab prn    dilTIAZem CD (CARDIZEM CD) 300 mg ER capsule Take 1 Cap by mouth daily.  30 Cap prn    docusate sodium (COLACE) 100 mg capsule Take 100 mg by mouth two (2) times a day.  gabapentin (NEURONTIN) 400 mg capsule Take 400 mg by mouth four (4) times daily.  PARoxetine (PAXIL) 20 mg tablet Take 20 mg by mouth daily.  cholecalciferol (VITAMIN D3) 50,000 unit capsule Take 50,000 Units by mouth every seven (7) days.  polyethylene glycol (MIRALAX) 17 gram packet Take 17 g by mouth daily as needed (constipation).  L. acidoph & paracasei- S therm- Bifido (HANG-Q/RISAQUAD) 8 billion cell cap cap Take 1 Cap by mouth daily. 30 Cap 0    metoprolol tartrate (LOPRESSOR) 50 mg tablet Take 1 Tab by mouth two (2) times a day. 60 Tab PRN    predniSONE (DELTASONE) 10 mg tablet Take 1 Tab by mouth daily (with breakfast). 30 Tab 0    acetaminophen (TYLENOL) 650 mg CR tablet Take 500 mg by mouth every six (6) hours as needed for Pain.  aspirin 81 mg tablet Take 81 mg by mouth daily.  Stopped for surgery 8-4-10        Past Medical History:   Diagnosis Date    Arrhythmia     A-fib    Arthritis     shoulder/right    Cancer ([address] 2015    left lung    Chronic kidney disease     Stent in Right Kidney, Stage III    Chronic obstructive pulmonary disease ([address]     Hypertension     Ill-defined condition     Ex Lap with Bobby  patch of a perforated pyloric channel ulcer 7/19/16    Other ill-defined conditions(799.89)     lipids    Other ill-defined conditions(799.89)     blood transfusion history    PVD (peripheral vascular disease) [address]      Past Surgical History:   Procedure Laterality Date    BYPASS GRAFT OTHR,FEM-FEM      BYPASS GRAFT OTHR,FEM-POP Right     PVD    HX HEENT      bilateral cataracts    HX ORTHOPAEDIC Right     right hip fracture/pinning    HX UROLOGICAL      right stent/kidney     Allergies   Allergen Reactions    Hydrocodone Nausea and Vomiting    Morphine Rash    Dilaudid [Hydromorphone] Itching     Does not remember       REVIEW OF SYSTEMS:  General: negative for - chills or fever, or weight loss or gain  ENT: negative for - headaches, nasal congestion or tinnitus  Eyes: no blurred or visual changes  Neck: No stiffness or swollen nodes  Respiratory: negative for - cough, hemoptysis, shortness of breath or wheezing  Cardiovascular : negative for - chest pain, edema, palpitations or shortness of breath  Gastrointestinal: negative for - abdominal pain, blood in stools, heartburn or nausea/vomiting  Genito-Urinary: no dysuria, trouble voiding, or hematuria  Musculoskeletal: negative for - gait disturbance, change of her chronic joint pain, joint stiffness or joint swelling  Neurological: no TIA or stroke symptoms  Hematologic: no bruises, no bleeding  Lymphatic: no swollen glands  Integument: no lumps, mole changes, nail changes or rash  Endocrine:no malaise/lethargy poly uria or polydipsia or unexpected weight changes        Social History     Socioeconomic History    Marital status:      Spouse name: Not on file    Number of children: Not on file    Years of education: Not on file    Highest education level: Not on file   Tobacco Use    Smoking status: Former Smoker     Packs/day: 0.25     Years: 60.00     Pack years: 15.00     Last attempt to quit:      Years since quittin.5    Smokeless tobacco: Never Used   Substance and Sexual Activity    Alcohol use: No    Drug use: No    Sexual activity: Not Currently     Family History   Problem Relation Age of Onset    Heart Disease Mother     Cancer Father        OBJECTIVE:     Visit Vitals  /64   Pulse (!) 48   Temp 97.6 °F (36.4 °C) (Oral)   Resp 18   Ht 5' 1.5\" (1.562 m)   SpO2 95%   BMI 21.19 kg/m²     CONSTITUTIONAL:   well nourished, appears age appropriate  EYES: sclera anicteric, PERRL, EOMI  ENMT:nares clear, moist mucous membranes, pharynx clear  NECK: supple.  Thyroid normal, No JVD or bruits  RESPIRATORY: Chest: clear to ascultation and percussion, normal inspiratory effort  CARDIOVASCULAR: Heart: regular rate and rhythm no murmurs, rubs or gallops, PMI not displaced, No thrills  GASTROINTESTINAL: Abdomen: non distended, soft, non tender, bowel sounds normal  HEMATOLOGIC: no purpura, petechiae or bruising  LYMPHATIC: No lymph node enlargemant  MUSCULOSKELETAL: Extremities: no edema or active synovitis, pulse 1+. Confined to wheelchair except for transfers  INTEGUMENT: No unusual rashes or suspicious skin lesions noted. Nails appear normal.  PERIPHERAL VASCULAR: normal pulses femoral, PT and DP  NEUROLOGIC: non-focal exam, A & O X 3  PSYCHIATRIC:, appropriate affect     ASSESSMENT:   1. Essential hypertension    2. Mixed hyperlipidemia    3. Chronic obstructive pulmonary disease, unspecified COPD type ([facility]    4. PAF (paroxysmal atrial fibrillation) ([facility] [facility]    5. Moderate protein-calorie malnutrition ([facility] [facility]    6. Stage 3 chronic kidney disease ([facility]    7. Anemia, unspecified type    8. Recurrent UTI      Impression  1. Hypertension that is labile component to her current repeat status by me was good so continue current treatment. 2.  Hyperlipidemia prior lab reviewed and repeat status pending I will adjust if necessary. 3.  COPD that is stable   4   Paroxysmal atrial fibrillation stable  5   Protein calorie malnutrition that seems to be actually improved as her weight is a little bit up today according to her. 6.  CKD stage III repeat status pending  7. Anemia repeat status pending  8. Recurrent urinary tract infection follow-up urine pending  All of the above discussed with her son present with her today. Follow-up scheduled for 3 months or sooner if there is a problem. I will call with lab results.     PLAN:  .  Orders Placed This Encounter    CULTURE, URINE    CBC WITH AUTOMATED DIFF ([facility])    METABOLIC PANEL, COMPREHENSIVE ([facility])    LIPID PANEL ([facility])    CK ([facility])    URINALYSIS W/MICROSCOPIC ([facility])         ATTENTION:   This medical record was transcribed using an electronic medical records system. Although proofread, it may and can contain electronic and spelling errors. Other human spelling and other errors may be present. Corrections may be executed at a later time. Please feel free to contact us for any clarifications as needed. Follow-up and Dispositions    · Return in about 3 months (around 11/2/2019). No results found for any visits on 08/02/19. Magalis Ramos MD    The patient verbalized understanding of the problems and plans as explained.

## 2019-08-02 NOTE — PROGRESS NOTES
Charlie Jacobsen  Identified pt with two pt identifiers(name and ). Chief Complaint   Patient presents with    Hypertension     3 month follow up        1. Have you been to the ER, urgent care clinic since your last visit? Hospitalized since your last visit? No     2. Have you seen or consulted any other health care providers outside of the [address] [place] [address] since your last visit? Include any pap smears or colon screening. No      Health Maintenance Topics with due status: Overdue       Topic Date Due    DTaP/Tdap/Td series 1956    Shingrix Vaccine Age 50> 1985    GLAUCOMA SCREENING Q2Y 2000    Influenza Age 5 to Adult 2019     Health Maintenance Topics with due status: Not Due       Topic Last Completion Date    MEDICARE Joe Resides 2018     Health Maintenance Topics with due status: Completed       Topic Last Completion Date    Bone Densitometry (Dexa) Screening 2014    Pneumococcal 65+ years 2015           Medication reconciliation up to date and corrected with patient at this time. Today's provider has been notified of reason for visit, vitals and flowsheets obtained on patients. Reviewed record in preparation for visit, huddled with provider and have obtained necessary documentation.         Wt Readings from Last 3 Encounters:   07/10/19 113 lb 15.7 oz (51.7 kg)   19 115 lb 1.3 oz (52.2 kg)   19 116 lb 3.2 oz (52.7 kg)     Temp Readings from Last 3 Encounters:   19 97.6 °F (36.4 °C) (Oral)   07/10/19 98 °F (36.7 °C)   19 97.8 °F (36.6 °C)     BP Readings from Last 3 Encounters:   19 184/60   07/10/19 181/43   19 138/82     Pulse Readings from Last 3 Encounters:   19 (!) 48   07/10/19 (!) 52   19 (!) 45     Vitals:    19 1007   BP: 184/60   Pulse: (!) 48   Resp: 18   Temp: 97.6 °F (36.4 °C)   TempSrc: Oral   SpO2: 95%   Height: 5' 1.5\" (1.562 m)   PainSc:   0 - No pain         Learning Assessment:  :     No flowsheet data found. Depression Screening:  :     3 most recent PHQ Screens 7/2/2019   Little interest or pleasure in doing things Not at all   Feeling down, depressed, irritable, or hopeless Not at all   Total Score PHQ 2 0       No flowsheet data found. Fall Risk Assessment:  :     Fall Risk Assessment, last 12 mths 7/2/2019   Able to walk? Yes   Fall in past 12 months? No   Fall with injury? -   Number of falls in past 12 months -   Fall Risk Score -       Abuse Screening:  :     Abuse Screening Questionnaire 6/14/2019   Do you ever feel afraid of your partner? N   Are you in a relationship with someone who physically or mentally threatens you? N   Is it safe for you to go home?  Y       ADL Screening:  :     ADL Assessment 6/14/2019   Feeding yourself No Help Needed   Getting from bed to chair Help Needed   Getting dressed Help Needed   Bathing or showering Help Needed   Walk across the room (includes cane/walker) Help Needed   Using the telphone No Help Needed   Taking your medications Help Needed   Preparing meals Help Needed   Managing money (expenses/bills) Help Needed   Moderately strenuous housework (laundry) Help Needed   Shopping for personal items (toiletries/medicines) Help Needed   Shopping for groceries Help Needed   Driving Help Needed   Climbing a flight of stairs Help Needed   Getting to places beyond walking distances Help Needed

## 2019-08-02 NOTE — PATIENT INSTRUCTIONS

## 2019-08-05 LAB
BACTERIA UR CULT: ABNORMAL
BACTERIA UR CULT: ABNORMAL

## 2019-08-05 RX ORDER — NITROFURANTOIN 25; 75 MG/1; MG/1
100 CAPSULE ORAL 2 TIMES DAILY
Qty: 20 CAP | Refills: 0 | Status: SHIPPED | OUTPATIENT
Start: 2019-08-05 | End: 2019-09-27

## 2019-08-05 NOTE — PROGRESS NOTES
Has a UTI so treat with Macrobid 100 twice daily for 10 days since this is a recurrent UTI. All the labs are okay.

## 2019-08-05 NOTE — PROGRESS NOTES
Called and spoke to patients son Mary Gamboa  Two pt identifiers confirmed  Informed patient per Dr. Precious Ramirez that Ms. Stevo Johnson has a uti and he wants to treat it with Macrobid 100mg twice daily for 10 days. Also informed Mary Gmaboa that per Dr. Precious Ramirez all other labs were ok. Mary Cooper requested rx to be printed as his mother lives in an assisted living and they fill her medications. Patient verbalized understanding of information discussed  with no further questions at this time.

## 2019-08-05 NOTE — TELEPHONE ENCOUNTER
PCP: Claude Meyers MD    Last appt: 8/2/2019  Future Appointments   Date Time Provider Miroslava Espinoza   11/8/2019 10:20 AM Claude Meyers MD [facility] RONY VALENTINO       Requested Prescriptions     Pending Prescriptions Disp Refills    nitrofurantoin, macrocrystal-monohydrate, (MACROBID) 100 mg capsule 20 Cap 0     Sig: Take 1 Cap by mouth two (2) times a day.

## 2019-08-13 DIAGNOSIS — G62.9 NEUROPATHY: Primary | ICD-10-CM

## 2019-08-13 RX ORDER — GABAPENTIN 400 MG/1
400 CAPSULE ORAL 4 TIMES DAILY
Qty: 120 CAP | Refills: 5 | Status: SHIPPED | OUTPATIENT
Start: 2019-08-13 | End: 2019-12-09 | Stop reason: SDUPTHER

## 2019-08-13 NOTE — TELEPHONE ENCOUNTER
RX refill request from the patient/pharmacy. Patient last seen 08- with labs, and next appt. scheduled for 11-  Requested Prescriptions     Pending Prescriptions Disp Refills    gabapentin (NEURONTIN) 400 mg capsule 120 Cap 5     Sig: Take 1 Cap by mouth four (4) times daily. Max Daily Amount: 1,600 mg.   .

## 2019-09-11 DIAGNOSIS — I73.9 PERIPHERAL VASCULAR DISEASE (HCC): ICD-10-CM

## 2019-09-11 RX ORDER — TEMAZEPAM 15 MG/1
15 CAPSULE ORAL
Qty: 30 CAP | Refills: 5 | Status: SHIPPED | OUTPATIENT
Start: 2019-09-11

## 2019-09-11 NOTE — TELEPHONE ENCOUNTER
RX refill request from the patient/pharmacy. Patient last seen 08- with labs, and next appt. scheduled for 11-  Requested Prescriptions     Pending Prescriptions Disp Refills    temazepam (RESTORIL) 15 mg capsule 30 Cap 5     Sig: Take 1 Cap by mouth nightly. Max Daily Amount: 15 mg.   .

## 2019-09-23 PROBLEM — Z00.00 MEDICARE ANNUAL WELLNESS VISIT, INITIAL: Status: RESOLVED | Noted: 2018-12-06 | Resolved: 2019-09-23

## 2019-09-26 ENCOUNTER — APPOINTMENT (OUTPATIENT)
Dept: GENERAL RADIOLOGY | Age: 84
DRG: 194 | End: 2019-09-26
Attending: EMERGENCY MEDICINE
Payer: MEDICARE

## 2019-09-26 ENCOUNTER — HOSPITAL ENCOUNTER (INPATIENT)
Age: 84
LOS: 15 days | Discharge: HOME HEALTH | DRG: 194 | End: 2019-10-11
Attending: EMERGENCY MEDICINE | Admitting: HOSPITALIST
Payer: MEDICARE

## 2019-09-26 ENCOUNTER — HOSPITAL ENCOUNTER (EMERGENCY)
Age: 84
Discharge: STILL ADMITTED OR ADMINISTRATIVE | DRG: 194 | End: 2019-09-26
Attending: EMERGENCY MEDICINE
Payer: MEDICARE

## 2019-09-26 DIAGNOSIS — I10 ESSENTIAL HYPERTENSION: ICD-10-CM

## 2019-09-26 DIAGNOSIS — E44.0 MODERATE PROTEIN-CALORIE MALNUTRITION (HCC): ICD-10-CM

## 2019-09-26 DIAGNOSIS — I48.0 PAF (PAROXYSMAL ATRIAL FIBRILLATION) (HCC): ICD-10-CM

## 2019-09-26 DIAGNOSIS — J44.9 CHRONIC OBSTRUCTIVE PULMONARY DISEASE, UNSPECIFIED COPD TYPE (HCC): Chronic | ICD-10-CM

## 2019-09-26 DIAGNOSIS — N39.0 URINARY TRACT INFECTION WITHOUT HEMATURIA, SITE UNSPECIFIED: ICD-10-CM

## 2019-09-26 DIAGNOSIS — D64.9 ANEMIA, UNSPECIFIED TYPE: ICD-10-CM

## 2019-09-26 DIAGNOSIS — R09.02 HYPOXIA: ICD-10-CM

## 2019-09-26 DIAGNOSIS — J18.9 PNEUMONIA OF LEFT UPPER LOBE DUE TO INFECTIOUS ORGANISM: Primary | ICD-10-CM

## 2019-09-26 DIAGNOSIS — N18.30 STAGE 3 CHRONIC KIDNEY DISEASE (HCC): ICD-10-CM

## 2019-09-26 LAB
ALBUMIN SERPL-MCNC: 3.1 G/DL (ref 3.5–5)
ALBUMIN/GLOB SERPL: 0.9 {RATIO} (ref 1.1–2.2)
ALP SERPL-CCNC: 65 U/L (ref 45–117)
ALT SERPL-CCNC: 15 U/L (ref 12–78)
ANION GAP SERPL CALC-SCNC: 6 MMOL/L (ref 5–15)
APPEARANCE UR: ABNORMAL
AST SERPL-CCNC: 28 U/L (ref 15–37)
BACTERIA URNS QL MICRO: ABNORMAL /HPF
BASOPHILS # BLD: 0 K/UL (ref 0–0.1)
BASOPHILS NFR BLD: 0 % (ref 0–1)
BILIRUB SERPL-MCNC: 0.5 MG/DL (ref 0.2–1)
BILIRUB UR QL: NEGATIVE
BUN SERPL-MCNC: 45 MG/DL (ref 6–20)
BUN/CREAT SERPL: 23 (ref 12–20)
CALCIUM SERPL-MCNC: 9.7 MG/DL (ref 8.5–10.1)
CHLORIDE SERPL-SCNC: 109 MMOL/L (ref 97–108)
CO2 SERPL-SCNC: 26 MMOL/L (ref 21–32)
COLOR UR: ABNORMAL
CREAT SERPL-MCNC: 1.92 MG/DL (ref 0.55–1.02)
DIFFERENTIAL METHOD BLD: ABNORMAL
EOSINOPHIL # BLD: 0 K/UL (ref 0–0.4)
EOSINOPHIL NFR BLD: 0 % (ref 0–7)
EPITH CASTS URNS QL MICRO: ABNORMAL /LPF
ERYTHROCYTE [DISTWIDTH] IN BLOOD BY AUTOMATED COUNT: 15.5 % (ref 11.5–14.5)
GLOBULIN SER CALC-MCNC: 3.4 G/DL (ref 2–4)
GLUCOSE SERPL-MCNC: 93 MG/DL (ref 65–100)
GLUCOSE UR STRIP.AUTO-MCNC: NEGATIVE MG/DL
HCT VFR BLD AUTO: 37.6 % (ref 35–47)
HGB BLD-MCNC: 11.9 G/DL (ref 11.5–16)
HGB UR QL STRIP: NEGATIVE
IMM GRANULOCYTES # BLD AUTO: 0 K/UL (ref 0–0.04)
IMM GRANULOCYTES NFR BLD AUTO: 0 % (ref 0–0.5)
KETONES UR QL STRIP.AUTO: NEGATIVE MG/DL
LACTATE BLD-SCNC: 1.27 MMOL/L (ref 0.4–2)
LEUKOCYTE ESTERASE UR QL STRIP.AUTO: ABNORMAL
LYMPHOCYTES # BLD: 1 K/UL (ref 0.8–3.5)
LYMPHOCYTES NFR BLD: 6 % (ref 12–49)
MCH RBC QN AUTO: 30.1 PG (ref 26–34)
MCHC RBC AUTO-ENTMCNC: 31.6 G/DL (ref 30–36.5)
MCV RBC AUTO: 95.2 FL (ref 80–99)
MONOCYTES # BLD: 1.9 K/UL (ref 0–1)
MONOCYTES NFR BLD: 11 % (ref 5–13)
NEUTS SEG # BLD: 14.2 K/UL (ref 1.8–8)
NEUTS SEG NFR BLD: 83 % (ref 32–75)
NITRITE UR QL STRIP.AUTO: NEGATIVE
NRBC # BLD: 0 K/UL (ref 0–0.01)
NRBC BLD-RTO: 0 PER 100 WBC
PH UR STRIP: 5 [PH] (ref 5–8)
PLATELET # BLD AUTO: 238 K/UL (ref 150–400)
PMV BLD AUTO: 11.1 FL (ref 8.9–12.9)
POTASSIUM SERPL-SCNC: 4.2 MMOL/L (ref 3.5–5.1)
PROT SERPL-MCNC: 6.5 G/DL (ref 6.4–8.2)
PROT UR STRIP-MCNC: 300 MG/DL
RBC # BLD AUTO: 3.95 M/UL (ref 3.8–5.2)
RBC #/AREA URNS HPF: ABNORMAL /HPF (ref 0–5)
RBC MORPH BLD: ABNORMAL
SODIUM SERPL-SCNC: 141 MMOL/L (ref 136–145)
SP GR UR REFRACTOMETRY: 1.01 (ref 1–1.03)
UA: UC IF INDICATED,UAUC: ABNORMAL
UROBILINOGEN UR QL STRIP.AUTO: 0.2 EU/DL (ref 0.2–1)
WBC # BLD AUTO: 17.1 K/UL (ref 3.6–11)
WBC URNS QL MICRO: ABNORMAL /HPF (ref 0–4)
YEAST URNS QL MICRO: PRESENT

## 2019-09-26 PROCEDURE — 36415 COLL VENOUS BLD VENIPUNCTURE: CPT

## 2019-09-26 PROCEDURE — 77030019905 HC CATH URETH INTMIT MDII -A

## 2019-09-26 PROCEDURE — 93005 ELECTROCARDIOGRAM TRACING: CPT

## 2019-09-26 PROCEDURE — 74011250636 HC RX REV CODE- 250/636: Performed by: EMERGENCY MEDICINE

## 2019-09-26 PROCEDURE — 87086 URINE CULTURE/COLONY COUNT: CPT

## 2019-09-26 PROCEDURE — 65270000029 HC RM PRIVATE

## 2019-09-26 PROCEDURE — 80053 COMPREHEN METABOLIC PANEL: CPT

## 2019-09-26 PROCEDURE — 87186 SC STD MICRODIL/AGAR DIL: CPT

## 2019-09-26 PROCEDURE — 85025 COMPLETE CBC W/AUTO DIFF WBC: CPT

## 2019-09-26 PROCEDURE — 87040 BLOOD CULTURE FOR BACTERIA: CPT

## 2019-09-26 PROCEDURE — 74011000258 HC RX REV CODE- 258: Performed by: EMERGENCY MEDICINE

## 2019-09-26 PROCEDURE — 75810000275 HC EMERGENCY DEPT VISIT NO LEVEL OF CARE

## 2019-09-26 PROCEDURE — 71045 X-RAY EXAM CHEST 1 VIEW: CPT

## 2019-09-26 PROCEDURE — 96365 THER/PROPH/DIAG IV INF INIT: CPT

## 2019-09-26 PROCEDURE — 99285 EMERGENCY DEPT VISIT HI MDM: CPT

## 2019-09-26 PROCEDURE — 87077 CULTURE AEROBIC IDENTIFY: CPT

## 2019-09-26 PROCEDURE — 81001 URINALYSIS AUTO W/SCOPE: CPT

## 2019-09-26 PROCEDURE — 83605 ASSAY OF LACTIC ACID: CPT

## 2019-09-26 PROCEDURE — 51701 INSERT BLADDER CATHETER: CPT

## 2019-09-26 RX ORDER — PRAVASTATIN SODIUM 40 MG/1
40 TABLET ORAL
Status: DISCONTINUED | OUTPATIENT
Start: 2019-09-26 | End: 2019-10-11 | Stop reason: HOSPADM

## 2019-09-26 RX ORDER — GUAIFENESIN 100 MG/5ML
81 LIQUID (ML) ORAL DAILY
Status: DISCONTINUED | OUTPATIENT
Start: 2019-09-27 | End: 2019-10-11 | Stop reason: HOSPADM

## 2019-09-26 RX ORDER — SODIUM CHLORIDE 9 MG/ML
100 INJECTION, SOLUTION INTRAVENOUS CONTINUOUS
Status: DISPENSED | OUTPATIENT
Start: 2019-09-26 | End: 2019-09-27

## 2019-09-26 RX ORDER — TRAMADOL HYDROCHLORIDE 50 MG/1
50 TABLET ORAL
Status: DISCONTINUED | OUTPATIENT
Start: 2019-09-26 | End: 2019-09-26

## 2019-09-26 RX ORDER — ONDANSETRON 2 MG/ML
4 INJECTION INTRAMUSCULAR; INTRAVENOUS
Status: DISCONTINUED | OUTPATIENT
Start: 2019-09-26 | End: 2019-10-11 | Stop reason: HOSPADM

## 2019-09-26 RX ORDER — METOPROLOL TARTRATE 50 MG/1
50 TABLET ORAL 2 TIMES DAILY
Status: DISCONTINUED | OUTPATIENT
Start: 2019-09-27 | End: 2019-10-11 | Stop reason: HOSPADM

## 2019-09-26 RX ORDER — ACETAMINOPHEN 325 MG/1
650 TABLET ORAL
Status: DISCONTINUED | OUTPATIENT
Start: 2019-09-26 | End: 2019-10-11 | Stop reason: HOSPADM

## 2019-09-26 RX ORDER — SODIUM CHLORIDE 9 MG/ML
75 INJECTION, SOLUTION INTRAVENOUS CONTINUOUS
Status: DISCONTINUED | OUTPATIENT
Start: 2019-09-26 | End: 2019-09-26

## 2019-09-26 RX ORDER — HEPARIN SODIUM 5000 [USP'U]/ML
5000 INJECTION, SOLUTION INTRAVENOUS; SUBCUTANEOUS EVERY 12 HOURS
Status: DISCONTINUED | OUTPATIENT
Start: 2019-09-27 | End: 2019-10-11 | Stop reason: HOSPADM

## 2019-09-26 RX ORDER — PAROXETINE HYDROCHLORIDE 20 MG/1
20 TABLET, FILM COATED ORAL DAILY
Status: DISCONTINUED | OUTPATIENT
Start: 2019-09-27 | End: 2019-10-11 | Stop reason: HOSPADM

## 2019-09-26 RX ORDER — SODIUM CHLORIDE 0.9 % (FLUSH) 0.9 %
5-40 SYRINGE (ML) INJECTION AS NEEDED
Status: DISCONTINUED | OUTPATIENT
Start: 2019-09-26 | End: 2019-10-11 | Stop reason: HOSPADM

## 2019-09-26 RX ORDER — VANCOMYCIN HYDROCHLORIDE
1250
Status: COMPLETED | OUTPATIENT
Start: 2019-09-27 | End: 2019-09-27

## 2019-09-26 RX ORDER — SODIUM CHLORIDE 0.9 % (FLUSH) 0.9 %
5-40 SYRINGE (ML) INJECTION EVERY 8 HOURS
Status: DISCONTINUED | OUTPATIENT
Start: 2019-09-26 | End: 2019-10-11 | Stop reason: HOSPADM

## 2019-09-26 RX ADMIN — CEFTRIAXONE 1 G: 1 INJECTION, POWDER, FOR SOLUTION INTRAMUSCULAR; INTRAVENOUS at 22:55

## 2019-09-26 RX ADMIN — Medication 20 ML: at 23:25

## 2019-09-27 LAB
ANION GAP SERPL CALC-SCNC: 6 MMOL/L (ref 5–15)
ATRIAL RATE: 78 BPM
BUN SERPL-MCNC: 40 MG/DL (ref 6–20)
BUN/CREAT SERPL: 25 (ref 12–20)
CALCIUM SERPL-MCNC: 9.2 MG/DL (ref 8.5–10.1)
CALCULATED P AXIS, ECG09: 65 DEGREES
CALCULATED R AXIS, ECG10: -29 DEGREES
CALCULATED T AXIS, ECG11: 71 DEGREES
CHLORIDE SERPL-SCNC: 111 MMOL/L (ref 97–108)
CO2 SERPL-SCNC: 26 MMOL/L (ref 21–32)
CREAT SERPL-MCNC: 1.58 MG/DL (ref 0.55–1.02)
DIAGNOSIS, 93000: NORMAL
ERYTHROCYTE [DISTWIDTH] IN BLOOD BY AUTOMATED COUNT: 15.4 % (ref 11.5–14.5)
GLUCOSE SERPL-MCNC: 87 MG/DL (ref 65–100)
HCT VFR BLD AUTO: 33.2 % (ref 35–47)
HGB BLD-MCNC: 10.3 G/DL (ref 11.5–16)
MAGNESIUM SERPL-MCNC: 1.6 MG/DL (ref 1.6–2.4)
MCH RBC QN AUTO: 30.1 PG (ref 26–34)
MCHC RBC AUTO-ENTMCNC: 31 G/DL (ref 30–36.5)
MCV RBC AUTO: 97.1 FL (ref 80–99)
NRBC # BLD: 0 K/UL (ref 0–0.01)
NRBC BLD-RTO: 0 PER 100 WBC
P-R INTERVAL, ECG05: 160 MS
PHOSPHATE SERPL-MCNC: 3.4 MG/DL (ref 2.6–4.7)
PLATELET # BLD AUTO: 170 K/UL (ref 150–400)
PMV BLD AUTO: 10 FL (ref 8.9–12.9)
POTASSIUM SERPL-SCNC: 3.9 MMOL/L (ref 3.5–5.1)
Q-T INTERVAL, ECG07: 390 MS
QRS DURATION, ECG06: 78 MS
QTC CALCULATION (BEZET), ECG08: 444 MS
RBC # BLD AUTO: 3.42 M/UL (ref 3.8–5.2)
SODIUM SERPL-SCNC: 143 MMOL/L (ref 136–145)
VENTRICULAR RATE, ECG03: 78 BPM
WBC # BLD AUTO: 12.4 K/UL (ref 3.6–11)

## 2019-09-27 PROCEDURE — 83735 ASSAY OF MAGNESIUM: CPT

## 2019-09-27 PROCEDURE — 85027 COMPLETE CBC AUTOMATED: CPT

## 2019-09-27 PROCEDURE — 74011250637 HC RX REV CODE- 250/637: Performed by: HOSPITALIST

## 2019-09-27 PROCEDURE — 65270000015 HC RM PRIVATE ONCOLOGY

## 2019-09-27 PROCEDURE — 84100 ASSAY OF PHOSPHORUS: CPT

## 2019-09-27 PROCEDURE — 80048 BASIC METABOLIC PNL TOTAL CA: CPT

## 2019-09-27 PROCEDURE — 36415 COLL VENOUS BLD VENIPUNCTURE: CPT

## 2019-09-27 PROCEDURE — 74011250636 HC RX REV CODE- 250/636: Performed by: EMERGENCY MEDICINE

## 2019-09-27 PROCEDURE — 94760 N-INVAS EAR/PLS OXIMETRY 1: CPT

## 2019-09-27 PROCEDURE — 74011250636 HC RX REV CODE- 250/636: Performed by: HOSPITALIST

## 2019-09-27 PROCEDURE — 74011000258 HC RX REV CODE- 258: Performed by: HOSPITALIST

## 2019-09-27 PROCEDURE — 77010033678 HC OXYGEN DAILY

## 2019-09-27 RX ORDER — IPRATROPIUM BROMIDE AND ALBUTEROL SULFATE 2.5; .5 MG/3ML; MG/3ML
3 SOLUTION RESPIRATORY (INHALATION)
Status: DISCONTINUED | OUTPATIENT
Start: 2019-09-27 | End: 2019-10-11 | Stop reason: HOSPADM

## 2019-09-27 RX ORDER — HYDRALAZINE HYDROCHLORIDE 100 MG/1
100 TABLET, FILM COATED ORAL 3 TIMES DAILY
COMMUNITY

## 2019-09-27 RX ADMIN — HEPARIN SODIUM 5000 UNITS: 5000 INJECTION INTRAVENOUS; SUBCUTANEOUS at 22:07

## 2019-09-27 RX ADMIN — SODIUM CHLORIDE 100 ML/HR: 900 INJECTION, SOLUTION INTRAVENOUS at 10:53

## 2019-09-27 RX ADMIN — PRAVASTATIN SODIUM 40 MG: 40 TABLET ORAL at 00:21

## 2019-09-27 RX ADMIN — DILTIAZEM HYDROCHLORIDE 300 MG: 180 CAPSULE, COATED, EXTENDED RELEASE ORAL at 09:27

## 2019-09-27 RX ADMIN — Medication 10 ML: at 14:19

## 2019-09-27 RX ADMIN — Medication 10 ML: at 10:53

## 2019-09-27 RX ADMIN — GABAPENTIN 400 MG: 100 CAPSULE ORAL at 14:19

## 2019-09-27 RX ADMIN — ACETAMINOPHEN 650 MG: 325 TABLET ORAL at 19:40

## 2019-09-27 RX ADMIN — PAROXETINE HYDROCHLORIDE 20 MG: 20 TABLET, FILM COATED ORAL at 09:26

## 2019-09-27 RX ADMIN — SODIUM CHLORIDE 100 ML/HR: 900 INJECTION, SOLUTION INTRAVENOUS at 00:21

## 2019-09-27 RX ADMIN — METOPROLOL TARTRATE 50 MG: 50 TABLET, FILM COATED ORAL at 09:29

## 2019-09-27 RX ADMIN — PRAVASTATIN SODIUM 40 MG: 40 TABLET ORAL at 22:08

## 2019-09-27 RX ADMIN — AZITHROMYCIN MONOHYDRATE 500 MG: 500 INJECTION, POWDER, LYOPHILIZED, FOR SOLUTION INTRAVENOUS at 00:21

## 2019-09-27 RX ADMIN — GABAPENTIN 400 MG: 100 CAPSULE ORAL at 09:28

## 2019-09-27 RX ADMIN — CEFTRIAXONE 1 G: 1 INJECTION, POWDER, FOR SOLUTION INTRAMUSCULAR; INTRAVENOUS at 22:07

## 2019-09-27 RX ADMIN — GABAPENTIN 400 MG: 100 CAPSULE ORAL at 17:36

## 2019-09-27 RX ADMIN — GABAPENTIN 400 MG: 100 CAPSULE ORAL at 22:08

## 2019-09-27 RX ADMIN — ASPIRIN 81 MG CHEWABLE TABLET 81 MG: 81 TABLET CHEWABLE at 09:29

## 2019-09-27 RX ADMIN — ACETAMINOPHEN 650 MG: 325 TABLET ORAL at 15:16

## 2019-09-27 RX ADMIN — METOPROLOL TARTRATE 50 MG: 50 TABLET, FILM COATED ORAL at 17:36

## 2019-09-27 RX ADMIN — VANCOMYCIN HYDROCHLORIDE 1250 MG: 10 INJECTION, POWDER, LYOPHILIZED, FOR SOLUTION INTRAVENOUS at 01:27

## 2019-09-27 RX ADMIN — HEPARIN SODIUM 5000 UNITS: 5000 INJECTION INTRAVENOUS; SUBCUTANEOUS at 09:30

## 2019-09-27 NOTE — PROGRESS NOTES
Physical Therapy    Received PT eval order. Spoke with RN who states pt is noted to be w/c bound at baseline at [facility]. Pt lives at [facility] (formerly [facility] Spoke with pt who is able to accurately state she lives at [facility] (remembers the recent name change) and states that she is only w/c level there and staff helps to lift her into the chair. She states she helps some, but the staff lifts her. Pt states she had worked with PT at the [facility] but it has been discontinued with no further gait or mobility goals. Per ER RN, pt was transferred back and forth to [facility] and required 2 person assist for safety. No current PT needs in acute setting. Pt should be screened by therapy upon return to [facility] as needed. Would recommend lift team for safety here in hospital. Relayed this to RN. Will sign off. If situation changes, please re-consult PT as appropriate if there is a change from baseline.     Lyssa Duke, PT

## 2019-09-27 NOTE — ED NOTES
TRANSFER - OUT REPORT:    Verbal [facility] on Kira Stoo  being transferred to [facility] (unit) for routine progression of care       Report consisted of patients Situation, Background, Assessment and   Recommendations(SBAR). Information from the following report(s) SBAR, ED Summary and Recent Results was reviewed with the receiving nurse. Lines:   Peripheral IV 09/26/19 Left Forearm (Active)   Site Assessment Clean, dry, & intact 9/26/2019  9:23 PM   Phlebitis Assessment 0 9/26/2019  9:23 PM   Infiltration Assessment 0 9/26/2019  9:23 PM   Dressing Status Clean, dry, & intact 9/26/2019  9:23 PM   Dressing Type Transparent;Tape 9/26/2019  9:23 PM   Hub Color/Line Status Pink;Patent; Flushed 9/26/2019  9:23 PM   Action Taken Blood drawn 9/26/2019  9:23 PM       Peripheral IV 09/26/19 Right Hand (Active)   Site Assessment Clean, dry, & intact 9/26/2019  9:28 PM   Phlebitis Assessment 0 9/26/2019  9:28 PM   Infiltration Assessment 0 9/26/2019  9:28 PM   Dressing Status Clean, dry, & intact 9/26/2019  9:28 PM   Dressing Type Transparent;Tape 9/26/2019  9:28 PM   Hub Color/Line Status Blue;Patent; Flushed 9/26/2019  9:28 PM   Action Taken Blood drawn 9/26/2019  9:28 PM        Opportunity for questions and clarification was provided.       Patient transported with:   [employer]

## 2019-09-27 NOTE — ED NOTES
[facility]/o cough x2 days and fever that started today    Pt arrives with rectal temp of 101.8. Per EMS pt is nonambulatory and uses a wheelchair.  Pt is A/O x3, per EMS pt has baseline confusion and dementia    Pt placed x3 on monitor, bed in low position, call bell in reach

## 2019-09-27 NOTE — PROGRESS NOTES
Pharmacy Medication Reconciliation     Transfer papers from the [employer] [facility] were used since patient was unable to answer questions. Source: transfer papers from [facility] [employer] [facility]     Allergy Update: Hydrocodone; Morphine; and Dilaudid [hydromorphone]    Recommendations/Findings: The following amendments were made to the patient's active medication list on file at [facility]:   1) Additions: none    2) Deletions:   Macrobid  Colace    3) Changes:   Hydralazine 100mg tablet from 2 tabs BID to 2 tabs TID  Vitamin D3 from q7days to every Friday    4) Pertinent Pharmacy Findings:  none      -Clarified PTA med list with transfer papers. PTA medication list was corrected to the following:     Prior to Admission Medications   Prescriptions Last Dose Informant Patient Reported? Taking? L. acidoph & paracasei- S therm- Bifido (HANG-Q/RISAQUAD) 8 billion cell cap cap 9/26/2019 at Unknown time Transfer Papers No Yes   Sig: Take 1 Cap by mouth daily. PARoxetine (PAXIL) 20 mg tablet 9/26/2019 at Unknown time Transfer Papers Yes Yes   Sig: Take 20 mg by mouth daily. acetaminophen (TYLENOL) 650 mg CR tablet Unknown at Unknown time Transfer Papers Yes No   Sig: Take 500 mg by mouth every six (6) hours as needed for Pain. aspirin 81 mg tablet 9/26/2019 at Unknown time Transfer Papers Yes Yes   Sig: Take 81 mg by mouth daily. Stopped for surgery 8-4-10    chlorthalidone (HYGROTEN) 25 mg tablet 9/26/2019 at Unknown time Transfer Papers No Yes   Sig: Take 1 Tab by mouth daily. cholecalciferol (VITAMIN D3) 50,000 unit capsule 9/20/2019 at Unknown time Transfer Papers Yes Yes   Sig: Take 50,000 Units by mouth Every Friday. dilTIAZem CD (CARDIZEM CD) 300 mg ER capsule 9/26/2019 at Unknown time Transfer Papers No Yes   Sig: Take 1 Cap by mouth daily. gabapentin (NEURONTIN) 400 mg capsule 9/26/2019 at Unknown time Transfer Papers No Yes   Sig: Take 1 Cap by mouth four (4) times daily.  Max Daily Amount: 1,600 mg.   hydrALAZINE (APRESOLINE) 100 mg tablet 9/26/2019 at Unknown time Transfer Papers Yes Yes   Sig: Take 100 mg by mouth three (3) times daily. lisinopril (PRINIVIL, ZESTRIL) 40 mg tablet 9/26/2019 at Unknown time Transfer Papers No Yes   Sig: Take 1 Tab by mouth daily. loperamide (IMODIUM) 2 mg capsule Unknown at Unknown time Transfer Papers No No   Sig: Take 2 Caps by mouth every eight (8) hours as needed for Diarrhea.   metoprolol tartrate (LOPRESSOR) 50 mg tablet 9/26/2019 at Unknown time Transfer Papers No Yes   Sig: Take 1 Tab by mouth two (2) times a day. ondansetron hcl (ZOFRAN) 4 mg tablet Unknown at Unknown time Transfer Papers Yes No   Sig: Take 4 mg by mouth every eight (8) hours as needed for Nausea. polyethylene glycol (MIRALAX) 17 gram packet Unknown at Unknown time Transfer Papers Yes No   Sig: Take 17 g by mouth daily as needed (constipation). pravastatin (PRAVACHOL) 40 mg tablet 9/26/2019 at Unknown time Transfer Papers No Yes   Sig: Take 1 Tab by mouth nightly. predniSONE (DELTASONE) 10 mg tablet 9/26/2019 at Unknown time Transfer Papers No Yes   Sig: Take 1 Tab by mouth daily (with breakfast). temazepam (RESTORIL) 15 mg capsule 9/26/2019 at Unknown time Transfer Papers No Yes   Sig: Take 1 Cap by mouth nightly. Max Daily Amount: 15 mg.   traMADol (ULTRAM) 50 mg tablet Unknown at Unknown time Transfer Papers No No   Sig: Take 1 Tab by mouth every six (6) hours as needed for Pain. Max Daily Amount: 200 mg.       Facility-Administered Medications: None          Thank you,  Buddy Lin, [facility] pharmacy student

## 2019-09-27 NOTE — PROGRESS NOTES
Oncology End of Shift Note      Bedside shift change report given to Ev Madrid RN (incoming nurse) by Shavonne Fajardo (outgoing nurse) on [facility]. Report included the following information SBAR, Kardex and MAR. Shift Summary: Patient was added to turn team. Patient was admitted to unit, patient was oriented to RN, tech and room. Dual skin assessment and admissions database was completed. Issues for Physician to Address:  None. Patient on Cardiac Monitoring?     [] Yes  [] No    Rhythm:              Shavonne Fajardo

## 2019-09-27 NOTE — ED PROVIDER NOTES
EMERGENCY DEPARTMENT HISTORY AND PHYSICAL EXAM     ----------------------------------------------------------------------------  Please note that this dictation was completed with [employer], the computer voice recognition software. Quite often unanticipated grammatical, syntax, homophones, and other interpretive errors are inadvertently transcribed by the computer software. Please disregard these errors. Please excuse any errors that have escaped final proofreading  ----------------------------------------------------------------------------      Date: 9/26/2019  Patient Name: Thom Pacheco    History of Presenting Illness     Chief Complaint   Patient presents with    Fever     via EMS for fever 100.7 at the [facility]    Cough     hx of pneumonia       History Provided By:  Patient and family    HPI: Thom Pacheco is a 80 y.o. female, with significant pmhx of HTN, XOL, COPD who presents via EMS to the ED from the [address] with report of having vomiting earlier this morning. Patient's family was notified who requested patient be sent to the emergency department earlier this morning but patient refused. Also reported to have slight fever that may or may not have been treated. Patient's family member spoke with her later this evening and noted that she did not feel she was herself and is concerned that she may have pneumonia. At the insistence of her family patient was brought to the emergency department. Family reports she has been having coughing but continues to state \"I am fine. \"  Patient with extensive urologic history and is followed by Dr. Jason Chakraborty of [facility]. Notes to have stent in her kidney that is changed every 6 months with most recent exchange 2 months ago. Patient without history of dementia and family notes that if she is altered it is typically due to an infection.   When asked, patient specifically denies any associated fevers, chills, nausea, vomiting, diarrhea, abd pain, CP, SOB, urinary sxs, changes in BM, or headache. Social Hx: Former tobacco  denies EtOH , denies Illicit Drugs    There are no other complaints, changes, or physical findings at this time. PCP: Jovanny Miller MD    Allergies   Allergen Reactions    Hydrocodone Nausea and Vomiting    Morphine Rash    Dilaudid [Hydromorphone] Itching     Does not remember       Current Facility-Administered Medications   Medication Dose Route Frequency Provider Last Rate Last Dose    cefTRIAXone (ROCEPHIN) 1 g in 0.9% sodium chloride (MBP/ADV) 50 mL  1 g IntraVENous NOW Akash Valdes  mL/hr at 09/26/19 2255 1 g at 09/26/19 2255     Current Outpatient Medications   Medication Sig Dispense Refill    temazepam (RESTORIL) 15 mg capsule Take 1 Cap by mouth nightly. Max Daily Amount: 15 mg. 30 Cap 5    gabapentin (NEURONTIN) 400 mg capsule Take 1 Cap by mouth four (4) times daily. Max Daily Amount: 1,600 mg. 120 Cap 5    ondansetron hcl (ZOFRAN) 4 mg tablet Take 4 mg by mouth every eight (8) hours as needed for Nausea.  hydrALAZINE (APRESOLINE) 100 mg tablet Take 2 Tabs by mouth two (2) times a day. 120 Tab prn    pravastatin (PRAVACHOL) 40 mg tablet Take 1 Tab by mouth nightly. 30 Tab 11    traMADol (ULTRAM) 50 mg tablet Take 1 Tab by mouth every six (6) hours as needed for Pain. Max Daily Amount: 200 mg. 60 Tab 0    lisinopril (PRINIVIL, ZESTRIL) 40 mg tablet Take 1 Tab by mouth daily. 30 Tab prn    dilTIAZem CD (CARDIZEM CD) 300 mg ER capsule Take 1 Cap by mouth daily. 30 Cap prn    docusate sodium (COLACE) 100 mg capsule Take 100 mg by mouth two (2) times a day.  PARoxetine (PAXIL) 20 mg tablet Take 20 mg by mouth daily.  cholecalciferol (VITAMIN D3) 50,000 unit capsule Take 50,000 Units by mouth every seven (7) days.  polyethylene glycol (MIRALAX) 17 gram packet Take 17 g by mouth daily as needed (constipation).       metoprolol tartrate (LOPRESSOR) 50 mg tablet Take 1 Tab by mouth two (2) times a day. 60 Tab PRN    acetaminophen (TYLENOL) 650 mg CR tablet Take 500 mg by mouth every six (6) hours as needed for Pain.  aspirin 81 mg tablet Take 81 mg by mouth daily. Stopped for surgery 8-4-10       nitrofurantoin, macrocrystal-monohydrate, (MACROBID) 100 mg capsule Take 1 Cap by mouth two (2) times a day. 20 Cap 0    loperamide (IMODIUM) 2 mg capsule Take 2 Caps by mouth every eight (8) hours as needed for Diarrhea. 60 Cap 2    chlorthalidone (HYGROTEN) 25 mg tablet Take 1 Tab by mouth daily. 30 Tab prn    L. acidoph & paracasei- S therm- Bifido (HANG-Q/RISAQUAD) 8 billion cell cap cap Take 1 Cap by mouth daily. 30 Cap 0    predniSONE (DELTASONE) 10 mg tablet Take 1 Tab by mouth daily (with breakfast).  30 Tab 0       Past History     Past Medical History:  Past Medical History:   Diagnosis Date    Arrhythmia     A-fib    Arthritis     shoulder/right    Cancer ([facility] [address] 2015    left lung    Chronic kidney disease     Stent in Right Kidney, Stage III    Chronic obstructive pulmonary disease ([facility] [address]     Hypertension     Ill-defined condition     Ex Lap with [employer] patch of a perforated pyloric channel ulcer 7/19/16    Other ill-defined conditions(799.89)     lipids    Other ill-defined conditions(799.89)     blood transfusion history    PVD (peripheral vascular disease) ([facility])        Past Surgical History:  Past Surgical History:   Procedure Laterality Date    BYPASS GRAFT OTHR,FEM-FEM      BYPASS GRAFT OTHR,FEM-POP Right     PVD    HX HEENT      bilateral cataracts    HX ORTHOPAEDIC Right     right hip fracture/pinning    HX UROLOGICAL      right stent/kidney       Family History:  Family History   Problem Relation Age of Onset    Heart Disease Mother     Cancer Father        Social History:  Social History     Tobacco Use    Smoking status: Former Smoker     Packs/day: 0.25     Years: 60.00     Pack years: 15.00     Last attempt to quit: 2015     Years since quitting: 4. 7    Smokeless tobacco: Never Used   Substance Use Topics    Alcohol use: No    Drug use: No       Allergies: Allergies   Allergen Reactions    Hydrocodone Nausea and Vomiting    Morphine Rash    Dilaudid [Hydromorphone] Itching     Does not remember         Review of Systems   Review of Systems   Constitutional: Positive for fever. Eyes: Negative. Respiratory: Negative. Negative for shortness of breath. Cardiovascular: Negative for chest pain. Gastrointestinal: Negative for abdominal pain, nausea and vomiting. Endocrine: Negative. Genitourinary: Negative. Negative for difficulty urinating, dysuria and hematuria. Musculoskeletal: Negative. Skin: Negative. Neurological: Negative. Psychiatric/Behavioral: Negative for suicidal ideas. All other systems reviewed and are negative. Physical Exam   Physical Exam   Constitutional: She is oriented to person, place, and time. She appears well-developed and well-nourished. She appears lethargic. She is easily aroused. No distress. HENT:   Head: Normocephalic and atraumatic. Nose: Nose normal.   Eyes: Conjunctivae and EOM are normal. No scleral icterus. Neck: Normal range of motion. No tracheal deviation present. Cardiovascular: Normal rate, regular rhythm, normal heart sounds and intact distal pulses. Exam reveals no friction rub. No murmur heard. Pulmonary/Chest: Effort normal and breath sounds normal. No stridor. No respiratory distress. She has no wheezes (throughout). She has no rales. Abdominal: Soft. Bowel sounds are normal. She exhibits no distension. There is no tenderness. There is no rebound. Musculoskeletal: Normal range of motion. She exhibits no tenderness. Neurological: She is oriented to person, place, and time and easily aroused. She appears lethargic. No cranial nerve deficit. Skin: Skin is warm and dry. No rash noted. She is not diaphoretic. Psychiatric: She has a normal mood and affect.  Her speech is normal and behavior is normal. Judgment and thought content normal. Cognition and memory are normal.   Nursing note and vitals reviewed. Diagnostic Study Results     Labs -     Recent Results (from the past 12 hour(s))   EKG, 12 LEAD, INITIAL    Collection Time: 09/26/19  9:09 PM   Result Value Ref Range    Ventricular Rate 78 BPM    Atrial Rate 78 BPM    P-R Interval 160 ms    QRS Duration 78 ms    Q-T Interval 390 ms    QTC Calculation (Bezet) 444 ms    Calculated P Axis 65 degrees    Calculated R Axis -29 degrees    Calculated T Axis 71 degrees    Diagnosis       Sinus rhythm with premature supraventricular complexes  Inferior infarct , age undetermined  Anterior infarct , age undetermined  When compared with ECG of 09-JUL-2019 08:33,  premature supraventricular complexes are now present  Vent.  rate has increased BY  33 BPM  QRS voltage has decreased  Anterior infarct is now present  Inferior infarct is now present     URINALYSIS W/ REFLEX CULTURE    Collection Time: 09/26/19  9:22 PM   Result Value Ref Range    Color YELLOW/STRAW      Appearance CLOUDY (A) CLEAR      Specific gravity 1.013 1.003 - 1.030      pH (UA) 5.0 5.0 - 8.0      Protein 300 (A) NEG mg/dL    Glucose NEGATIVE  NEG mg/dL    Ketone NEGATIVE  NEG mg/dL    Bilirubin NEGATIVE  NEG      Blood NEGATIVE  NEG      Urobilinogen 0.2 0.2 - 1.0 EU/dL    Nitrites NEGATIVE  NEG      Leukocyte Esterase MODERATE (A) NEG      WBC 20-50 0 - 4 /hpf    RBC 0-5 0 - 5 /hpf    Epithelial cells MODERATE (A) FEW /lpf    Bacteria 2+ (A) NEG /hpf    UA:UC IF INDICATED URINE CULTURE ORDERED (A) CNI      Yeast PRESENT (A) NEG     CBC WITH AUTOMATED DIFF    Collection Time: 09/26/19  9:30 PM   Result Value Ref Range    WBC 17.1 (H) 3.6 - 11.0 K/uL    RBC 3.95 3.80 - 5.20 M/uL    HGB 11.9 11.5 - 16.0 g/dL    HCT 37.6 35.0 - 47.0 %    MCV 95.2 80.0 - 99.0 FL    MCH 30.1 26.0 - 34.0 PG    MCHC 31.6 30.0 - 36.5 g/dL    RDW 15.5 (H) 11.5 - 14.5 %    PLATELET 238 150 - 400 K/uL    MPV 11.1 8.9 - 12.9 FL    NRBC 0.0 0  WBC    ABSOLUTE NRBC 0.00 0.00 - 0.01 K/uL    NEUTROPHILS 83 (H) 32 - 75 %    LYMPHOCYTES 6 (L) 12 - 49 %    MONOCYTES 11 5 - 13 %    EOSINOPHILS 0 0 - 7 %    BASOPHILS 0 0 - 1 %    IMMATURE GRANULOCYTES 0 0.0 - 0.5 %    ABS. NEUTROPHILS 14.2 (H) 1.8 - 8.0 K/UL    ABS. LYMPHOCYTES 1.0 0.8 - 3.5 K/UL    ABS. MONOCYTES 1.9 (H) 0.0 - 1.0 K/UL    ABS. EOSINOPHILS 0.0 0.0 - 0.4 K/UL    ABS. BASOPHILS 0.0 0.0 - 0.1 K/UL    ABS. IMM. GRANS. 0.0 0.00 - 0.04 K/UL    DF MANUAL      RBC COMMENTS NORMOCYTIC, NORMOCHROMIC     METABOLIC PANEL, COMPREHENSIVE    Collection Time: 09/26/19  9:30 PM   Result Value Ref Range    Sodium 141 136 - 145 mmol/L    Potassium 4.2 3.5 - 5.1 mmol/L    Chloride 109 (H) 97 - 108 mmol/L    CO2 26 21 - 32 mmol/L    Anion gap 6 5 - 15 mmol/L    Glucose 93 65 - 100 mg/dL    BUN 45 (H) 6 - 20 MG/DL    Creatinine 1.92 (H) 0.55 - 1.02 MG/DL    BUN/Creatinine ratio 23 (H) 12 - 20      GFR est AA 30 (L) >60 ml/min/1.73m2    GFR est non-AA 25 (L) >60 ml/min/1.73m2    Calcium 9.7 8.5 - 10.1 MG/DL    Bilirubin, total 0.5 0.2 - 1.0 MG/DL    ALT (SGPT) 15 12 - 78 U/L    AST (SGOT) 28 15 - 37 U/L    Alk. phosphatase 65 45 - 117 U/L    Protein, total 6.5 6.4 - 8.2 g/dL    Albumin 3.1 (L) 3.5 - 5.0 g/dL    Globulin 3.4 2.0 - 4.0 g/dL    A-G Ratio 0.9 (L) 1.1 - 2.2     POC LACTIC ACID    Collection Time: 09/26/19  9:36 PM   Result Value Ref Range    Lactic Acid (POC) 1.27 0.40 - 2.00 mmol/L       Radiologic Studies -   XR CHEST PORT   Final Result   IMPRESSION: Left perihilar consolidation likely represents pneumonia. Radiographic follow-up is recommended to assure complete resolution. CT Results  (Last 48 hours)    None        CXR Results  (Last 48 hours)               09/26/19 2238  XR CHEST PORT Final result    Impression:  IMPRESSION: Left perihilar consolidation likely represents pneumonia.    Radiographic follow-up is recommended to assure complete resolution. Narrative:  INDICATION: Cough       COMPARISON: June 4, 2019       FINDINGS: AP portable imaging of the chest performed at 10:12 PM demonstrates a   stable cardiomediastinal silhouette. There is new/increased left perihilar   consolidation. The lungs are otherwise clear. No significant osseous   abnormalities are seen. Medical Decision Making   I am the first provider for this patient. I reviewed the vital signs, available nursing notes, past medical history, past surgical history, family history and social history. Vital Signs-Reviewed the patient's vital signs. Patient Vitals for the past 12 hrs:   Temp Pulse Resp BP SpO2   09/26/19 2057 (!) 101.8 °F (38.8 °C)       09/26/19 2049 99.4 °F (37.4 °C) 79 18 151/74 93 %       Pulse Oximetry Analysis - 93% on RA    Cardiac Monitor:   Rate: 79 bpm  Rhythm: nsr    Records Reviewed: Nursing Notes, Old Medical Records, Ambulance Run Sheet, Previous Radiology Studies and Previous Laboratory Studies    Provider Notes (Medical Decision Making):     DDX:  Pneumonia, UTI, sepsis, lecture light abnormality, dehydration    Plan:  Labs, lactate, cultures, UA, chest x-ray    Impression:  UTI    ED Course:   Initial assessment performed. The patients presenting problems have been discussed, and they are in agreement with the care plan formulated and outlined with them. I have encouraged them to ask questions as they arise throughout their visit. I reviewed our electronic medical record system for any past medical records that were available that may contribute to the patients current condition, the nursing notes and and vital signs from today's visit    Nursing notes will be reviewed as they become available in realtime while the pt has been in the ED.   Yovanny Abdalla MD    EKG interpretation 2109: NSR, nl Axis, rate 78; , QRS 78, QTc 444; no acute ischemia; interpreted by Yovanny Abdalla MD    I personally reviewed pt's imaging. Official read by radiology noted above. Domenic Pyle MD    PROGRESS NOTE:  10:26 PM  Pt noted to have findings on UA concerning for UTI. Will start ceftriaxone at this time awaiting chest x-ray findings. Noted to have elevated white blood cell count but normal lactate. Have trialed patient off oxygen and noted to desat to 83%. Patient placed back on 2 L via nasal cannula. Patient would likely admission. X-ray being performed now to evaluate further for pneumonia. Domenic Pyle MD      PROGRESS NOTE  11:00  patient with findings consistent with pneumonia on chest x-ray. We will continue with plan for admission. Domenic Pyle MD      CONSULT NOTE:   11:00 PM  Domenic Pyle MD spoke with Dr. Kristen Goltz,   Specialty: [facility] Dr. Kristen Goltz due to uti/pna, hypoxia. Discussed pt's HPI and available diagnostic results thus far. Expressed concerns for needed admission. Consultant will evaluate for admission. Domenic Pyle MD    ADMISSION NOTE:  11:00 PM  Patient is being admitted to the hospital by Dr. Kristen Goltz. The results of their tests and reasons for their admission have been discussed with them and/or available family. They convey agreement and understanding for the need to be admitted and for their admission diagnosis.    Domenic Pyle MD             Critical Care Time:       CRITICAL CARE NOTE:  10:28 PM  IMPENDING DETERIORATION -Airway, Respiratory, Cardiovascular, CNS, Metabolic and Renal  ASSOCIATED RISK FACTORS - Hypotension, Dysrhythmia, Metabolic changes and Dehydration  MANAGEMENT- Bedside Assessment and Supervision of Care  INTERPRETATION -  Xrays, ECG and Blood Pressure  INTERVENTIONS - hemodynamic mngmt, vascular control and Metobolic interventions  CASE REVIEW - Hospitalist, Nursing and Family  TREATMENT RESPONSE -Improved  PERFORMED BY - Self  NOTES   :  I have spent 90 minutes of critical care time involved in lab review, consultations with specialist, family decision- making, bedside attention and documentation excluding time spent on any separately billed procedures. During this entire length of time I was immediately available to the patient . Lj Hutton MD        Diagnosis     Clinical Impression:   1. Pneumonia of left upper lobe due to infectious organism ([address]    2. Urinary tract infection without hematuria, site unspecified    3. Hypoxia        PLAN:  1. Admit to hospitalist          This note will not be viewable in [address]

## 2019-09-27 NOTE — H&P
HISTORY AND PHYSICAL      PCP: Kenney Parekh MD  History source: the patient, patient's daughter, ER      CC: fever      HPI: 80 y.o lady w/ COPD, HTN, afib, nursing home resident, who presents with fever. She was noted to have a temperature of 100.7 F yesterday. She also had vomiting and a dry cough. She finally came to the ED at the insistence of her family. She denies abdominal pain, diarrhea, or dysuria. She doesn't feel short of breath any more than baseline. She has a history of recurrent UTIs and pneumonia. PMH/PSH:  Past Medical History:   Diagnosis Date    Arrhythmia     A-fib    Arthritis     shoulder/right    Cancer ([facility] [address] 2015    left lung    Chronic kidney disease     Stent in Right Kidney, Stage III    Chronic obstructive pulmonary disease ([facility] [address]     Hypertension     Ill-defined condition     Ex Lap with Delorse Kathi patch of a perforated pyloric channel ulcer 7/19/16    Other ill-defined conditions(799.89)     lipids    Other ill-defined conditions(799.89)     blood transfusion history    PVD (peripheral vascular disease) (HCC)      Past Surgical History:   Procedure Laterality Date    BYPASS GRAFT OTHR,FEM-FEM      BYPASS GRAFT OTHR,FEM-POP Right     PVD    HX HEENT      bilateral cataracts    HX ORTHOPAEDIC Right     right hip fracture/pinning    HX UROLOGICAL      right stent/kidney       Home meds:   Prior to Admission medications    Medication Sig Start Date End Date Taking? Authorizing Provider   temazepam (RESTORIL) 15 mg capsule Take 1 Cap by mouth nightly. Max Daily Amount: 15 mg. 9/11/19  Yes Kenney Parekh MD   gabapentin (NEURONTIN) 400 mg capsule Take 1 Cap by mouth four (4) times daily. Max Daily Amount: 1,600 mg. 8/13/19  Yes Kenney Parekh MD   ondansetron hcl [facility]) 4 mg tablet Take 4 mg by mouth every eight (8) hours as needed for Nausea. Yes Provider, Historical   hydrALAZINE (APRESOLINE) 100 mg tablet Take 2 Tabs by mouth two (2) times a day. 1/4/19  Yes Matt Siu MD   pravastatin (PRAVACHOL) 40 mg tablet Take 1 Tab by mouth nightly. 12/11/18  Yes Matt Siu MD   traMADol Shelah Aspen) 50 mg tablet Take 1 Tab by mouth every six (6) hours as needed for Pain. Max Daily Amount: 200 mg. 12/7/18  Yes Matt Siu MD   lisinopril (PRINIVIL, ZESTRIL) 40 mg tablet Take 1 Tab by mouth daily. 11/6/18  Yes Matt Siu MD   dilTIAZem CD (CARDIZEM CD) 300 mg ER capsule Take 1 Cap by mouth daily. 10/30/18  Yes Matt Siu MD   docusate sodium (COLACE) 100 mg capsule Take 100 mg by mouth two (2) times a day. Yes Veronika, MD Baldev   PARoxetine (PAXIL) 20 mg tablet Take 20 mg by mouth daily. Yes Veronika, MD Baldev   cholecalciferol (VITAMIN D3) 50,000 unit capsule Take 50,000 Units by mouth every seven (7) days. Yes Other, MD Baldev   polyethylene glycol (MIRALAX) 17 gram packet Take 17 g by mouth daily as needed (constipation). Yes Other, MD Baldev   metoprolol tartrate (LOPRESSOR) 50 mg tablet Take 1 Tab by mouth two (2) times a day. 4/12/18  Yes Matt Siu MD   acetaminophen (TYLENOL) 650 mg CR tablet Take 500 mg by mouth every six (6) hours as needed for Pain. Yes Provider, Historical   aspirin 81 mg tablet Take 81 mg by mouth daily. Stopped for surgery 8-4-10  8/5/10  Yes Provider, Historical   nitrofurantoin, macrocrystal-monohydrate, (MACROBID) 100 mg capsule Take 1 Cap by mouth two (2) times a day. 8/5/19   Matt Siu MD   loperamide (IMODIUM) 2 mg capsule Take 2 Caps by mouth every eight (8) hours as needed for Diarrhea. 3/13/19   Matt Siu MD   chlorthalidone (HYGROTEN) 25 mg tablet Take 1 Tab by mouth daily. 12/7/18   MD AMBER Casey acidoph & paracasei- S therm- Bifido (HANG-Q/RISAQUAD) 8 billion cell cap cap Take 1 Cap by mouth daily. 4/12/18   Matt Siu MD   predniSONE (DELTASONE) 10 mg tablet Take 1 Tab by mouth daily (with breakfast).  4/12/18   Matt Siu, MD       Allergies: Allergies   Allergen Reactions    Hydrocodone Nausea and Vomiting    Morphine Rash    Dilaudid [Hydromorphone] Itching     Does not remember       FH:  Family History   Problem Relation Age of Onset    Heart Disease Mother     Cancer Father        SH:  Social History     Tobacco Use    Smoking status: Former Smoker     Packs/day: 0.25     Years: 60.00     Pack years: 15.00     Last attempt to quit:      Years since quittin.7    Smokeless tobacco: Never Used   Substance Use Topics    Alcohol use: No       ROS: A comprehensive review of systems was negative except for that written in the HPI.       PHYSICAL EXAM:  Visit Vitals  /74 (BP 1 Location: Right arm, BP Patient Position: At rest)   Pulse 79   Temp (!) 101.8 °F (38.8 °C)   Resp 18   Ht 5' 3\" (1.6 m)   Wt 60 kg (132 lb 4.4 oz)   SpO2 93%   BMI 23.43 kg/m²       Gen: NAD, non-toxic, chronically-ill appearing  HEENT: anicteric sclerae, normal conjunctiva, oropharynx clear, MM dry  Neck: supple, trachea midline, no adenopathy  Heart: RRR, no MRG, no JVD, no peripheral edema  Lungs: left basilar crackles, diminished breath sounds globally, non-labored respirations on O2  Abd: soft, NT, ND, BS+  Extr: warm  Skin: dry, no rash  Neuro: CN II-XII grossly intact, normal speech, moves all extremities  Psych: normal mood, appropriate affect      Labs/Imaging:  Recent Results (from the past 24 hour(s))   EKG, 12 LEAD, INITIAL    Collection Time: 19  9:09 PM   Result Value Ref Range    Ventricular Rate 78 BPM    Atrial Rate 78 BPM    P-R Interval 160 ms    QRS Duration 78 ms    Q-T Interval 390 ms    QTC Calculation (Bezet) 444 ms    Calculated P Axis 65 degrees    Calculated R Axis -29 degrees    Calculated T Axis 71 degrees    Diagnosis       Sinus rhythm with premature supraventricular complexes  Inferior infarct , age undetermined  Anterior infarct , age undetermined  When compared with ECG of 2019 08:33,  premature supraventricular complexes are now present  Vent. rate has increased BY  33 BPM  QRS voltage has decreased  Anterior infarct is now present  Inferior infarct is now present     URINALYSIS W/ REFLEX CULTURE    Collection Time: 09/26/19  9:22 PM   Result Value Ref Range    Color YELLOW/STRAW      Appearance CLOUDY (A) CLEAR      Specific gravity 1.013 1.003 - 1.030      pH (UA) 5.0 5.0 - 8.0      Protein 300 (A) NEG mg/dL    Glucose NEGATIVE  NEG mg/dL    Ketone NEGATIVE  NEG mg/dL    Bilirubin NEGATIVE  NEG      Blood NEGATIVE  NEG      Urobilinogen 0.2 0.2 - 1.0 EU/dL    Nitrites NEGATIVE  NEG      Leukocyte Esterase MODERATE (A) NEG      WBC 20-50 0 - 4 /hpf    RBC 0-5 0 - 5 /hpf    Epithelial cells MODERATE (A) FEW /lpf    Bacteria 2+ (A) NEG /hpf    UA:UC IF INDICATED URINE CULTURE ORDERED (A) CNI      Yeast PRESENT (A) NEG     CBC WITH AUTOMATED DIFF    Collection Time: 09/26/19  9:30 PM   Result Value Ref Range    WBC 17.1 (H) 3.6 - 11.0 K/uL    RBC 3.95 3.80 - 5.20 M/uL    HGB 11.9 11.5 - 16.0 g/dL    HCT 37.6 35.0 - 47.0 %    MCV 95.2 80.0 - 99.0 FL    MCH 30.1 26.0 - 34.0 PG    MCHC 31.6 30.0 - 36.5 g/dL    RDW 15.5 (H) 11.5 - 14.5 %    PLATELET 965 924 - 031 K/uL    MPV 11.1 8.9 - 12.9 FL    NRBC 0.0 0  WBC    ABSOLUTE NRBC 0.00 0.00 - 0.01 K/uL    NEUTROPHILS 83 (H) 32 - 75 %    LYMPHOCYTES 6 (L) 12 - 49 %    MONOCYTES 11 5 - 13 %    EOSINOPHILS 0 0 - 7 %    BASOPHILS 0 0 - 1 %    IMMATURE GRANULOCYTES 0 0.0 - 0.5 %    ABS. NEUTROPHILS 14.2 (H) 1.8 - 8.0 K/UL    ABS. LYMPHOCYTES 1.0 0.8 - 3.5 K/UL    ABS. MONOCYTES 1.9 (H) 0.0 - 1.0 K/UL    ABS. EOSINOPHILS 0.0 0.0 - 0.4 K/UL    ABS. BASOPHILS 0.0 0.0 - 0.1 K/UL    ABS. IMM.  GRANS. 0.0 0.00 - 0.04 K/UL    DF MANUAL      RBC COMMENTS NORMOCYTIC, NORMOCHROMIC     METABOLIC PANEL, COMPREHENSIVE    Collection Time: 09/26/19  9:30 PM   Result Value Ref Range    Sodium 141 136 - 145 mmol/L    Potassium 4.2 3.5 - 5.1 mmol/L    Chloride 109 (H) 97 - 108 mmol/L    CO2 26 21 - 32 mmol/L    Anion gap 6 5 - 15 mmol/L    Glucose 93 65 - 100 mg/dL    BUN 45 (H) 6 - 20 MG/DL    Creatinine 1.92 (H) 0.55 - 1.02 MG/DL    BUN/Creatinine ratio 23 (H) 12 - 20      GFR est AA 30 (L) >60 ml/min/1.73m2    GFR est non-AA 25 (L) >60 ml/min/1.73m2    Calcium 9.7 8.5 - 10.1 MG/DL    Bilirubin, total 0.5 0.2 - 1.0 MG/DL    ALT (SGPT) 15 12 - 78 U/L    AST (SGOT) 28 15 - 37 U/L    Alk. phosphatase 65 45 - 117 U/L    Protein, total 6.5 6.4 - 8.2 g/dL    Albumin 3.1 (L) 3.5 - 5.0 g/dL    Globulin 3.4 2.0 - 4.0 g/dL    A-G Ratio 0.9 (L) 1.1 - 2.2     POC LACTIC ACID    Collection Time: 09/26/19  9:36 PM   Result Value Ref Range    Lactic Acid (POC) 1.27 0.40 - 2.00 mmol/L       Recent Labs     09/26/19  2130   WBC 17.1*   HGB 11.9   HCT 37.6        Recent Labs     09/26/19  2130      K 4.2   *   CO2 26   BUN 45*   CREA 1.92*   GLU 93   CA 9.7     Recent Labs     09/26/19  2130   SGOT 28   ALT 15   AP 65   TBILI 0.5   TP 6.5   ALB 3.1*   GLOB 3.4       No results for input(s): CPK, CKNDX, TROIQ in the last 72 hours. No lab exists for component: CPKMB    No results for input(s): INR, PTP, APTT, INREXT in the last 72 hours. No results for input(s): PH, PCO2, PO2 in the last 72 hours. Xr Chest Port    Result Date: 9/26/2019  IMPRESSION: Left perihilar consolidation likely represents pneumonia. Radiographic follow-up is recommended to assure complete resolution.            Assessment & Plan:     Bacterial pneumonia: (POA)  -ceftriaxone and azithromycin IV  -f/u blood cultures    UTI: (POA)   -on IV ceftriaxone, add IV vancomycin for history of Enterococcus faecalis UTIs  -f/u urine culture    CKD III: at baseline  -gentle IVF overnight x24 hrs; she appears dry    COPD: stable    HTN:  -continue diltiazem and metoprolol  -resume hydralazine and lisinopril in AM if BP tolerates    Paroxysmal afib    DVT ppx: sq heparin  Code status: DDNR  Disposition: prior living arrangement when ready. PT eval prior to discharge.  Wheelchair bound at baseline    Signed By: Virginia Tiwari MD     September 26, 2019

## 2019-09-27 NOTE — ED NOTES
Bedside and Verbal shift change report given to Rolanda Cote RN (oncoming nurse) by Valerio Rossi RN (offgoing nurse). Report included the following information SBAR, Kardex, ED Summary, Procedure Summary, MAR and Recent Results.

## 2019-09-27 NOTE — PROGRESS NOTES
Pharmacy Automatic Renal Dosing Protocol - Antimicrobials    Indication for Antimicrobials: UTI/ CAP     Current Regimen of Each Antimicrobial:  Vancomycin 1.25g ONCE, then 750 mg q36h (Start Date 19; Day # 1)  CTX 1g IV q24h (Start Date 19; Day # 2)  Zithromax 500 mg q24h (Start Date 19; Day # 1)    Previous Antimicrobial Therapy:      Goal Level: VANCOMYCIN TROUGH GOAL RANGE    Vancomycin Trough: 10 - 15 mcg/mL  (AUC: 400 - 600 mg/hr/Liter/day)     Date Dose & Interval Measured (mcg/mL) Extrapolated (mcg/mL)                       Date & time of next level: TBD    Significant Cultures:   19 PBC--NGTD - pending  19 UCx--pending    Radiology / Imaging results: (X-ray, CT scan or MRI):   19 chest XR:  IMPRESSION: Left perihilar consolidation likely represents pneumonia. Radiographic follow-up is recommended to assure complete resolution. Labs:  Recent Labs     19  0646 19  2130   CREA 1.58* 1.92*   BUN 40* 45*   WBC 12.4* 17.1*     Temp (24hrs), Av.9 °F (37.7 °C), Min:98.5 °F (36.9 °C), Max:101.8 °F (38.8 °C)    Creatinine Clearance (mL/min) or Dialysis: 18 ml/min    Impression/Plan:   Daily BMP ordered  See past history of enterococcal UTI's (pan sens)  Will continue to watch Scr, vast improvement from . Will empirically increase dose to 1000mg IV every 36hr to yield a trough of 10-15, . Antimicrobial stop date TBD     Pharmacy will follow daily and adjust medications as appropriate for renal function and/or serum levels. Thank you,  KEY Milian    Recommended duration of therapy  [email or web address]/Pharmacy/Clinical%20Companion/Duration%20of%20ABX%20therapy. docx    Renal Dosing  [email or web address]/[facility]/Pharmacy/Clinical%20Companion/Renal%[id number]. pdf

## 2019-09-27 NOTE — PROGRESS NOTES
INTERNAL MEDICINE ATTENDING NOTE    S: Ms. Giulia Dale was seen by me today during rounds. At this time, she is resting comfortably in ER bed. \"I'm hungry. \"  The patient has no new complaints today. ROS otherwise unremarkable except as noted elsewhere. O: Blood pressure 173/54, pulse 62, temperature 98.5 °F (36.9 °C), resp. rate 15, height 5' 3\" (1.6 m), weight 132 lb 4.4 oz (60 kg), SpO2 100 %. Gen: Patient is in no acute distress. Lungs: Distant BS. Heart: RRR. Abd: S, NT, ND, BS present. Extremities: Warm. Recent Results (from the past 12 hour(s))   EKG, 12 LEAD, INITIAL    Collection Time: 09/26/19  9:09 PM   Result Value Ref Range    Ventricular Rate 78 BPM    Atrial Rate 78 BPM    P-R Interval 160 ms    QRS Duration 78 ms    Q-T Interval 390 ms    QTC Calculation (Bezet) 444 ms    Calculated P Axis 65 degrees    Calculated R Axis -29 degrees    Calculated T Axis 71 degrees    Diagnosis       Sinus rhythm with premature supraventricular complexes  Inferior infarct , age undetermined  Anterior infarct , age undetermined  When compared with ECG of 09-JUL-2019 08:33,  premature supraventricular complexes are now present  Vent.  rate has increased BY  33 BPM  QRS voltage has decreased  Anterior infarct is now present  Inferior infarct is now present     URINALYSIS W/ REFLEX CULTURE    Collection Time: 09/26/19  9:22 PM   Result Value Ref Range    Color YELLOW/STRAW      Appearance CLOUDY (A) CLEAR      Specific gravity 1.013 1.003 - 1.030      pH (UA) 5.0 5.0 - 8.0      Protein 300 (A) NEG mg/dL    Glucose NEGATIVE  NEG mg/dL    Ketone NEGATIVE  NEG mg/dL    Bilirubin NEGATIVE  NEG      Blood NEGATIVE  NEG      Urobilinogen 0.2 0.2 - 1.0 EU/dL    Nitrites NEGATIVE  NEG      Leukocyte Esterase MODERATE (A) NEG      WBC 20-50 0 - 4 /hpf    RBC 0-5 0 - 5 /hpf    Epithelial cells MODERATE (A) FEW /lpf    Bacteria 2+ (A) NEG /hpf    UA:UC IF INDICATED URINE CULTURE ORDERED (A) CNI      Yeast PRESENT (A) NEG     CBC WITH AUTOMATED DIFF    Collection Time: 09/26/19  9:30 PM   Result Value Ref Range    WBC 17.1 (H) 3.6 - 11.0 K/uL    RBC 3.95 3.80 - 5.20 M/uL    HGB 11.9 11.5 - 16.0 g/dL    HCT 37.6 35.0 - 47.0 %    MCV 95.2 80.0 - 99.0 FL    MCH 30.1 26.0 - 34.0 PG    MCHC 31.6 30.0 - 36.5 g/dL    RDW 15.5 (H) 11.5 - 14.5 %    PLATELET 988 649 - 578 K/uL    MPV 11.1 8.9 - 12.9 FL    NRBC 0.0 0  WBC    ABSOLUTE NRBC 0.00 0.00 - 0.01 K/uL    NEUTROPHILS 83 (H) 32 - 75 %    LYMPHOCYTES 6 (L) 12 - 49 %    MONOCYTES 11 5 - 13 %    EOSINOPHILS 0 0 - 7 %    BASOPHILS 0 0 - 1 %    IMMATURE GRANULOCYTES 0 0.0 - 0.5 %    ABS. NEUTROPHILS 14.2 (H) 1.8 - 8.0 K/UL    ABS. LYMPHOCYTES 1.0 0.8 - 3.5 K/UL    ABS. MONOCYTES 1.9 (H) 0.0 - 1.0 K/UL    ABS. EOSINOPHILS 0.0 0.0 - 0.4 K/UL    ABS. BASOPHILS 0.0 0.0 - 0.1 K/UL    ABS. IMM. GRANS. 0.0 0.00 - 0.04 K/UL    DF MANUAL      RBC COMMENTS NORMOCYTIC, NORMOCHROMIC     METABOLIC PANEL, COMPREHENSIVE    Collection Time: 09/26/19  9:30 PM   Result Value Ref Range    Sodium 141 136 - 145 mmol/L    Potassium 4.2 3.5 - 5.1 mmol/L    Chloride 109 (H) 97 - 108 mmol/L    CO2 26 21 - 32 mmol/L    Anion gap 6 5 - 15 mmol/L    Glucose 93 65 - 100 mg/dL    BUN 45 (H) 6 - 20 MG/DL    Creatinine 1.92 (H) 0.55 - 1.02 MG/DL    BUN/Creatinine ratio 23 (H) 12 - 20      GFR est AA 30 (L) >60 ml/min/1.73m2    GFR est non-AA 25 (L) >60 ml/min/1.73m2    Calcium 9.7 8.5 - 10.1 MG/DL    Bilirubin, total 0.5 0.2 - 1.0 MG/DL    ALT (SGPT) 15 12 - 78 U/L    AST (SGOT) 28 15 - 37 U/L    Alk.  phosphatase 65 45 - 117 U/L    Protein, total 6.5 6.4 - 8.2 g/dL    Albumin 3.1 (L) 3.5 - 5.0 g/dL    Globulin 3.4 2.0 - 4.0 g/dL    A-G Ratio 0.9 (L) 1.1 - 2.2     CULTURE, BLOOD, PAIRED    Collection Time: 09/26/19  9:30 PM   Result Value Ref Range    Special Requests: NO SPECIAL REQUESTS      Culture result: NO GROWTH AFTER 9 HOURS     POC LACTIC ACID    Collection Time: 09/26/19  9:36 PM   Result Value Ref Range    Lactic Acid (POC) 1.27 0.40 - 2.00 mmol/L   CBC W/O DIFF    Collection Time: 09/27/19  6:46 AM   Result Value Ref Range    WBC 12.4 (H) 3.6 - 11.0 K/uL    RBC 3.42 (L) 3.80 - 5.20 M/uL    HGB 10.3 (L) 11.5 - 16.0 g/dL    HCT 33.2 (L) 35.0 - 47.0 %    MCV 97.1 80.0 - 99.0 FL    MCH 30.1 26.0 - 34.0 PG    MCHC 31.0 30.0 - 36.5 g/dL    RDW 15.4 (H) 11.5 - 14.5 %    PLATELET 750 229 - 097 K/uL    MPV 10.0 8.9 - 12.9 FL    NRBC 0.0 0  WBC    ABSOLUTE NRBC 0.00 0.00 - 4.58 K/uL   METABOLIC PANEL, BASIC    Collection Time: 09/27/19  6:46 AM   Result Value Ref Range    Sodium 143 136 - 145 mmol/L    Potassium 3.9 3.5 - 5.1 mmol/L    Chloride 111 (H) 97 - 108 mmol/L    CO2 26 21 - 32 mmol/L    Anion gap 6 5 - 15 mmol/L    Glucose 87 65 - 100 mg/dL    BUN 40 (H) 6 - 20 MG/DL    Creatinine 1.58 (H) 0.55 - 1.02 MG/DL    BUN/Creatinine ratio 25 (H) 12 - 20      GFR est AA 38 (L) >60 ml/min/1.73m2    GFR est non-AA 31 (L) >60 ml/min/1.73m2    Calcium 9.2 8.5 - 10.1 MG/DL   MAGNESIUM    Collection Time: 09/27/19  6:46 AM   Result Value Ref Range    Magnesium 1.6 1.6 - 2.4 mg/dL   PHOSPHORUS    Collection Time: 09/27/19  6:46 AM   Result Value Ref Range    Phosphorus 3.4 2.6 - 4.7 MG/DL        A / P:   1. CAP (community acquired pneumonia) (9/26/2019): Continue antibiotics. Currently rocephin + azithromycin + vanc. 2. UTI: Vanc added for hx of enterococcus. 3. CKD III:   4. COPD: Stable. I'll add prn bronchodilator therapy. 5. P AFib: Seems to be sinus at this itme. 6. Code: DNR.          Leticia Wu MD, FACP  Best contact is via Pager: [phone], or via hospital  at [phone]

## 2019-09-27 NOTE — ED NOTES
Pt incontinent of BM, diaper and linen changed.  Pt provided with hygiene care    Pt straight cathed at this time, pt tolerated well

## 2019-09-27 NOTE — ED NOTES
Pt O2 challenged on RA at this time    Pt desat to 83%, pt placed on 2L O2 pt at 95%    Jagdish Law MD aware

## 2019-09-28 PROCEDURE — 94760 N-INVAS EAR/PLS OXIMETRY 1: CPT

## 2019-09-28 PROCEDURE — 74011250636 HC RX REV CODE- 250/636: Performed by: INTERNAL MEDICINE

## 2019-09-28 PROCEDURE — 74011000258 HC RX REV CODE- 258: Performed by: HOSPITALIST

## 2019-09-28 PROCEDURE — 65270000015 HC RM PRIVATE ONCOLOGY

## 2019-09-28 PROCEDURE — 74011250637 HC RX REV CODE- 250/637: Performed by: FAMILY MEDICINE

## 2019-09-28 PROCEDURE — 74011250636 HC RX REV CODE- 250/636: Performed by: HOSPITALIST

## 2019-09-28 PROCEDURE — 77010033678 HC OXYGEN DAILY

## 2019-09-28 PROCEDURE — 74011250637 HC RX REV CODE- 250/637: Performed by: HOSPITALIST

## 2019-09-28 RX ORDER — AMLODIPINE BESYLATE 5 MG/1
5 TABLET ORAL ONCE
Status: COMPLETED | OUTPATIENT
Start: 2019-09-28 | End: 2019-09-28

## 2019-09-28 RX ORDER — TRAMADOL HYDROCHLORIDE 50 MG/1
50 TABLET ORAL ONCE
Status: COMPLETED | OUTPATIENT
Start: 2019-09-28 | End: 2019-09-28

## 2019-09-28 RX ADMIN — PRAVASTATIN SODIUM 40 MG: 40 TABLET ORAL at 22:31

## 2019-09-28 RX ADMIN — ACETAMINOPHEN 650 MG: 325 TABLET ORAL at 19:28

## 2019-09-28 RX ADMIN — HEPARIN SODIUM 5000 UNITS: 5000 INJECTION INTRAVENOUS; SUBCUTANEOUS at 22:32

## 2019-09-28 RX ADMIN — PAROXETINE HYDROCHLORIDE 20 MG: 20 TABLET, FILM COATED ORAL at 08:32

## 2019-09-28 RX ADMIN — Medication 10 ML: at 14:00

## 2019-09-28 RX ADMIN — DILTIAZEM HYDROCHLORIDE 300 MG: 180 CAPSULE, COATED, EXTENDED RELEASE ORAL at 08:32

## 2019-09-28 RX ADMIN — CEFTRIAXONE 1 G: 1 INJECTION, POWDER, FOR SOLUTION INTRAMUSCULAR; INTRAVENOUS at 22:31

## 2019-09-28 RX ADMIN — METOPROLOL TARTRATE 50 MG: 50 TABLET, FILM COATED ORAL at 08:32

## 2019-09-28 RX ADMIN — GABAPENTIN 400 MG: 100 CAPSULE ORAL at 08:32

## 2019-09-28 RX ADMIN — TRAMADOL HYDROCHLORIDE 50 MG: 50 TABLET, COATED ORAL at 22:31

## 2019-09-28 RX ADMIN — GABAPENTIN 400 MG: 100 CAPSULE ORAL at 12:14

## 2019-09-28 RX ADMIN — ASPIRIN 81 MG CHEWABLE TABLET 81 MG: 81 TABLET CHEWABLE at 08:32

## 2019-09-28 RX ADMIN — GABAPENTIN 400 MG: 100 CAPSULE ORAL at 17:14

## 2019-09-28 RX ADMIN — AZITHROMYCIN MONOHYDRATE 500 MG: 500 INJECTION, POWDER, LYOPHILIZED, FOR SOLUTION INTRAVENOUS at 01:19

## 2019-09-28 RX ADMIN — AMLODIPINE BESYLATE 5 MG: 5 TABLET ORAL at 22:38

## 2019-09-28 RX ADMIN — VANCOMYCIN HYDROCHLORIDE 1000 MG: 1 INJECTION, POWDER, LYOPHILIZED, FOR SOLUTION INTRAVENOUS at 12:14

## 2019-09-28 RX ADMIN — HEPARIN SODIUM 5000 UNITS: 5000 INJECTION INTRAVENOUS; SUBCUTANEOUS at 08:33

## 2019-09-28 RX ADMIN — GABAPENTIN 400 MG: 100 CAPSULE ORAL at 22:31

## 2019-09-28 RX ADMIN — METOPROLOL TARTRATE 50 MG: 50 TABLET, FILM COATED ORAL at 17:14

## 2019-09-28 NOTE — PROGRESS NOTES
Problem: Falls - Risk of  Goal: *Absence of Falls  Description  Document [place] Fall Risk and appropriate interventions in the flowsheet.   Outcome: Progressing Towards Goal  Note:   Fall Risk Interventions:       Mentation Interventions: Adequate sleep, hydration, pain control    Medication Interventions: Patient to call before getting OOB    Elimination Interventions: Call light in reach

## 2019-09-28 NOTE — PROGRESS NOTES
Oncology End of Shift Note      Bedside shift change report given to Analilia Rojas RN (incoming nurse) by Rome Bliss (outgoing nurse) on [facility]. Report included the following information SBAR, Kardex and MAR. Shift Summary: All medications were given and medication education was provided. IV vancomycin was hung. One of patient's IV's was infiltrated and removed. Patient slept for majority of day. Patient remained stable throughout shift and tolerated care well. Issues for Physician to Address:  None. Patient on Cardiac Monitoring?     [] Yes  [x] No    Rhythm:              Rome Bliss

## 2019-09-28 NOTE — PROGRESS NOTES
Pharmacy Automatic Renal Dosing Protocol - Antimicrobials    Indication for Antimicrobials: UTI/ CAP     Current Regimen of Each Antimicrobial:  Vancomycin 1.25g ONCE, then 750 mg q36h (Start Date 19; Day # 2)  CTX 1g IV q24h (Start Date 19; Day # 3)  Zithromax 500 mg q24h (Start Date 19; Day # 2)    Previous Antimicrobial Therapy:      Goal Level: VANCOMYCIN TROUGH GOAL RANGE    Vancomycin Trough: 10 - 15 mcg/mL  (AUC: 400 - 600 mg/hr/Liter/day)     Date Dose & Interval Measured (mcg/mL) Extrapolated (mcg/mL)                       Date & time of next level: TBD    Significant Cultures:   19 PBC--NGTD - pending  19 UCx-->100,000 col/ml enterococcus species - pending    Radiology / Imaging results: (X-ray, CT scan or MRI):   19 chest XR:  IMPRESSION: Left perihilar consolidation likely represents pneumonia. Radiographic follow-up is recommended to assure complete resolution. Labs:  Recent Labs     19  0646 19  2130   CREA 1.58* 1.92*   BUN 40* 45*   WBC 12.4* 17.1*     Temp (24hrs), Av.8 °F (37.1 °C), Min:98.3 °F (36.8 °C), Max:99.2 °F (37.3 °C)    Creatinine Clearance (mL/min) or Dialysis: ~22 ml/min    Impression/Plan:   Daily BMP ordered  See past history of enterococcal UTI's (pan sens)  Will continue to watch Scr, vast improvement from . Will empirically increase dose to 1000mg IV every 36hr to yield a trough of 10-15, . Antimicrobial stop date TBD     Pharmacy will follow daily and adjust medications as appropriate for renal function and/or serum levels. Thank you,  KEY Khan    Recommended duration of therapy  [email or web address]/[facility]/Pharmacy/Clinical%20Companion/Duration%20of%20ABX%20therapy. docx    Renal Dosing  [email or web address]/Pharmacy/Clinical%20Companion/Renal%[id number]. pdf

## 2019-09-28 NOTE — PROGRESS NOTES
Reason for Admission:   Community acquired pneumonia               RRAT Score:     33 high risk             Resources/supports as identified by patient/family:   family                Top Challenges facing patient (as identified by patient/family and CM): Finances/Medication cost?      No challenges reported              Transportation? [place] or patient's son provide transportation              Support system or lack thereof?  family                     Living arrangements? Lives at the [facility]           Self-care/ADLs/Cognition? [facility] staff assist with bathing, meals, transportation. Patient states she is able to dress herself          Current Advanced Directive/Advance Care Plan:  DNR on file 4/2019                          Plan for utilizing home health:    Has used home health in the past                 Transition of Care Plan:                  1  Patient will need 2nd IM letter at discharge  2. Patient prefers to discharge back to the [facility] with follow up appointments    Patient is a 80year old female admitted 9/26 for community acquired pneumonia. Patient alert and oriented, resting in bed, no visitors present in room. Demographic information verified and correct. Insurance information verified and correct. Patient lives at the [facility], no home oxygen, is wheelchair bound and has used home health in the past.  Patient uses [facility]  Patient states she is able to dress herself but [facility] staff assist her with bathing, meals and transportation. Patient states her son is also available for transport at [place]. Care Management Interventions  PCP Verified by CM: Diane Carballo MD)  Mode of Transport at Discharge:  Other (see comment)(the [facility] or pt's son provide transportation)  Transition of Care Consult (CM Consult): Discharge Planning  Discharge Durable Medical Equipment: No  Physical Therapy Consult: Yes  Occupational Therapy Consult: No  Speech Therapy Consult: No  Current Support Network: Assisted Living(lives at the [address])  Confirm Follow Up Transport: Family  Plan discussed with Pt/Family/Caregiver: Yes  Discharge Location  Discharge Placement: [address] Monica Metzger RN,  [facility]  [phone]

## 2019-09-28 NOTE — PROGRESS NOTES
General Daily Progress Note    Admit Date: 9/26/2019  Hospital day 3    Subjective:     Patient has no complaint of shortness of breath or chest pain. Still has moderate nonproductive cough. Feeling hungry. ..   Medication side effects: none    Current Facility-Administered Medications   Medication Dose Route Frequency    vancomycin (VANCOCIN) 1,000 mg in 0.9% sodium chloride (MBP/ADV) 250 mL  1,000 mg IntraVENous Q36H    influenza vaccine 2019-20 (6 mos+)(PF) (FLUARIX/FLULAVAL/FLUZONE QUAD) injection 0.5 mL  0.5 mL IntraMUSCular PRIOR TO DISCHARGE    albuterol-ipratropium (DUO-NEB) 2.5 MG-0.5 MG/3 ML  3 mL Nebulization Q4H PRN    sodium chloride (NS) flush 5-40 mL  5-40 mL IntraVENous Q8H    sodium chloride (NS) flush 5-40 mL  5-40 mL IntraVENous PRN    acetaminophen (TYLENOL) tablet 650 mg  650 mg Oral Q4H PRN    ondansetron (ZOFRAN) injection 4 mg  4 mg IntraVENous Q4H PRN    heparin (porcine) injection 5,000 Units  5,000 Units SubCUTAneous Q12H    azithromycin (ZITHROMAX) 500 mg in 0.9% sodium chloride (MBP/ADV) 250 mL  500 mg IntraVENous Q24H    cefTRIAXone (ROCEPHIN) 1 g in 0.9% sodium chloride (MBP/ADV) 50 mL  1 g IntraVENous Q24H    aspirin chewable tablet 81 mg  81 mg Oral DAILY    dilTIAZem CD (CARDIZEM CD) capsule 300 mg  300 mg Oral DAILY    gabapentin (NEURONTIN) capsule 400 mg  400 mg Oral QID    metoprolol tartrate (LOPRESSOR) tablet 50 mg  50 mg Oral BID    PARoxetine (PAXIL) tablet 20 mg  20 mg Oral DAILY    pravastatin (PRAVACHOL) tablet 40 mg  40 mg Oral QHS        Review of Systems  Pertinent items are noted in HPI. Objective:     Patient Vitals for the past 8 hrs:   BP Temp Pulse Resp SpO2   09/28/19 0729 185/41 98.5 °F (36.9 °C) (!) 57 16 98 %   09/28/19 0309 (!) 151/39 99.1 °F (37.3 °C) (!) 50 17 95 %     No intake/output data recorded.   09/26 1901 - 09/28 0700  In: 550 [I.V.:550]  Out: -     Physical Exam:   Visit Vitals  /41 (BP 1 Location: Right arm, BP Patient Position: At rest)   Pulse (!) 57   Temp 98.5 °F (36.9 °C)   Resp 16   Ht 5' 3\" (1.6 m)   Wt 132 lb 4.4 oz (60 kg)   SpO2 98%   BMI 23.43 kg/m²     Lungs: rhonchi L base  Heart: regular rate and rhythm, S1, S2 normal, no murmur, click, rub or gallop  Abdomen: soft, non-tender. Bowel sounds normal. No masses,  no organomegaly  Extremities: extremities normal, atraumatic, no cyanosis or edema      ECG: normal sinus rhythm     Data Review No results found for this or any previous visit (from the past 24 hour(s)). Assessment:     Active Problems:    CAP (community acquired pneumonia) (9/26/2019)        Plan:     1. CAP (community acquired pneumonia) (9/26/2019): Continue antibiotics. Currently rocephin + azithromycin + vanc. 2. UTI: Vanc added for hx of enterococcus. 3. CKD III:   4. COPD: Stable. I'll add prn bronchodilator therapy. 5. P AFib: Seems to be sinus at this itme.    6. Code: DNR.

## 2019-09-28 NOTE — PROGRESS NOTES
Oncology End of Shift Note      Bedside shift change report given to [facility], RN (incoming nurse) by Brigid Gomez (outgoing nurse) on [facility]. Report included the following information SBAR, Kardex, Intake/Output and MAR. Shift Summary: unable to obtain am labs ( 2 staff tried for a total of 3 sticks), complained of pain in left hand, transferred iv fluids to right hand and given acetaminophen       Issues for Physician to Address:       Patient on Cardiac Monitoring?     [] Yes  [x] No    Rhythm:          Shift Events        Brigid Gomez

## 2019-09-29 LAB
ANION GAP SERPL CALC-SCNC: 2 MMOL/L (ref 5–15)
BACTERIA SPEC CULT: ABNORMAL
BASOPHILS # BLD: 0.1 K/UL (ref 0–0.1)
BASOPHILS NFR BLD: 1 % (ref 0–1)
BUN SERPL-MCNC: 31 MG/DL (ref 6–20)
BUN/CREAT SERPL: 21 (ref 12–20)
CALCIUM SERPL-MCNC: 9 MG/DL (ref 8.5–10.1)
CC UR VC: ABNORMAL
CHLORIDE SERPL-SCNC: 116 MMOL/L (ref 97–108)
CO2 SERPL-SCNC: 26 MMOL/L (ref 21–32)
CREAT SERPL-MCNC: 1.49 MG/DL (ref 0.55–1.02)
DIFFERENTIAL METHOD BLD: ABNORMAL
EOSINOPHIL # BLD: 0.2 K/UL (ref 0–0.4)
EOSINOPHIL NFR BLD: 2 % (ref 0–7)
ERYTHROCYTE [DISTWIDTH] IN BLOOD BY AUTOMATED COUNT: 14.9 % (ref 11.5–14.5)
GLUCOSE SERPL-MCNC: 94 MG/DL (ref 65–100)
HCT VFR BLD AUTO: 33 % (ref 35–47)
HGB BLD-MCNC: 10.8 G/DL (ref 11.5–16)
IMM GRANULOCYTES # BLD AUTO: 0.1 K/UL (ref 0–0.04)
IMM GRANULOCYTES NFR BLD AUTO: 1 % (ref 0–0.5)
LYMPHOCYTES # BLD: 0.5 K/UL (ref 0.8–3.5)
LYMPHOCYTES NFR BLD: 6 % (ref 12–49)
MCH RBC QN AUTO: 31.1 PG (ref 26–34)
MCHC RBC AUTO-ENTMCNC: 32.7 G/DL (ref 30–36.5)
MCV RBC AUTO: 95.1 FL (ref 80–99)
MONOCYTES # BLD: 0.9 K/UL (ref 0–1)
MONOCYTES NFR BLD: 11 % (ref 5–13)
NEUTS SEG # BLD: 6.5 K/UL (ref 1.8–8)
NEUTS SEG NFR BLD: 79 % (ref 32–75)
NRBC # BLD: 0 K/UL (ref 0–0.01)
NRBC BLD-RTO: 0 PER 100 WBC
PLATELET # BLD AUTO: 200 K/UL (ref 150–400)
PMV BLD AUTO: 10.8 FL (ref 8.9–12.9)
POTASSIUM SERPL-SCNC: 3.8 MMOL/L (ref 3.5–5.1)
RBC # BLD AUTO: 3.47 M/UL (ref 3.8–5.2)
RBC MORPH BLD: ABNORMAL
SERVICE CMNT-IMP: ABNORMAL
SODIUM SERPL-SCNC: 144 MMOL/L (ref 136–145)
WBC # BLD AUTO: 8.3 K/UL (ref 3.6–11)

## 2019-09-29 PROCEDURE — 74011250636 HC RX REV CODE- 250/636: Performed by: HOSPITALIST

## 2019-09-29 PROCEDURE — 77010033678 HC OXYGEN DAILY

## 2019-09-29 PROCEDURE — 65270000015 HC RM PRIVATE ONCOLOGY

## 2019-09-29 PROCEDURE — 80048 BASIC METABOLIC PNL TOTAL CA: CPT

## 2019-09-29 PROCEDURE — 85025 COMPLETE CBC W/AUTO DIFF WBC: CPT

## 2019-09-29 PROCEDURE — 74011000258 HC RX REV CODE- 258: Performed by: HOSPITALIST

## 2019-09-29 PROCEDURE — 74011250637 HC RX REV CODE- 250/637: Performed by: FAMILY MEDICINE

## 2019-09-29 PROCEDURE — 94760 N-INVAS EAR/PLS OXIMETRY 1: CPT

## 2019-09-29 PROCEDURE — 36415 COLL VENOUS BLD VENIPUNCTURE: CPT

## 2019-09-29 PROCEDURE — 74011250637 HC RX REV CODE- 250/637: Performed by: HOSPITALIST

## 2019-09-29 RX ORDER — TEMAZEPAM 15 MG/1
15 CAPSULE ORAL
Status: DISCONTINUED | OUTPATIENT
Start: 2019-09-29 | End: 2019-10-11 | Stop reason: HOSPADM

## 2019-09-29 RX ORDER — LISINOPRIL 40 MG/1
40 TABLET ORAL DAILY
Status: DISCONTINUED | OUTPATIENT
Start: 2019-09-29 | End: 2019-10-11 | Stop reason: HOSPADM

## 2019-09-29 RX ORDER — CHLORTHALIDONE 25 MG/1
25 TABLET ORAL DAILY
Status: DISCONTINUED | OUTPATIENT
Start: 2019-09-29 | End: 2019-10-11 | Stop reason: HOSPADM

## 2019-09-29 RX ORDER — HYDRALAZINE HYDROCHLORIDE 50 MG/1
100 TABLET, FILM COATED ORAL 3 TIMES DAILY
Status: DISCONTINUED | OUTPATIENT
Start: 2019-09-29 | End: 2019-10-11 | Stop reason: HOSPADM

## 2019-09-29 RX ADMIN — TEMAZEPAM 15 MG: 15 CAPSULE ORAL at 21:35

## 2019-09-29 RX ADMIN — LISINOPRIL 40 MG: 40 TABLET ORAL at 09:58

## 2019-09-29 RX ADMIN — DILTIAZEM HYDROCHLORIDE 300 MG: 180 CAPSULE, COATED, EXTENDED RELEASE ORAL at 09:58

## 2019-09-29 RX ADMIN — ASPIRIN 81 MG CHEWABLE TABLET 81 MG: 81 TABLET CHEWABLE at 09:58

## 2019-09-29 RX ADMIN — GABAPENTIN 400 MG: 100 CAPSULE ORAL at 14:19

## 2019-09-29 RX ADMIN — GABAPENTIN 400 MG: 100 CAPSULE ORAL at 21:35

## 2019-09-29 RX ADMIN — PRAVASTATIN SODIUM 40 MG: 40 TABLET ORAL at 21:35

## 2019-09-29 RX ADMIN — HEPARIN SODIUM 5000 UNITS: 5000 INJECTION INTRAVENOUS; SUBCUTANEOUS at 09:59

## 2019-09-29 RX ADMIN — HYDRALAZINE HYDROCHLORIDE 100 MG: 50 TABLET ORAL at 17:57

## 2019-09-29 RX ADMIN — HYDRALAZINE HYDROCHLORIDE 100 MG: 50 TABLET ORAL at 21:34

## 2019-09-29 RX ADMIN — AZITHROMYCIN MONOHYDRATE 500 MG: 500 INJECTION, POWDER, LYOPHILIZED, FOR SOLUTION INTRAVENOUS at 23:10

## 2019-09-29 RX ADMIN — AZITHROMYCIN MONOHYDRATE 500 MG: 500 INJECTION, POWDER, LYOPHILIZED, FOR SOLUTION INTRAVENOUS at 00:23

## 2019-09-29 RX ADMIN — CEFTRIAXONE 1 G: 1 INJECTION, POWDER, FOR SOLUTION INTRAMUSCULAR; INTRAVENOUS at 21:34

## 2019-09-29 RX ADMIN — HYDRALAZINE HYDROCHLORIDE 100 MG: 50 TABLET ORAL at 09:58

## 2019-09-29 RX ADMIN — METOPROLOL TARTRATE 50 MG: 50 TABLET, FILM COATED ORAL at 09:58

## 2019-09-29 RX ADMIN — METOPROLOL TARTRATE 50 MG: 50 TABLET, FILM COATED ORAL at 17:57

## 2019-09-29 RX ADMIN — CHLORTHALIDONE 25 MG: 25 TABLET ORAL at 09:58

## 2019-09-29 RX ADMIN — Medication 10 ML: at 22:57

## 2019-09-29 RX ADMIN — GABAPENTIN 400 MG: 100 CAPSULE ORAL at 09:58

## 2019-09-29 RX ADMIN — Medication 10 ML: at 14:00

## 2019-09-29 RX ADMIN — GABAPENTIN 400 MG: 100 CAPSULE ORAL at 17:57

## 2019-09-29 RX ADMIN — PAROXETINE HYDROCHLORIDE 20 MG: 20 TABLET, FILM COATED ORAL at 09:58

## 2019-09-29 RX ADMIN — HEPARIN SODIUM 5000 UNITS: 5000 INJECTION INTRAVENOUS; SUBCUTANEOUS at 21:39

## 2019-09-29 NOTE — ROUTINE PROCESS
Oncology End of Shift Note Bedside shift change report given to [address] [place] [address] (incoming nurse) by Otha Olszewski (outgoing nurse) on [facility]. Report included the following information SBAR, Kardex, Intake/Output and MAR. Shift Summary: had some confusion during the night, am labs drawn Issues for Physician to Address:    
 
Patient on Cardiac Monitoring? [] Yes 
[x] No 
 
Rhythm:   
 
 
 
Shift Events Otha Olszewski

## 2019-09-29 NOTE — PROGRESS NOTES
General Daily Progress Note    Admit Date: 9/26/2019  Hospital day 4    Subjective:     Patient has no complaint of shortness of breath or chest pain. Some nonproductive cough Somewhat upset over not getting her regular sleeping pill. bp has been somewhat high also. ..   Medication side effects: none    Current Facility-Administered Medications   Medication Dose Route Frequency    temazepam (RESTORIL) capsule 15 mg  15 mg Oral QHS    chlorthalidone (HYGROTEN) tablet 25 mg  25 mg Oral DAILY    lisinopril (PRINIVIL, ZESTRIL) tablet 40 mg  40 mg Oral DAILY    hydrALAZINE (APRESOLINE) tablet 100 mg  100 mg Oral TID    vancomycin (VANCOCIN) 1,000 mg in 0.9% sodium chloride (MBP/ADV) 250 mL  1,000 mg IntraVENous Q36H    influenza vaccine 2019-20 (6 mos+)(PF) (FLUARIX/FLULAVAL/FLUZONE QUAD) injection 0.5 mL  0.5 mL IntraMUSCular PRIOR TO DISCHARGE    albuterol-ipratropium (DUO-NEB) 2.5 MG-0.5 MG/3 ML  3 mL Nebulization Q4H PRN    sodium chloride (NS) flush 5-40 mL  5-40 mL IntraVENous Q8H    sodium chloride (NS) flush 5-40 mL  5-40 mL IntraVENous PRN    acetaminophen (TYLENOL) tablet 650 mg  650 mg Oral Q4H PRN    ondansetron (ZOFRAN) injection 4 mg  4 mg IntraVENous Q4H PRN    heparin (porcine) injection 5,000 Units  5,000 Units SubCUTAneous Q12H    azithromycin (ZITHROMAX) 500 mg in 0.9% sodium chloride (MBP/ADV) 250 mL  500 mg IntraVENous Q24H    cefTRIAXone (ROCEPHIN) 1 g in 0.9% sodium chloride (MBP/ADV) 50 mL  1 g IntraVENous Q24H    aspirin chewable tablet 81 mg  81 mg Oral DAILY    dilTIAZem CD (CARDIZEM CD) capsule 300 mg  300 mg Oral DAILY    gabapentin (NEURONTIN) capsule 400 mg  400 mg Oral QID    metoprolol tartrate (LOPRESSOR) tablet 50 mg  50 mg Oral BID    PARoxetine (PAXIL) tablet 20 mg  20 mg Oral DAILY    pravastatin (PRAVACHOL) tablet 40 mg  40 mg Oral QHS        Review of Systems  Pertinent items are noted in HPI.     Objective:     Patient Vitals for the past 8 hrs:   BP Temp Pulse Resp SpO2   09/29/19 0402 178/50 98.6 °F (37 °C) (!) 52 16 92 %   09/29/19 0033 175/45         09/28 1901 - 09/29 0700  In: 60 [P.O.:60]  Out: -   09/27 0701 - 09/28 1900  In: 550 [I.V.:550]  Out: -     Physical Exam:   Visit Vitals  /50 (BP 1 Location: Right arm, BP Patient Position: At rest)   Pulse (!) 52   Temp 98.6 °F (37 °C)   Resp 16   Ht 5' 3\" (1.6 m)   Wt 132 lb 4.4 oz (60 kg)   SpO2 92%   BMI 23.43 kg/m²     Lungs: rhonchi L base  Heart: regular rate and rhythm, S1, S2 normal, no murmur, click, rub or gallop  Abdomen: soft, non-tender. Bowel sounds normal. No masses,  no organomegaly  Extremities: extremities normal, atraumatic, no cyanosis or edema      ECG: normal sinus rhythm     Data Review No results found for this or any previous visit (from the past 24 hour(s)). Assessment:     Active Problems:    CAP (community acquired pneumonia) (9/26/2019)        Plan:     1. CAP (community acquired pneumonia) (9/26/2019): Continue antibiotics. Currently rocephin + azithromycin + vanc. 2. UTI: enterococcal. On Vanc. 3. CKD III:   4. COPD: Stable. I'll add prn bronchodilator therapy. 5. P AFib: Seems to be sinus at this itme. 6. Code: DNR.   7.  Hypertension- will resume all her outpatient meds.    8. Insomnia- will resume her outpatient temazepam.

## 2019-09-29 NOTE — PROGRESS NOTES
Pharmacy Automatic Renal Dosing Protocol - Antimicrobials    Indication for Antimicrobials: UTI/ CAP     Current Regimen of Each Antimicrobial:  Vancomycin 1.25g ONCE, then 750 mg q36h (Start Date 19; Day # 3)  CTX 1g IV q24h (Start Date 19; Day # 4)  Zithromax 500 mg q24h (Start Date 19; Day # 3)    Previous Antimicrobial Therapy:      Goal Level: VANCOMYCIN TROUGH GOAL RANGE    Vancomycin Trough: 10 - 15 mcg/mL  (AUC: 400 - 600 mg/hr/Liter/day)     Date Dose & Interval Measured (mcg/mL) Extrapolated (mcg/mL)    @ 2300 1000mg q 36hr                   Date & time of next level: Vanco trough  @ 2300    Significant Cultures:   19 PBC--NGTD - pending  19 UCx-->100,000 col/ml enterococcus species, klebsiella, yeast - pending ( Klebsiella pan susceptible, still waiting on enterococcus)    Radiology / Imaging results: (X-ray, CT scan or MRI):   19 chest XR:  IMPRESSION: Left perihilar consolidation likely represents pneumonia. Radiographic follow-up is recommended to assure complete resolution. Labs:  Recent Labs     19  0500 19  0646 19  2130   CREA 1.49* 1.58* 1.92*   BUN 31* 40* 45*   WBC 8.3 12.4* 17.1*     Temp (24hrs), Av.7 °F (37.1 °C), Min:98.2 °F (36.8 °C), Max:99 °F (37.2 °C)    Creatinine Clearance (mL/min) or Dialysis: ~23 ml/min    Impression/Plan:   Daily BMP ordered  See past history of enterococcal UTI's (pan sens)  Will continue to watch Scr, vast improvement from . Will empirically increase dose to 1000mg IV every 36hr to yield a trough of 10-15, . Vanco trough  at 2300, prior to midnight dose. Antimicrobial stop date TBD     Pharmacy will follow daily and adjust medications as appropriate for renal function and/or serum levels. Thank you,  KEY Desir    Recommended duration of therapy  [email or web address]. docx    Renal Dosing  [email or web address]/Pharmacy/[email or web address]/Renal%[id number]. pdf

## 2019-09-29 NOTE — PROGRESS NOTES
Problem: Breathing Pattern - Ineffective  Goal: *Absence of hypoxia  Outcome: Progressing Towards Goal Problem: Serum Glucose Level - Abnormal:  Goal: Ability to maintain appropriate glucose levels will improve to within specified parameters  Ability to maintain appropriate glucose levels will improve to within specified parameters  Outcome: Not Met This Shift

## 2019-09-29 NOTE — PROGRESS NOTES
Oncology End of Shift Note      Bedside shift change report given to Eliezer Alexandra RN (incoming nurse) by Bladimir Cobb (outgoing nurse) on [facility]. Report included the following information SBAR, Kardex and MAR. Shift Summary: Patient received all medications and medication education was provided. Patient was turned frequently throughout the shift. Issues for Physician to Address:  None. Patient on Cardiac Monitoring?     [] Yes  [x] No    Rhythm:            Bladimir Cobb

## 2019-09-30 LAB
ANION GAP SERPL CALC-SCNC: 4 MMOL/L (ref 5–15)
BUN SERPL-MCNC: 30 MG/DL (ref 6–20)
BUN/CREAT SERPL: 19 (ref 12–20)
CALCIUM SERPL-MCNC: 9.2 MG/DL (ref 8.5–10.1)
CHLORIDE SERPL-SCNC: 114 MMOL/L (ref 97–108)
CO2 SERPL-SCNC: 26 MMOL/L (ref 21–32)
COMMENT, HOLDF: NORMAL
CREAT SERPL-MCNC: 1.58 MG/DL (ref 0.55–1.02)
DATE LAST DOSE: ABNORMAL
GLUCOSE SERPL-MCNC: 102 MG/DL (ref 65–100)
POTASSIUM SERPL-SCNC: 3.9 MMOL/L (ref 3.5–5.1)
REPORTED DOSE,DOSE: ABNORMAL UNITS
REPORTED DOSE/TIME,TMG: ABNORMAL
SAMPLES BEING HELD,HOLD: NORMAL
SODIUM SERPL-SCNC: 144 MMOL/L (ref 136–145)
VANCOMYCIN TROUGH SERPL-MCNC: 11.5 UG/ML (ref 5–10)

## 2019-09-30 PROCEDURE — 74011250637 HC RX REV CODE- 250/637: Performed by: FAMILY MEDICINE

## 2019-09-30 PROCEDURE — 80048 BASIC METABOLIC PNL TOTAL CA: CPT

## 2019-09-30 PROCEDURE — 94760 N-INVAS EAR/PLS OXIMETRY 1: CPT

## 2019-09-30 PROCEDURE — 77010033678 HC OXYGEN DAILY

## 2019-09-30 PROCEDURE — 74011250637 HC RX REV CODE- 250/637: Performed by: HOSPITALIST

## 2019-09-30 PROCEDURE — 74011250636 HC RX REV CODE- 250/636: Performed by: HOSPITALIST

## 2019-09-30 PROCEDURE — 65270000015 HC RM PRIVATE ONCOLOGY

## 2019-09-30 PROCEDURE — 80202 ASSAY OF VANCOMYCIN: CPT

## 2019-09-30 PROCEDURE — 36415 COLL VENOUS BLD VENIPUNCTURE: CPT

## 2019-09-30 PROCEDURE — 74011000258 HC RX REV CODE- 258: Performed by: HOSPITALIST

## 2019-09-30 PROCEDURE — 74011250636 HC RX REV CODE- 250/636: Performed by: INTERNAL MEDICINE

## 2019-09-30 RX ADMIN — CEFTRIAXONE 1 G: 1 INJECTION, POWDER, FOR SOLUTION INTRAMUSCULAR; INTRAVENOUS at 21:09

## 2019-09-30 RX ADMIN — GABAPENTIN 400 MG: 100 CAPSULE ORAL at 21:09

## 2019-09-30 RX ADMIN — HYDRALAZINE HYDROCHLORIDE 100 MG: 50 TABLET ORAL at 10:30

## 2019-09-30 RX ADMIN — VANCOMYCIN HYDROCHLORIDE 1000 MG: 1 INJECTION, POWDER, LYOPHILIZED, FOR SOLUTION INTRAVENOUS at 04:21

## 2019-09-30 RX ADMIN — HYDRALAZINE HYDROCHLORIDE 100 MG: 50 TABLET ORAL at 17:01

## 2019-09-30 RX ADMIN — Medication 10 ML: at 13:11

## 2019-09-30 RX ADMIN — METOPROLOL TARTRATE 50 MG: 50 TABLET, FILM COATED ORAL at 17:01

## 2019-09-30 RX ADMIN — GABAPENTIN 400 MG: 100 CAPSULE ORAL at 10:29

## 2019-09-30 RX ADMIN — PRAVASTATIN SODIUM 40 MG: 40 TABLET ORAL at 21:09

## 2019-09-30 RX ADMIN — TEMAZEPAM 15 MG: 15 CAPSULE ORAL at 21:09

## 2019-09-30 RX ADMIN — GABAPENTIN 400 MG: 100 CAPSULE ORAL at 13:09

## 2019-09-30 RX ADMIN — DILTIAZEM HYDROCHLORIDE 300 MG: 180 CAPSULE, COATED, EXTENDED RELEASE ORAL at 10:28

## 2019-09-30 RX ADMIN — LISINOPRIL 40 MG: 40 TABLET ORAL at 10:30

## 2019-09-30 RX ADMIN — PAROXETINE HYDROCHLORIDE 20 MG: 20 TABLET, FILM COATED ORAL at 10:28

## 2019-09-30 RX ADMIN — METOPROLOL TARTRATE 50 MG: 50 TABLET, FILM COATED ORAL at 10:30

## 2019-09-30 RX ADMIN — HEPARIN SODIUM 5000 UNITS: 5000 INJECTION INTRAVENOUS; SUBCUTANEOUS at 10:33

## 2019-09-30 RX ADMIN — CHLORTHALIDONE 25 MG: 25 TABLET ORAL at 10:39

## 2019-09-30 RX ADMIN — GABAPENTIN 400 MG: 100 CAPSULE ORAL at 17:01

## 2019-09-30 RX ADMIN — HEPARIN SODIUM 5000 UNITS: 5000 INJECTION INTRAVENOUS; SUBCUTANEOUS at 21:09

## 2019-09-30 RX ADMIN — ASPIRIN 81 MG CHEWABLE TABLET 81 MG: 81 TABLET CHEWABLE at 10:31

## 2019-09-30 RX ADMIN — Medication 10 ML: at 21:10

## 2019-09-30 RX ADMIN — HYDRALAZINE HYDROCHLORIDE 100 MG: 50 TABLET ORAL at 21:09

## 2019-09-30 NOTE — PROGRESS NOTES
Pharmacy Automatic Renal Dosing Protocol - Antimicrobials    Indication for Antimicrobials: UTI/ CAP     Current Regimen of Each Antimicrobial:  Vancomycin 1.25g ONCE, then 1gm IV  q36h (Start Date 19; Day # 4)  CTX 1g IV q24h (Start Date 19; Day # 4)  Zithromax 500 mg q24h (Start Date 19; Day # 4)    Previous Antimicrobial Therapy:      Goal Level: VANCOMYCIN TROUGH GOAL RANGE    Vancomycin Trough: 10 - 15 mcg/mL  (AUC: 400 - 600 mg/hr/Liter/day)     Date Dose & Interval Measured (mcg/mL) Extrapolated (mcg/mL)    1000mg q 36hr 11.5 12.6                 Date & time of next level: 2-3 days (10-2-3) or sooner if patient condition changes      Significant Cultures:   : Blood = NGSF (pending)  : Urine = >100k E.faecalis D; 1k K.pna, pan-sens (FINAL)    Radiology / Imaging results: (X-ray, CT scan or MRI):   : CXR: Left perihilar consolidation likely represents pneumonia. Radiographic follow-up is recommended to assure complete resolution. Labs:  Recent Labs     19  0306 19  0500   CREA 1.58* 1.49*   BUN 30* 31*   WBC  --  8.3     Temp (24hrs), Av.6 °F (37 °C), Min:97.9 °F (36.6 °C), Max:99.1 °F (37.3 °C)    Creatinine Clearance (mL/min) or Dialysis: 22 ml/min    Impression/Plan:   Urine w/ >100k E.faecalis D; 1k K.pna, pan-sens; persistent non-productive cough, but less SOB  Vancomycin Trough 12.6 is within the desired range (10-15) to treat UTI  Continue Vancomycin 1gm IV q36h  Date & time of next level: 2-3 days (10-2-3) or sooner if patient condition changes  Continue Azithromycin 500mg IV q24h  Continue Ceftriaxone 1gm IV q24h  Antimicrobial stop date TBD     Pharmacy will follow daily and adjust medications as appropriate for renal function and/or serum levels. Thank you,  Nancy Magallanes [facility]    Recommended duration of therapy  [email or web address]. docx    Renal Dosing  [email or web address]/[place]/[facility]/Pharmacy/Clinical%20Companion/Renal%[email or web address]. pdf

## 2019-09-30 NOTE — PROGRESS NOTES
Oncology End of Shift Note      Bedside shift change report given to Shira Dunbar (incoming nurse) by Zulma Salomon (outgoing nurse) on [facility]. Report included the following information SBAR, Kardex, Intake/Output, MAR, Accordion and Recent Results. Shift Summary: updated daughter as to pt's status; turned q2h; pt. Denies pain, no acute changes      Issues for Physician to Address:       Patient on Cardiac Monitoring?     [] Yes  [x] No    Rhythm:          Shift Events        Zulma Salomon

## 2019-09-30 NOTE — PROGRESS NOTES
IV attempts x6 by 3 nurses unsuccessful, lab stick also unsuccessful-- ER contacted to attempt US guided IV. ER called back and states unable to come, nursing supervisor consulted and Tiara Christine from CCU agrees to come.

## 2019-09-30 NOTE — PROGRESS NOTES
Oncology End of Shift Note      Bedside shift change report given to Joesph Walters RN (incoming nurse) by Austin Washington RN (outgoing nurse) on [employer]. Report included the following information SBAR, Kardex, Intake/Output and MAR. Shift Summary: Pt slept during night, requires turning, incontinent of urine, arms with swelling d/t infiltrates, multiple IV sticks required to obtain new IV site/lab draw.       Issues for Physician to Address:  Coarse breath sounds     Patient on Cardiac Monitoring  [x] No    Rhythm:          Shift Events  --      Austin Washington RN

## 2019-09-30 NOTE — ACP (ADVANCE CARE PLANNING)
Advance Care Planning (ACP) Provider Note - Comprehensive     Date of ACP Conversation: 09/30/19  Persons included in Conversation:  patient  Length of ACP Conversation in minutes:  30 minutes      Interventions Provided:  Entered DNR order (If yes, complete Durable DNR form)     Previous DDNR was incorrectly completed, due to capacity was not documented. The patient has capacity and a new DDNR was completed that will void the previous DDNR.     Copies made and distributed to [facility], and [facility] team.    BECCA Barrera/ [address]  [phone]

## 2019-09-30 NOTE — PROGRESS NOTES
Problem: Falls - Risk of  Goal: *Absence of Falls  Description  Document Lizz Bartlett Fall Risk and appropriate interventions in the flowsheet. Outcome: Progressing Towards Goal  Note:   Fall Risk Interventions:  Mobility Interventions: Bed/chair exit alarm, Patient to call before getting OOB, Utilize walker, cane, or other assistive device    Mentation Interventions: Bed/chair exit alarm, Adequate sleep, hydration, pain control, Door open when patient unattended    Medication Interventions: Bed/chair exit alarm, Patient to call before getting OOB    Elimination Interventions: Bed/chair exit alarm, Call light in reach, Toileting schedule/hourly rounds              Problem: Patient Education: Go to Patient Education Activity  Goal: Patient/Family Education  Outcome: Progressing Towards Goal     Problem: Pressure Injury - Risk of  Goal: *Prevention of pressure injury  Description  Document Rashid Scale and appropriate interventions in the flowsheet. Outcome: Progressing Towards Goal  Note:   Pressure Injury Interventions:  Sensory Interventions: Keep linens dry and wrinkle-free, Minimize linen layers, Turn and reposition approx. every two hours (pillows and wedges if needed)    Moisture Interventions: Absorbent underpads, Minimize layers, Check for incontinence Q2 hours and as needed    Activity Interventions: Pressure redistribution bed/mattress(bed type)    Mobility Interventions: Float heels, HOB 30 degrees or less, Turn and reposition approx.  every two hours(pillow and wedges)    Nutrition Interventions: Document food/fluid/supplement intake    Friction and Shear Interventions: Apply protective barrier, creams and emollients, HOB 30 degrees or less, Lift sheet, Minimize layers                Problem: Patient Education: Go to Patient Education Activity  Goal: Patient/Family Education  Outcome: Progressing Towards Goal     Problem: Breathing Pattern - Ineffective  Goal: *Absence of hypoxia  Outcome: Progressing Towards Goal

## 2019-09-30 NOTE — PROGRESS NOTES
Oncology End of Shift Note      Bedside shift change report given to Alis Colby (incoming nurse) by Malathi Arango (outgoing nurse) on [facility]. Report included the following information SBAR, Kardex, Intake/Output and MAR. Shift Summary: no complaints of pain, slept most of the time, vanc trough due at 2300      Issues for Physician to Address:       Patient on Cardiac Monitoring?    [] Yes  [x] No    Rhythm:          Shift Events        Malathi Arango

## 2019-10-01 LAB
ANION GAP SERPL CALC-SCNC: 6 MMOL/L (ref 5–15)
BACTERIA SPEC CULT: NORMAL
BASOPHILS # BLD: 0 K/UL (ref 0–0.1)
BASOPHILS NFR BLD: 0 % (ref 0–1)
BUN SERPL-MCNC: 25 MG/DL (ref 6–20)
BUN/CREAT SERPL: 16 (ref 12–20)
CALCIUM SERPL-MCNC: 9.6 MG/DL (ref 8.5–10.1)
CHLORIDE SERPL-SCNC: 113 MMOL/L (ref 97–108)
CO2 SERPL-SCNC: 25 MMOL/L (ref 21–32)
CREAT SERPL-MCNC: 1.56 MG/DL (ref 0.55–1.02)
DIFFERENTIAL METHOD BLD: ABNORMAL
EOSINOPHIL # BLD: 0.2 K/UL (ref 0–0.4)
EOSINOPHIL NFR BLD: 2 % (ref 0–7)
ERYTHROCYTE [DISTWIDTH] IN BLOOD BY AUTOMATED COUNT: 14.6 % (ref 11.5–14.5)
GLUCOSE SERPL-MCNC: 99 MG/DL (ref 65–100)
HCT VFR BLD AUTO: 31.9 % (ref 35–47)
HGB BLD-MCNC: 10.4 G/DL (ref 11.5–16)
IMM GRANULOCYTES # BLD AUTO: 0.1 K/UL (ref 0–0.04)
IMM GRANULOCYTES NFR BLD AUTO: 1 % (ref 0–0.5)
LYMPHOCYTES # BLD: 0.6 K/UL (ref 0.8–3.5)
LYMPHOCYTES NFR BLD: 6 % (ref 12–49)
MCH RBC QN AUTO: 31.1 PG (ref 26–34)
MCHC RBC AUTO-ENTMCNC: 32.6 G/DL (ref 30–36.5)
MCV RBC AUTO: 95.5 FL (ref 80–99)
MONOCYTES # BLD: 1 K/UL (ref 0–1)
MONOCYTES NFR BLD: 11 % (ref 5–13)
NEUTS SEG # BLD: 7.5 K/UL (ref 1.8–8)
NEUTS SEG NFR BLD: 80 % (ref 32–75)
NRBC # BLD: 0 K/UL (ref 0–0.01)
NRBC BLD-RTO: 0 PER 100 WBC
PLATELET # BLD AUTO: 203 K/UL (ref 150–400)
PMV BLD AUTO: 10.4 FL (ref 8.9–12.9)
POTASSIUM SERPL-SCNC: 3.7 MMOL/L (ref 3.5–5.1)
RBC # BLD AUTO: 3.34 M/UL (ref 3.8–5.2)
RBC MORPH BLD: ABNORMAL
SERVICE CMNT-IMP: NORMAL
SODIUM SERPL-SCNC: 144 MMOL/L (ref 136–145)
WBC # BLD AUTO: 9.4 K/UL (ref 3.6–11)

## 2019-10-01 PROCEDURE — 77010033678 HC OXYGEN DAILY

## 2019-10-01 PROCEDURE — 74011250637 HC RX REV CODE- 250/637: Performed by: HOSPITALIST

## 2019-10-01 PROCEDURE — 74011250637 HC RX REV CODE- 250/637: Performed by: FAMILY MEDICINE

## 2019-10-01 PROCEDURE — 74011000258 HC RX REV CODE- 258: Performed by: HOSPITALIST

## 2019-10-01 PROCEDURE — 97163 PT EVAL HIGH COMPLEX 45 MIN: CPT

## 2019-10-01 PROCEDURE — 74011250636 HC RX REV CODE- 250/636: Performed by: HOSPITALIST

## 2019-10-01 PROCEDURE — 94760 N-INVAS EAR/PLS OXIMETRY 1: CPT

## 2019-10-01 PROCEDURE — 80048 BASIC METABOLIC PNL TOTAL CA: CPT

## 2019-10-01 PROCEDURE — 85025 COMPLETE CBC W/AUTO DIFF WBC: CPT

## 2019-10-01 PROCEDURE — 97166 OT EVAL MOD COMPLEX 45 MIN: CPT

## 2019-10-01 PROCEDURE — 76937 US GUIDE VASCULAR ACCESS: CPT

## 2019-10-01 PROCEDURE — 65270000015 HC RM PRIVATE ONCOLOGY

## 2019-10-01 PROCEDURE — 36415 COLL VENOUS BLD VENIPUNCTURE: CPT

## 2019-10-01 PROCEDURE — 77030018786 HC NDL GD F/USND BARD -B

## 2019-10-01 PROCEDURE — 97530 THERAPEUTIC ACTIVITIES: CPT

## 2019-10-01 PROCEDURE — 97535 SELF CARE MNGMENT TRAINING: CPT

## 2019-10-01 PROCEDURE — C1751 CATH, INF, PER/CENT/MIDLINE: HCPCS

## 2019-10-01 PROCEDURE — 74011250636 HC RX REV CODE- 250/636: Performed by: INTERNAL MEDICINE

## 2019-10-01 RX ADMIN — PAROXETINE HYDROCHLORIDE 20 MG: 20 TABLET, FILM COATED ORAL at 08:58

## 2019-10-01 RX ADMIN — Medication 10 ML: at 21:06

## 2019-10-01 RX ADMIN — ACETAMINOPHEN 650 MG: 325 TABLET ORAL at 09:34

## 2019-10-01 RX ADMIN — GABAPENTIN 400 MG: 100 CAPSULE ORAL at 13:08

## 2019-10-01 RX ADMIN — Medication 10 ML: at 06:24

## 2019-10-01 RX ADMIN — HYDRALAZINE HYDROCHLORIDE 100 MG: 50 TABLET ORAL at 21:03

## 2019-10-01 RX ADMIN — HEPARIN SODIUM 5000 UNITS: 5000 INJECTION INTRAVENOUS; SUBCUTANEOUS at 21:05

## 2019-10-01 RX ADMIN — CEFTRIAXONE 1 G: 1 INJECTION, POWDER, FOR SOLUTION INTRAMUSCULAR; INTRAVENOUS at 21:03

## 2019-10-01 RX ADMIN — GABAPENTIN 400 MG: 100 CAPSULE ORAL at 18:02

## 2019-10-01 RX ADMIN — HYDRALAZINE HYDROCHLORIDE 100 MG: 50 TABLET ORAL at 08:56

## 2019-10-01 RX ADMIN — ASPIRIN 81 MG CHEWABLE TABLET 81 MG: 81 TABLET CHEWABLE at 08:57

## 2019-10-01 RX ADMIN — DILTIAZEM HYDROCHLORIDE 300 MG: 180 CAPSULE, COATED, EXTENDED RELEASE ORAL at 08:57

## 2019-10-01 RX ADMIN — VANCOMYCIN HYDROCHLORIDE 1000 MG: 1 INJECTION, POWDER, LYOPHILIZED, FOR SOLUTION INTRAVENOUS at 11:42

## 2019-10-01 RX ADMIN — CHLORTHALIDONE 25 MG: 25 TABLET ORAL at 09:34

## 2019-10-01 RX ADMIN — HEPARIN SODIUM 5000 UNITS: 5000 INJECTION INTRAVENOUS; SUBCUTANEOUS at 08:56

## 2019-10-01 RX ADMIN — HYDRALAZINE HYDROCHLORIDE 100 MG: 50 TABLET ORAL at 18:01

## 2019-10-01 RX ADMIN — GABAPENTIN 400 MG: 100 CAPSULE ORAL at 08:58

## 2019-10-01 RX ADMIN — TEMAZEPAM 15 MG: 15 CAPSULE ORAL at 21:05

## 2019-10-01 RX ADMIN — METOPROLOL TARTRATE 50 MG: 50 TABLET, FILM COATED ORAL at 18:02

## 2019-10-01 RX ADMIN — METOPROLOL TARTRATE 50 MG: 50 TABLET, FILM COATED ORAL at 08:58

## 2019-10-01 RX ADMIN — Medication 10 ML: at 13:11

## 2019-10-01 RX ADMIN — GABAPENTIN 400 MG: 100 CAPSULE ORAL at 21:04

## 2019-10-01 RX ADMIN — AZITHROMYCIN MONOHYDRATE 500 MG: 500 INJECTION, POWDER, LYOPHILIZED, FOR SOLUTION INTRAVENOUS at 00:44

## 2019-10-01 RX ADMIN — PRAVASTATIN SODIUM 40 MG: 40 TABLET ORAL at 21:04

## 2019-10-01 RX ADMIN — LISINOPRIL 40 MG: 40 TABLET ORAL at 08:58

## 2019-10-01 NOTE — PROGRESS NOTES
Problem: Falls - Risk of  Goal: *Absence of Falls  Description  Document Carmen Darden Fall Risk and appropriate interventions in the flowsheet. Outcome: Progressing Towards Goal  Note:   Fall Risk Interventions:  Mobility Interventions: Bed/chair exit alarm, Patient to call before getting OOB    Mentation Interventions: Bed/chair exit alarm, Adequate sleep, hydration, pain control, More frequent rounding, Toileting rounds    Medication Interventions: Bed/chair exit alarm, Patient to call before getting OOB    Elimination Interventions: Call light in reach, Bed/chair exit alarm, Toileting schedule/hourly rounds              Problem: Patient Education: Go to Patient Education Activity  Goal: Patient/Family Education  Outcome: Progressing Towards Goal     Problem: Pressure Injury - Risk of  Goal: *Prevention of pressure injury  Description  Document Rashid Scale and appropriate interventions in the flowsheet. Outcome: Progressing Towards Goal  Note:   Pressure Injury Interventions:  Sensory Interventions: Minimize linen layers, Monitor skin under medical devices, Turn and reposition approx. every two hours (pillows and wedges if needed), Use 30-degree side-lying position    Moisture Interventions: Absorbent underpads, Check for incontinence Q2 hours and as needed, Minimize layers, Offer toileting Q_hr    Activity Interventions: Assess need for specialty bed    Mobility Interventions: HOB 30 degrees or less, Turn and reposition approx.  every two hours(pillow and wedges)    Nutrition Interventions: Document food/fluid/supplement intake    Friction and Shear Interventions: HOB 30 degrees or less, Feet elevated on foot rest, Minimize layers, Lift sheet                Problem: Patient Education: Go to Patient Education Activity  Goal: Patient/Family Education  Outcome: Progressing Towards Goal     Problem: Breathing Pattern - Ineffective  Goal: *Absence of hypoxia  Outcome: Progressing Towards Goal

## 2019-10-01 NOTE — PROGRESS NOTES
Problem: Mobility Impaired (Adult and Pediatric)  Goal: *Acute Goals and Plan of Care (Insert Text)  Description  FUNCTIONAL STATUS PRIOR TO ADMISSION: At baseline patient is wheelchair bound and dependent on others to roll her in wheelchair due to R shoulder arthritis/pain. 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 HOME SUPPORT PRIOR TO ADMISSION: The patient lived [facility] and was assisted by staff or private duty aides for all ADLs, transfers and wheelchair mobility. Physical Therapy Goals  Initiated 10/1/2019  1. Patient will move from supine to sit and sit to supine , scoot up and down and roll side to side in bed with minimal assist/contact guard assist within 7 day(s). 2.  Patient will transfer from bed to chair and chair to bed with moderate assistance x 1 using the least restrictive device within 7 day(s). 3.  Patient will perform sit to stand with moderate assistance x 1 within 7 day(s). Outcome: Not Met   PHYSICAL THERAPY EVALUATION  Patient: Sania Joaquin (38 y.o. female)  Date: 10/1/2019  Primary Diagnosis: CAP (community acquired pneumonia) [J18.9]        Precautions: DNR, Fall, Skin, Bed Alarm      ASSESSMENT  Based on the objective data described below, the patient presents with generalized weakness, bilateral plantarflexion contractures(-10-20 degrees bilaterally), decreased balance, decreased activity tolerance, pain in R shoulder from djd, and impaired bed mobility and transfer skills. She was non-ambulatory at [facility] and pushed from room to dining room for meals. [facility] from [facility] at [facility] upon discharge to assist regaining prior level of function with bed mobility and transfers. Her O2 sats on 2L/min varied from 92% to 95% during mobility assessment.     Current Level of Function Impacting Discharge (mobility/balance): min/mod a x 1 supine to sit; mod a x 2 for sit to stand; max a x 2 for sit to supine due to bowel incontinence, mod a x 1 rolling using bed rails. Functional Outcome Measure: The patient scored 5/100 on the Barthel outcome measure which is indicative of high functional impairment. Other factors to consider for discharge: lives in [facility], likely near prior level of function, was not on O2 at [facility]. Patient will benefit from skilled therapy intervention to address the above noted impairments. PLAN :  Recommendations and Planned Interventions: bed mobility training, transfer training, therapeutic exercises, patient and family training/education and therapeutic activities      Frequency/Duration: Patient will be followed by physical therapy:  3 times a week to address goals. Recommendation for discharge: (in order for the patient to meet his/her long term goals)  Physical therapy at least 2 days/week in the home AND ensure assist and/or supervision for safety with ADLs and transfers. This discharge recommendation:  Has been made in collaboration with the attending provider and/or case management    Equipment recommendations for successful discharge (if) home: patient owns DME required for discharge         SUBJECTIVE:   Patient stated  Hoist you.     OBJECTIVE DATA SUMMARY:   HISTORY:    Past Medical History:   Diagnosis Date    Arrhythmia     A-fib    Arthritis     shoulder/right    Cancer ([facility] 2015    left lung    Chronic kidney disease     Stent in Right Kidney, Stage III    Chronic obstructive pulmonary disease ([address]     Hypertension     Ill-defined condition     Ex Lap with [place] patch of a perforated pyloric channel ulcer 7/19/16    Other ill-defined conditions(799.89)     lipids    Other ill-defined conditions(799.89)     blood transfusion history    PVD (peripheral vascular disease) ([facility] [address]      Past Surgical History:   Procedure Laterality Date    BYPASS GRAFT OTHR,FEM-FEM      BYPASS GRAFT OTHR,FEM-POP Right     PVD    HX HEENT      bilateral cataracts    HX ORTHOPAEDIC Right     right hip fracture/pinning    HX UROLOGICAL      right stent/kidney       Personal factors and/or comorbidities impacting plan of care: copd, pafib, pvd    Home Situation  Home Environment: [address] Name: [place] ([email or web address])  One/Two Story Residence: One story  Living Alone: Yes  Support Systems: [employer] / [employer]  Patient Expects to be Discharged to[de-identified] Independent living facility  Current DME Used/Available at [facility]: Wheelchair, [employer] dressing aides, Grab bars(reacher)  Tub or Shower Type: Shower    EXAMINATION/PRESENTATION/DECISION MAKING:   Critical Behavior:  Neurologic State: Alert, Confused  Orientation Level: Oriented to person, Oriented to place, Disoriented to time, Disoriented to situation  Cognition: Decreased attention/concentration, Decreased command following, Memory loss, Impaired decision making  Safety/Judgement: Decreased insight into deficits, Decreased awareness of need for assistance, Fall prevention  Hearing: Auditory  Auditory Impairment: None  Skin:  redness on coccyx - nurse notified  Edema: none noted  Range Of Motion:  AROM: Generally decreased, functional                       Strength:    Strength: Generally decreased, functional                    Tone & Sensation:   Tone: Normal                              Coordination:  Coordination: Generally decreased, functional  Vision:      Functional Mobility:  Bed Mobility:  Rolling: Moderate assistance;Assist x1  Supine to Sit: Minimum assistance; Moderate assistance;Assist x1  Sit to Supine: Maximum assistance;Assist x2(due to being soiled)  Scooting: Moderate assistance;Assist x1  Transfers:  Sit to Stand: Moderate assistance;Assist x2  Stand to Sit: Minimum assistance;Assist x2        Bed to Chair: (deferred due to bowel incintinence)              Balance:   Sitting: Impaired  Sitting - Static: Good (unsupported); Occassional  Sitting - Dynamic: Fair (occasional)  Standing: Impaired  Standing - Static: Constant support;Poor  Standing - Dynamic : Not tested(became incontinent of bowel)  Ambulation/Gait Training:              Gait Description (WDL): (patient is wheelchair bound/dependent for propulsion)     Right Side Weight Bearing: Full  Left Side Weight Bearing: Full                            Functional Measure:  Barthel Index:    Bathin  Bladder: 0  Bowels: 0  Groomin  Dressin  Feedin  Mobility: 0  Stairs: 0  Toilet Use: 0  Transfer (Bed to Chair and Back): 5(inferred)  Total: 5/100       The Barthel ADL Index: Guidelines  1. The index should be used as a record of what a patient does, not as a record of what a patient could do. 2. The main aim is to establish degree of independence from any help, physical or verbal, however minor and for whatever reason. 3. The need for supervision renders the patient not independent. 4. A patient's performance should be established using the best available evidence. Asking the patient, friends/relatives and nurses are the usual sources, but direct observation and common sense are also important. However direct testing is not needed. 5. Usually the patient's performance over the preceding 24-48 hours is important, but occasionally longer periods will be relevant. 6. Middle categories imply that the patient supplies over 50 per cent of the effort. 7. Use of aids to be independent is allowed. Lu Kee., Barthel, D.W. (7131). Functional evaluation: the Barthel Index. [address] [facility] (14)2. Arlette Cosme, FARAJ.MJIGAR, Gerardo Becerra., Kaleb Batres., [place], [address] [place] [address] (). Measuring the change indisability after inpatient rehabilitation; comparison of the responsiveness of the Barthel Index and Functional [place] Measure. Journal of Neurology, Neurosurgery, and Psychiatry, 66(4), 941-385.   Evelin Bradford, N.[place].MICHELE, PAULO Sanchez, Mansi Soto, M.A. (2004.) Assessment of post-stroke quality of life in cost-effectiveness studies: The usefulness of the Barthel Index and the EuroQoL-5D. Quality of Life Research, 15, 313-62            Physical Therapy Evaluation Charge Determination   History Examination Presentation Decision-Making   HIGH Complexity :3+ comorbidities / personal factors will impact the outcome/ POC  HIGH Complexity : 4+ Standardized tests and measures addressing body structure, function, activity limitation and / or participation in recreation  MEDIUM Complexity : Evolving with changing characteristics  Other outcome measures Barthel  HIGH       Based on the above components, the patient evaluation is determined to be of the following complexity level: MEDIUM    Pain Rating:  R shoulder during elevation 6/10; no complaints in resting position. Activity Tolerance:   Fair, requires rest breaks, observed SOB with activity and bowel incontinence   Please refer to the flowsheet for vital signs taken during this treatment. After treatment patient left in no apparent distress:   Heels elevated for pressure relief, Patient positioned in right sidelying for pressure relief, Call bell within reach, Bed / chair alarm activated and Side rails x 3    COMMUNICATION/EDUCATION:   The patients plan of care was discussed with: Occupational Therapist, Registered Nurse and . Fall prevention education was provided and the patient/caregiver indicated understanding., Patient/family have participated as able in goal setting and plan of care. and Patient/family agree to work toward stated goals and plan of care.     Thank you for this referral.  Matheus Dow, PT   Time Calculation: 45 mins

## 2019-10-01 NOTE — PROGRESS NOTES
OT Note:    Orders received, chart reviewed and patient evaluated by Occupational Therapy. Anticipate Patient is functioning very close to her baseline level. Pt reports she can perform UB bathing/dressing dressing at baseline, as well as x1 assist for WC<>toilet transfer at [facility]. Will continue to see Pt on trial basis 2x/week to assess as Pt demonstrated some difficulty with remembering her PLOF during assessment. Do not anticipate she will require post-acute OT at [facility].      Full evaluation to follow.      Christina Portillo OTR/L

## 2019-10-01 NOTE — PROGRESS NOTES
PROGRESS NOTE    NAME:  Thom Pacheco   :   1935   MRN:   [id number]     Date/Time:  10/1/2019 7:21 AM  Subjective:   History:  Chart reviewed and patient seen and examined and D/W her nurse this AM and all events noted. She was admitted with Pneumonia and a recurrent UTI with Enterococcus. She still has a nonproductive cough but is less SOB w/o other cardiac or respiratory c/o. She has no abdominal pain or other GI/ c/o. She is very weak w/o other neurologic c/o. She has no other c/o on complete ROS.       Medications reviewed:  Current Facility-Administered Medications   Medication Dose Route Frequency    temazepam (RESTORIL) capsule 15 mg  15 mg Oral QHS    chlorthalidone (HYGROTEN) tablet 25 mg  25 mg Oral DAILY    lisinopril (PRINIVIL, ZESTRIL) tablet 40 mg  40 mg Oral DAILY    hydrALAZINE (APRESOLINE) tablet 100 mg  100 mg Oral TID    vancomycin (VANCOCIN) 1,000 mg in 0.9% sodium chloride (MBP/ADV) 250 mL  1,000 mg IntraVENous Q36H    influenza vaccine - (6 mos+)(PF) (FLUARIX/FLULAVAL/FLUZONE QUAD) injection 0.5 mL  0.5 mL IntraMUSCular PRIOR TO DISCHARGE    albuterol-ipratropium (DUO-NEB) 2.5 MG-0.5 MG/3 ML  3 mL Nebulization Q4H PRN    sodium chloride (NS) flush 5-40 mL  5-40 mL IntraVENous Q8H    sodium chloride (NS) flush 5-40 mL  5-40 mL IntraVENous PRN    acetaminophen (TYLENOL) tablet 650 mg  650 mg Oral Q4H PRN    ondansetron (ZOFRAN) injection 4 mg  4 mg IntraVENous Q4H PRN    heparin (porcine) injection 5,000 Units  5,000 Units SubCUTAneous Q12H    azithromycin (ZITHROMAX) 500 mg in 0.9% sodium chloride (MBP/ADV) 250 mL  500 mg IntraVENous Q24H    cefTRIAXone (ROCEPHIN) 1 g in 0.9% sodium chloride (MBP/ADV) 50 mL  1 g IntraVENous Q24H    aspirin chewable tablet 81 mg  81 mg Oral DAILY    dilTIAZem CD (CARDIZEM CD) capsule 300 mg  300 mg Oral DAILY    gabapentin (NEURONTIN) capsule 400 mg  400 mg Oral QID    metoprolol tartrate (LOPRESSOR) tablet 50 mg  50 mg Oral BID    PARoxetine (PAXIL) tablet 20 mg  20 mg Oral DAILY    pravastatin (PRAVACHOL) tablet 40 mg  40 mg Oral QHS        Objective:   Vitals:  Visit Vitals  /47   Pulse 66   Temp 98.5 °F (36.9 °C)   Resp 18   Ht 5' 3\" (1.6 m)   Wt 132 lb 4.4 oz (60 kg)   SpO2 95%   BMI 23.43 kg/m²    O2 Flow Rate (L/min): 1 l/min O2 Device: Nasal cannula Temp (24hrs), Av.6 °F (37 °C), Min:98.3 °F (36.8 °C), Max:98.9 °F (37.2 °C)      Last 24hr Input/Output:    Intake/Output Summary (Last 24 hours) at 10/1/2019 0721  Last data filed at 2019 1928  Gross per 24 hour   Intake 1400 ml   Output    Net 1400 ml        PHYSICAL EXAM:  General:     Alert, cooperative, no distress, appears stated age. Head:    Normocephalic, without obvious abnormality, atraumatic. Eyes:    Conjunctivae/corneas clear. PERRLA  Nose:   Nares normal. No drainage or sinus tenderness. Throat:     Lips, mucosa, and tongue normal.  No Thrush  Neck:   Supple, symmetrical,  no adenopathy, thyroid: non tender     no carotid bruit and no JVD. Back:     Symmetric,  No CVA tenderness. Lungs:    Decreased BS bilaterally with scattered rhonchi. No Wheezing. No rales. Heart:    Regular rate and rhythm,  no murmur, rub or gallop. Abdomen:    Soft, non-tender. Not distended. Bowel sounds normal. No masses  Extremities:  Extremities normal, atraumatic, No cyanosis. No edema. No clubbing  Lymph nodes:  Cervical, supraclavicular normal.  Neurologic:  General decreased strength, Alert and oriented X 3.    Skin:                 No rash      Lab Data Reviewed:    Recent Results (from the past 24 hour(s))   CBC WITH AUTOMATED DIFF    Collection Time: 10/01/19 12:09 AM   Result Value Ref Range    WBC 9.4 3.6 - 11.0 K/uL    RBC 3.34 (L) 3.80 - 5.20 M/uL    HGB 10.4 (L) 11.5 - 16.0 g/dL    HCT 31.9 (L) 35.0 - 47.0 %    MCV 95.5 80.0 - 99.0 FL    MCH 31.1 26.0 - 34.0 PG    MCHC 32.6 30.0 - 36.5 g/dL    RDW 14.6 (H) 11.5 - 14.5 %    PLATELET 871 779 - 197 K/uL    MPV 10.4 8.9 - 12.9 FL    NRBC 0.0 0  WBC    ABSOLUTE NRBC 0.00 0.00 - 0.01 K/uL    NEUTROPHILS 80 (H) 32 - 75 %    LYMPHOCYTES 6 (L) 12 - 49 %    MONOCYTES 11 5 - 13 %    EOSINOPHILS 2 0 - 7 %    BASOPHILS 0 0 - 1 %    IMMATURE GRANULOCYTES 1 (H) 0.0 - 0.5 %    ABS. NEUTROPHILS 7.5 1.8 - 8.0 K/UL    ABS. LYMPHOCYTES 0.6 (L) 0.8 - 3.5 K/UL    ABS. MONOCYTES 1.0 0.0 - 1.0 K/UL    ABS. EOSINOPHILS 0.2 0.0 - 0.4 K/UL    ABS. BASOPHILS 0.0 0.0 - 0.1 K/UL    ABS. IMM. GRANS. 0.1 (H) 0.00 - 0.04 K/UL    DF SMEAR SCANNED      RBC COMMENTS SCHISTOCYTES  1+       METABOLIC PANEL, BASIC    Collection Time: 10/01/19 12:09 AM   Result Value Ref Range    Sodium 144 136 - 145 mmol/L    Potassium 3.7 3.5 - 5.1 mmol/L    Chloride 113 (H) 97 - 108 mmol/L    CO2 25 21 - 32 mmol/L    Anion gap 6 5 - 15 mmol/L    Glucose 99 65 - 100 mg/dL    BUN 25 (H) 6 - 20 MG/DL    Creatinine 1.56 (H) 0.55 - 1.02 MG/DL    BUN/Creatinine ratio 16 12 - 20      GFR est AA 38 (L) >60 ml/min/1.73m2    GFR est non-AA 32 (L) >60 ml/min/1.73m2    Calcium 9.6 8.5 - 10.1 MG/DL         Assessment/Plan:     Principal Problem:    CAP (community acquired pneumonia) (9/26/2019)    Active Problems:    COPD (chronic obstructive pulmonary disease) ([facility] (8/14/2010)      Essential hypertension (7/20/2016)      PAF (paroxysmal atrial fibrillation) (HCC) (3/30/2018)      Moderate protein-calorie malnutrition (HCC) (3/30/2018)      Stage 3 chronic kidney disease ([facility] [address] (11/5/2018)      Recurrent UTI (6/14/2019)      Anemia (8/1/2019)       ___________________________________________________  PLAN:    1. CAP (community acquired pneumonia) (9/26/2019): Continue antibiotics with rocephin + azithromycin + vanc. 2. UTI: enterococcal. On Vanc., S pending  3. CKD III: follow  4. COPD: Stable. Continue prn bronchodilator therapy. 5. P AFib: Seems to be sinus at this itme. 6. Code: DNR.   7.  Hypertension- will continue all her outpatient meds.    8.  Insomnia- latoya continue her outpatient temazepam.             ___________________________________________________    Attending Physician: Nestor Carreon MD

## 2019-10-01 NOTE — PROGRESS NOTES
Discussed vascular access need with Dr Tim Simmons. Pt will need antibiotics for next several days. She has been losing her IV's due to fragile skin/veins. Will place Midline instead of PICC for short term antibiotic needs. Midline insertion procedure note:  Pt has very limited vascular access. Procedure explained to Patient Sruthi Patel along with risks and benefits. Procedure teaching completed. Pre procedure assessment done. She denies questions or concerns at this time. Midline information booklet left at bedside. Midline sign over the [facility]. Maximum sterile barrier precautions observed throughout procedure. Lidocaine 1% 5ml sc injected to site prior to access the vein. Cannulated Basilic vein using ultrasound guidance. Inserted 4 Fr. single lumen midline to Right arm. Blood return verified and flushed with 20ml normal saline. Sterile dressing applied with Biopatch, StatLock and occlusive dressing as per protocol. Curos caps applied to port. Patient tolerated procedure well with minimal blood loss. Reason for access : Reliable IV access  Complications related to insertion : none, pt bruises easily and will have ecchymosis at the site. This midline to be removed when therapy no longer needed. See nursing message on [facility]isa for midline reminders. Midline is CT and MRI compatible. Inserted by : Alejandro Freeman RN Vascular Access Nurse  Assisted by : Chloe Braun RN, [facility] Vascular Access Nurse  Catheter Length : 11 cm   External Length : 0 cm   Right arm circumference : 26 Cm  Catheter occupies 23% of vein. Type of Midline: BARD  Ref#: [id number]  Lot#: [id number]  Expiration Date: 2021-01-31  Informed primary nurse LOVELY Etienne midline is ready for use and to hang new infusion tubing prior to use.     Alejandro Freeman RN, BSN, [facility]      Vascular Access Nurse

## 2019-10-01 NOTE — PROGRESS NOTES
Problem: Self Care Deficits Care Plan (Adult)  Goal: *Acute Goals and Plan of Care (Insert Text)  Description    FUNCTIONAL STATUS PRIOR TO ADMISSION: [facility] resident. 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 Says she can complete stand pivot transfer from [facility] to toilet to relieve herself, yet unsure if this is accurate. Can feed self using non-dominant L hand. Sits on shower chair to bathe with significant assistance; can wash UB and face. Needs assist for distal LB aspects and jonathan care. Does not self-propel W/C.      HOME SUPPORT: The patient lived at [facility] with heavy assistance for ADLs. Occupational Therapy Goals  Initiated 10/1/2019  1. Patient will perform self-feeding with Min A using adaptive strategies/AE PRN within 7 day(s). 2.  Patient will perform upper body dressing with supervision/set-up using adaptive strategies/AE PRN within 7 day(s). 3.  Patient will perform WC<>toilet transfers with moderate assistance x1 within 7 day(s). Outcome: Progressing Towards Goal   OCCUPATIONAL THERAPY EVALUATION  Patient: Juan Lacy (67 y.o. female)  Date: 10/1/2019  Primary Diagnosis: CAP (community acquired pneumonia) [J18.9]        Precautions: DNR, Fall, Skin, Bed Alarm    ASSESSMENT  Based on the objective data described below, the patient presents with decreased ability to complete bed mobility and bed<>WC transfers from baseline, decreased activity tolerance, and decreased bimanual dexterity/ strength R>L. Pt states she can complete supine<>sit transfers independently at baseline and perform UB ADLs with limited assist, as well as toilet transfers with x1 assist from her W/C.  This session she was able to attempt a sit<>stand x1 with Mod-Max Assist only for a few seconds, yet had an episode of bowel incontinence and needed to be returned supine for rear jonathan care at bed level. Pt appears close to her functional baseline, yet will continue to treat 2x/week on trial basis to maximize her safety and independence with self-feeding, UB ADLs and toilet transfers as appropriate prior to discharge back to [facility]. Current Level of Function Impacting Discharge (ADLs/self-care): Min-Mod Assist x1 with bed mobility; Mod-Max Assist x2 with sit<>stand; Total A with toileting,     Functional Outcome Measure: The patient scored Total: 5/100 on the Barthel Index outcome measure which is indicative of 95% impaired ability to care for basic self needs/dependency on others; inferred 95% dependency on others for instrumental ADLs. Other factors to consider for discharge: [facility] Resident; Recently D/C from PT at facility for meeting goals (likely plateau)     Patient will benefit from skilled therapy intervention to address the above noted impairments. PLAN :  Recommendations and Planned Interventions: self care training, functional mobility training, therapeutic exercise, endurance activities and home safety training    Frequency/Duration: Patient will be followed by occupational therapy 2 times a week to address goals. Recommendation for discharge: (in order for the patient to meet his/her long term goals)  No skilled occupational therapy/ follow up rehabilitation needs identified at this time. Agree with plan for [facility] PT at [facility].      This discharge recommendation:  Has been made in collaboration with the attending provider and/or case management    Equipment recommendations for successful discharge (if) home: patient owns DME required for discharge       SUBJECTIVE:   Patient stated This is a lot different\" (when asked how she is completing bed mobility and UB ADLs compared to baseline)    OBJECTIVE DATA SUMMARY:   HISTORY:   Past Medical History:   Diagnosis Date    Arrhythmia     A-fib    Arthritis     shoulder/right    Cancer ([facility] [address] 2015    left lung    Chronic kidney disease     Stent in Right Kidney, Stage III    Chronic obstructive pulmonary disease ([facility] [address]     Hypertension     Ill-defined condition     Ex Lap with Laura Franklink patch of a perforated pyloric channel ulcer 7/19/16    Other ill-defined conditions(799.89)     lipids    Other ill-defined conditions(799.89)     blood transfusion history    PVD (peripheral vascular disease) ([facility])      Past Surgical History:   Procedure Laterality Date    BYPASS GRAFT OTHR,FEM-FEM      BYPASS GRAFT OTHR,FEM-POP Right     PVD    HX HEENT      bilateral cataracts    HX ORTHOPAEDIC Right     right hip fracture/pinning    HX UROLOGICAL      right stent/kidney       Expanded or extensive additional review of patient history:     Home Situation  Home Environment: Assisted living  [address] [place] [address] [place]: [facility] ([address])  One/Two Story Residence: One story  Living Alone: Yes  Support Systems: Cyrba / Piehole  Patient Expects to be Discharged to[de-identified] Independent living facility  Current DME Used/Available at [facility]: Wheelchair, [employer] dressing aides, Grab bars(reacher)  Tub or Shower Type: Shower    Hand dominance: Right (yet has learned to use non-dominant L hand for task d/t significant arthritis in R hand)    EXAMINATION OF PERFORMANCE DEFICITS:  Cognitive/Behavioral Status:  Neurologic State: Alert;Confused  Orientation Level: Oriented to person;Oriented to place; Disoriented to time;Disoriented to situation  Cognition: Decreased attention/concentration;Decreased command following;Memory loss; Impaired decision making  Perception: Appears intact  Perseveration: No perseveration noted  Safety/Judgement: Decreased insight into deficits; Decreased awareness of need for assistance; Fall prevention    Skin: Mild erythema bilat UEs    Edema: Moderate edema BLEs    Hearing:   Auditory  Auditory Impairment: None    Vision/Perceptual:       Appears intact            Range of Motion:  AROM: Generally decreased, functional(w/ exception of impaired R shoulder AROM)  PROM: Generally decreased, functional                      Strength:  Strength: Generally decreased, functional                Coordination:  Coordination: Generally decreased, functional(w/ exception of BLEs)  Fine Motor Skills-Upper: Left Intact; Right Impaired    Gross Motor Skills-Upper: Left Intact; Right Impaired    Tone & Sensation:  Tone: Normal  Sensation: Impaired       Balance:  Sitting: Impaired  Sitting - Static: Good (unsupported); Occassional  Sitting - Dynamic: Fair (occasional)  Standing: Impaired  Standing - Static: Constant support;Poor  Standing - Dynamic : Not tested(became incontinent of bowel)    Functional Mobility and Transfers for ADLs:  Bed Mobility:  Rolling: Moderate assistance;Assist x1  Supine to Sit: Minimum assistance; Moderate assistance;Assist x1  Sit to Supine: Maximum assistance;Assist x2(due to being soiled)  Scooting: Moderate assistance;Assist x1    Transfers:  Sit to Stand: Moderate assistance;Assist x2  Stand to Sit: Minimum assistance;Assist x2  Bed to Chair: (deferred due to bowel incontinence; unable to take steps)  Bathroom Mobility: Maximum assistance  Toilet Transfer : Maximum assistance;Assist x2  Shower Transfer: Maximum assistance;Assist x2    ADL Assessment:  Feeding: Moderate assistance(with cutting items)    Oral Facial Hygiene/Grooming: Minimum assistance    Bathing: Total assistance    Upper Body Dressing: Minimum assistance    Lower Body Dressing: Total assistance    Toileting: Total assistance       ADL Intervention and task modifications:      Pt required Total A at bed level for toileting, including jonathan care, d/t episode of bowel incontinence during x1 attempt at sit<>stand. She was able to assist with rolling side<>side with greater function using LUE than RUE.  Educated Pt regarding benefits of requesting regular position changes between R/L sidelying to reduce risk for pressure ulcers and made Pt aware of therapist recommendation for mobility team to use [employer] Lift to assist her into a recliner chair d/t prolonged period of bedrest this admission. Pt in agreement with this plan. Cognitive Retraining  Safety/Judgement: Decreased insight into deficits; Decreased awareness of need for assistance; Fall prevention    Functional Measure:  Barthel Index:    Bathin  Bladder: 0  Bowels: 0  Groomin  Dressin  Feedin  Mobility: 0  Stairs: 0  Toilet Use: 0  Transfer (Bed to Chair and Back): 5(inferred)  Total: 5/100        The Barthel ADL Index: Guidelines  1. The index should be used as a record of what a patient does, not as a record of what a patient could do. 2. The main aim is to establish degree of independence from any help, physical or verbal, however minor and for whatever reason. 3. The need for supervision renders the patient not independent. 4. A patient's performance should be established using the best available evidence. Asking the patient, friends/relatives and nurses are the usual sources, but direct observation and common sense are also important. However direct testing is not needed. 5. Usually the patient's performance over the preceding 24-48 hours is important, but occasionally longer periods will be relevant. 6. Middle categories imply that the patient supplies over 50 per cent of the effort. 7. Use of aids to be independent is allowed. Waqar Moore., Barthel, D.W. (2860). Functional evaluation: the Barthel Index. [facility] (14)2. FARA UrenaJMitraMJIGAR, Zuly Puckett., Daina Oglesby., [place], [address] [place] [address] (). Measuring the change indisability after inpatient rehabilitation; comparison of the responsiveness of the Barthel Index and Functional [place] Measure. Journal of Neurology, Neurosurgery, and Psychiatry, 66(4), 471-426. Olamide Berry, N.J.A, Zachary Rene  WMitraJ.M, & Kamini Owusu, M.A. (2004.) Assessment of post-stroke quality of life in cost-effectiveness studies:  The usefulness of the Barthel Index and the EuroQoL-5D. Quality of Life Research, 15, 216-87         Occupational Therapy Evaluation Charge Determination   History Examination Decision-Making   MEDIUM Complexity : Expanded review of history including physical, cognitive and psychosocial  history  MEDIUM Complexity : 3-5 performance deficits relating to physical, cognitive , or psychosocial skils that result in activity limitations and / or participation restrictions MEDIUM Complexity : Patient may present with comorbidities that affect occupational performnce. Miniml to moderate modification of tasks or assistance (eg, physical or verbal ) with assesment(s) is necessary to enable patient to complete evaluation       Based on the above components, the patient evaluation is determined to be of the following complexity level: MEDIUM  Pain Rating:  None    Activity Tolerance:   Poor and requires rest breaks  Please refer to the flowsheet for vital signs taken during this treatment. After treatment patient left in no apparent distress:    Heels elevated for pressure relief, Patient positioned in R sidelying for pressure relief, Call bell within reach and Side rails x 3    COMMUNICATION/EDUCATION:   The patients plan of care was discussed with: Physical Therapist, Registered Nurse and Patient and . Home safety education was provided and the patient/caregiver indicated understanding., Patient/family have participated as able in goal setting and plan of care. and Patient/family agree to work toward stated goals and plan of care.     Thank you for this referral.  Akanksha Metzger, OTR/L  Time Calculation: 40 mins

## 2019-10-01 NOTE — PROGRESS NOTES
Orders received, chart reviewed and patient evaluated by physical therapy. Pending progression with skilled acute physical therapy, recommend:  Physical therapy at least 2 days/week in the home AND ensure assist and/or supervision for safety with ADLs and transfers. - she is high risk for falls and non-ambulatory. She is wheelchair bound and is pushed in facility from place to place. Recommend with nursing patient to complete as able in order to maintain strength, endurance and independence: OOB to recliner 3x/day with Mobility Team using [employer]. Thank you for your assistance. Full evaluation to follow.      Santos Vickers, PT

## 2019-10-01 NOTE — PROGRESS NOTES
Oncology End of Shift Note      Bedside shift change report given to ANAI BRAND RN (incoming nurse) by Jewels Muñoz (outgoing nurse) on [facility]. Report included the following information SBAR, Kardex, Intake/Output, MAR, Accordion and Recent Results. Shift Summary: pt IV infiltrated, physician ordered midline; placed in right upper arm by PICC team; total assist to chair for every meal; BM 10/1/19; denies pain      Issues for Physician to Address:       Patient on Cardiac Monitoring?     [] Yes  [x] No    Rhythm:          Shift Events      Jewels Muñoz

## 2019-10-01 NOTE — PROGRESS NOTES
Oncology End of Shift Note      Bedside shift change report given to Maura Lesch, RN (incoming nurse) by Shanti Carrillo (outgoing nurse) on [facility]. Report included the following information SBAR, Kardex and MAR.       Shift Summary: assumed pt care at 11:30 pm, two new IV's placed, labs drawn      Issues for Physician to Address:       Patient on Cardiac Monitoring?  no  [] Yes  [x] No    Rhythm:          Shift Events        Delilah Agrawal

## 2019-10-01 NOTE — PROGRESS NOTES
Pharmacy Automatic Renal Dosing Protocol - Antimicrobials    Indication for Antimicrobials: UTI/ CAP     Current Regimen of Each Antimicrobial:  Vancomycin 1.25g ONCE, then 1gm IV  q36h (Start Date 19; Day # 5)  CTX 1g IV q24h (Start Date 19; Day # 5)  Zithromax 500 mg q24h (Start Date 19; Day # 5)    Previous Antimicrobial Therapy:      Goal Level: VANCOMYCIN TROUGH GOAL RANGE    Vancomycin Trough: 10 - 15 mcg/mL  (AUC: 400 - 600 mg/hr/Liter/day)     Date Dose & Interval Measured (mcg/mL) Extrapolated (mcg/mL)    1000mg q 36hr 11.5 12.6                 Date & time of next level: 2-3 days (10-2-3) or sooner if patient condition changes      Significant Cultures:   : Blood = NG - Final   : Urine = >100k E.faecalis D; 1k K.pna, pan-sens (FINAL)    Radiology / Imaging results: (X-ray, CT scan or MRI):   : CXR: Left perihilar consolidation likely represents pneumonia. Radiographic follow-up is recommended to assure complete resolution. Labs:  Recent Labs     10/01/19  0009 19  0306 19  0500   CREA 1.56* 1.58* 1.49*   BUN 25* 30* 31*   WBC 9.4  --  8.3     Temp (24hrs), Av.8 °F (37.1 °C), Min:98.5 °F (36.9 °C), Max:99.3 °F (37.4 °C)    Creatinine Clearance (mL/min) or Dialysis: 22 ml/min    Impression/Plan:   Urine w/ >100k E.faecalis D; 1k K.pna, pan-sens; persistent non-productive cough, but less SOB  Vancomycin Trough 12.6 is within the desired range (10-15) to treat UTI  Continue Vancomycin 1gm IV q36h  Date & time of next level: 2-3 days (10-2-3) or sooner if patient condition changes - condition not changed so will defer to levels at least after two doses from last level  Continue Azithromycin 500mg IV q24h  Continue Ceftriaxone 1gm IV q24h  Consider deescalating , NG in blood   Antimicrobial stop date TBD     Pharmacy will follow daily and adjust medications as appropriate for renal function and/or serum levels.     Thank you,  Jerry Carolina, [facility]

## 2019-10-02 LAB
ANION GAP SERPL CALC-SCNC: 8 MMOL/L (ref 5–15)
BASOPHILS # BLD: 0 K/UL (ref 0–0.1)
BASOPHILS NFR BLD: 0 % (ref 0–1)
BUN SERPL-MCNC: 25 MG/DL (ref 6–20)
BUN/CREAT SERPL: 16 (ref 12–20)
CALCIUM SERPL-MCNC: 9.6 MG/DL (ref 8.5–10.1)
CHLORIDE SERPL-SCNC: 112 MMOL/L (ref 97–108)
CO2 SERPL-SCNC: 22 MMOL/L (ref 21–32)
CREAT SERPL-MCNC: 1.52 MG/DL (ref 0.55–1.02)
DIFFERENTIAL METHOD BLD: ABNORMAL
EOSINOPHIL # BLD: 0.2 K/UL (ref 0–0.4)
EOSINOPHIL NFR BLD: 2 % (ref 0–7)
ERYTHROCYTE [DISTWIDTH] IN BLOOD BY AUTOMATED COUNT: 14.6 % (ref 11.5–14.5)
GLUCOSE SERPL-MCNC: 102 MG/DL (ref 65–100)
HCT VFR BLD AUTO: 29.8 % (ref 35–47)
HGB BLD-MCNC: 9.4 G/DL (ref 11.5–16)
IMM GRANULOCYTES # BLD AUTO: 0.1 K/UL (ref 0–0.04)
IMM GRANULOCYTES NFR BLD AUTO: 1 % (ref 0–0.5)
LYMPHOCYTES # BLD: 0.6 K/UL (ref 0.8–3.5)
LYMPHOCYTES NFR BLD: 6 % (ref 12–49)
MCH RBC QN AUTO: 29.7 PG (ref 26–34)
MCHC RBC AUTO-ENTMCNC: 31.5 G/DL (ref 30–36.5)
MCV RBC AUTO: 94 FL (ref 80–99)
MONOCYTES # BLD: 1 K/UL (ref 0–1)
MONOCYTES NFR BLD: 10 % (ref 5–13)
NEUTS SEG # BLD: 7.6 K/UL (ref 1.8–8)
NEUTS SEG NFR BLD: 81 % (ref 32–75)
NRBC # BLD: 0 K/UL (ref 0–0.01)
NRBC BLD-RTO: 0 PER 100 WBC
PLATELET # BLD AUTO: 201 K/UL (ref 150–400)
PMV BLD AUTO: 10.4 FL (ref 8.9–12.9)
POTASSIUM SERPL-SCNC: 3.5 MMOL/L (ref 3.5–5.1)
RBC # BLD AUTO: 3.17 M/UL (ref 3.8–5.2)
SODIUM SERPL-SCNC: 142 MMOL/L (ref 136–145)
WBC # BLD AUTO: 9.5 K/UL (ref 3.6–11)

## 2019-10-02 PROCEDURE — 80048 BASIC METABOLIC PNL TOTAL CA: CPT

## 2019-10-02 PROCEDURE — 94760 N-INVAS EAR/PLS OXIMETRY 1: CPT

## 2019-10-02 PROCEDURE — 74011250636 HC RX REV CODE- 250/636: Performed by: HOSPITALIST

## 2019-10-02 PROCEDURE — 85025 COMPLETE CBC W/AUTO DIFF WBC: CPT

## 2019-10-02 PROCEDURE — 74011250636 HC RX REV CODE- 250/636: Performed by: INTERNAL MEDICINE

## 2019-10-02 PROCEDURE — 74011000258 HC RX REV CODE- 258: Performed by: HOSPITALIST

## 2019-10-02 PROCEDURE — 74011250637 HC RX REV CODE- 250/637: Performed by: HOSPITALIST

## 2019-10-02 PROCEDURE — 65270000015 HC RM PRIVATE ONCOLOGY

## 2019-10-02 PROCEDURE — 74011250637 HC RX REV CODE- 250/637: Performed by: FAMILY MEDICINE

## 2019-10-02 PROCEDURE — 97530 THERAPEUTIC ACTIVITIES: CPT

## 2019-10-02 PROCEDURE — 36415 COLL VENOUS BLD VENIPUNCTURE: CPT

## 2019-10-02 PROCEDURE — 77010033678 HC OXYGEN DAILY

## 2019-10-02 RX ORDER — LEVOFLOXACIN 5 MG/ML
250 INJECTION, SOLUTION INTRAVENOUS EVERY 24 HOURS
Status: DISCONTINUED | OUTPATIENT
Start: 2019-10-03 | End: 2019-10-03

## 2019-10-02 RX ORDER — LEVOFLOXACIN 5 MG/ML
500 INJECTION, SOLUTION INTRAVENOUS EVERY 24 HOURS
Status: COMPLETED | OUTPATIENT
Start: 2019-10-02 | End: 2019-10-02

## 2019-10-02 RX ADMIN — HYDRALAZINE HYDROCHLORIDE 100 MG: 50 TABLET ORAL at 08:44

## 2019-10-02 RX ADMIN — DILTIAZEM HYDROCHLORIDE 300 MG: 180 CAPSULE, COATED, EXTENDED RELEASE ORAL at 08:45

## 2019-10-02 RX ADMIN — Medication 10 ML: at 14:00

## 2019-10-02 RX ADMIN — TEMAZEPAM 15 MG: 15 CAPSULE ORAL at 21:52

## 2019-10-02 RX ADMIN — HYDRALAZINE HYDROCHLORIDE 100 MG: 50 TABLET ORAL at 21:52

## 2019-10-02 RX ADMIN — LISINOPRIL 40 MG: 40 TABLET ORAL at 08:46

## 2019-10-02 RX ADMIN — AZITHROMYCIN MONOHYDRATE 500 MG: 500 INJECTION, POWDER, LYOPHILIZED, FOR SOLUTION INTRAVENOUS at 00:50

## 2019-10-02 RX ADMIN — CHLORTHALIDONE 25 MG: 25 TABLET ORAL at 09:16

## 2019-10-02 RX ADMIN — HEPARIN SODIUM 5000 UNITS: 5000 INJECTION INTRAVENOUS; SUBCUTANEOUS at 08:44

## 2019-10-02 RX ADMIN — PRAVASTATIN SODIUM 40 MG: 40 TABLET ORAL at 21:52

## 2019-10-02 RX ADMIN — GABAPENTIN 400 MG: 100 CAPSULE ORAL at 14:05

## 2019-10-02 RX ADMIN — GABAPENTIN 400 MG: 100 CAPSULE ORAL at 21:52

## 2019-10-02 RX ADMIN — Medication 10 ML: at 21:53

## 2019-10-02 RX ADMIN — METOPROLOL TARTRATE 50 MG: 50 TABLET, FILM COATED ORAL at 08:46

## 2019-10-02 RX ADMIN — CEFTRIAXONE 1 G: 1 INJECTION, POWDER, FOR SOLUTION INTRAMUSCULAR; INTRAVENOUS at 21:52

## 2019-10-02 RX ADMIN — PAROXETINE HYDROCHLORIDE 20 MG: 20 TABLET, FILM COATED ORAL at 08:45

## 2019-10-02 RX ADMIN — HEPARIN SODIUM 5000 UNITS: 5000 INJECTION INTRAVENOUS; SUBCUTANEOUS at 21:52

## 2019-10-02 RX ADMIN — Medication 10 ML: at 06:00

## 2019-10-02 RX ADMIN — ASPIRIN 81 MG CHEWABLE TABLET 81 MG: 81 TABLET CHEWABLE at 08:46

## 2019-10-02 RX ADMIN — HYDRALAZINE HYDROCHLORIDE 100 MG: 50 TABLET ORAL at 17:09

## 2019-10-02 RX ADMIN — LEVOFLOXACIN 500 MG: 5 INJECTION, SOLUTION INTRAVENOUS at 09:17

## 2019-10-02 RX ADMIN — GABAPENTIN 400 MG: 100 CAPSULE ORAL at 17:08

## 2019-10-02 RX ADMIN — METOPROLOL TARTRATE 50 MG: 50 TABLET, FILM COATED ORAL at 17:09

## 2019-10-02 RX ADMIN — GABAPENTIN 400 MG: 100 CAPSULE ORAL at 08:45

## 2019-10-02 NOTE — PROGRESS NOTES
Pharmacy Automatic Renal Dosing Protocol - Antimicrobials    Indication for Antimicrobials: UTI/ CAP     Current Regimen of Each Antimicrobial:  Levofloxacin 500 mg q24h (10/2 Day 1 of 5)  CTX 1g IV q24h (Start Date 19; Day # 5)      Previous Antimicrobial Therapy:  Vancomycin 1.25g ONCE, then 1gm IV  q36h (Start Date 19; Day # 5)  Zithromax 500 mg q24h (Start Date 19; Day # 5)      Goal Level: VANCOMYCIN TROUGH GOAL RANGE    Vancomycin Trough: 10 - 15 mcg/mL  (AUC: 400 - 600 mg/hr/Liter/day)     Date Dose & Interval Measured (mcg/mL) Extrapolated (mcg/mL)    1000mg q 36hr 11.5 12.6                 Date & time of next level: 2-3 days (10-2-3) or sooner if patient condition changes      Significant Cultures:   : Blood = NG - Final   : Urine = >100k E.faecalis D; 1k K.pna, pan-sens (FINAL)    Radiology / Imaging results: (X-ray, CT scan or MRI):   : CXR: Left perihilar consolidation likely represents pneumonia. Radiographic follow-up is recommended to assure complete resolution. Labs:  Recent Labs     10/02/19  0327 10/01/19  0009 19  0306   CREA 1.52* 1.56* 1.58*   BUN 25* 25* 30*   WBC 9.5 9.4  --      Temp (24hrs), Av.9 °F (37.2 °C), Min:98.3 °F (36.8 °C), Max:99.3 °F (37.4 °C)    Creatinine Clearance (mL/min) or Dialysis: 22 ml/min    Impression/Plan:   Urine w/ >100k E.faecalis D; 1k K.pna, pan-sens; persistent non-productive cough, but less SOB  Levofloxacin dose adjusted to 500 mg x 1 then 250 mg daily  Continue Ceftriaxone 1gm IV q24h  Consider deescalating  to monotherapy both Klebsiella and Enterococcus Faecalis are susceptible to Levofloxacin  Antimicrobial stop date TBD     Pharmacy will follow daily and adjust medications as appropriate for renal function and/or serum levels.     Thank you,  Shukri Castillo, [facility]

## 2019-10-02 NOTE — INTERDISCIPLINARY ROUNDS
Oncology Interdisciplinary rounds were held today to discuss patient plan of care and outcomes. The following members were present: Nursing, Physician, Case Management, Pharmacy, and PT/OT Actual Length of Stay: 6 DRG GLOS: 3.3 Expected Length of Stay: 3d 7h 
 
 
 
               Plan            Discharge PT/OT ABX Discharge TBA

## 2019-10-02 NOTE — PROGRESS NOTES
OT Note:    Chart reviewed and cleared for therapy by RN. Pt asleep on arrival yet awoke to voice and reports feeling very tired. Pt verbalized she sat up in chair for 2 hours yesterday with assist from mobility team, yet has not been assisted to chair today. Upon preparing for supine<>sit transfer, Pt noted to have an episode of bowel incontinence at bed level. RN staff made aware and session aborted as Pt requires Total A with toileting/jonathan care at bed level and skilled therapy is not indicated for this. Continue to strongly recommend mobility team assistance with mobilizing Pt to chair at least 2x/day and turn team every 2 hours to reduce risk for skin breakdown.      Thank you,    Henrry Sosa, OTR/L

## 2019-10-02 NOTE — PROGRESS NOTES
Problem: Mobility Impaired (Adult and Pediatric)  Goal: *Acute Goals and Plan of Care (Insert Text)  Description  FUNCTIONAL STATUS PRIOR TO ADMISSION: At baseline patient is wheelchair bound and dependent on others to roll her in wheelchair due to R shoulder arthritis/pain. 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 HOME SUPPORT PRIOR TO ADMISSION: The patient lived [facility] and was assisted by staff or private duty aides for all ADLs, transfers and wheelchair mobility. Physical Therapy Goals  Initiated 10/1/2019  1. Patient will move from supine to sit and sit to supine , scoot up and down and roll side to side in bed with minimal assist/contact guard assist within 7 day(s). 2.  Patient will transfer from bed to chair and chair to bed with moderate assistance x 1 using the least restrictive device within 7 day(s). 3.  Patient will perform sit to stand with moderate assistance x 1 within 7 day(s). Outcome: Progressing Towards Goal   PHYSICAL THERAPY TREATMENT  Patient: Juan Lacy (89 y.o. female)  Date: 10/2/2019  Diagnosis: CAP (community acquired pneumonia) [J18.9] CAP (community acquired pneumonia)       Precautions: DNR, Fall, Skin, Bed Alarm  Chart, physical therapy assessment, plan of care and goals were reviewed. ASSESSMENT  Patient continues with skilled PT services and is progressing towards goals. Patient continues to exhibit limited functional mobility secondary to decreased ROM, gross strength deficits, impaired sitting and standing balance, impaired cognition/confusion, and decreased endurance/activity tolerance. Patient unclear on PLOF, however did note that she has used lift in the past. Reports that she now has assistance from caretakers for bed>W/C transfer.  Requires near step-by-step instruction and cueing for safe hand placement and appropriate weight shifting to progress mobility. Excellent progress made for bed mobility, however patient does utilize elevated HOB and bed railings. SpO2 >90% on 3 L/min O2 NC during session, however patient fatigued quickly and required rest breaks. Continue to recommend patient return to [facility] with 24/7 care/assistance and HHPT. Current Level of Function Impacting Discharge (mobility/balance): supervision for rolling and supine>sit; SBA for sit>supine; max A for scooting towards EOB and laterally on EOB; max to total A x 1 for sit>stand transfer (unable to achieve full standing); sitting balance good-fair    Other factors to consider for discharge: Does not use O2 at baseline; Lives at [facility]; Will require 24/7 care         PLAN :  Patient continues to benefit from skilled intervention to address the above impairments. Continue treatment per established plan of care. to address goals. Recommendation for discharge: (in order for the patient to meet his/her long term goals)  Physical therapy at least 2 days/week in the home AND ensure assist and/or supervision for safety with all functional mobility and ADLs     This discharge recommendation:  Has been made in collaboration with the attending provider and/or case management    Equipment recommendations for successful discharge (if) home: none       SUBJECTIVE:   Patient stated These socks slip.     OBJECTIVE DATA SUMMARY:   Critical Behavior:  Neurologic State: Alert, Confused  Orientation Level: Oriented to person, Oriented to place  Cognition: Decreased attention/concentration, Decreased command following  Safety/Judgement: Decreased insight into deficits, Decreased awareness of need for assistance, Fall prevention  Functional Mobility Training:  Bed Mobility:  Rolling: Supervision  Supine to Sit: Supervision  Sit to Supine: Stand-by assistance  Scooting: Maximum assistance(towards EOB and laterally)        Transfers:  Sit to Stand: Maximum assistance;Assist x1(unable to achieve standing with repeated trials)           Bed to Chair: Total assistance(Unable to complete)                    Balance:  Sitting: Impaired  Sitting - Static: Good (unsupported)  Sitting - Dynamic: Fair (occasional)  Standing: Impaired  Standing - Static: Poor(Did not achieve standing)  Standing - Dynamic : Not tested  Ambulation/Gait Training:          Pain Rating:  No pain complaints    Activity Tolerance:   Poor, desaturates with exertion and requires oxygen, requires frequent rest breaks and observed SOB with activity  Please refer to the flowsheet for vital signs taken during this treatment.     After treatment patient left in no apparent distress:   Supine in bed and Call bell within reach    COMMUNICATION/COLLABORATION:   The patients plan of care was discussed with: Physical Therapist and Registered Nurse    Magali Saab, PT, DPT   Time Calculation: 24 mins

## 2019-10-02 NOTE — PROGRESS NOTES
Bedside shift change report given to Chayo (oncoming nurse) by Edgar Alston (offgoing nurse). Report included the following information SBAR, Kardex and MAR.

## 2019-10-02 NOTE — PROGRESS NOTES
PROGRESS NOTE    NAME:  Sania Joaquin   :   1935   MRN:   [id number]     Date/Time:  10/2/2019 7:06 AM  Subjective:   History:  Chart reviewed and patient seen and examined and D/W her nurse this AM and all events noted. She was admitted with Pneumonia and a recurrent UTI with Enterococcus. She still has a nonproductive cough but is less SOB w/o other cardiac or respiratory c/o. She has no abdominal pain or other GI/ c/o. She is very weak w/o other neurologic c/o, but sat in chair for 2 hours yesterday. She has no other c/o on complete ROS.       Medications reviewed:  Current Facility-Administered Medications   Medication Dose Route Frequency    temazepam (RESTORIL) capsule 15 mg  15 mg Oral QHS    chlorthalidone (HYGROTEN) tablet 25 mg  25 mg Oral DAILY    lisinopril (PRINIVIL, ZESTRIL) tablet 40 mg  40 mg Oral DAILY    hydrALAZINE (APRESOLINE) tablet 100 mg  100 mg Oral TID    vancomycin (VANCOCIN) 1,000 mg in 0.9% sodium chloride (MBP/ADV) 250 mL  1,000 mg IntraVENous Q36H    influenza vaccine 2019- (6 mos+)(PF) (FLUARIX/FLULAVAL/FLUZONE QUAD) injection 0.5 mL  0.5 mL IntraMUSCular PRIOR TO DISCHARGE    albuterol-ipratropium (DUO-NEB) 2.5 MG-0.5 MG/3 ML  3 mL Nebulization Q4H PRN    sodium chloride (NS) flush 5-40 mL  5-40 mL IntraVENous Q8H    sodium chloride (NS) flush 5-40 mL  5-40 mL IntraVENous PRN    acetaminophen (TYLENOL) tablet 650 mg  650 mg Oral Q4H PRN    ondansetron (ZOFRAN) injection 4 mg  4 mg IntraVENous Q4H PRN    heparin (porcine) injection 5,000 Units  5,000 Units SubCUTAneous Q12H    azithromycin (ZITHROMAX) 500 mg in 0.9% sodium chloride (MBP/ADV) 250 mL  500 mg IntraVENous Q24H    cefTRIAXone (ROCEPHIN) 1 g in 0.9% sodium chloride (MBP/ADV) 50 mL  1 g IntraVENous Q24H    aspirin chewable tablet 81 mg  81 mg Oral DAILY    dilTIAZem CD (CARDIZEM CD) capsule 300 mg  300 mg Oral DAILY    gabapentin (NEURONTIN) capsule 400 mg  400 mg Oral QID    metoprolol tartrate (LOPRESSOR) tablet 50 mg  50 mg Oral BID    PARoxetine (PAXIL) tablet 20 mg  20 mg Oral DAILY    pravastatin (PRAVACHOL) tablet 40 mg  40 mg Oral QHS        Objective:   Vitals:  Visit Vitals  /74 (BP 1 Location: Left arm, BP Patient Position: At rest)   Pulse 70   Temp 99.2 °F (37.3 °C)   Resp 20   Ht 5' 3\" (1.6 m)   Wt 132 lb 4.4 oz (60 kg)   SpO2 96%   BMI 23.43 kg/m²    O2 Flow Rate (L/min): 3 l/min O2 Device: Nasal cannula Temp (24hrs), Av.9 °F (37.2 °C), Min:98.3 °F (36.8 °C), Max:99.3 °F (37.4 °C)      Last 24hr Input/Output:  No intake or output data in the 24 hours ending 10/02/19 0706     PHYSICAL EXAM:  General:     Alert, cooperative, no distress, appears stated age. Head:    Normocephalic, without obvious abnormality, atraumatic. Eyes:    Conjunctivae/corneas clear. PERRLA  Nose:   Nares normal. No drainage or sinus tenderness. Throat:     Lips, mucosa, and tongue normal.  No Thrush  Neck:   Supple, symmetrical,  no adenopathy, thyroid: non tender     no carotid bruit and no JVD. Back:     Symmetric,  No CVA tenderness. Lungs:    Decreased BS bilaterally with scattered rhonchi. No Wheezing. No rales. Heart:    Regular rate and rhythm,  no murmur, rub or gallop. Abdomen:    Soft, non-tender. Not distended. Bowel sounds normal. No masses  Extremities:  Extremities normal, atraumatic, No cyanosis. No edema. No clubbing  Lymph nodes:  Cervical, supraclavicular normal.  Neurologic:  General decreased strength, Alert and oriented X 3.    Skin:                 No rash      Lab Data Reviewed:    Recent Results (from the past 24 hour(s))   CBC WITH AUTOMATED DIFF    Collection Time: 10/02/19  3:27 AM   Result Value Ref Range    WBC 9.5 3.6 - 11.0 K/uL    RBC 3.17 (L) 3.80 - 5.20 M/uL    HGB 9.4 (L) 11.5 - 16.0 g/dL    HCT 29.8 (L) 35.0 - 47.0 %    MCV 94.0 80.0 - 99.0 FL    MCH 29.7 26.0 - 34.0 PG    MCHC 31.5 30.0 - 36.5 g/dL    RDW 14.6 (H) 11.5 - 14.5 %    PLATELET 677 369 - 400 K/uL    MPV 10.4 8.9 - 12.9 FL    NRBC 0.0 0  WBC    ABSOLUTE NRBC 0.00 0.00 - 0.01 K/uL    NEUTROPHILS 81 (H) 32 - 75 %    LYMPHOCYTES 6 (L) 12 - 49 %    MONOCYTES 10 5 - 13 %    EOSINOPHILS 2 0 - 7 %    BASOPHILS 0 0 - 1 %    IMMATURE GRANULOCYTES 1 (H) 0.0 - 0.5 %    ABS. NEUTROPHILS 7.6 1.8 - 8.0 K/UL    ABS. LYMPHOCYTES 0.6 (L) 0.8 - 3.5 K/UL    ABS. MONOCYTES 1.0 0.0 - 1.0 K/UL    ABS. EOSINOPHILS 0.2 0.0 - 0.4 K/UL    ABS. BASOPHILS 0.0 0.0 - 0.1 K/UL    ABS. IMM. GRANS. 0.1 (H) 0.00 - 0.04 K/UL    DF AUTOMATED     METABOLIC PANEL, BASIC    Collection Time: 10/02/19  3:27 AM   Result Value Ref Range    Sodium 142 136 - 145 mmol/L    Potassium 3.5 3.5 - 5.1 mmol/L    Chloride 112 (H) 97 - 108 mmol/L    CO2 22 21 - 32 mmol/L    Anion gap 8 5 - 15 mmol/L    Glucose 102 (H) 65 - 100 mg/dL    BUN 25 (H) 6 - 20 MG/DL    Creatinine 1.52 (H) 0.55 - 1.02 MG/DL    BUN/Creatinine ratio 16 12 - 20      GFR est AA 40 (L) >60 ml/min/1.73m2    GFR est non-AA 33 (L) >60 ml/min/1.73m2    Calcium 9.6 8.5 - 10.1 MG/DL         Assessment/Plan:     Principal Problem:    CAP (community acquired pneumonia) (9/26/2019)    Active Problems:    COPD (chronic obstructive pulmonary disease) ([facility] (8/14/2010)      Essential hypertension (7/20/2016)      PAF (paroxysmal atrial fibrillation) ([facility]) (3/30/2018)      Moderate protein-calorie malnutrition (HCC) (3/30/2018)      Stage 3 chronic kidney disease ([facility] (11/5/2018)      Recurrent UTI (6/14/2019)      Anemia (8/1/2019)       ___________________________________________________  PLAN:    1. CAP (community acquired pneumonia) (9/26/2019): Continue antibiotics with rocephin but replace zithromax and Vanc with Levaquin  2. UTI: enterococcal. On Vanc., S to Levaquin and some Klebsiella in urine also sens to Levaquin  3. CKD III: follow  4. COPD: Stable. Continue prn bronchodilator therapy. 5. P AFib: Seems to be sinus at this itme.    6. Code: DNR.   7.  Hypertension- will continue all her outpatient meds.    8.  Insomnia- will continue her outpatient temazepam.             ___________________________________________________    Attending Physician: Brisa Herman MD

## 2019-10-02 NOTE — PROGRESS NOTES
Problem: Falls - Risk of  Goal: *Absence of Falls  Description  Document [place] Fall Risk and appropriate interventions in the flowsheet. Outcome: Progressing Towards Goal  Note:   Fall Risk Interventions:  Mobility Interventions: Mechanical lift, PT Consult for mobility concerns, PT Consult for assist device competence    Mentation Interventions: Adequate sleep, hydration, pain control, Bed/chair exit alarm    Medication Interventions: Bed/chair exit alarm, Assess postural VS orthostatic hypotension    Elimination Interventions: Call light in reach              Problem: Pressure Injury - Risk of  Goal: *Prevention of pressure injury  Description  Document Rashid Scale and appropriate interventions in the flowsheet.   Outcome: Progressing Towards Goal  Note:   Pressure Injury Interventions:  Sensory Interventions: Minimize linen layers, Keep linens dry and wrinkle-free    Moisture Interventions: Absorbent underpads, Apply protective barrier, creams and emollients, Internal/External urinary devices    Activity Interventions: Increase time out of bed    Mobility Interventions: HOB 30 degrees or less    Nutrition Interventions: Document food/fluid/supplement intake    Friction and Shear Interventions: Apply protective barrier, creams and emollients                Problem: Breathing Pattern - Ineffective  Goal: *Absence of hypoxia  Outcome: Progressing Towards Goal     Problem: Patient Education: Go to Patient Education Activity  Goal: Patient/Family Education  Outcome: Progressing Towards Goal

## 2019-10-02 NOTE — PROGRESS NOTES
Oncology End of Shift Note      Bedside shift change report given to Quincy Coughlin RN (incoming nurse) by Mannie Barton (outgoing nurse) on [facility]. Report included the following information SBAR, Kardex, Intake/Output and MAR. Shift Summary: Pt remained stable throughout shift. She was turned q2h. Hourly rounding completed. Scheduled meds given, no PRN meds given. BP remained high most of shift, provider made aware. Issues for Physician to Address:       Patient on Cardiac Monitoring?     [] Yes  [x] No    Rhythm:            Mannie Barton

## 2019-10-03 ENCOUNTER — APPOINTMENT (OUTPATIENT)
Dept: GENERAL RADIOLOGY | Age: 84
DRG: 194 | End: 2019-10-03
Attending: INTERNAL MEDICINE
Payer: MEDICARE

## 2019-10-03 LAB
ANION GAP SERPL CALC-SCNC: 8 MMOL/L (ref 5–15)
BASOPHILS # BLD: 0 K/UL (ref 0–0.1)
BASOPHILS NFR BLD: 0 % (ref 0–1)
BUN SERPL-MCNC: 23 MG/DL (ref 6–20)
BUN/CREAT SERPL: 16 (ref 12–20)
CALCIUM SERPL-MCNC: 9.9 MG/DL (ref 8.5–10.1)
CHLORIDE SERPL-SCNC: 113 MMOL/L (ref 97–108)
CO2 SERPL-SCNC: 22 MMOL/L (ref 21–32)
CREAT SERPL-MCNC: 1.47 MG/DL (ref 0.55–1.02)
DIFFERENTIAL METHOD BLD: ABNORMAL
EOSINOPHIL # BLD: 0.2 K/UL (ref 0–0.4)
EOSINOPHIL NFR BLD: 2 % (ref 0–7)
ERYTHROCYTE [DISTWIDTH] IN BLOOD BY AUTOMATED COUNT: 14.8 % (ref 11.5–14.5)
GLUCOSE SERPL-MCNC: 96 MG/DL (ref 65–100)
HCT VFR BLD AUTO: 29.7 % (ref 35–47)
HGB BLD-MCNC: 9.4 G/DL (ref 11.5–16)
IMM GRANULOCYTES # BLD AUTO: 0.1 K/UL (ref 0–0.04)
IMM GRANULOCYTES NFR BLD AUTO: 1 % (ref 0–0.5)
LYMPHOCYTES # BLD: 0.5 K/UL (ref 0.8–3.5)
LYMPHOCYTES NFR BLD: 6 % (ref 12–49)
MCH RBC QN AUTO: 30 PG (ref 26–34)
MCHC RBC AUTO-ENTMCNC: 31.6 G/DL (ref 30–36.5)
MCV RBC AUTO: 94.9 FL (ref 80–99)
MONOCYTES # BLD: 1.1 K/UL (ref 0–1)
MONOCYTES NFR BLD: 13 % (ref 5–13)
NEUTS SEG # BLD: 6.2 K/UL (ref 1.8–8)
NEUTS SEG NFR BLD: 78 % (ref 32–75)
NRBC # BLD: 0 K/UL (ref 0–0.01)
NRBC BLD-RTO: 0 PER 100 WBC
PLATELET # BLD AUTO: 181 K/UL (ref 150–400)
PMV BLD AUTO: 10.8 FL (ref 8.9–12.9)
POTASSIUM SERPL-SCNC: 3.4 MMOL/L (ref 3.5–5.1)
RBC # BLD AUTO: 3.13 M/UL (ref 3.8–5.2)
RBC MORPH BLD: ABNORMAL
SODIUM SERPL-SCNC: 143 MMOL/L (ref 136–145)
WBC # BLD AUTO: 8.1 K/UL (ref 3.6–11)

## 2019-10-03 PROCEDURE — 74011250636 HC RX REV CODE- 250/636: Performed by: INTERNAL MEDICINE

## 2019-10-03 PROCEDURE — 36415 COLL VENOUS BLD VENIPUNCTURE: CPT

## 2019-10-03 PROCEDURE — 74011250637 HC RX REV CODE- 250/637: Performed by: FAMILY MEDICINE

## 2019-10-03 PROCEDURE — 94760 N-INVAS EAR/PLS OXIMETRY 1: CPT

## 2019-10-03 PROCEDURE — 97530 THERAPEUTIC ACTIVITIES: CPT

## 2019-10-03 PROCEDURE — 74011250637 HC RX REV CODE- 250/637: Performed by: HOSPITALIST

## 2019-10-03 PROCEDURE — 85025 COMPLETE CBC W/AUTO DIFF WBC: CPT

## 2019-10-03 PROCEDURE — 65270000015 HC RM PRIVATE ONCOLOGY

## 2019-10-03 PROCEDURE — 74011250636 HC RX REV CODE- 250/636: Performed by: HOSPITALIST

## 2019-10-03 PROCEDURE — 80048 BASIC METABOLIC PNL TOTAL CA: CPT

## 2019-10-03 PROCEDURE — 77010033678 HC OXYGEN DAILY

## 2019-10-03 PROCEDURE — 74011000258 HC RX REV CODE- 258: Performed by: HOSPITALIST

## 2019-10-03 PROCEDURE — 71045 X-RAY EXAM CHEST 1 VIEW: CPT

## 2019-10-03 RX ORDER — LEVOFLOXACIN 5 MG/ML
750 INJECTION, SOLUTION INTRAVENOUS
Status: DISPENSED | OUTPATIENT
Start: 2019-10-03 | End: 2019-10-07

## 2019-10-03 RX ADMIN — GABAPENTIN 400 MG: 100 CAPSULE ORAL at 13:02

## 2019-10-03 RX ADMIN — Medication 10 ML: at 03:58

## 2019-10-03 RX ADMIN — ASPIRIN 81 MG CHEWABLE TABLET 81 MG: 81 TABLET CHEWABLE at 08:09

## 2019-10-03 RX ADMIN — GABAPENTIN 400 MG: 100 CAPSULE ORAL at 08:10

## 2019-10-03 RX ADMIN — TEMAZEPAM 15 MG: 15 CAPSULE ORAL at 22:00

## 2019-10-03 RX ADMIN — Medication 10 ML: at 14:25

## 2019-10-03 RX ADMIN — HEPARIN SODIUM 5000 UNITS: 5000 INJECTION INTRAVENOUS; SUBCUTANEOUS at 20:36

## 2019-10-03 RX ADMIN — HYDRALAZINE HYDROCHLORIDE 100 MG: 50 TABLET ORAL at 17:45

## 2019-10-03 RX ADMIN — LISINOPRIL 40 MG: 40 TABLET ORAL at 08:10

## 2019-10-03 RX ADMIN — CHLORTHALIDONE 25 MG: 25 TABLET ORAL at 09:11

## 2019-10-03 RX ADMIN — GABAPENTIN 400 MG: 100 CAPSULE ORAL at 21:58

## 2019-10-03 RX ADMIN — HEPARIN SODIUM 5000 UNITS: 5000 INJECTION INTRAVENOUS; SUBCUTANEOUS at 08:11

## 2019-10-03 RX ADMIN — HYDRALAZINE HYDROCHLORIDE 100 MG: 50 TABLET ORAL at 08:10

## 2019-10-03 RX ADMIN — Medication 10 ML: at 08:18

## 2019-10-03 RX ADMIN — HYDRALAZINE HYDROCHLORIDE 100 MG: 50 TABLET ORAL at 21:59

## 2019-10-03 RX ADMIN — METOPROLOL TARTRATE 50 MG: 50 TABLET, FILM COATED ORAL at 17:45

## 2019-10-03 RX ADMIN — Medication 10 ML: at 22:00

## 2019-10-03 RX ADMIN — GABAPENTIN 400 MG: 100 CAPSULE ORAL at 17:45

## 2019-10-03 RX ADMIN — DILTIAZEM HYDROCHLORIDE 300 MG: 180 CAPSULE, COATED, EXTENDED RELEASE ORAL at 08:09

## 2019-10-03 RX ADMIN — METOPROLOL TARTRATE 50 MG: 50 TABLET, FILM COATED ORAL at 08:10

## 2019-10-03 RX ADMIN — Medication 10 ML: at 09:57

## 2019-10-03 RX ADMIN — PAROXETINE HYDROCHLORIDE 20 MG: 20 TABLET, FILM COATED ORAL at 08:10

## 2019-10-03 RX ADMIN — LEVOFLOXACIN 750 MG: 5 INJECTION, SOLUTION INTRAVENOUS at 08:12

## 2019-10-03 RX ADMIN — CEFTRIAXONE 1 G: 1 INJECTION, POWDER, FOR SOLUTION INTRAMUSCULAR; INTRAVENOUS at 21:57

## 2019-10-03 RX ADMIN — PRAVASTATIN SODIUM 40 MG: 40 TABLET ORAL at 21:59

## 2019-10-03 NOTE — PROGRESS NOTES
MARC Plan:    -d/c back to [facility]. -F/U appointments      Pt is a resident at [facility]. Pt voiced she won't need any HH at time of discharge. Acknowledged understanding. Patient's son to transport at time of d/c.     Reena Rodgers, MSW  [phone]

## 2019-10-03 NOTE — PROGRESS NOTES
Problem: Falls - Risk of  Goal: *Absence of Falls  Description  Document [facility] Fall Risk and appropriate interventions in the flowsheet.   Outcome: Progressing Towards Goal  Note:   Fall Risk Interventions:  Mobility Interventions: Communicate number of staff needed for ambulation/transfer    Mentation Interventions: Adequate sleep, hydration, pain control    Medication Interventions: Bed/chair exit alarm    Elimination Interventions: Bed/chair exit alarm

## 2019-10-03 NOTE — PROGRESS NOTES
Problem: Mobility Impaired (Adult and Pediatric)  Goal: *Acute Goals and Plan of Care (Insert Text)  Description  FUNCTIONAL STATUS PRIOR TO ADMISSION: At baseline patient is wheelchair bound and dependent on others to roll her in wheelchair due to R shoulder arthritis/pain. 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 HOME SUPPORT PRIOR TO ADMISSION: The patient lived [facility] and was assisted by staff or private duty aides for all ADLs, transfers and wheelchair mobility. Physical Therapy Goals  Initiated 10/1/2019  1. Patient will move from supine to sit and sit to supine , scoot up and down and roll side to side in bed with minimal assist/contact guard assist within 7 day(s). 2.  Patient will transfer from bed to chair and chair to bed with moderate assistance x 1 using the least restrictive device within 7 day(s). 3.  Patient will perform sit to stand with moderate assistance x 1 within 7 day(s). Outcome: Not Progressing Towards Goal     PHYSICAL THERAPY TREATMENT  Patient: Zachary Buckley (29 y.o. female)  Date: 10/3/2019  Diagnosis: CAP (community acquired pneumonia) [J18.9] CAP (community acquired pneumonia)       Precautions: DNR, Fall, Skin, Bed Alarm  Chart, physical therapy assessment, plan of care and goals were reviewed. ASSESSMENT  Patient continues with skilled PT services and is not progressing towards goals. ... Pt may be presenting at baseline for functional mobility efforts as she is unable to recall the last time she could stand or transfer without max. Assistance. Pt also does not report use of mechanical lift at [place] with her transfers. .. Continuation of skilled services here with change in focus to sliding board transfers as opposed to sit<>stand efforts at this time given current status and unknown PLOF. Recommend lift team for back to bed efforts at this time.     Current Level of Function Impacting Discharge (mobility/balance): max./total assist x 2 for safety     Other factors to consider for discharge: pt has supportive daughter per pt         PLAN :  Patient continues to benefit from skilled intervention to address the above impairments. Continue treatment per established plan of care. to address goals. Recommendation for discharge: (in order for the patient to meet his/her long term goals)  Physical therapy at least 2 days/week in the home (even if pt's transfers/bed mob. Are at baseline, she is now on O2 with noted SOB, decreased activity tolerance indicative of need for PT intervention and follow-up once discharged) AND ensure assist for safety with all transfers/bed mob. efforts     This discharge recommendation:  Has been made in collaboration with the attending provider and/or case management    Equipment recommendations for successful discharge (if) home: to be determined by rehab facility       SUBJECTIVE:   Patient stated I just can't remember.     OBJECTIVE DATA SUMMARY:   Critical Behavior:  Neurologic State: Alert(periodic confusion)  Orientation Level: Oriented to person, Oriented to place, Oriented to situation, Oriented to time  Cognition: Follows commands  Safety/Judgement: Decreased insight into deficits, Decreased awareness of need for assistance, Fall prevention  Functional Mobility Training:  Bed Mobility:     Supine to Sit: Stand-by assistance; Additional time;Bed Modified(HOB raised)     Scooting: Moderate assistance;Maximum assistance; Additional time     Interventions: Verbal cues; Tactile cues; Weight shifting training/Pressure relief  Transfers:  Sit to Stand: Maximum assistance;Assist x2  Stand to Sit: Total assistance;Assist x2        Bed to Chair: Maximum assistance;Assist x2 (3/4 stand-pivot) ; max. Cuing for safe hand/foot placement              Interventions: Tactile cues; Verbal cues; Safety awareness training;Weight shifting training/Pressure relief     Balance:  Sitting: Impaired  Sitting - Static: Good (unsupported)  Sitting - Dynamic: Fair (occasional)  Standing: Impaired; With support  Standing - Static: Constant support;Poor(unable to reach full stand )  Standing - Dynamic : Not tested    Pain Ratin/10    Activity Tolerance:   Fair, requires frequent rest breaks and observed SOB with activity  Please refer to the flowsheet for vital signs taken during this treatment.     After treatment patient left in no apparent distress:   Sitting in chair, Call bell within reach and Bed / chair alarm activated    COMMUNICATION/COLLABORATION:   The patients plan of care was discussed with: Registered Nurse and Certified Nursing Assistant/Patient Care Technician    Shelley Kurtz, PT, DPT   Time Calculation: 21 mins

## 2019-10-03 NOTE — PROGRESS NOTES
PROGRESS NOTE    NAME:  Brandyn Burt   :   1935   MRN:   [id number]     Date/Time:  10/3/2019 7:07 AM  Subjective:   History:  Chart reviewed and patient seen and examined and D/W her nurse this AM and all events noted. She was admitted with Pneumonia and a recurrent UTI with Enterococcus. She still has a nonproductive chronic cough but is less SOB w/o other cardiac or respiratory c/o. She has no abdominal pain or other GI/ c/o. She is very weak w/o other neurologic c/o, but sat in chair for 3 times yesterday. She has no other c/o on complete ROS.       Medications reviewed:  Current Facility-Administered Medications   Medication Dose Route Frequency    levoFLOXacin (LEVAQUIN) 250 mg in D5W IVPB  250 mg IntraVENous Q24H    temazepam (RESTORIL) capsule 15 mg  15 mg Oral QHS    chlorthalidone (HYGROTEN) tablet 25 mg  25 mg Oral DAILY    lisinopril (PRINIVIL, ZESTRIL) tablet 40 mg  40 mg Oral DAILY    hydrALAZINE (APRESOLINE) tablet 100 mg  100 mg Oral TID    influenza vaccine - (6 mos+)(PF) (FLUARIX/FLULAVAL/FLUZONE QUAD) injection 0.5 mL  0.5 mL IntraMUSCular PRIOR TO DISCHARGE    albuterol-ipratropium (DUO-NEB) 2.5 MG-0.5 MG/3 ML  3 mL Nebulization Q4H PRN    sodium chloride (NS) flush 5-40 mL  5-40 mL IntraVENous Q8H    sodium chloride (NS) flush 5-40 mL  5-40 mL IntraVENous PRN    acetaminophen (TYLENOL) tablet 650 mg  650 mg Oral Q4H PRN    ondansetron (ZOFRAN) injection 4 mg  4 mg IntraVENous Q4H PRN    heparin (porcine) injection 5,000 Units  5,000 Units SubCUTAneous Q12H    cefTRIAXone (ROCEPHIN) 1 g in 0.9% sodium chloride (MBP/ADV) 50 mL  1 g IntraVENous Q24H    aspirin chewable tablet 81 mg  81 mg Oral DAILY    dilTIAZem CD (CARDIZEM CD) capsule 300 mg  300 mg Oral DAILY    gabapentin (NEURONTIN) capsule 400 mg  400 mg Oral QID    metoprolol tartrate (LOPRESSOR) tablet 50 mg  50 mg Oral BID    PARoxetine (PAXIL) tablet 20 mg  20 mg Oral DAILY    pravastatin (PRAVACHOL) tablet 40 mg  40 mg Oral QHS        Objective:   Vitals:  Visit Vitals  BP (!) 137/37   Pulse (!) 56   Temp 98.6 °F (37 °C)   Resp 14   Ht 5' 3\" (1.6 m)   Wt 132 lb 4.4 oz (60 kg)   SpO2 92%   BMI 23.43 kg/m²    O2 Flow Rate (L/min): 3 l/min O2 Device: Nasal cannula Temp (24hrs), Av.7 °F (37.1 °C), Min:98.3 °F (36.8 °C), Max:99 °F (37.2 °C)      Last 24hr Input/Output:    Intake/Output Summary (Last 24 hours) at 10/3/2019 0707  Last data filed at 10/2/2019 1922  Gross per 24 hour   Intake 850 ml   Output 200 ml   Net 650 ml        PHYSICAL EXAM:  General:     Alert, cooperative, no distress, appears stated age. Head:    Normocephalic, without obvious abnormality, atraumatic. Eyes:    Conjunctivae/corneas clear. PERRLA  Nose:   Nares normal. No drainage or sinus tenderness. Throat:     Lips, mucosa, and tongue normal.  No Thrush  Neck:   Supple, symmetrical,  no adenopathy, thyroid: non tender     no carotid bruit and no JVD. Back:     Symmetric,  No CVA tenderness. Lungs:    Decreased BS bilaterally with scattered rhonchi. No Wheezing. No rales. Heart:    Regular rate and rhythm,  no murmur, rub or gallop. Abdomen:    Soft, non-tender. Not distended. Bowel sounds normal. No masses  Extremities:  Extremities normal, atraumatic, No cyanosis. No edema. No clubbing  Lymph nodes:  Cervical, supraclavicular normal.  Neurologic:  General decreased strength, Alert and oriented X 3.    Skin:                 No rash      Lab Data Reviewed:    Recent Results (from the past 24 hour(s))   METABOLIC PANEL, BASIC    Collection Time: 10/03/19  3:58 AM   Result Value Ref Range    Sodium 143 136 - 145 mmol/L    Potassium 3.4 (L) 3.5 - 5.1 mmol/L    Chloride 113 (H) 97 - 108 mmol/L    CO2 22 21 - 32 mmol/L    Anion gap 8 5 - 15 mmol/L    Glucose 96 65 - 100 mg/dL    BUN 23 (H) 6 - 20 MG/DL    Creatinine 1.47 (H) 0.55 - 1.02 MG/DL    BUN/Creatinine ratio 16 12 - 20      GFR est AA 41 (L) >60 ml/min/1.73m2 GFR est non-AA 34 (L) >60 ml/min/1.73m2    Calcium 9.9 8.5 - 10.1 MG/DL   CBC WITH AUTOMATED DIFF    Collection Time: 10/03/19  3:58 AM   Result Value Ref Range    WBC 8.1 3.6 - 11.0 K/uL    RBC 3.13 (L) 3.80 - 5.20 M/uL    HGB 9.4 (L) 11.5 - 16.0 g/dL    HCT 29.7 (L) 35.0 - 47.0 %    MCV 94.9 80.0 - 99.0 FL    MCH 30.0 26.0 - 34.0 PG    MCHC 31.6 30.0 - 36.5 g/dL    RDW 14.8 (H) 11.5 - 14.5 %    PLATELET 892 127 - 910 K/uL    MPV 10.8 8.9 - 12.9 FL    NRBC 0.0 0  WBC    ABSOLUTE NRBC 0.00 0.00 - 0.01 K/uL    NEUTROPHILS 78 (H) 32 - 75 %    LYMPHOCYTES 6 (L) 12 - 49 %    MONOCYTES 13 5 - 13 %    EOSINOPHILS 2 0 - 7 %    BASOPHILS 0 0 - 1 %    IMMATURE GRANULOCYTES 1 (H) 0.0 - 0.5 %    ABS. NEUTROPHILS 6.2 1.8 - 8.0 K/UL    ABS. LYMPHOCYTES 0.5 (L) 0.8 - 3.5 K/UL    ABS. MONOCYTES 1.1 (H) 0.0 - 1.0 K/UL    ABS. EOSINOPHILS 0.2 0.0 - 0.4 K/UL    ABS. BASOPHILS 0.0 0.0 - 0.1 K/UL    ABS. IMM. GRANS. 0.1 (H) 0.00 - 0.04 K/UL    DF AUTOMATED      RBC COMMENTS ANISOCYTOSIS  1+             Assessment/Plan:     Principal Problem:    CAP (community acquired pneumonia) (9/26/2019)    Active Problems:    COPD (chronic obstructive pulmonary disease) ([facility] (8/14/2010)      Essential hypertension (7/20/2016)      PAF (paroxysmal atrial fibrillation) ([facility] (3/30/2018)      Moderate protein-calorie malnutrition ([facility] (3/30/2018)      Stage 3 chronic kidney disease ([facility] (11/5/2018)      Recurrent UTI (6/14/2019)      Anemia (8/1/2019)       ___________________________________________________  PLAN:    1. CAP (community acquired pneumonia) (9/26/2019): Continue antibiotics with rocephin and Levaquin  2. UTI: enterococcal. Changed Vanc. to Levaquin as Sens. and some Klebsiella in urine also sens to Levaquin  3. CKD III: follow, 23/1.47, improved from yesterday  4. COPD: Stable. Continue prn bronchodilator therapy. 5. P AFib: Seems to be sinus at this itme.    6. Code: DNR.   7.  Hypertension- will continue all her outpatient meds.    8.  Insomnia- will continue her outpatient temazepam.   9. Repeat CXR today            ___________________________________________________    Attending Physician: Gus Mark MD

## 2019-10-03 NOTE — PROGRESS NOTES
Initial Nutrition Assessment:    INTERVENTIONS/RECOMMENDATIONS:   · Continue current diet  · Ensure BID     ASSESSMENT:   Chart reviewed, medically noted for CAP, COPD, CKD3 and PMH shown below. Pt reports fair appetite and PO intake. She has received ensure on past admissions. Will add to diet order BID. Weight history shows no significant weight loss PTA. No obvious signs of muscle or fat wasting. Past Medical History:   Diagnosis Date    Arrhythmia     A-fib    Arthritis     shoulder/right    Cancer ([facility] [address] 2015    left lung    Chronic kidney disease     Stent in Right Kidney, Stage III    Chronic obstructive pulmonary disease ([facility] [address]     Hypertension     Ill-defined condition     Ex Lap with Sheila Clore patch of a perforated pyloric channel ulcer 7/19/16    Other ill-defined conditions(799.89)     lipids    Other ill-defined conditions(799.89)     blood transfusion history    PVD (peripheral vascular disease) ([facility] [address]        Diet Order: Cardiac  % Eaten:  No data found.   Pertinent Medications: [x]Reviewed:   Pertinent Labs: [x]Reviewed:   Food Allergies: [x]NKFA  []Other   Last BM: 10/3  Edema:   1+     []RUE   []LUE   [x]RLE   [x]LLE      Pressure Injury:  n/a    [] Stage I   [] Stage II   [] Stage III   [] Stage IV      Wt Readings from Last 30 Encounters:   09/26/19 60 kg (132 lb 4.4 oz)   07/10/19 51.7 kg (113 lb 15.7 oz)   07/09/19 52.2 kg (115 lb 1.3 oz)   06/04/19 52.7 kg (116 lb 3.2 oz)   05/03/19 54.9 kg (121 lb)   04/08/19 52.2 kg (115 lb)   02/01/19 51.9 kg (114 lb 6.4 oz)   01/04/19 52.1 kg (114 lb 12.8 oz)   12/07/18 55.3 kg (122 lb)   11/12/18 55.3 kg (122 lb)   11/06/18 55.3 kg (122 lb)   10/24/18 52.2 kg (115 lb)   04/10/18 42.1 kg (92 lb 14.2 oz)   10/28/16 43.1 kg (95 lb)   10/13/16 43.1 kg (95 lb)   08/29/16 45.8 kg (101 lb)   07/19/16 45.8 kg (101 lb)   02/14/15 44.6 kg (98 lb 5.2 oz)   08/10/10 53.6 kg (118 lb 3.2 oz)   08/05/10 52.3 kg (115 lb 6 oz)       Anthropometrics:   Height: 5' 3\" (160 cm) Weight: 60 kg (132 lb 4.4 oz)   IBW (%IBW):   ( ) UBW (%UBW):   (  %)   Last Weight Metrics:  Weight Loss Metrics 9/26/2019 8/2/2019 7/10/2019 7/9/2019 6/14/2019 6/4/2019 6/4/2019   Today's Wt 132 lb 4.4 oz - 113 lb 15.7 oz 115 lb 1.3 oz - 116 lb 3.2 oz -   BMI 23.43 kg/m2 21.19 kg/m2 21.19 kg/m2 21.39 kg/m2 21.25 kg/m2 21.25 kg/m2 22.13 kg/m2       BMI: Body mass index is 23.43 kg/m². This BMI is indicative of:   []Underweight    [x]Normal    []Overweight    [] Obesity   [] Extreme Obesity (BMI>40)     Estimated Nutrition Needs (Based on):   1325 Kcals/day(BMR: 1025 x 1.3) , 60 g(1 g/kg) Protein  Carbohydrate: At Least 130 g/day  Fluids: 1325 mL/day (1ml/kcal) or per primary team    NUTRITION DIAGNOSES:   Problem:  No nutritional diagnosis at this time      Etiology: related to       Signs/Symptoms: as evidenced by        NUTRITION INTERVENTIONS:  Meals/Snacks: General/healthful diet                  GOAL:   consume >50% of meals in 5-7 days    LEARNING NEEDS (Diet, Food/Nutrient-Drug Interaction):    [x] None Identified   [] Identified and Education Provided/Documented   [] Identified and Pt declined/was not appropriate     [place], [place], [place] Ethnic Dietary Needs:    [x] None Identified   [] Identified and Addressed     [x] Interdisciplinary Care Plan Reviewed/Documented    [x] Discharge Planning:  General healthy diet      MONITORING /EVALUATION:      Food/Nutrient Intake Outcomes:  Total energy intake  Physical Signs/Symptoms Outcomes: Weight/weight change, Electrolyte and renal profile, GI    NUTRITION RISK:    [] High              [] Moderate           [x]  Low  []  Minimal/Uncompromised    PT SEEN FOR:    []  MD Consult: []Calorie Count      []Diabetic Diet Education        []Diet Education     []Electrolyte Management     []General Nutrition Management and Supplements     []Management of Tube Feeding     []TPN Recommendations    []  RN Referral:  []MST score >=2     []Enteral/Parenteral Nutrition PTA     []Pregnant: Gestational DM or Multigestation     []Pressure Ulcer/Wound Care needs        []  Low BMI  [x]  LOS Referral       Xavier Shields RDN  Pager [phone]  Weekend Pager [phone]

## 2019-10-03 NOTE — PROGRESS NOTES
Pharmacy Automatic Renal Dosing Protocol - Antimicrobials     Indication for Antimicrobials: UTI/ CAP      Current Regimen of Each Antimicrobial:  Levofloxacin 500 mg q24h (10/2 Day 1 of 5)  CTX 1g IV q24h (Start Date 9/27/19; Day # 5)    Creatinine Clearance (mL/min) or Dialysis: 24 ml/min     Impression/Plan:   · Will adjust Levaquin dose to 750mg IV q48h due to CrCl of 24ml/min      Pharmacy will follow daily and adjust medications as appropriate for renal function and/or serum levels.     Thank you,  Alicia Yi. Michela GONZALES.     Recommended duration of therapy  [email or web address]/[email or web address]. docx     Renal Dosing  [email or web address]/[facility]/Pharmacy/Clinical%20Companion/Renal%[email or web address]. pdf

## 2019-10-03 NOTE — PROGRESS NOTES
Oncology End of Shift Note      Bedside shift change report given to Amy Hensley RN (incoming nurse) by Jamie Spicer (outgoing nurse) on [facility]. Report included the following information SBAR, Kardex, ED Summary, Intake/Output, MAR and Recent Results. Shift Summary:   No change in patient condition throughout shift. No complaints of pain. Patient tolerated diet well. Patient sat in chair for lunch. Max assist from chair to bed. Patient had multiple bowel movements today. Issues for Physician to Address:  none     Patient on Cardiac Monitoring?     [] Yes  [x] No    Rhythm:          Shift Events      Qian Mendoza

## 2019-10-03 NOTE — PROGRESS NOTES
Oncology End of Shift Note      Bedside shift change report given to LOVELY Cota (incoming nurse) by Carrie Villalpando RN (outgoing nurse) on [employer]. Report included the following information SBAR, Kardex and MAR. Shift Summary: Pt slept throughout the shift, more alert, am labs drawn, no complaints of pain    Issues for Physician to Address:       Patient on Cardiac Monitoring?     [] Yes  [x] No    Rhythm:          Shift Events        Carrie Villalpando RN

## 2019-10-04 LAB
ANION GAP SERPL CALC-SCNC: 8 MMOL/L (ref 5–15)
BASOPHILS # BLD: 0 K/UL (ref 0–0.1)
BASOPHILS NFR BLD: 0 % (ref 0–1)
BUN SERPL-MCNC: 22 MG/DL (ref 6–20)
BUN/CREAT SERPL: 14 (ref 12–20)
CALCIUM SERPL-MCNC: 10 MG/DL (ref 8.5–10.1)
CHLORIDE SERPL-SCNC: 111 MMOL/L (ref 97–108)
CO2 SERPL-SCNC: 24 MMOL/L (ref 21–32)
CREAT SERPL-MCNC: 1.58 MG/DL (ref 0.55–1.02)
DIFFERENTIAL METHOD BLD: ABNORMAL
EOSINOPHIL # BLD: 0.2 K/UL (ref 0–0.4)
EOSINOPHIL NFR BLD: 3 % (ref 0–7)
ERYTHROCYTE [DISTWIDTH] IN BLOOD BY AUTOMATED COUNT: 14.9 % (ref 11.5–14.5)
GLUCOSE SERPL-MCNC: 89 MG/DL (ref 65–100)
HCT VFR BLD AUTO: 27.6 % (ref 35–47)
HGB BLD-MCNC: 8.8 G/DL (ref 11.5–16)
IMM GRANULOCYTES # BLD AUTO: 0.1 K/UL (ref 0–0.04)
IMM GRANULOCYTES NFR BLD AUTO: 2 % (ref 0–0.5)
LYMPHOCYTES # BLD: 0.6 K/UL (ref 0.8–3.5)
LYMPHOCYTES NFR BLD: 8 % (ref 12–49)
MCH RBC QN AUTO: 29.9 PG (ref 26–34)
MCHC RBC AUTO-ENTMCNC: 31.9 G/DL (ref 30–36.5)
MCV RBC AUTO: 93.9 FL (ref 80–99)
MONOCYTES # BLD: 0.9 K/UL (ref 0–1)
MONOCYTES NFR BLD: 11 % (ref 5–13)
NEUTS SEG # BLD: 6.4 K/UL (ref 1.8–8)
NEUTS SEG NFR BLD: 77 % (ref 32–75)
NRBC # BLD: 0 K/UL (ref 0–0.01)
NRBC BLD-RTO: 0 PER 100 WBC
PLATELET # BLD AUTO: 229 K/UL (ref 150–400)
PMV BLD AUTO: 11.4 FL (ref 8.9–12.9)
POTASSIUM SERPL-SCNC: 3.3 MMOL/L (ref 3.5–5.1)
RBC # BLD AUTO: 2.94 M/UL (ref 3.8–5.2)
SODIUM SERPL-SCNC: 143 MMOL/L (ref 136–145)
WBC # BLD AUTO: 8.3 K/UL (ref 3.6–11)

## 2019-10-04 PROCEDURE — 94760 N-INVAS EAR/PLS OXIMETRY 1: CPT

## 2019-10-04 PROCEDURE — 74011000258 HC RX REV CODE- 258: Performed by: HOSPITALIST

## 2019-10-04 PROCEDURE — 74011250636 HC RX REV CODE- 250/636: Performed by: HOSPITALIST

## 2019-10-04 PROCEDURE — 36415 COLL VENOUS BLD VENIPUNCTURE: CPT

## 2019-10-04 PROCEDURE — 85025 COMPLETE CBC W/AUTO DIFF WBC: CPT

## 2019-10-04 PROCEDURE — 74011250637 HC RX REV CODE- 250/637: Performed by: HOSPITALIST

## 2019-10-04 PROCEDURE — 65270000015 HC RM PRIVATE ONCOLOGY

## 2019-10-04 PROCEDURE — 80048 BASIC METABOLIC PNL TOTAL CA: CPT

## 2019-10-04 PROCEDURE — 77010033678 HC OXYGEN DAILY

## 2019-10-04 PROCEDURE — 74011250637 HC RX REV CODE- 250/637: Performed by: FAMILY MEDICINE

## 2019-10-04 PROCEDURE — 74011250637 HC RX REV CODE- 250/637: Performed by: INTERNAL MEDICINE

## 2019-10-04 RX ADMIN — HEPARIN SODIUM 5000 UNITS: 5000 INJECTION INTRAVENOUS; SUBCUTANEOUS at 20:08

## 2019-10-04 RX ADMIN — PRAVASTATIN SODIUM 40 MG: 40 TABLET ORAL at 23:36

## 2019-10-04 RX ADMIN — Medication 10 ML: at 13:38

## 2019-10-04 RX ADMIN — PAROXETINE HYDROCHLORIDE 20 MG: 20 TABLET, FILM COATED ORAL at 08:46

## 2019-10-04 RX ADMIN — HEPARIN SODIUM 5000 UNITS: 5000 INJECTION INTRAVENOUS; SUBCUTANEOUS at 08:46

## 2019-10-04 RX ADMIN — DILTIAZEM HYDROCHLORIDE 300 MG: 180 CAPSULE, COATED, EXTENDED RELEASE ORAL at 08:46

## 2019-10-04 RX ADMIN — Medication 10 ML: at 05:08

## 2019-10-04 RX ADMIN — ASPIRIN 81 MG CHEWABLE TABLET 81 MG: 81 TABLET CHEWABLE at 08:46

## 2019-10-04 RX ADMIN — HYDRALAZINE HYDROCHLORIDE 100 MG: 50 TABLET ORAL at 23:36

## 2019-10-04 RX ADMIN — ACETAMINOPHEN 650 MG: 325 TABLET ORAL at 20:08

## 2019-10-04 RX ADMIN — METOPROLOL TARTRATE 50 MG: 50 TABLET, FILM COATED ORAL at 08:46

## 2019-10-04 RX ADMIN — GABAPENTIN 400 MG: 100 CAPSULE ORAL at 08:46

## 2019-10-04 RX ADMIN — GABAPENTIN 400 MG: 100 CAPSULE ORAL at 13:37

## 2019-10-04 RX ADMIN — HYDRALAZINE HYDROCHLORIDE 100 MG: 50 TABLET ORAL at 08:46

## 2019-10-04 RX ADMIN — CHLORTHALIDONE 25 MG: 25 TABLET ORAL at 09:03

## 2019-10-04 RX ADMIN — CEFTRIAXONE 1 G: 1 INJECTION, POWDER, FOR SOLUTION INTRAMUSCULAR; INTRAVENOUS at 23:37

## 2019-10-04 RX ADMIN — LISINOPRIL 40 MG: 40 TABLET ORAL at 08:46

## 2019-10-04 RX ADMIN — TEMAZEPAM 15 MG: 15 CAPSULE ORAL at 23:36

## 2019-10-04 RX ADMIN — GABAPENTIN 400 MG: 100 CAPSULE ORAL at 17:20

## 2019-10-04 RX ADMIN — Medication 1 CAPSULE: at 08:46

## 2019-10-04 RX ADMIN — HYDRALAZINE HYDROCHLORIDE 100 MG: 50 TABLET ORAL at 17:20

## 2019-10-04 RX ADMIN — METOPROLOL TARTRATE 50 MG: 50 TABLET, FILM COATED ORAL at 17:20

## 2019-10-04 NOTE — PROGRESS NOTES
Oncology End of Shift Note      Bedside shift change report given to Milvia Scott RN (incoming nurse) by Jonathan Whatley (outgoing nurse) on [facility]. Report included the following information SBAR, Kardex and MAR. Shift Summary: Patient received all medications and medication education was provided. Patient was turned throughout shift. Issues for Physician to Address:  None. Patient on Cardiac Monitoring?     [] Yes  [x] No    Rhythm:                Jonathan Whatley

## 2019-10-04 NOTE — PROGRESS NOTES
Today is day #8 Ceftriaxone; Please consider discontinuing. You had mentioned checking Cdiff in your progress note, but I do not see an order. Do you still want to order it?

## 2019-10-04 NOTE — PROGRESS NOTES
PROGRESS NOTE    NAME:  Seven Dorsey   :   1935   MRN:   [id number]     Date/Time:  10/4/2019 7:36 AM  Subjective:   History:  Chart reviewed and patient seen and examined and D/W her nurse this AM and all events noted. She was admitted with Pneumonia and a recurrent UTI with Enterococcus. She still has a nonproductive chronic cough but is less SOB w/o other cardiac or respiratory c/o. She has no abdominal pain or other GI/ c/o except now has developed diarrhea 3 times per day. She is very weak w/o other neurologic c/o, but sat in chair for 2 times yesterday. She has no other c/o on complete ROS.       Medications reviewed:  Current Facility-Administered Medications   Medication Dose Route Frequency    levoFLOXacin (LEVAQUIN) 750 mg in D5W IVPB  750 mg IntraVENous Q48H    temazepam (RESTORIL) capsule 15 mg  15 mg Oral QHS    chlorthalidone (HYGROTEN) tablet 25 mg  25 mg Oral DAILY    lisinopril (PRINIVIL, ZESTRIL) tablet 40 mg  40 mg Oral DAILY    hydrALAZINE (APRESOLINE) tablet 100 mg  100 mg Oral TID    influenza vaccine - (6 mos+)(PF) (FLUARIX/FLULAVAL/FLUZONE QUAD) injection 0.5 mL  0.5 mL IntraMUSCular PRIOR TO DISCHARGE    albuterol-ipratropium (DUO-NEB) 2.5 MG-0.5 MG/3 ML  3 mL Nebulization Q4H PRN    sodium chloride (NS) flush 5-40 mL  5-40 mL IntraVENous Q8H    sodium chloride (NS) flush 5-40 mL  5-40 mL IntraVENous PRN    acetaminophen (TYLENOL) tablet 650 mg  650 mg Oral Q4H PRN    ondansetron (ZOFRAN) injection 4 mg  4 mg IntraVENous Q4H PRN    heparin (porcine) injection 5,000 Units  5,000 Units SubCUTAneous Q12H    cefTRIAXone (ROCEPHIN) 1 g in 0.9% sodium chloride (MBP/ADV) 50 mL  1 g IntraVENous Q24H    aspirin chewable tablet 81 mg  81 mg Oral DAILY    dilTIAZem CD (CARDIZEM CD) capsule 300 mg  300 mg Oral DAILY    gabapentin (NEURONTIN) capsule 400 mg  400 mg Oral QID    metoprolol tartrate (LOPRESSOR) tablet 50 mg  50 mg Oral BID    PARoxetine (PAXIL) tablet 20 mg  20 mg Oral DAILY    pravastatin (PRAVACHOL) tablet 40 mg  40 mg Oral QHS        Objective:   Vitals:  Visit Vitals  /55 (BP 1 Location: Left arm, BP Patient Position: At rest)   Pulse 75   Temp 98.8 °F (37.1 °C)   Resp 18   Ht 5' 3\" (1.6 m)   Wt 132 lb 4.4 oz (60 kg)   SpO2 99%   BMI 23.43 kg/m²    O2 Flow Rate (L/min): 3 l/min O2 Device: Nasal cannula Temp (24hrs), Av.8 °F (37.1 °C), Min:98.5 °F (36.9 °C), Max:99 °F (37.2 °C)      Last 24hr Input/Output:    Intake/Output Summary (Last 24 hours) at 10/4/2019 0736  Last data filed at 10/3/2019 2340  Gross per 24 hour   Intake    Output 400 ml   Net -400 ml        PHYSICAL EXAM:  General:     Alert, cooperative, no distress, appears stated age. Head:    Normocephalic, without obvious abnormality, atraumatic. Eyes:    Conjunctivae/corneas clear. PERRLA  Nose:   Nares normal. No drainage or sinus tenderness. Throat:     Lips, mucosa, and tongue normal.  No Thrush  Neck:   Supple, symmetrical,  no adenopathy, thyroid: non tender     no carotid bruit and no JVD. Back:     Symmetric,  No CVA tenderness. Lungs:    Decreased BS bilaterally with scattered rhonchi. No Wheezing. No rales. Heart:    Regular rate and rhythm,  no murmur, rub or gallop. Abdomen:    Soft, non-tender. Not distended. Bowel sounds normal. No masses  Extremities:  Extremities normal, atraumatic, No cyanosis. No edema. No clubbing  Lymph nodes:  Cervical, supraclavicular normal.  Neurologic:  General decreased strength, Alert and oriented X 3.    Skin:                 No rash      Lab Data Reviewed:    Recent Results (from the past 24 hour(s))   METABOLIC PANEL, BASIC    Collection Time: 10/04/19  4:09 AM   Result Value Ref Range    Sodium 143 136 - 145 mmol/L    Potassium 3.3 (L) 3.5 - 5.1 mmol/L    Chloride 111 (H) 97 - 108 mmol/L    CO2 24 21 - 32 mmol/L    Anion gap 8 5 - 15 mmol/L    Glucose 89 65 - 100 mg/dL    BUN 22 (H) 6 - 20 MG/DL    Creatinine 1.58 (H) 0.55 - 1.02 MG/DL    BUN/Creatinine ratio 14 12 - 20      GFR est AA 38 (L) >60 ml/min/1.73m2    GFR est non-AA 31 (L) >60 ml/min/1.73m2    Calcium 10.0 8.5 - 10.1 MG/DL   CBC WITH AUTOMATED DIFF    Collection Time: 10/04/19  4:09 AM   Result Value Ref Range    WBC 8.3 3.6 - 11.0 K/uL    RBC 2.94 (L) 3.80 - 5.20 M/uL    HGB 8.8 (L) 11.5 - 16.0 g/dL    HCT 27.6 (L) 35.0 - 47.0 %    MCV 93.9 80.0 - 99.0 FL    MCH 29.9 26.0 - 34.0 PG    MCHC 31.9 30.0 - 36.5 g/dL    RDW 14.9 (H) 11.5 - 14.5 %    PLATELET 540 767 - 102 K/uL    MPV 11.4 8.9 - 12.9 FL    NRBC 0.0 0  WBC    ABSOLUTE NRBC 0.00 0.00 - 0.01 K/uL    NEUTROPHILS 77 (H) 32 - 75 %    LYMPHOCYTES 8 (L) 12 - 49 %    MONOCYTES 11 5 - 13 %    EOSINOPHILS 3 0 - 7 %    BASOPHILS 0 0 - 1 %    IMMATURE GRANULOCYTES 2 (H) 0.0 - 0.5 %    ABS. NEUTROPHILS 6.4 1.8 - 8.0 K/UL    ABS. LYMPHOCYTES 0.6 (L) 0.8 - 3.5 K/UL    ABS. MONOCYTES 0.9 0.0 - 1.0 K/UL    ABS. EOSINOPHILS 0.2 0.0 - 0.4 K/UL    ABS. BASOPHILS 0.0 0.0 - 0.1 K/UL    ABS. IMM. GRANS. 0.1 (H) 0.00 - 0.04 K/UL    DF AUTOMATED           Assessment/Plan:     Principal Problem:    CAP (community acquired pneumonia) (9/26/2019)    Active Problems:    COPD (chronic obstructive pulmonary disease) ([facility] (8/14/2010)      Essential hypertension (7/20/2016)      PAF (paroxysmal atrial fibrillation) ([facility] (3/30/2018)      Moderate protein-calorie malnutrition ([facility] (3/30/2018)      Stage 3 chronic kidney disease ([facility] [address] (11/5/2018)      Recurrent UTI (6/14/2019)      Anemia (8/1/2019)       ___________________________________________________  PLAN:    1. CAP (community acquired pneumonia) (9/26/2019): Continue antibiotics with Rocephin and Levaquin  2. UTI: enterococcal. Changed Vanc. to Levaquin as Sens. and some Klebsiella in urine also sens to Levaquin  3. CKD III: follow, 22/1.58, w/o significant change from yesterday  4. COPD: Stable. Continue prn bronchodilator therapy.    5. P AFib: Seems to be sinus at this itme.   6. Code: DNR.   7.  Hypertension- will continue all her outpatient meds. 8.  Insomnia- will continue her outpatient temazepam.   9.  Repeat CXR yesterday L effusion with LLL atelectasis, increased L perihilar infiltrate  10    Check c diff  11.    Probiotic            ___________________________________________________    Attending Physician: Emeterio Rogers MD

## 2019-10-04 NOTE — PROGRESS NOTES
MARC Plan:    -return to [place]  -PCP follow up        SW approached by patient's daughter Sony Owens). Daughter voiced the following concerns re: her mother today during her visit. -not looking alert today vs yesterday more alert.  -not hearing well today.  -confused a little today. My mom doesn't have Dementia. Daughter further informed [facility] agency is the provider inside of [facility]. Referral to be faxed over for [facility] services. Daughter left before writer could get signature on [address] form. Will leave [address] form and place inside the room for signature.       Crystal Randle, MSW  [phone]

## 2019-10-04 NOTE — PROGRESS NOTES
Oncology End of Shift Note      Bedside shift change report given to [facility], RN (incoming nurse) by Armand Ramirez RN (outgoing nurse) on [employer]. Report included the following information SBAR, Kardex, Intake/Output and MAR. Shift Summary: Pt slept during night, incontinent of urine, requires turning. Issues for Physician to Address: Am labs     Patient on Cardiac Monitoring?     [] Yes  [x] No    Rhythm:          Shift Events

## 2019-10-05 LAB
ANION GAP SERPL CALC-SCNC: 6 MMOL/L (ref 5–15)
BASOPHILS # BLD: 0 K/UL (ref 0–0.1)
BASOPHILS NFR BLD: 0 % (ref 0–1)
BUN SERPL-MCNC: 22 MG/DL (ref 6–20)
BUN/CREAT SERPL: 13 (ref 12–20)
CALCIUM SERPL-MCNC: 9.8 MG/DL (ref 8.5–10.1)
CHLORIDE SERPL-SCNC: 111 MMOL/L (ref 97–108)
CO2 SERPL-SCNC: 24 MMOL/L (ref 21–32)
CREAT SERPL-MCNC: 1.68 MG/DL (ref 0.55–1.02)
DIFFERENTIAL METHOD BLD: ABNORMAL
EOSINOPHIL # BLD: 0.3 K/UL (ref 0–0.4)
EOSINOPHIL NFR BLD: 4 % (ref 0–7)
ERYTHROCYTE [DISTWIDTH] IN BLOOD BY AUTOMATED COUNT: 14.8 % (ref 11.5–14.5)
GLUCOSE SERPL-MCNC: 93 MG/DL (ref 65–100)
HCT VFR BLD AUTO: 28 % (ref 35–47)
HGB BLD-MCNC: 9.1 G/DL (ref 11.5–16)
IMM GRANULOCYTES # BLD AUTO: 0.1 K/UL (ref 0–0.04)
IMM GRANULOCYTES NFR BLD AUTO: 2 % (ref 0–0.5)
LYMPHOCYTES # BLD: 0.7 K/UL (ref 0.8–3.5)
LYMPHOCYTES NFR BLD: 9 % (ref 12–49)
MCH RBC QN AUTO: 30.2 PG (ref 26–34)
MCHC RBC AUTO-ENTMCNC: 32.5 G/DL (ref 30–36.5)
MCV RBC AUTO: 93 FL (ref 80–99)
MONOCYTES # BLD: 0.8 K/UL (ref 0–1)
MONOCYTES NFR BLD: 12 % (ref 5–13)
NEUTS SEG # BLD: 5.4 K/UL (ref 1.8–8)
NEUTS SEG NFR BLD: 74 % (ref 32–75)
NRBC # BLD: 0 K/UL (ref 0–0.01)
NRBC BLD-RTO: 0 PER 100 WBC
PLATELET # BLD AUTO: 253 K/UL (ref 150–400)
PMV BLD AUTO: 10.6 FL (ref 8.9–12.9)
POTASSIUM SERPL-SCNC: 3.4 MMOL/L (ref 3.5–5.1)
RBC # BLD AUTO: 3.01 M/UL (ref 3.8–5.2)
SODIUM SERPL-SCNC: 141 MMOL/L (ref 136–145)
WBC # BLD AUTO: 7.3 K/UL (ref 3.6–11)

## 2019-10-05 PROCEDURE — 80048 BASIC METABOLIC PNL TOTAL CA: CPT

## 2019-10-05 PROCEDURE — 36415 COLL VENOUS BLD VENIPUNCTURE: CPT

## 2019-10-05 PROCEDURE — 65270000015 HC RM PRIVATE ONCOLOGY

## 2019-10-05 PROCEDURE — 74011250637 HC RX REV CODE- 250/637: Performed by: INTERNAL MEDICINE

## 2019-10-05 PROCEDURE — 74011250636 HC RX REV CODE- 250/636: Performed by: INTERNAL MEDICINE

## 2019-10-05 PROCEDURE — 74011250637 HC RX REV CODE- 250/637: Performed by: FAMILY MEDICINE

## 2019-10-05 PROCEDURE — 74011000258 HC RX REV CODE- 258: Performed by: HOSPITALIST

## 2019-10-05 PROCEDURE — 94760 N-INVAS EAR/PLS OXIMETRY 1: CPT

## 2019-10-05 PROCEDURE — 74011250636 HC RX REV CODE- 250/636: Performed by: HOSPITALIST

## 2019-10-05 PROCEDURE — 85025 COMPLETE CBC W/AUTO DIFF WBC: CPT

## 2019-10-05 PROCEDURE — 74011250637 HC RX REV CODE- 250/637: Performed by: HOSPITALIST

## 2019-10-05 PROCEDURE — 77010033678 HC OXYGEN DAILY

## 2019-10-05 RX ORDER — FUROSEMIDE 10 MG/ML
40 INJECTION INTRAMUSCULAR; INTRAVENOUS DAILY
Status: DISCONTINUED | OUTPATIENT
Start: 2019-10-05 | End: 2019-10-11 | Stop reason: HOSPADM

## 2019-10-05 RX ADMIN — GABAPENTIN 400 MG: 100 CAPSULE ORAL at 21:18

## 2019-10-05 RX ADMIN — HEPARIN SODIUM 5000 UNITS: 5000 INJECTION INTRAVENOUS; SUBCUTANEOUS at 21:00

## 2019-10-05 RX ADMIN — FUROSEMIDE 40 MG: 10 INJECTION, SOLUTION INTRAMUSCULAR; INTRAVENOUS at 12:00

## 2019-10-05 RX ADMIN — CHLORTHALIDONE 25 MG: 25 TABLET ORAL at 08:47

## 2019-10-05 RX ADMIN — HYDRALAZINE HYDROCHLORIDE 100 MG: 50 TABLET ORAL at 17:54

## 2019-10-05 RX ADMIN — Medication 1 CAPSULE: at 08:40

## 2019-10-05 RX ADMIN — GABAPENTIN 400 MG: 100 CAPSULE ORAL at 00:25

## 2019-10-05 RX ADMIN — TEMAZEPAM 15 MG: 15 CAPSULE ORAL at 21:18

## 2019-10-05 RX ADMIN — ASPIRIN 81 MG CHEWABLE TABLET 81 MG: 81 TABLET CHEWABLE at 08:40

## 2019-10-05 RX ADMIN — METOPROLOL TARTRATE 50 MG: 50 TABLET, FILM COATED ORAL at 17:55

## 2019-10-05 RX ADMIN — LISINOPRIL 40 MG: 40 TABLET ORAL at 08:41

## 2019-10-05 RX ADMIN — Medication 10 ML: at 22:00

## 2019-10-05 RX ADMIN — HYDRALAZINE HYDROCHLORIDE 100 MG: 50 TABLET ORAL at 08:40

## 2019-10-05 RX ADMIN — PAROXETINE HYDROCHLORIDE 20 MG: 20 TABLET, FILM COATED ORAL at 08:40

## 2019-10-05 RX ADMIN — GABAPENTIN 400 MG: 100 CAPSULE ORAL at 13:32

## 2019-10-05 RX ADMIN — HYDRALAZINE HYDROCHLORIDE 100 MG: 50 TABLET ORAL at 21:18

## 2019-10-05 RX ADMIN — CEFTRIAXONE 1 G: 1 INJECTION, POWDER, FOR SOLUTION INTRAMUSCULAR; INTRAVENOUS at 21:18

## 2019-10-05 RX ADMIN — PRAVASTATIN SODIUM 40 MG: 40 TABLET ORAL at 21:19

## 2019-10-05 RX ADMIN — HEPARIN SODIUM 5000 UNITS: 5000 INJECTION INTRAVENOUS; SUBCUTANEOUS at 08:41

## 2019-10-05 RX ADMIN — GABAPENTIN 400 MG: 100 CAPSULE ORAL at 08:40

## 2019-10-05 RX ADMIN — DILTIAZEM HYDROCHLORIDE 300 MG: 180 CAPSULE, COATED, EXTENDED RELEASE ORAL at 08:41

## 2019-10-05 RX ADMIN — Medication 10 ML: at 00:26

## 2019-10-05 RX ADMIN — Medication 10 ML: at 13:33

## 2019-10-05 RX ADMIN — GABAPENTIN 400 MG: 100 CAPSULE ORAL at 17:55

## 2019-10-05 RX ADMIN — METOPROLOL TARTRATE 50 MG: 50 TABLET, FILM COATED ORAL at 08:41

## 2019-10-05 NOTE — PROGRESS NOTES
Oncology End of Shift Note      Bedside shift change report given to [facility] RN (incoming nurse) by Candy Hensely (outgoing nurse) on [facility]. Report included the following information SBAR, Kardex, Intake/Output and MAR. Shift Summary: slept most of the night, am labs drawn      Issues for Physician to Address:       Patient on Cardiac Monitoring?     [] Yes  [x] No    Rhythm:          Shift Events        Candy Hensley

## 2019-10-05 NOTE — PROGRESS NOTES
PROGRESS NOTE    NAME:  Ruthy Turpin   :   1935   MRN:   [id number]     Date/Time:  10/5/2019 11:00 AM  Subjective:   History:  Chart reviewed and patient seen and examined and D/W her nurse this AM and D/W her daughter yesterday PM and all events noted. She was admitted with Pneumonia and a recurrent UTI with Enterococcus. She still has a nonproductive chronic cough but is less SOB w/o other cardiac or respiratory c/o. She has no abdominal pain or other GI/ c/o except she had developed diarrhea 3 times per day, but now resolved and per her nurse it wasn't diarrhea but more loose stools. She is very weak w/o other neurologic c/o, but sat in chair  yesterday. She has no other c/o on complete ROS.       Medications reviewed:  Current Facility-Administered Medications   Medication Dose Route Frequency    lactobac ac& pc-s.therm-b.anim (HANG Q/RISAQUAD)  1 Cap Oral DAILY    levoFLOXacin (LEVAQUIN) 750 mg in D5W IVPB  750 mg IntraVENous Q48H    temazepam (RESTORIL) capsule 15 mg  15 mg Oral QHS    chlorthalidone (HYGROTEN) tablet 25 mg  25 mg Oral DAILY    lisinopril (PRINIVIL, ZESTRIL) tablet 40 mg  40 mg Oral DAILY    hydrALAZINE (APRESOLINE) tablet 100 mg  100 mg Oral TID    influenza vaccine - (6 mos+)(PF) (FLUARIX/FLULAVAL/FLUZONE QUAD) injection 0.5 mL  0.5 mL IntraMUSCular PRIOR TO DISCHARGE    albuterol-ipratropium (DUO-NEB) 2.5 MG-0.5 MG/3 ML  3 mL Nebulization Q4H PRN    sodium chloride (NS) flush 5-40 mL  5-40 mL IntraVENous Q8H    sodium chloride (NS) flush 5-40 mL  5-40 mL IntraVENous PRN    acetaminophen (TYLENOL) tablet 650 mg  650 mg Oral Q4H PRN    ondansetron (ZOFRAN) injection 4 mg  4 mg IntraVENous Q4H PRN    heparin (porcine) injection 5,000 Units  5,000 Units SubCUTAneous Q12H    cefTRIAXone (ROCEPHIN) 1 g in 0.9% sodium chloride (MBP/ADV) 50 mL  1 g IntraVENous Q24H    aspirin chewable tablet 81 mg  81 mg Oral DAILY    dilTIAZem CD (CARDIZEM CD) capsule 300 mg  300 mg Oral DAILY    gabapentin (NEURONTIN) capsule 400 mg  400 mg Oral QID    metoprolol tartrate (LOPRESSOR) tablet 50 mg  50 mg Oral BID    PARoxetine (PAXIL) tablet 20 mg  20 mg Oral DAILY    pravastatin (PRAVACHOL) tablet 40 mg  40 mg Oral QHS        Objective:   Vitals:  Visit Vitals  /51 (BP 1 Location: Left arm, BP Patient Position: At rest)   Pulse 60   Temp 98.3 °F (36.8 °C)   Resp 20   Ht 5' 3\" (1.6 m)   Wt 132 lb 4.4 oz (60 kg)   SpO2 92%   BMI 23.43 kg/m²    O2 Flow Rate (L/min): 3 l/min O2 Device: Nasal cannula Temp (24hrs), Av.3 °F (36.8 °C), Min:97.9 °F (36.6 °C), Max:98.6 °F (37 °C)      Last 24hr Input/Output:    Intake/Output Summary (Last 24 hours) at 10/5/2019 1106  Last data filed at 10/5/2019 0340  Gross per 24 hour   Intake 0 ml   Output    Net 0 ml        PHYSICAL EXAM:  General:     Alert, cooperative, no distress, appears stated age. Head:    Normocephalic, without obvious abnormality, atraumatic. Eyes:    Conjunctivae/corneas clear. PERRLA  Nose:   Nares normal. No drainage or sinus tenderness. Throat:     Lips, mucosa, and tongue normal.  No Thrush  Neck:   Supple, symmetrical,  no adenopathy, thyroid: non tender     no carotid bruit and no JVD. Back:     Symmetric,  No CVA tenderness. Lungs:    Decreased BS bilaterally with scattered rhonchi. No Wheezing. No rales. Heart:    Regular rate and rhythm,  no murmur, rub or gallop. Abdomen:    Soft, non-tender. Not distended. Bowel sounds normal. No masses  Extremities:  Extremities normal except some edema in arms, atraumatic, No cyanosis. No edema. No clubbing  Lymph nodes:  Cervical, supraclavicular normal.  Neurologic:  General decreased strength, Alert and oriented X 3.    Skin:                 No rash      Lab Data Reviewed:    Recent Results (from the past 24 hour(s))   METABOLIC PANEL, BASIC    Collection Time: 10/05/19  4:17 AM   Result Value Ref Range    Sodium 141 136 - 145 mmol/L    Potassium 3.4 (L) 3.5 - 5.1 mmol/L    Chloride 111 (H) 97 - 108 mmol/L    CO2 24 21 - 32 mmol/L    Anion gap 6 5 - 15 mmol/L    Glucose 93 65 - 100 mg/dL    BUN 22 (H) 6 - 20 MG/DL    Creatinine 1.68 (H) 0.55 - 1.02 MG/DL    BUN/Creatinine ratio 13 12 - 20      GFR est AA 35 (L) >60 ml/min/1.73m2    GFR est non-AA 29 (L) >60 ml/min/1.73m2    Calcium 9.8 8.5 - 10.1 MG/DL   CBC WITH AUTOMATED DIFF    Collection Time: 10/05/19  4:17 AM   Result Value Ref Range    WBC 7.3 3.6 - 11.0 K/uL    RBC 3.01 (L) 3.80 - 5.20 M/uL    HGB 9.1 (L) 11.5 - 16.0 g/dL    HCT 28.0 (L) 35.0 - 47.0 %    MCV 93.0 80.0 - 99.0 FL    MCH 30.2 26.0 - 34.0 PG    MCHC 32.5 30.0 - 36.5 g/dL    RDW 14.8 (H) 11.5 - 14.5 %    PLATELET 152 937 - 078 K/uL    MPV 10.6 8.9 - 12.9 FL    NRBC 0.0 0  WBC    ABSOLUTE NRBC 0.00 0.00 - 0.01 K/uL    NEUTROPHILS 74 32 - 75 %    LYMPHOCYTES 9 (L) 12 - 49 %    MONOCYTES 12 5 - 13 %    EOSINOPHILS 4 0 - 7 %    BASOPHILS 0 0 - 1 %    IMMATURE GRANULOCYTES 2 (H) 0.0 - 0.5 %    ABS. NEUTROPHILS 5.4 1.8 - 8.0 K/UL    ABS. LYMPHOCYTES 0.7 (L) 0.8 - 3.5 K/UL    ABS. MONOCYTES 0.8 0.0 - 1.0 K/UL    ABS. EOSINOPHILS 0.3 0.0 - 0.4 K/UL    ABS. BASOPHILS 0.0 0.0 - 0.1 K/UL    ABS. IMM. GRANS. 0.1 (H) 0.00 - 0.04 K/UL    DF AUTOMATED           Assessment/Plan:     Principal Problem:    CAP (community acquired pneumonia) (9/26/2019)    Active Problems:    COPD (chronic obstructive pulmonary disease) ([facility] (8/14/2010)      Essential hypertension (7/20/2016)      PAF (paroxysmal atrial fibrillation) ([facility] (3/30/2018)      Moderate protein-calorie malnutrition ([facility] (3/30/2018)      Stage 3 chronic kidney disease ([facility] (11/5/2018)      Recurrent UTI (6/14/2019)      Anemia (8/1/2019)       ___________________________________________________  PLAN:    1. CAP (community acquired pneumonia) (9/26/2019): Continue antibiotics with Rocephin and Levaquin  2. UTI: enterococcal. Changed Vanc. to Levaquin as Sens.  and some Klebsiella in urine also sens to Levaquin  3. CKD III: follow, 22/1.68, w/o significant change from yesterday, (baseline Cr 1.9)  4. COPD: Stable. Continue prn bronchodilator therapy. 5. P AFib: Seems to be sinus at this itme. 6. Code: DNR.   7.  Hypertension- will continue all her outpatient meds. 8.  Insomnia- will continue her outpatient temazepam.   9.  Repeat CXR yesterday L effusion with LLL atelectasis, increased L perihilar infiltrate  10    Did not check C. Diff after discussion with nurse re; stool consistency  11. Probiotic added 10/4  12.    Start low dose Lasix with edema            ___________________________________________________    Attending Physician: Nellie Valdez MD

## 2019-10-06 ENCOUNTER — APPOINTMENT (OUTPATIENT)
Dept: GENERAL RADIOLOGY | Age: 84
DRG: 194 | End: 2019-10-06
Attending: INTERNAL MEDICINE
Payer: MEDICARE

## 2019-10-06 LAB
ANION GAP SERPL CALC-SCNC: 7 MMOL/L (ref 5–15)
BASOPHILS # BLD: 0 K/UL (ref 0–0.1)
BASOPHILS NFR BLD: 0 % (ref 0–1)
BUN SERPL-MCNC: 26 MG/DL (ref 6–20)
BUN/CREAT SERPL: 14 (ref 12–20)
CALCIUM SERPL-MCNC: 9.5 MG/DL (ref 8.5–10.1)
CHLORIDE SERPL-SCNC: 112 MMOL/L (ref 97–108)
CO2 SERPL-SCNC: 24 MMOL/L (ref 21–32)
CREAT SERPL-MCNC: 1.88 MG/DL (ref 0.55–1.02)
DIFFERENTIAL METHOD BLD: ABNORMAL
EOSINOPHIL # BLD: 0.3 K/UL (ref 0–0.4)
EOSINOPHIL NFR BLD: 4 % (ref 0–7)
ERYTHROCYTE [DISTWIDTH] IN BLOOD BY AUTOMATED COUNT: 14.9 % (ref 11.5–14.5)
GLUCOSE SERPL-MCNC: 97 MG/DL (ref 65–100)
HCT VFR BLD AUTO: 27.1 % (ref 35–47)
HGB BLD-MCNC: 8.7 G/DL (ref 11.5–16)
IMM GRANULOCYTES # BLD AUTO: 0.1 K/UL (ref 0–0.04)
IMM GRANULOCYTES NFR BLD AUTO: 2 % (ref 0–0.5)
LYMPHOCYTES # BLD: 0.6 K/UL (ref 0.8–3.5)
LYMPHOCYTES NFR BLD: 8 % (ref 12–49)
MCH RBC QN AUTO: 29.9 PG (ref 26–34)
MCHC RBC AUTO-ENTMCNC: 32.1 G/DL (ref 30–36.5)
MCV RBC AUTO: 93.1 FL (ref 80–99)
MONOCYTES # BLD: 0.8 K/UL (ref 0–1)
MONOCYTES NFR BLD: 11 % (ref 5–13)
NEUTS SEG # BLD: 5.5 K/UL (ref 1.8–8)
NEUTS SEG NFR BLD: 75 % (ref 32–75)
NRBC # BLD: 0 K/UL (ref 0–0.01)
NRBC BLD-RTO: 0 PER 100 WBC
PLATELET # BLD AUTO: 243 K/UL (ref 150–400)
PMV BLD AUTO: 9.9 FL (ref 8.9–12.9)
POTASSIUM SERPL-SCNC: 3.5 MMOL/L (ref 3.5–5.1)
RBC # BLD AUTO: 2.91 M/UL (ref 3.8–5.2)
RBC MORPH BLD: ABNORMAL
SODIUM SERPL-SCNC: 143 MMOL/L (ref 136–145)
WBC # BLD AUTO: 7.3 K/UL (ref 3.6–11)

## 2019-10-06 PROCEDURE — 74011250637 HC RX REV CODE- 250/637: Performed by: INTERNAL MEDICINE

## 2019-10-06 PROCEDURE — 71046 X-RAY EXAM CHEST 2 VIEWS: CPT

## 2019-10-06 PROCEDURE — 74011250636 HC RX REV CODE- 250/636: Performed by: INTERNAL MEDICINE

## 2019-10-06 PROCEDURE — 80048 BASIC METABOLIC PNL TOTAL CA: CPT

## 2019-10-06 PROCEDURE — 94760 N-INVAS EAR/PLS OXIMETRY 1: CPT

## 2019-10-06 PROCEDURE — 85025 COMPLETE CBC W/AUTO DIFF WBC: CPT

## 2019-10-06 PROCEDURE — 65270000015 HC RM PRIVATE ONCOLOGY

## 2019-10-06 PROCEDURE — 74011000258 HC RX REV CODE- 258: Performed by: HOSPITALIST

## 2019-10-06 PROCEDURE — 74011250636 HC RX REV CODE- 250/636: Performed by: HOSPITALIST

## 2019-10-06 PROCEDURE — 74011250637 HC RX REV CODE- 250/637: Performed by: FAMILY MEDICINE

## 2019-10-06 PROCEDURE — 74011250637 HC RX REV CODE- 250/637: Performed by: HOSPITALIST

## 2019-10-06 PROCEDURE — 36415 COLL VENOUS BLD VENIPUNCTURE: CPT

## 2019-10-06 PROCEDURE — 77010033678 HC OXYGEN DAILY

## 2019-10-06 RX ADMIN — HYDRALAZINE HYDROCHLORIDE 100 MG: 50 TABLET ORAL at 08:32

## 2019-10-06 RX ADMIN — Medication 10 ML: at 14:00

## 2019-10-06 RX ADMIN — LISINOPRIL 40 MG: 40 TABLET ORAL at 08:32

## 2019-10-06 RX ADMIN — METOPROLOL TARTRATE 50 MG: 50 TABLET, FILM COATED ORAL at 17:34

## 2019-10-06 RX ADMIN — ACETAMINOPHEN 650 MG: 325 TABLET ORAL at 20:10

## 2019-10-06 RX ADMIN — FUROSEMIDE 40 MG: 10 INJECTION, SOLUTION INTRAMUSCULAR; INTRAVENOUS at 08:33

## 2019-10-06 RX ADMIN — HEPARIN SODIUM 5000 UNITS: 5000 INJECTION INTRAVENOUS; SUBCUTANEOUS at 08:33

## 2019-10-06 RX ADMIN — Medication 1 CAPSULE: at 08:32

## 2019-10-06 RX ADMIN — GABAPENTIN 400 MG: 100 CAPSULE ORAL at 21:34

## 2019-10-06 RX ADMIN — CEFTRIAXONE 1 G: 1 INJECTION, POWDER, FOR SOLUTION INTRAMUSCULAR; INTRAVENOUS at 21:33

## 2019-10-06 RX ADMIN — GABAPENTIN 400 MG: 100 CAPSULE ORAL at 08:32

## 2019-10-06 RX ADMIN — TEMAZEPAM 15 MG: 15 CAPSULE ORAL at 21:34

## 2019-10-06 RX ADMIN — METOPROLOL TARTRATE 50 MG: 50 TABLET, FILM COATED ORAL at 08:33

## 2019-10-06 RX ADMIN — HYDRALAZINE HYDROCHLORIDE 100 MG: 50 TABLET ORAL at 16:00

## 2019-10-06 RX ADMIN — GABAPENTIN 400 MG: 100 CAPSULE ORAL at 17:34

## 2019-10-06 RX ADMIN — CHLORTHALIDONE 25 MG: 25 TABLET ORAL at 08:37

## 2019-10-06 RX ADMIN — PAROXETINE HYDROCHLORIDE 20 MG: 20 TABLET, FILM COATED ORAL at 08:32

## 2019-10-06 RX ADMIN — HEPARIN SODIUM 5000 UNITS: 5000 INJECTION INTRAVENOUS; SUBCUTANEOUS at 21:35

## 2019-10-06 RX ADMIN — PRAVASTATIN SODIUM 40 MG: 40 TABLET ORAL at 21:34

## 2019-10-06 RX ADMIN — GABAPENTIN 400 MG: 100 CAPSULE ORAL at 12:42

## 2019-10-06 RX ADMIN — DILTIAZEM HYDROCHLORIDE 300 MG: 180 CAPSULE, COATED, EXTENDED RELEASE ORAL at 08:32

## 2019-10-06 RX ADMIN — Medication 10 ML: at 06:24

## 2019-10-06 RX ADMIN — ASPIRIN 81 MG CHEWABLE TABLET 81 MG: 81 TABLET CHEWABLE at 08:32

## 2019-10-06 RX ADMIN — HYDRALAZINE HYDROCHLORIDE 100 MG: 50 TABLET ORAL at 21:34

## 2019-10-06 NOTE — PROGRESS NOTES
Problem: Falls - Risk of  Goal: *Absence of Falls  Description  Document [facility] Fall Risk and appropriate interventions in the flowsheet.   Outcome: Progressing Towards Goal  Note:   Fall Risk Interventions:  Mobility Interventions: Communicate number of staff needed for ambulation/transfer    Mentation Interventions: Adequate sleep, hydration, pain control    Medication Interventions: Patient to call before getting OOB    Elimination Interventions: Call light in reach

## 2019-10-06 NOTE — PROGRESS NOTES
Oncology End of Shift Note      Bedside shift change report given to  [facility]      , RN (incoming nurse) by Terra Coyle (outgoing nurse) on [facility]. Report included the following information SBAR, Kardex, Intake/Output and MAR. Shift Summary: slept through most of the night, am labs drawn, no complaints of pain     Issues for Physician to Address:       Patient on Cardiac Monitoring?    [] Yes  [x] No    Rhythm:          Shift Events        Terra Coyle

## 2019-10-06 NOTE — PROGRESS NOTES
PROGRESS NOTE    NAME:  Estrella Duncan   :   1935   MRN:   [id number]     Date/Time:  10/6/2019 9:43 AM  Subjective:   History:  Chart reviewed and patient seen and examined and D/W her nurse this AM and D/W her daughter 10/4 PM and all events noted. She was admitted with Pneumonia and a recurrent UTI with Enterococcus. She still has a nonproductive chronic cough but is less SOB w/o other cardiac or respiratory c/o. She has no abdominal pain or other GI/ c/o except she had developed diarrhea 3 times per day, but now resolved and per her nurse it wasn't diarrhea but more loose stools. She is very weak w/o other neurologic c/o, but sat in chair  yesterday. She has no other c/o on complete ROS.       Medications reviewed:  Current Facility-Administered Medications   Medication Dose Route Frequency    furosemide (LASIX) injection 40 mg  40 mg IntraVENous DAILY    lactobac ac& pc-s.therm-b.anim (HANG Q/RISAQUAD)  1 Cap Oral DAILY    levoFLOXacin (LEVAQUIN) 750 mg in D5W IVPB  750 mg IntraVENous Q48H    temazepam (RESTORIL) capsule 15 mg  15 mg Oral QHS    chlorthalidone (HYGROTEN) tablet 25 mg  25 mg Oral DAILY    lisinopril (PRINIVIL, ZESTRIL) tablet 40 mg  40 mg Oral DAILY    hydrALAZINE (APRESOLINE) tablet 100 mg  100 mg Oral TID    influenza vaccine - (6 mos+)(PF) (FLUARIX/FLULAVAL/FLUZONE QUAD) injection 0.5 mL  0.5 mL IntraMUSCular PRIOR TO DISCHARGE    albuterol-ipratropium (DUO-NEB) 2.5 MG-0.5 MG/3 ML  3 mL Nebulization Q4H PRN    sodium chloride (NS) flush 5-40 mL  5-40 mL IntraVENous Q8H    sodium chloride (NS) flush 5-40 mL  5-40 mL IntraVENous PRN    acetaminophen (TYLENOL) tablet 650 mg  650 mg Oral Q4H PRN    ondansetron (ZOFRAN) injection 4 mg  4 mg IntraVENous Q4H PRN    heparin (porcine) injection 5,000 Units  5,000 Units SubCUTAneous Q12H    cefTRIAXone (ROCEPHIN) 1 g in 0.9% sodium chloride (MBP/ADV) 50 mL  1 g IntraVENous Q24H    aspirin chewable tablet 81 mg  81 mg Oral DAILY    dilTIAZem CD (CARDIZEM CD) capsule 300 mg  300 mg Oral DAILY    gabapentin (NEURONTIN) capsule 400 mg  400 mg Oral QID    metoprolol tartrate (LOPRESSOR) tablet 50 mg  50 mg Oral BID    PARoxetine (PAXIL) tablet 20 mg  20 mg Oral DAILY    pravastatin (PRAVACHOL) tablet 40 mg  40 mg Oral QHS        Objective:   Vitals:  Visit Vitals  /48 (BP 1 Location: Left arm, BP Patient Position: At rest)   Pulse 67   Temp 98.9 °F (37.2 °C)   Resp 18   Ht 5' 3\" (1.6 m)   Wt 132 lb 4.4 oz (60 kg)   SpO2 94%   BMI 23.43 kg/m²    O2 Flow Rate (L/min): 3 l/min O2 Device: Nasal cannula Temp (24hrs), Av.8 °F (37.1 °C), Min:98.4 °F (36.9 °C), Max:99.2 °F (37.3 °C)      Last 24hr Input/Output:    Intake/Output Summary (Last 24 hours) at 10/6/2019 0943  Last data filed at 10/5/2019 2000  Gross per 24 hour   Intake 0 ml   Output    Net 0 ml        PHYSICAL EXAM:  General:     Alert, cooperative, no distress, appears stated age. Head:    Normocephalic, without obvious abnormality, atraumatic. Eyes:    Conjunctivae/corneas clear. PERRLA  Nose:   Nares normal. No drainage or sinus tenderness. Throat:     Lips, mucosa, and tongue normal.  No Thrush  Neck:   Supple, symmetrical,  no adenopathy, thyroid: non tender     no carotid bruit and no JVD. Back:     Symmetric,  No CVA tenderness. Lungs:    Decreased BS bilaterally with scattered rhonchi. No Wheezing. No rales. Heart:    Regular rate and rhythm,  no murmur, rub or gallop. Abdomen:    Soft, non-tender. Not distended. Bowel sounds normal. No masses  Extremities:  Extremities normal except some edema in arms, atraumatic, No cyanosis. No edema. No clubbing  Lymph nodes:  Cervical, supraclavicular normal.  Neurologic:  General decreased strength, Alert and oriented X 3.    Skin:                 No rash      Lab Data Reviewed:    Recent Results (from the past 24 hour(s))   METABOLIC PANEL, BASIC    Collection Time: 10/06/19  4:28 AM   Result Value Ref Range    Sodium 143 136 - 145 mmol/L    Potassium 3.5 3.5 - 5.1 mmol/L    Chloride 112 (H) 97 - 108 mmol/L    CO2 24 21 - 32 mmol/L    Anion gap 7 5 - 15 mmol/L    Glucose 97 65 - 100 mg/dL    BUN 26 (H) 6 - 20 MG/DL    Creatinine 1.88 (H) 0.55 - 1.02 MG/DL    BUN/Creatinine ratio 14 12 - 20      GFR est AA 31 (L) >60 ml/min/1.73m2    GFR est non-AA 26 (L) >60 ml/min/1.73m2    Calcium 9.5 8.5 - 10.1 MG/DL   CBC WITH AUTOMATED DIFF    Collection Time: 10/06/19  4:28 AM   Result Value Ref Range    WBC 7.3 3.6 - 11.0 K/uL    RBC 2.91 (L) 3.80 - 5.20 M/uL    HGB 8.7 (L) 11.5 - 16.0 g/dL    HCT 27.1 (L) 35.0 - 47.0 %    MCV 93.1 80.0 - 99.0 FL    MCH 29.9 26.0 - 34.0 PG    MCHC 32.1 30.0 - 36.5 g/dL    RDW 14.9 (H) 11.5 - 14.5 %    PLATELET 396 588 - 317 K/uL    MPV 9.9 8.9 - 12.9 FL    NRBC 0.0 0  WBC    ABSOLUTE NRBC 0.00 0.00 - 0.01 K/uL    NEUTROPHILS 75 32 - 75 %    LYMPHOCYTES 8 (L) 12 - 49 %    MONOCYTES 11 5 - 13 %    EOSINOPHILS 4 0 - 7 %    BASOPHILS 0 0 - 1 %    IMMATURE GRANULOCYTES 2 (H) 0.0 - 0.5 %    ABS. NEUTROPHILS 5.5 1.8 - 8.0 K/UL    ABS. LYMPHOCYTES 0.6 (L) 0.8 - 3.5 K/UL    ABS. MONOCYTES 0.8 0.0 - 1.0 K/UL    ABS. EOSINOPHILS 0.3 0.0 - 0.4 K/UL    ABS. BASOPHILS 0.0 0.0 - 0.1 K/UL    ABS. IMM.  GRANS. 0.1 (H) 0.00 - 0.04 K/UL    DF AUTOMATED      RBC COMMENTS ANISOCYTOSIS  1+        RBC COMMENTS MACROCYTOSIS  1+        RBC COMMENTS MICROCYTOSIS  1+             Assessment/Plan:     Principal Problem:    CAP (community acquired pneumonia) (9/26/2019)    Active Problems:    COPD (chronic obstructive pulmonary disease) ([facility] (8/14/2010)      Essential hypertension (7/20/2016)      PAF (paroxysmal atrial fibrillation) ([facility] (3/30/2018)      Moderate protein-calorie malnutrition ([facility] (3/30/2018)      Stage 3 chronic kidney disease ([facility] (11/5/2018)      Recurrent UTI (6/14/2019)      Anemia (8/1/2019)       ___________________________________________________  PLAN:    1. CAP (community acquired pneumonia) (9/26/2019): Continue antibiotics with Rocephin and D/Micah Levaquin  2. UTI: enterococcal. Changed Vanc. to Levaquin as Sens. and some Klebsiella in urine also sens to Levaquin, now off Levaquin  3. CKD III: follow, 26/1.88, w/o significant change from yesterday, (baseline Cr 1.9), Lasix started yesterday 10/5 for edema  4. COPD: Stable. Continue prn bronchodilator therapy. 5. P AFib: Seems to be sinus at this itme. 6. Code: DNR.   7.  Hypertension- will continue all her outpatient meds. 8.  Insomnia- will continue her outpatient temazepam.   9.  Repeat CXR 10/3 L effusion with LLL atelectasis, increased L perihilar infiltrate, will repeat today  10    Did not check C. Diff after discussion with nurse re; stool consistency  11. Probiotic added 10/4  12.    Started low dose Lasix 10/5 with edema            ___________________________________________________    Attending Physician: Dez Escalona MD

## 2019-10-06 NOTE — PROGRESS NOTES
Oncology End of Shift Note      Bedside shift change report given to John Azul RN (incoming nurse) by Sameul Sandifer (outgoing nurse) on [facility]. Report included the following information SBAR, Kardex and MAR. Shift Summary: Patient received all medications and medication education was provided. Patient was turned throughout the day. Patient received lasix. Issues for Physician to Address:  None. Patient on Cardiac Monitoring?     [] Yes  [x] No    Rhythm:              Sameul Sandifer

## 2019-10-07 LAB
ANION GAP SERPL CALC-SCNC: 4 MMOL/L (ref 5–15)
BASOPHILS # BLD: 0 K/UL (ref 0–0.1)
BASOPHILS NFR BLD: 0 % (ref 0–1)
BUN SERPL-MCNC: 25 MG/DL (ref 6–20)
BUN/CREAT SERPL: 12 (ref 12–20)
CALCIUM SERPL-MCNC: 9.6 MG/DL (ref 8.5–10.1)
CHLORIDE SERPL-SCNC: 112 MMOL/L (ref 97–108)
CO2 SERPL-SCNC: 28 MMOL/L (ref 21–32)
CREAT SERPL-MCNC: 2.16 MG/DL (ref 0.55–1.02)
DIFFERENTIAL METHOD BLD: ABNORMAL
EOSINOPHIL # BLD: 0.4 K/UL (ref 0–0.4)
EOSINOPHIL NFR BLD: 5 % (ref 0–7)
ERYTHROCYTE [DISTWIDTH] IN BLOOD BY AUTOMATED COUNT: 15 % (ref 11.5–14.5)
GLUCOSE SERPL-MCNC: 106 MG/DL (ref 65–100)
HCT VFR BLD AUTO: 26.9 % (ref 35–47)
HGB BLD-MCNC: 8.6 G/DL (ref 11.5–16)
IMM GRANULOCYTES # BLD AUTO: 0 K/UL (ref 0–0.04)
IMM GRANULOCYTES NFR BLD AUTO: 0 % (ref 0–0.5)
LYMPHOCYTES # BLD: 0.5 K/UL (ref 0.8–3.5)
LYMPHOCYTES NFR BLD: 7 % (ref 12–49)
MCH RBC QN AUTO: 30.1 PG (ref 26–34)
MCHC RBC AUTO-ENTMCNC: 32 G/DL (ref 30–36.5)
MCV RBC AUTO: 94.1 FL (ref 80–99)
METAMYELOCYTES NFR BLD MANUAL: 1 %
MONOCYTES # BLD: 0.4 K/UL (ref 0–1)
MONOCYTES NFR BLD: 5 % (ref 5–13)
NEUTS SEG # BLD: 6.4 K/UL (ref 1.8–8)
NEUTS SEG NFR BLD: 82 % (ref 32–75)
NRBC # BLD: 0 K/UL (ref 0–0.01)
NRBC BLD-RTO: 0 PER 100 WBC
PLATELET # BLD AUTO: 262 K/UL (ref 150–400)
PMV BLD AUTO: 10.1 FL (ref 8.9–12.9)
POTASSIUM SERPL-SCNC: 3.2 MMOL/L (ref 3.5–5.1)
RBC # BLD AUTO: 2.86 M/UL (ref 3.8–5.2)
RBC MORPH BLD: ABNORMAL
RBC MORPH BLD: ABNORMAL
SODIUM SERPL-SCNC: 144 MMOL/L (ref 136–145)
WBC # BLD AUTO: 7.8 K/UL (ref 3.6–11)

## 2019-10-07 PROCEDURE — 36415 COLL VENOUS BLD VENIPUNCTURE: CPT

## 2019-10-07 PROCEDURE — 74011250637 HC RX REV CODE- 250/637: Performed by: INTERNAL MEDICINE

## 2019-10-07 PROCEDURE — 77010033678 HC OXYGEN DAILY

## 2019-10-07 PROCEDURE — 74011000258 HC RX REV CODE- 258: Performed by: HOSPITALIST

## 2019-10-07 PROCEDURE — 94760 N-INVAS EAR/PLS OXIMETRY 1: CPT

## 2019-10-07 PROCEDURE — 65270000015 HC RM PRIVATE ONCOLOGY

## 2019-10-07 PROCEDURE — 97530 THERAPEUTIC ACTIVITIES: CPT

## 2019-10-07 PROCEDURE — 74011250637 HC RX REV CODE- 250/637: Performed by: HOSPITALIST

## 2019-10-07 PROCEDURE — 74011250636 HC RX REV CODE- 250/636: Performed by: HOSPITALIST

## 2019-10-07 PROCEDURE — 74011250637 HC RX REV CODE- 250/637: Performed by: FAMILY MEDICINE

## 2019-10-07 PROCEDURE — 85025 COMPLETE CBC W/AUTO DIFF WBC: CPT

## 2019-10-07 PROCEDURE — 74011250636 HC RX REV CODE- 250/636: Performed by: INTERNAL MEDICINE

## 2019-10-07 PROCEDURE — 80048 BASIC METABOLIC PNL TOTAL CA: CPT

## 2019-10-07 PROCEDURE — 97110 THERAPEUTIC EXERCISES: CPT

## 2019-10-07 PROCEDURE — 97535 SELF CARE MNGMENT TRAINING: CPT

## 2019-10-07 RX ORDER — POTASSIUM CHLORIDE 20 MEQ/1
20 TABLET, EXTENDED RELEASE ORAL
Status: COMPLETED | OUTPATIENT
Start: 2019-10-07 | End: 2019-10-07

## 2019-10-07 RX ADMIN — Medication 1 CAPSULE: at 08:28

## 2019-10-07 RX ADMIN — ASPIRIN 81 MG CHEWABLE TABLET 81 MG: 81 TABLET CHEWABLE at 08:27

## 2019-10-07 RX ADMIN — TEMAZEPAM 15 MG: 15 CAPSULE ORAL at 21:39

## 2019-10-07 RX ADMIN — GABAPENTIN 400 MG: 100 CAPSULE ORAL at 17:11

## 2019-10-07 RX ADMIN — Medication 10 ML: at 08:29

## 2019-10-07 RX ADMIN — METOPROLOL TARTRATE 50 MG: 50 TABLET, FILM COATED ORAL at 08:28

## 2019-10-07 RX ADMIN — DILTIAZEM HYDROCHLORIDE 300 MG: 180 CAPSULE, COATED, EXTENDED RELEASE ORAL at 08:28

## 2019-10-07 RX ADMIN — CEFTRIAXONE 1 G: 1 INJECTION, POWDER, FOR SOLUTION INTRAMUSCULAR; INTRAVENOUS at 21:38

## 2019-10-07 RX ADMIN — HEPARIN SODIUM 5000 UNITS: 5000 INJECTION INTRAVENOUS; SUBCUTANEOUS at 08:28

## 2019-10-07 RX ADMIN — GABAPENTIN 400 MG: 100 CAPSULE ORAL at 21:39

## 2019-10-07 RX ADMIN — GABAPENTIN 400 MG: 100 CAPSULE ORAL at 08:28

## 2019-10-07 RX ADMIN — HYDRALAZINE HYDROCHLORIDE 100 MG: 50 TABLET ORAL at 17:11

## 2019-10-07 RX ADMIN — METOPROLOL TARTRATE 50 MG: 50 TABLET, FILM COATED ORAL at 17:11

## 2019-10-07 RX ADMIN — Medication 10 ML: at 21:39

## 2019-10-07 RX ADMIN — PAROXETINE HYDROCHLORIDE 20 MG: 20 TABLET, FILM COATED ORAL at 08:28

## 2019-10-07 RX ADMIN — GABAPENTIN 400 MG: 100 CAPSULE ORAL at 13:00

## 2019-10-07 RX ADMIN — HYDRALAZINE HYDROCHLORIDE 100 MG: 50 TABLET ORAL at 08:28

## 2019-10-07 RX ADMIN — HYDRALAZINE HYDROCHLORIDE 100 MG: 50 TABLET ORAL at 21:39

## 2019-10-07 RX ADMIN — PRAVASTATIN SODIUM 40 MG: 40 TABLET ORAL at 21:39

## 2019-10-07 RX ADMIN — Medication 10 ML: at 13:01

## 2019-10-07 RX ADMIN — CHLORTHALIDONE 25 MG: 25 TABLET ORAL at 08:38

## 2019-10-07 RX ADMIN — FUROSEMIDE 40 MG: 10 INJECTION, SOLUTION INTRAMUSCULAR; INTRAVENOUS at 08:29

## 2019-10-07 RX ADMIN — HEPARIN SODIUM 5000 UNITS: 5000 INJECTION INTRAVENOUS; SUBCUTANEOUS at 21:39

## 2019-10-07 RX ADMIN — POTASSIUM CHLORIDE 20 MEQ: 20 TABLET, EXTENDED RELEASE ORAL at 21:39

## 2019-10-07 RX ADMIN — LISINOPRIL 40 MG: 40 TABLET ORAL at 08:28

## 2019-10-07 NOTE — PROGRESS NOTES
MARC Plan:    -[place]  -[facility] services via [facility]. 15:45 PM Faxed new referral.  Will call to verify receipt of fax. Arik Swanson, MSW  [phone]    15:35 PM  Outbound F/U call to Mary Kate Tierney RN (liasion) re: new order/referral for [facility] services. Writer provided fax # [phone]. Arik Swanson, MSW  [phone]    10:44 AM, Outbound call to Mary Kate Tierney RN/Liasion for [facility]. Writer unable to leave VM d/t mailbox being full.     Arik Swanson, MSW  [phone]

## 2019-10-07 NOTE — PROGRESS NOTES
Infection control was contacted this AM and consulted on the pt's contact status. Selin Cruz, an infection control employee, verbalized that it was OK to remove her from a contact status. Pt has not had diarrhea, her WBC are WNL, and her temperature is WNL.

## 2019-10-07 NOTE — PROGRESS NOTES
Problem: Mobility Impaired (Adult and Pediatric)  Goal: *Acute Goals and Plan of Care (Insert Text)  Description  FUNCTIONAL STATUS PRIOR TO ADMISSION: At baseline patient is wheelchair bound and dependent on others to roll her in wheelchair due to R shoulder arthritis/pain. 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 HOME SUPPORT PRIOR TO ADMISSION: The patient lived [facility] and was assisted by staff or private duty aides for all ADLs, transfers and wheelchair mobility. Physical Therapy Goals  Initiated 10/1/2019  1. Patient will move from supine to sit and sit to supine , scoot up and down and roll side to side in bed with minimal assist/contact guard assist within 7 day(s). 2.  Patient will transfer from bed to chair and chair to bed with moderate assistance x 1 using the least restrictive device within 7 day(s). 3.  Patient will perform sit to stand with moderate assistance x 1 within 7 day(s). Outcome: Progressing Towards Goal  PHYSICAL THERAPY TREATMENT  Patient: Angelo Jackson (65 y.o. female)  Date: 10/7/2019  Diagnosis: CAP (community acquired pneumonia) [J18.9] CAP (community acquired pneumonia)       Precautions: DNR, Fall, Skin, Bed Alarm  Chart, physical therapy assessment, plan of care and goals were reviewed. ASSESSMENT  Patient continues with skilled PT services and is progressing towards goals. Pt participates in unsupported sitting, supported standing, and bed to chair transfer today. Discussed prior level of function with staff at the [facility]; pt reportedly requires maximum assistance for all stand/squat pivot transfers at baseline and confirms non-ambulatory status for patient. Pt with good tolerance to mobility though requires encouragement for initiation.      Current Level of Function Impacting Discharge (mobility/balance): maximum assistance x 2 for transfers    Other factors to consider for discharge: requires 24-hour assistance at baseline; incontinent of stool and urine         PLAN :  Patient continues to benefit from skilled intervention to address the above impairments. Continue treatment per established plan of care. to address goals. Recommendation for discharge: (in order for the patient to meet his/her long term goals)  Therapy up to 5 days/week in SNF setting or intensive home health therapy program and 24-hour assist.    This discharge recommendation:  A follow-up discussion with the attending provider and/or case management is planned    Equipment recommendations for successful discharge (if) home: none       SUBJECTIVE:   Patient stated My feet slip, re: pt fear during transfers. Pt received supine, agreeable to PT and cleared by RN. OBJECTIVE DATA SUMMARY:   Critical Behavior:  Neurologic State: Alert  Orientation Level: Oriented X4  Cognition: Follows commands  Safety/Judgement: Decreased insight into deficits, Decreased awareness of need for assistance, Fall prevention  Functional Mobility Training:  Bed Mobility:  Rolling: Supervision(HOB elevated)  Supine to Sit: Additional time;Bed Modified;Minimum assistance  Sit to Supine: Stand-by assistance(bed flat)  Scooting: Additional time; Moderate assistance        Transfers:  Sit to Stand: Additional time;Maximum assistance;Assist x2; performed x 4. On final stance pt upright for 4 mins with moderate assistance x 2, cues for UE extension, B knee extension anterior weight shift, trunk extension. Stand to Sit: Assist x2; Additional time;Maximum assistance        Bed to Chair: Assist x2;Maximum assistance; Additional time         Total assist for toileting hygiene.            Balance:  Sitting: Without support  Sitting - Static: Good (unsupported)  Sitting - Dynamic: Fair (occasional)  Standing: With support  Standing - Static: Poor  Standing - Dynamic : Poor  Ambulation/Gait Training:         Pt non-ambulatory at baseline. Therapeutic Exercises: Ankle pumps x 10 with frequent cues. Pain Rating:  Denies pain at rest.    Activity Tolerance:   Fair  Please refer to the flowsheet for vital signs taken during this treatment.     After treatment patient left in no apparent distress:   Sitting in chair, Call bell within reach, Bed / chair alarm activated and Caregiver / family present    COMMUNICATION/COLLABORATION:   The patients plan of care was discussed with: Registered Nurse and Rehabilitation Attendant    Ruth Paul PT, DPT   Time Calculation: 33 mins

## 2019-10-07 NOTE — PROGRESS NOTES
Bedside and Verbal shift change report given to Saturnino (oncoming nurse) by Sloane Kramer RN (offgoing nurse). Report included the following information SBAR, Kardex, MAR and Recent Results.

## 2019-10-07 NOTE — PROGRESS NOTES
PROGRESS NOTE    NAME:  Estrella Duncan   :   1935   MRN:   [id number]     Date/Time:  10/7/2019 7:24 AM  Subjective:   History:  Chart reviewed and patient seen and examined and D/W her nurse this AM and D/W her daughter 10/4 PM and all events noted. She was admitted with Pneumonia and a recurrent UTI with Enterococcus. She still has a nonproductive chronic cough but is less SOB w/o other cardiac or respiratory c/o. She has no abdominal pain or other GI/ c/o except she had developed diarrhea 3 times per day, but now resolved and per her nurse it wasn't diarrhea but more loose stools. She is very weak w/o other neurologic c/o, but sat in chair  yesterday. She has no other c/o on complete ROS.       Medications reviewed:  Current Facility-Administered Medications   Medication Dose Route Frequency    furosemide (LASIX) injection 40 mg  40 mg IntraVENous DAILY    lactobac ac& pc-s.therm-b.anim (HANG Q/RISAQUAD)  1 Cap Oral DAILY    levoFLOXacin (LEVAQUIN) 750 mg in D5W IVPB  750 mg IntraVENous Q48H    temazepam (RESTORIL) capsule 15 mg  15 mg Oral QHS    chlorthalidone (HYGROTEN) tablet 25 mg  25 mg Oral DAILY    lisinopril (PRINIVIL, ZESTRIL) tablet 40 mg  40 mg Oral DAILY    hydrALAZINE (APRESOLINE) tablet 100 mg  100 mg Oral TID    influenza vaccine - (6 mos+)(PF) (FLUARIX/FLULAVAL/FLUZONE QUAD) injection 0.5 mL  0.5 mL IntraMUSCular PRIOR TO DISCHARGE    albuterol-ipratropium (DUO-NEB) 2.5 MG-0.5 MG/3 ML  3 mL Nebulization Q4H PRN    sodium chloride (NS) flush 5-40 mL  5-40 mL IntraVENous Q8H    sodium chloride (NS) flush 5-40 mL  5-40 mL IntraVENous PRN    acetaminophen (TYLENOL) tablet 650 mg  650 mg Oral Q4H PRN    ondansetron (ZOFRAN) injection 4 mg  4 mg IntraVENous Q4H PRN    heparin (porcine) injection 5,000 Units  5,000 Units SubCUTAneous Q12H    cefTRIAXone (ROCEPHIN) 1 g in 0.9% sodium chloride (MBP/ADV) 50 mL  1 g IntraVENous Q24H    aspirin chewable tablet 81 mg  81 mg Oral DAILY    dilTIAZem CD (CARDIZEM CD) capsule 300 mg  300 mg Oral DAILY    gabapentin (NEURONTIN) capsule 400 mg  400 mg Oral QID    metoprolol tartrate (LOPRESSOR) tablet 50 mg  50 mg Oral BID    PARoxetine (PAXIL) tablet 20 mg  20 mg Oral DAILY    pravastatin (PRAVACHOL) tablet 40 mg  40 mg Oral QHS        Objective:   Vitals:  Visit Vitals  /42 (BP 1 Location: Left arm, BP Patient Position: At rest)   Pulse 61   Temp 98.5 °F (36.9 °C)   Resp 18   Ht 5' 3\" (1.6 m)   Wt 132 lb 4.4 oz (60 kg)   SpO2 95%   BMI 23.43 kg/m²    O2 Flow Rate (L/min): 3 l/min O2 Device: Nasal cannula Temp (24hrs), Av.5 °F (36.9 °C), Min:97.7 °F (36.5 °C), Max:99 °F (37.2 °C)      Last 24hr Input/Output:    Intake/Output Summary (Last 24 hours) at 10/7/2019 0724  Last data filed at 10/7/2019 06  Gross per 24 hour   Intake    Output 900 ml   Net -900 ml        PHYSICAL EXAM:  General:     Alert, cooperative, no distress, appears stated age. Head:    Normocephalic, without obvious abnormality, atraumatic. Eyes:    Conjunctivae/corneas clear. PERRLA  Nose:   Nares normal. No drainage or sinus tenderness. Throat:     Lips, mucosa, and tongue normal.  No Thrush  Neck:   Supple, symmetrical,  no adenopathy, thyroid: non tender     no carotid bruit and no JVD. Back:     Symmetric,  No CVA tenderness. Lungs:    Decreased BS bilaterally with scattered rhonchi. No Wheezing. No rales. Heart:    Regular rate and rhythm,  no murmur, rub or gallop. Abdomen:    Soft, non-tender. Not distended. Bowel sounds normal. No masses  Extremities:  Extremities normal except some edema in arms, atraumatic, No cyanosis. No edema. No clubbing  Lymph nodes:  Cervical, supraclavicular normal.  Neurologic:  General decreased strength, Alert and oriented X 3.    Skin:                 No rash      Lab Data Reviewed:    Recent Results (from the past 24 hour(s))   METABOLIC PANEL, BASIC    Collection Time: 10/07/19  4:00 AM   Result Value Ref Range    Sodium 144 136 - 145 mmol/L    Potassium 3.2 (L) 3.5 - 5.1 mmol/L    Chloride 112 (H) 97 - 108 mmol/L    CO2 28 21 - 32 mmol/L    Anion gap 4 (L) 5 - 15 mmol/L    Glucose 106 (H) 65 - 100 mg/dL    BUN 25 (H) 6 - 20 MG/DL    Creatinine 2.16 (H) 0.55 - 1.02 MG/DL    BUN/Creatinine ratio 12 12 - 20      GFR est AA 26 (L) >60 ml/min/1.73m2    GFR est non-AA 22 (L) >60 ml/min/1.73m2    Calcium 9.6 8.5 - 10.1 MG/DL   CBC WITH AUTOMATED DIFF    Collection Time: 10/07/19  4:00 AM   Result Value Ref Range    WBC 7.8 3.6 - 11.0 K/uL    RBC 2.86 (L) 3.80 - 5.20 M/uL    HGB 8.6 (L) 11.5 - 16.0 g/dL    HCT 26.9 (L) 35.0 - 47.0 %    MCV 94.1 80.0 - 99.0 FL    MCH 30.1 26.0 - 34.0 PG    MCHC 32.0 30.0 - 36.5 g/dL    RDW 15.0 (H) 11.5 - 14.5 %    PLATELET 495 325 - 792 K/uL    MPV 10.1 8.9 - 12.9 FL    NRBC 0.0 0  WBC    ABSOLUTE NRBC 0.00 0.00 - 0.01 K/uL    NEUTROPHILS 82 (H) 32 - 75 %    LYMPHOCYTES 7 (L) 12 - 49 %    MONOCYTES 5 5 - 13 %    EOSINOPHILS 5 0 - 7 %    BASOPHILS 0 0 - 1 %    METAMYELOCYTES 1 %    IMMATURE GRANULOCYTES 0 0.0 - 0.5 %    ABS. NEUTROPHILS 6.4 1.8 - 8.0 K/UL    ABS. LYMPHOCYTES 0.5 (L) 0.8 - 3.5 K/UL    ABS. MONOCYTES 0.4 0.0 - 1.0 K/UL    ABS. EOSINOPHILS 0.4 0.0 - 0.4 K/UL    ABS. BASOPHILS 0.0 0.0 - 0.1 K/UL    ABS. IMM.  GRANS. 0.0 0.00 - 0.04 K/UL    DF MANUAL      RBC COMMENTS ANISOCYTOSIS  1+        RBC COMMENTS HELMET CELLS  1+             Assessment/Plan:     Principal Problem:    CAP (community acquired pneumonia) (9/26/2019)    Active Problems:    COPD (chronic obstructive pulmonary disease) ([facility] (8/14/2010)      Essential hypertension (7/20/2016)      PAF (paroxysmal atrial fibrillation) ([facility] (3/30/2018)      Moderate protein-calorie malnutrition ([facility] (3/30/2018)      Stage 3 chronic kidney disease ([facility] (11/5/2018)      Recurrent UTI (6/14/2019)      Anemia (8/1/2019)       ___________________________________________________  PLAN:    1. CAP (community acquired pneumonia) (9/26/2019): Continue antibiotics with Rocephin and D/Micah Levaquin  2. UTI: enterococcal. Changed Vanc. to Levaquin as Sens. and some Klebsiella in urine also sens to Levaquin, now off Levaquin  3. CKD III: follow, 25/2.16, w/o significant change from yesterday, (baseline Cr 1.9), Lasix started 10/5 for edema and will follow closely  4. COPD: Stable. Continue prn bronchodilator therapy. 5. P AFib: Seems to be sinus at this itme. 6. Code: DNR.   7.  Hypertension- will continue all her outpatient meds. 8.  Insomnia- will continue her outpatient temazepam.   9.  Repeat CXR 10/3 L effusion with LLL atelectasis, increased L perihilar infiltrate, Repeat yesterday noted some interstitial edema  10    Did not check C. Diff after discussion with nurse re; stool consistency  11. Probiotic added 10/4  12.    Started low dose Lasix 10/5 with edema            ___________________________________________________    Attending Physician: Petra Kelley MD

## 2019-10-07 NOTE — PROGRESS NOTES
Problem: Self Care Deficits Care Plan (Adult)  Goal: *Acute Goals and Plan of Care (Insert Text)  Description    FUNCTIONAL STATUS PRIOR TO ADMISSION: SUKHDEV resident. 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 Says she can complete stand pivot transfer from [facility] to toilet to relieve herself, yet unsure if this is accurate. Can feed self using non-dominant L hand. Sits on shower chair to bathe with significant assistance; can wash UB and face. Needs assist for distal LB aspects and jonathan care. Does not self-propel W/C.      HOME SUPPORT: The patient lived at [facility] with heavy assistance for ADLs. Occupational Therapy Goals    Initiated 10/7/2019  1. Patient will perform self-feeding with Min A using adaptive strategies/AE PRN within 7 day(s). 2.  Patient will perform grooming tasks sitting unsupported EOB for at least 10 minutes with Setup/Supervision within 7 days. 3.  Patient will perform upper body dressing with supervision/set-up using adaptive strategies/AE PRN within 7 day(s). 4.  Patient will perform WC<>toilet transfers with maximum assistance x1 within 7 day(s). Initiated 10/1/2019; Reviewed 10/7/2019  1. Patient will perform self-feeding with Min A using adaptive strategies/AE PRN within 7 day(s). Not met; Continue  2. Patient will perform upper body dressing with supervision/set-up using adaptive strategies/AE PRN within 7 day(s). Not met; Continue  3. Patient will perform WC<>toilet transfers with moderate assistance x1 within 7 day(s). Not met;  Modified     Outcome: Progressing Towards Goal   OCCUPATIONAL THERAPY TREATMENT/WEEKLY RE-EVALUATION  Patient: Isrrael Miller (89 y.o. female)  Date: 10/7/2019  Diagnosis: CAP (community acquired pneumonia) [J18.9] CAP (community acquired pneumonia)      Precautions: DNR, Fall, Skin, Bed Alarm  Chart, occupational therapy assessment, plan of care, and goals were reviewed. ASSESSMENT  Based on the objective data described below, Pt demonstrates mild improvement with activity tolerance and unsupported sitting balance during performance of seated grooming tasks at EOB. She is performing bed mobility with slightly less assistance over prior OT session. Pt completed x3 grooming tasks sitting unsupported EOB for 12 minutes with Supervision/SBA and mod multimodal cueing for maintaining midline and correcting her posture throughout. Anticipate she is close to her baseline this session, yet will continue to treat to ensure safety with toilet transfers and seated ADLs prior to Pt discharging back to [facility] with James Guardado PT. Goals have been modified based on Pt's progress. Current Level of Function Impacting Discharge (ADLs): SBA with bed mobility (HOB elevated); Other factors to consider for discharge: [facility] resident         PLAN :  Goals have been updated based on progression since last assessment. Patient continues to benefit from skilled intervention to address the above impairments. Continue to follow patient 2 times a week to address goals. Recommend with staff: Mobility team for bed<>chair assist; Turn team every 2 hours to minimize risk for skin breakdown    Recommend next OT session: Feeding; UB dressing; Consider trial of drop-arm BSC transfer using sliding board (consult with PT) in order to progress independence/safety with toileting. Recommendation for discharge: (in order for the patient to meet his/her long term goals)  No skilled occupational therapy/ follow up rehabilitation needs identified at this time. Agree with plan for [place] PT at [facility].      This discharge recommendation:  Has been made in collaboration with the attending provider and/or case management    Equipment recommendations for successful discharge (if) home: none for OT       SUBJECTIVE:   Patient stated I'm sleeping better\"    OBJECTIVE DATA SUMMARY: Cognitive/Behavioral Status:  Neurologic State: Alert  Orientation Level: Oriented X4  Cognition: Follows commands             Functional Mobility and Transfers for ADLs:  Bed Mobility:  Rolling: Supervision(HOB elevated)  Supine to Sit: Stand-by assistance; Additional time;Bed Modified(HOB elevated)  Sit to Supine: Stand-by assistance(bed flat)  Scooting: Minimum assistance; Moderate assistance(Mod A following supine<>sit; Min A at EOB scooting fwd)    Transfers:  Sit to Stand: (did not occur)          Balance:  Sitting: Impaired  Sitting - Static: Good (unsupported)  Sitting - Dynamic: Fair (occasional); Good (unsupported)    ADL Intervention:     Pt completed multiple grooming tasks sitting unsupported EOB for 12 minutes this session with mod cueing for maintaining midline, ensuring bilat feet are touching ground and activating core musculature to flex forward during scooting. Educated Pt regarding benefits to intermittently placing R hand on bed for stability while using L hand to complete grooming tasks when she feels unsteady, or pausing during task completion and adjusting her posture for safety. Grooming  Washing Face: Supervision;Stand-by assistance(sitting unsupported EOB)  Brushing Teeth: Supervision;Stand-by assistance(sitting unsupported EOB; used oral swabs and donned dentures)  Cues: Physical assistance;Verbal cues provided; Tactile cues provided(for maintaining midline and feet on ground)    Pain:  None     Activity Tolerance:   Fair and requires rest breaks  Please refer to the flowsheet for vital signs taken during this treatment.     After treatment patient left in no apparent distress:   Supine in bed, Patient positioned in R sidelying for pressure relief, Call bell within reach and Side rails x 3    COMMUNICATION/COLLABORATION:   The patients plan of care was discussed with: Physical Therapist, Registered Nurse and Patient    CORIN Lomeli/L  Time Calculation: 36 mins

## 2019-10-07 NOTE — PROGRESS NOTES
Spoke with Dr. Talia Garcia over the phone and notified him that the pt has a heart rate between 55-60 bpm. MD made aware and ordered to go ahead and given metoprolol even though pt is bradycardic.

## 2019-10-07 NOTE — PROGRESS NOTES
Oncology End of Shift Note      Bedside shift change report given to Belinda Ramos RN (incoming nurse) by Dirk Jimenez (outgoing nurse) on [facility]. Report included the following information SBAR, Kardex and MAR. Shift Summary: Patient received all medications. Chest xray was completed. Patient was cleaned up throughout the day and turned frequently. Issues for Physician to Address:  None. Patient on Cardiac Monitoring?     [] Yes  [x] No    Rhythm:                Dirk Jimenez

## 2019-10-07 NOTE — PROGRESS NOTES
Oncology End of Shift Note      Bedside shift change report given to , RN (incoming nurse) by Jesi Berry (outgoing nurse) on [facility]. Report included the following information SBAR, Kardex, Intake/Output, MAR and Accordion. Shift Summary: slept most of the night, am labs drawn, no stools noted      Issues for Physician to Address:       Patient on Cardiac Monitoring?     [] Yes  [x] No    Rhythm:          Shift Events      Jesi Berry

## 2019-10-07 NOTE — INTERDISCIPLINARY ROUNDS
Oncology Interdisciplinary rounds were held today to discuss patient plan of care and outcomes. The following members were present: Nursing, Physician, Case Management, Pharmacy, and PT/OT Actual Length of Stay: 11 DRG GLOS: 3.3 Expected Length of Stay: 3d 7h 
 
 
 
               Plan            Discharge PT/OT Discharge to [facility] when stable

## 2019-10-08 ENCOUNTER — APPOINTMENT (OUTPATIENT)
Dept: GENERAL RADIOLOGY | Age: 84
DRG: 194 | End: 2019-10-08
Attending: INTERNAL MEDICINE
Payer: MEDICARE

## 2019-10-08 LAB
ANION GAP SERPL CALC-SCNC: 6 MMOL/L (ref 5–15)
BASOPHILS # BLD: 0.1 K/UL (ref 0–0.1)
BASOPHILS NFR BLD: 1 % (ref 0–1)
BUN SERPL-MCNC: 26 MG/DL (ref 6–20)
BUN/CREAT SERPL: 15 (ref 12–20)
CALCIUM SERPL-MCNC: 9.9 MG/DL (ref 8.5–10.1)
CHLORIDE SERPL-SCNC: 107 MMOL/L (ref 97–108)
CO2 SERPL-SCNC: 27 MMOL/L (ref 21–32)
CREAT SERPL-MCNC: 1.75 MG/DL (ref 0.55–1.02)
DIFFERENTIAL METHOD BLD: ABNORMAL
EOSINOPHIL # BLD: 0.4 K/UL (ref 0–0.4)
EOSINOPHIL NFR BLD: 4 % (ref 0–7)
ERYTHROCYTE [DISTWIDTH] IN BLOOD BY AUTOMATED COUNT: 14.9 % (ref 11.5–14.5)
GLUCOSE SERPL-MCNC: 92 MG/DL (ref 65–100)
HCT VFR BLD AUTO: 28.4 % (ref 35–47)
HGB BLD-MCNC: 9.3 G/DL (ref 11.5–16)
IMM GRANULOCYTES # BLD AUTO: 0.3 K/UL (ref 0–0.04)
IMM GRANULOCYTES NFR BLD AUTO: 3 % (ref 0–0.5)
LYMPHOCYTES # BLD: 0.8 K/UL (ref 0.8–3.5)
LYMPHOCYTES NFR BLD: 9 % (ref 12–49)
MCH RBC QN AUTO: 30.2 PG (ref 26–34)
MCHC RBC AUTO-ENTMCNC: 32.7 G/DL (ref 30–36.5)
MCV RBC AUTO: 92.2 FL (ref 80–99)
MONOCYTES # BLD: 1 K/UL (ref 0–1)
MONOCYTES NFR BLD: 11 % (ref 5–13)
NEUTS SEG # BLD: 6.7 K/UL (ref 1.8–8)
NEUTS SEG NFR BLD: 72 % (ref 32–75)
NRBC # BLD: 0 K/UL (ref 0–0.01)
NRBC BLD-RTO: 0 PER 100 WBC
PLATELET # BLD AUTO: 284 K/UL (ref 150–400)
PMV BLD AUTO: 10.4 FL (ref 8.9–12.9)
POTASSIUM SERPL-SCNC: 3.4 MMOL/L (ref 3.5–5.1)
RBC # BLD AUTO: 3.08 M/UL (ref 3.8–5.2)
RBC MORPH BLD: ABNORMAL
SODIUM SERPL-SCNC: 140 MMOL/L (ref 136–145)
WBC # BLD AUTO: 9.3 K/UL (ref 3.6–11)

## 2019-10-08 PROCEDURE — 74011250636 HC RX REV CODE- 250/636: Performed by: HOSPITALIST

## 2019-10-08 PROCEDURE — 71046 X-RAY EXAM CHEST 2 VIEWS: CPT

## 2019-10-08 PROCEDURE — 74011250636 HC RX REV CODE- 250/636: Performed by: INTERNAL MEDICINE

## 2019-10-08 PROCEDURE — 74011250637 HC RX REV CODE- 250/637: Performed by: HOSPITALIST

## 2019-10-08 PROCEDURE — 80048 BASIC METABOLIC PNL TOTAL CA: CPT

## 2019-10-08 PROCEDURE — 74011250637 HC RX REV CODE- 250/637: Performed by: INTERNAL MEDICINE

## 2019-10-08 PROCEDURE — 36415 COLL VENOUS BLD VENIPUNCTURE: CPT

## 2019-10-08 PROCEDURE — 74011000258 HC RX REV CODE- 258: Performed by: HOSPITALIST

## 2019-10-08 PROCEDURE — 74011250637 HC RX REV CODE- 250/637: Performed by: FAMILY MEDICINE

## 2019-10-08 PROCEDURE — 77010033678 HC OXYGEN DAILY

## 2019-10-08 PROCEDURE — 65270000015 HC RM PRIVATE ONCOLOGY

## 2019-10-08 PROCEDURE — 94760 N-INVAS EAR/PLS OXIMETRY 1: CPT

## 2019-10-08 PROCEDURE — 85025 COMPLETE CBC W/AUTO DIFF WBC: CPT

## 2019-10-08 RX ADMIN — DILTIAZEM HYDROCHLORIDE 300 MG: 180 CAPSULE, COATED, EXTENDED RELEASE ORAL at 08:46

## 2019-10-08 RX ADMIN — HYDRALAZINE HYDROCHLORIDE 100 MG: 50 TABLET ORAL at 08:47

## 2019-10-08 RX ADMIN — ASPIRIN 81 MG CHEWABLE TABLET 81 MG: 81 TABLET CHEWABLE at 08:47

## 2019-10-08 RX ADMIN — TEMAZEPAM 15 MG: 15 CAPSULE ORAL at 22:12

## 2019-10-08 RX ADMIN — CEFTRIAXONE 1 G: 1 INJECTION, POWDER, FOR SOLUTION INTRAMUSCULAR; INTRAVENOUS at 22:12

## 2019-10-08 RX ADMIN — HEPARIN SODIUM 5000 UNITS: 5000 INJECTION INTRAVENOUS; SUBCUTANEOUS at 22:12

## 2019-10-08 RX ADMIN — GABAPENTIN 400 MG: 100 CAPSULE ORAL at 08:46

## 2019-10-08 RX ADMIN — GABAPENTIN 400 MG: 100 CAPSULE ORAL at 17:01

## 2019-10-08 RX ADMIN — Medication 10 ML: at 22:13

## 2019-10-08 RX ADMIN — FUROSEMIDE 40 MG: 10 INJECTION, SOLUTION INTRAMUSCULAR; INTRAVENOUS at 08:47

## 2019-10-08 RX ADMIN — LISINOPRIL 40 MG: 40 TABLET ORAL at 08:47

## 2019-10-08 RX ADMIN — Medication 10 ML: at 05:15

## 2019-10-08 RX ADMIN — GABAPENTIN 400 MG: 100 CAPSULE ORAL at 12:53

## 2019-10-08 RX ADMIN — CHLORTHALIDONE 25 MG: 25 TABLET ORAL at 08:46

## 2019-10-08 RX ADMIN — PRAVASTATIN SODIUM 40 MG: 40 TABLET ORAL at 22:12

## 2019-10-08 RX ADMIN — Medication 10 ML: at 14:00

## 2019-10-08 RX ADMIN — METOPROLOL TARTRATE 50 MG: 50 TABLET, FILM COATED ORAL at 08:46

## 2019-10-08 RX ADMIN — GABAPENTIN 400 MG: 100 CAPSULE ORAL at 22:12

## 2019-10-08 RX ADMIN — PAROXETINE HYDROCHLORIDE 20 MG: 20 TABLET, FILM COATED ORAL at 08:47

## 2019-10-08 RX ADMIN — HEPARIN SODIUM 5000 UNITS: 5000 INJECTION INTRAVENOUS; SUBCUTANEOUS at 08:47

## 2019-10-08 RX ADMIN — HYDRALAZINE HYDROCHLORIDE 100 MG: 50 TABLET ORAL at 22:12

## 2019-10-08 RX ADMIN — HYDRALAZINE HYDROCHLORIDE 100 MG: 50 TABLET ORAL at 17:01

## 2019-10-08 RX ADMIN — Medication 1 CAPSULE: at 08:47

## 2019-10-08 RX ADMIN — METOPROLOL TARTRATE 50 MG: 50 TABLET, FILM COATED ORAL at 17:01

## 2019-10-08 NOTE — PROGRESS NOTES
Oncology End of Shift Note      Bedside shift change report given to Cheo Damian (incoming nurse) by Calvin Box (outgoing nurse) on [facility]. Report included the following information SBAR, Kardex and MAR. Shift Summary: Patient received all medications and medication education was provided. Chest x ray was completed. Patient was cleaned up throughout the shift. Patient tolerated care well. Issues for Physician to Address:  None. Patient on Cardiac Monitoring?     [] Yes  [x] No    Rhythm:              Calvin Box

## 2019-10-08 NOTE — PROGRESS NOTES
PROGRESS NOTE    NAME:  Rosas Melendez   :   1935   MRN:   [id number]     Date/Time:  10/8/2019 7:06 AM  Subjective:   History:  Chart reviewed and patient seen and examined and D/W her nurse this AM and D/W her daughter 10/4 PM and all events noted. She was admitted with Pneumonia and a recurrent UTI with Enterococcus. She still has a mild nonproductive chronic cough but is less SOB w/o other cardiac or respiratory c/o. She has no abdominal pain or other GI/ c/o except she had developed diarrhea 3 times per day, but now resolved and per her nurse it wasn't diarrhea but more loose stools. She is very weak w/o other neurologic c/o, but sat in chair  yesterday. She has no other c/o on complete ROS.       Medications reviewed:  Current Facility-Administered Medications   Medication Dose Route Frequency    furosemide (LASIX) injection 40 mg  40 mg IntraVENous DAILY    lactobac ac& pc-s.therm-b.anim (HANG Q/RISAQUAD)  1 Cap Oral DAILY    temazepam (RESTORIL) capsule 15 mg  15 mg Oral QHS    chlorthalidone (HYGROTEN) tablet 25 mg  25 mg Oral DAILY    lisinopril (PRINIVIL, ZESTRIL) tablet 40 mg  40 mg Oral DAILY    hydrALAZINE (APRESOLINE) tablet 100 mg  100 mg Oral TID    influenza vaccine - (6 mos+)(PF) (FLUARIX/FLULAVAL/FLUZONE QUAD) injection 0.5 mL  0.5 mL IntraMUSCular PRIOR TO DISCHARGE    albuterol-ipratropium (DUO-NEB) 2.5 MG-0.5 MG/3 ML  3 mL Nebulization Q4H PRN    sodium chloride (NS) flush 5-40 mL  5-40 mL IntraVENous Q8H    sodium chloride (NS) flush 5-40 mL  5-40 mL IntraVENous PRN    acetaminophen (TYLENOL) tablet 650 mg  650 mg Oral Q4H PRN    ondansetron (ZOFRAN) injection 4 mg  4 mg IntraVENous Q4H PRN    heparin (porcine) injection 5,000 Units  5,000 Units SubCUTAneous Q12H    cefTRIAXone (ROCEPHIN) 1 g in 0.9% sodium chloride (MBP/ADV) 50 mL  1 g IntraVENous Q24H    aspirin chewable tablet 81 mg  81 mg Oral DAILY    dilTIAZem CD (CARDIZEM CD) capsule 300 mg  300 mg Oral DAILY    gabapentin (NEURONTIN) capsule 400 mg  400 mg Oral QID    metoprolol tartrate (LOPRESSOR) tablet 50 mg  50 mg Oral BID    PARoxetine (PAXIL) tablet 20 mg  20 mg Oral DAILY    pravastatin (PRAVACHOL) tablet 40 mg  40 mg Oral QHS        Objective:   Vitals:  Visit Vitals  /44 (BP 1 Location: Left arm, BP Patient Position: At rest)   Pulse 65   Temp 98.8 °F (37.1 °C)   Resp 20   Ht 5' 3\" (1.6 m)   Wt 132 lb 4.4 oz (60 kg)   SpO2 95%   BMI 23.43 kg/m²    O2 Flow Rate (L/min): 3 l/min O2 Device: Nasal cannula Temp (24hrs), Av.7 °F (37.1 °C), Min:98 °F (36.7 °C), Max:99.2 °F (37.3 °C)      Last 24hr Input/Output:    Intake/Output Summary (Last 24 hours) at 10/8/2019 0706  Last data filed at 10/7/2019 2058  Gross per 24 hour   Intake 250 ml   Output 400 ml   Net -150 ml        PHYSICAL EXAM:  General:     Alert, cooperative, no distress, appears stated age. Head:    Normocephalic, without obvious abnormality, atraumatic. Eyes:    Conjunctivae/corneas clear. PERRLA  Nose:   Nares normal. No drainage or sinus tenderness. Throat:     Lips, mucosa, and tongue normal.  No Thrush  Neck:   Supple, symmetrical,  no adenopathy, thyroid: non tender     no carotid bruit and no JVD. Back:     Symmetric,  No CVA tenderness. Lungs:    Decreased BS bilaterally with scattered rhonchi. No Wheezing. No rales. Heart:    Regular rate and rhythm,  no murmur, rub or gallop. Abdomen:    Soft, non-tender. Not distended. Bowel sounds normal. No masses  Extremities:  Extremities normal except some edema in arms, atraumatic, No cyanosis. No edema. No clubbing  Lymph nodes:  Cervical, supraclavicular normal.  Neurologic:  General decreased strength, Alert and oriented X 3.    Skin:                 No rash      Lab Data Reviewed:    Recent Results (from the past 24 hour(s))   METABOLIC PANEL, BASIC    Collection Time: 10/08/19  1:44 AM   Result Value Ref Range    Sodium 140 136 - 145 mmol/L    Potassium 3.4 (L) 3.5 - 5.1 mmol/L    Chloride 107 97 - 108 mmol/L    CO2 27 21 - 32 mmol/L    Anion gap 6 5 - 15 mmol/L    Glucose 92 65 - 100 mg/dL    BUN 26 (H) 6 - 20 MG/DL    Creatinine 1.75 (H) 0.55 - 1.02 MG/DL    BUN/Creatinine ratio 15 12 - 20      GFR est AA 34 (L) >60 ml/min/1.73m2    GFR est non-AA 28 (L) >60 ml/min/1.73m2    Calcium 9.9 8.5 - 10.1 MG/DL   CBC WITH AUTOMATED DIFF    Collection Time: 10/08/19  1:44 AM   Result Value Ref Range    WBC 9.3 3.6 - 11.0 K/uL    RBC 3.08 (L) 3.80 - 5.20 M/uL    HGB 9.3 (L) 11.5 - 16.0 g/dL    HCT 28.4 (L) 35.0 - 47.0 %    MCV 92.2 80.0 - 99.0 FL    MCH 30.2 26.0 - 34.0 PG    MCHC 32.7 30.0 - 36.5 g/dL    RDW 14.9 (H) 11.5 - 14.5 %    PLATELET 474 655 - 009 K/uL    MPV 10.4 8.9 - 12.9 FL    NRBC 0.0 0  WBC    ABSOLUTE NRBC 0.00 0.00 - 0.01 K/uL    NEUTROPHILS 72 32 - 75 %    LYMPHOCYTES 9 (L) 12 - 49 %    MONOCYTES 11 5 - 13 %    EOSINOPHILS 4 0 - 7 %    BASOPHILS 1 0 - 1 %    IMMATURE GRANULOCYTES 3 (H) 0.0 - 0.5 %    ABS. NEUTROPHILS 6.7 1.8 - 8.0 K/UL    ABS. LYMPHOCYTES 0.8 0.8 - 3.5 K/UL    ABS. MONOCYTES 1.0 0.0 - 1.0 K/UL    ABS. EOSINOPHILS 0.4 0.0 - 0.4 K/UL    ABS. BASOPHILS 0.1 0.0 - 0.1 K/UL    ABS. IMM.  GRANS. 0.3 (H) 0.00 - 0.04 K/UL    DF AUTOMATED      RBC COMMENTS ANISOCYTOSIS  1+        RBC COMMENTS HYPOCHROMIA  1+        RBC COMMENTS OVALOCYTES  1+        RBC COMMENTS MACROCYTOSIS  1+             Assessment/Plan:     Principal Problem:    CAP (community acquired pneumonia) (9/26/2019)    Active Problems:    COPD (chronic obstructive pulmonary disease) ([facility] (8/14/2010)      Essential hypertension (7/20/2016)      PAF (paroxysmal atrial fibrillation) ([facility] (3/30/2018)      Moderate protein-calorie malnutrition ([facility] (3/30/2018)      Stage 3 chronic kidney disease ([facility] (11/5/2018)      Recurrent UTI (6/14/2019)      Anemia (8/1/2019)       ___________________________________________________  PLAN:    1. CAP (community acquired pneumonia) (9/26/2019): Continue antibiotics with Rocephin and D/Micah Levaquin  2. UTI: enterococcal. Changed Vanc. to Levaquin as Sens. and some Klebsiella in urine also sens to Levaquin, now off Levaquin  3. CKD III: follow, 26/1.75, w/o significant change from yesterday, (baseline Cr 1.9), Lasix started 10/5 for edema and will follow closely  4. COPD: Stable. Continue prn bronchodilator therapy. 5. P AFib: Seems to be sinus at this itme. 6. Code: DNR.   7.  Hypertension- will continue all her outpatient meds. 8.  Insomnia- will continue her outpatient temazepam.   9.  Repeat CXR 10/3 L effusion with LLL atelectasis, increased L perihilar infiltrate, Repeat on 10/6 noted some interstitial edema so will re-check today  10    Did not check C. Diff after discussion with nurse re; stool consistency  11. Probiotic added 10/4  12.    Started low dose Lasix 10/5 with edema            ___________________________________________________    Attending Physician: Petra Kelley MD

## 2019-10-08 NOTE — PROGRESS NOTES
PT note:     Chart reviewed and spoke with nursing. Patient currently off the floor for a procedure per nursing. Will defer and continue to follow. Recommending return to SNF at discharge.      Curtis Later, PT, DPT

## 2019-10-08 NOTE — PROGRESS NOTES
Spiritual Care Assessment/Progress Note  [facility]      NAME: Ruthy Turpin      MRN: [id number]  AGE: 80 y.o.  SEX: female  [place] Affiliation: Evangelical   Language: English     10/8/2019     Total Time (in minutes): 15     Spiritual Assessment begun in [facility] through conversation with:         [x]Patient        [] Family    [] Friend(s)        Reason for Consult: Initial/Spiritual assessment, patient floor     Spiritual beliefs: (Please include comment if needed)     [x] Identifies with a jairon tradition:         [] Supported by a jairon community:            [] Claims no spiritual orientation:           [] Seeking spiritual identity:                [] Adheres to an individual form of spirituality:           [] Not able to assess:                           Identified resources for coping:      [x] Prayer                               [] Music                  [] Guided Imagery     [x] Family/friends                 [] Pet visits     [] Devotional reading                         [] Unknown     [] Other:                                              Interventions offered during this visit: (See comments for more details)    Patient Interventions: Affirmation of jairon, Affirmation of emotions/emotional suffering, Iconic (affirming the presence of God/Higher Power), Initial/Spiritual assessment, patient floor, Normalization of emotional/spiritual concerns, Prayer (assurance of)           Plan of Care:     [x] Support spiritual and/or cultural needs    [] Support AMD and/or advance care planning process      [] Support grieving process   [] Coordinate Rites and/or Rituals    [] Coordination with community clergy   [x] No spiritual needs identified at this time   [] Detailed Plan of Care below (See Comments)  [] Make referral to Music Therapy  [] Make referral to Pet Therapy     [] Make referral to Addiction services  [] Make referral to [facility]  [] Make referral to Spiritual Care Partner  [] No future visits requested        [x] Follow up visits as needed     Comments   Initial visit on Oncology for spiritual assessment. No family/friends present. Provided pastoral presence and support as patient shared briefly about her illness. Family visits regularly, per patient. She acknowledged having good spiritual support and but expressed no concerns at this time. Gave patient a prayer blanket and explained its meeting. Patient was receptive to assurance of prayer. Advised patient of  availability.     OMAIRA Cabral, [facility], [address] [place] [address] Avenue    [address] Paging Service  [phone])

## 2019-10-08 NOTE — PROGRESS NOTES
OT Note:    Chart reviewed and spoke with RN. Pt currently PARKER for chest xray. Will defer and continue to follow.      [facility], OTR/L

## 2019-10-09 LAB
ANION GAP SERPL CALC-SCNC: 2 MMOL/L (ref 5–15)
BASOPHILS # BLD: 0.1 K/UL (ref 0–0.1)
BASOPHILS NFR BLD: 1 % (ref 0–1)
BUN SERPL-MCNC: 27 MG/DL (ref 6–20)
BUN/CREAT SERPL: 14 (ref 12–20)
CALCIUM SERPL-MCNC: 10.1 MG/DL (ref 8.5–10.1)
CHLORIDE SERPL-SCNC: 109 MMOL/L (ref 97–108)
CO2 SERPL-SCNC: 30 MMOL/L (ref 21–32)
CREAT SERPL-MCNC: 1.91 MG/DL (ref 0.55–1.02)
DIFFERENTIAL METHOD BLD: ABNORMAL
EOSINOPHIL # BLD: 0.3 K/UL (ref 0–0.4)
EOSINOPHIL NFR BLD: 4 % (ref 0–7)
ERYTHROCYTE [DISTWIDTH] IN BLOOD BY AUTOMATED COUNT: 14.9 % (ref 11.5–14.5)
GLUCOSE SERPL-MCNC: 106 MG/DL (ref 65–100)
HCT VFR BLD AUTO: 29.3 % (ref 35–47)
HGB BLD-MCNC: 9.1 G/DL (ref 11.5–16)
IMM GRANULOCYTES # BLD AUTO: 0.3 K/UL (ref 0–0.04)
IMM GRANULOCYTES NFR BLD AUTO: 3 % (ref 0–0.5)
LYMPHOCYTES # BLD: 0.8 K/UL (ref 0.8–3.5)
LYMPHOCYTES NFR BLD: 9 % (ref 12–49)
MCH RBC QN AUTO: 29.2 PG (ref 26–34)
MCHC RBC AUTO-ENTMCNC: 31.1 G/DL (ref 30–36.5)
MCV RBC AUTO: 93.9 FL (ref 80–99)
MONOCYTES # BLD: 1.1 K/UL (ref 0–1)
MONOCYTES NFR BLD: 13 % (ref 5–13)
NEUTS SEG # BLD: 6 K/UL (ref 1.8–8)
NEUTS SEG NFR BLD: 70 % (ref 32–75)
NRBC # BLD: 0 K/UL (ref 0–0.01)
NRBC BLD-RTO: 0 PER 100 WBC
PLATELET # BLD AUTO: 257 K/UL (ref 150–400)
PMV BLD AUTO: 10.5 FL (ref 8.9–12.9)
POTASSIUM SERPL-SCNC: 3.3 MMOL/L (ref 3.5–5.1)
RBC # BLD AUTO: 3.12 M/UL (ref 3.8–5.2)
RBC MORPH BLD: ABNORMAL
SODIUM SERPL-SCNC: 141 MMOL/L (ref 136–145)
WBC # BLD AUTO: 8.6 K/UL (ref 3.6–11)

## 2019-10-09 PROCEDURE — 74011250637 HC RX REV CODE- 250/637: Performed by: HOSPITALIST

## 2019-10-09 PROCEDURE — 80048 BASIC METABOLIC PNL TOTAL CA: CPT

## 2019-10-09 PROCEDURE — 97535 SELF CARE MNGMENT TRAINING: CPT

## 2019-10-09 PROCEDURE — 74011250636 HC RX REV CODE- 250/636: Performed by: INTERNAL MEDICINE

## 2019-10-09 PROCEDURE — 74011250637 HC RX REV CODE- 250/637: Performed by: FAMILY MEDICINE

## 2019-10-09 PROCEDURE — 74011250636 HC RX REV CODE- 250/636: Performed by: HOSPITALIST

## 2019-10-09 PROCEDURE — 97530 THERAPEUTIC ACTIVITIES: CPT

## 2019-10-09 PROCEDURE — 85025 COMPLETE CBC W/AUTO DIFF WBC: CPT

## 2019-10-09 PROCEDURE — 36415 COLL VENOUS BLD VENIPUNCTURE: CPT

## 2019-10-09 PROCEDURE — 65270000015 HC RM PRIVATE ONCOLOGY

## 2019-10-09 PROCEDURE — 94760 N-INVAS EAR/PLS OXIMETRY 1: CPT

## 2019-10-09 PROCEDURE — 74011250637 HC RX REV CODE- 250/637: Performed by: INTERNAL MEDICINE

## 2019-10-09 PROCEDURE — 77010033678 HC OXYGEN DAILY

## 2019-10-09 RX ORDER — LEVOFLOXACIN 5 MG/ML
500 INJECTION, SOLUTION INTRAVENOUS EVERY 24 HOURS
Status: DISCONTINUED | OUTPATIENT
Start: 2019-10-09 | End: 2019-10-09 | Stop reason: DRUGHIGH

## 2019-10-09 RX ORDER — POTASSIUM CHLORIDE 1.5 G/1.77G
20 POWDER, FOR SOLUTION ORAL 2 TIMES DAILY WITH MEALS
Status: DISCONTINUED | OUTPATIENT
Start: 2019-10-09 | End: 2019-10-11 | Stop reason: HOSPADM

## 2019-10-09 RX ORDER — LEVOFLOXACIN 5 MG/ML
500 INJECTION, SOLUTION INTRAVENOUS
Status: DISCONTINUED | OUTPATIENT
Start: 2019-10-11 | End: 2019-10-10

## 2019-10-09 RX ORDER — LEVOFLOXACIN 5 MG/ML
750 INJECTION, SOLUTION INTRAVENOUS ONCE
Status: COMPLETED | OUTPATIENT
Start: 2019-10-09 | End: 2019-10-09

## 2019-10-09 RX ADMIN — POTASSIUM CHLORIDE 20 MEQ: 1.5 POWDER, FOR SOLUTION ORAL at 17:37

## 2019-10-09 RX ADMIN — PRAVASTATIN SODIUM 40 MG: 40 TABLET ORAL at 22:38

## 2019-10-09 RX ADMIN — PAROXETINE HYDROCHLORIDE 20 MG: 20 TABLET, FILM COATED ORAL at 08:26

## 2019-10-09 RX ADMIN — GABAPENTIN 400 MG: 100 CAPSULE ORAL at 22:37

## 2019-10-09 RX ADMIN — GABAPENTIN 400 MG: 100 CAPSULE ORAL at 17:37

## 2019-10-09 RX ADMIN — DILTIAZEM HYDROCHLORIDE 300 MG: 180 CAPSULE, COATED, EXTENDED RELEASE ORAL at 08:27

## 2019-10-09 RX ADMIN — METOPROLOL TARTRATE 50 MG: 50 TABLET, FILM COATED ORAL at 08:26

## 2019-10-09 RX ADMIN — METOPROLOL TARTRATE 50 MG: 50 TABLET, FILM COATED ORAL at 17:37

## 2019-10-09 RX ADMIN — LEVOFLOXACIN 750 MG: 5 INJECTION, SOLUTION INTRAVENOUS at 08:23

## 2019-10-09 RX ADMIN — Medication 10 ML: at 22:38

## 2019-10-09 RX ADMIN — TEMAZEPAM 15 MG: 15 CAPSULE ORAL at 22:37

## 2019-10-09 RX ADMIN — HYDRALAZINE HYDROCHLORIDE 100 MG: 50 TABLET ORAL at 17:37

## 2019-10-09 RX ADMIN — HYDRALAZINE HYDROCHLORIDE 100 MG: 50 TABLET ORAL at 22:36

## 2019-10-09 RX ADMIN — CHLORTHALIDONE 25 MG: 25 TABLET ORAL at 08:26

## 2019-10-09 RX ADMIN — Medication 1 CAPSULE: at 08:26

## 2019-10-09 RX ADMIN — Medication 10 ML: at 13:07

## 2019-10-09 RX ADMIN — FUROSEMIDE 40 MG: 10 INJECTION, SOLUTION INTRAMUSCULAR; INTRAVENOUS at 08:34

## 2019-10-09 RX ADMIN — ASPIRIN 81 MG CHEWABLE TABLET 81 MG: 81 TABLET CHEWABLE at 08:26

## 2019-10-09 RX ADMIN — HEPARIN SODIUM 5000 UNITS: 5000 INJECTION INTRAVENOUS; SUBCUTANEOUS at 08:27

## 2019-10-09 RX ADMIN — POTASSIUM CHLORIDE 20 MEQ: 1.5 POWDER, FOR SOLUTION ORAL at 08:26

## 2019-10-09 RX ADMIN — HEPARIN SODIUM 5000 UNITS: 5000 INJECTION INTRAVENOUS; SUBCUTANEOUS at 22:38

## 2019-10-09 RX ADMIN — GABAPENTIN 400 MG: 100 CAPSULE ORAL at 08:27

## 2019-10-09 RX ADMIN — Medication 10 ML: at 06:30

## 2019-10-09 RX ADMIN — GABAPENTIN 400 MG: 100 CAPSULE ORAL at 13:06

## 2019-10-09 RX ADMIN — LISINOPRIL 40 MG: 40 TABLET ORAL at 08:27

## 2019-10-09 RX ADMIN — HYDRALAZINE HYDROCHLORIDE 100 MG: 50 TABLET ORAL at 08:26

## 2019-10-09 NOTE — PROGRESS NOTES
PROGRESS NOTE    NAME:  Laura Georges   :   1935   MRN:   [id number]     Date/Time:  10/9/2019 7:02 AM  Subjective:   History:  Chart reviewed and patient seen and examined and D/W her nurse this AM and D/W her daughter 10/4 PM and all events noted. She was admitted with Pneumonia and a recurrent UTI with Enterococcus. She still has a mild nonproductive chronic cough but is less SOB w/o other cardiac or respiratory c/o. She continues with marked edema both forearms w/o pain. She has no abdominal pain or other GI/ c/o. She is very weak w/o other neurologic c/o, but sat in chair  yesterday. She has no other c/o on complete ROS.       Medications reviewed:  Current Facility-Administered Medications   Medication Dose Route Frequency    furosemide (LASIX) injection 40 mg  40 mg IntraVENous DAILY    lactobac ac& pc-s.therm-b.anim (HANG Q/RISAQUAD)  1 Cap Oral DAILY    temazepam (RESTORIL) capsule 15 mg  15 mg Oral QHS    chlorthalidone (HYGROTEN) tablet 25 mg  25 mg Oral DAILY    lisinopril (PRINIVIL, ZESTRIL) tablet 40 mg  40 mg Oral DAILY    hydrALAZINE (APRESOLINE) tablet 100 mg  100 mg Oral TID    influenza vaccine 2019- (6 mos+)(PF) (FLUARIX/FLULAVAL/FLUZONE QUAD) injection 0.5 mL  0.5 mL IntraMUSCular PRIOR TO DISCHARGE    albuterol-ipratropium (DUO-NEB) 2.5 MG-0.5 MG/3 ML  3 mL Nebulization Q4H PRN    sodium chloride (NS) flush 5-40 mL  5-40 mL IntraVENous Q8H    sodium chloride (NS) flush 5-40 mL  5-40 mL IntraVENous PRN    acetaminophen (TYLENOL) tablet 650 mg  650 mg Oral Q4H PRN    ondansetron (ZOFRAN) injection 4 mg  4 mg IntraVENous Q4H PRN    heparin (porcine) injection 5,000 Units  5,000 Units SubCUTAneous Q12H    cefTRIAXone (ROCEPHIN) 1 g in 0.9% sodium chloride (MBP/ADV) 50 mL  1 g IntraVENous Q24H    aspirin chewable tablet 81 mg  81 mg Oral DAILY    dilTIAZem CD (CARDIZEM CD) capsule 300 mg  300 mg Oral DAILY    gabapentin (NEURONTIN) capsule 400 mg  400 mg Oral QID  metoprolol tartrate (LOPRESSOR) tablet 50 mg  50 mg Oral BID    PARoxetine (PAXIL) tablet 20 mg  20 mg Oral DAILY    pravastatin (PRAVACHOL) tablet 40 mg  40 mg Oral QHS        Objective:   Vitals:  Visit Vitals  /49 (BP 1 Location: Left arm, BP Patient Position: At rest)   Pulse 74   Temp 98.4 °F (36.9 °C)   Resp 16   Ht 5' 3\" (1.6 m)   Wt 132 lb 4.4 oz (60 kg)   SpO2 96%   BMI 23.43 kg/m²    O2 Flow Rate (L/min): 2 l/min O2 Device: Nasal cannula Temp (24hrs), Av °F (37.2 °C), Min:98.4 °F (36.9 °C), Max:99.7 °F (37.6 °C)      Last 24hr Input/Output:    Intake/Output Summary (Last 24 hours) at 10/9/2019 0702  Last data filed at 10/8/2019 1919  Gross per 24 hour   Intake 50 ml   Output    Net 50 ml        PHYSICAL EXAM:  General:     Alert, cooperative, no distress, appears stated age. Head:    Normocephalic, without obvious abnormality, atraumatic. Eyes:    Conjunctivae/corneas clear. PERRLA  Nose:   Nares normal. No drainage or sinus tenderness. Throat:     Lips, mucosa, and tongue normal.  No Thrush  Neck:   Supple, symmetrical,  no adenopathy, thyroid: non tender     no carotid bruit and no JVD. Back:     Symmetric,  No CVA tenderness. Lungs:    Decreased BS bilaterally with scattered rhonchi. No Wheezing. No rales. Heart:    Regular rate and rhythm,  no murmur, rub or gallop. Abdomen:    Soft, non-tender. Not distended. Bowel sounds normal. No masses  Extremities:  Extremities normal except some edema in arms bilateral w/o change, atraumatic, No cyanosis. No edema. No clubbing  Lymph nodes:  Cervical, supraclavicular normal.  Neurologic:  General decreased strength, Alert and oriented X 3.    Skin:                 No rash      Lab Data Reviewed:    Recent Results (from the past 24 hour(s))   METABOLIC PANEL, BASIC    Collection Time: 10/09/19 12:53 AM   Result Value Ref Range    Sodium 141 136 - 145 mmol/L    Potassium 3.3 (L) 3.5 - 5.1 mmol/L    Chloride 109 (H) 97 - 108 mmol/L CO2 30 21 - 32 mmol/L    Anion gap 2 (L) 5 - 15 mmol/L    Glucose 106 (H) 65 - 100 mg/dL    BUN 27 (H) 6 - 20 MG/DL    Creatinine 1.91 (H) 0.55 - 1.02 MG/DL    BUN/Creatinine ratio 14 12 - 20      GFR est AA 30 (L) >60 ml/min/1.73m2    GFR est non-AA 25 (L) >60 ml/min/1.73m2    Calcium 10.1 8.5 - 10.1 MG/DL   CBC WITH AUTOMATED DIFF    Collection Time: 10/09/19 12:53 AM   Result Value Ref Range    WBC 8.6 3.6 - 11.0 K/uL    RBC 3.12 (L) 3.80 - 5.20 M/uL    HGB 9.1 (L) 11.5 - 16.0 g/dL    HCT 29.3 (L) 35.0 - 47.0 %    MCV 93.9 80.0 - 99.0 FL    MCH 29.2 26.0 - 34.0 PG    MCHC 31.1 30.0 - 36.5 g/dL    RDW 14.9 (H) 11.5 - 14.5 %    PLATELET 221 918 - 648 K/uL    MPV 10.5 8.9 - 12.9 FL    NRBC 0.0 0  WBC    ABSOLUTE NRBC 0.00 0.00 - 0.01 K/uL    NEUTROPHILS 70 32 - 75 %    LYMPHOCYTES 9 (L) 12 - 49 %    MONOCYTES 13 5 - 13 %    EOSINOPHILS 4 0 - 7 %    BASOPHILS 1 0 - 1 %    IMMATURE GRANULOCYTES 3 (H) 0.0 - 0.5 %    ABS. NEUTROPHILS 6.0 1.8 - 8.0 K/UL    ABS. LYMPHOCYTES 0.8 0.8 - 3.5 K/UL    ABS. MONOCYTES 1.1 (H) 0.0 - 1.0 K/UL    ABS. EOSINOPHILS 0.3 0.0 - 0.4 K/UL    ABS. BASOPHILS 0.1 0.0 - 0.1 K/UL    ABS. IMM. GRANS. 0.3 (H) 0.00 - 0.04 K/UL    DF SMEAR SCANNED      RBC COMMENTS NORMOCYTIC, NORMOCHROMIC           Assessment/Plan:     Principal Problem:    CAP (community acquired pneumonia) (9/26/2019)    Active Problems:    COPD (chronic obstructive pulmonary disease) ([address] (8/14/2010)      Essential hypertension (7/20/2016)      PAF (paroxysmal atrial fibrillation) ([facility] (3/30/2018)      Moderate protein-calorie malnutrition ([facility] (3/30/2018)      Stage 3 chronic kidney disease ([facility] (11/5/2018)      Recurrent UTI (6/14/2019)      Anemia (8/1/2019)       ___________________________________________________  PLAN:    1. CAP (community acquired pneumonia) (9/26/2019): Continue antibiotics with change back to every day Levaquin as infiltrate on CXR slightly worse although WBC remains normal  2.  UTI: enterococcal and some Klebsiella in urine also sens to Levaquin  3. CKD III: follow, 27/1.91, w/o significant change from yesterday, (baseline Cr 1.9), Lasix started 10/5 for edema and will follow closely  4. COPD: Stable. Continue prn bronchodilator therapy. 5. P AFib: Seems to be sinus at this itme. 6. Replete K, 3.3 today  7. Hypertension- will continue all her outpatient meds. 8.  Insomnia- will continue her outpatient temazepam.   9.  Repeat CXR 10/3 L effusion with LLL atelectasis, increased L perihilar infiltrate, Repeat on 10/6 noted some interstitial edema so will re-check yesterday with improved edema  10    Did not check C. Diff after discussion with nurse re; stool consistency  11. Probiotic added 10/4  12.    Started low dose Lasix 10/5 with edema  13    Warm compresses to forearms            ___________________________________________________    Attending Physician: Giuseppe Urena MD

## 2019-10-09 NOTE — PROGRESS NOTES
Pharmacy Automatic Renal Dosing Protocol - Antimicrobials    Indication for Antimicrobials: UTI/ CAP     Current Regimen of Each Antimicrobial:  Levaquin 500mg IV q24h (Start Date 10/9; Day # 1)    Previous Antimicrobial Therapy:  Levofloxacin 750 mg every 48 hours (Start Date 10/2; through 10/6)  Vancomycin 1.25g ONCE, then 1gm IV  q36h (Start Date 19; Day # 5)  Zithromax 500 mg q24h (Start Date 19; Day # 5)  Ceftriaxone 1 g IV every 24 hours (Start Date ; Day # 12)    Significant Cultures:   : Blood = NG - Final   : Urine = >100k E.faecalis D; 1k K.pna, pan-sens (FINAL)     Radiology / Imaging results: (X-ray, CT scan or MRI):   : CXR: Left perihilar consolidation likely represents pneumonia. 10/8: CXR:   1. Findings suggestive of infiltration involving the left lung base and the left  midlung. 2. Slight interval improvement of the congestive change    Labs:  Recent Labs     10/09/19  0053 10/08/19  0144 10/07/19  0400   CREA 1.91* 1.75* 2.16*   BUN 27* 26* 25*   WBC 8.6 9.3 7.8     Temp (24hrs), Av °F (37.2 °C), Min:98.4 °F (36.9 °C), Max:99.7 °F (37.6 °C)    Creatinine Clearance (mL/min) or Dialysis: 18.5 mL/min    Impression/Plan:   Change Levaquin dose to 750mg IV x 1, followed by 500mg IV q48h  Antimicrobial stop date TBD     Pharmacy will follow daily and adjust medications as appropriate for renal function and/or serum levels. Thank you,  Mau Sers    Recommended duration of therapy  http://[facility]/[place]/[facility]/Pharmacy/Clinical%20Companion/Duration%20of%20ABX%20therapy. docx    Renal Dosing  http://[facility]/[place]/[facility]/Pharmacy/Clinical%20Companion/Renal%[email or web address]. pdf

## 2019-10-09 NOTE — PROGRESS NOTES
Oncology End of Shift Note      Bedside shift change report given to Marly Jim RN (incoming nurse) by Sameul Sandifer (outgoing nurse) on [facility]. Report included the following information SBAR, Kardex and MAR. Shift Summary: Patient received all medications. Patient sat up in chair for lunch. Patient was frequently turned throughout the shift. Patient was cleaned up throughout the shift. IV antibiotics were given. Issues for Physician to Address:  None. Patient on Cardiac Monitoring?     [] Yes  [x] No    Rhythm:              Sameul Sandifer

## 2019-10-09 NOTE — PROGRESS NOTES
Problem: Self Care Deficits Care Plan (Adult)  Goal: *Acute Goals and Plan of Care (Insert Text)  Description    FUNCTIONAL STATUS PRIOR TO ADMISSION: [facility] resident. 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 Says she can complete stand pivot transfer from [facility] to toilet to relieve herself, yet unsure if this is accurate. Can feed self using non-dominant L hand. Sits on shower chair to bathe with significant assistance; can wash UB and face. Needs assist for distal LB aspects and jonathan care. Does not self-propel W/C.      HOME SUPPORT: The patient lived at [facility] with heavy assistance for ADLs. Occupational Therapy Goals    Initiated 10/7/2019  1. Patient will perform self-feeding with Min A using adaptive strategies/AE PRN within 7 day(s). 2.  Patient will perform grooming tasks sitting unsupported EOB for at least 10 minutes with Setup/Supervision within 7 days. 3.  Patient will perform upper body dressing with supervision/set-up using adaptive strategies/AE PRN within 7 day(s). 4.  Patient will perform WC<>toilet transfers with moderate assistance x1 within 7 day(s). Upgraded 10/9/2019    Initiated 10/1/2019; Reviewed 10/7/2019  1. Patient will perform self-feeding with Min A using adaptive strategies/AE PRN within 7 day(s). Not met; Continue  2. Patient will perform upper body dressing with supervision/set-up using adaptive strategies/AE PRN within 7 day(s). Not met; Continue  3. Patient will perform WC<>toilet transfers with moderate assistance x1 within 7 day(s). Not met;  Modified     Outcome: Progressing Towards Goal   OCCUPATIONAL THERAPY TREATMENT  Patient: Cassandra Collet (67 y.o. female)  Date: 10/9/2019  Diagnosis: CAP (community acquired pneumonia) [J18.9] CAP (community acquired pneumonia)       Precautions: DNR, Fall, Skin, Bed Alarm  Chart, occupational therapy assessment, plan of care, and goals were reviewed. ASSESSMENT  Patient continues with skilled OT services and is progressing towards goals. Pt completed bed<>chair transfer with Mod Assist x2 using stand pivot transfer in prep for eating her lunch sitting in chair. Demonstrates slowly improving bed mobility. Once seated, Pt demonstrated the ability to cut some softer food items on her plate using fork in R and knife in L hand, yet required Mod Assist to cut remaining aspects. She accepted education to manipulate fork to successfully retrieve food items using L hand only. Current Level of Function Impacting Discharge (ADLs): Mod Assist x2 for stand pivot transfers; Mod I to Max A with aspects of self-feeding. Other factors to consider for discharge: Resides at [facility]         PLAN :  Patient continues to benefit from skilled intervention to address the above impairments. Continue treatment per established plan of care. to address goals. Recommend with staff: x2 assist for bed<>chair transfers; OOB to chair all meals     Recommendation for discharge: (in order for the patient to meet his/her long term goals)  Therapy up to 5 days/week in SNF setting or return to [facility]    This discharge recommendation:  Has been made in collaboration with the attending provider and/or case management    Equipment recommendations for successful discharge (if) home: patient owns DME required for discharge       SUBJECTIVE:   Patient stated My back hurts if I sit up too long\"    OBJECTIVE DATA SUMMARY:   Cognitive/Behavioral Status:  Neurologic State: Alert;Eyes open spontaneously  Orientation Level: Oriented X4;Other (Comment)(Periodic confusion)  Cognition: Follows commands       Functional Mobility and Transfers for ADLs:  Bed Mobility:  Rolling: Supervision  Supine to Sit: Minimum assistance; Additional time  Scooting: Minimum assistance;Assist x1;Additional time    Transfers:  Sit to Stand:  Moderate assistance;Assist x1;Assist x2; Additional time     Bed to Chair: Moderate assistance;Assist x2    Balance:  Sitting: Impaired; Without support  Sitting - Static: Good (unsupported)  Sitting - Dynamic: Fair (occasional)  Standing: Impaired; With support  Standing - Static: Poor  Standing - Dynamic : Poor    ADL Intervention:    Pt completed self-feeding tasks following bed<>chair transfer with Mod A for cutting food items. She was able to manipulate her fork to obtain food items that she continued to drop during multiple attempts d/t positioning difficulty. She was able to cut part of her meal independently yet required moderate assist.     Feeding  Container Management: Maximum assistance  Cutting Food: Moderate assistance  Food to Mouth: Modified independent  Drink to Mouth: Modified independent  Cues: Physical assistance;Verbal cues provided;Visual cues provided(for manipulating fork to cut w/o knife)    Pain:  None    Activity Tolerance:   Fair  Please refer to the flowsheet for vital signs taken during this treatment.     After treatment patient left in no apparent distress:   Sitting in chair and Call bell within reach    COMMUNICATION/COLLABORATION:   The patients plan of care was discussed with: Physical Therapist, Registered Nurse and Patient     JERRY Burk  Time Calculation: 18 mins

## 2019-10-09 NOTE — PROGRESS NOTES
Bedside shift change report given to [facility]  (oncoming nurse) by Spencer Dennis (offgoing nurse). Report included the following information SBAR, Kardex and MAR.

## 2019-10-09 NOTE — PROGRESS NOTES
Problem: Mobility Impaired (Adult and Pediatric)  Goal: *Acute Goals and Plan of Care (Insert Text)  Description  FUNCTIONAL STATUS PRIOR TO ADMISSION: At baseline patient is wheelchair bound and dependent on others to roll her in wheelchair due to R shoulder arthritis/pain. 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 HOME SUPPORT PRIOR TO ADMISSION: The patient lived [facility] and was assisted by staff or private duty aides for all ADLs, transfers and wheelchair mobility. Physical Therapy Goals  Initiated 10/1/2019 re-evaluated on 10/9/2019 and goals remain appropriate  1. Patient will move from supine to sit and sit to supine , scoot up and down and roll side to side in bed with minimal assist/contact guard assist within 7 day(s). 2.  Patient will transfer from bed to chair and chair to bed with moderate assistance x 1 using the least restrictive device within 7 day(s). 3.  Patient will perform sit to stand with moderate assistance x 1 within 7 day(s). Outcome: Progressing Towards Goal   PHYSICAL THERAPY TREATMENT: WEEKLY REASSESSMENT  Patient: Ramandeep Palmer (75 y.o. female)  Date: 10/9/2019  Primary Diagnosis: CAP (community acquired pneumonia) [J18.9]        Precautions:   DNR, Fall, Skin, Bed Alarm      ASSESSMENT  Patient continues with skilled PT services and is progressing slowly towards goals. Patient with improved sitting balance, improved functional mobility although continues to be limited. Patient continues to require mod A x 1-2 for sit <> Stand and SPT to the bedside chair which is likely close to her baseline. O2 sats remained stable on 2LO2. Patient's progression toward goals since last assessment: she is progressing slowly with therapy. Goals remain appropriate. Current Level of Function Impacting Discharge (mobility/balance): min-mod A x 1-2 for bed mobility and transfers. Functional Outcome Measure: The patient scored 20/100 on the Barthel outcome measure which is indicative of high functional impairment. Other factors to consider for discharge: from [facility], close to baseline         PLAN :  Goals have been updated based on progression since last assessment. Patient continues to benefit from skilled intervention to address the above impairments. Recommendations and Planned Interventions: bed mobility training, transfer training, therapeutic exercises, patient and family training/education and therapeutic activities      Frequency/Duration: Patient will be followed by physical therapy:  2 times a week to address goals. Recommendation for discharge: (in order for the patient to meet his/her long term goals)  Therapy up to 5 days/week in SNF setting or return to [facility]    This discharge recommendation:  Has been made in collaboration with the attending provider and/or case management    Equipment recommendations for successful discharge (if) home: TBD by facility. SUBJECTIVE:   Patient stated I just can't sit in the chair for hours.     OBJECTIVE DATA SUMMARY:   HISTORY:    Past Medical History:   Diagnosis Date    Arrhythmia     A-fib    Arthritis     shoulder/right    Cancer ([facility] 2015    left lung    Chronic kidney disease     Stent in Right Kidney, Stage III    Chronic obstructive pulmonary disease ([facility]     Hypertension     Ill-defined condition     Ex Lap with Agueda Banker patch of a perforated pyloric channel ulcer 7/19/16    Other ill-defined conditions(799.89)     lipids    Other ill-defined conditions(799.89)     blood transfusion history    PVD (peripheral vascular disease) ([facility]      Past Surgical History:   Procedure Laterality Date    BYPASS GRAFT OTHR,FEM-FEM      BYPASS GRAFT OTHR,FEM-POP Right     PVD    HX HEENT      bilateral cataracts    HX ORTHOPAEDIC Right     right hip fracture/pinning    HX UROLOGICAL      right stent/kidney         Home Situation  Home Environment: [address] [place] [address] Road Name: [place] ([id number])  One/Two Story Residence: One story  Living Alone: Yes  Support Systems: Shaheen Ayoub / [facility]  Patient Expects to be Discharged to[de-identified] Independent living facility  Current DME Used/Available at [facility]: Wheelchair, [employer] dressing aides, Grab bars(reacher)  Tub or Shower Type: Shower    EXAMINATION/PRESENTATION/DECISION MAKING:   Critical Behavior:  Neurologic State: Alert, Eyes open spontaneously  Orientation Level: Oriented X4, Other (Comment)(Periodic confusion)  Cognition: Follows commands  Safety/Judgement: Decreased insight into deficits, Decreased awareness of need for assistance, Fall prevention  Hearing: Auditory  Auditory Impairment: None                Functional Mobility:  Bed Mobility:  Rolling: Supervision  Supine to Sit: Minimum assistance; Additional time     Scooting: Minimum assistance;Assist x1;Additional time  Transfers:  Sit to Stand: Moderate assistance;Assist x1;Assist x2; Additional time  Stand to Sit: Moderate assistance;Assist x2  Stand Pivot Transfers: Moderate assistance;Assist x2     Bed to Chair: Moderate assistance;Assist x2              Balance:   Sitting: Impaired; Without support  Sitting - Static: Good (unsupported)  Sitting - Dynamic: Fair (occasional)  Standing: Impaired; With support  Standing - Static: Poor  Standing - Dynamic : Poor  Ambulation/Gait Training:   Does not ambulate at baseline    Functional Measure:  Barthel Index:    Bathin  Bladder: 0  Bowels: 0  Groomin  Dressin  Feeding: 10  Mobility: 0  Stairs: 0  Toilet Use: 0  Transfer (Bed to Chair and Back): 5  Total: 20/100       The Barthel ADL Index: Guidelines  1. The index should be used as a record of what a patient does, not as a record of what a patient could do. 2. The main aim is to establish degree of independence from any help, physical or verbal, however minor and for whatever reason.   3. The need for supervision renders the patient not independent. 4. A patient's performance should be established using the best available evidence. Asking the patient, friends/relatives and nurses are the usual sources, but direct observation and common sense are also important. However direct testing is not needed. 5. Usually the patient's performance over the preceding 24-48 hours is important, but occasionally longer periods will be relevant. 6. Middle categories imply that the patient supplies over 50 per cent of the effort. 7. Use of aids to be independent is allowed. Wen Herman, Barthel, DMitraW. (3575). Functional evaluation: the Barthel Index. [facility] (14)2. [facility] evan CARINA Rosales, Carolin Arellano., [facility] [place], [address] [place] [address] Ave (1999). Measuring the change indisability after inpatient rehabilitation; comparison of the responsiveness of the Barthel Index and Functional Dyer Measure. Journal of Neurology, Neurosurgery, and Psychiatry, 66(4), 362-085. Milka Salazar, N.J.A, PAULO Sanchez, & Edvin Humphreys M.A. (2004.) Assessment of post-stroke quality of life in cost-effectiveness studies: The usefulness of the Barthel Index and the EuroQoL-5D. Quality of Life Research, 13, 782-92           Activity Tolerance:   Fair  Please refer to the flowsheet for vital signs taken during this treatment. After treatment patient left in no apparent distress:   Sitting in chair, Call bell within reach and with linsey pad under her     COMMUNICATION/EDUCATION:   The patients plan of care was discussed with: Occupational Therapist, Registered Nurse and . Fall prevention education was provided and the patient/caregiver indicated understanding., Patient/family have participated as able in goal setting and plan of care. and Patient/family agree to work toward stated goals and plan of care.     Thank you for this referral.  Shoshana Lou, PT, DPT   Time Calculation: 16 mins

## 2019-10-09 NOTE — PROGRESS NOTES
MARC Plan:    -[place]  -[facility] via [facility]        SW notified by Kathleen Mckeon (LOVELY) liaison for [facility] to fax discharge papers to their office prior to pt leaving the hospital.      Sirisha Atwood, MSW  [phone]

## 2019-10-10 LAB
ANION GAP SERPL CALC-SCNC: 5 MMOL/L (ref 5–15)
BASOPHILS # BLD: 0 K/UL (ref 0–0.1)
BASOPHILS NFR BLD: 0 % (ref 0–1)
BUN SERPL-MCNC: 24 MG/DL (ref 6–20)
BUN/CREAT SERPL: 14 (ref 12–20)
CALCIUM SERPL-MCNC: 10.3 MG/DL (ref 8.5–10.1)
CHLORIDE SERPL-SCNC: 106 MMOL/L (ref 97–108)
CO2 SERPL-SCNC: 28 MMOL/L (ref 21–32)
CREAT SERPL-MCNC: 1.66 MG/DL (ref 0.55–1.02)
DIFFERENTIAL METHOD BLD: ABNORMAL
EOSINOPHIL # BLD: 0.1 K/UL (ref 0–0.4)
EOSINOPHIL NFR BLD: 1 % (ref 0–7)
ERYTHROCYTE [DISTWIDTH] IN BLOOD BY AUTOMATED COUNT: 14.7 % (ref 11.5–14.5)
GLUCOSE SERPL-MCNC: 95 MG/DL (ref 65–100)
HCT VFR BLD AUTO: 26.6 % (ref 35–47)
HGB BLD-MCNC: 8.7 G/DL (ref 11.5–16)
IMM GRANULOCYTES # BLD AUTO: 0 K/UL (ref 0–0.04)
IMM GRANULOCYTES NFR BLD AUTO: 0 % (ref 0–0.5)
LYMPHOCYTES # BLD: 1 K/UL (ref 0.8–3.5)
LYMPHOCYTES NFR BLD: 11 % (ref 12–49)
MCH RBC QN AUTO: 30.2 PG (ref 26–34)
MCHC RBC AUTO-ENTMCNC: 32.7 G/DL (ref 30–36.5)
MCV RBC AUTO: 92.4 FL (ref 80–99)
MONOCYTES # BLD: 0.9 K/UL (ref 0–1)
MONOCYTES NFR BLD: 10 % (ref 5–13)
NEUTS SEG # BLD: 7.3 K/UL (ref 1.8–8)
NEUTS SEG NFR BLD: 78 % (ref 32–75)
NRBC # BLD: 0 K/UL (ref 0–0.01)
NRBC BLD-RTO: 0 PER 100 WBC
PLATELET # BLD AUTO: 275 K/UL (ref 150–400)
PMV BLD AUTO: 10.2 FL (ref 8.9–12.9)
POTASSIUM SERPL-SCNC: 3.8 MMOL/L (ref 3.5–5.1)
RBC # BLD AUTO: 2.88 M/UL (ref 3.8–5.2)
RBC MORPH BLD: ABNORMAL
SODIUM SERPL-SCNC: 139 MMOL/L (ref 136–145)
WBC # BLD AUTO: 9.3 K/UL (ref 3.6–11)

## 2019-10-10 PROCEDURE — 94760 N-INVAS EAR/PLS OXIMETRY 1: CPT

## 2019-10-10 PROCEDURE — 74011250637 HC RX REV CODE- 250/637: Performed by: INTERNAL MEDICINE

## 2019-10-10 PROCEDURE — 80048 BASIC METABOLIC PNL TOTAL CA: CPT

## 2019-10-10 PROCEDURE — 74011250637 HC RX REV CODE- 250/637: Performed by: HOSPITALIST

## 2019-10-10 PROCEDURE — 65270000015 HC RM PRIVATE ONCOLOGY

## 2019-10-10 PROCEDURE — 74011250636 HC RX REV CODE- 250/636: Performed by: HOSPITALIST

## 2019-10-10 PROCEDURE — 77010033678 HC OXYGEN DAILY

## 2019-10-10 PROCEDURE — 36415 COLL VENOUS BLD VENIPUNCTURE: CPT

## 2019-10-10 PROCEDURE — 74011250636 HC RX REV CODE- 250/636: Performed by: INTERNAL MEDICINE

## 2019-10-10 PROCEDURE — 74011250637 HC RX REV CODE- 250/637: Performed by: FAMILY MEDICINE

## 2019-10-10 PROCEDURE — 85025 COMPLETE CBC W/AUTO DIFF WBC: CPT

## 2019-10-10 RX ORDER — LEVOFLOXACIN 5 MG/ML
500 INJECTION, SOLUTION INTRAVENOUS EVERY 24 HOURS
Status: DISCONTINUED | OUTPATIENT
Start: 2019-10-10 | End: 2019-10-10 | Stop reason: ALTCHOICE

## 2019-10-10 RX ORDER — LEVOFLOXACIN 500 MG/1
500 TABLET, FILM COATED ORAL
Status: DISCONTINUED | OUTPATIENT
Start: 2019-10-10 | End: 2019-10-11 | Stop reason: HOSPADM

## 2019-10-10 RX ADMIN — Medication 1 CAPSULE: at 09:05

## 2019-10-10 RX ADMIN — LEVOFLOXACIN 500 MG: 500 TABLET, FILM COATED ORAL at 09:05

## 2019-10-10 RX ADMIN — Medication 10 ML: at 21:24

## 2019-10-10 RX ADMIN — GABAPENTIN 400 MG: 100 CAPSULE ORAL at 17:17

## 2019-10-10 RX ADMIN — ASPIRIN 81 MG CHEWABLE TABLET 81 MG: 81 TABLET CHEWABLE at 09:05

## 2019-10-10 RX ADMIN — PRAVASTATIN SODIUM 40 MG: 40 TABLET ORAL at 22:49

## 2019-10-10 RX ADMIN — METOPROLOL TARTRATE 50 MG: 50 TABLET, FILM COATED ORAL at 09:05

## 2019-10-10 RX ADMIN — FUROSEMIDE 40 MG: 10 INJECTION, SOLUTION INTRAMUSCULAR; INTRAVENOUS at 09:06

## 2019-10-10 RX ADMIN — HEPARIN SODIUM 5000 UNITS: 5000 INJECTION INTRAVENOUS; SUBCUTANEOUS at 21:22

## 2019-10-10 RX ADMIN — DILTIAZEM HYDROCHLORIDE 300 MG: 180 CAPSULE, COATED, EXTENDED RELEASE ORAL at 09:05

## 2019-10-10 RX ADMIN — GABAPENTIN 400 MG: 100 CAPSULE ORAL at 09:05

## 2019-10-10 RX ADMIN — GABAPENTIN 400 MG: 100 CAPSULE ORAL at 13:52

## 2019-10-10 RX ADMIN — POTASSIUM CHLORIDE 20 MEQ: 1.5 POWDER, FOR SOLUTION ORAL at 17:17

## 2019-10-10 RX ADMIN — HYDRALAZINE HYDROCHLORIDE 100 MG: 50 TABLET ORAL at 22:49

## 2019-10-10 RX ADMIN — HYDRALAZINE HYDROCHLORIDE 100 MG: 50 TABLET ORAL at 16:00

## 2019-10-10 RX ADMIN — Medication 10 ML: at 13:53

## 2019-10-10 RX ADMIN — HYDRALAZINE HYDROCHLORIDE 100 MG: 50 TABLET ORAL at 09:05

## 2019-10-10 RX ADMIN — METOPROLOL TARTRATE 50 MG: 50 TABLET, FILM COATED ORAL at 17:17

## 2019-10-10 RX ADMIN — HEPARIN SODIUM 5000 UNITS: 5000 INJECTION INTRAVENOUS; SUBCUTANEOUS at 09:05

## 2019-10-10 RX ADMIN — GABAPENTIN 400 MG: 100 CAPSULE ORAL at 22:48

## 2019-10-10 RX ADMIN — TEMAZEPAM 15 MG: 15 CAPSULE ORAL at 22:48

## 2019-10-10 RX ADMIN — POTASSIUM CHLORIDE 20 MEQ: 1.5 POWDER, FOR SOLUTION ORAL at 09:05

## 2019-10-10 RX ADMIN — PAROXETINE HYDROCHLORIDE 20 MG: 20 TABLET, FILM COATED ORAL at 09:05

## 2019-10-10 RX ADMIN — LISINOPRIL 40 MG: 40 TABLET ORAL at 09:05

## 2019-10-10 NOTE — PROGRESS NOTES
Bedside shift change report given to [facility], RN (oncoming nurse) by Nick Frank RN (offgoing nurse). Report included the following information SBAR, Kardex, ED Summary, Procedure Summary, Intake/Output, MAR and Recent Results.

## 2019-10-10 NOTE — PROGRESS NOTES
Oncology End of Shift Note      Bedside shift change report given to Susan Mckeon RN (incoming nurse) by Saintclair Mead (outgoing nurse) on Thom Tara. Report included the following information SBAR, Kardex and MAR. Shift Summary: Patient received all medications. IV antibiotics have been switched to oral. Patient was turned and cleaned up throughout the day. No acute changes with patient, patient tolerated care well. Issues for Physician to Address:  None. Patient on Cardiac Monitoring?     [] Yes  [x] No    Rhythm:              Saintclair Mead

## 2019-10-10 NOTE — PROGRESS NOTES
MARC Plan:    -d/c to [facility] [address] services via [facility]      [facility] w/SN added and faxed over @ [phone].         Deepali Dan, MSW  [phone]

## 2019-10-10 NOTE — PROGRESS NOTES
Nutrition Assessment:    INTERVENTIONS/RECOMMENDATIONS:   · Consider low Na diet vs full cardiac restrictions  · Continue ensure BID  · Please document % meals consumed in flowsheet     ASSESSMENT:   Chart reviewed. Pt was sleeping during visit attempt. Two unopened ensure bottle in room. Flowsheet documents fair appetite. Will try to revisit at a later time. Diet Order: Cardiac  % Eaten:    Patient Vitals for the past 72 hrs:   % Diet Eaten   10/09/19 1118 85 %   10/08/19 1145 80 %     Pertinent Medications: [x]Reviewed: lasix, lactobac, KCl,  Pertinent Labs: [x]Reviewed:   Food Allergies: [x]NKFA  []Other   Last BM:  10/10  Edema:      [x]RUE 1+  [x]LUE 1+  [x]RLE 1+  [x]LLE 1+     Pressure Ulcer:   n/a   [] Stage I   [] Stage II   [] Stage III   [] Stage IV      Anthropometrics: Height: 5' 3\" (160 cm) Weight: 60 kg (132 lb 4.4 oz)    IBW (%IBW):   ( ) UBW (%UBW):   (  %)    BMI: Body mass index is 23.43 kg/m². This BMI is indicative of:  []Underweight   [x]Normal   []Overweight   [] Obesity   [] Extreme Obesity (BMI>40)  Last Weight Metrics:  Weight Loss Metrics 9/26/2019 8/2/2019 7/10/2019 7/9/2019 6/14/2019 6/4/2019 6/4/2019   Today's Wt 132 lb 4.4 oz - 113 lb 15.7 oz 115 lb 1.3 oz - 116 lb 3.2 oz -   BMI 23.43 kg/m2 21.19 kg/m2 21.19 kg/m2 21.39 kg/m2 21.25 kg/m2 21.25 kg/m2 22.13 kg/m2       Estimated Nutrition Needs (Based on): 1325 Kcals/day(BMR: 1025 x 1.3) , 60 g(1 g/kg) Protein  Carbohydrate: At Least 130 g/day  Fluids: 1325 mL/day or per primary team    NUTRITION DIAGNOSES:   Problem:  No nutritional diagnosis at this time      Etiology: related to       Signs/Symptoms: as evidenced by      Previous Nutrition Dx:  [] Resolved  [] Unresolved           [] Progressing    NUTRITION INTERVENTIONS:  Meals/Snacks: General/healthful diet                  GOAL:   consume >50% of meals in 5-7 days    NUTRITION MONITORING AND EVALUATION      Food/Nutrient Intake Outcomes:  Total energy intake  Physical Signs/Symptoms Outcomes: Weight/weight change, Electrolyte and renal profile, GI    Previous Goal Met:   [] Met              [x] Progressing Towards Goal              [] Not Progressing Towards Goal   Previous Recommendations:   [] Implemented          [] Not Implemented          [] Not Applicable    LEARNING NEEDS (Diet, Food/Nutrient-Drug Interaction):    [x] None Identified   [] Identified and Education Provided/Documented   [] Identified and Pt declined/was not appropriate     Cultural, Islam, OR Ethnic Dietary Needs:    [x] None Identified   [] Identified and Addressed     [x] Interdisciplinary Care Plan Reviewed/Documented    [x] Discharge Planning: general healthy diet    [] Participated in Interdisciplinary Rounds    NUTRITION RISK:    [] High              [] Moderate           [x]  Low  []  Minimal/Uncompromised      Dena Garner RDN  Pager [phone]  Weekend Pager [phone]

## 2019-10-10 NOTE — PROGRESS NOTES
Pharmacy Automatic Renal Dosing Protocol - Antimicrobials    Indication for Antimicrobials: UTI/ CAP     Current Regimen of Each Antimicrobial:  Levaquin 500mg IV q48h (Start Date 10/9; Day # 2)    Previous Antimicrobial Therapy:  Levofloxacin 750 mg every 48 hours (Start Date 10/2; through 10/6)  Vancomycin 1.25g ONCE, then 1gm IV  q36h (Start Date 19; Day # 5)  Zithromax 500 mg q24h (Start Date 19; Day # 5)  Ceftriaxone 1 g IV every 24 hours (Start Date ; Day # 12)    Significant Cultures:   : Blood = NG - Final   : Urine = >100k E.faecalis D; 1k K.pna, pan-sens (FINAL)     Radiology / Imaging results: (X-ray, CT scan or MRI):   : CXR: Left perihilar consolidation likely represents pneumonia. 10/8: CXR:   1. Findings suggestive of infiltration involving the left lung base and the left  midlung. 2. Slight interval improvement of the congestive change    Labs:  Recent Labs     10/10/19  0522 10/09/19  0053 10/08/19  0144   CREA 1.66* 1.91* 1.75*   BUN 24* 27* 26*   WBC 9.3 8.6 9.3     Temp (24hrs), Av.9 °F (37.2 °C), Min:98.2 °F (36.8 °C), Max:99.4 °F (37.4 °C)    Creatinine Clearance (mL/min) or Dialysis: 21 mL/min    Impression/Plan:   Renal function slightly improved; dose should be adjusted to 750mg q48h, but prescriber changed back to 500mg IV q24h  Levofloxacin changed to oral  Daily BMP  Antimicrobial stop date TBD     Pharmacy will follow daily and adjust medications as appropriate for renal function and/or serum levels. Thank you,  Sammy Sullivan [facility]    Recommended duration of therapy  [email or web address]://[facility]/[place]/[facility]/Pharmacy/Clinical%20Companion/[email or web address]. docx    Renal Dosing  http://[facility]/[place]/[facility]/Pharmacy/Clinical%20Companion/Renal%[id number]. pdf

## 2019-10-10 NOTE — PROGRESS NOTES
Pharmacy IV to PO Conversion Program    Medication: Levofloxacin 500mg IV q24h to Levofloxacin 500mg po daily        Thanks  Adrianna Denney [facility]    [email or web address]/[place]/[facility]/Pharmacy/Clinical%20Companion/IV%20to%[id number]. pdf

## 2019-10-10 NOTE — PROGRESS NOTES
PROGRESS NOTE    NAME:  Zachary Buckley   :   1935   MRN:   [id number]     Date/Time:  10/10/2019 7:08 AM  Subjective:   History:  Chart reviewed and patient seen and examined and D/W her nurse this AM and D/W her daughter 10/4 PM and all events noted. She was admitted with Pneumonia and a recurrent UTI with Enterococcus. She still has a mild nonproductive chronic cough but is less SOB w/o other cardiac or respiratory c/o. She continues with marked edema both forearms w/o pain. She has no abdominal pain or other GI/ c/o. She is very weak w/o other neurologic c/o, but sat in chair  Yesterday and some improvement per PT note. She has no other c/o on complete ROS.       Medications reviewed:  Current Facility-Administered Medications   Medication Dose Route Frequency    potassium chloride (KLOR-CON) packet for solution 20 mEq  20 mEq Oral BID WITH MEALS    [START ON 10/11/2019] levoFLOXacin (LEVAQUIN) 500 mg in D5W IVPB  500 mg IntraVENous Q48H    furosemide (LASIX) injection 40 mg  40 mg IntraVENous DAILY    lactobac ac& pc-s.therm-b.anim (HANG Q/RISAQUAD)  1 Cap Oral DAILY    temazepam (RESTORIL) capsule 15 mg  15 mg Oral QHS    chlorthalidone (HYGROTEN) tablet 25 mg  25 mg Oral DAILY    lisinopril (PRINIVIL, ZESTRIL) tablet 40 mg  40 mg Oral DAILY    hydrALAZINE (APRESOLINE) tablet 100 mg  100 mg Oral TID    influenza vaccine - (6 mos+)(PF) (FLUARIX/FLULAVAL/FLUZONE QUAD) injection 0.5 mL  0.5 mL IntraMUSCular PRIOR TO DISCHARGE    albuterol-ipratropium (DUO-NEB) 2.5 MG-0.5 MG/3 ML  3 mL Nebulization Q4H PRN    sodium chloride (NS) flush 5-40 mL  5-40 mL IntraVENous Q8H    sodium chloride (NS) flush 5-40 mL  5-40 mL IntraVENous PRN    acetaminophen (TYLENOL) tablet 650 mg  650 mg Oral Q4H PRN    ondansetron (ZOFRAN) injection 4 mg  4 mg IntraVENous Q4H PRN    heparin (porcine) injection 5,000 Units  5,000 Units SubCUTAneous Q12H    aspirin chewable tablet 81 mg  81 mg Oral DAILY  dilTIAZem CD (CARDIZEM CD) capsule 300 mg  300 mg Oral DAILY    gabapentin (NEURONTIN) capsule 400 mg  400 mg Oral QID    metoprolol tartrate (LOPRESSOR) tablet 50 mg  50 mg Oral BID    PARoxetine (PAXIL) tablet 20 mg  20 mg Oral DAILY    pravastatin (PRAVACHOL) tablet 40 mg  40 mg Oral QHS        Objective:   Vitals:  Visit Vitals  /42 (BP 1 Location: Left arm, BP Patient Position: At rest)   Pulse 68   Temp 98.8 °F (37.1 °C)   Resp 18   Ht 5' 3\" (1.6 m)   Wt 132 lb 4.4 oz (60 kg)   SpO2 100%   BMI 23.43 kg/m²    O2 Flow Rate (L/min): 3 l/min O2 Device: Nasal cannula Temp (24hrs), Av.7 °F (37.1 °C), Min:98.2 °F (36.8 °C), Max:99.3 °F (37.4 °C)      Last 24hr Input/Output:  No intake or output data in the 24 hours ending 10/10/19 0708     PHYSICAL EXAM:  General:     Alert, cooperative, no distress, appears stated age. Head:    Normocephalic, without obvious abnormality, atraumatic. Eyes:    Conjunctivae/corneas clear. PERRLA  Nose:   Nares normal. No drainage or sinus tenderness. Throat:     Lips, mucosa, and tongue normal.  No Thrush  Neck:   Supple, symmetrical,  no adenopathy, thyroid: non tender     no carotid bruit and no JVD. Back:     Symmetric,  No CVA tenderness. Lungs:    Decreased BS bilaterally with scattered rhonchi. No Wheezing. No rales. Heart:    Regular rate and rhythm,  no murmur, rub or gallop. Abdomen:    Soft, non-tender. Not distended. Bowel sounds normal. No masses  Extremities:  Extremities normal except some edema in arms bilateral w/o change, atraumatic, No cyanosis. No edema. No clubbing  Lymph nodes:  Cervical, supraclavicular normal.  Neurologic:  General decreased strength, Alert and oriented X 3.    Skin:                 No rash      Lab Data Reviewed:    Recent Results (from the past 24 hour(s))   METABOLIC PANEL, BASIC    Collection Time: 10/10/19  5:22 AM   Result Value Ref Range    Sodium 139 136 - 145 mmol/L    Potassium 3.8 3.5 - 5.1 mmol/L Chloride 106 97 - 108 mmol/L    CO2 28 21 - 32 mmol/L    Anion gap 5 5 - 15 mmol/L    Glucose 95 65 - 100 mg/dL    BUN 24 (H) 6 - 20 MG/DL    Creatinine 1.66 (H) 0.55 - 1.02 MG/DL    BUN/Creatinine ratio 14 12 - 20      GFR est AA 36 (L) >60 ml/min/1.73m2    GFR est non-AA 30 (L) >60 ml/min/1.73m2    Calcium 10.3 (H) 8.5 - 10.1 MG/DL   CBC WITH AUTOMATED DIFF    Collection Time: 10/10/19  5:22 AM   Result Value Ref Range    WBC 9.3 3.6 - 11.0 K/uL    RBC 2.88 (L) 3.80 - 5.20 M/uL    HGB 8.7 (L) 11.5 - 16.0 g/dL    HCT 26.6 (L) 35.0 - 47.0 %    MCV 92.4 80.0 - 99.0 FL    MCH 30.2 26.0 - 34.0 PG    MCHC 32.7 30.0 - 36.5 g/dL    RDW 14.7 (H) 11.5 - 14.5 %    PLATELET 132 593 - 981 K/uL    MPV 10.2 8.9 - 12.9 FL    NRBC 0.0 0  WBC    ABSOLUTE NRBC 0.00 0.00 - 0.01 K/uL    NEUTROPHILS 78 (H) 32 - 75 %    LYMPHOCYTES 11 (L) 12 - 49 %    MONOCYTES 10 5 - 13 %    EOSINOPHILS 1 0 - 7 %    BASOPHILS 0 0 - 1 %    IMMATURE GRANULOCYTES 0 0.0 - 0.5 %    ABS. NEUTROPHILS 7.3 1.8 - 8.0 K/UL    ABS. LYMPHOCYTES 1.0 0.8 - 3.5 K/UL    ABS. MONOCYTES 0.9 0.0 - 1.0 K/UL    ABS. EOSINOPHILS 0.1 0.0 - 0.4 K/UL    ABS. BASOPHILS 0.0 0.0 - 0.1 K/UL    ABS. IMM.  GRANS. 0.0 0.00 - 0.04 K/UL    DF MANUAL      RBC COMMENTS NORMOCYTIC, NORMOCHROMIC           Assessment/Plan:     Principal Problem:    CAP (community acquired pneumonia) (9/26/2019)    Active Problems:    COPD (chronic obstructive pulmonary disease) ([facility] (8/14/2010)      Essential hypertension (7/20/2016)      PAF (paroxysmal atrial fibrillation) ([facility] (3/30/2018)      Moderate protein-calorie malnutrition ([facility] (3/30/2018)      Stage 3 chronic kidney disease ([facility] (11/5/2018)      Recurrent UTI (6/14/2019)      Anemia (8/1/2019)       ___________________________________________________  PLAN:    1. CAP (community acquired pneumonia) (9/26/2019): Continue antibiotics with change back to every day Levaquin as of 10/9 as infiltrate on CXR slightly worse although WBC remains normal  2. UTI: enterococcal and some Klebsiella in urine also sens to Levaquin  3. CKD III: follow, 24/1.66, down but w/o significant change from yesterday, (baseline Cr 1.9), Lasix started 10/5 for edema and will follow closely  4. COPD: Stable. Continue prn bronchodilator therapy. 5. P AFib: Seems to be sinus at this itme. 6. Repleted K, 3.8 today  7. Hypertension- will continue all her outpatient meds. 8.  Insomnia- will continue her outpatient temazepam.   9.  Repeat CXR 10/3 L effusion with LLL atelectasis, increased L perihilar infiltrate, Repeat on 10/6 noted some interstitial edema so will re-check yesterday with improved edema  10    Did not check C. Diff after discussion with nurse re; stool consistency  11. Probiotic added 10/4  12. Started low dose Lasix 10/5 with edema  13    Warm compresses to forearms  14.    D/C planning for tomorrow if stable            ___________________________________________________    Attending Physician: Allison Burns MD

## 2019-10-11 VITALS
DIASTOLIC BLOOD PRESSURE: 41 MMHG | HEART RATE: 63 BPM | OXYGEN SATURATION: 94 % | SYSTOLIC BLOOD PRESSURE: 131 MMHG | TEMPERATURE: 98.8 F | WEIGHT: 132.28 LBS | BODY MASS INDEX: 23.44 KG/M2 | RESPIRATION RATE: 20 BRPM | HEIGHT: 63 IN

## 2019-10-11 LAB
ANION GAP SERPL CALC-SCNC: 2 MMOL/L (ref 5–15)
BASOPHILS # BLD: 0 K/UL (ref 0–0.1)
BASOPHILS NFR BLD: 0 % (ref 0–1)
BUN SERPL-MCNC: 27 MG/DL (ref 6–20)
BUN/CREAT SERPL: 13 (ref 12–20)
CALCIUM SERPL-MCNC: 10.4 MG/DL (ref 8.5–10.1)
CHLORIDE SERPL-SCNC: 105 MMOL/L (ref 97–108)
CO2 SERPL-SCNC: 30 MMOL/L (ref 21–32)
CREAT SERPL-MCNC: 2.07 MG/DL (ref 0.55–1.02)
DIFFERENTIAL METHOD BLD: ABNORMAL
EOSINOPHIL # BLD: 0.1 K/UL (ref 0–0.4)
EOSINOPHIL NFR BLD: 1 % (ref 0–7)
ERYTHROCYTE [DISTWIDTH] IN BLOOD BY AUTOMATED COUNT: 14.6 % (ref 11.5–14.5)
GLUCOSE SERPL-MCNC: 95 MG/DL (ref 65–100)
HCT VFR BLD AUTO: 27.7 % (ref 35–47)
HGB BLD-MCNC: 8.9 G/DL (ref 11.5–16)
IMM GRANULOCYTES # BLD AUTO: 0 K/UL (ref 0–0.04)
IMM GRANULOCYTES NFR BLD AUTO: 0 % (ref 0–0.5)
LYMPHOCYTES # BLD: 1 K/UL (ref 0.8–3.5)
LYMPHOCYTES NFR BLD: 11 % (ref 12–49)
MCH RBC QN AUTO: 30.1 PG (ref 26–34)
MCHC RBC AUTO-ENTMCNC: 32.1 G/DL (ref 30–36.5)
MCV RBC AUTO: 93.6 FL (ref 80–99)
MONOCYTES # BLD: 1.1 K/UL (ref 0–1)
MONOCYTES NFR BLD: 13 % (ref 5–13)
NEUTS BAND NFR BLD MANUAL: 2 %
NEUTS SEG # BLD: 6.6 K/UL (ref 1.8–8)
NEUTS SEG NFR BLD: 73 % (ref 32–75)
NRBC # BLD: 0 K/UL (ref 0–0.01)
NRBC BLD-RTO: 0 PER 100 WBC
PLATELET # BLD AUTO: 272 K/UL (ref 150–400)
PMV BLD AUTO: 10.1 FL (ref 8.9–12.9)
POTASSIUM SERPL-SCNC: 4.5 MMOL/L (ref 3.5–5.1)
RBC # BLD AUTO: 2.96 M/UL (ref 3.8–5.2)
RBC MORPH BLD: ABNORMAL
SODIUM SERPL-SCNC: 137 MMOL/L (ref 136–145)
WBC # BLD AUTO: 8.8 K/UL (ref 3.6–11)

## 2019-10-11 PROCEDURE — 74011250637 HC RX REV CODE- 250/637: Performed by: INTERNAL MEDICINE

## 2019-10-11 PROCEDURE — 74011250636 HC RX REV CODE- 250/636: Performed by: HOSPITALIST

## 2019-10-11 PROCEDURE — 90471 IMMUNIZATION ADMIN: CPT

## 2019-10-11 PROCEDURE — 36415 COLL VENOUS BLD VENIPUNCTURE: CPT

## 2019-10-11 PROCEDURE — 80048 BASIC METABOLIC PNL TOTAL CA: CPT

## 2019-10-11 PROCEDURE — 85025 COMPLETE CBC W/AUTO DIFF WBC: CPT

## 2019-10-11 PROCEDURE — 94760 N-INVAS EAR/PLS OXIMETRY 1: CPT

## 2019-10-11 PROCEDURE — 74011250637 HC RX REV CODE- 250/637: Performed by: HOSPITALIST

## 2019-10-11 PROCEDURE — 74011250636 HC RX REV CODE- 250/636: Performed by: INTERNAL MEDICINE

## 2019-10-11 PROCEDURE — 90686 IIV4 VACC NO PRSV 0.5 ML IM: CPT | Performed by: HOSPITALIST

## 2019-10-11 PROCEDURE — 74011250637 HC RX REV CODE- 250/637: Performed by: FAMILY MEDICINE

## 2019-10-11 PROCEDURE — 77010033678 HC OXYGEN DAILY

## 2019-10-11 RX ORDER — POTASSIUM CHLORIDE 1.5 G/1.77G
20 POWDER, FOR SOLUTION ORAL 2 TIMES DAILY WITH MEALS
Qty: 60 PACKET | Status: SHIPPED | OUTPATIENT
Start: 2019-10-11 | End: 2019-10-11

## 2019-10-11 RX ORDER — POTASSIUM CHLORIDE 1.5 G/1.77G
20 POWDER, FOR SOLUTION ORAL DAILY
Qty: 60 PACKET | Status: SHIPPED | OUTPATIENT
Start: 2019-10-11 | End: 2019-10-29

## 2019-10-11 RX ORDER — LEVOFLOXACIN 500 MG/1
500 TABLET, FILM COATED ORAL
Qty: 5 TAB | Refills: 0 | Status: SHIPPED | OUTPATIENT
Start: 2019-10-12 | End: 2019-10-29

## 2019-10-11 RX ADMIN — GABAPENTIN 400 MG: 100 CAPSULE ORAL at 08:18

## 2019-10-11 RX ADMIN — LISINOPRIL 40 MG: 40 TABLET ORAL at 08:19

## 2019-10-11 RX ADMIN — Medication 10 ML: at 05:44

## 2019-10-11 RX ADMIN — Medication 10 ML: at 15:57

## 2019-10-11 RX ADMIN — POTASSIUM CHLORIDE 20 MEQ: 1.5 POWDER, FOR SOLUTION ORAL at 08:20

## 2019-10-11 RX ADMIN — HEPARIN SODIUM 5000 UNITS: 5000 INJECTION INTRAVENOUS; SUBCUTANEOUS at 08:19

## 2019-10-11 RX ADMIN — GABAPENTIN 400 MG: 100 CAPSULE ORAL at 12:20

## 2019-10-11 RX ADMIN — PAROXETINE HYDROCHLORIDE 20 MG: 20 TABLET, FILM COATED ORAL at 08:19

## 2019-10-11 RX ADMIN — LEVOFLOXACIN 500 MG: 500 TABLET, FILM COATED ORAL at 06:37

## 2019-10-11 RX ADMIN — HYDRALAZINE HYDROCHLORIDE 100 MG: 50 TABLET ORAL at 15:56

## 2019-10-11 RX ADMIN — METOPROLOL TARTRATE 50 MG: 50 TABLET, FILM COATED ORAL at 08:20

## 2019-10-11 RX ADMIN — ASPIRIN 81 MG CHEWABLE TABLET 81 MG: 81 TABLET CHEWABLE at 08:20

## 2019-10-11 RX ADMIN — CHLORTHALIDONE 25 MG: 25 TABLET ORAL at 08:40

## 2019-10-11 RX ADMIN — DILTIAZEM HYDROCHLORIDE 300 MG: 180 CAPSULE, COATED, EXTENDED RELEASE ORAL at 08:20

## 2019-10-11 RX ADMIN — HYDRALAZINE HYDROCHLORIDE 100 MG: 50 TABLET ORAL at 08:20

## 2019-10-11 RX ADMIN — FUROSEMIDE 40 MG: 10 INJECTION, SOLUTION INTRAMUSCULAR; INTRAVENOUS at 08:18

## 2019-10-11 RX ADMIN — Medication 1 CAPSULE: at 08:20

## 2019-10-11 RX ADMIN — INFLUENZA VIRUS VACCINE 0.5 ML: 15; 15; 15; 15 SUSPENSION INTRAMUSCULAR at 09:39

## 2019-10-11 NOTE — PROGRESS NOTES
Home Oxygen Test  Date of test: 10/11/19  Time of test: 1000    Sa02 76 % on room air AT REST. Sa02 N/A% on room air DURING AMBULATION. Sa02 N/A % on NA Liters DURING AMBULATION. [id number] % on 2  Liters AT REST/AFTER AMBULATION.

## 2019-10-11 NOTE — PROGRESS NOTES
Problem: Falls - Risk of  Goal: *Absence of Falls  Outcome: Resolved/Met  Note:   Fall Risk Interventions:  Mobility Interventions: Communicate number of staff needed for ambulation/transfer    Mentation Interventions: Evaluate medications/consider consulting pharmacy, Adequate sleep, hydration, pain control    Medication Interventions: Evaluate medications/consider consulting pharmacy    Elimination Interventions: Call light in reach              Problem: Patient Education: Go to Patient Education Activity  Goal: Patient/Family Education  Outcome: Resolved/Met

## 2019-10-11 NOTE — PROGRESS NOTES
Dr Thalia Gallardo office called by the nurse 5 times ,left messages x3  From 1100am to 1500pm .Awaiting call back between these times but no call back received. Seth SUMNER and supervisor [address] [place] [address] notified . Awaiting call back to get O2 order for discharge  that is scheduled  for today.

## 2019-10-11 NOTE — ROUTINE PROCESS
The following appointments have been successfully scheduled: 
 
Date/time Tuesday, October 15, 2019 11:10 AM 
Patient  Jaimie Livingston 1935 (40CV F) #[id number] [id number] Department [facility] OFFICE Appointment type Transitional Care Provider Dipti Goetz

## 2019-10-11 NOTE — PROGRESS NOTES
Problem: Falls - Risk of  Goal: *Absence of Falls  Description  Document Vidhi Bowden Fall Risk and appropriate interventions in the flowsheet. Outcome: Progressing Towards Goal  Note:   Fall Risk Interventions:  Mobility Interventions: Communicate number of staff needed for ambulation/transfer    Mentation Interventions: Adequate sleep, hydration, pain control, Door open when patient unattended    Medication Interventions: Patient to call before getting OOB    Elimination Interventions: Call light in reach              Problem: Patient Education: Go to Patient Education Activity  Goal: Patient/Family Education  Outcome: Progressing Towards Goal     Problem: Pressure Injury - Risk of  Goal: *Prevention of pressure injury  Description  Document Rashid Scale and appropriate interventions in the flowsheet.   Outcome: Progressing Towards Goal  Note:   Pressure Injury Interventions:  Sensory Interventions: Float heels, Keep linens dry and wrinkle-free, Minimize linen layers    Moisture Interventions: Absorbent underpads, Minimize layers    Activity Interventions: Increase time out of bed    Mobility Interventions: Float heels, HOB 30 degrees or less    Nutrition Interventions: Document food/fluid/supplement intake    Friction and Shear Interventions: HOB 30 degrees or less, Minimize layers                Problem: Patient Education: Go to Patient Education Activity  Goal: Patient/Family Education  Outcome: Progressing Towards Goal     Problem: Breathing Pattern - Ineffective  Goal: *Absence of hypoxia  Outcome: Progressing Towards Goal     Problem: Patient Education: Go to Patient Education Activity  Goal: Patient/Family Education  Outcome: Progressing Towards Goal     Problem: Patient Education: Go to Patient Education Activity  Goal: Patient/Family Education  Outcome: Progressing Towards Goal     Problem: Risk for Spread of Infection  Goal: Prevent transmission of infectious organism to others  Description  Prevent the transmission of infectious organisms to other patients, staff members, and visitors.   Outcome: Progressing Towards Goal     Problem: Patient Education:  Go to Education Activity  Goal: Patient/Family Education  Outcome: Progressing Towards Goal

## 2019-10-11 NOTE — PROGRESS NOTES
Transitional Care Discharge HUG Note [facility]        Patient: Isrrael Miller MRN: [id number]  SSN: [id number]    YOB: 1935  Age: 80 y.o. Sex: female      Admit Date: 9/26/2019     LOS: 15 days        Assessment /Risk    MARC Diagnosis:    1. Pneumonia  2. Recurrent UTI  3. Generalized weakness    Readmission Risks: Moderate    1. Mobility and safety issues, WC dependent  2.  increased risk for recurrent infection  3. New oxygen demand - requiring cont O2 at 2LPM  4. Severe COPD      Nurse Navigator: unknown  MARC appointment with DR Mindy Flores next tuesday    18.5 - 24.9 Normal weight / Body mass index is 23.43 kg/m². Code status: Durable DNR sent with patient  Recommended Disposition: SNF/LTC     Subjective:     80 y.o. Admitted with recurrent UTI and Pneumonia. She is due to be Dc to the [facility] and usually [facility]. Today she reports she is ready for discharge, unfortuantley no O2 was ordered at dc and she requires ongoing supplementation . Her Sats dropped into the 70's without improvement. Awaiting orders prior to discharge. She is to receive ongoing [facility] services once DC. Number of Admissions this year    3  Heart Failure Bundle NO      Advance Care Plan: On file, reviewed        ROS:     A comprehensive ROS was performed and was negative except for as per HPI. Objective:      Allergies   Allergen Reactions    Hydrocodone Nausea and Vomiting    Morphine Rash    Dilaudid [Hydromorphone] Itching     Does not remember       Past Medical History:   Diagnosis Date    Arrhythmia     A-fib    Arthritis     shoulder/right    Cancer ([facility] [address] 2015    left lung    Chronic kidney disease     Stent in Right Kidney, Stage III    Chronic obstructive pulmonary disease ([facility] [address]     Hypertension     Ill-defined condition     Ex Lap with Bobetta Dapper patch of a perforated pyloric channel ulcer 7/19/16    Other ill-defined conditions(799.89)     lipids    Other ill-defined conditions(799.89)     blood transfusion history    PVD (peripheral vascular disease) ([facility] [address]        Past Surgical History:   Procedure Laterality Date    BYPASS GRAFT OTHR,FEM-FEM      BYPASS GRAFT OTHR,FEM-POP Right     PVD    HX HEENT      bilateral cataracts    HX ORTHOPAEDIC Right     right hip fracture/pinning    HX UROLOGICAL      right stent/kidney       Social History     Tobacco Use   Smoking Status Former Smoker    Packs/day: 0.25    Years: 60.00    Pack years: 15.00    Last attempt to quit:     Years since quittin.7   Smokeless Tobacco Never Used       Current Facility-Administered Medications   Medication Dose Route Frequency    levoFLOXacin (LEVAQUIN) tablet 500 mg  500 mg Oral ACB    potassium chloride (KLOR-CON) packet for solution 20 mEq  20 mEq Oral BID WITH MEALS    furosemide (LASIX) injection 40 mg  40 mg IntraVENous DAILY    lactobac ac& pc-s.therm-b.anim (HANG Q/RISAQUAD)  1 Cap Oral DAILY    temazepam (RESTORIL) capsule 15 mg  15 mg Oral QHS    chlorthalidone (HYGROTEN) tablet 25 mg  25 mg Oral DAILY    lisinopril (PRINIVIL, ZESTRIL) tablet 40 mg  40 mg Oral DAILY    hydrALAZINE (APRESOLINE) tablet 100 mg  100 mg Oral TID    albuterol-ipratropium (DUO-NEB) 2.5 MG-0.5 MG/3 ML  3 mL Nebulization Q4H PRN    sodium chloride (NS) flush 5-40 mL  5-40 mL IntraVENous Q8H    sodium chloride (NS) flush 5-40 mL  5-40 mL IntraVENous PRN    acetaminophen (TYLENOL) tablet 650 mg  650 mg Oral Q4H PRN    ondansetron (ZOFRAN) injection 4 mg  4 mg IntraVENous Q4H PRN    heparin (porcine) injection 5,000 Units  5,000 Units SubCUTAneous Q12H    aspirin chewable tablet 81 mg  81 mg Oral DAILY    dilTIAZem CD (CARDIZEM CD) capsule 300 mg  300 mg Oral DAILY    gabapentin (NEURONTIN) capsule 400 mg  400 mg Oral QID    metoprolol tartrate (LOPRESSOR) tablet 50 mg  50 mg Oral BID    PARoxetine (PAXIL) tablet 20 mg  20 mg Oral DAILY    pravastatin (PRAVACHOL) tablet 40 mg  40 mg Oral QHS       Prior to Admission Medications   Prescriptions Last Dose Informant Patient Reported? Taking? L. acidoph & paracasei- S therm- Bifido (HANG-Q/RISAQUAD) 8 billion cell cap cap 9/26/2019 at Unknown time Transfer Papers No Yes   Sig: Take 1 Cap by mouth daily. PARoxetine (PAXIL) 20 mg tablet 9/26/2019 at Unknown time Transfer Papers Yes Yes   Sig: Take 20 mg by mouth daily. acetaminophen (TYLENOL) 650 mg CR tablet Unknown at Unknown time Transfer Papers Yes No   Sig: Take 500 mg by mouth every six (6) hours as needed for Pain. aspirin 81 mg tablet 9/26/2019 at Unknown time Transfer Papers Yes Yes   Sig: Take 81 mg by mouth daily. Stopped for surgery 8-4-10    chlorthalidone (HYGROTEN) 25 mg tablet 9/26/2019 at Unknown time Transfer Papers No Yes   Sig: Take 1 Tab by mouth daily. cholecalciferol (VITAMIN D3) 50,000 unit capsule 9/20/2019 at Unknown time Transfer Papers Yes Yes   Sig: Take 50,000 Units by mouth Every Friday. dilTIAZem CD (CARDIZEM CD) 300 mg ER capsule 9/26/2019 at Unknown time Transfer Papers No Yes   Sig: Take 1 Cap by mouth daily. gabapentin (NEURONTIN) 400 mg capsule 9/26/2019 at Unknown time Transfer Papers No Yes   Sig: Take 1 Cap by mouth four (4) times daily. Max Daily Amount: 1,600 mg.   hydrALAZINE (APRESOLINE) 100 mg tablet 9/26/2019 at Unknown time Transfer Papers Yes Yes   Sig: Take 100 mg by mouth three (3) times daily. lisinopril (PRINIVIL, ZESTRIL) 40 mg tablet 9/26/2019 at Unknown time Transfer Papers No Yes   Sig: Take 1 Tab by mouth daily. loperamide (IMODIUM) 2 mg capsule Unknown at Unknown time Transfer Papers No No   Sig: Take 2 Caps by mouth every eight (8) hours as needed for Diarrhea.   metoprolol tartrate (LOPRESSOR) 50 mg tablet 9/26/2019 at Unknown time Transfer Papers No Yes   Sig: Take 1 Tab by mouth two (2) times a day.    ondansetron hcl (ZOFRAN) 4 mg tablet Unknown at Unknown time Transfer Papers Yes No   Sig: Take 4 mg by mouth every eight (8) hours as needed for Nausea. polyethylene glycol (MIRALAX) 17 gram packet Unknown at Unknown time Transfer Papers Yes No   Sig: Take 17 g by mouth daily as needed (constipation). pravastatin (PRAVACHOL) 40 mg tablet 9/26/2019 at Unknown time Transfer Papers No Yes   Sig: Take 1 Tab by mouth nightly. predniSONE (DELTASONE) 10 mg tablet 9/26/2019 at Unknown time Transfer Papers No Yes   Sig: Take 1 Tab by mouth daily (with breakfast). temazepam (RESTORIL) 15 mg capsule 9/26/2019 at Unknown time Transfer Papers No Yes   Sig: Take 1 Cap by mouth nightly. Max Daily Amount: 15 mg.   traMADol (ULTRAM) 50 mg tablet Unknown at Unknown time Transfer Papers No No   Sig: Take 1 Tab by mouth every six (6) hours as needed for Pain. Max Daily Amount: 200 mg. Facility-Administered Medications: None       Patient Vitals for the past 24 hrs:   Temp Pulse Resp BP SpO2   10/11/19 0945     (!) 87 %   10/11/19 0721   20 131/41    10/11/19 0718 98.8 °F (37.1 °C) 63 20 (!) 119/37 100 %   10/11/19 0342  60  131/52    10/11/19 0330 99.2 °F (37.3 °C) 61 20 (!) 130/39 94 %   10/10/19 2251    130/43    10/10/19 1948 98.8 °F (37.1 °C) 70 20 143/47 96 %   10/10/19 1623 99.2 °F (37.3 °C) 75 17 142/63 99 %        Intake and Output:    Intake/Output Summary (Last 24 hours) at 10/11/2019 0947  Last data filed at 10/11/2019 4358  Gross per 24 hour   Intake    Output 550 ml   Net -550 ml         PHYSICAL EXAM:    Visit Vitals  /41 (BP 1 Location: Left arm, BP Patient Position: At rest)   Pulse 63   Temp 98.8 °F (37.1 °C)   Resp 20   Ht 5' 3\" (1.6 m)   Wt 60 kg (132 lb 4.4 oz)   SpO2 (!) 87% Comment: room air   BMI 23.43 kg/m²     General appearance: alert, cooperative, no distress, appears stated age  Head: Normocephalic, without obvious abnormality, atraumatic  Eyes: conjunctivae/corneas clear. PERRL, EOM's intact.    Ears: hearing intact  Throat: Lips, mucosa, and tongue normal. Teeth and gums normal  Lungs: clear to auscultation bilaterally  Heart: regular rate and rhythm, S1, S2 normal, no murmur, click, rub or gallop  Abdomen: soft, non-tender. Bowel sounds normal. No masses,  no organomegaly            Lab/Data Review:   Recent Results (from the past 24 hour(s))   METABOLIC PANEL, BASIC    Collection Time: 10/11/19  2:50 AM   Result Value Ref Range    Sodium 137 136 - 145 mmol/L    Potassium 4.5 3.5 - 5.1 mmol/L    Chloride 105 97 - 108 mmol/L    CO2 30 21 - 32 mmol/L    Anion gap 2 (L) 5 - 15 mmol/L    Glucose 95 65 - 100 mg/dL    BUN 27 (H) 6 - 20 MG/DL    Creatinine 2.07 (H) 0.55 - 1.02 MG/DL    BUN/Creatinine ratio 13 12 - 20      GFR est AA 28 (L) >60 ml/min/1.73m2    GFR est non-AA 23 (L) >60 ml/min/1.73m2    Calcium 10.4 (H) 8.5 - 10.1 MG/DL   CBC WITH AUTOMATED DIFF    Collection Time: 10/11/19  2:50 AM   Result Value Ref Range    WBC 8.8 3.6 - 11.0 K/uL    RBC 2.96 (L) 3.80 - 5.20 M/uL    HGB 8.9 (L) 11.5 - 16.0 g/dL    HCT 27.7 (L) 35.0 - 47.0 %    MCV 93.6 80.0 - 99.0 FL    MCH 30.1 26.0 - 34.0 PG    MCHC 32.1 30.0 - 36.5 g/dL    RDW 14.6 (H) 11.5 - 14.5 %    PLATELET 009 646 - 071 K/uL    MPV 10.1 8.9 - 12.9 FL    NRBC 0.0 0  WBC    ABSOLUTE NRBC 0.00 0.00 - 0.01 K/uL    NEUTROPHILS 73 32 - 75 %    BAND NEUTROPHILS 2 %    LYMPHOCYTES 11 (L) 12 - 49 %    MONOCYTES 13 5 - 13 %    EOSINOPHILS 1 0 - 7 %    BASOPHILS 0 0 - 1 %    IMMATURE GRANULOCYTES 0 0.0 - 0.5 %    ABS. NEUTROPHILS 6.6 1.8 - 8.0 K/UL    ABS. LYMPHOCYTES 1.0 0.8 - 3.5 K/UL    ABS. MONOCYTES 1.1 (H) 0.0 - 1.0 K/UL    ABS. EOSINOPHILS 0.1 0.0 - 0.4 K/UL    ABS. BASOPHILS 0.0 0.0 - 0.1 K/UL    ABS. IMM. GRANS. 0.0 0.00 - 0.04 K/UL    DF AUTOMATED      RBC COMMENTS NORMOCYTIC, NORMOCHROMIC         Imaging Reviewed:     Xr Chest Pa Lat    Result Date: 10/8/2019  IMPRESSION: 1. Findings suggestive of infiltration involving the left lung base and the left midlung. 2. Slight interval improvement of the congestive change.     James Herring Chest Pa Lat    Result Date: 10/6/2019  Impression: Stable left effusion and underlying atelectasis. New minimal edema. Xr Chest Port    Result Date: 10/3/2019  IMPRESSION: 1. Interval development of small moderate left pleural effusion with marked left lower lobe atelectasis 2. Persistent an increase in left perihilar infiltrate. Follow-up to complete resolution is necessary. CT may yield more information. Xr Chest Port    Result Date: 9/26/2019  IMPRESSION: Left perihilar consolidation likely represents pneumonia. Radiographic follow-up is recommended to assure complete resolution. .  Assessment:     Principal Problem:    CAP (community acquired pneumonia) (9/26/2019)    Active Problems:    COPD (chronic obstructive pulmonary disease) ([facility] [address] (8/14/2010)      Essential hypertension (7/20/2016)      PAF (paroxysmal atrial fibrillation) ([facility] [address] (3/30/2018)      Moderate protein-calorie malnutrition ([facility] [address] (3/30/2018)      Stage 3 chronic kidney disease ([facility] [address] (11/5/2018)      Recurrent UTI (6/14/2019)      Anemia (8/1/2019)        Plan:     The objective of todays visit was to review the transitional care plan with the patient. We discussed the importance of transitional care as it relates to reducing the likelihood of readmission. We reviewed the goals for the first 30 days following hospital discharge. The patient and I discussed wrap around services including nurse navigation, care coordination, home health, transitional care appointments with their primary care provider and specialist team and the role of dispatch [facility]. Patient education focused on readmission zones as described as    The Red Zone: High risk for readmission, days 1-21  The Yellow Zone: Moderate risk for readmission, days 22-29  The Green Zone: Lower risk for readmission, days 30 and after    1. ACP documented      2. Hug Calendar distributed. 3. All labs, I/O's and imaging have been reviewed.     4. Medication Reconciliation performed at discharge. Accessiblity good   RX rationale explained yes   Julianneiancy good        5. Collaborated with  CM and RN on this plan of care. 6. Discussed  - [facility]  option to avoid frequent ED visits. [facility] can treat; pains, sprains, cuts, wounds, high fevers, upper respiratory infections and much more. There medical team is equipped with all the tools necessary to provide advanced medical care in the comfort of you home, workplace, or location of need. The medical team consists of doctors, nurse practitioners, and EMTs. [facility] is available 7 days per week 9 am to 9 pm.   Request care by calling [phone] or by going online at [facility]        The patient   expressed understanding of the disease process and management plan, and all questions were answered. Greater than 45  minutes were spent in patient management, greater than half of which was spent in counseling and coordination of care.       Signed By: Caroline Hernández NP     October 11, 2019

## 2019-10-11 NOTE — PROGRESS NOTES
Report called to Pauline Hernandez LPN at [facility] at [facility]

## 2019-10-11 NOTE — PROGRESS NOTES
MARC Plan:    1) Return phone call from Dr. Franko Martinez for home oxygen order. Information sent to [facility] via [employer] for home O2 set up. Portable tank provided to patient and [facility] to set up home oxygen - patient to call [facility] when leaving hospital - [phone]. 2) Return to [facility] at [address] is number for report. Nursing to call report to facility. 3) Patient provided 2nd IM letter and signed copy placed on chart. 4) Follow-up appt with Dr. Franko Martinez on AVS.    5) AVS FAX'd to [facility] at ) [phone] with Dr. Franko Martinez signature. Care Management Interventions  PCP Verified by CM: Yes  Mode of Transport at Discharge: Other (see comment)(son)  Transition of Care Consult (CM Consult): Other([facility] )  Discharge Durable Medical Equipment: No  Physical Therapy Consult: Yes  Occupational Therapy Consult: No  Speech Therapy Consult: No  Current Support Network: Assisted Living  Confirm Follow Up Transport: Family  Plan discussed with Pt/Family/Caregiver: Yes  Discharge Location  [facility]Care Management Interventions  PCP Verified by CM: Yes  Mode of Transport at Discharge: Other (see comment)(son)  Transition of Care Consult (CM Consult):  Other([facility] )  Discharge Durable Medical Equipment: No  Physical Therapy Consult: Yes  Occupational Therapy Consult: No  Speech Therapy Consult: No  Current Support Network: Assisted Living  Confirm Follow Up Transport: Family  Plan discussed with Pt/Family/Caregiver: Yes  Discharge Location  Discharge Placement: [facility]isted Living([facility])    Ying Lynch RN, BSN, [address] [place] [address]     [phone]

## 2019-10-11 NOTE — DISCHARGE INSTRUCTIONS
[facility] [address]  [phone]      Patient Discharge Instructions    Sera Thomas / [id number] : 1935    Admitted 2019 Discharged: 10/11/2019     Principal Problem:    CAP (community acquired pneumonia) (2019)    Active Problems:    COPD (chronic obstructive pulmonary disease) ([facility] [address] (2010)      Essential hypertension (2016)      PAF (paroxysmal atrial fibrillation) ([facility] [address] (3/30/2018)      Moderate protein-calorie malnutrition ([facility] [address] (3/30/2018)      Stage 3 chronic kidney disease ([facility] [address] (2018)      Recurrent UTI (2019)      Anemia (2019)          Allergies   Allergen Reactions    Hydrocodone Nausea and Vomiting    Morphine Rash    Dilaudid [Hydromorphone] Itching     Does not remember       · It is important that you take the medication exactly as they are prescribed. · Do not take other medications without consulting your doctor. What to do at Next Level of Care    Disposition:  [facility] assisted living    Recommended diet: As tolerated    Recommended activity: Up with assistance          Information obtained by :  I understand that if any problems occur once I am at home I am to contact my physician. I understand and acknowledge receipt of the instructions indicated above.                                                                                                                                            Physician's or R.N.'s Signature                                                                  Date/Time                                                                                                                                              Patient or Representative Signature                                                          Date/Time

## 2019-10-11 NOTE — ROUTINE PROCESS
Oncology End of Shift Note Bedside shift change report given to Linda Norman RN (incoming nurse) by Nakul Kimball (outgoing nurse) on Jigna Clipper. Report included the following information SBAR, MAR and Quality Measures. Shift Summary:  No acute events overnight. Issues for Physician to Address:    
 
Patient on Cardiac Monitoring? [] Yes 
[] No 
 
Rhythm: Telemetry: normal sinus rhythm Shift Events Nakul Kimball

## 2019-10-12 NOTE — DISCHARGE SUMMARY
[address] SUMMARY    Name:  Dava Schwab  MR#:  [id number]  :  1935  ACCOUNT #:  [de-identified]  ADMIT DATE:  2019  DISCHARGE DATE:  10/11/2019      FINAL DIAGNOSES:  1. Community acquired pneumonia. 2.  Chronic obstructive pulmonary disease. 3.  Urinary tract infection. 4.  Hypertension. 5.  Paroxysmal atrial fibrillation. 6.  Protein calorie malnutrition. 7.  Chronic kidney disease, stage III. 8.  Anemia of chronic disease. CONSULTATIONS:  None. PROCEDURES:  None. For details of admission history and physical, please see admit note. HOSPITAL COURSE:  Briefly, the patient is an 80-year-old white female, who is admitted to the hospital with community acquired pneumonia and was started on Rocephin, which was continued on throughout the course of a full treatment regimen of two weeks. She also had the urinary tract infection and was covered for a period with Levaquin and with a combination of the above, she had gradual improvement of her respiratory status. Although, chest x-ray most recently still showed some residual infiltrates, clinically, her pneumonia has resolved and it was felt at this point a maximal hospital benefit has been achieved. She was seen by physical therapy and progressively strengthened and seemed to be making progress. She will be discharged at this point back to the [place] [address]. DISCHARGE MEDICATIONS:  Her new medications will consist of Levaquin 500 mg daily for an additional 5 days, potassium 20 mEq b.i.d. and she will get a flu shot prior to discharge.   Her other chronic medications will consist of vitamin D3 50,000 units once weekly, Imodium tablets 2 every 8 hours as needed p.r.n. for diarrhea, Zofran 4 mg p.r.n. for nausea, MiraLax 1 packet daily, tramadol 50 mg q.i.d. p.r.n., Hygroton 25 mg daily, Priscila-Q 1 tablet daily for 30 days, Lisinopril 40 mg daily, Paxil 20 mg daily, Pravachol 40 mg daily, Restoril 15 mg at bedtime, hydralazine 100 mg 3 times daily, and Lopressor 50 mg 2 times daily. She was on prednisone at the time of admission and that was not continued in the hospitalization and will not be continued at discharge. DISCHARGE INSTRUCTIONS:  She will have followup by me in the office in about 5 days.       MD GLEN Kapadia/[id number]  D:  10/11/2019 7:25  T:  10/12/2019 1:54  JOB #:  [id number]  CC:  [address] Cand[address] Mederos

## 2019-10-14 ENCOUNTER — PATIENT OUTREACH (OUTPATIENT)
Dept: FAMILY MEDICINE CLINIC | Age: 84
End: 2019-10-14

## 2019-10-14 NOTE — PROGRESS NOTES
Hospital Discharge Follow-Up      Date/Time:  10/14/2019 10:11 AM    Patient was admitted to [facility] on 19 and discharged on 10/11/19 for CAP. The physician discharge summary was available at the time of outreach. Patient was contacted within 1 business days of discharge. Top Challenges reviewed with the provider   10/11/19 Hgb 8.9 and Hct 27.7    Advance Care Planning:   Does patient have an Advance Directive:  reviewed and current        Method of communication with provider :chart routing    Inpatient RRAT score: 39  Was this a readmission? no   Patient stated reason for the readmission: n/a    Care Transition Nurse (CTN) contacted the caregiver  Diane Arvizu with [facility], by telephone to perform post hospital discharge assessment. Verified name and  with caregiver as identifiers. Provided introduction to self, and explanation of the CTN role. Caregiver received hospital discharge instructions. CTN reviewed discharge instructions and red flags with caregiver who verbalized understanding. Caregiver given an opportunity to ask questions and does not have any further questions or concerns at this time. The caregiver agrees to contact the PCP office for questions related to their healthcare. CTN provided contact information for future reference.     Disease Specific:   N/A    Patients top risk factors for readmission:  lack of knowledge about disease, medical condition    Home Health orders at discharge: PT, OT, SN, prior history with non  [facility]: [facility] [place]  Date of initial visit: 10/15/19    Durable Medical Equipment ordered at discharge: [address]: [place]   [address] [place] [address] received: yes    Medication(s):   New Medications at Discharge: Levaquin 500 MG every day X 5 days, Klor-Con  Changed Medications at Discharge: none  Discontinued Medications at Discharge: prednisone    Medication reconciliation was performed with caregiver, who verbalizes understanding of administration of home medications. There were no barriers to obtaining medications identified at this time. Referral to Pharm D needed: no     Current Outpatient Medications   Medication Sig    influenza vaccine 2019-20, 6 mos+,,PF, (FLUARIX/FLULAVAL/FLUZONE QUAD) syrg injection 0.5 mL by IntraMUSCular route PRIOR TO DISCHARGE.  levoFLOXacin (LEVAQUIN) 500 mg tablet Take 1 Tab by mouth Daily (before breakfast).  potassium chloride (KLOR-CON) 20 mEq pack Take 1 Packet by mouth daily.  hydrALAZINE (APRESOLINE) 100 mg tablet Take 100 mg by mouth three (3) times daily.  temazepam (RESTORIL) 15 mg capsule Take 1 Cap by mouth nightly. Max Daily Amount: 15 mg.    gabapentin (NEURONTIN) 400 mg capsule Take 1 Cap by mouth four (4) times daily. Max Daily Amount: 1,600 mg.    ondansetron hcl (ZOFRAN) 4 mg tablet Take 4 mg by mouth every eight (8) hours as needed for Nausea.  loperamide (IMODIUM) 2 mg capsule Take 2 Caps by mouth every eight (8) hours as needed for Diarrhea.  pravastatin (PRAVACHOL) 40 mg tablet Take 1 Tab by mouth nightly.  chlorthalidone (HYGROTEN) 25 mg tablet Take 1 Tab by mouth daily.  traMADol (ULTRAM) 50 mg tablet Take 1 Tab by mouth every six (6) hours as needed for Pain. Max Daily Amount: 200 mg.    lisinopril (PRINIVIL, ZESTRIL) 40 mg tablet Take 1 Tab by mouth daily.  dilTIAZem CD (CARDIZEM CD) 300 mg ER capsule Take 1 Cap by mouth daily.  PARoxetine (PAXIL) 20 mg tablet Take 20 mg by mouth daily.  cholecalciferol (VITAMIN D3) 50,000 unit capsule Take 50,000 Units by mouth Every Friday.  polyethylene glycol (MIRALAX) 17 gram packet Take 17 g by mouth daily as needed (constipation).  L. acidoph & paracasei- S therm- Bifido (HANG-Q/RISAQUAD) 8 billion cell cap cap Take 1 Cap by mouth daily.     metoprolol tartrate (LOPRESSOR) 50 mg tablet Take 1 Tab by mouth two (2) times a day.  acetaminophen (TYLENOL) 650 mg CR tablet Take 500 mg by mouth every six (6) hours as needed for Pain.  aspirin 81 mg tablet Take 81 mg by mouth daily. Stopped for surgery 8-4-10      No current facility-administered medications for this visit. There are no discontinued medications. [facility] follow up appointment(s):   Future Appointments   Date Time Provider Miroslava Espinoza   10/15/2019 11:10 AM Sean Foy MD [facility]   11/8/2019 10:20 AM Sean Foy MD [address]      Non-[facility] follow up appointment(s): none  [facility]:  information provided as a resource     Goals      Attend follow up appointments on schedule      10/14/19 Patient is scheduled to follow up with PCP on 10/15/19 @ 1110. Will monitor for attendance. JACQUE       Prevent complications post hospitalization. 10/14/19 Misa with [facility] reports patient began Levaquin on 10/12/19 and oxygen was delivered. Nursing staff will monitor patient for S&S of ssepsis and report at next outreach.  JACQUE

## 2019-10-15 ENCOUNTER — APPOINTMENT (OUTPATIENT)
Dept: GENERAL RADIOLOGY | Age: 84
DRG: 683 | End: 2019-10-15
Attending: EMERGENCY MEDICINE
Payer: MEDICARE

## 2019-10-15 ENCOUNTER — HOSPITAL ENCOUNTER (INPATIENT)
Age: 84
LOS: 14 days | Discharge: NURSING, REHAB OR LONG-TERM CARE | DRG: 683 | End: 2019-10-29
Attending: EMERGENCY MEDICINE | Admitting: FAMILY MEDICINE
Payer: MEDICARE

## 2019-10-15 ENCOUNTER — APPOINTMENT (OUTPATIENT)
Dept: ULTRASOUND IMAGING | Age: 84
DRG: 683 | End: 2019-10-15
Attending: INTERNAL MEDICINE
Payer: MEDICARE

## 2019-10-15 ENCOUNTER — TELEPHONE (OUTPATIENT)
Dept: INTERNAL MEDICINE CLINIC | Age: 84
End: 2019-10-15

## 2019-10-15 DIAGNOSIS — J96.01 ACUTE HYPOXEMIC RESPIRATORY FAILURE (HCC): ICD-10-CM

## 2019-10-15 DIAGNOSIS — D64.9 ANEMIA, UNSPECIFIED TYPE: ICD-10-CM

## 2019-10-15 DIAGNOSIS — R53.1 WEAKNESS GENERALIZED: ICD-10-CM

## 2019-10-15 DIAGNOSIS — N17.9 ACUTE KIDNEY INJURY SUPERIMPOSED ON CKD (HCC): Primary | ICD-10-CM

## 2019-10-15 DIAGNOSIS — E44.0 MODERATE PROTEIN-CALORIE MALNUTRITION (HCC): ICD-10-CM

## 2019-10-15 DIAGNOSIS — E78.2 MIXED HYPERLIPIDEMIA: ICD-10-CM

## 2019-10-15 DIAGNOSIS — N30.00 ACUTE CYSTITIS WITHOUT HEMATURIA: ICD-10-CM

## 2019-10-15 DIAGNOSIS — J44.9 CHRONIC OBSTRUCTIVE PULMONARY DISEASE, UNSPECIFIED COPD TYPE (HCC): Chronic | ICD-10-CM

## 2019-10-15 DIAGNOSIS — N18.30 STAGE 3 CHRONIC KIDNEY DISEASE (HCC): ICD-10-CM

## 2019-10-15 DIAGNOSIS — N39.0 COMPLICATED UTI (URINARY TRACT INFECTION): ICD-10-CM

## 2019-10-15 DIAGNOSIS — I48.0 PAF (PAROXYSMAL ATRIAL FIBRILLATION) (HCC): ICD-10-CM

## 2019-10-15 DIAGNOSIS — N13.30 HYDRONEPHROSIS, UNSPECIFIED HYDRONEPHROSIS TYPE: ICD-10-CM

## 2019-10-15 DIAGNOSIS — I10 ESSENTIAL HYPERTENSION: ICD-10-CM

## 2019-10-15 DIAGNOSIS — N18.9 ACUTE KIDNEY INJURY SUPERIMPOSED ON CKD (HCC): Primary | ICD-10-CM

## 2019-10-15 DIAGNOSIS — E86.0 DEHYDRATION: ICD-10-CM

## 2019-10-15 LAB
ALBUMIN SERPL-MCNC: 2.7 G/DL (ref 3.5–5)
ALBUMIN/GLOB SERPL: 0.7 {RATIO} (ref 1.1–2.2)
ALP SERPL-CCNC: 62 U/L (ref 45–117)
ALT SERPL-CCNC: 16 U/L (ref 12–78)
ANION GAP SERPL CALC-SCNC: 6 MMOL/L (ref 5–15)
APPEARANCE UR: ABNORMAL
AST SERPL-CCNC: 50 U/L (ref 15–37)
BACTERIA URNS QL MICRO: NEGATIVE /HPF
BASOPHILS # BLD: 0 K/UL (ref 0–0.1)
BASOPHILS NFR BLD: 0 % (ref 0–1)
BILIRUB SERPL-MCNC: 0.7 MG/DL (ref 0.2–1)
BILIRUB UR QL: NEGATIVE
BUN SERPL-MCNC: 34 MG/DL (ref 6–20)
BUN/CREAT SERPL: 9 (ref 12–20)
CALCIUM SERPL-MCNC: 9.8 MG/DL (ref 8.5–10.1)
CHLORIDE SERPL-SCNC: 104 MMOL/L (ref 97–108)
CO2 SERPL-SCNC: 27 MMOL/L (ref 21–32)
COLOR UR: ABNORMAL
CREAT SERPL-MCNC: 4 MG/DL (ref 0.55–1.02)
CREAT UR-MCNC: 230 MG/DL
DIFFERENTIAL METHOD BLD: ABNORMAL
EOSINOPHIL # BLD: 0.2 K/UL (ref 0–0.4)
EOSINOPHIL NFR BLD: 1 % (ref 0–7)
EPITH CASTS URNS QL MICRO: ABNORMAL /LPF
ERYTHROCYTE [DISTWIDTH] IN BLOOD BY AUTOMATED COUNT: 14.6 % (ref 11.5–14.5)
GLOBULIN SER CALC-MCNC: 3.9 G/DL (ref 2–4)
GLUCOSE SERPL-MCNC: 106 MG/DL (ref 65–100)
GLUCOSE UR STRIP.AUTO-MCNC: NEGATIVE MG/DL
HCT VFR BLD AUTO: 33.3 % (ref 35–47)
HGB BLD-MCNC: 10.1 G/DL (ref 11.5–16)
HGB UR QL STRIP: NEGATIVE
IMM GRANULOCYTES # BLD AUTO: 0 K/UL (ref 0–0.04)
IMM GRANULOCYTES NFR BLD AUTO: 0 % (ref 0–0.5)
INR PPP: 1.1 (ref 0.9–1.1)
KETONES UR QL STRIP.AUTO: ABNORMAL MG/DL
LEUKOCYTE ESTERASE UR QL STRIP.AUTO: ABNORMAL
LYMPHOCYTES # BLD: 0.9 K/UL (ref 0.8–3.5)
LYMPHOCYTES NFR BLD: 6 % (ref 12–49)
MCH RBC QN AUTO: 29.5 PG (ref 26–34)
MCHC RBC AUTO-ENTMCNC: 30.3 G/DL (ref 30–36.5)
MCV RBC AUTO: 97.4 FL (ref 80–99)
METAMYELOCYTES NFR BLD MANUAL: 1 %
MONOCYTES # BLD: 1.2 K/UL (ref 0–1)
MONOCYTES NFR BLD: 8 % (ref 5–13)
NEUTS BAND NFR BLD MANUAL: 2 %
NEUTS SEG # BLD: 12.9 K/UL (ref 1.8–8)
NEUTS SEG NFR BLD: 82 % (ref 32–75)
NITRITE UR QL STRIP.AUTO: NEGATIVE
NRBC # BLD: 0 K/UL (ref 0–0.01)
NRBC BLD-RTO: 0 PER 100 WBC
PH UR STRIP: 5 [PH] (ref 5–8)
PLATELET # BLD AUTO: 301 K/UL (ref 150–400)
PMV BLD AUTO: 10.3 FL (ref 8.9–12.9)
POTASSIUM SERPL-SCNC: 4.3 MMOL/L (ref 3.5–5.1)
PROT SERPL-MCNC: 6.6 G/DL (ref 6.4–8.2)
PROT UR STRIP-MCNC: 300 MG/DL
PROTHROMBIN TIME: 11.4 SEC (ref 9–11.1)
RBC # BLD AUTO: 3.42 M/UL (ref 3.8–5.2)
RBC #/AREA URNS HPF: ABNORMAL /HPF (ref 0–5)
RBC MORPH BLD: ABNORMAL
SODIUM SERPL-SCNC: 137 MMOL/L (ref 136–145)
SODIUM UR-SCNC: 42 MMOL/L
SP GR UR REFRACTOMETRY: 1.02 (ref 1–1.03)
TROPONIN I SERPL-MCNC: 0.18 NG/ML
UROBILINOGEN UR QL STRIP.AUTO: 0.2 EU/DL (ref 0.2–1)
WBC # BLD AUTO: 15.3 K/UL (ref 3.6–11)
WBC URNS QL MICRO: >100 /HPF (ref 0–4)
YEAST URNS QL MICRO: PRESENT

## 2019-10-15 PROCEDURE — 80053 COMPREHEN METABOLIC PANEL: CPT

## 2019-10-15 PROCEDURE — 94760 N-INVAS EAR/PLS OXIMETRY 1: CPT

## 2019-10-15 PROCEDURE — 74011250637 HC RX REV CODE- 250/637: Performed by: INTERNAL MEDICINE

## 2019-10-15 PROCEDURE — 93005 ELECTROCARDIOGRAM TRACING: CPT

## 2019-10-15 PROCEDURE — 76770 US EXAM ABDO BACK WALL COMP: CPT

## 2019-10-15 PROCEDURE — 99285 EMERGENCY DEPT VISIT HI MDM: CPT

## 2019-10-15 PROCEDURE — 84300 ASSAY OF URINE SODIUM: CPT

## 2019-10-15 PROCEDURE — 77030019905 HC CATH URETH INTMIT MDII -A

## 2019-10-15 PROCEDURE — 85025 COMPLETE CBC W/AUTO DIFF WBC: CPT

## 2019-10-15 PROCEDURE — 85610 PROTHROMBIN TIME: CPT

## 2019-10-15 PROCEDURE — 65270000029 HC RM PRIVATE

## 2019-10-15 PROCEDURE — 36415 COLL VENOUS BLD VENIPUNCTURE: CPT

## 2019-10-15 PROCEDURE — 74011250636 HC RX REV CODE- 250/636: Performed by: INTERNAL MEDICINE

## 2019-10-15 PROCEDURE — 77010033678 HC OXYGEN DAILY

## 2019-10-15 PROCEDURE — 82570 ASSAY OF URINE CREATININE: CPT

## 2019-10-15 PROCEDURE — 74011250636 HC RX REV CODE- 250/636: Performed by: EMERGENCY MEDICINE

## 2019-10-15 PROCEDURE — 71045 X-RAY EXAM CHEST 1 VIEW: CPT

## 2019-10-15 PROCEDURE — 77030037878 HC DRSG MEPILEX >48IN BORD MOLN -B

## 2019-10-15 PROCEDURE — 81001 URINALYSIS AUTO W/SCOPE: CPT

## 2019-10-15 PROCEDURE — 84484 ASSAY OF TROPONIN QUANT: CPT

## 2019-10-15 RX ORDER — SODIUM CHLORIDE 9 MG/ML
75 INJECTION, SOLUTION INTRAVENOUS CONTINUOUS
Status: DISCONTINUED | OUTPATIENT
Start: 2019-10-15 | End: 2019-10-19

## 2019-10-15 RX ORDER — METOPROLOL TARTRATE 50 MG/1
50 TABLET ORAL 2 TIMES DAILY
Status: DISCONTINUED | OUTPATIENT
Start: 2019-10-15 | End: 2019-10-29 | Stop reason: HOSPADM

## 2019-10-15 RX ORDER — GABAPENTIN 300 MG/1
300 CAPSULE ORAL 4 TIMES DAILY
Status: DISCONTINUED | OUTPATIENT
Start: 2019-10-15 | End: 2019-10-16

## 2019-10-15 RX ORDER — HYDRALAZINE HYDROCHLORIDE 50 MG/1
100 TABLET, FILM COATED ORAL 3 TIMES DAILY
Status: DISCONTINUED | OUTPATIENT
Start: 2019-10-15 | End: 2019-10-29 | Stop reason: HOSPADM

## 2019-10-15 RX ORDER — TRAMADOL HYDROCHLORIDE 50 MG/1
50 TABLET ORAL
Status: DISCONTINUED | OUTPATIENT
Start: 2019-10-15 | End: 2019-10-29 | Stop reason: HOSPADM

## 2019-10-15 RX ORDER — PAROXETINE HYDROCHLORIDE 20 MG/1
20 TABLET, FILM COATED ORAL DAILY
Status: DISCONTINUED | OUTPATIENT
Start: 2019-10-16 | End: 2019-10-29 | Stop reason: HOSPADM

## 2019-10-15 RX ORDER — POLYETHYLENE GLYCOL 3350 17 G/17G
17 POWDER, FOR SOLUTION ORAL
Status: DISCONTINUED | OUTPATIENT
Start: 2019-10-15 | End: 2019-10-29 | Stop reason: HOSPADM

## 2019-10-15 RX ORDER — TEMAZEPAM 15 MG/1
15 CAPSULE ORAL
Status: DISCONTINUED | OUTPATIENT
Start: 2019-10-15 | End: 2019-10-29 | Stop reason: HOSPADM

## 2019-10-15 RX ORDER — ACETAMINOPHEN 325 MG/1
325 TABLET ORAL
Status: DISCONTINUED | OUTPATIENT
Start: 2019-10-15 | End: 2019-10-16

## 2019-10-15 RX ORDER — GUAIFENESIN 100 MG/5ML
81 LIQUID (ML) ORAL DAILY
Status: DISCONTINUED | OUTPATIENT
Start: 2019-10-16 | End: 2019-10-29 | Stop reason: HOSPADM

## 2019-10-15 RX ORDER — LOPERAMIDE HYDROCHLORIDE 2 MG/1
4 CAPSULE ORAL
Status: DISCONTINUED | OUTPATIENT
Start: 2019-10-15 | End: 2019-10-29 | Stop reason: HOSPADM

## 2019-10-15 RX ORDER — PRAVASTATIN SODIUM 40 MG/1
40 TABLET ORAL
Status: DISCONTINUED | OUTPATIENT
Start: 2019-10-15 | End: 2019-10-29 | Stop reason: HOSPADM

## 2019-10-15 RX ORDER — HEPARIN SODIUM 5000 [USP'U]/ML
5000 INJECTION, SOLUTION INTRAVENOUS; SUBCUTANEOUS EVERY 12 HOURS
Status: DISCONTINUED | OUTPATIENT
Start: 2019-10-15 | End: 2019-10-29 | Stop reason: HOSPADM

## 2019-10-15 RX ADMIN — TEMAZEPAM 15 MG: 15 CAPSULE ORAL at 21:30

## 2019-10-15 RX ADMIN — HEPARIN SODIUM 5000 UNITS: 5000 INJECTION INTRAVENOUS; SUBCUTANEOUS at 21:30

## 2019-10-15 RX ADMIN — METOPROLOL TARTRATE 50 MG: 50 TABLET, FILM COATED ORAL at 20:00

## 2019-10-15 RX ADMIN — HYDRALAZINE HYDROCHLORIDE 100 MG: 50 TABLET, FILM COATED ORAL at 21:29

## 2019-10-15 RX ADMIN — SODIUM CHLORIDE 1000 ML: 900 INJECTION, SOLUTION INTRAVENOUS at 15:10

## 2019-10-15 RX ADMIN — SODIUM CHLORIDE 75 ML/HR: 900 INJECTION, SOLUTION INTRAVENOUS at 19:00

## 2019-10-15 RX ADMIN — GABAPENTIN 300 MG: 300 CAPSULE ORAL at 21:30

## 2019-10-15 RX ADMIN — PRAVASTATIN SODIUM 40 MG: 40 TABLET ORAL at 21:30

## 2019-10-15 NOTE — ROUTINE PROCESS
TRANSFER - OUT REPORT: 
 
Verbal report given to Agata MURRY(name) on Emir Vasquez  being transferred to [facility] [phone] (unit) for routine progression of care Report consisted of patients Situation, Background, Assessment and  
Recommendations(SBAR). Information from the following report(s) SBAR, Kardex, ED Summary, [facility] and Recent Results was reviewed with the receiving nurse. Lines:  
Peripheral IV 10/15/19 Left Arm (Active) Site Assessment Clean, dry, & intact 10/15/2019  1:04 PM  
Phlebitis Assessment 0 10/15/2019  1:04 PM  
Infiltration Assessment 0 10/15/2019  1:04 PM  
Dressing Status Clean, dry, & intact 10/15/2019  1:04 PM  
Dressing Type Transparent 10/15/2019  1:04 PM  
Hub Color/Line Status Pink;Capped;Flushed;Patent 10/15/2019  1:04 PM  
Action Taken Blood drawn 10/15/2019  1:04 PM  
  
 
Opportunity for questions and clarification was provided. Patient transported with: 
 O2 @ 2 liters

## 2019-10-15 NOTE — ED NOTES
[facility] for [facility] and [facility]. Stated it has gotten worse over the weekend. Patient was discharged from this hospital on 10-11-19 after a 2 week stay for pneumonia.

## 2019-10-15 NOTE — TELEPHONE ENCOUNTER
Patients daughter phoned spoke with Helen Ashby and states they are in route to the ER daughter states her mother is also experiencing  rectal bleeding Dr. Curly Aguirre has been notified

## 2019-10-15 NOTE — PROGRESS NOTES
Reason for Readmission:     Weakness and nausea         RRAT Score and Risk Level:     33     Level of Readmission:    Level 1      Care Conference scheduled:   IDR will discuss       Resources/supports as identified by patient/family:   Son [facility] staff assist with bathing, meals, transportation. Patient states she is able to dress herself     Top Challenges facing patient (as identified by patient/family and CM): Finances/Medication cost?  Not an issue      Transportation      [facility] or Pt's son provided transportation. Self-care/ADLs/Cognition? [facility] staff assist with bathing, meals, transportation. Current Advanced Directive/Advance Care Plan:  DNR           Plan for utilizing home health:   Yes if needed, uses [place] in the past.             Transition of Care Plan:    Based on readmission, the patient's previous Plan of Care   has been evaluated and/or modified. The current Transition of Care Plan is:       Patient prefers to discharge back to the [facility] with follow up appointments    Care Management Interventions  PCP Verified by CM: Yes  Mode of Transport at Discharge: Self(Son will transport pt back to home. )  Transition of Care Consult (CM Consult): Assisted Living(Pt lives at [facility] [address] staff assist with bathing, meals, transportation. )  Discharge Durable Medical Equipment: No  Current Support Network: Assisted Living([facility] staff assist with bathing, meals, transportation. Patient states she is able to dress herself)  Confirm Follow Up Transport: Family  Discharge Location  Discharge Placement: Assisted Living     Patient lives at the [address], no home oxygen, is wheelchair bound and has used home health in the past.  Patient uses [facility]  Patient states she is able to dress herself but [facility] staff assist her with bathing, meals and transportation. Patient's son is also available for transport at [facility] MSW  ED Case Manager   Ext [phone]

## 2019-10-15 NOTE — H&P
Hospitalist Admission Note    NAME: Seven Dorsey   :  1935   MRN:  [id number]     Date/Time:  10/15/2019 3:30 PM    Patient PCP: Susan Samano MD Urology= Dr Nabeel Cardoso  ______________________________________________________________________  Given the patient's current clinical presentation, I have a high level of concern for decompensation if discharged from the emergency department. Complex decision making was performed, which includes reviewing the patient's available past medical records, laboratory results, and x-ray films. My assessment of this patient's clinical condition and my plan of care is as follows. Assessment / Plan:  Acute on Chronic Renal Failure POA  Causing Generalized Weakness POA  CKD stage 3 POA- baseline Cr~ < 2.0  Cr= 2.0-->4.0 POA  CXR neg acute  UA negative, likely concentrated  WBC= 15k- ?reactive    Admit to renal telemetry bed  IVF  Hold Lisinopril, Chlorthalidone for now  Check Renal USG  Ip Nephrology consulted by ER- will await further recommendations  BMP in AM  Pt is long term resident at [facility] set up after recent DC (not yet started)    HTN  Parox Afib  Anemia of CKD  COPD  Chronic PEMalnutrition    Cont Hydralazine, Metoprolol  Holding ACE- & Chlorthalidone for CHRISTY as above  IV Hydralazine prn as needed in hospital  Cont All other home meds including Paxil, statin, Restoril Q HS  Encourage PO intake & supplements    Recent PNA- s/p Levaquin x 5 days completed on discharge per family  CXR neg now      Code Status: DNR in connect care, confirmed with pt & daughter in ER  Surrogate Decision Maker: daughter    DVT Prophylaxis: SQ heparin  GI Prophylaxis: not indicated    Baseline: Pt lives at [address]- needed [facility] on recent DC      Subjective:   CHIEF COMPLAINT: Progressive Generalized weakness at [facility] x 4 days    HISTORY OF PRESENT ILLNESS:     Ashlee Mac is a 80 y.o.    female who presents with above complains from long-term via EMS. Pt presents with severe Progressive Fatigue/gen weakness with inability to even saroj out of bed today AM-- worsening over 4 days after recently DC from hospital (was admitted for PNA)  Denies any h/o fever, worsening cough/sputum  H/o associated decreased PO intake per daughter for past few days  H/o taking Lisinopril & Chlorthalidone for usually high BP  H/o chronic diarrhea needing imodium prn  H/o Renal stent by urology per daughter- changed reg by Dr Keri Peña    Pt was found to have CHRISTY with Cr 4.0 & dehydration in ER evaluation. We were asked to admit for work up and evaluation of the above problems.      Past Medical History:   Diagnosis Date    Arrhythmia     A-fib    Arthritis     shoulder/right    Cancer ([facility] [address]     left lung    Chronic kidney disease     Stent in Right Kidney, Stage III    Chronic obstructive pulmonary disease ([facility] [address]     Hypertension     Ill-defined condition     Ex Lap with Agueda Banker patch of a perforated pyloric channel ulcer 16    Other ill-defined conditions(799.89)     lipids    Other ill-defined conditions(799.89)     blood transfusion history    PVD (peripheral vascular disease) (HCC)         Past Surgical History:   Procedure Laterality Date    BYPASS GRAFT OTHR,FEM-FEM      BYPASS GRAFT OTHR,FEM-POP Right     PVD    HX HEENT      bilateral cataracts    HX ORTHOPAEDIC Right     right hip fracture/pinning    HX UROLOGICAL      right stent/kidney       Social History     Tobacco Use    Smoking status: Former Smoker     Packs/day: 0.25     Years: 60.00     Pack years: 15.00     Last attempt to quit:      Years since quittin.7    Smokeless tobacco: Never Used   Substance Use Topics    Alcohol use: No        Family History   Problem Relation Age of Onset    Heart Disease Mother     Cancer Father      Allergies   Allergen Reactions    Hydrocodone Nausea and Vomiting    Morphine Rash    Dilaudid [Hydromorphone] Itching     Does not remember        Prior to Admission medications    Medication Sig Start Date End Date Taking? Authorizing Provider   influenza vaccine 2019-20, 6 mos+,,PF, (FLUARIX/FLULAVAL/FLUZONE QUAD) syrg injection 0.5 mL by IntraMUSCular route PRIOR TO DISCHARGE. 10/11/19   Bennett Cameron MD   levoFLOXacin (LEVAQUIN) 500 mg tablet Take 1 Tab by mouth Daily (before breakfast). 10/12/19   Bennett Cameron MD   potassium chloride (KLOR-CON) 20 mEq pack Take 1 Packet by mouth daily. 10/11/19   Bennett Cameron MD   hydrALAZINE (APRESOLINE) 100 mg tablet Take 100 mg by mouth three (3) times daily. Provider, Historical   temazepam (RESTORIL) 15 mg capsule Take 1 Cap by mouth nightly. Max Daily Amount: 15 mg. 9/11/19   Bennett Cameron MD   gabapentin (NEURONTIN) 400 mg capsule Take 1 Cap by mouth four (4) times daily. Max Daily Amount: 1,600 mg. 8/13/19   Bennett Cameron MD   ondansetron hcl [facility]) 4 mg tablet Take 4 mg by mouth every eight (8) hours as needed for Nausea. Provider, Historical   loperamide (IMODIUM) 2 mg capsule Take 2 Caps by mouth every eight (8) hours as needed for Diarrhea. 3/13/19   Bennett Cameron MD   pravastatin (PRAVACHOL) 40 mg tablet Take 1 Tab by mouth nightly. 12/11/18   Bennett Cameron MD   chlorthalidone (HYGROTEN) 25 mg tablet Take 1 Tab by mouth daily. 12/7/18   Bennett Cameron MD   traMADol Edith Morales) 50 mg tablet Take 1 Tab by mouth every six (6) hours as needed for Pain. Max Daily Amount: 200 mg. 12/7/18   Bennett Cameron MD   lisinopril (PRINIVIL, ZESTRIL) 40 mg tablet Take 1 Tab by mouth daily. 11/6/18   Bennett Cameron MD   dilTIAZem CD (CARDIZEM CD) 300 mg ER capsule Take 1 Cap by mouth daily. 10/30/18   Bennett Cameron MD   PARoxetine (PAXIL) 20 mg tablet Take 20 mg by mouth daily. Other, MD Baldev   cholecalciferol (VITAMIN D3) 50,000 unit capsule Take 50,000 Units by mouth Every Friday.     Other, MD Baldev   polyethylene glycol (MIRALAX) 17 gram packet Take 17 g by mouth daily as needed (constipation). Other, MD AMBER De Paz acidoph & paracasei- S therm- Bifido (HANG-Q/RISAQUAD) 8 billion cell cap cap Take 1 Cap by mouth daily. 4/12/18   Ulises Jacob MD   metoprolol tartrate (LOPRESSOR) 50 mg tablet Take 1 Tab by mouth two (2) times a day. 4/12/18   Ulises Jacob MD   acetaminophen (TYLENOL) 650 mg CR tablet Take 500 mg by mouth every six (6) hours as needed for Pain. Provider, Historical   aspirin 81 mg tablet Take 81 mg by mouth daily.  Stopped for surgery 8-4-10  8/5/10   Provider, Historical       REVIEW OF SYSTEMS:           Total of 12 systems reviewed as follows:       POSITIVE= underlined text  Negative = text not underlined  General:  fever, chills, sweats, generalized weakness, weight loss/gain,      loss of appetite   Eyes:    blurred vision, eye pain, loss of vision, double vision  ENT:    rhinorrhea, pharyngitis   Respiratory:   cough, sputum production, SOB, MONTANO, wheezing, pleuritic pain   Cardiology:   chest pain, palpitations, orthopnea, PND, edema, syncope   Gastrointestinal:  abdominal pain , Nausea only, diarrhea, dysphagia, constipation, bleeding   Genitourinary:  frequency, urgency, dysuria, hematuria, incontinence   Muskuloskeletal :  arthralgia, myalgia, back pain  Hematology:  easy bruising, nose or gum bleeding, lymphadenopathy   Dermatological: rash, ulceration, pruritis, color change / jaundice  Endocrine:   hot flashes or polydipsia   Neurological:  headache, dizziness, confusion, focal weakness, paresthesia,     Speech difficulties, memory loss, gait difficulty  Psychological: Feelings of anxiety, depression, agitation    Objective:   VITALS:    Visit Vitals  /87 (BP 1 Location: Left arm, BP Patient Position: At rest)   Pulse 64   Temp 98.8 °F (37.1 °C)   Resp 20   Ht 5' (1.524 m)   Wt 54.4 kg (119 lb 14.9 oz)   SpO2 93%   BMI 23.42 kg/m²       PHYSICAL EXAM:    General:    Alert, cooperative, no distress, appears stated age. HEENT: Atraumatic, anicteric sclerae, pink conjunctivae     No oral ulcers, mucosa Dry, lips chapped & dry +, throat clear, dentition fair  Neck:  Supple, symmetrical,  thyroid: non tender  Lungs:   Clear to auscultation bilaterally. No Wheezing or Rhonchi. No rales. Chest wall:  No tenderness  No Accessory muscle use. Heart:   Regular  rhythm,  No  murmur   No edema  Abdomen:   Soft, non-tender. Not distended. Bowel sounds normal  Extremities: No cyanosis. No clubbing,      Skin turgor normal, Capillary refill normal, Radial dial pulse 2+  Skin:     Not pale. Not Jaundiced  No rashes   Psych:  Good insight. Not depressed. Not anxious or agitated. Neurologic: EOMs intact. No facial asymmetry. No aphasia or slurred speech. Symmetrical strength, Sensation grossly intact. Alert and oriented X 4.     _______________________________________________________________________  Care Plan discussed with:    Comments   Patient x    Family  x Daughter at bedside in ER   RN x    Care Manager                    Consultant:      _______________________________________________________________________  Expected  Disposition:   Home with Family    HH/PT/OT/RN x   SNF/LTC x   HERB    ________________________________________________________________________  TOTAL TIME:  64 Minutes    Critical Care Provided     Minutes non procedure based      Comments    x Reviewed previous records   >50% of visit spent in counseling and coordination of care x Discussion with patient and family and questions answered       ________________________________________________________________________  Signed: Diamond Parekh MD    Procedures: see electronic medical records for all procedures/Xrays and details which were not copied into this note but were reviewed prior to creation of Plan.     LAB DATA REVIEWED:    Recent Results (from the past 24 hour(s))   CBC WITH AUTOMATED DIFF    Collection Time: 10/15/19 1:04 PM   Result Value Ref Range    WBC 15.3 (H) 3.6 - 11.0 K/uL    RBC 3.42 (L) 3.80 - 5.20 M/uL    HGB 10.1 (L) 11.5 - 16.0 g/dL    HCT 33.3 (L) 35.0 - 47.0 %    MCV 97.4 80.0 - 99.0 FL    MCH 29.5 26.0 - 34.0 PG    MCHC 30.3 30.0 - 36.5 g/dL    RDW 14.6 (H) 11.5 - 14.5 %    PLATELET 969 040 - 379 K/uL    MPV 10.3 8.9 - 12.9 FL    NRBC 0.0 0  WBC    ABSOLUTE NRBC 0.00 0.00 - 0.01 K/uL    NEUTROPHILS 82 (H) 32 - 75 %    BAND NEUTROPHILS 2 %    LYMPHOCYTES 6 (L) 12 - 49 %    MONOCYTES 8 5 - 13 %    EOSINOPHILS 1 0 - 7 %    BASOPHILS 0 0 - 1 %    METAMYELOCYTES 1 %    IMMATURE GRANULOCYTES 0 0.0 - 0.5 %    ABS. NEUTROPHILS 12.9 (H) 1.8 - 8.0 K/UL    ABS. LYMPHOCYTES 0.9 0.8 - 3.5 K/UL    ABS. MONOCYTES 1.2 (H) 0.0 - 1.0 K/UL    ABS. EOSINOPHILS 0.2 0.0 - 0.4 K/UL    ABS. BASOPHILS 0.0 0.0 - 0.1 K/UL    ABS. IMM. GRANS. 0.0 0.00 - 0.04 K/UL    DF MANUAL      RBC COMMENTS NORMOCYTIC, NORMOCHROMIC     PROTHROMBIN TIME + INR    Collection Time: 10/15/19  1:04 PM   Result Value Ref Range    INR 1.1 0.9 - 1.1      Prothrombin time 11.4 (H) 9.0 - 97.3 sec   METABOLIC PANEL, COMPREHENSIVE    Collection Time: 10/15/19  1:04 PM   Result Value Ref Range    Sodium 137 136 - 145 mmol/L    Potassium 4.3 3.5 - 5.1 mmol/L    Chloride 104 97 - 108 mmol/L    CO2 27 21 - 32 mmol/L    Anion gap 6 5 - 15 mmol/L    Glucose 106 (H) 65 - 100 mg/dL    BUN 34 (H) 6 - 20 MG/DL    Creatinine 4.00 (H) 0.55 - 1.02 MG/DL    BUN/Creatinine ratio 9 (L) 12 - 20      GFR est AA 13 (L) >60 ml/min/1.73m2    GFR est non-AA 11 (L) >60 ml/min/1.73m2    Calcium 9.8 8.5 - 10.1 MG/DL    Bilirubin, total 0.7 0.2 - 1.0 MG/DL    ALT (SGPT) 16 12 - 78 U/L    AST (SGOT) 50 (H) 15 - 37 U/L    Alk.  phosphatase 62 45 - 117 U/L    Protein, total 6.6 6.4 - 8.2 g/dL    Albumin 2.7 (L) 3.5 - 5.0 g/dL    Globulin 3.9 2.0 - 4.0 g/dL    A-G Ratio 0.7 (L) 1.1 - 2.2     TROPONIN I    Collection Time: 10/15/19  1:04 PM   Result Value Ref Range    Troponin-I, Qt. 0.18 (H) <0.05 ng/mL   URINALYSIS W/ RFLX MICROSCOPIC    Collection Time: 10/15/19  3:01 PM   Result Value Ref Range    Color DARK YELLOW      Appearance TURBID (A) CLEAR      Specific gravity 1.023 1.003 - 1.030      pH (UA) 5.0 5.0 - 8.0      Protein 300 (A) NEG mg/dL    Glucose NEGATIVE  NEG mg/dL    Ketone TRACE (A) NEG mg/dL    Bilirubin NEGATIVE  NEG      Blood NEGATIVE  NEG      Urobilinogen 0.2 0.2 - 1.0 EU/dL    Nitrites NEGATIVE  NEG      Leukocyte Esterase LARGE (A) NEG     EKG, 12 LEAD, INITIAL    Collection Time: 10/15/19  3:02 PM   Result Value Ref Range    Ventricular Rate 63 BPM    Atrial Rate 63 BPM    P-R Interval 246 ms    QRS Duration 92 ms    Q-T Interval 440 ms    QTC Calculation (Bezet) 450 ms    Calculated P Axis 42 degrees    Calculated R Axis -22 degrees    Calculated T Axis 63 degrees    Diagnosis       Sinus rhythm with 1st degree AV block  When compared with ECG of 26-SEP-2019 21:09,  premature supraventricular complexes are no longer present  AK interval has increased  Criteria for Anterior infarct are no longer present  Criteria for Inferior infarct are no longer present

## 2019-10-15 NOTE — PROGRESS NOTES
Pharmacy Clarification of the Prior to Admission Medication Regimen Retrospective to the Admission Medication Reconciliation    The patient was not interviewed regarding clarification of the prior to admission medication regimen. Patient was transferred from [facility] [address] [facility], to [facility], with a current med list. Pharmacy called [facility] [address], [phone], and spoke with Isrrael Ortega LPN and Annika, Director of Nursing, who were able to verify the patient's last administered doses. Information Obtained From: caregiver    Recommendations/Findings: The following amendments were made to the patient's active medication list on file at [facility]:     1) Additions: NONE    2) Removals: NONE    3) Changes: NONE    4) Pertinent Pharmacy Findings:  polyethylene glycol (MIRALAX) 17 gram packet: Per SNF, this agent has not been administered as of 10/15/19  levoFLOXacin (LEVAQUIN) 500 mg tablet: Patient started a 5 day regimen on 10/12/19. Patient has completed 4 days of therapy as of 10/15/15. Antibiotic Indication: PNA     PTA medication list was corrected to the following:     Prior to Admission Medications   Prescriptions Last Dose Informant Patient Reported? Taking? L. acidoph & paracasei- S therm- Bifido (HANG-Q/RISAQUAD) 8 billion cell cap cap 10/15/2019 at 0900 Care Giver No Yes   Sig: Take 1 Cap by mouth daily. PARoxetine (PAXIL) 20 mg tablet 10/15/2019 at [address] Yes Yes   Sig: Take 20 mg by mouth daily. acetaminophen (TYLENOL) 500 mg tablet 10/13/2019 at Unknown time Care Giver Yes Yes   Sig: Take 500 mg by mouth every six (6) hours as needed for Pain. aspirin 81 mg tablet 10/15/2019 at 0900 Care Giver Yes Yes   Sig: Take 81 mg by mouth daily. chlorthalidone (HYGROTEN) 25 mg tablet 10/15/2019 at 0900 Care Giver No Yes   Sig: Take 1 Tab by mouth daily. cholecalciferol (VITAMIN D3) 50,000 unit capsule 10/4/2019 at Unknown time Care Giver Yes Yes   Sig: Take 50,000 Units by mouth Every Friday. dilTIAZem CD (CARDIZEM CD) 300 mg ER capsule 10/15/2019 at 0900 Care Giver No Yes   Sig: Take 1 Cap by mouth daily. gabapentin (NEURONTIN) 400 mg capsule 10/15/2019 at 0900 Care Giver No Yes   Sig: Take 1 Cap by mouth four (4) times daily. Max Daily Amount: 1,600 mg.   hydrALAZINE (APRESOLINE) 100 mg tablet 10/15/2019 at [address] Yes Yes   Sig: Take 100 mg by mouth three (3) times daily. levoFLOXacin (LEVAQUIN) 500 mg tablet 10/15/2019 at 0900 Care Giver No Yes   Sig: Take 1 Tab by mouth Daily (before breakfast). lisinopril (PRINIVIL, ZESTRIL) 40 mg tablet 10/15/2019 at 0900 Care Giver No Yes   Sig: Take 1 Tab by mouth daily. loperamide (IMODIUM) 2 mg capsule 10/14/2019 at Unknown time Care Giver No Yes   Sig: Take 2 Caps by mouth every eight (8) hours as needed for Diarrhea.   metoprolol tartrate (LOPRESSOR) 50 mg tablet 10/15/2019 at 0900 Care Giver No Yes   Sig: Take 1 Tab by mouth two (2) times a day. ondansetron hcl (ZOFRAN) 4 mg tablet 10/15/2019 at Unknown time Care Giver Yes Yes   Sig: Take 4 mg by mouth every eight (8) hours as needed for Nausea. polyethylene glycol (MIRALAX) 17 gram packet Not Taking at Unknown time Care Giver Yes No   Sig: Take 17 g by mouth daily as needed (constipation). potassium chloride (KLOR-CON) 20 mEq pack 10/15/2019 at 0900 Care Giver No Yes   Sig: Take 1 Packet by mouth daily. pravastatin (PRAVACHOL) 40 mg tablet 10/14/2019 at 2100 Care Giver No Yes   Sig: Take 1 Tab by mouth nightly. temazepam (RESTORIL) 15 mg capsule 10/14/2019 at 2100 Care Giver No Yes   Sig: Take 1 Cap by mouth nightly. Max Daily Amount: 15 mg.   traMADol (ULTRAM) 50 mg tablet 10/14/2019 at Unknown time Care Giver No Yes   Sig: Take 1 Tab by mouth every six (6) hours as needed for Pain. Max Daily Amount: 200 mg.       Facility-Administered Medications: None          Thank you,  Jatinder Galvin CPhT  Medication History Pharmacy Technician

## 2019-10-15 NOTE — TELEPHONE ENCOUNTER
Patient daughter called and she is not feeling good. She has a fever and she wants to know can doctor Adi Stage called and have her admitted instead of going through the emergency room.

## 2019-10-15 NOTE — CONSULTS
Consultation Note    NAME: Giulia Dale   :  1935   MRN:  [id number]     Date/Time:  10/15/2019 5:28 PM    I have been asked to see this patient by Dr. Che Deluna  for advice/opinion re: CKD-3/CHRISTY. Assessment :    Plan:  CKD-3-baseline creatinine 1.5  CHRISTY  Pyuria  Proteinuria  Chronic obstructive uropathy with right ureteral stricture from previous AAA  Diarrhea   Unclear what has caused CHRISTY. She has pyuria so I'm concerned about interstitial nephritis. Check urine eos. US shows no hydro. I agree with IVF and may have an element of volume depletion. Labs in AM.       Subjective:   CHIEF COMPLAINT:  \"I felt bad. \"    HISTORY OF PRESENT ILLNESS:     Mesha Alvarez is a 80 y.o.   female who has a history of CKD-3 admitted with CHRISTY and weakness. Review of the chart shows that we saw the patient last year with CHRISTY due to obstruction. More recently she was admitted from  to 10/11 with pneumonia. Her D/C creatinine was 2.07. She says that since being home she has become increasingly weak. She says that po intake has been poor and that she has had loose stool. She denies any gross hematuria and has no flank pain.       Past Medical History:   Diagnosis Date    Arrhythmia     A-fib    Arthritis     shoulder/right    Cancer ([facility] [address] 2015    left lung    Chronic kidney disease     Stent in Right Kidney, Stage III    Chronic obstructive pulmonary disease ([facility] [address]     Hypertension     Ill-defined condition     Ex Lap with Lonne Loyd patch of a perforated pyloric channel ulcer 16    Other ill-defined conditions(799.89)     lipids    Other ill-defined conditions(799.89)     blood transfusion history    PVD (peripheral vascular disease) (HCC)       Past Surgical History:   Procedure Laterality Date    BYPASS GRAFT OTHR,FEM-FEM      BYPASS GRAFT OTHR,FEM-POP Right     PVD    HX HEENT      bilateral cataracts    HX ORTHOPAEDIC Right     right hip fracture/pinning    HX UROLOGICAL      right stent/kidney     Social History     Tobacco Use    Smoking status: Former Smoker     Packs/day: 0.25     Years: 60.00     Pack years: 15.00     Last attempt to quit: 2015     Years since quittin.7    Smokeless tobacco: Never Used   Substance Use Topics    Alcohol use: No      Family History   Problem Relation Age of Onset    Heart Disease Mother     Cancer Father       Allergies   Allergen Reactions    Hydrocodone Nausea and Vomiting    Morphine Rash    Dilaudid [Hydromorphone] Itching     Does not remember      Prior to Admission medications    Medication Sig Start Date End Date Taking? Authorizing Provider   levoFLOXacin (LEVAQUIN) 500 mg tablet Take 1 Tab by mouth Daily (before breakfast). 10/12/19  Yes Cher Martínez MD   potassium chloride (KLOR-CON) 20 mEq pack Take 1 Packet by mouth daily. 10/11/19  Yes Cher Martínez MD   hydrALAZINE (APRESOLINE) 100 mg tablet Take 100 mg by mouth three (3) times daily. Yes Provider, Historical   temazepam (RESTORIL) 15 mg capsule Take 1 Cap by mouth nightly. Max Daily Amount: 15 mg. 19  Yes Cher Martínez MD   gabapentin (NEURONTIN) 400 mg capsule Take 1 Cap by mouth four (4) times daily. Max Daily Amount: 1,600 mg. 19  Yes Cher Martínez MD   ondansetron hcl [facility]) 4 mg tablet Take 4 mg by mouth every eight (8) hours as needed for Nausea. Yes Provider, Historical   loperamide (IMODIUM) 2 mg capsule Take 2 Caps by mouth every eight (8) hours as needed for Diarrhea. 3/13/19  Yes Cher Martínez MD   pravastatin (PRAVACHOL) 40 mg tablet Take 1 Tab by mouth nightly. 18  Yes Cher Martínez MD   chlorthalidone (HYGROTEN) 25 mg tablet Take 1 Tab by mouth daily. 18  Yes Cher Martínez MD   traMADol Jovanny Martinez) 50 mg tablet Take 1 Tab by mouth every six (6) hours as needed for Pain.  Max Daily Amount: 200 mg. 18  Yes Cher Martínez MD   lisinopril (PRINIVIL, ZESTRIL) 40 mg tablet Take 1 Tab by mouth daily. 11/6/18  Yes Vanessa Oliva MD   dilTIAZem CD (CARDIZEM CD) 300 mg ER capsule Take 1 Cap by mouth daily. 10/30/18  Yes Vanessa Oliva MD   PARoxetine (PAXIL) 20 mg tablet Take 20 mg by mouth daily. Yes Veronika, MD Baldev   cholecalciferol (VITAMIN D3) 50,000 unit capsule Take 50,000 Units by mouth Every Friday. Yes Veronika, MD Baldev   LMitra acidoph & paracasei- S therm- Bifido (HANG-Q/RISAQUAD) 8 billion cell cap cap Take 1 Cap by mouth daily. 4/12/18  Yes Vanessa Oliva MD   metoprolol tartrate (LOPRESSOR) 50 mg tablet Take 1 Tab by mouth two (2) times a day. 4/12/18  Yes Vanessa Oliva MD   acetaminophen (TYLENOL) 500 mg tablet Take 500 mg by mouth every six (6) hours as needed for Pain. Yes Provider, Historical   aspirin 81 mg tablet Take 81 mg by mouth daily. Yes Provider, Historical   polyethylene glycol (MIRALAX) 17 gram packet Take 17 g by mouth daily as needed (constipation).     Other, MD Baldev     REVIEW OF SYSTEMS:     []  Unable to obtain reliable ROS due to  [] mental status  [] sedated   [] intubated   [x] Total of 12 systems reviewed as follows:  Constitutional: negative fever, negative chills, negative weight loss  Eyes:   negative visual changes  ENT:   negative sore throat, tongue or lip swelling  Respiratory:  negative cough, negative dyspnea  Cards:  negative for chest pain, palpitations, lower extremity edema  GI:   negative for nausea, vomiting, pos diarrhea, and no abdominal pain  :  negative for frequency, dysuria  Integument:  negative for rash and pruritus  Heme:  negative for easy bruising and gum/nose bleeding  Musculoskel: negative for myalgias,  back pain and muscle weakness  Neuro:  negative for headaches, dizziness, vertigo  Psych:  negative for feelings of anxiety, depression   Travel?: none    Objective:   VITALS:    Visit Vitals  /87 (BP 1 Location: Left arm, BP Patient Position: At rest)   Pulse 64   Temp 98.8 °F (37.1 °C)   Resp 20   Ht 5' (1.524 m)   Wt 54.4 kg (119 lb 14.9 oz)   SpO2 93%   BMI 23.42 kg/m²     PHYSICAL EXAM:  Gen:  []  WD []  WN  [] cachectic []  thin []  obese []  disheveled             [x]  ill apearing  []   Critical  [x]   Chronic    []  No acute distress    HEENT:   [x] NC/AT/PERRL    [x] pink conjunctivae      [] pale conjunctivae                  PERRL  [] yes  [] no      [] moist mucosa    [] dry mucosa    hearing intact to voice [] yes  [] No                 NECK:   supple [] yes  [] no        masses [] yes  [] No               thyroid  []  non tender  []  tender    RESP:   [] CTA bilaterally/no wheezing/rhonchi/rales/crackles    [x] rhonchi bilaterally - no dullness  [] wheezing   [] rhonchi   [] crackles     use of accessory muscles [] yes [] no    CARD:   [x]  regular rate and rhythm/No murmurs/rubs/gallops    murmur  [] yes ()  [] no      Rubs  [] yes  [] no       Gallops [] yes  [] no    Rate []  regular  []  irregular        carotid bruits  [] Right  []  Left                 LE edema [] yes  [x] no           JVP  []  yes   []  no    ABD:    [x] soft/non distended/non tender/+bowel sounds/no HSM    []  Rigid    tenderness [] yes [] no   Liver enlargement  []  yes []  no                Spleen enlargement  []  yes []  no     distended []  yes [] no     bowel sound  [] hypoactive   [] hyperactive    LYMPH:    Neck []  yes [x]  no       Axillae []  yes [x]  no    SKIN:   Rashes []  yes   [x]  no    Ulcers []  yes   [x]  no               [] tight to palpitation    skin turgor []  good  [] poor  [] decreased               Cyanosis/clubbing []  yes []  no    NEUR:   [x] cranial nerves II-XII grossly intact       [] Cranial nerves deficit                 []  facial droop    []  slurred speech   [] aphasic     [] Strength normal     []  weakness  []  LUE  []   RUE/ []  LLE  []   RLE    follows commands  [x]  yes []  no           PSYCH:   insight [] poor [x] good   Alert and Oriented to  [x] person  [x] place  [x]  time [] depressed [] anxious [] agitated  [] lethargic [] stuporous  [] sedated     LAB DATA REVIEWED:    Recent Labs     10/15/19  1304   WBC 15.3*   HGB 10.1*   HCT 33.3*        Recent Labs     10/15/19  1304      K 4.3      CO2 27   BUN 34*   CREA 4.00*   *   CA 9.8     Recent Labs     10/15/19  1304   SGOT 50*   ALT 16   AP 62   TBILI 0.7   ALB 2.7*   GLOB 3.9     Recent Labs     10/15/19  1304   INR 1.1   PTP 11.4*      No results for input(s): FE, TIBC, PSAT, FERR in the last 72 hours. No results for input(s): PH, PCO2, PO2 in the last 72 hours. No results for input(s): CPK, CKMB in the last 72 hours.     No lab exists for component: TROPONINI  Lab Results   Component Value Date/Time    Glucose (POC) 108 (H) 06/26/2009 08:05 AM    Glucose (POC) 167 (H) 06/25/2009 06:01 PM    Glucose (POC) 103 06/24/2009 12:08 PM       Procedures: see electronic medical records for all procedures/Xrays and details which were not copied into this note but were reviewed prior to creation of Plan.    ________________________________________________________________________       ___________________________________________________  Consulting Physician: Africa Fine MD

## 2019-10-15 NOTE — PROGRESS NOTES
1610  Patient arrived from ED via stretcher, dual skin assessment with Agata RN, large are of breakdown purplish in color and small amount of bleeding found on sacral area along with bright red rash on both sides of groin. 1810  Transport arrived to take patient for ultrasound   1900  Bedside and Verbal shift change report given to oncoming nurse. Report included the following information SBAR, Kardex, Intake/Output and Recent Results.

## 2019-10-15 NOTE — ED NOTES
Patient resting in bed in position of comfort with call bell in reach, family at bedside. Patient is on bedside monitor times 3.

## 2019-10-15 NOTE — ED PROVIDER NOTES
EMERGENCY DEPARTMENT HISTORY AND PHYSICAL EXAM      Date: 10/15/2019  Patient Name: Kareem Peraza    Please note that this dictation was completed with [id number] you [employer], the computer voice recognition software. Quite often unanticipated grammatical, syntax, homophones, and other interpretive errors are inadvertently transcribed by the computer software. Please disregard these errors. Please excuse any errors that have escaped final proofreading. History of Presenting Illness     Chief Complaint   Patient presents with    Nausea     nausea for the past 4 days, no vomiting, released from the hospital on 10-11-19 for 2 week stay for pneumonia.  Fatigue     unable to get out of bed this morning       History Provided By: Patient    HPI: Kareem Peraza, 80 y.o. female, with a past medical history significant for lung cancer, recent diagnosis of pneumonia and recent admission, was discharged on 10/11. She is on 3 L oxygen at baseline, as discharged home on Levaquin for treatment of pneumonia and UTI. She denies any fevers or chills. Admits to nausea, denies any breath. Denies any urinary symptoms. No focal deficit. No fever here. States she felt so weak that she could not sit up without assistance today. Nothing lateralizing, no headache. PCP: Kenney Parekh MD    No current facility-administered medications on file prior to encounter. Current Outpatient Medications on File Prior to Encounter   Medication Sig Dispense Refill    influenza vaccine 2019-20, 6 mos+,,PF, (FLUARIX/FLULAVAL/FLUZONE QUAD) syrg injection 0.5 mL by IntraMUSCular route PRIOR TO DISCHARGE. 1 mL 0    levoFLOXacin (LEVAQUIN) 500 mg tablet Take 1 Tab by mouth Daily (before breakfast). 5 Tab 0    potassium chloride (KLOR-CON) 20 mEq pack Take 1 Packet by mouth daily. 60 Packet prn    hydrALAZINE (APRESOLINE) 100 mg tablet Take 100 mg by mouth three (3) times daily.       temazepam (RESTORIL) 15 mg capsule Take 1 Cap by mouth nightly. Max Daily Amount: 15 mg. 30 Cap 5    gabapentin (NEURONTIN) 400 mg capsule Take 1 Cap by mouth four (4) times daily. Max Daily Amount: 1,600 mg. 120 Cap 5    ondansetron hcl (ZOFRAN) 4 mg tablet Take 4 mg by mouth every eight (8) hours as needed for Nausea.  loperamide (IMODIUM) 2 mg capsule Take 2 Caps by mouth every eight (8) hours as needed for Diarrhea. 60 Cap 2    pravastatin (PRAVACHOL) 40 mg tablet Take 1 Tab by mouth nightly. 30 Tab 11    chlorthalidone (HYGROTEN) 25 mg tablet Take 1 Tab by mouth daily. 30 Tab prn    traMADol (ULTRAM) 50 mg tablet Take 1 Tab by mouth every six (6) hours as needed for Pain. Max Daily Amount: 200 mg. 60 Tab 0    lisinopril (PRINIVIL, ZESTRIL) 40 mg tablet Take 1 Tab by mouth daily. 30 Tab prn    dilTIAZem CD (CARDIZEM CD) 300 mg ER capsule Take 1 Cap by mouth daily. 30 Cap prn    PARoxetine (PAXIL) 20 mg tablet Take 20 mg by mouth daily.  cholecalciferol (VITAMIN D3) 50,000 unit capsule Take 50,000 Units by mouth Every Friday.  polyethylene glycol (MIRALAX) 17 gram packet Take 17 g by mouth daily as needed (constipation).  L. acidoph & paracasei- S therm- Bifido (HANG-Q/RISAQUAD) 8 billion cell cap cap Take 1 Cap by mouth daily. 30 Cap 0    metoprolol tartrate (LOPRESSOR) 50 mg tablet Take 1 Tab by mouth two (2) times a day. 60 Tab PRN    acetaminophen (TYLENOL) 650 mg CR tablet Take 500 mg by mouth every six (6) hours as needed for Pain.  aspirin 81 mg tablet Take 81 mg by mouth daily.  Stopped for surgery 8-4-10          Past History     Past Medical History:  Past Medical History:   Diagnosis Date    Arrhythmia     A-fib    Arthritis     shoulder/right    Cancer ([facility] 2015    left lung    Chronic kidney disease     Stent in Right Kidney, Stage III    Chronic obstructive pulmonary disease ([facility]     Hypertension     Ill-defined condition     Ex Lap with [facility] patch of a perforated pyloric channel ulcer 7/19/16    Other ill-defined conditions(799.89)     lipids    Other ill-defined conditions(799.89)     blood transfusion history    PVD (peripheral vascular disease) (HCC)        Past Surgical History:  Past Surgical History:   Procedure Laterality Date    BYPASS GRAFT OTHR,FEM-FEM      BYPASS GRAFT OTHR,FEM-POP Right     PVD    HX HEENT      bilateral cataracts    HX ORTHOPAEDIC Right     right hip fracture/pinning    HX UROLOGICAL      right stent/kidney       Family History:  Family History   Problem Relation Age of Onset    Heart Disease Mother     Cancer Father        Social History:  Social History     Tobacco Use    Smoking status: Former Smoker     Packs/day: 0.25     Years: 60.00     Pack years: 15.00     Last attempt to quit:      Years since quittin.7    Smokeless tobacco: Never Used   Substance Use Topics    Alcohol use: No    Drug use: No       Allergies: Allergies   Allergen Reactions    Hydrocodone Nausea and Vomiting    Morphine Rash    Dilaudid [Hydromorphone] Itching     Does not remember         Review of Systems   Review of Systems   Constitutional: Positive for fever. Negative for chills. HENT: Negative for congestion and sore throat. Eyes: Negative for visual disturbance. Respiratory: Negative for cough and shortness of breath. Cardiovascular: Negative for chest pain and leg swelling. Gastrointestinal: Positive for nausea. Negative for abdominal pain, blood in stool, diarrhea and vomiting. Endocrine: Negative for polyuria. Genitourinary: Negative for dysuria, flank pain, vaginal bleeding and vaginal discharge. Musculoskeletal: Negative for myalgias. Skin: Negative for rash. Allergic/Immunologic: Negative for immunocompromised state. Neurological: Positive for weakness. Negative for headaches. Psychiatric/Behavioral: Negative for confusion. Physical Exam   Physical Exam   Constitutional: She is oriented to person, place, and time.  She appears well-developed and well-nourished. HENT:   Head: Normocephalic and atraumatic. Dry mucous membranes   Eyes: Pupils are equal, round, and reactive to light. Conjunctivae are normal. Right eye exhibits no discharge. Left eye exhibits no discharge. Neck: Normal range of motion. Neck supple. No tracheal deviation present. Cardiovascular: Normal rate, regular rhythm and normal heart sounds. No murmur heard. Pulmonary/Chest: Effort normal and breath sounds normal. No respiratory distress. She has no wheezes. She has no rales. Abdominal: Soft. Bowel sounds are normal. There is no tenderness. There is no rebound and no guarding. Musculoskeletal: Normal range of motion. She exhibits no edema, tenderness or deformity. Neurological: She is alert and oriented to person, place, and time. Skin: Skin is warm and dry. No rash noted. No erythema. Psychiatric: Her behavior is normal.   Nursing note and vitals reviewed. Diagnostic Study Results     Labs -     Recent Results (from the past 12 hour(s))   CBC WITH AUTOMATED DIFF    Collection Time: 10/15/19  1:04 PM   Result Value Ref Range    WBC 15.3 (H) 3.6 - 11.0 K/uL    RBC 3.42 (L) 3.80 - 5.20 M/uL    HGB 10.1 (L) 11.5 - 16.0 g/dL    HCT 33.3 (L) 35.0 - 47.0 %    MCV 97.4 80.0 - 99.0 FL    MCH 29.5 26.0 - 34.0 PG    MCHC 30.3 30.0 - 36.5 g/dL    RDW 14.6 (H) 11.5 - 14.5 %    PLATELET 569 308 - 012 K/uL    MPV 10.3 8.9 - 12.9 FL    NRBC 0.0 0  WBC    ABSOLUTE NRBC 0.00 0.00 - 0.01 K/uL    NEUTROPHILS 82 (H) 32 - 75 %    BAND NEUTROPHILS 2 %    LYMPHOCYTES 6 (L) 12 - 49 %    MONOCYTES 8 5 - 13 %    EOSINOPHILS 1 0 - 7 %    BASOPHILS 0 0 - 1 %    METAMYELOCYTES 1 %    IMMATURE GRANULOCYTES 0 0.0 - 0.5 %    ABS. NEUTROPHILS 12.9 (H) 1.8 - 8.0 K/UL    ABS. LYMPHOCYTES 0.9 0.8 - 3.5 K/UL    ABS. MONOCYTES 1.2 (H) 0.0 - 1.0 K/UL    ABS. EOSINOPHILS 0.2 0.0 - 0.4 K/UL    ABS. BASOPHILS 0.0 0.0 - 0.1 K/UL    ABS. IMM.  GRANS. 0.0 0.00 - 0.04 K/UL    DF MANUAL RBC COMMENTS NORMOCYTIC, NORMOCHROMIC     PROTHROMBIN TIME + INR    Collection Time: 10/15/19  1:04 PM   Result Value Ref Range    INR 1.1 0.9 - 1.1      Prothrombin time 11.4 (H) 9.0 - 27.0 sec   METABOLIC PANEL, COMPREHENSIVE    Collection Time: 10/15/19  1:04 PM   Result Value Ref Range    Sodium 137 136 - 145 mmol/L    Potassium 4.3 3.5 - 5.1 mmol/L    Chloride 104 97 - 108 mmol/L    CO2 27 21 - 32 mmol/L    Anion gap 6 5 - 15 mmol/L    Glucose 106 (H) 65 - 100 mg/dL    BUN 34 (H) 6 - 20 MG/DL    Creatinine 4.00 (H) 0.55 - 1.02 MG/DL    BUN/Creatinine ratio 9 (L) 12 - 20      GFR est AA 13 (L) >60 ml/min/1.73m2    GFR est non-AA 11 (L) >60 ml/min/1.73m2    Calcium 9.8 8.5 - 10.1 MG/DL    Bilirubin, total 0.7 0.2 - 1.0 MG/DL    ALT (SGPT) 16 12 - 78 U/L    AST (SGOT) 50 (H) 15 - 37 U/L    Alk.  phosphatase 62 45 - 117 U/L    Protein, total 6.6 6.4 - 8.2 g/dL    Albumin 2.7 (L) 3.5 - 5.0 g/dL    Globulin 3.9 2.0 - 4.0 g/dL    A-G Ratio 0.7 (L) 1.1 - 2.2     TROPONIN I    Collection Time: 10/15/19  1:04 PM   Result Value Ref Range    Troponin-I, Qt. 0.18 (H) <0.05 ng/mL   URINALYSIS W/ RFLX MICROSCOPIC    Collection Time: 10/15/19  3:01 PM   Result Value Ref Range    Color DARK YELLOW      Appearance TURBID (A) CLEAR      Specific gravity 1.023 1.003 - 1.030      pH (UA) 5.0 5.0 - 8.0      Protein 300 (A) NEG mg/dL    Glucose NEGATIVE  NEG mg/dL    Ketone TRACE (A) NEG mg/dL    Bilirubin NEGATIVE  NEG      Blood NEGATIVE  NEG      Urobilinogen 0.2 0.2 - 1.0 EU/dL    Nitrites NEGATIVE  NEG      Leukocyte Esterase LARGE (A) NEG     EKG, 12 LEAD, INITIAL    Collection Time: 10/15/19  3:02 PM   Result Value Ref Range    Ventricular Rate 63 BPM    Atrial Rate 63 BPM    P-R Interval 246 ms    QRS Duration 92 ms    Q-T Interval 440 ms    QTC Calculation (Bezet) 450 ms    Calculated P Axis 42 degrees    Calculated R Axis -22 degrees    Calculated T Axis 63 degrees    Diagnosis       Sinus rhythm with 1st degree AV block  When compared with ECG of 26-SEP-2019 21:09,  premature supraventricular complexes are no longer present  TX interval has increased  Criteria for Anterior infarct are no longer present  Criteria for Inferior infarct are no longer present         Radiologic Studies -   XR CHEST PORT   Final Result    impression: No change. US RETROPERITONEUM COMP    (Results Pending)     CT Results  (Last 48 hours)    None        CXR Results  (Last 48 hours)               10/15/19 1257  XR CHEST PORT Final result    Impression:   impression: No change. Narrative:  Clinical indication: Weakness, shortness of breath. Portable AP upright view of the chest obtained, comparison October 8. Left   pleural effusion with some loculation and basilar atelectasis unchanged. Shallow   inspiration, cardiomegaly. Medical Decision Making   I am the first provider for this patient. I reviewed the vital signs, available nursing notes, past medical history, past surgical history, family history and social history. Vital Signs-Reviewed the patient's vital signs. Patient Vitals for the past 12 hrs:   Temp Pulse Resp BP SpO2   10/15/19 1500 98.8 °F (37.1 °C) 64 20 122/87 93 %   10/15/19 1430  60 20 (!) 107/36 91 %   10/15/19 1330  62 20 (!) 103/34 91 %   10/15/19 1230  66 20 113/43 93 %   10/15/19 1200  66  117/44 94 %   10/15/19 1152 98.2 °F (36.8 °C) 65 20 (!) 114/33 93 %       Pulse Oximetry Analysis - 93% on RA    Cardiac Monitor:   NSR     EKG interpretation: (Preliminary)  NSR rate 63. First degree av block. No acute ischemic st or t wave changes. Records Reviewed: Nursing Notes and Old Medical Records    Provider Notes (Medical Decision Making):   Likely the patient appears dehydrated. Found to have CHRISTY. Will start IV fluids, no source of infection on exam.  Urine culture was growing out enterococcus that was sensitive to Levaquin. She is on Levaquin.     ED Course:   Initial assessment performed. The patients presenting problems have been discussed, and they are in agreement with the care plan formulated and outlined with them. I have encouraged them to ask questions as they arise throughout their visit. ED Course as of Oct 15 1530   Tue Oct 15, 2019   1419 Troponin elevated, likely secondary to CHRISTY    [AR]   1420 . [AR]      ED Course User Index  [AR] Lord Jersey DO     Discussed with Dr. Mac Mobley, will see and evaluate the patient    Discussed with PCP Dr. Fede Marie, would like hospitalist to admit will follow along tomorrow. Critical Care Time:   none    Disposition:  Patient is being admitted to the hospital by Dr. Shefali Feliz. The results of their tests and reasons for their admission have been discussed with them and/or available family. They convey agreement and understanding for the need to be admitted and for their admission diagnosis. Consultation has been made with the inpatient physician specialist for hospitalization. Diagnosis     Clinical Impression:   1. Acute kidney injury superimposed on CKD ([address]    2. Dehydration        Attestations:   This note was completed by Shy Mitchell DO

## 2019-10-16 LAB
ANION GAP SERPL CALC-SCNC: 8 MMOL/L (ref 5–15)
APPEARANCE UR: ABNORMAL
ATRIAL RATE: 63 BPM
BACTERIA URNS QL MICRO: NEGATIVE /HPF
BILIRUB UR QL: NEGATIVE
BUN SERPL-MCNC: 34 MG/DL (ref 6–20)
BUN/CREAT SERPL: 8 (ref 12–20)
CALCIUM SERPL-MCNC: 8.9 MG/DL (ref 8.5–10.1)
CALCULATED P AXIS, ECG09: 42 DEGREES
CALCULATED R AXIS, ECG10: -22 DEGREES
CALCULATED T AXIS, ECG11: 63 DEGREES
CHLORIDE SERPL-SCNC: 108 MMOL/L (ref 97–108)
CO2 SERPL-SCNC: 24 MMOL/L (ref 21–32)
COLOR UR: ABNORMAL
CREAT SERPL-MCNC: 4.36 MG/DL (ref 0.55–1.02)
CREAT UR-MCNC: 211 MG/DL
DIAGNOSIS, 93000: NORMAL
EOSINOPHIL #/AREA URNS HPF: NEGATIVE /[HPF]
EPITH CASTS URNS QL MICRO: ABNORMAL /LPF
ERYTHROCYTE [DISTWIDTH] IN BLOOD BY AUTOMATED COUNT: 14.8 % (ref 11.5–14.5)
GLUCOSE SERPL-MCNC: 98 MG/DL (ref 65–100)
GLUCOSE UR STRIP.AUTO-MCNC: NEGATIVE MG/DL
HCT VFR BLD AUTO: 27.2 % (ref 35–47)
HGB BLD-MCNC: 8.3 G/DL (ref 11.5–16)
HGB UR QL STRIP: NEGATIVE
KETONES UR QL STRIP.AUTO: NEGATIVE MG/DL
LEUKOCYTE ESTERASE UR QL STRIP.AUTO: ABNORMAL
MCH RBC QN AUTO: 29.3 PG (ref 26–34)
MCHC RBC AUTO-ENTMCNC: 30.5 G/DL (ref 30–36.5)
MCV RBC AUTO: 96.1 FL (ref 80–99)
NITRITE UR QL STRIP.AUTO: NEGATIVE
NRBC # BLD: 0 K/UL (ref 0–0.01)
NRBC BLD-RTO: 0 PER 100 WBC
P-R INTERVAL, ECG05: 246 MS
PH UR STRIP: 5 [PH] (ref 5–8)
PLATELET # BLD AUTO: 245 K/UL (ref 150–400)
PMV BLD AUTO: 10.6 FL (ref 8.9–12.9)
POTASSIUM SERPL-SCNC: 4.2 MMOL/L (ref 3.5–5.1)
PROT UR STRIP-MCNC: 100 MG/DL
Q-T INTERVAL, ECG07: 440 MS
QRS DURATION, ECG06: 92 MS
QTC CALCULATION (BEZET), ECG08: 450 MS
RBC # BLD AUTO: 2.83 M/UL (ref 3.8–5.2)
RBC #/AREA URNS HPF: ABNORMAL /HPF (ref 0–5)
SODIUM SERPL-SCNC: 140 MMOL/L (ref 136–145)
SODIUM UR-SCNC: 31 MMOL/L
SP GR UR REFRACTOMETRY: 1.02 (ref 1–1.03)
UROBILINOGEN UR QL STRIP.AUTO: 0.2 EU/DL (ref 0.2–1)
VENTRICULAR RATE, ECG03: 63 BPM
WBC # BLD AUTO: 13.2 K/UL (ref 3.6–11)
WBC URNS QL MICRO: >100 /HPF (ref 0–4)
YEAST URNS QL MICRO: PRESENT

## 2019-10-16 PROCEDURE — 81001 URINALYSIS AUTO W/SCOPE: CPT

## 2019-10-16 PROCEDURE — 51798 US URINE CAPACITY MEASURE: CPT

## 2019-10-16 PROCEDURE — 87040 BLOOD CULTURE FOR BACTERIA: CPT

## 2019-10-16 PROCEDURE — 65270000029 HC RM PRIVATE

## 2019-10-16 PROCEDURE — 77030037878 HC DRSG MEPILEX >48IN BORD MOLN -B

## 2019-10-16 PROCEDURE — 36415 COLL VENOUS BLD VENIPUNCTURE: CPT

## 2019-10-16 PROCEDURE — 87205 SMEAR GRAM STAIN: CPT

## 2019-10-16 PROCEDURE — 84300 ASSAY OF URINE SODIUM: CPT

## 2019-10-16 PROCEDURE — 82570 ASSAY OF URINE CREATININE: CPT

## 2019-10-16 PROCEDURE — 74011250637 HC RX REV CODE- 250/637: Performed by: INTERNAL MEDICINE

## 2019-10-16 PROCEDURE — 77030038269 HC DRN EXT URIN PURWCK BARD -A

## 2019-10-16 PROCEDURE — 74011250637 HC RX REV CODE- 250/637: Performed by: FAMILY MEDICINE

## 2019-10-16 PROCEDURE — 94760 N-INVAS EAR/PLS OXIMETRY 1: CPT

## 2019-10-16 PROCEDURE — 85027 COMPLETE CBC AUTOMATED: CPT

## 2019-10-16 PROCEDURE — 74011250636 HC RX REV CODE- 250/636: Performed by: INTERNAL MEDICINE

## 2019-10-16 PROCEDURE — 77030011256 HC DRSG MEPILEX <16IN NO BORD MOLN -A

## 2019-10-16 PROCEDURE — 77010033678 HC OXYGEN DAILY

## 2019-10-16 PROCEDURE — 80048 BASIC METABOLIC PNL TOTAL CA: CPT

## 2019-10-16 RX ORDER — BALSAM PERU/CASTOR OIL
OINTMENT (GRAM) TOPICAL 3 TIMES DAILY
Status: DISCONTINUED | OUTPATIENT
Start: 2019-10-16 | End: 2019-10-29 | Stop reason: HOSPADM

## 2019-10-16 RX ORDER — ACETAMINOPHEN 325 MG/1
650 TABLET ORAL
Status: DISCONTINUED | OUTPATIENT
Start: 2019-10-16 | End: 2019-10-29 | Stop reason: HOSPADM

## 2019-10-16 RX ORDER — GABAPENTIN 300 MG/1
300 CAPSULE ORAL DAILY
Status: DISCONTINUED | OUTPATIENT
Start: 2019-10-17 | End: 2019-10-29 | Stop reason: HOSPADM

## 2019-10-16 RX ADMIN — ACETAMINOPHEN 650 MG: 325 TABLET ORAL at 20:32

## 2019-10-16 RX ADMIN — METOPROLOL TARTRATE 50 MG: 50 TABLET, FILM COATED ORAL at 18:14

## 2019-10-16 RX ADMIN — HEPARIN SODIUM 5000 UNITS: 5000 INJECTION INTRAVENOUS; SUBCUTANEOUS at 09:43

## 2019-10-16 RX ADMIN — GABAPENTIN 300 MG: 300 CAPSULE ORAL at 09:43

## 2019-10-16 RX ADMIN — SODIUM CHLORIDE 75 ML/HR: 900 INJECTION, SOLUTION INTRAVENOUS at 09:49

## 2019-10-16 RX ADMIN — CASTOR OIL AND BALSAM, PERU: 788; 87 OINTMENT TOPICAL at 18:14

## 2019-10-16 RX ADMIN — ASPIRIN 81 MG 81 MG: 81 TABLET ORAL at 09:43

## 2019-10-16 RX ADMIN — PAROXETINE 20 MG: 20 TABLET, FILM COATED ORAL at 09:43

## 2019-10-16 RX ADMIN — HEPARIN SODIUM 5000 UNITS: 5000 INJECTION INTRAVENOUS; SUBCUTANEOUS at 21:50

## 2019-10-16 RX ADMIN — METOPROLOL TARTRATE 50 MG: 50 TABLET, FILM COATED ORAL at 09:43

## 2019-10-16 RX ADMIN — CASTOR OIL AND BALSAM, PERU: 788; 87 OINTMENT TOPICAL at 21:50

## 2019-10-16 RX ADMIN — HYDRALAZINE HYDROCHLORIDE 100 MG: 50 TABLET, FILM COATED ORAL at 18:14

## 2019-10-16 RX ADMIN — HYDRALAZINE HYDROCHLORIDE 100 MG: 50 TABLET, FILM COATED ORAL at 09:42

## 2019-10-16 NOTE — PROGRESS NOTES
Pharmacy Renal Dosing      Drugs being monitored:  Gabapentin 400 mg QID  Wt Readings from Last 1 Encounters:   10/15/19 54.4 kg (119 lb 14.9 oz)     Ideal body weight: 45.5 kg (100 lb 4.9 oz)  Adjusted ideal body weight: 49.1 kg (108 lb 2.5 oz)  Recent Labs     10/16/19  0331   CREA 4.36*       CrCl = 7 ml/min      A/P: Reduce dose to 300 mg daily, since patient in CHRISTY

## 2019-10-16 NOTE — PROGRESS NOTES
Patient Urine output for entire shift 7A-7P was only 220 cc. Upon bladder scan patient showed 123cc.

## 2019-10-16 NOTE — PROGRESS NOTES
PROGRESS NOTE    NAME:  Ivone Delgado   :   1935   MRN:   [id number]     Date/Time:  10/16/2019 6:01 AM  Subjective:   History:  Chart reviewed and patient seen and examined and D/W her nurse this AM and all events noted. She was recently admitted with CAP and UTI and D/Micah to adult home on 10/11 feeling well with Creat. 2.07 (baseline 1.9). Apparently although she felt well on D/C she became weaker and has taken in little PO and represented to the ER yesterday weaker with Creat 4.00 and was thus admitted with acute renal failure on CKD. She has a US w/o renal obstruction and ureteral stent in place. She has UA c/w infection with culture sent. Her WBC was elevated on admission and improved this AM although Creatinine is higher at 4.36 despite hydration. She was on Lisinopril for HTN and Lasix for edema as outpatient but currently on hold. She feels well now except she is weak and has no appetite. She denies CP, SOB or cardiac or respiratory c/o. She has no nausea or GI c/o except poor appetite and she has no  c/o. There are no there c/o on complete ROS.        Medications reviewed:  Current Facility-Administered Medications   Medication Dose Route Frequency    acetaminophen (TYLENOL) tablet 325 mg  325 mg Oral Q6H PRN    aspirin chewable tablet 81 mg  81 mg Oral DAILY    gabapentin (NEURONTIN) capsule 300 mg  300 mg Oral QID    hydrALAZINE (APRESOLINE) tablet 100 mg  100 mg Oral TID    loperamide (IMODIUM) capsule 4 mg  4 mg Oral Q8H PRN    metoprolol tartrate (LOPRESSOR) tablet 50 mg  50 mg Oral BID    PARoxetine (PAXIL) tablet 20 mg  20 mg Oral DAILY    polyethylene glycol (MIRALAX) packet 17 g  17 g Oral DAILY PRN    pravastatin (PRAVACHOL) tablet 40 mg  40 mg Oral QHS    temazepam (RESTORIL) capsule 15 mg  15 mg Oral QHS    traMADol (ULTRAM) tablet 50 mg  50 mg Oral Q6H PRN    0.9% sodium chloride infusion  75 mL/hr IntraVENous CONTINUOUS    heparin (porcine) injection 5,000 Units 5,000 Units SubCUTAneous Q12H        Objective:   Vitals:  Visit Vitals  /70   Pulse 70   Temp 98.6 °F (37 °C)   Resp 16   Ht 5' (1.524 m)   Wt 119 lb 14.9 oz (54.4 kg)   SpO2 94%   BMI 23.42 kg/m²    O2 Flow Rate (L/min): 2 l/min O2 Device: Nasal cannula Temp (24hrs), Av.6 °F (37 °C), Min:98.2 °F (36.8 °C), Max:98.8 °F (37.1 °C)      Last 24hr Input/Output:    Intake/Output Summary (Last 24 hours) at 10/16/2019 0601  Last data filed at 10/16/2019 0434  Gross per 24 hour   Intake    Output 300 ml   Net -300 ml        PHYSICAL EXAM:  General:     Alert, cooperative, no distress, appears stated age. Head:    Normocephalic, without obvious abnormality, atraumatic. Eyes:    Conjunctivae/corneas clear. PERRLA  Nose:   Nares normal. No drainage or sinus tenderness. Throat:     Lips, mucosa, and tongue normal.  No Thrush  Neck:   Supple, symmetrical,  no adenopathy, thyroid: non tender     no carotid bruit and no JVD. Back:     Symmetric,  No CVA tenderness. Lungs:    Clear to auscultation bilaterally. No Wheezing or Rhonchi. No rales. Heart:    Regular rate and rhythm,  no murmur, rub or gallop. Abdomen:    Soft, non-tender. Not distended. Bowel sounds normal. No masses  Extremities:  Extremities normal, atraumatic, No cyanosis. No edema. No clubbing  Lymph nodes:  Cervical, supraclavicular normal.  Neurologic:  Normal strength, Alert and oriented X 3.    Skin:                No rash      Lab Data Reviewed:    Recent Results (from the past 24 hour(s))   CBC WITH AUTOMATED DIFF    Collection Time: 10/15/19  1:04 PM   Result Value Ref Range    WBC 15.3 (H) 3.6 - 11.0 K/uL    RBC 3.42 (L) 3.80 - 5.20 M/uL    HGB 10.1 (L) 11.5 - 16.0 g/dL    HCT 33.3 (L) 35.0 - 47.0 %    MCV 97.4 80.0 - 99.0 FL    MCH 29.5 26.0 - 34.0 PG    MCHC 30.3 30.0 - 36.5 g/dL    RDW 14.6 (H) 11.5 - 14.5 %    PLATELET 206 515 - 178 K/uL    MPV 10.3 8.9 - 12.9 FL    NRBC 0.0 0  WBC    ABSOLUTE NRBC 0.00 0.00 - 0.01 K/uL NEUTROPHILS 82 (H) 32 - 75 %    BAND NEUTROPHILS 2 %    LYMPHOCYTES 6 (L) 12 - 49 %    MONOCYTES 8 5 - 13 %    EOSINOPHILS 1 0 - 7 %    BASOPHILS 0 0 - 1 %    METAMYELOCYTES 1 %    IMMATURE GRANULOCYTES 0 0.0 - 0.5 %    ABS. NEUTROPHILS 12.9 (H) 1.8 - 8.0 K/UL    ABS. LYMPHOCYTES 0.9 0.8 - 3.5 K/UL    ABS. MONOCYTES 1.2 (H) 0.0 - 1.0 K/UL    ABS. EOSINOPHILS 0.2 0.0 - 0.4 K/UL    ABS. BASOPHILS 0.0 0.0 - 0.1 K/UL    ABS. IMM. GRANS. 0.0 0.00 - 0.04 K/UL    DF MANUAL      RBC COMMENTS NORMOCYTIC, NORMOCHROMIC     PROTHROMBIN TIME + INR    Collection Time: 10/15/19  1:04 PM   Result Value Ref Range    INR 1.1 0.9 - 1.1      Prothrombin time 11.4 (H) 9.0 - 11.3 sec   METABOLIC PANEL, COMPREHENSIVE    Collection Time: 10/15/19  1:04 PM   Result Value Ref Range    Sodium 137 136 - 145 mmol/L    Potassium 4.3 3.5 - 5.1 mmol/L    Chloride 104 97 - 108 mmol/L    CO2 27 21 - 32 mmol/L    Anion gap 6 5 - 15 mmol/L    Glucose 106 (H) 65 - 100 mg/dL    BUN 34 (H) 6 - 20 MG/DL    Creatinine 4.00 (H) 0.55 - 1.02 MG/DL    BUN/Creatinine ratio 9 (L) 12 - 20      GFR est AA 13 (L) >60 ml/min/1.73m2    GFR est non-AA 11 (L) >60 ml/min/1.73m2    Calcium 9.8 8.5 - 10.1 MG/DL    Bilirubin, total 0.7 0.2 - 1.0 MG/DL    ALT (SGPT) 16 12 - 78 U/L    AST (SGOT) 50 (H) 15 - 37 U/L    Alk.  phosphatase 62 45 - 117 U/L    Protein, total 6.6 6.4 - 8.2 g/dL    Albumin 2.7 (L) 3.5 - 5.0 g/dL    Globulin 3.9 2.0 - 4.0 g/dL    A-G Ratio 0.7 (L) 1.1 - 2.2     TROPONIN I    Collection Time: 10/15/19  1:04 PM   Result Value Ref Range    Troponin-I, Qt. 0.18 (H) <0.05 ng/mL   URINALYSIS W/ RFLX MICROSCOPIC    Collection Time: 10/15/19  3:01 PM   Result Value Ref Range    Color DARK YELLOW      Appearance TURBID (A) CLEAR      Specific gravity 1.023 1.003 - 1.030      pH (UA) 5.0 5.0 - 8.0      Protein 300 (A) NEG mg/dL    Glucose NEGATIVE  NEG mg/dL    Ketone TRACE (A) NEG mg/dL    Bilirubin NEGATIVE  NEG      Blood NEGATIVE  NEG      Urobilinogen 0.2 0.2 - 1.0 EU/dL    Nitrites NEGATIVE  NEG      Leukocyte Esterase LARGE (A) NEG      WBC >100 (H) 0 - 4 /hpf    RBC 0-5 0 - 5 /hpf    Epithelial cells FEW FEW /lpf    Bacteria NEGATIVE  NEG /hpf    Yeast PRESENT (A) NEG     SODIUM, UR, RANDOM    Collection Time: 10/15/19  3:01 PM   Result Value Ref Range    Sodium,urine random 42 MMOL/L   CREATININE, UR, RANDOM    Collection Time: 10/15/19  3:01 PM   Result Value Ref Range    Creatinine, urine 230.00 mg/dL   EKG, 12 LEAD, INITIAL    Collection Time: 10/15/19  3:02 PM   Result Value Ref Range    Ventricular Rate 63 BPM    Atrial Rate 63 BPM    P-R Interval 246 ms    QRS Duration 92 ms    Q-T Interval 440 ms    QTC Calculation (Bezet) 450 ms    Calculated P Axis 42 degrees    Calculated R Axis -22 degrees    Calculated T Axis 63 degrees    Diagnosis       Sinus rhythm with 1st degree AV block  When compared with ECG of 26-SEP-2019 21:09,  premature supraventricular complexes are no longer present  AR interval has increased  Criteria for Anterior infarct are no longer present  Criteria for Inferior infarct are no longer present     METABOLIC PANEL, BASIC    Collection Time: 10/16/19  3:31 AM   Result Value Ref Range    Sodium 140 136 - 145 mmol/L    Potassium 4.2 3.5 - 5.1 mmol/L    Chloride 108 97 - 108 mmol/L    CO2 24 21 - 32 mmol/L    Anion gap 8 5 - 15 mmol/L    Glucose 98 65 - 100 mg/dL    BUN 34 (H) 6 - 20 MG/DL    Creatinine 4.36 (H) 0.55 - 1.02 MG/DL    BUN/Creatinine ratio 8 (L) 12 - 20      GFR est AA 12 (L) >60 ml/min/1.73m2    GFR est non-AA 10 (L) >60 ml/min/1.73m2    Calcium 8.9 8.5 - 10.1 MG/DL   CBC W/O DIFF    Collection Time: 10/16/19  3:31 AM   Result Value Ref Range    WBC 13.2 (H) 3.6 - 11.0 K/uL    RBC 2.83 (L) 3.80 - 5.20 M/uL    HGB 8.3 (L) 11.5 - 16.0 g/dL    HCT 27.2 (L) 35.0 - 47.0 %    MCV 96.1 80.0 - 99.0 FL    MCH 29.3 26.0 - 34.0 PG    MCHC 30.5 30.0 - 36.5 g/dL    RDW 14.8 (H) 11.5 - 14.5 %    PLATELET 800 565 - 799 K/uL    MPV 10.6 8.9 - 12.9 FL    NRBC 0.0 0  WBC    ABSOLUTE NRBC 0.00 0.00 - 0.01 K/uL          Assessment/Plan:     Principal Problem:    Acute kidney injury superimposed on CKD ([facility] (10/15/2019)    Active Problems:    COPD (chronic obstructive pulmonary disease) ([facility] (8/14/2010)      Essential hypertension (7/20/2016)      Moderate protein-calorie malnutrition ([facility] (3/30/2018)      Stage 3 chronic kidney disease ([facility] (11/5/2018)      Weakness generalized (10/15/2019)       ___________________________________________________  PLAN:    1. Continue IV hydration  2. Follow Creat 4.0 on adm to 4.36 now with baseline 1.9  3. Continue off Lasix and Lisinopril  4. F/U on repeat urine culture  5. Follow WBC, 15.3 adm to 13.2 now w/o antibiotics  6. Continue Hydralazine and Metoprolol for HTN and follow  7. Continue nasal oxygen with COPD  8. Renal consult note reviewed and appreciate help  9. PT evaluation  10.  DNR per patient request, D/W her this AM      40 minutes spent in direct care of this complex patient today      ___________________________________________________    Attending Physician: Angelica Currie MD

## 2019-10-16 NOTE — PROGRESS NOTES
MARC PLAN:     Pt is a readmit: Pt was here 9/26/19 to 10/11/19 for CAP    Plan is to return to [place] at [address] with [facility] once medically stable. Therapy to work with Pt evaluate and make recommendations regarding disposition. Son may be able to transport her back to [place] once stable if Pt is able to ride in car.      4:08 PM   CM noted that AMD is not on file. CM will need to try to get family to bring a copy of the AMD.  Per previous hospitalizations we have asked family to bring it in but it is not on file. CM called and left a VM for Libby at [facility] and asked if they could fax a copy to us.      4:03 PM   CM sent [facility] message to Myra KAUR to notify her of readmission and see if she has any questions or concerns. 3:46 PM   CM called Earl Lacy at [facility]: [phone] and let her know that Pt is here in Room 2059. CM will let her know PR Plan and be involved with her during the process. Patient lives at the [facility], no home oxygen, is wheelchair bound and has used home health in the past.  [facility] uses [facility]  Patient states she is able to dress herself but [facility] staff assist her with bathing, meals and transportation.  Patient's son is also available for transport at [place]. CM will continue to monitor discharge plan.      [place]  Ext [phone]

## 2019-10-16 NOTE — PROGRESS NOTES
Initial Nutrition Assessment:    INTERVENTIONS/RECOMMENDATIONS:   · Meals/Snacks: General/healthful diet: Recommend liberalizing diet to regular, 2 g Na  ·  Supplements: Commercial supplement: recommend Ensure Enlive 1x/day and Gelatein 1x/day    ASSESSMENT:   10/16: chart reviewed. Medically noted for: CHRISTY, CKD, COPD, ARF, lung cancer. Per medical chart, pt reports to being weak and tired w/ no appetite. Pt noted for pressure injury. Dietetic intern visited pt to assess PO intake. Pt was asleep during visit but per pt's tray, pt consumed <25% of lunch tray. Pt's Na and K are WNL. D/t normalized labs and suboptimal PO intake, please liberalize diet to regular, 2 g Na to better support the nutritional needs of the pt. Will add ONS to provide additional kcals/protein. Intern will continue to monitor PO/ONS intake and will nutritionally reassess if needed. Diet Order: Renal/Cardiac  % Eaten:    Patient Vitals for the past 72 hrs:   % Diet Eaten   10/16/19 0933 10 %     Pertinent Medications: [x]Reviewed []Other: porcine, lopressor  Pertinent Labs: [x]Reviewed []Other: hgb (8.3), hct (27.2), BUN (34), creat (4.36), alb (2.7)  Food Allergies: [x]None []Other   Last BM: 10/16   [x]Active     []Hyperactive  []Hypoactive       [] Absent BS  Skin:    [] Intact   [] Incision  [] Breakdown  [x] Other: wound     Anthropometrics:   Height: 5' (152.4 cm) Weight: 54.4 kg (119 lb 14.9 oz)   IBW (%IBW):   ( ) UBW (%UBW):   (  %)   Last Weight Metrics:  Weight Loss Metrics 10/15/2019 9/26/2019 8/2/2019 7/10/2019 7/9/2019 6/14/2019 6/4/2019   Today's Wt 119 lb 14.9 oz 132 lb 4.4 oz - 113 lb 15.7 oz 115 lb 1.3 oz - 116 lb 3.2 oz   BMI 23.42 kg/m2 23.43 kg/m2 21.19 kg/m2 21.19 kg/m2 21.39 kg/m2 21.25 kg/m2 21.25 kg/m2       BMI: Body mass index is 23.42 kg/m².     This BMI is indicative of:   []Underweight    [x]Normal    []Overweight    [] Obesity   [] Extreme Obesity (BMI>40)     Estimated Nutrition Needs (Based on):   7758 Kcals/day( X AF 1.3) , 76 g(54.4 kg X 1.4) Protein  Carbohydrate: At Least 130 g/day  Fluids: 1200 mL/day (1ml/kcal)    NUTRITION DIAGNOSES:   Problem:  Inadequate protein-energy intake Increased nutrient needs    Etiology: related to poor appetite  pressure ulcer    Signs/Symptoms: as evidenced by PO <25% of meals  need for wound healing     NUTRITION INTERVENTIONS:  Meals/Snacks: General/healthful diet   Supplements: Commercial supplement              GOAL:   PO intake >50% of meals and ONS in 3-5 days     LEARNING NEEDS (Diet, Food/Nutrient-Drug Interaction):    [x] None Identified   [] Identified and Education Provided/Documented   [] Identified and Pt declined/was not appropriate     Cultureal, Restorationist, OR Ethnic Dietary Needs:    [x] None Identified   [] Identified and Addressed     [x] Interdisciplinary Care Plan Reviewed/Documented    [x] Discharge Planning: general healthy diet      MONITORING /EVALUATION:      Food/Nutrient Intake Outcomes:  Total energy intake  Physical Signs/Symptoms Outcomes: Weight/weight change, Electrolyte and renal profile, Glucose profile, GI    NUTRITION RISK:    [x] Patient At Nutritional Risk    [] Patient Not At Nutritional Risk    PT SEEN FOR:    []  MD Consult: []Calorie Count      []Diabetic Diet Education        []Diet Education     []Electrolyte Management     []General Nutrition Management and Supplements     []Management of Tube Feeding     []TPN Recommendations    [x]  RN Referral:  []MST score >=2     []Enteral/Parenteral Nutrition PTA     []Pregnant: Gestational DM or Multigestation     [x]Pressure Ulcer/Wound Care needs        []  Low BMI  []  [facility]  Pager [phone]  Weekend Pager [phone]

## 2019-10-16 NOTE — WOUND CARE
Wound Care Consult: chart reviewed and patient assessed for her sacrum wound that was present on admission on 10-15-19. Dr. Fadia Patel is her attending and he has been notified by the Wound Care nurse about this. Pt. appears to have some foot drop and generalized weakness along with her current acute kidney injury. Pt has chronic kidney disease. Nursing noted that she had pressure injuries that were present on admission. A specialty bed was ordered for her that is a low air loss bed. Assessment / Treatment: Pt. Needs much assistance to turn / reposition. Her skin is fragile, especially on her arms. Her heels are red but blanchable (good cap refill). When the foot is examined today the patient had involuntary jerks like Myoclonus and she has foot drop (cannot make a 90 degree angle at her ankle). The heel boots were removed due to this. The sacrum has deep purple skin injury consistent with DTI (deep tissue injury) from pressure and shear. A specialty bed was ordered and just delivered today. Pt. Currently on the low air loss surface. Venelex ointment was ordered for this patient and wound care orders for the skin care and the off loading of pressure to the sacrum and buttocks. Plan: Re-consult as needed. Follow up for this patient will be next week to see the progress of the wound care plan that nursing will need to do this week. Nursing to educate patient and family on turning and prevention of pressure injury.   
Rich Castro RN, BSN, [employer]

## 2019-10-16 NOTE — PROGRESS NOTES
Problem: Pressure Injury - Risk of  Goal: *Prevention of pressure injury  Description  Document Rashid Scale and appropriate interventions in the flowsheet.   Outcome: Progressing Towards Goal  Note:   Pressure Injury Interventions:       Moisture Interventions: Assess need for specialty bed, Check for incontinence Q2 hours and as needed, Internal/External urinary devices    Activity Interventions: Increase time out of bed    Mobility Interventions: Float heels, Pressure redistribution bed/mattress (bed type), PT/OT evaluation    Nutrition Interventions: Document food/fluid/supplement intake    Friction and Shear Interventions: Foam dressings/transparent film/skin sealants                Problem: Patient Education: Go to Patient Education Activity  Goal: Patient/Family Education  Outcome: Progressing Towards Goal

## 2019-10-16 NOTE — PROGRESS NOTES
Bedside and Verbal shift change report given to Tres Woodson (oncoming nurse) by David Jiménez (offgoing nurse). Report included the following information SBAR, Kardex, Intake/Output, MAR and Recent Results.

## 2019-10-16 NOTE — PROGRESS NOTES
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 UACR about 1600. FENA 0.5%. Would continue with IVF for now. Labs in AM.    Urine eos pending. Subjective:     Chief Complaint:  \" I feel about the same. \"  Weak. Tired. No N/V. No dyspnea. Review of Systems:    Symptom Y/N Comments  Symptom Y/N Comments   Fever/Chills    Chest Pain     Poor Appetite    Edema     Cough    Abdominal Pain     Sputum    Joint Pain     SOB/MONTANO    Pruritis/Rash     Nausea/vomit    Tolerating PT/OT     Diarrhea    Tolerating Diet     Constipation    Other       Could not obtain due to:      Objective:     VITALS:   Last 24hrs VS reviewed since prior progress note.  Most recent are:  Visit Vitals  /58 (BP 1 Location: Left arm, BP Patient Position: At rest)   Pulse 89   Temp 98.9 °F (37.2 °C)   Resp 14   Ht 5' (1.524 m)   Wt 54.4 kg (119 lb 14.9 oz)   SpO2 92%   BMI 23.42 kg/m²       Intake/Output Summary (Last 24 hours) at 10/16/2019 1041  Last data filed at 10/16/2019 0434  Gross per 24 hour   Intake    Output 300 ml   Net -300 ml      Telemetry Reviewed:     PHYSICAL EXAM:  General: NAD  CTA  No edema      Lab Data Reviewed: (see below)    Medications Reviewed: (see below)    PMH/SH reviewed - no change compared to H&P  ________________________________________________________________________  Care Plan discussed with:  Patient     Family      RN     Care Manager                    Consultant:          Comments   >50% of visit spent in counseling and coordination of care       ________________________________________________________________________  [place] MD     Procedures: see electronic medical records for all procedures/Xrays and details which  were not copied into this note but were reviewed prior to creation of Plan. LABS:  Recent Labs     10/16/19  0331 10/15/19  1304   WBC 13.2* 15.3*   HGB 8.3* 10.1*   HCT 27.2* 33.3*    301     Recent Labs     10/16/19  0331 10/15/19  1304    137   K 4.2 4.3    104   CO2 24 27   BUN 34* 34*   CREA 4.36* 4.00*   GLU 98 106*   CA 8.9 9.8     Recent Labs     10/15/19  1304   SGOT 50*   AP 62   TP 6.6   ALB 2.7*   GLOB 3.9     Recent Labs     10/15/19  1304   INR 1.1   PTP 11.4*      No results for input(s): FE, TIBC, PSAT, FERR in the last 72 hours. No results found for: FOL, RBCF   No results for input(s): PH, PCO2, PO2 in the last 72 hours. No results for input(s): CPK, CKMB in the last 72 hours.     No lab exists for component: TROPONINI  No components found for: [place]  Lab Results   Component Value Date/Time    Color DARK YELLOW 10/15/2019 03:01 PM    Appearance TURBID (A) 10/15/2019 03:01 PM    Specific gravity 1.023 10/15/2019 03:01 PM    Specific gravity 1.015 08/02/2019 11:39 AM    pH (UA) 5.0 10/15/2019 03:01 PM    Protein 300 (A) 10/15/2019 03:01 PM    Glucose NEGATIVE  10/15/2019 03:01 PM    Ketone TRACE (A) 10/15/2019 03:01 PM    Bilirubin NEGATIVE  10/15/2019 03:01 PM    Urobilinogen 0.2 10/15/2019 03:01 PM    Nitrites NEGATIVE  10/15/2019 03:01 PM    Leukocyte Esterase LARGE (A) 10/15/2019 03:01 PM    Epithelial cells FEW 10/15/2019 03:01 PM    Bacteria NEGATIVE  10/15/2019 03:01 PM    WBC >100 (H) 10/15/2019 03:01 PM    RBC 0-5 10/15/2019 03:01 PM       MEDICATIONS:  Current Facility-Administered Medications   Medication Dose Route Frequency    acetaminophen (TYLENOL) tablet 325 mg  325 mg Oral Q6H PRN    aspirin chewable tablet 81 mg  81 mg Oral DAILY    gabapentin (NEURONTIN) capsule 300 mg  300 mg Oral QID    hydrALAZINE (APRESOLINE) tablet 100 mg  100 mg Oral TID    loperamide (IMODIUM) capsule 4 mg  4 mg Oral Q8H PRN    metoprolol tartrate (LOPRESSOR) tablet 50 mg  50 mg Oral BID    PARoxetine (PAXIL) tablet 20 mg  20 mg Oral DAILY    polyethylene glycol (MIRALAX) packet 17 g  17 g Oral DAILY PRN    pravastatin (PRAVACHOL) tablet 40 mg  40 mg Oral QHS    temazepam (RESTORIL) capsule 15 mg  15 mg Oral QHS    traMADol (ULTRAM) tablet 50 mg  50 mg Oral Q6H PRN    0.9% sodium chloride infusion  75 mL/hr IntraVENous CONTINUOUS    heparin (porcine) injection 5,000 Units  5,000 Units SubCUTAneous Q12H

## 2019-10-17 ENCOUNTER — APPOINTMENT (OUTPATIENT)
Dept: GENERAL RADIOLOGY | Age: 84
DRG: 683 | End: 2019-10-17
Attending: INTERNAL MEDICINE
Payer: MEDICARE

## 2019-10-17 LAB
AMORPH CRY URNS QL MICRO: ABNORMAL
ANION GAP SERPL CALC-SCNC: 7 MMOL/L (ref 5–15)
APPEARANCE UR: ABNORMAL
BACTERIA URNS QL MICRO: NEGATIVE /HPF
BASOPHILS # BLD: 0.2 K/UL (ref 0–0.1)
BASOPHILS NFR BLD: 1 % (ref 0–1)
BILIRUB UR QL: NEGATIVE
BUN SERPL-MCNC: 40 MG/DL (ref 6–20)
BUN/CREAT SERPL: 12 (ref 12–20)
CALCIUM SERPL-MCNC: 8.6 MG/DL (ref 8.5–10.1)
CHLORIDE SERPL-SCNC: 110 MMOL/L (ref 97–108)
CO2 SERPL-SCNC: 23 MMOL/L (ref 21–32)
COLOR UR: ABNORMAL
CREAT SERPL-MCNC: 3.28 MG/DL (ref 0.55–1.02)
DIFFERENTIAL METHOD BLD: ABNORMAL
EOSINOPHIL # BLD: 0 K/UL (ref 0–0.4)
EOSINOPHIL NFR BLD: 0 % (ref 0–7)
EPITH CASTS URNS QL MICRO: ABNORMAL /LPF
ERYTHROCYTE [DISTWIDTH] IN BLOOD BY AUTOMATED COUNT: 14.6 % (ref 11.5–14.5)
GLUCOSE SERPL-MCNC: 116 MG/DL (ref 65–100)
GLUCOSE UR STRIP.AUTO-MCNC: NEGATIVE MG/DL
HCT VFR BLD AUTO: 26.7 % (ref 35–47)
HGB BLD-MCNC: 8.2 G/DL (ref 11.5–16)
HGB UR QL STRIP: NEGATIVE
IMM GRANULOCYTES # BLD AUTO: 0 K/UL (ref 0–0.04)
IMM GRANULOCYTES NFR BLD AUTO: 0 % (ref 0–0.5)
KETONES UR QL STRIP.AUTO: NEGATIVE MG/DL
LEUKOCYTE ESTERASE UR QL STRIP.AUTO: ABNORMAL
LYMPHOCYTES # BLD: 0.9 K/UL (ref 0.8–3.5)
LYMPHOCYTES NFR BLD: 5 % (ref 12–49)
MCH RBC QN AUTO: 29.5 PG (ref 26–34)
MCHC RBC AUTO-ENTMCNC: 30.7 G/DL (ref 30–36.5)
MCV RBC AUTO: 96 FL (ref 80–99)
METAMYELOCYTES NFR BLD MANUAL: 1 %
MONOCYTES # BLD: 2 K/UL (ref 0–1)
MONOCYTES NFR BLD: 11 % (ref 5–13)
NEUTS BAND NFR BLD MANUAL: 2 %
NEUTS SEG # BLD: 14.8 K/UL (ref 1.8–8)
NEUTS SEG NFR BLD: 80 % (ref 32–75)
NITRITE UR QL STRIP.AUTO: NEGATIVE
NRBC # BLD: 0 K/UL (ref 0–0.01)
NRBC BLD-RTO: 0 PER 100 WBC
PH UR STRIP: 5 [PH] (ref 5–8)
PLATELET # BLD AUTO: 254 K/UL (ref 150–400)
PMV BLD AUTO: 10.8 FL (ref 8.9–12.9)
POTASSIUM SERPL-SCNC: 4.1 MMOL/L (ref 3.5–5.1)
PROT UR STRIP-MCNC: 100 MG/DL
RBC # BLD AUTO: 2.78 M/UL (ref 3.8–5.2)
RBC #/AREA URNS HPF: ABNORMAL /HPF (ref 0–5)
RBC MORPH BLD: ABNORMAL
SODIUM SERPL-SCNC: 140 MMOL/L (ref 136–145)
SP GR UR REFRACTOMETRY: 1.01 (ref 1–1.03)
UA: UC IF INDICATED,UAUC: ABNORMAL
UROBILINOGEN UR QL STRIP.AUTO: 0.2 EU/DL (ref 0.2–1)
WBC # BLD AUTO: 18.1 K/UL (ref 3.6–11)
WBC URNS QL MICRO: ABNORMAL /HPF (ref 0–4)
YEAST BUDDING URNS QL: PRESENT

## 2019-10-17 PROCEDURE — 74011000258 HC RX REV CODE- 258: Performed by: INTERNAL MEDICINE

## 2019-10-17 PROCEDURE — 97530 THERAPEUTIC ACTIVITIES: CPT | Performed by: OCCUPATIONAL THERAPIST

## 2019-10-17 PROCEDURE — 74011250637 HC RX REV CODE- 250/637: Performed by: INTERNAL MEDICINE

## 2019-10-17 PROCEDURE — 87086 URINE CULTURE/COLONY COUNT: CPT

## 2019-10-17 PROCEDURE — 97161 PT EVAL LOW COMPLEX 20 MIN: CPT | Performed by: PHYSICAL THERAPIST

## 2019-10-17 PROCEDURE — 74011250636 HC RX REV CODE- 250/636: Performed by: INTERNAL MEDICINE

## 2019-10-17 PROCEDURE — 81001 URINALYSIS AUTO W/SCOPE: CPT

## 2019-10-17 PROCEDURE — 97166 OT EVAL MOD COMPLEX 45 MIN: CPT | Performed by: OCCUPATIONAL THERAPIST

## 2019-10-17 PROCEDURE — 85025 COMPLETE CBC W/AUTO DIFF WBC: CPT

## 2019-10-17 PROCEDURE — 80048 BASIC METABOLIC PNL TOTAL CA: CPT

## 2019-10-17 PROCEDURE — 97530 THERAPEUTIC ACTIVITIES: CPT | Performed by: PHYSICAL THERAPIST

## 2019-10-17 PROCEDURE — 36415 COLL VENOUS BLD VENIPUNCTURE: CPT

## 2019-10-17 PROCEDURE — 94760 N-INVAS EAR/PLS OXIMETRY 1: CPT

## 2019-10-17 PROCEDURE — 71045 X-RAY EXAM CHEST 1 VIEW: CPT

## 2019-10-17 PROCEDURE — 77010033678 HC OXYGEN DAILY

## 2019-10-17 PROCEDURE — 65270000029 HC RM PRIVATE

## 2019-10-17 PROCEDURE — 87106 FUNGI IDENTIFICATION YEAST: CPT

## 2019-10-17 PROCEDURE — 77030037878 HC DRSG MEPILEX >48IN BORD MOLN -B

## 2019-10-17 RX ADMIN — HEPARIN SODIUM 5000 UNITS: 5000 INJECTION INTRAVENOUS; SUBCUTANEOUS at 21:14

## 2019-10-17 RX ADMIN — METOPROLOL TARTRATE 50 MG: 50 TABLET, FILM COATED ORAL at 10:45

## 2019-10-17 RX ADMIN — CASTOR OIL AND BALSAM, PERU: 788; 87 OINTMENT TOPICAL at 22:00

## 2019-10-17 RX ADMIN — HYDRALAZINE HYDROCHLORIDE 100 MG: 50 TABLET, FILM COATED ORAL at 17:50

## 2019-10-17 RX ADMIN — SODIUM CHLORIDE 75 ML/HR: 900 INJECTION, SOLUTION INTRAVENOUS at 21:13

## 2019-10-17 RX ADMIN — PAROXETINE 20 MG: 20 TABLET, FILM COATED ORAL at 10:45

## 2019-10-17 RX ADMIN — TRAMADOL HYDROCHLORIDE 50 MG: 50 TABLET ORAL at 10:44

## 2019-10-17 RX ADMIN — PRAVASTATIN SODIUM 40 MG: 40 TABLET ORAL at 21:14

## 2019-10-17 RX ADMIN — GABAPENTIN 300 MG: 300 CAPSULE ORAL at 10:45

## 2019-10-17 RX ADMIN — TEMAZEPAM 15 MG: 15 CAPSULE ORAL at 21:14

## 2019-10-17 RX ADMIN — ASPIRIN 81 MG 81 MG: 81 TABLET ORAL at 10:45

## 2019-10-17 RX ADMIN — METOPROLOL TARTRATE 50 MG: 50 TABLET, FILM COATED ORAL at 17:50

## 2019-10-17 RX ADMIN — HYDRALAZINE HYDROCHLORIDE 100 MG: 50 TABLET, FILM COATED ORAL at 21:14

## 2019-10-17 RX ADMIN — CASTOR OIL AND BALSAM, PERU: 788; 87 OINTMENT TOPICAL at 10:47

## 2019-10-17 RX ADMIN — CEFEPIME HYDROCHLORIDE 2 G: 2 INJECTION, POWDER, FOR SOLUTION INTRAVENOUS at 07:46

## 2019-10-17 RX ADMIN — HYDRALAZINE HYDROCHLORIDE 100 MG: 50 TABLET, FILM COATED ORAL at 10:44

## 2019-10-17 RX ADMIN — CASTOR OIL AND BALSAM, PERU: 788; 87 OINTMENT TOPICAL at 17:51

## 2019-10-17 RX ADMIN — HEPARIN SODIUM 5000 UNITS: 5000 INJECTION INTRAVENOUS; SUBCUTANEOUS at 10:45

## 2019-10-17 NOTE — PROGRESS NOTES
Bedside shift change report given to Lionel (oncoming nurse) by Chung Son (offgoing nurse). Report included the following information SBAR, Kardex, Intake/Output, MAR and Recent Results.

## 2019-10-17 NOTE — PROGRESS NOTES
Occupational Therapy  OT consult received, chart reviewed. Patient was seen this morning for OT evaluation. Noted in the chart that the plan in to return to assisted living with home health. Patient is currently requiring assist of 2 for all tasks. As long as the facility can accommodate her level of care, agree with the plan. Full evaluation note to follow.

## 2019-10-17 NOTE — PROGRESS NOTES
PROGRESS NOTE    NAME:  Katherin Bass   :   1935   MRN:   [id number]     Date/Time:  10/17/2019 7:35 AM  Subjective:   History:  Chart reviewed and patient seen and examined and D/W her nurse this AM and her daughter last PM and all events noted. She was recently admitted with CAP and UTI and D/Micah to adult home on 10/11 feeling well with Creat. 2.07 (baseline 1.9). Apparently although she felt well on D/C she became weaker and has taken in little PO and represented to the ER on 10/15 weaker with Creat 4.00 and was thus admitted with acute renal failure on CKD. She had a US w/o renal obstruction and ureteral stent in place. She has UA c/w infection with culture sent. Her WBC was elevated on admission and improved yesterday AM but higher this AM and now has developed a cough and had a fever 102 during the night. Yesterday her Creatinine was higher at 4.36 despite hydration, but significantly better today at 3.28. She was on Lisinopril for HTN and Lasix for edema as outpatient but currently on hold. She feels well now except she is weak and has no appetite. She denies CP, SOB or cardiac or respiratory c/o except now has a cough. She has no nausea or GI c/o except poor appetite and she has no  c/o. There are no there c/o on complete ROS.        Medications reviewed:  Current Facility-Administered Medications   Medication Dose Route Frequency    cefepime (MAXIPIME) 2 g in 0.9% sodium chloride (MBP/ADV) 100 mL  2 g IntraVENous Q24H    balsam peru-castor oil (VENELEX) ointment   Topical TID    gabapentin (NEURONTIN) capsule 300 mg  300 mg Oral DAILY    acetaminophen (TYLENOL) tablet 650 mg  650 mg Oral Q6H PRN    aspirin chewable tablet 81 mg  81 mg Oral DAILY    hydrALAZINE (APRESOLINE) tablet 100 mg  100 mg Oral TID    loperamide (IMODIUM) capsule 4 mg  4 mg Oral Q8H PRN    metoprolol tartrate (LOPRESSOR) tablet 50 mg  50 mg Oral BID    PARoxetine (PAXIL) tablet 20 mg  20 mg Oral DAILY    polyethylene glycol (MIRALAX) packet 17 g  17 g Oral DAILY PRN    pravastatin (PRAVACHOL) tablet 40 mg  40 mg Oral QHS    temazepam (RESTORIL) capsule 15 mg  15 mg Oral QHS    traMADol (ULTRAM) tablet 50 mg  50 mg Oral Q6H PRN    0.9% sodium chloride infusion  75 mL/hr IntraVENous CONTINUOUS    heparin (porcine) injection 5,000 Units  5,000 Units SubCUTAneous Q12H        Objective:   Vitals:  Visit Vitals  /45 (BP 1 Location: Left arm, BP Patient Position: At rest)   Pulse 94   Temp 99.4 °F (37.4 °C)   Resp 22   Ht 5' (1.524 m)   Wt 119 lb 14.9 oz (54.4 kg)   SpO2 93%   BMI 23.42 kg/m²    O2 Flow Rate (L/min): 2 l/min O2 Device: Nasal cannula Temp (24hrs), Av.7 °F (37.6 °C), Min:98.7 °F (37.1 °C), Max:101.9 °F (38.8 °C)      Last 24hr Input/Output:    Intake/Output Summary (Last 24 hours) at 10/17/2019 0735  Last data filed at 10/17/2019 0559  Gross per 24 hour   Intake    Output 570 ml   Net -570 ml        PHYSICAL EXAM:  General:     Alert, cooperative, no distress, appears stated age. Head:    Normocephalic, without obvious abnormality, atraumatic. Eyes:    Conjunctivae/corneas clear. PERRLA  Nose:   Nares normal. No drainage or sinus tenderness. Throat:     Lips, mucosa, and tongue normal.  No Thrush  Neck:   Supple, symmetrical,  no adenopathy, thyroid: non tender     no carotid bruit and no JVD. Back:     Symmetric,  No CVA tenderness. Lungs:    Scattered coarse rhonchi bilaterally. No Wheezing. No rales. Heart:    Regular rate and rhythm,  no murmur, rub or gallop. Abdomen:    Soft, non-tender. Not distended. Bowel sounds normal. No masses  Extremities:  Extremities normal, atraumatic, No cyanosis. No edema. No clubbing  Lymph nodes:  Cervical, supraclavicular normal.  Neurologic:  General decreased strength, Alert and oriented X 3.    Skin:                 No rash, Skin breakdown on buttocks as per wound care note from 10/16      Lab Data Reviewed:    Recent Results (from the past 24 hour(s))   CREATININE, UR, RANDOM    Collection Time: 10/16/19  3:45 PM   Result Value Ref Range    Creatinine, urine 211.00 mg/dL   SODIUM, UR, RANDOM    Collection Time: 10/16/19  3:45 PM   Result Value Ref Range    Sodium,urine random 31 MMOL/L   URINALYSIS W/MICROSCOPIC    Collection Time: 10/16/19  3:45 PM   Result Value Ref Range    Color YELLOW/STRAW      Appearance CLOUDY (A) CLEAR      Specific gravity 1.017 1.003 - 1.030      pH (UA) 5.0 5.0 - 8.0      Protein 100 (A) NEG mg/dL    Glucose NEGATIVE  NEG mg/dL    Ketone NEGATIVE  NEG mg/dL    Bilirubin NEGATIVE  NEG      Blood NEGATIVE  NEG      Urobilinogen 0.2 0.2 - 1.0 EU/dL    Nitrites NEGATIVE  NEG      Leukocyte Esterase LARGE (A) NEG      WBC >100 (H) 0 - 4 /hpf    RBC 0-5 0 - 5 /hpf    Epithelial cells FEW FEW /lpf    Bacteria NEGATIVE  NEG /hpf    Yeast PRESENT (A) NEG     EOSINOPHILS, URINE    Collection Time: 10/16/19  3:45 PM   Result Value Ref Range    Eosinophils,urine NEGATIVE      CBC WITH AUTOMATED DIFF    Collection Time: 10/17/19  3:28 AM   Result Value Ref Range    WBC 18.1 (H) 3.6 - 11.0 K/uL    RBC 2.78 (L) 3.80 - 5.20 M/uL    HGB 8.2 (L) 11.5 - 16.0 g/dL    HCT 26.7 (L) 35.0 - 47.0 %    MCV 96.0 80.0 - 99.0 FL    MCH 29.5 26.0 - 34.0 PG    MCHC 30.7 30.0 - 36.5 g/dL    RDW 14.6 (H) 11.5 - 14.5 %    PLATELET 497 709 - 244 K/uL    MPV 10.8 8.9 - 12.9 FL    NRBC 0.0 0  WBC    ABSOLUTE NRBC 0.00 0.00 - 0.01 K/uL    NEUTROPHILS 80 (H) 32 - 75 %    BAND NEUTROPHILS 2 %    LYMPHOCYTES 5 (L) 12 - 49 %    MONOCYTES 11 5 - 13 %    EOSINOPHILS 0 0 - 7 %    BASOPHILS 1 0 - 1 %    METAMYELOCYTES 1 %    IMMATURE GRANULOCYTES 0 0.0 - 0.5 %    ABS. NEUTROPHILS 14.8 (H) 1.8 - 8.0 K/UL    ABS. LYMPHOCYTES 0.9 0.8 - 3.5 K/UL    ABS. MONOCYTES 2.0 (H) 0.0 - 1.0 K/UL    ABS. EOSINOPHILS 0.0 0.0 - 0.4 K/UL    ABS. BASOPHILS 0.2 (H) 0.0 - 0.1 K/UL    ABS. IMM.  GRANS. 0.0 0.00 - 0.04 K/UL    DF AUTOMATED      RBC COMMENTS NORMOCYTIC, NORMOCHROMIC     METABOLIC PANEL, BASIC    Collection Time: 10/17/19  3:28 AM   Result Value Ref Range    Sodium 140 136 - 145 mmol/L    Potassium 4.1 3.5 - 5.1 mmol/L    Chloride 110 (H) 97 - 108 mmol/L    CO2 23 21 - 32 mmol/L    Anion gap 7 5 - 15 mmol/L    Glucose 116 (H) 65 - 100 mg/dL    BUN 40 (H) 6 - 20 MG/DL    Creatinine 3.28 (H) 0.55 - 1.02 MG/DL    BUN/Creatinine ratio 12 12 - 20      GFR est AA 16 (L) >60 ml/min/1.73m2    GFR est non-AA 13 (L) >60 ml/min/1.73m2    Calcium 8.6 8.5 - 10.1 MG/DL          Assessment/Plan:     Principal Problem:    Acute kidney injury superimposed on CKD ([facility] (10/15/2019)    Active Problems:    COPD (chronic obstructive pulmonary disease) ([facility] (8/14/2010)      Essential hypertension (7/20/2016)      Moderate protein-calorie malnutrition ([facility]) (3/30/2018)      Stage 3 chronic kidney disease ([facility] (11/5/2018)      Weakness generalized (10/15/2019)       ___________________________________________________  PLAN:    1. Continue IV hydration  2. Follow Creat 4.0 on adm to 4.36 yesterday and 3.28 now with baseline 1.9  3. Continue off Lasix and Lisinopril  4. F/U on repeat urine culture  5. Follow WBC, 15.3 adm to 13.2 yesterday and 18.1 now w/o antibiotics so start maxipime  6. Continue Hydralazine and Metoprolol for HTN and follow  7. Continue nasal oxygen with COPD  8. Renal consult note reviewed and appreciate help  9. PT evaluation   10. Repeat CXR and send urine and blood cultures  11.  DNR per patient request, D/W her      35 minutes spent in direct care of this complex patient today      ___________________________________________________    Attending Physician: Dez Escalona MD

## 2019-10-17 NOTE — PROGRESS NOTES
TELE-HOSPITALIST CROSS COVER NOTE:    Visit Vitals  /47 (BP 1 Location: Right arm, BP Patient Position: At rest)   Pulse 91   Temp (!) 101.9 °F (38.8 °C)   Resp 17   Ht 5' (1.524 m)   Wt 54.4 kg (119 lb 14.9 oz)   SpO2 92%   BMI 23.42 kg/m²       D/W RN; medical records reviewed; admitted for management of CHRISTY; noted to be febrile.     - Tylenol for symptomatic management  - BCx requested  - UA reviewed; benign  - CXR reviewed; no acute infectious process noted      [facility]

## 2019-10-17 NOTE — PROGRESS NOTES
Transitional Care READMISSION  HUG Note [facility]        Patient: Cassandra Collet MRN: [id number]  SSN: [id number]    YOB: 1935  Age: 80 y.o. Sex: female      Admit Date: 10/15/2019     LOS: 2 days        Assessment /Risk    MARC Diagnosis:    1. CHRISTY  With CKD d/t dehydration (decreased po intake following dc 10/11)  2. Probable Sepsis r/t PNA      WBC -  18.1 ( incr) - Cefepime started today      CR -  3.28 (improved) - nephrology consult in process      febrile        BP - stable now       O2 -2LPM        confusion  3. Generalized Fatigue - increased since discharge    Readmission Risks:  HIGH    1. Questionable [facility] candidate - functional decline more evident - decreased po intake, imani fluids  ( does she need to transisiton to LTC???)  2. Increased functional decline, baseline WC dependent, and most ADLs dependent [facility] has been providing incr assistance   3. Care Goals - requested AMD, DDNR on file      Fluid Status:  Incomplete, need complete record (ordered)    Nurse Navigator: PHANI  MARC appointment with Dr Rissa Manuel    18.5 - 24.9 Normal weight / Body mass index is 23.42 kg/m². Code status: Durable DNR sent with patient  Recommended Disposition: SNF/LTC     Subjective:     80 y.o. female readmitted within 4 days of discharge for CHRISTY due to dehydration and probable PNA. Upon discharge patient continued to have increased weakness, decreased po intake and was dc to baseline [facility] wit new requirement of O2 2LPM. Family noticed she was not eating/drinking and becoming more progressively weakness. Also noted more confusion. PMH pertient for CKD. Nephrology consulted. ACE and Lasix held. Today she appears somnolent, needs rpeated verbal cues to follow commands where she is inconsistent.  Son a t bedside, appears very anxious over recent events\" I think she was discharged too early last time\"    Baseline she lives at the [facility] and they manage her RX and provide a higher level of care than basic [facility]. Son reports she has never been \"good at drinking water\" and needs reminders. BCX and UCX are in process. She may benefit for a ID consult. Will continue to follow. Number of Admissions this year  4, UTI and PNA  Heart Failure Bundle no      Advance Care Plan:     Son to look for AMD - states he is the mPOA. He has one sister who is also very involved in care. Patient is single      ROS:     A comprehensive ROS was performed and was negative except for as per HPI. Objective:      Allergies   Allergen Reactions    Hydrocodone Nausea and Vomiting    Morphine Rash    Dilaudid [Hydromorphone] Itching     Does not remember       Past Medical History:   Diagnosis Date    Arrhythmia     A-fib    Arthritis     shoulder/right    Cancer ([address]     left lung    Chronic kidney disease     Stent in Right Kidney, Stage III    Chronic obstructive pulmonary disease ([facility] [address]     Hypertension     Ill-defined condition     Ex Lap with Marlen Barney patch of a perforated pyloric channel ulcer 16    Other ill-defined conditions(799.89)     lipids    Other ill-defined conditions(799.89)     blood transfusion history    PVD (peripheral vascular disease) (HCC)        Past Surgical History:   Procedure Laterality Date    BYPASS GRAFT OTHR,FEM-FEM      BYPASS GRAFT OTHR,FEM-POP Right     PVD    HX HEENT      bilateral cataracts    HX ORTHOPAEDIC Right     right hip fracture/pinning    HX UROLOGICAL      right stent/kidney       Social History     Tobacco Use   Smoking Status Former Smoker    Packs/day: 0.25    Years: 60.00    Pack years: 15.00    Last attempt to quit:     Years since quittin.7   Smokeless Tobacco Never Used       Current Facility-Administered Medications   Medication Dose Route Frequency    cefepime (MAXIPIME) 2 g in 0.9% sodium chloride (MBP/ADV) 100 mL  2 g IntraVENous Q24H    balsam peru-castor oil (VENELEX) ointment   Topical TID    gabapentin (NEURONTIN) capsule 300 mg  300 mg Oral DAILY    acetaminophen (TYLENOL) tablet 650 mg  650 mg Oral Q6H PRN    aspirin chewable tablet 81 mg  81 mg Oral DAILY    hydrALAZINE (APRESOLINE) tablet 100 mg  100 mg Oral TID    loperamide (IMODIUM) capsule 4 mg  4 mg Oral Q8H PRN    metoprolol tartrate (LOPRESSOR) tablet 50 mg  50 mg Oral BID    PARoxetine (PAXIL) tablet 20 mg  20 mg Oral DAILY    polyethylene glycol (MIRALAX) packet 17 g  17 g Oral DAILY PRN    pravastatin (PRAVACHOL) tablet 40 mg  40 mg Oral QHS    temazepam (RESTORIL) capsule 15 mg  15 mg Oral QHS    traMADol (ULTRAM) tablet 50 mg  50 mg Oral Q6H PRN    0.9% sodium chloride infusion  75 mL/hr IntraVENous CONTINUOUS    heparin (porcine) injection 5,000 Units  5,000 Units SubCUTAneous Q12H       Prior to Admission Medications   Prescriptions Last Dose Informant Patient Reported? Taking? L. acidoph & paracasei- S therm- Bifido (HANG-Q/RISAQUAD) 8 billion cell cap cap 10/15/2019 at 0900 Care Giver No Yes   Sig: Take 1 Cap by mouth daily. PARoxetine (PAXIL) 20 mg tablet 10/15/2019 at [address] Yes Yes   Sig: Take 20 mg by mouth daily. acetaminophen (TYLENOL) 500 mg tablet 10/13/2019 at Unknown time Care Giver Yes Yes   Sig: Take 500 mg by mouth every six (6) hours as needed for Pain. aspirin 81 mg tablet 10/15/2019 at 0900 Care Giver Yes Yes   Sig: Take 81 mg by mouth daily. chlorthalidone (HYGROTEN) 25 mg tablet 10/15/2019 at 0900 Care Giver No Yes   Sig: Take 1 Tab by mouth daily. cholecalciferol (VITAMIN D3) 50,000 unit capsule 10/4/2019 at Unknown time Care Giver Yes Yes   Sig: Take 50,000 Units by mouth Every Friday. dilTIAZem CD (CARDIZEM CD) 300 mg ER capsule 10/15/2019 at 0900 Care Giver No Yes   Sig: Take 1 Cap by mouth daily. gabapentin (NEURONTIN) 400 mg capsule 10/15/2019 at 0900 Care Giver No Yes   Sig: Take 1 Cap by mouth four (4) times daily.  Max Daily Amount: 1,600 mg.   hydrALAZINE (APRESOLINE) 100 mg tablet 10/15/2019 at 0900 Care Giver Yes Yes   Sig: Take 100 mg by mouth three (3) times daily. levoFLOXacin (LEVAQUIN) 500 mg tablet 10/15/2019 at 0900 Care Giver No Yes   Sig: Take 1 Tab by mouth Daily (before breakfast). lisinopril (PRINIVIL, ZESTRIL) 40 mg tablet 10/15/2019 at 0900 Care Giver No Yes   Sig: Take 1 Tab by mouth daily. loperamide (IMODIUM) 2 mg capsule 10/14/2019 at Unknown time Care Giver No Yes   Sig: Take 2 Caps by mouth every eight (8) hours as needed for Diarrhea.   metoprolol tartrate (LOPRESSOR) 50 mg tablet 10/15/2019 at 0900 Care Giver No Yes   Sig: Take 1 Tab by mouth two (2) times a day. ondansetron hcl (ZOFRAN) 4 mg tablet 10/15/2019 at Unknown time Care Giver Yes Yes   Sig: Take 4 mg by mouth every eight (8) hours as needed for Nausea. polyethylene glycol (MIRALAX) 17 gram packet Not Taking at Unknown time Care Giver Yes No   Sig: Take 17 g by mouth daily as needed (constipation). potassium chloride (KLOR-CON) 20 mEq pack 10/15/2019 at 0900 Care Giver No Yes   Sig: Take 1 Packet by mouth daily. pravastatin (PRAVACHOL) 40 mg tablet 10/14/2019 at 2100 Care Giver No Yes   Sig: Take 1 Tab by mouth nightly. temazepam (RESTORIL) 15 mg capsule 10/14/2019 at 2100 Care Giver No Yes   Sig: Take 1 Cap by mouth nightly. Max Daily Amount: 15 mg.   traMADol (ULTRAM) 50 mg tablet 10/14/2019 at Unknown time Care Giver No Yes   Sig: Take 1 Tab by mouth every six (6) hours as needed for Pain. Max Daily Amount: 200 mg.       Facility-Administered Medications: None       Patient Vitals for the past 24 hrs:   Temp Pulse Resp BP SpO2   10/17/19 0815 99.5 °F (37.5 °C) 90 20 172/52 92 %   10/16/19 2255 99.4 °F (37.4 °C) 94 22 121/45 93 %   10/16/19 2154 99.4 °F (37.4 °C)       10/16/19 2005 (!) 101.9 °F (38.8 °C) 91 17 151/47 92 %   10/16/19 1534 98.7 °F (37.1 °C) 87 14 147/50 93 %        Intake and Output:    Intake/Output Summary (Last 24 hours) at 10/17/2019 1518  Last data filed at 10/17/2019 0700  Gross per 24 hour   Intake    Output 670 ml   Net -670 ml         PHYSICAL EXAM:    Visit Vitals  /52   Pulse 90   Temp 99.5 °F (37.5 °C)   Resp 20   Ht 5' (1.524 m)   Wt 54.4 kg (119 lb 14.9 oz)   SpO2 92%   BMI 23.42 kg/m²     General appearance: fatigued, mild distress, appears stated age, toxic, mildy confused oriented to name, place. Head: Normocephalic, without obvious abnormality, atraumatic  Eyes: conjunctivae/corneas clear. PERRL, EOM's intact. Ears: hearing intact  Throat: Lips, mucosa, and tongue normal. Teeth and gums normal  Lungs:decreased breath sounds bilaterally bases  Heart: regular rate and rhythm, S1, S2 normal, no murmur, click, rub or gallop  Abdomen: soft, non-tender.  Bowel sounds normal. No masses,  no organomegaly  Extremities: UE significant edema, non pitting  Pulses: +2 equal  Skin:mulitple contusions in UE bilaterally   Neurologic: somnolent        Lab/Data Review:   Recent Results (from the past 24 hour(s))   CREATININE, UR, RANDOM    Collection Time: 10/16/19  3:45 PM   Result Value Ref Range    Creatinine, urine 211.00 mg/dL   SODIUM, UR, RANDOM    Collection Time: 10/16/19  3:45 PM   Result Value Ref Range    Sodium,urine random 31 MMOL/L   URINALYSIS W/MICROSCOPIC    Collection Time: 10/16/19  3:45 PM   Result Value Ref Range    Color YELLOW/STRAW      Appearance CLOUDY (A) CLEAR      Specific gravity 1.017 1.003 - 1.030      pH (UA) 5.0 5.0 - 8.0      Protein 100 (A) NEG mg/dL    Glucose NEGATIVE  NEG mg/dL    Ketone NEGATIVE  NEG mg/dL    Bilirubin NEGATIVE  NEG      Blood NEGATIVE  NEG      Urobilinogen 0.2 0.2 - 1.0 EU/dL    Nitrites NEGATIVE  NEG      Leukocyte Esterase LARGE (A) NEG      WBC >100 (H) 0 - 4 /hpf    RBC 0-5 0 - 5 /hpf    Epithelial cells FEW FEW /lpf    Bacteria NEGATIVE  NEG /hpf    Yeast PRESENT (A) NEG     EOSINOPHILS, URINE    Collection Time: 10/16/19  3:45 PM   Result Value Ref Range    Eosinophils,urine NEGATIVE      CULTURE, BLOOD    Collection Time: 10/16/19  9:15 PM   Result Value Ref Range    Special Requests: NO SPECIAL REQUESTS      Culture result: NO GROWTH AFTER 10 HOURS     CBC WITH AUTOMATED DIFF    Collection Time: 10/17/19  3:28 AM   Result Value Ref Range    WBC 18.1 (H) 3.6 - 11.0 K/uL    RBC 2.78 (L) 3.80 - 5.20 M/uL    HGB 8.2 (L) 11.5 - 16.0 g/dL    HCT 26.7 (L) 35.0 - 47.0 %    MCV 96.0 80.0 - 99.0 FL    MCH 29.5 26.0 - 34.0 PG    MCHC 30.7 30.0 - 36.5 g/dL    RDW 14.6 (H) 11.5 - 14.5 %    PLATELET 184 851 - 861 K/uL    MPV 10.8 8.9 - 12.9 FL    NRBC 0.0 0  WBC    ABSOLUTE NRBC 0.00 0.00 - 0.01 K/uL    NEUTROPHILS 80 (H) 32 - 75 %    BAND NEUTROPHILS 2 %    LYMPHOCYTES 5 (L) 12 - 49 %    MONOCYTES 11 5 - 13 %    EOSINOPHILS 0 0 - 7 %    BASOPHILS 1 0 - 1 %    METAMYELOCYTES 1 %    IMMATURE GRANULOCYTES 0 0.0 - 0.5 %    ABS. NEUTROPHILS 14.8 (H) 1.8 - 8.0 K/UL    ABS. LYMPHOCYTES 0.9 0.8 - 3.5 K/UL    ABS. MONOCYTES 2.0 (H) 0.0 - 1.0 K/UL    ABS. EOSINOPHILS 0.0 0.0 - 0.4 K/UL    ABS. BASOPHILS 0.2 (H) 0.0 - 0.1 K/UL    ABS. IMM.  GRANS. 0.0 0.00 - 0.04 K/UL    DF AUTOMATED      RBC COMMENTS NORMOCYTIC, NORMOCHROMIC     METABOLIC PANEL, BASIC    Collection Time: 10/17/19  3:28 AM   Result Value Ref Range    Sodium 140 136 - 145 mmol/L    Potassium 4.1 3.5 - 5.1 mmol/L    Chloride 110 (H) 97 - 108 mmol/L    CO2 23 21 - 32 mmol/L    Anion gap 7 5 - 15 mmol/L    Glucose 116 (H) 65 - 100 mg/dL    BUN 40 (H) 6 - 20 MG/DL    Creatinine 3.28 (H) 0.55 - 1.02 MG/DL    BUN/Creatinine ratio 12 12 - 20      GFR est AA 16 (L) >60 ml/min/1.73m2    GFR est non-AA 13 (L) >60 ml/min/1.73m2    Calcium 8.6 8.5 - 10.1 MG/DL   URINALYSIS W/ REFLEX CULTURE    Collection Time: 10/17/19  7:12 AM   Result Value Ref Range    Color YELLOW/STRAW      Appearance CLOUDY (A) CLEAR      Specific gravity 1.015 1.003 - 1.030      pH (UA) 5.0 5.0 - 8.0      Protein 100 (A) NEG mg/dL    Glucose NEGATIVE  NEG mg/dL    Ketone NEGATIVE  NEG mg/dL Bilirubin NEGATIVE  NEG      Blood NEGATIVE  NEG      Urobilinogen 0.2 0.2 - 1.0 EU/dL    Nitrites NEGATIVE  NEG      Leukocyte Esterase SMALL (A) NEG      WBC 20-50 0 - 4 /hpf    RBC 0-5 0 - 5 /hpf    Epithelial cells FEW FEW /lpf    Bacteria NEGATIVE  NEG /hpf    UA:UC IF INDICATED URINE CULTURE ORDERED (A) CNI      Amorphous Crystals FEW (A) NEG      Budding yeast PRESENT (A) NEG         Imaging Reviewed:     Us Retroperitoneum Comp    Result Date: 10/15/2019  IMPRESSION: No hydronephrosis. No significant change. Xr Chest Port    Result Date: 10/17/2019  IMPRESSION: 1. Evidence of developing congestive change/pulmonary edema. 2. Opacification of the left lung base as described above. Developing atelectasis/infiltration in this region must be considered. Presence of atelectatic change in the lateral aspect of the left midlung. Xr Chest Port    Result Date: 10/15/2019   impression: No change. .  Assessment:     Principal Problem:    Acute kidney injury superimposed on CKD ([address] (10/15/2019)    Active Problems:    COPD (chronic obstructive pulmonary disease) ([address] (8/14/2010)      Essential hypertension (7/20/2016)      Moderate protein-calorie malnutrition ([address] (3/30/2018)      Stage 3 chronic kidney disease ([address] (11/5/2018)      Weakness generalized (10/15/2019)        Plan:     The objective of todays visit was to review the transitional care plan with the patient. We discussed the importance of transitional care as it relates to reducing the likelihood of readmission. We reviewed the goals for the first 30 days following hospital discharge. The patient and I discussed wrap around services including nurse navigation, care coordination, home health, transitional care appointments with their primary care provider and specialist team and the role of [facility].  Patient education focused on readmission zones as described as    The Red Zone: High risk for readmission, days 1-21  The Yellow Zone: Moderate risk for readmission, days 22-29  The Green Zone: Lower risk for readmission, days 30 and after        1. Family to provided AMD, if not found will complete POST    2. HUG Tool Education  To be provided long discussion with son in regards to recent set of events with continuous O2, decreased po intake, CHRISTY in setting with CKD,  Overall functional decline, current confusion. He appears very overwhelmed with current situation. Provided reassurance about current consults, IV hydration, CX pending, Lab results  And ATB coverage. 3. Hug Calendar distributed. 3. All labs, I/O's and imaging have been reviewed. 4. Medication Reconciliation  performed at discharge. Accessiblity GOOd   RX rationale explained TBD at [facility] provides        5. Collaborated with  RN, CM and mPOA on this plan of care. 6. Discussed  - [facility]  option to avoid frequent ED visits. [facility] can treat; pains, sprains, cuts, wounds, high fevers, upper respiratory infections and much more. There medical team is equipped with all the tools necessary to provide advanced medical care in the comfort of you home, workplace, or location of need. The medical team consists of doctors, nurse practitioners, and EMTs. [facility] is available 7 days per week 9 am to 9 pm.   Request care by calling [phone] or by going online at [phone] [employer] [address]        The son expressed understanding of the disease process and management plan, and all questions were answered. Greater than 60  minutes were spent in patient management, greater than half of which was spent in counseling and coordination of care.       Signed By: Minerva Linares NP     October 17, 2019

## 2019-10-17 NOTE — PROGRESS NOTES
Bedside and verbal shift change report given to LOVELY Parmar (oncoming nurse) by Darvin Jensen RN (offgoing nurse). Report included the following information SBAR, Kardex, Intake/Output, Recent Results and Quality Measures.

## 2019-10-17 NOTE — PROGRESS NOTES
Problem: Mobility Impaired (Adult and Pediatric)  Goal: *Acute Goals and Plan of Care (Insert Text)  Description  FUNCTIONAL STATUS PRIOR TO ADMISSION: The patient was at the wheelchair level and required moderate assistance for transfers to the chair; unable to propel chair secondary to arthritis    HOME [address]: The patient lived in [facility] and requires assist for all mobility and ADLs. Physical Therapy Goals  Initiated 10/17/2019  1. Patient will move from supine to sit and sit to supine , scoot up and down and roll side to side in bed with moderate assistance  within 7 day(s). 2.  Patient will transfer from bed to chair and chair to bed with moderate assistance  using the least restrictive device within 7 day(s). 3.  Patient will perform sit to stand with moderate assistance  within 7 day(s). Outcome: Not Met   PHYSICAL THERAPY EVALUATION  Patient: Laura Georges (40 y.o. female)  Date: 10/17/2019  Primary Diagnosis: Acute kidney injury superimposed on CKD ([facility] [address] [N17.9, N18.9]       Precautions: falls         ASSESSMENT  Based on the objective data described below, the patient presents with profoundly impaired functional mobility, balance, and activity tolerance. She requires total A for bed mobility and sitting balance is poor. O2 sats found to be 80% on 4 l/min following rolling and 84% on 5 l/min in sitting, but improved to 90% with VC for deep breathing Patient demonstrates grossly impaired ROM secondary to arthritis and increased c/o pain when attempting any active or passive movement. Patient is confused and appears very fearful/anxious. Patient has had multiple hospitalizations over the last year and per chart review of prior notes, decreasing functional status with the most recent baseline functional status of requiring A for all mobility and w/c bound. Patient would benefit from a Palliative care consult.  Will follow on a trial basis based on patient's ability to participate in therapy. Current Level of Function Impacting Discharge (mobility/balance): total A of 2    Functional Outcome Measure: The patient scored 0/20 on the Elderly Mobility scale outcome measure which is indicative of dependence for all care. Other factors to consider for discharge: confusion and declining functional status     Patient will benefit from skilled therapy intervention to address the above noted impairments. PLAN :  Recommendations and Planned Interventions: bed mobility training, transfer training, patient and family training/education and therapeutic activities      Frequency/Duration: Patient will be followed by physical therapy:  3 times a week to address goals on a trial basis    Recommendation for discharge: (in order for the patient to meet his/her long term goals)  To be determined: by medical course and progress with therapy; may need LTC placement    This discharge recommendation:  Has been made in collaboration with the attending provider and/or case management    Equipment recommendations for successful discharge (if) home: patient has DME available at [facility] for discharge         SUBJECTIVE:   Patient stated I'm not supposed to have anything touching me.     OBJECTIVE DATA SUMMARY:   HISTORY:    Past Medical History:   Diagnosis Date    Arrhythmia     A-fib    Arthritis     shoulder/right    Cancer ([facility] [address] 2015    left lung    Chronic kidney disease     Stent in Right Kidney, Stage III    Chronic obstructive pulmonary disease ([facility] [address] [facility]     Hypertension     Ill-defined condition     Ex Lap with Dolph Caleb patch of a perforated pyloric channel ulcer 7/19/16    Other ill-defined conditions(799.89)     lipids    Other ill-defined conditions(799.89)     blood transfusion history    PVD (peripheral vascular disease) ([facility] [address]      Past Surgical History:   Procedure Laterality Date    BYPASS GRAFT OTHR,FEM-FEM      BYPASS GRAFT OTHR,FEM-POP Right     PVD    HX HEENT bilateral cataracts    HX ORTHOPAEDIC Right     right hip fracture/pinning    HX UROLOGICAL      right stent/kidney           Home Situation  Home Environment: Assisted living  # Steps to Enter: 0  One/Two Story Residence: One story  Living Alone: No  Support Systems: Assisted living, Child(hansel), Long term acute care, Skilled nursing facility  Patient Expects to be Discharged to[de-identified] Assisted living  Current DME Used/Available at Home: Wheelchair  Tub or Shower Type: Shower    EXAMINATION/PRESENTATION/DECISION MAKING:   Critical Behavior:  Neurologic State: Alert, Confused  Orientation Level: Oriented X4  Cognition: Decreased attention/concentration, Follows commands, Memory loss  Safety/Judgement: Awareness of environment, Decreased awareness of need for safety  Hearing: Auditory  Auditory Impairment: None    Range Of Motion:  AROM: Grossly decreased, non-functional           PROM: Generally decreased, functional           Strength:    Strength: Grossly decreased, non-functional                    Tone & Sensation:   Tone: Abnormal(tremulous)                              Coordination:  Coordination: Grossly decreased, non-functional  Vision:      Functional Mobility:  Bed Mobility:  Rolling: Total assistance;Assist x2  Supine to Sit: Total assistance;Assist x2  Sit to Supine: Total assistance;Assist x2  Scooting: Total assistance;Assist x2  Transfers:  Sit to Stand: Total assistance;Assist x2           Bed to Chair: (unsafe to attempt)              Balance:   Sitting: Impaired  Sitting - Static: Poor (constant support)  Sitting - Dynamic: Poor (constant support)  Standing: Impaired(unable to achieve full stand)      Functional Measure:    Elder Mobility Scale    0/20         Scores under 10  generally these patients are dependent in mobility maneuvers; require help with  basic ADL, such as transfers, toileting and dressing.     Scores between 10  13  generally these patients are borderline in terms of safe mobility and  independence in ADL i.e. they require some help with some mobility maneuvers. Scores over 14  Generally these patients are able to perform mobility maneuvers alone and safely  and are independent in basic ADL. Pain Rating:  Patient c/o pain with movement but is not able to rate    Activity Tolerance:   Poor   O2 sats rolling rolling found to be 80% on 4 l/min and improved to 90% with deep breathing; decreased to 84% in sitting on 4 l/min requiring 5 l/min and deep breathing to improve to 90%- nursing notified  Please refer to the flowsheet for vital signs taken during this treatment. After treatment patient left in no apparent distress:   Supine in bed and Call bell within reach    COMMUNICATION/EDUCATION:   The patients plan of care was discussed with: Occupational Therapist, Registered Nurse and . Fall prevention education was provided but no evidence of understanding. and Patient is unable to participate in goal setting and plan of care.     Thank you for this referral.  Laura Frost, PT   Time Calculation: 38 mins

## 2019-10-17 NOTE — PROGRESS NOTES
OCCUPATIONAL THERAPY EVALUATION  Patient: Emir Vasquez (59 y.o. female)  Date: 10/17/2019  Primary Diagnosis: Acute kidney injury superimposed on CKD ([facility] [address] [N17.9, N18.9]        Precautions:        ASSESSMENT  Based on the objective data described below, the patient presents with significant deficits in all self-care. She currently requires assistance of 2 for most tasks. She was on 4 liters O2 via nasal canula, dropped to 80% with rolling to get cleaned up. Cued for pursed lip breathing, increasing to 89-90% in about a minute. Completed supine to sit with Total A of 2 and also required assistance of 2 to sit upright edge of bed. SpO2 again dropped to the low 80's. Bumped oxygen up to 5 liters. Discussed with nursing. Patient presents with significant edema in B UE and can't even hold the utensils and feed herself. Patient will benefit from a trial of OT to attempt to increase safety and independence in ADL. Current Level of Function Impacting Discharge (ADLs/self-care): Total A of 1 to 2 for all tasks. Functional Outcome Measure: The patient scored 5/100 on the Barthel Index outcome measure which is indicative of profound deficits in ADL and mobility. Other factors to consider for discharge: Need to determine if group home can accommodate her level of function     Patient will benefit from skilled therapy intervention to address the above noted impairments. PLAN :  Recommendations and Planned Interventions: self care training, functional mobility training, therapeutic exercise, balance training, therapeutic activities, cognitive retraining, endurance activities, neuromuscular re-education, patient education, home safety training and family training/education    Frequency/Duration: Patient will be followed by occupational therapy 3 times a week to address goals.     Recommendation for discharge: (in order for the patient to meet his/her long term goals)  To be determined: Home health at [facility] vs SNF to LTC     This discharge recommendation:  Has been made in collaboration with the attending provider and/or case management    Equipment recommendations for successful discharge (if) home: To be determined        SUBJECTIVE:   Patient stated I can't.     OBJECTIVE DATA SUMMARY:   HISTORY:   Past Medical History:   Diagnosis Date    Arrhythmia     A-fib    Arthritis     shoulder/right    Cancer ([facility] [address] 2015    left lung    Chronic kidney disease     Stent in Right Kidney, Stage III    Chronic obstructive pulmonary disease (HCC)     Hypertension     Ill-defined condition     Ex Lap with Jordyn Ciara patch of a perforated pyloric channel ulcer 7/19/16    Other ill-defined conditions(799.89)     lipids    Other ill-defined conditions(799.89)     blood transfusion history    PVD (peripheral vascular disease) (HCC)      Past Surgical History:   Procedure Laterality Date    BYPASS GRAFT OTHR,FEM-FEM      BYPASS GRAFT OTHR,FEM-POP Right     PVD    HX HEENT      bilateral cataracts    HX ORTHOPAEDIC Right     right hip fracture/pinning    HX UROLOGICAL      right stent/kidney       Expanded or extensive additional review of patient history:     Home Situation  Home Environment: Assisted living  # Steps to Enter: 0  One/Two Story Residence: One story  Living Alone: No  Support Systems: Assisted living, Child(hansel), Long term acute care, Skilled nursing facility  Patient Expects to be Discharged to[de-identified] Assisted living  Current DME Used/Available at Home: Wheelchair  Tub or Shower Type: Shower    Hand dominance: Right    EXAMINATION OF PERFORMANCE DEFICITS:  Cognitive/Behavioral Status:  Neurologic State: Alert;Confused  Orientation Level: Oriented X4  Cognition: Decreased attention/concentration; Follows commands;Memory loss     Perseveration: Perseverates during conversation  Safety/Judgement: Awareness of environment;Decreased awareness of need for safety    Skin: sacral breakdown    Edema: B UE (positioned on pillows)    Hearing: Auditory  Auditory Impairment: None    Vision/Perceptual:    Not tested                                Range of Motion:  AROM: Grossly decreased, non-functional  PROM: Generally decreased, functional                      Strength:  Strength: Grossly decreased, non-functional                Coordination:     Fine Motor Skills-Upper: Left Impaired;Right Impaired    Gross Motor Skills-Upper: Left Impaired;Right Impaired    Balance:  Sitting: Impaired  Sitting - Static: Poor (constant support)  Sitting - Dynamic: Poor (constant support)  Standing: Impaired(unable to achieve full stand)    Functional Mobility and Transfers for ADLs:  Bed Mobility:  Rolling: Total assistance;Assist x2  Supine to Sit: Total assistance;Assist x2  Sit to Supine: Total assistance;Assist x2  Scooting: Total assistance;Assist x2    Transfers:  Sit to Stand: Total assistance;Assist x2  Bed to Chair: (unsafe to attempt)  Toilet Transfer : (unsafe to attempt at this time)    ADL Assessment:  Feeding: Total assistance    Oral Facial Hygiene/Grooming: Total assistance    Bathing: Total assistance    Upper Body Dressing: Total assistance    Lower Body Dressing: Total assistance    Toileting: Total assistance;Assist x2                  Functional Measure:  Barthel Index:    Bathin  Bladder: 0  Bowels: 0  Groomin  Dressin  Feedin  Mobility: 0  Stairs: 0  Toilet Use: 0  Transfer (Bed to Chair and Back): 5  Total: 5/100        The Barthel ADL Index: Guidelines  1. The index should be used as a record of what a patient does, not as a record of what a patient could do. 2. The main aim is to establish degree of independence from any help, physical or verbal, however minor and for whatever reason. 3. The need for supervision renders the patient not independent. 4. A patient's performance should be established using the best available evidence.  Asking the patient, friends/relatives and nurses are the usual sources, but direct observation and common sense are also important. However direct testing is not needed. 5. Usually the patient's performance over the preceding 24-48 hours is important, but occasionally longer periods will be relevant. 6. Middle categories imply that the patient supplies over 50 per cent of the effort. 7. Use of aids to be independent is allowed. Ghazal Cross., Barthel, D.W. (8315). Functional evaluation: the Barthel Index. [address] [facility] (14)2. CARINA Guzman, Elige Heimlich., Rosalba Galan., [place], [address] (1999). Measuring the change indisability after inpatient rehabilitation; comparison of the responsiveness of the Barthel Index and Functional [place] Measure. Journal of Neurology, Neurosurgery, and Psychiatry, 66(4), 779-413. Soledad Carballo, ROSE MARY, PAULO Sanchez, & Severiano Coto M.A. (2004.) Assessment of post-stroke quality of life in cost-effectiveness studies: The usefulness of the Barthel Index and the EuroQoL-5D. Quality of Life Research, 15, 217-81        Occupational Therapy Evaluation Charge Determination   History Examination Decision-Making   MEDIUM Complexity : Expanded review of history including physical, cognitive and psychosocial  history  HIGH Complexity : 5 or more performance deficits relating to physical, cognitive , or psychosocial skils that result in activity limitations and / or participation restrictions HIGH Complexity : Patient presents with comorbidities that affect occupational performance. Signifigant modification of tasks or assistance (eg, physical or verbal) with assessment (s) is necessary to enable patient to complete evaluation       Based on the above components, the patient evaluation is determined to be of the following complexity level: MEDIUM  Pain Rating:  No complaints    Activity Tolerance:   Poor  Please refer to the flowsheet for vital signs taken during this treatment.     After treatment patient left in no apparent distress:    Supine in bed and Call bell within reach    COMMUNICATION/EDUCATION:   The patients plan of care was discussed with: Physical Therapist, Registered Nurse and Care Manager . Patient is unable to participate in goal setting and plan of care. This patients plan of care is appropriate for delegation to [facility].     Thank you for this referral.  Maria Eugenia Geiger, OTR/L  Time Calculation: 38 mins

## 2019-10-17 NOTE — PROGRESS NOTES
MARC PLAN    Pt is a readmit: Pt was here 9/26/19 to 10/11/19 for CAP     Plan is to return to [address] [place] [address] at [address] [facility] with [facility] once medically stable IF SHE CAN MEET THAT prison LOC. ANTICPATING THAT PT MAY NEED SNF?       Therapy to work with Pt and Pt is currently requiring assist of 2 for all tasks. So I have called [facility] at [address] [place] [address] at [facility] and left a VM to have her to come and evaluate to see if they can meet her needs prior to DC.     Son may be able to transport her back to [facility] once stable if Pt is able to ride in car. Need to get copy of AMD from family for chart. 4:30 PM   CM talked to [address] and she was talking about how she felt like she did not have voice in her DC last admission. CM printed [facility] letter for her. Medicare pt has received, reviewed, and signed [facility] letter informing them of their right to appeal the discharge. Signed copy has been placed on pt bedside chart. CM asked her to bring a copy of AMD and she indicated that she would get her brother to bring in AMD.      11:26 AM  CM called and left  for Libby at [address] [facility] at [facility] to see if they can reeval her prior to DC. [facility] did call yesterday afternoon and they do not have a copy of AMD on file. Need to try to get one from family. CM will continue to monitor discharge plan.      Justin JaySullivan County Community Hospitaljessica  Ext 2009

## 2019-10-17 NOTE — PROGRESS NOTES
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 Urine eos negative. Labs in AM.       Subjective:     Chief Complaint:  \" I feel OK. \"  Remains confused. Review of Systems:    Symptom Y/N Comments  Symptom Y/N Comments   Fever/Chills    Chest Pain     Poor Appetite    Edema     Cough    Abdominal Pain     Sputum    Joint Pain     SOB/MONTANO    Pruritis/Rash     Nausea/vomit    Tolerating PT/OT     Diarrhea    Tolerating Diet     Constipation    Other       Could not obtain due to:      Objective:     VITALS:   Last 24hrs VS reviewed since prior progress note. Most recent are:  Visit Vitals  /52   Pulse 90   Temp 99.5 °F (37.5 °C)   Resp 20   Ht 5' (1.524 m)   Wt 54.4 kg (119 lb 14.9 oz)   SpO2 92%   BMI 23.42 kg/m²       Intake/Output Summary (Last 24 hours) at 10/17/2019 1206  Last data filed at 10/17/2019 0700  Gross per 24 hour   Intake    Output 670 ml   Net -670 ml      Telemetry Reviewed:     PHYSICAL EXAM:  General: NAD  No edema      Lab Data Reviewed: (see below)    Medications Reviewed: (see below)    PMH/SH reviewed - no change compared to H&P  ________________________________________________________________________  Care Plan discussed with:  Patient     Family      RN     Care Manager                    Consultant:          Comments   >50% of visit spent in counseling and coordination of care       ________________________________________________________________________  Marta Red MD     Procedures: see electronic medical records for all procedures/Xrays and details which  were not copied into this note but were reviewed prior to creation of Plan.       LABS:  Recent Labs 10/17/19  0328 10/16/19  0331   WBC 18.1* 13.2*   HGB 8.2* 8.3*   HCT 26.7* 27.2*    245     Recent Labs     10/17/19  0328 10/16/19  0331 10/15/19  1304    140 137   K 4.1 4.2 4.3   * 108 104   CO2 23 24 27   BUN 40* 34* 34*   CREA 3.28* 4.36* 4.00*   * 98 106*   CA 8.6 8.9 9.8     Recent Labs     10/15/19  1304   SGOT 50*   AP 62   TP 6.6   ALB 2.7*   GLOB 3.9     Recent Labs     10/15/19  1304   INR 1.1   PTP 11.4*      No results for input(s): FE, TIBC, PSAT, FERR in the last 72 hours. No results found for: FOL, RBCF   No results for input(s): PH, PCO2, PO2 in the last 72 hours. No results for input(s): CPK, CKMB in the last 72 hours.     No lab exists for component: TROPONINI  No components found for: [place]  Lab Results   Component Value Date/Time    Color YELLOW/STRAW 10/17/2019 07:12 AM    Appearance CLOUDY (A) 10/17/2019 07:12 AM    Specific gravity 1.015 10/17/2019 07:12 AM    Specific gravity 1.015 08/02/2019 11:39 AM    pH (UA) 5.0 10/17/2019 07:12 AM    Protein 100 (A) 10/17/2019 07:12 AM    Glucose NEGATIVE  10/17/2019 07:12 AM    Ketone NEGATIVE  10/17/2019 07:12 AM    Bilirubin NEGATIVE  10/17/2019 07:12 AM    Urobilinogen 0.2 10/17/2019 07:12 AM    Nitrites NEGATIVE  10/17/2019 07:12 AM    Leukocyte Esterase SMALL (A) 10/17/2019 07:12 AM    Epithelial cells FEW 10/17/2019 07:12 AM    Bacteria NEGATIVE  10/17/2019 07:12 AM    WBC 20-50 10/17/2019 07:12 AM    RBC 0-5 10/17/2019 07:12 AM       MEDICATIONS:  Current Facility-Administered Medications   Medication Dose Route Frequency    cefepime (MAXIPIME) 2 g in 0.9% sodium chloride (MBP/ADV) 100 mL  2 g IntraVENous Q24H    balsam peru-castor oil (VENELEX) ointment   Topical TID    gabapentin (NEURONTIN) capsule 300 mg  300 mg Oral DAILY    acetaminophen (TYLENOL) tablet 650 mg  650 mg Oral Q6H PRN    aspirin chewable tablet 81 mg  81 mg Oral DAILY    hydrALAZINE (APRESOLINE) tablet 100 mg  100 mg Oral TID    loperamide (IMODIUM) capsule 4 mg  4 mg Oral Q8H PRN    metoprolol tartrate (LOPRESSOR) tablet 50 mg  50 mg Oral BID    PARoxetine (PAXIL) tablet 20 mg  20 mg Oral DAILY    polyethylene glycol (MIRALAX) packet 17 g  17 g Oral DAILY PRN    pravastatin (PRAVACHOL) tablet 40 mg  40 mg Oral QHS    temazepam (RESTORIL) capsule 15 mg  15 mg Oral QHS    traMADol (ULTRAM) tablet 50 mg  50 mg Oral Q6H PRN    0.9% sodium chloride infusion  75 mL/hr IntraVENous CONTINUOUS    heparin (porcine) injection 5,000 Units  5,000 Units SubCUTAneous Q12H

## 2019-10-17 NOTE — PROGRESS NOTES
Problem: Pressure Injury - Risk of  Goal: *Prevention of pressure injury  Description  Document Rashid Scale and appropriate interventions in the flowsheet. Outcome: Progressing Towards Goal  Note:   Pressure Injury Interventions:  Sensory Interventions: Avoid rigorous massage over bony prominences, Float heels, Keep linens dry and wrinkle-free, Maintain/enhance activity level, Minimize linen layers, Pressure redistribution bed/mattress (bed type)    Moisture Interventions: Apply protective barrier, creams and emollients, Absorbent underpads, Limit adult briefs, Maintain skin hydration (lotion/cream)    Activity Interventions: Increase time out of bed, Pressure redistribution bed/mattress(bed type), PT/OT evaluation    Mobility Interventions: HOB 30 degrees or less, Pressure redistribution bed/mattress (bed type), PT/OT evaluation, Float heels    Nutrition Interventions: Document food/fluid/supplement intake    Friction and Shear Interventions: Foam dressings/transparent film/skin sealants, HOB 30 degrees or less, Lift team/patient mobility team                Problem: Patient Education: Go to Patient Education Activity  Goal: Patient/Family Education  Outcome: Progressing Towards Goal     Problem: Falls - Risk of  Goal: *Absence of Falls  Description  Document Aurora Fall Risk and appropriate interventions in the flowsheet.   Outcome: Progressing Towards Goal  Note:   Fall Risk Interventions:  Mobility Interventions: Communicate number of staff needed for ambulation/transfer, PT Consult for mobility concerns, PT Consult for assist device competence         Medication Interventions: Evaluate medications/consider consulting pharmacy, Patient to call before getting OOB    Elimination Interventions: Call light in reach, Patient to call for help with toileting needs, Stay With Me (per policy)              Problem: Patient Education: Go to Patient Education Activity  Goal: Patient/Family Education  Outcome: Progressing Towards Goal

## 2019-10-18 LAB
ANION GAP SERPL CALC-SCNC: 7 MMOL/L (ref 5–15)
BASOPHILS # BLD: 0.2 K/UL (ref 0–0.1)
BASOPHILS NFR BLD: 1 % (ref 0–1)
BUN SERPL-MCNC: 41 MG/DL (ref 6–20)
BUN/CREAT SERPL: 18 (ref 12–20)
CALCIUM SERPL-MCNC: 9 MG/DL (ref 8.5–10.1)
CHLORIDE SERPL-SCNC: 112 MMOL/L (ref 97–108)
CO2 SERPL-SCNC: 21 MMOL/L (ref 21–32)
CREAT SERPL-MCNC: 2.32 MG/DL (ref 0.55–1.02)
DIFFERENTIAL METHOD BLD: ABNORMAL
EOSINOPHIL # BLD: 0.2 K/UL (ref 0–0.4)
EOSINOPHIL NFR BLD: 1 % (ref 0–7)
ERYTHROCYTE [DISTWIDTH] IN BLOOD BY AUTOMATED COUNT: 14.9 % (ref 11.5–14.5)
GLUCOSE SERPL-MCNC: 92 MG/DL (ref 65–100)
HCT VFR BLD AUTO: 26.5 % (ref 35–47)
HGB BLD-MCNC: 8.3 G/DL (ref 11.5–16)
IMM GRANULOCYTES # BLD AUTO: 0.2 K/UL (ref 0–0.04)
IMM GRANULOCYTES NFR BLD AUTO: 1 % (ref 0–0.5)
LYMPHOCYTES # BLD: 0.6 K/UL (ref 0.8–3.5)
LYMPHOCYTES NFR BLD: 4 % (ref 12–49)
MCH RBC QN AUTO: 29.7 PG (ref 26–34)
MCHC RBC AUTO-ENTMCNC: 31.3 G/DL (ref 30–36.5)
MCV RBC AUTO: 95 FL (ref 80–99)
MONOCYTES # BLD: 1.9 K/UL (ref 0–1)
MONOCYTES NFR BLD: 12 % (ref 5–13)
NEUTS SEG # BLD: 13 K/UL (ref 1.8–8)
NEUTS SEG NFR BLD: 81 % (ref 32–75)
NRBC # BLD: 0 K/UL (ref 0–0.01)
NRBC BLD-RTO: 0 PER 100 WBC
PLATELET # BLD AUTO: 253 K/UL (ref 150–400)
PMV BLD AUTO: 10.2 FL (ref 8.9–12.9)
POTASSIUM SERPL-SCNC: 3.8 MMOL/L (ref 3.5–5.1)
RBC # BLD AUTO: 2.79 M/UL (ref 3.8–5.2)
RBC MORPH BLD: ABNORMAL
SODIUM SERPL-SCNC: 140 MMOL/L (ref 136–145)
WBC # BLD AUTO: 16.1 K/UL (ref 3.6–11)

## 2019-10-18 PROCEDURE — 74011250636 HC RX REV CODE- 250/636: Performed by: INTERNAL MEDICINE

## 2019-10-18 PROCEDURE — 65270000029 HC RM PRIVATE

## 2019-10-18 PROCEDURE — 77010033678 HC OXYGEN DAILY

## 2019-10-18 PROCEDURE — 74011250637 HC RX REV CODE- 250/637: Performed by: INTERNAL MEDICINE

## 2019-10-18 PROCEDURE — 85025 COMPLETE CBC W/AUTO DIFF WBC: CPT

## 2019-10-18 PROCEDURE — 94760 N-INVAS EAR/PLS OXIMETRY 1: CPT

## 2019-10-18 PROCEDURE — 51798 US URINE CAPACITY MEASURE: CPT

## 2019-10-18 PROCEDURE — 97530 THERAPEUTIC ACTIVITIES: CPT | Performed by: PHYSICAL THERAPIST

## 2019-10-18 PROCEDURE — 36415 COLL VENOUS BLD VENIPUNCTURE: CPT

## 2019-10-18 PROCEDURE — 80048 BASIC METABOLIC PNL TOTAL CA: CPT

## 2019-10-18 PROCEDURE — 74011000258 HC RX REV CODE- 258: Performed by: INTERNAL MEDICINE

## 2019-10-18 RX ADMIN — TEMAZEPAM 15 MG: 15 CAPSULE ORAL at 22:39

## 2019-10-18 RX ADMIN — ASPIRIN 81 MG 81 MG: 81 TABLET ORAL at 08:01

## 2019-10-18 RX ADMIN — METOPROLOL TARTRATE 50 MG: 50 TABLET, FILM COATED ORAL at 08:01

## 2019-10-18 RX ADMIN — CASTOR OIL AND BALSAM, PERU: 788; 87 OINTMENT TOPICAL at 16:00

## 2019-10-18 RX ADMIN — PRAVASTATIN SODIUM 40 MG: 40 TABLET ORAL at 22:39

## 2019-10-18 RX ADMIN — HEPARIN SODIUM 5000 UNITS: 5000 INJECTION INTRAVENOUS; SUBCUTANEOUS at 22:39

## 2019-10-18 RX ADMIN — METOPROLOL TARTRATE 50 MG: 50 TABLET, FILM COATED ORAL at 17:33

## 2019-10-18 RX ADMIN — CASTOR OIL AND BALSAM, PERU: 788; 87 OINTMENT TOPICAL at 22:40

## 2019-10-18 RX ADMIN — HYDRALAZINE HYDROCHLORIDE 100 MG: 50 TABLET, FILM COATED ORAL at 17:34

## 2019-10-18 RX ADMIN — SODIUM CHLORIDE 75 ML/HR: 900 INJECTION, SOLUTION INTRAVENOUS at 04:50

## 2019-10-18 RX ADMIN — HEPARIN SODIUM 5000 UNITS: 5000 INJECTION INTRAVENOUS; SUBCUTANEOUS at 08:01

## 2019-10-18 RX ADMIN — GABAPENTIN 300 MG: 300 CAPSULE ORAL at 08:01

## 2019-10-18 RX ADMIN — PAROXETINE 20 MG: 20 TABLET, FILM COATED ORAL at 08:01

## 2019-10-18 RX ADMIN — CEFEPIME HYDROCHLORIDE 2 G: 2 INJECTION, POWDER, FOR SOLUTION INTRAVENOUS at 07:46

## 2019-10-18 RX ADMIN — HYDRALAZINE HYDROCHLORIDE 100 MG: 50 TABLET, FILM COATED ORAL at 22:38

## 2019-10-18 RX ADMIN — CASTOR OIL AND BALSAM, PERU: 788; 87 OINTMENT TOPICAL at 08:02

## 2019-10-18 RX ADMIN — SODIUM CHLORIDE 75 ML/HR: 900 INJECTION, SOLUTION INTRAVENOUS at 19:35

## 2019-10-18 NOTE — PROGRESS NOTES
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 Urine eos negative. Labs in AM.       Subjective:     Chief Complaint:  \" I feel the same. \"  Remains confused. Review of Systems:    Symptom Y/N Comments  Symptom Y/N Comments   Fever/Chills    Chest Pain     Poor Appetite    Edema     Cough    Abdominal Pain     Sputum    Joint Pain     SOB/MONTANO    Pruritis/Rash     Nausea/vomit    Tolerating PT/OT     Diarrhea    Tolerating Diet     Constipation    Other       Could not obtain due to:      Objective:     VITALS:   Last 24hrs VS reviewed since prior progress note.  Most recent are:  Visit Vitals  /55 (BP 1 Location: Left arm, BP Patient Position: At rest)   Pulse 86   Temp 99.7 °F (37.6 °C)   Resp 17   Ht 5' (1.524 m)   Wt 54.4 kg (119 lb 14.9 oz)   SpO2 94%   BMI 23.42 kg/m²       Intake/Output Summary (Last 24 hours) at 10/18/2019 1833  Last data filed at 10/18/2019 0518  Gross per 24 hour   Intake 100 ml   Output 75 ml   Net 25 ml      Telemetry Reviewed:     PHYSICAL EXAM:  General: NAD  No edema      Lab Data Reviewed: (see below)    Medications Reviewed: (see below)    PMH/SH reviewed - no change compared to H&P  ________________________________________________________________________  Care Plan discussed with:  Patient     Family      RN     Care Manager                    Consultant:          Comments   >50% of visit spent in counseling and coordination of care       ________________________________________________________________________  [place]eron[place] MD     Procedures: see electronic medical records for all procedures/Xrays and details which  were not copied into this note but were reviewed prior to creation of Plan. LABS:  Recent Labs     10/18/19  0452 10/17/19  0328   WBC 16.1* 18.1*   HGB 8.3* 8.2*   HCT 26.5* 26.7*    254     Recent Labs     10/18/19  0452 10/17/19  0328 10/16/19  0331    140 140   K 3.8 4.1 4.2   * 110* 108   CO2 21 23 24   BUN 41* 40* 34*   CREA 2.32* 3.28* 4.36*   GLU 92 116* 98   CA 9.0 8.6 8.9     No results for input(s): SGOT, GPT, AP, TBIL, TP, ALB, GLOB, GGT, AML, LPSE in the last 72 hours. No lab exists for component: AMYP, HLPSE  No results for input(s): INR, PTP, APTT, INREXT, INREXT in the last 72 hours. No results for input(s): FE, TIBC, PSAT, FERR in the last 72 hours. No results found for: FOL, RBCF   No results for input(s): PH, PCO2, PO2 in the last 72 hours. No results for input(s): CPK, CKMB in the last 72 hours.     No lab exists for component: TROPONINI  No components found for: [place]  Lab Results   Component Value Date/Time    Color YELLOW/STRAW 10/17/2019 07:12 AM    Appearance CLOUDY (A) 10/17/2019 07:12 AM    Specific gravity 1.015 10/17/2019 07:12 AM    Specific gravity 1.015 08/02/2019 11:39 AM    pH (UA) 5.0 10/17/2019 07:12 AM    Protein 100 (A) 10/17/2019 07:12 AM    Glucose NEGATIVE  10/17/2019 07:12 AM    Ketone NEGATIVE  10/17/2019 07:12 AM    Bilirubin NEGATIVE  10/17/2019 07:12 AM    Urobilinogen 0.2 10/17/2019 07:12 AM    Nitrites NEGATIVE  10/17/2019 07:12 AM    Leukocyte Esterase SMALL (A) 10/17/2019 07:12 AM    Epithelial cells FEW 10/17/2019 07:12 AM    Bacteria NEGATIVE  10/17/2019 07:12 AM    WBC 20-50 10/17/2019 07:12 AM    RBC 0-5 10/17/2019 07:12 AM       MEDICATIONS:  Current Facility-Administered Medications   Medication Dose Route Frequency    cefepime (MAXIPIME) 2 g in 0.9% sodium chloride (MBP/ADV) 100 mL  2 g IntraVENous Q24H    balsam peru-castor oil (VENELEX) ointment   Topical TID    gabapentin (NEURONTIN) capsule 300 mg  300 mg Oral DAILY    acetaminophen (TYLENOL) tablet 650 mg  650 mg Oral Q6H PRN    aspirin chewable tablet 81 mg  81 mg Oral DAILY    hydrALAZINE (APRESOLINE) tablet 100 mg  100 mg Oral TID    loperamide (IMODIUM) capsule 4 mg  4 mg Oral Q8H PRN    metoprolol tartrate (LOPRESSOR) tablet 50 mg  50 mg Oral BID    PARoxetine (PAXIL) tablet 20 mg  20 mg Oral DAILY    polyethylene glycol (MIRALAX) packet 17 g  17 g Oral DAILY PRN    pravastatin (PRAVACHOL) tablet 40 mg  40 mg Oral QHS    temazepam (RESTORIL) capsule 15 mg  15 mg Oral QHS    traMADol (ULTRAM) tablet 50 mg  50 mg Oral Q6H PRN    0.9% sodium chloride infusion  75 mL/hr IntraVENous CONTINUOUS    heparin (porcine) injection 5,000 Units  5,000 Units SubCUTAneous Q12H

## 2019-10-18 NOTE — PROGRESS NOTES
Problem: Mobility Impaired (Adult and Pediatric)  Goal: *Acute Goals and Plan of Care (Insert Text)  Description  FUNCTIONAL STATUS PRIOR TO ADMISSION: The patient was at the wheelchair level and required moderate assistance for transfers to the chair; unable to propel chair secondary to arthritis    HOME [address]: The patient lived in [place] and requires assist for all mobility and ADLs. Physical Therapy Goals  Initiated 10/17/2019  1. Patient will move from supine to sit and sit to supine , scoot up and down and roll side to side in bed with moderate assistance  within 7 day(s). 2.  Patient will transfer from bed to chair and chair to bed with moderate assistance  using the least restrictive device within 7 day(s). 3.  Patient will perform sit to stand with moderate assistance  within 7 day(s). 10/18/2019 1627 by Cathy Urbina PT  Outcome: Not Met   PHYSICAL THERAPY TREATMENT/DISCHARGE  Patient: Ruthy Turpin (72 y.o. female)  Date: 10/18/2019  Diagnosis: Acute kidney injury superimposed on CKD ([address] [N17.9, N18.9] Acute kidney injury superimposed on CKD ([address]       Precautions:  falls  Chart, physical therapy assessment, plan of care and goals were reviewed. ASSESSMENT  Patient continues with skilled PT services and has not progressed towards goals. Patient is dependent for bed mobility and is refusing to attempt sitting EOB. Patient note to sound congested with wet cough noted. O2 sats 91% on 5 l/min at rest. Patient able to perform B UE AAROM exercises. Increased bruising noted on B UEs today. Per , patient is dependent for overall care and her present FDC is able to care for her. Not appropriate for skilled therapy and therefore will sign off. Recommend linsey lift for OOB transfers. Patient would benefit from Palliative Care consult. PLAN :  Patient will be discharged from acute skilled physical therapy at this time.     Rationale for discharge:  Patient not participating in therapy and per  is dependent at baseline  Recommendation for discharge: (in order for the patient to meet his/her long term goals)  Return to [facility] which is able to provide level of care patient requires    This discharge recommendation:  Has been made in collaboration with the attending provider and/or case management    Equipment recommendations for successful discharge: patient has appropriate DME available at [facility] required for discharge       SUBJECTIVE:   Patient stated I can't do it. I don't want to me.     OBJECTIVE DATA SUMMARY:   Critical Behavior:  Neurologic State: Alert  Orientation Level: Oriented to place, Oriented to person, Disoriented to time, Oriented to situation  Cognition: Follows commands, Decreased attention/concentration  Safety/Judgement: Awareness of environment, Decreased awareness of need for safety  Functional Mobility Training:  Bed Mobility:      Total A                  Therapeutic Exercises:   Patient performing AAROM for B elbow and shoulder flexion x 5 reps each; patient c/o increased pain  Pain Rating:  C/o pain with all movement but did not rate pain    Activity Tolerance:   Poor  Please refer to the flowsheet for vital signs taken during this treatment.     After treatment patient left in no apparent distress:   Supine in bed, Heels elevated for pressure relief and Call bell within reach    COMMUNICATION/COLLABORATION:   The patients plan of care was discussed with: Occupational Therapist, Registered Nurse and     Heide Young, PT   Time Calculation: 14 mins

## 2019-10-18 NOTE — PROGRESS NOTES
1900--bedside report received from devon galindo pt resting in bed oriented x 3, periods of confusion, denies pain at this time call bell in reach assessment as noted    2200--pt sitting up in bed, rn asks if she wants cake she requested earlier pt denies at this time call bell in reach    0430--am labs drawn and sent to lab per orders, call bell in reach    0700--bedside report given to Hiren Cutler rn who is assuming care of pt

## 2019-10-18 NOTE — PROGRESS NOTES
Bedside shift change report given to [address] [place] [address] (oncoming nurse) by Jeniffer Beal RN (offgoing nurse). Report included the following information SBAR, Kardex, Intake/Output and Recent Results.

## 2019-10-18 NOTE — PROGRESS NOTES
PROGRESS NOTE    NAME:  Zachary Buckley   :   1935   MRN:   [id number]     Date/Time:  10/18/2019 7:22 AM  Subjective:   History:  Chart reviewed and patient seen and examined and D/W her nurse this AM and all events noted. She was recently admitted with CAP and UTI and D/Micah to adult home on 10/11 feeling well with Creat. 2.07 (baseline 1.9). Apparently although she felt well on D/C she became weaker and had taken in little PO and represented to the ER on 10/15 weaker with Creat 4.00 and was thus admitted with acute renal failure on CKD. She had a US w/o renal obstruction and ureteral stent in place. She has UA c/w infection with culture sent (no report yet). Her WBC was elevated on admission and improved initially but higher yesterday AM and had developed a cough and had a fever 102 during the night. Maxipime started and CXR w/o clear infiltrate, but WBC a little better today. Initially her Creatinine was higher at 4.36 despite hydration, but significantly better now daily and at 2.32 now (baseline 1.9). She was on Lisinopril for HTN and Lasix for edema as outpatient but currently on hold. She feels well now except she is weak and has no appetite. She denies CP, SOB or cardiac or respiratory c/o except now has a cough. She has no nausea or GI c/o except poor appetite and she has no  c/o. There are no there c/o on complete ROS.        Medications reviewed:  Current Facility-Administered Medications   Medication Dose Route Frequency    cefepime (MAXIPIME) 2 g in 0.9% sodium chloride (MBP/ADV) 100 mL  2 g IntraVENous Q24H    balsam peru-castor oil (VENELEX) ointment   Topical TID    gabapentin (NEURONTIN) capsule 300 mg  300 mg Oral DAILY    acetaminophen (TYLENOL) tablet 650 mg  650 mg Oral Q6H PRN    aspirin chewable tablet 81 mg  81 mg Oral DAILY    hydrALAZINE (APRESOLINE) tablet 100 mg  100 mg Oral TID    loperamide (IMODIUM) capsule 4 mg  4 mg Oral Q8H PRN    metoprolol tartrate (LOPRESSOR) tablet 50 mg  50 mg Oral BID    PARoxetine (PAXIL) tablet 20 mg  20 mg Oral DAILY    polyethylene glycol (MIRALAX) packet 17 g  17 g Oral DAILY PRN    pravastatin (PRAVACHOL) tablet 40 mg  40 mg Oral QHS    temazepam (RESTORIL) capsule 15 mg  15 mg Oral QHS    traMADol (ULTRAM) tablet 50 mg  50 mg Oral Q6H PRN    0.9% sodium chloride infusion  75 mL/hr IntraVENous CONTINUOUS    heparin (porcine) injection 5,000 Units  5,000 Units SubCUTAneous Q12H        Objective:   Vitals:  Visit Vitals  /78 (BP 1 Location: Right arm, BP Patient Position: At rest)   Pulse 80   Temp 99.1 °F (37.3 °C)   Resp 18   Ht 5' (1.524 m)   Wt 119 lb 14.9 oz (54.4 kg)   SpO2 95%   BMI 23.42 kg/m²    O2 Flow Rate (L/min): 2 l/min O2 Device: Nasal cannula Temp (24hrs), Av.5 °F (37.5 °C), Min:99.1 °F (37.3 °C), Max:99.8 °F (37.7 °C)      Last 24hr Input/Output:    Intake/Output Summary (Last 24 hours) at 10/18/2019 3481  Last data filed at 10/18/2019 0518  Gross per 24 hour   Intake 220 ml   Output 250 ml   Net -30 ml        PHYSICAL EXAM:  General:     Alert, cooperative, no distress, appears stated age. Head:    Normocephalic, without obvious abnormality, atraumatic. Eyes:    Conjunctivae/corneas clear. PERRLA  Nose:   Nares normal. No drainage or sinus tenderness. Throat:     Lips, mucosa, and tongue normal.  No Thrush  Neck:   Supple, symmetrical,  no adenopathy, thyroid: non tender     no carotid bruit and no JVD. Back:     Symmetric,  No CVA tenderness. Lungs:    Scattered coarse rhonchi bilaterally. No Wheezing. No rales. Heart:    Regular rate and rhythm,  no murmur, rub or gallop. Abdomen:    Soft, non-tender. Not distended. Bowel sounds normal. No masses  Extremities:  Extremities normal, atraumatic, No cyanosis. No edema. No clubbing  Lymph nodes:  Cervical, supraclavicular normal.  Neurologic:  General decreased strength, Alert and oriented X 3.    Skin:                 No rash, Skin breakdown on buttocks as per wound care note from 10/16      Lab Data Reviewed:    Recent Results (from the past 24 hour(s))   CBC WITH AUTOMATED DIFF    Collection Time: 10/18/19  4:52 AM   Result Value Ref Range    WBC 16.1 (H) 3.6 - 11.0 K/uL    RBC 2.79 (L) 3.80 - 5.20 M/uL    HGB 8.3 (L) 11.5 - 16.0 g/dL    HCT 26.5 (L) 35.0 - 47.0 %    MCV 95.0 80.0 - 99.0 FL    MCH 29.7 26.0 - 34.0 PG    MCHC 31.3 30.0 - 36.5 g/dL    RDW 14.9 (H) 11.5 - 14.5 %    PLATELET 016 051 - 069 K/uL    MPV 10.2 8.9 - 12.9 FL    NRBC 0.0 0  WBC    ABSOLUTE NRBC 0.00 0.00 - 0.01 K/uL    NEUTROPHILS 81 (H) 32 - 75 %    LYMPHOCYTES 4 (L) 12 - 49 %    MONOCYTES 12 5 - 13 %    EOSINOPHILS 1 0 - 7 %    BASOPHILS 1 0 - 1 %    IMMATURE GRANULOCYTES 1 (H) 0.0 - 0.5 %    ABS. NEUTROPHILS 13.0 (H) 1.8 - 8.0 K/UL    ABS. LYMPHOCYTES 0.6 (L) 0.8 - 3.5 K/UL    ABS. MONOCYTES 1.9 (H) 0.0 - 1.0 K/UL    ABS. EOSINOPHILS 0.2 0.0 - 0.4 K/UL    ABS. BASOPHILS 0.2 (H) 0.0 - 0.1 K/UL    ABS. IMM.  GRANS. 0.2 (H) 0.00 - 0.04 K/UL    DF AUTOMATED      RBC COMMENTS ANISOCYTOSIS  1+       METABOLIC PANEL, BASIC    Collection Time: 10/18/19  4:52 AM   Result Value Ref Range    Sodium 140 136 - 145 mmol/L    Potassium 3.8 3.5 - 5.1 mmol/L    Chloride 112 (H) 97 - 108 mmol/L    CO2 21 21 - 32 mmol/L    Anion gap 7 5 - 15 mmol/L    Glucose 92 65 - 100 mg/dL    BUN 41 (H) 6 - 20 MG/DL    Creatinine 2.32 (H) 0.55 - 1.02 MG/DL    BUN/Creatinine ratio 18 12 - 20      GFR est AA 24 (L) >60 ml/min/1.73m2    GFR est non-AA 20 (L) >60 ml/min/1.73m2    Calcium 9.0 8.5 - 10.1 MG/DL          Assessment/Plan:     Principal Problem:    Acute kidney injury superimposed on CKD ([facility] (10/15/2019)    Active Problems:    COPD (chronic obstructive pulmonary disease) ([facility] (8/14/2010)      Essential hypertension (7/20/2016)      Moderate protein-calorie malnutrition (HCC) (3/30/2018)      Stage 3 chronic kidney disease ([facility] (11/5/2018)      Weakness generalized (10/15/2019) ___________________________________________________  PLAN:    1. Continue IV hydration  2. Follow Creat 4.0 on adm to 4.36 and 2.32 now with baseline 1.9  3. Continue off Lasix and Lisinopril  4. F/U on repeat urine culture  5. Follow WBC, 15.3 adm to yesterday 18.1 now 16.1 w/  maxipime  6. Continue Hydralazine and Metoprolol for HTN and follow  7. Continue nasal oxygen with COPD  8. Renal consult note reviewed and appreciate help  9. PT evaluation   10. Repeat CXR no definite infiltrate, ? Congestion but reluctant to use diuretic as stable and has Acute Renal failure and sent urine and blood cultures  11.  DNR per patient request, D/W her          ___________________________________________________    Attending Physician: Emiliano Marie MD

## 2019-10-18 NOTE — PROGRESS NOTES
MARC PLAN:     -Plan is to return to [facility] at [address] with [facility] once medically stable.  -Await therapy recs regarding snf vs SNF. Pt is currently requiring assist of 2 for all tasks. -Updates sent to Bubba Mario at [facility] to assess pt today for possible d/c Mon//Tues if stable and snf is appropriate  -son to transport if appropriate.   -DDNR is on chart  -await AMD from [address] [facility],[address]

## 2019-10-19 LAB
ANION GAP SERPL CALC-SCNC: 11 MMOL/L (ref 5–15)
BASOPHILS # BLD: 0.2 K/UL (ref 0–0.1)
BASOPHILS NFR BLD: 1 % (ref 0–1)
BUN SERPL-MCNC: 41 MG/DL (ref 6–20)
BUN/CREAT SERPL: 22 (ref 12–20)
CALCIUM SERPL-MCNC: 9.2 MG/DL (ref 8.5–10.1)
CHLORIDE SERPL-SCNC: 113 MMOL/L (ref 97–108)
CO2 SERPL-SCNC: 17 MMOL/L (ref 21–32)
CREAT SERPL-MCNC: 1.83 MG/DL (ref 0.55–1.02)
DIFFERENTIAL METHOD BLD: ABNORMAL
EOSINOPHIL # BLD: 0.2 K/UL (ref 0–0.4)
EOSINOPHIL NFR BLD: 1 % (ref 0–7)
ERYTHROCYTE [DISTWIDTH] IN BLOOD BY AUTOMATED COUNT: 15.1 % (ref 11.5–14.5)
GLUCOSE SERPL-MCNC: 83 MG/DL (ref 65–100)
HCT VFR BLD AUTO: 27.9 % (ref 35–47)
HGB BLD-MCNC: 8.6 G/DL (ref 11.5–16)
IMM GRANULOCYTES # BLD AUTO: 0.2 K/UL (ref 0–0.04)
IMM GRANULOCYTES NFR BLD AUTO: 1 % (ref 0–0.5)
LYMPHOCYTES # BLD: 0.5 K/UL (ref 0.8–3.5)
LYMPHOCYTES NFR BLD: 3 % (ref 12–49)
MCH RBC QN AUTO: 29.2 PG (ref 26–34)
MCHC RBC AUTO-ENTMCNC: 30.8 G/DL (ref 30–36.5)
MCV RBC AUTO: 94.6 FL (ref 80–99)
MONOCYTES # BLD: 2.3 K/UL (ref 0–1)
MONOCYTES NFR BLD: 14 % (ref 5–13)
NEUTS SEG # BLD: 13.3 K/UL (ref 1.8–8)
NEUTS SEG NFR BLD: 80 % (ref 32–75)
NRBC # BLD: 0 K/UL (ref 0–0.01)
NRBC BLD-RTO: 0 PER 100 WBC
PLATELET # BLD AUTO: 253 K/UL (ref 150–400)
PMV BLD AUTO: 10.1 FL (ref 8.9–12.9)
POTASSIUM SERPL-SCNC: 3.9 MMOL/L (ref 3.5–5.1)
RBC # BLD AUTO: 2.95 M/UL (ref 3.8–5.2)
RBC MORPH BLD: ABNORMAL
SODIUM SERPL-SCNC: 141 MMOL/L (ref 136–145)
WBC # BLD AUTO: 16.7 K/UL (ref 3.6–11)

## 2019-10-19 PROCEDURE — 74011250636 HC RX REV CODE- 250/636: Performed by: INTERNAL MEDICINE

## 2019-10-19 PROCEDURE — 74011000258 HC RX REV CODE- 258: Performed by: INTERNAL MEDICINE

## 2019-10-19 PROCEDURE — 77010033678 HC OXYGEN DAILY

## 2019-10-19 PROCEDURE — 85025 COMPLETE CBC W/AUTO DIFF WBC: CPT

## 2019-10-19 PROCEDURE — 36415 COLL VENOUS BLD VENIPUNCTURE: CPT

## 2019-10-19 PROCEDURE — 80048 BASIC METABOLIC PNL TOTAL CA: CPT

## 2019-10-19 PROCEDURE — 65270000029 HC RM PRIVATE

## 2019-10-19 PROCEDURE — 74011250637 HC RX REV CODE- 250/637: Performed by: INTERNAL MEDICINE

## 2019-10-19 PROCEDURE — 94760 N-INVAS EAR/PLS OXIMETRY 1: CPT

## 2019-10-19 RX ORDER — ONDANSETRON 2 MG/ML
4 INJECTION INTRAMUSCULAR; INTRAVENOUS
Status: DISCONTINUED | OUTPATIENT
Start: 2019-10-19 | End: 2019-10-29 | Stop reason: HOSPADM

## 2019-10-19 RX ADMIN — PRAVASTATIN SODIUM 40 MG: 40 TABLET ORAL at 21:17

## 2019-10-19 RX ADMIN — CASTOR OIL AND BALSAM, PERU: 788; 87 OINTMENT TOPICAL at 08:57

## 2019-10-19 RX ADMIN — CASTOR OIL AND BALSAM, PERU: 788; 87 OINTMENT TOPICAL at 16:00

## 2019-10-19 RX ADMIN — HYDRALAZINE HYDROCHLORIDE 100 MG: 50 TABLET, FILM COATED ORAL at 21:17

## 2019-10-19 RX ADMIN — METOPROLOL TARTRATE 50 MG: 50 TABLET, FILM COATED ORAL at 17:38

## 2019-10-19 RX ADMIN — SODIUM CHLORIDE 75 ML/HR: 900 INJECTION, SOLUTION INTRAVENOUS at 09:00

## 2019-10-19 RX ADMIN — ASPIRIN 81 MG 81 MG: 81 TABLET ORAL at 08:56

## 2019-10-19 RX ADMIN — PAROXETINE 20 MG: 20 TABLET, FILM COATED ORAL at 08:57

## 2019-10-19 RX ADMIN — METOPROLOL TARTRATE 50 MG: 50 TABLET, FILM COATED ORAL at 08:57

## 2019-10-19 RX ADMIN — HEPARIN SODIUM 5000 UNITS: 5000 INJECTION INTRAVENOUS; SUBCUTANEOUS at 08:57

## 2019-10-19 RX ADMIN — TEMAZEPAM 15 MG: 15 CAPSULE ORAL at 21:17

## 2019-10-19 RX ADMIN — GABAPENTIN 300 MG: 300 CAPSULE ORAL at 08:56

## 2019-10-19 RX ADMIN — CASTOR OIL AND BALSAM, PERU: 788; 87 OINTMENT TOPICAL at 21:16

## 2019-10-19 RX ADMIN — HEPARIN SODIUM 5000 UNITS: 5000 INJECTION INTRAVENOUS; SUBCUTANEOUS at 21:17

## 2019-10-19 RX ADMIN — CEFEPIME HYDROCHLORIDE 2 G: 2 INJECTION, POWDER, FOR SOLUTION INTRAVENOUS at 06:14

## 2019-10-19 RX ADMIN — HYDRALAZINE HYDROCHLORIDE 100 MG: 50 TABLET, FILM COATED ORAL at 08:56

## 2019-10-19 RX ADMIN — HYDRALAZINE HYDROCHLORIDE 100 MG: 50 TABLET, FILM COATED ORAL at 17:37

## 2019-10-19 NOTE — PROGRESS NOTES
NAME: Jigna Rodgers        :  1935        MRN:  [id number]        Assessment :    Plan:  --CKD-3-baseline creatinine 1.5  [facility]  Pyuria  Proteinuria  Chronic obstructive uropathy with right ureteral stricture from previous AAA  Diarrhea --Creatinine continues to improve and nearing baseline. Urine eos negative. I'll stop IVF as her arms are swollen. Subjective:     Chief Complaint:  \" I feel the same. \"  Nurse reports arms are swollen. Fair po intake. Review of Systems:    Symptom Y/N Comments  Symptom Y/N Comments   Fever/Chills    Chest Pain     Poor Appetite    Edema     Cough    Abdominal Pain     Sputum    Joint Pain     SOB/MONTANO    Pruritis/Rash     Nausea/vomit    Tolerating PT/OT     Diarrhea    Tolerating Diet     Constipation    Other       Could not obtain due to:      Objective:     VITALS:   Last 24hrs VS reviewed since prior progress note.  Most recent are:  Visit Vitals  /88 (BP 1 Location: Left arm, BP Patient Position: At rest)   Pulse 96   Temp 97.5 °F (36.4 °C)   Resp 18   Ht 5' (1.524 m)   Wt 54.4 kg (119 lb 14.9 oz)   SpO2 90%   BMI 23.42 kg/m²       Intake/Output Summary (Last 24 hours) at 10/19/2019 0946  Last data filed at 10/18/2019 1645  Gross per 24 hour   Intake 440 ml   Output    Net 440 ml      Telemetry Reviewed:     PHYSICAL EXAM:  General: NAD    + arm edema    Lab Data Reviewed: (see below)    Medications Reviewed: (see below)    PMH/SH reviewed - no change compared to H&P  ________________________________________________________________________  Care Plan discussed with:  Patient     Family      RN     Care Manager                    Consultant:          Comments   >50% of visit spent in counseling and coordination of care       ________________________________________________________________________  Celia Tucker MD     Procedures: see electronic medical records for all procedures/Xrays and details which  were not copied into this note but were reviewed prior to creation of Plan. LABS:  Recent Labs     10/19/19  0356 10/18/19  0452   WBC 16.7* 16.1*   HGB 8.6* 8.3*   HCT 27.9* 26.5*    253     Recent Labs     10/19/19  0356 10/18/19  0452 10/17/19  0328    140 140   K 3.9 3.8 4.1   * 112* 110*   CO2 17* 21 23   BUN 41* 41* 40*   CREA 1.83* 2.32* 3.28*   GLU 83 92 116*   CA 9.2 9.0 8.6     No results for input(s): SGOT, GPT, AP, TBIL, TP, ALB, GLOB, GGT, AML, LPSE in the last 72 hours. No lab exists for component: AMYP, HLPSE  No results for input(s): INR, PTP, APTT, INREXT, INREXT in the last 72 hours. No results for input(s): FE, TIBC, PSAT, FERR in the last 72 hours. No results found for: FOL, RBCF   No results for input(s): PH, PCO2, PO2 in the last 72 hours. No results for input(s): CPK, CKMB in the last 72 hours.     No lab exists for component: TROPONINI  No components found for: [place]  Lab Results   Component Value Date/Time    Color YELLOW/STRAW 10/17/2019 07:12 AM    Appearance CLOUDY (A) 10/17/2019 07:12 AM    Specific gravity 1.015 10/17/2019 07:12 AM    Specific gravity 1.015 08/02/2019 11:39 AM    pH (UA) 5.0 10/17/2019 07:12 AM    Protein 100 (A) 10/17/2019 07:12 AM    Glucose NEGATIVE  10/17/2019 07:12 AM    Ketone NEGATIVE  10/17/2019 07:12 AM    Bilirubin NEGATIVE  10/17/2019 07:12 AM    Urobilinogen 0.2 10/17/2019 07:12 AM    Nitrites NEGATIVE  10/17/2019 07:12 AM    Leukocyte Esterase SMALL (A) 10/17/2019 07:12 AM    Epithelial cells FEW 10/17/2019 07:12 AM    Bacteria NEGATIVE  10/17/2019 07:12 AM    WBC 20-50 10/17/2019 07:12 AM    RBC 0-5 10/17/2019 07:12 AM       MEDICATIONS:  Current Facility-Administered Medications   Medication Dose Route Frequency    cefepime (MAXIPIME) 2 g in 0.9% sodium chloride (MBP/ADV) 100 mL  2 g IntraVENous Q24H    balsam peru-castor oil (VENELEX) ointment   Topical TID    gabapentin (NEURONTIN) capsule 300 mg  300 mg Oral DAILY    acetaminophen (TYLENOL) tablet 650 mg  650 mg Oral Q6H PRN    aspirin chewable tablet 81 mg  81 mg Oral DAILY    hydrALAZINE (APRESOLINE) tablet 100 mg  100 mg Oral TID    loperamide (IMODIUM) capsule 4 mg  4 mg Oral Q8H PRN    metoprolol tartrate (LOPRESSOR) tablet 50 mg  50 mg Oral BID    PARoxetine (PAXIL) tablet 20 mg  20 mg Oral DAILY    polyethylene glycol (MIRALAX) packet 17 g  17 g Oral DAILY PRN    pravastatin (PRAVACHOL) tablet 40 mg  40 mg Oral QHS    temazepam (RESTORIL) capsule 15 mg  15 mg Oral QHS    traMADol (ULTRAM) tablet 50 mg  50 mg Oral Q6H PRN    heparin (porcine) injection 5,000 Units  5,000 Units SubCUTAneous Q12H

## 2019-10-19 NOTE — PROGRESS NOTES
General Daily Progress Note    Admit Date: 10/15/2019  Hospital day 5    Subjective:     Patient has fatigue. .   Medication side effects: none    Current Facility-Administered Medications   Medication Dose Route Frequency    cefepime (MAXIPIME) 2 g in 0.9% sodium chloride (MBP/ADV) 100 mL  2 g IntraVENous Q24H    balsam peru-castor oil (VENELEX) ointment   Topical TID    gabapentin (NEURONTIN) capsule 300 mg  300 mg Oral DAILY    acetaminophen (TYLENOL) tablet 650 mg  650 mg Oral Q6H PRN    aspirin chewable tablet 81 mg  81 mg Oral DAILY    hydrALAZINE (APRESOLINE) tablet 100 mg  100 mg Oral TID    loperamide (IMODIUM) capsule 4 mg  4 mg Oral Q8H PRN    metoprolol tartrate (LOPRESSOR) tablet 50 mg  50 mg Oral BID    PARoxetine (PAXIL) tablet 20 mg  20 mg Oral DAILY    polyethylene glycol (MIRALAX) packet 17 g  17 g Oral DAILY PRN    pravastatin (PRAVACHOL) tablet 40 mg  40 mg Oral QHS    temazepam (RESTORIL) capsule 15 mg  15 mg Oral QHS    traMADol (ULTRAM) tablet 50 mg  50 mg Oral Q6H PRN    0.9% sodium chloride infusion  75 mL/hr IntraVENous CONTINUOUS    heparin (porcine) injection 5,000 Units  5,000 Units SubCUTAneous Q12H        Review of Systems  Respiratory: negative  Cardiovascular: negative  Gastrointestinal: negative  Genitourinary:negative    Objective:     Patient Vitals for the past 8 hrs:   BP Temp Pulse Resp SpO2   10/19/19 0738 172/88 97.5 °F (36.4 °C) 96 18 90 %   10/19/19 0607 146/71 98.7 °F (37.1 °C) 100 20 92 %     No intake/output data recorded.   10/17 1901 - 10/19 0700  In: 80 [P.O.:780]  Out: 75 [Urine:75]    Physical Exam:   Visit Vitals  /88 (BP 1 Location: Left arm, BP Patient Position: At rest)   Pulse 96   Temp 97.5 °F (36.4 °C)   Resp 18   Ht 5' (1.524 m)   Wt 119 lb 14.9 oz (54.4 kg)   SpO2 90%   BMI 23.42 kg/m²     General appearance: alert, cooperative, no distress, appears stated age  Lungs: clear to auscultation bilaterally  Heart: regular rate and rhythm, S1, S2 normal, no murmur, click, rub or gallop  Abdomen: soft, non-tender. Bowel sounds normal. No masses,  no organomegaly  Extremities: extremities normal, atraumatic, no cyanosis or edema      ECG: sinus tachycardia     Data Review   Recent Results (from the past 24 hour(s))   CBC WITH AUTOMATED DIFF    Collection Time: 10/19/19  3:56 AM   Result Value Ref Range    WBC 16.7 (H) 3.6 - 11.0 K/uL    RBC 2.95 (L) 3.80 - 5.20 M/uL    HGB 8.6 (L) 11.5 - 16.0 g/dL    HCT 27.9 (L) 35.0 - 47.0 %    MCV 94.6 80.0 - 99.0 FL    MCH 29.2 26.0 - 34.0 PG    MCHC 30.8 30.0 - 36.5 g/dL    RDW 15.1 (H) 11.5 - 14.5 %    PLATELET 750 472 - 480 K/uL    MPV 10.1 8.9 - 12.9 FL    NRBC 0.0 0  WBC    ABSOLUTE NRBC 0.00 0.00 - 0.01 K/uL    NEUTROPHILS 80 (H) 32 - 75 %    LYMPHOCYTES 3 (L) 12 - 49 %    MONOCYTES 14 (H) 5 - 13 %    EOSINOPHILS 1 0 - 7 %    BASOPHILS 1 0 - 1 %    IMMATURE GRANULOCYTES 1 (H) 0.0 - 0.5 %    ABS. NEUTROPHILS 13.3 (H) 1.8 - 8.0 K/UL    ABS. LYMPHOCYTES 0.5 (L) 0.8 - 3.5 K/UL    ABS. MONOCYTES 2.3 (H) 0.0 - 1.0 K/UL    ABS. EOSINOPHILS 0.2 0.0 - 0.4 K/UL    ABS. BASOPHILS 0.2 (H) 0.0 - 0.1 K/UL    ABS. IMM.  GRANS. 0.2 (H) 0.00 - 0.04 K/UL    DF SMEAR SCANNED      RBC COMMENTS ANISOCYTOSIS  1+        RBC COMMENTS OVALOCYTES  1+        RBC COMMENTS SCHISTOCYTES  1+       METABOLIC PANEL, BASIC    Collection Time: 10/19/19  3:56 AM   Result Value Ref Range    Sodium 141 136 - 145 mmol/L    Potassium 3.9 3.5 - 5.1 mmol/L    Chloride 113 (H) 97 - 108 mmol/L    CO2 17 (L) 21 - 32 mmol/L    Anion gap 11 5 - 15 mmol/L    Glucose 83 65 - 100 mg/dL    BUN 41 (H) 6 - 20 MG/DL    Creatinine 1.83 (H) 0.55 - 1.02 MG/DL    BUN/Creatinine ratio 22 (H) 12 - 20      GFR est AA 32 (L) >60 ml/min/1.73m2    GFR est non-AA 26 (L) >60 ml/min/1.73m2    Calcium 9.2 8.5 - 10.1 MG/DL           Assessment:     Principal Problem:    Acute kidney injury superimposed on CKD ([facility] (10/15/2019)    Active Problems:    COPD (chronic obstructive pulmonary disease) ([facility] (8/14/2010)      Essential hypertension (7/20/2016)      Moderate protein-calorie malnutrition ([facility] (3/30/2018)      Stage 3 chronic kidney disease ([facility] (11/5/2018)      Weakness generalized (10/15/2019)        Plan:     Renal function continues to improve. WBC remains elevated. Urine growing yeast.Continue current meds and treatments

## 2019-10-19 NOTE — PROGRESS NOTES
Bedside and Verbal shift change report given to Greyson Vega (oncoming nurse) by Mario Alberto Zaragoza RN (offgoing nurse). Report included the following information SBAR, Kardex, Intake/Output, MAR, Accordion, Recent Results and Med Rec Status.

## 2019-10-19 NOTE — PROGRESS NOTES
Pharmacy Automatic Renal Dosing Protocol - Antimicrobials    Indication for Antimicrobials: PNA? Current Regimen of Each Antimicrobial:  Cefepime 2g IV Q24H (Start Date 10/17; Day # 3)    Previous Antimicrobial Therapy:      Significant Cultures:   Urine - Yeast - Prelim  10/16: blood - NGTD - prelim    Radiology / Imaging results: (X-ray, CT scan or MRI):   10/15: ultrasound: no hydronephrosis  10/15: chest xray: no change    Paralysis, amputations, malnutrition:  Malnutrition    Labs:  Recent Labs     10/19/19  0356 10/18/19  0452 10/17/19  0328   CREA 1.83* 2.32* 3.28*   BUN 41* 41* 40*   WBC 16.7* 16.1* 18.1*     Temp (24hrs), Av.7 °F (37.1 °C), Min:97.5 °F (36.4 °C), Max:99.7 °F (37.6 °C)    Creatinine Clearance (mL/min) or Dialysis: 17 ml/min    Impression/Plan:   Continue current Cefepime dose. Antimicrobial stop date TBD     Pharmacy will follow daily and adjust medications as appropriate for renal function and/or serum levels. Thank you,  KEY Scruggs    Recommended duration of therapy  http://[facility]/[place]/[facility]/Pharmacy/Clinical%20Companion/Duration%20of%20ABX%20therapy. docx    Renal Dosing  [email or web address]/[place]/[facility]/Pharmacy/Clinical%20Companion/Renal%[email or web address]. pdf

## 2019-10-19 NOTE — PROGRESS NOTES
Spoke to  regarding patient complain of nausea. Received telephone order for Zofran 4 mg PRN Q6.      Bedside shift change report given to [address] [place] [address]  (oncoming nurse) by Kun Jeter RN (offgoing nurse). Report included the following information SBAR, Kardex, Procedure Summary and Recent Results.

## 2019-10-19 NOTE — PROGRESS NOTES
Pharmacy Renal Dosing      Drugs being monitored:  Gabapentin 400 mg QID  Wt Readings from Last 1 Encounters:   10/15/19 54.4 kg (119 lb 14.9 oz)     Ideal body weight: 45.5 kg (100 lb 4.9 oz)  Adjusted ideal body weight: 49.1 kg (108 lb 2.5 oz)  Recent Labs     10/19/19  0356   CREA 1.83*       CrCl = 16.7 ml/min      A/P: Keep dose at 300 mg daily, since patient in [facility].    Slight improvement in past 24hr

## 2019-10-20 LAB
ANION GAP SERPL CALC-SCNC: 10 MMOL/L (ref 5–15)
BACTERIA SPEC CULT: ABNORMAL
BACTERIA SPEC CULT: ABNORMAL
BUN SERPL-MCNC: 45 MG/DL (ref 6–20)
BUN/CREAT SERPL: 28 (ref 12–20)
CALCIUM SERPL-MCNC: 9.4 MG/DL (ref 8.5–10.1)
CC UR VC: ABNORMAL
CHLORIDE SERPL-SCNC: 114 MMOL/L (ref 97–108)
CO2 SERPL-SCNC: 19 MMOL/L (ref 21–32)
CREAT SERPL-MCNC: 1.61 MG/DL (ref 0.55–1.02)
GLUCOSE SERPL-MCNC: 81 MG/DL (ref 65–100)
POTASSIUM SERPL-SCNC: 3.9 MMOL/L (ref 3.5–5.1)
SERVICE CMNT-IMP: ABNORMAL
SODIUM SERPL-SCNC: 143 MMOL/L (ref 136–145)

## 2019-10-20 PROCEDURE — 77010033678 HC OXYGEN DAILY

## 2019-10-20 PROCEDURE — 74011250637 HC RX REV CODE- 250/637: Performed by: INTERNAL MEDICINE

## 2019-10-20 PROCEDURE — 74011250636 HC RX REV CODE- 250/636: Performed by: FAMILY MEDICINE

## 2019-10-20 PROCEDURE — 36415 COLL VENOUS BLD VENIPUNCTURE: CPT

## 2019-10-20 PROCEDURE — 74011250636 HC RX REV CODE- 250/636: Performed by: INTERNAL MEDICINE

## 2019-10-20 PROCEDURE — 80048 BASIC METABOLIC PNL TOTAL CA: CPT

## 2019-10-20 PROCEDURE — 74011000258 HC RX REV CODE- 258: Performed by: INTERNAL MEDICINE

## 2019-10-20 PROCEDURE — 94760 N-INVAS EAR/PLS OXIMETRY 1: CPT

## 2019-10-20 PROCEDURE — 77030037878 HC DRSG MEPILEX >48IN BORD MOLN -B

## 2019-10-20 PROCEDURE — 65270000029 HC RM PRIVATE

## 2019-10-20 RX ADMIN — HYDRALAZINE HYDROCHLORIDE 100 MG: 50 TABLET, FILM COATED ORAL at 16:31

## 2019-10-20 RX ADMIN — CASTOR OIL AND BALSAM, PERU: 788; 87 OINTMENT TOPICAL at 23:21

## 2019-10-20 RX ADMIN — HEPARIN SODIUM 5000 UNITS: 5000 INJECTION INTRAVENOUS; SUBCUTANEOUS at 23:05

## 2019-10-20 RX ADMIN — ONDANSETRON 4 MG: 2 INJECTION INTRAMUSCULAR; INTRAVENOUS at 08:58

## 2019-10-20 RX ADMIN — METOPROLOL TARTRATE 50 MG: 50 TABLET, FILM COATED ORAL at 08:58

## 2019-10-20 RX ADMIN — HYDRALAZINE HYDROCHLORIDE 100 MG: 50 TABLET, FILM COATED ORAL at 23:05

## 2019-10-20 RX ADMIN — PAROXETINE 20 MG: 20 TABLET, FILM COATED ORAL at 08:59

## 2019-10-20 RX ADMIN — ASPIRIN 81 MG 81 MG: 81 TABLET ORAL at 08:59

## 2019-10-20 RX ADMIN — GABAPENTIN 300 MG: 300 CAPSULE ORAL at 08:58

## 2019-10-20 RX ADMIN — METOPROLOL TARTRATE 50 MG: 50 TABLET, FILM COATED ORAL at 17:28

## 2019-10-20 RX ADMIN — HYDRALAZINE HYDROCHLORIDE 100 MG: 50 TABLET, FILM COATED ORAL at 08:59

## 2019-10-20 RX ADMIN — CEFEPIME HYDROCHLORIDE 2 G: 2 INJECTION, POWDER, FOR SOLUTION INTRAVENOUS at 06:30

## 2019-10-20 RX ADMIN — CASTOR OIL AND BALSAM, PERU: 788; 87 OINTMENT TOPICAL at 16:00

## 2019-10-20 RX ADMIN — PRAVASTATIN SODIUM 40 MG: 40 TABLET ORAL at 23:05

## 2019-10-20 RX ADMIN — CASTOR OIL AND BALSAM, PERU: 788; 87 OINTMENT TOPICAL at 08:59

## 2019-10-20 RX ADMIN — HEPARIN SODIUM 5000 UNITS: 5000 INJECTION INTRAVENOUS; SUBCUTANEOUS at 08:59

## 2019-10-20 NOTE — PROGRESS NOTES
General Daily Progress Note    Admit Date: 10/15/2019  Hospital day 6    Subjective:     Patient has fatigue mild dyspnea cough. .   Medication side effects: none    Current Facility-Administered Medications   Medication Dose Route Frequency    ondansetron (ZOFRAN) injection 4 mg  4 mg IntraVENous Q6H PRN    cefepime (MAXIPIME) 2 g in 0.9% sodium chloride (MBP/ADV) 100 mL  2 g IntraVENous Q24H    balsam peru-castor oil (VENELEX) ointment   Topical TID    gabapentin (NEURONTIN) capsule 300 mg  300 mg Oral DAILY    acetaminophen (TYLENOL) tablet 650 mg  650 mg Oral Q6H PRN    aspirin chewable tablet 81 mg  81 mg Oral DAILY    hydrALAZINE (APRESOLINE) tablet 100 mg  100 mg Oral TID    loperamide (IMODIUM) capsule 4 mg  4 mg Oral Q8H PRN    metoprolol tartrate (LOPRESSOR) tablet 50 mg  50 mg Oral BID    PARoxetine (PAXIL) tablet 20 mg  20 mg Oral DAILY    polyethylene glycol (MIRALAX) packet 17 g  17 g Oral DAILY PRN    pravastatin (PRAVACHOL) tablet 40 mg  40 mg Oral QHS    temazepam (RESTORIL) capsule 15 mg  15 mg Oral QHS    traMADol (ULTRAM) tablet 50 mg  50 mg Oral Q6H PRN    heparin (porcine) injection 5,000 Units  5,000 Units SubCUTAneous Q12H        Review of Systems  Respiratory: negative  Cardiovascular: negative  Gastrointestinal: negative  Genitourinary:negative    Objective:     Patient Vitals for the past 8 hrs:   BP Temp Pulse Resp SpO2   10/20/19 0846 166/77 98.1 °F (36.7 °C) 98 16 93 %     No intake/output data recorded.   10/18 1901 - 10/20 0700  In: 400 [P.O.:400]  Out: -     Physical Exam:   Visit Vitals  /77 (BP 1 Location: Left arm, BP Patient Position: At rest)   Pulse 98   Temp 98.1 °F (36.7 °C)   Resp 16   Ht 5' (1.524 m)   Wt 119 lb 14.9 oz (54.4 kg)   SpO2 93%   BMI 23.42 kg/m²     General appearance: alert, cooperative, no distress, appears stated age  Lungs: clear to auscultation bilaterally  Heart: regular rate and rhythm, S1, S2 normal, no murmur, click, rub or gallop  Abdomen: soft, non-tender. Bowel sounds normal. No masses,  no organomegaly  Extremities: extremities normal, atraumatic, no cyanosis or edema      ECG: sinus tachycardia     Data Review   Recent Results (from the past 24 hour(s))   METABOLIC PANEL, BASIC    Collection Time: 10/20/19  3:12 AM   Result Value Ref Range    Sodium 143 136 - 145 mmol/L    Potassium 3.9 3.5 - 5.1 mmol/L    Chloride 114 (H) 97 - 108 mmol/L    CO2 19 (L) 21 - 32 mmol/L    Anion gap 10 5 - 15 mmol/L    Glucose 81 65 - 100 mg/dL    BUN 45 (H) 6 - 20 MG/DL    Creatinine 1.61 (H) 0.55 - 1.02 MG/DL    BUN/Creatinine ratio 28 (H) 12 - 20      GFR est AA 37 (L) >60 ml/min/1.73m2    GFR est non-AA 31 (L) >60 ml/min/1.73m2    Calcium 9.4 8.5 - 10.1 MG/DL           Assessment:     Principal Problem:    Acute kidney injury superimposed on CKD ([facility] (10/15/2019)    Active Problems:    COPD (chronic obstructive pulmonary disease) ([facility] (8/14/2010)      Essential hypertension (7/20/2016)      Moderate protein-calorie malnutrition (HCC) (3/30/2018)      Stage 3 chronic kidney disease ([facility] (11/5/2018)      Weakness generalized (10/15/2019)        Plan:     Renal function continues to improve. WBC remains elevated. Urine growing yeast.Slow progress. Will need discharge planningContinue current meds and treatments

## 2019-10-20 NOTE — PROGRESS NOTES
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 Urine eos negative. Encouraged increased po intake. Subjective:     Chief Complaint:  \" I feel OK. \"  Fair to poor po intake. No N/V. No dyspnea. Review of Systems:    Symptom Y/N Comments  Symptom Y/N Comments   Fever/Chills    Chest Pain     Poor Appetite    Edema     Cough    Abdominal Pain     Sputum    Joint Pain     SOB/MONTANO    Pruritis/Rash     Nausea/vomit    Tolerating PT/OT     Diarrhea    Tolerating Diet     Constipation    Other       Could not obtain due to:      Objective:     VITALS:   Last 24hrs VS reviewed since prior progress note.  Most recent are:  Visit Vitals  /77 (BP 1 Location: Left arm, BP Patient Position: At rest)   Pulse 98   Temp 98.1 °F (36.7 °C)   Resp 16   Ht 5' (1.524 m)   Wt 54.4 kg (119 lb 14.9 oz)   SpO2 93%   BMI 23.42 kg/m²       Intake/Output Summary (Last 24 hours) at 10/20/2019 0929  Last data filed at 10/19/2019 1705  Gross per 24 hour   Intake 200 ml   Output    Net 200 ml      Telemetry Reviewed:     PHYSICAL EXAM:  General: NAD    + arm edema    Lab Data Reviewed: (see below)    Medications Reviewed: (see below)    PMH/SH reviewed - no change compared to H&P  ________________________________________________________________________  Care Plan discussed with:  Patient     Family      RN     Care Manager                    Consultant:          Comments   >50% of visit spent in counseling and coordination of care       ________________________________________________________________________  [place] MD Selena     Procedures: see electronic medical records for all procedures/Xrays and details which  were not copied into this note but were reviewed prior to creation of Plan. LABS:  Recent Labs     10/19/19  0356 10/18/19  0452   WBC 16.7* 16.1*   HGB 8.6* 8.3*   HCT 27.9* 26.5*    253     Recent Labs     10/20/19  0312 10/19/19  0356 10/18/19  0452    141 140   K 3.9 3.9 3.8   * 113* 112*   CO2 19* 17* 21   BUN 45* 41* 41*   CREA 1.61* 1.83* 2.32*   GLU 81 83 92   CA 9.4 9.2 9.0     No results for input(s): SGOT, GPT, AP, TBIL, TP, ALB, GLOB, GGT, AML, LPSE in the last 72 hours. No lab exists for component: AMYP, HLPSE  No results for input(s): INR, PTP, APTT, INREXT, INREXT in the last 72 hours. No results for input(s): FE, TIBC, PSAT, FERR in the last 72 hours. No results found for: FOL, RBCF   No results for input(s): PH, PCO2, PO2 in the last 72 hours. No results for input(s): CPK, CKMB in the last 72 hours.     No lab exists for component: TROPONINI  No components found for: [place]  Lab Results   Component Value Date/Time    Color YELLOW/STRAW 10/17/2019 07:12 AM    Appearance CLOUDY (A) 10/17/2019 07:12 AM    Specific gravity 1.015 10/17/2019 07:12 AM    Specific gravity 1.015 08/02/2019 11:39 AM    pH (UA) 5.0 10/17/2019 07:12 AM    Protein 100 (A) 10/17/2019 07:12 AM    Glucose NEGATIVE  10/17/2019 07:12 AM    Ketone NEGATIVE  10/17/2019 07:12 AM    Bilirubin NEGATIVE  10/17/2019 07:12 AM    Urobilinogen 0.2 10/17/2019 07:12 AM    Nitrites NEGATIVE  10/17/2019 07:12 AM    Leukocyte Esterase SMALL (A) 10/17/2019 07:12 AM    Epithelial cells FEW 10/17/2019 07:12 AM    Bacteria NEGATIVE  10/17/2019 07:12 AM    WBC 20-50 10/17/2019 07:12 AM    RBC 0-5 10/17/2019 07:12 AM       MEDICATIONS:  Current Facility-Administered Medications   Medication Dose Route Frequency    ondansetron (ZOFRAN) injection 4 mg  4 mg IntraVENous Q6H PRN    cefepime (MAXIPIME) 2 g in 0.9% sodium chloride (MBP/ADV) 100 mL  2 g IntraVENous Q24H    balsam peru-castor oil (VENELEX) ointment   Topical TID    gabapentin (NEURONTIN) capsule 300 mg  300 mg Oral DAILY    acetaminophen (TYLENOL) tablet 650 mg  650 mg Oral Q6H PRN    aspirin chewable tablet 81 mg  81 mg Oral DAILY    hydrALAZINE (APRESOLINE) tablet 100 mg  100 mg Oral TID    loperamide (IMODIUM) capsule 4 mg  4 mg Oral Q8H PRN    metoprolol tartrate (LOPRESSOR) tablet 50 mg  50 mg Oral BID    PARoxetine (PAXIL) tablet 20 mg  20 mg Oral DAILY    polyethylene glycol (MIRALAX) packet 17 g  17 g Oral DAILY PRN    pravastatin (PRAVACHOL) tablet 40 mg  40 mg Oral QHS    temazepam (RESTORIL) capsule 15 mg  15 mg Oral QHS    traMADol (ULTRAM) tablet 50 mg  50 mg Oral Q6H PRN    heparin (porcine) injection 5,000 Units  5,000 Units SubCUTAneous Q12H

## 2019-10-20 NOTE — PROGRESS NOTES
Pharmacy Automatic Renal Dosing Protocol - Antimicrobials    Indication for Antimicrobials: PNA? Current Regimen of Each Antimicrobial:  Cefepime 2g IV Q24H (Start Date 10/17; Day # 4)    Previous Antimicrobial Therapy:      Significant Cultures:   Urine - Yeast - Prelim  10/16: blood - NGTD - prelim    Radiology / Imaging results: (X-ray, CT scan or MRI):   10/15: ultrasound: no hydronephrosis  10/15: chest xray: no change    Paralysis, amputations, malnutrition:  Malnutrition    Labs:  Recent Labs     10/20/19  0312 10/19/19  0356 10/18/19  0452   CREA 1.61* 1.83* 2.32*   BUN 45* 41* 41*   WBC  --  16.7* 16.1*     Temp (24hrs), Av.1 °F (36.7 °C), Min:97.7 °F (36.5 °C), Max:98.4 °F (36.9 °C)    Creatinine Clearance (mL/min) or Dialysis: 19 ml/min    Impression/Plan:   Continue current Cefepime dose. Antimicrobial stop date TBD     Pharmacy will follow daily and adjust medications as appropriate for renal function and/or serum levels. Thank you,  KEY Coulter    Recommended duration of therapy  [email or web address]/[facility]/Pharmacy/Clinical%20Companion/[email or web address]. docx    Renal Dosing  [email or web address]/[facility]/Pharmacy/Clinical%20Companion/Renal%[email or web address]. pdf

## 2019-10-20 NOTE — PROGRESS NOTES
Bedside and Verbal shift change report given to Danielle RN (oncoming nurse) by Jose Laura RN (offgoing nurse). Report included the following information SBAR, Kardex, Intake/Output, MAR, Accordion, Recent Results and Med Rec Status.

## 2019-10-21 LAB
ANION GAP SERPL CALC-SCNC: 10 MMOL/L (ref 5–15)
BASOPHILS # BLD: 0.1 K/UL (ref 0–0.1)
BASOPHILS NFR BLD: 1 % (ref 0–1)
BUN SERPL-MCNC: 44 MG/DL (ref 6–20)
BUN/CREAT SERPL: 29 (ref 12–20)
CALCIUM SERPL-MCNC: 9.4 MG/DL (ref 8.5–10.1)
CHLORIDE SERPL-SCNC: 114 MMOL/L (ref 97–108)
CO2 SERPL-SCNC: 19 MMOL/L (ref 21–32)
CREAT SERPL-MCNC: 1.51 MG/DL (ref 0.55–1.02)
DIFFERENTIAL METHOD BLD: ABNORMAL
EOSINOPHIL # BLD: 0.4 K/UL (ref 0–0.4)
EOSINOPHIL NFR BLD: 3 % (ref 0–7)
ERYTHROCYTE [DISTWIDTH] IN BLOOD BY AUTOMATED COUNT: 15.6 % (ref 11.5–14.5)
GLUCOSE SERPL-MCNC: 81 MG/DL (ref 65–100)
HCT VFR BLD AUTO: 26.7 % (ref 35–47)
HGB BLD-MCNC: 8.7 G/DL (ref 11.5–16)
IMM GRANULOCYTES # BLD AUTO: 0.2 K/UL (ref 0–0.04)
IMM GRANULOCYTES NFR BLD AUTO: 2 % (ref 0–0.5)
LYMPHOCYTES # BLD: 0.8 K/UL (ref 0.8–3.5)
LYMPHOCYTES NFR BLD: 6 % (ref 12–49)
MCH RBC QN AUTO: 29.9 PG (ref 26–34)
MCHC RBC AUTO-ENTMCNC: 32.6 G/DL (ref 30–36.5)
MCV RBC AUTO: 91.8 FL (ref 80–99)
MONOCYTES # BLD: 2 K/UL (ref 0–1)
MONOCYTES NFR BLD: 15 % (ref 5–13)
NEUTS SEG # BLD: 9.7 K/UL (ref 1.8–8)
NEUTS SEG NFR BLD: 73 % (ref 32–75)
NRBC # BLD: 0 K/UL (ref 0–0.01)
NRBC BLD-RTO: 0 PER 100 WBC
PLATELET # BLD AUTO: 228 K/UL (ref 150–400)
PMV BLD AUTO: 10.6 FL (ref 8.9–12.9)
POTASSIUM SERPL-SCNC: 3.7 MMOL/L (ref 3.5–5.1)
RBC # BLD AUTO: 2.91 M/UL (ref 3.8–5.2)
SODIUM SERPL-SCNC: 143 MMOL/L (ref 136–145)
WBC # BLD AUTO: 13.1 K/UL (ref 3.6–11)

## 2019-10-21 PROCEDURE — 77010033678 HC OXYGEN DAILY

## 2019-10-21 PROCEDURE — 36415 COLL VENOUS BLD VENIPUNCTURE: CPT

## 2019-10-21 PROCEDURE — 74011250636 HC RX REV CODE- 250/636: Performed by: INTERNAL MEDICINE

## 2019-10-21 PROCEDURE — 85025 COMPLETE CBC W/AUTO DIFF WBC: CPT

## 2019-10-21 PROCEDURE — 77030037878 HC DRSG MEPILEX >48IN BORD MOLN -B

## 2019-10-21 PROCEDURE — 97535 SELF CARE MNGMENT TRAINING: CPT | Performed by: OCCUPATIONAL THERAPIST

## 2019-10-21 PROCEDURE — 65270000029 HC RM PRIVATE

## 2019-10-21 PROCEDURE — 74011250637 HC RX REV CODE- 250/637: Performed by: INTERNAL MEDICINE

## 2019-10-21 PROCEDURE — 94760 N-INVAS EAR/PLS OXIMETRY 1: CPT

## 2019-10-21 PROCEDURE — 80048 BASIC METABOLIC PNL TOTAL CA: CPT

## 2019-10-21 PROCEDURE — 77030038269 HC DRN EXT URIN PURWCK BARD -A

## 2019-10-21 PROCEDURE — 74011000258 HC RX REV CODE- 258: Performed by: INTERNAL MEDICINE

## 2019-10-21 RX ORDER — FLUCONAZOLE 100 MG/1
100 TABLET ORAL DAILY
Status: DISCONTINUED | OUTPATIENT
Start: 2019-10-21 | End: 2019-10-29 | Stop reason: HOSPADM

## 2019-10-21 RX ORDER — FUROSEMIDE 10 MG/ML
40 INJECTION INTRAMUSCULAR; INTRAVENOUS 2 TIMES DAILY
Status: COMPLETED | OUTPATIENT
Start: 2019-10-21 | End: 2019-10-21

## 2019-10-21 RX ORDER — LISINOPRIL 10 MG/1
10 TABLET ORAL DAILY
Status: DISCONTINUED | OUTPATIENT
Start: 2019-10-21 | End: 2019-10-21

## 2019-10-21 RX ADMIN — FLUCONAZOLE 100 MG: 100 TABLET ORAL at 09:07

## 2019-10-21 RX ADMIN — TEMAZEPAM 15 MG: 15 CAPSULE ORAL at 21:37

## 2019-10-21 RX ADMIN — METOPROLOL TARTRATE 50 MG: 50 TABLET, FILM COATED ORAL at 09:07

## 2019-10-21 RX ADMIN — HYDRALAZINE HYDROCHLORIDE 100 MG: 50 TABLET, FILM COATED ORAL at 21:38

## 2019-10-21 RX ADMIN — HEPARIN SODIUM 5000 UNITS: 5000 INJECTION INTRAVENOUS; SUBCUTANEOUS at 21:39

## 2019-10-21 RX ADMIN — HYDRALAZINE HYDROCHLORIDE 100 MG: 50 TABLET, FILM COATED ORAL at 17:35

## 2019-10-21 RX ADMIN — GABAPENTIN 300 MG: 300 CAPSULE ORAL at 09:07

## 2019-10-21 RX ADMIN — EPOETIN ALFA-EPBX 10000 UNITS: 10000 INJECTION, SOLUTION INTRAVENOUS; SUBCUTANEOUS at 21:38

## 2019-10-21 RX ADMIN — HEPARIN SODIUM 5000 UNITS: 5000 INJECTION INTRAVENOUS; SUBCUTANEOUS at 09:07

## 2019-10-21 RX ADMIN — ASPIRIN 81 MG 81 MG: 81 TABLET ORAL at 09:07

## 2019-10-21 RX ADMIN — FUROSEMIDE 40 MG: 10 INJECTION, SOLUTION INTRAMUSCULAR; INTRAVENOUS at 11:13

## 2019-10-21 RX ADMIN — CASTOR OIL AND BALSAM, PERU: 788; 87 OINTMENT TOPICAL at 09:08

## 2019-10-21 RX ADMIN — CASTOR OIL AND BALSAM, PERU: 788; 87 OINTMENT TOPICAL at 17:26

## 2019-10-21 RX ADMIN — METOPROLOL TARTRATE 50 MG: 50 TABLET, FILM COATED ORAL at 17:35

## 2019-10-21 RX ADMIN — CEFEPIME HYDROCHLORIDE 2 G: 2 INJECTION, POWDER, FOR SOLUTION INTRAVENOUS at 06:05

## 2019-10-21 RX ADMIN — HYDRALAZINE HYDROCHLORIDE 100 MG: 50 TABLET, FILM COATED ORAL at 09:07

## 2019-10-21 RX ADMIN — CASTOR OIL AND BALSAM, PERU: 788; 87 OINTMENT TOPICAL at 21:39

## 2019-10-21 RX ADMIN — FUROSEMIDE 40 MG: 10 INJECTION, SOLUTION INTRAMUSCULAR; INTRAVENOUS at 17:35

## 2019-10-21 RX ADMIN — PRAVASTATIN SODIUM 40 MG: 40 TABLET ORAL at 21:38

## 2019-10-21 RX ADMIN — PAROXETINE 20 MG: 20 TABLET, FILM COATED ORAL at 09:07

## 2019-10-21 NOTE — PROGRESS NOTES
Spiritual Care Assessment/Progress Note  [facility]      NAME: Leander Ortiz      MRN: [id number]  AGE: 80 y.o.  SEX: female  Scientology Affiliation: Jainism   Language: English     10/21/2019     Total Time (in minutes): 10     Spiritual Assessment begun in [facility] 3 MED TELE through conversation with:         [x]Patient        [] Family    [x] Friend(s)        Reason for Consult: Initial/Spiritual assessment, patient floor     Spiritual beliefs: (Please include comment if needed)     [x] Identifies with a jairon tradition:    Episcopal     [] Supported by a jairon community:            [] Claims no spiritual orientation:           [] Seeking spiritual identity:                [] Adheres to an individual form of spirituality:           [] Not able to assess:                           Identified resources for coping:      [] Prayer                               [] Music                  [] Guided Imagery     [x] Family/friends                 [] Pet visits     [] Devotional reading                         [] Unknown     [] Other:                                              Interventions offered during this visit: (See comments for more details)    Patient Interventions: Iconic (affirming the presence of God/Higher Power), Initial/Spiritual assessment, patient floor, Normalization of emotional/spiritual concerns, Prayer (assurance of), Scientology beliefs/image of God discussed     Family/Friend(s): Coping skills reviewed/reinforced, Normalization of emotional/spiritual concerns, Prayer (assurance of)     Plan of Care:     [] Support spiritual and/or cultural needs    [] Support AMD and/or advance care planning process      [] Support grieving process   [] Coordinate Rites and/or Rituals    [] Coordination with community clergy   [] No spiritual needs identified at this time   [] Detailed Plan of Care below (See Comments)  [] Make referral to Music Therapy  [] Make referral to Pet Therapy     [] Make referral to Addiction services  [] Make referral to [facility]  [] Make referral to Spiritual Care Partner  [] No future visits requested        [x] Follow up visits as needed     Comments: Met patient and a friend at bedside. 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 Provided supportive listening for patient who says she stay in pain a lot. Talked about her belief in God and support of two adult children and friends, although she doesn't much feel like visits from friends. Provided assurance of prayer and offered spiritual encouragement. Informed patient and friend of  support if follow up needed/ requested.     Chaplain Barney Patiño M.Div, [facility]  [phone]

## 2019-10-21 NOTE — PROGRESS NOTES
Problem: Self Care Deficits Care Plan (Adult)  Goal: *Acute Goals and Plan of Care (Insert Text)  Description  FUNCTIONAL STATUS PRIOR TO ADMISSION: Patient lives in assisted living, receiving assistance with all ADL. She reports being able to help with some upper body ADL; was able to feed herself. HOME SUPPORT: Assisted living staff; family    Occupational Therapy Goals  Initiated 10/17/2019  1. Patient will perform grooming in bed or chair with moderate assistance  within 7 day(s). 2.  Patient will feed herself with moderate assistance within 7 day(s). 3.  Patient will roll side to side with maximal assistance within 7 day(s). 4.  Patient will participate in upper extremity therapeutic exercises with minimal cues/assist for 10 minutes within 7 day(s). Outcome: Not Progressing Towards Goal    OCCUPATIONAL THERAPY TREATMENT  Patient: Rosas Melendez (26 y.o. female)  Date: 10/21/2019  Diagnosis: Acute kidney injury superimposed on CKD ([facility] [N17.9, N18.9] Acute kidney injury superimposed on CKD [facility])       Precautions:    Chart, occupational therapy assessment, plan of care, and goals were reviewed. ASSESSMENT  Patient continues with skilled OT services and is not progressing towards goals. She is extremely weak and requires constant encouragement to attempt any functional tasks. She declined attempting to feed herself breakfast, despite max encouragement. Patient is planning to return back to her [facility] with assistance of caregivers and [facility] staff. Current Level of Function Impacting Discharge (ADLs): Total A       PLAN :  Patient continues to benefit from skilled intervention to address the above impairments. Continue treatment per established plan of care. to address goals.     Recommendation for discharge: (in order for the patient to meet his/her long term goals)  Return to half-way with caregivers      This discharge recommendation:  Has been made in collaboration with the attending provider and/or case management    IF patient discharges home will need the following DME: none       SUBJECTIVE:   Patient stated No. (minimal vocalizations)    OBJECTIVE DATA SUMMARY:   Cognitive/Behavioral Status:  Neurologic State: Alert  Orientation Level: Oriented X4  Cognition: Follows commands; Impaired decision making         ADL Intervention:       Feeding:  Attempted to assist patient with her breakfast, but she declined eating anything, despite max encouragement. Grooming:  Attempted basic grooming tasks while in bed with head of bed elevated. Patient requires Total to Max A to comb her hair using her non dominant L UE. She requires Total A for washing face and hands. Pain:  No complaints    Activity Tolerance:   Poor  Please refer to the flowsheet for vital signs taken during this treatment.     After treatment patient left in no apparent distress:   Supine in bed and Call bell within reach    COMMUNICATION/COLLABORATION:   The patients plan of care was discussed with: Registered Nurse    CORIN Katz/L  Time Calculation: 13 mins

## 2019-10-21 NOTE — PROGRESS NOTES
Bedside shift change report given to Marcos Banks (oncoming nurse) by Ganga Rudolph (offgoing nurse). Report included the following information SBAR, Kardex, Intake/Output, MAR and Recent Results. Pt started IV lasix today and placed on purewick. Wound care performed x2. Appetite still poor.

## 2019-10-21 NOTE — PROGRESS NOTES
MARC PLAN:     -Plan is to return to [facility] at [address] with [facility] once medically stable.-they can accept back when stable  -updates ecin'd to  [address]  -Updates left on VM for Libby at [facility] to assess pt today for possible d/c Tuesday//Wednesday if stable and [facility] is appropriate  -son to transport if appropriate per his request.  -DDNR is on chart  -AMD is on chart-send DDNR and AMD at d/c.

## 2019-10-21 NOTE — PROGRESS NOTES
PROGRESS NOTE    NAME:  Josefina Younger   :   1935   MRN:   [id number]     Date/Time:  10/21/2019 7:50 AM  Subjective:   History:  Chart reviewed and patient seen and examined and D/W her nurse this AM and all events noted. She was recently admitted with CAP and UTI and D/Micah to adult home on 10/11 feeling well with Creat. 2.07 (baseline 1.9). Apparently although she felt well on D/C she became weaker and had taken in little PO and represented to the ER on 10/15 weaker with Creat 4.00 and was thus admitted with acute renal failure on CKD. She had a US w/o renal obstruction and ureteral stent in place. She has UA c/w infection with culture sent ( pos for Yeast). Her WBC was elevated on admission and improved initially but higher now and had developed a cough and had a fever 102 during the night 10/18. Maxipime started and CXR w/o clear infiltrate. Initially her Creatinine was higher at 4.36 despite hydration, but significantly better now daily and at 1.51 now (baseline 1.9). She was on Lisinopril for HTN and Lasix for edema as outpatient but currently on hold. She feels well now except she is weak and has no appetite. She denies CP, SOB or cardiac or respiratory c/o except now has a cough. She has no nausea or GI c/o except poor appetite and she has no  c/o. There are no there c/o on complete ROS.        Medications reviewed:  Current Facility-Administered Medications   Medication Dose Route Frequency    ondansetron (ZOFRAN) injection 4 mg  4 mg IntraVENous Q6H PRN    cefepime (MAXIPIME) 2 g in 0.9% sodium chloride (MBP/ADV) 100 mL  2 g IntraVENous Q24H    balsam peru-castor oil (VENELEX) ointment   Topical TID    gabapentin (NEURONTIN) capsule 300 mg  300 mg Oral DAILY    acetaminophen (TYLENOL) tablet 650 mg  650 mg Oral Q6H PRN    aspirin chewable tablet 81 mg  81 mg Oral DAILY    hydrALAZINE (APRESOLINE) tablet 100 mg  100 mg Oral TID    loperamide (IMODIUM) capsule 4 mg  4 mg Oral Q8H PRN  metoprolol tartrate (LOPRESSOR) tablet 50 mg  50 mg Oral BID    PARoxetine (PAXIL) tablet 20 mg  20 mg Oral DAILY    polyethylene glycol (MIRALAX) packet 17 g  17 g Oral DAILY PRN    pravastatin (PRAVACHOL) tablet 40 mg  40 mg Oral QHS    temazepam (RESTORIL) capsule 15 mg  15 mg Oral QHS    traMADol (ULTRAM) tablet 50 mg  50 mg Oral Q6H PRN    heparin (porcine) injection 5,000 Units  5,000 Units SubCUTAneous Q12H        Objective:   Vitals:  Visit Vitals  /75 (BP 1 Location: Left arm, BP Patient Position: At rest)   Pulse (!) 102   Temp 98 °F (36.7 °C)   Resp 16   Ht 5' (1.524 m)   Wt 119 lb 14.9 oz (54.4 kg)   SpO2 92% Comment: 4L   BMI 23.42 kg/m²    O2 Flow Rate (L/min): 3 l/min O2 Device: Nasal cannula Temp (24hrs), Av.1 °F (36.7 °C), Min:97.8 °F (36.6 °C), Max:98.4 °F (36.9 °C)      Last 24hr Input/Output:    Intake/Output Summary (Last 24 hours) at 10/21/2019 0750  Last data filed at 10/21/2019 0158  Gross per 24 hour   Intake 960 ml   Output    Net 960 ml        PHYSICAL EXAM:  General:     Alert, cooperative, no distress, appears stated age. Head:    Normocephalic, without obvious abnormality, atraumatic. Eyes:    Conjunctivae/corneas clear. PERRLA  Nose:   Nares normal. No drainage or sinus tenderness. Throat:     Lips, mucosa, and tongue normal.  No Thrush  Neck:   Supple, symmetrical,  no adenopathy, thyroid: non tender     no carotid bruit and no JVD. Back:     Symmetric,  No CVA tenderness. Lungs:    Scattered coarse rhonchi bilaterally. No Wheezing. No rales. Heart:    Regular rate and rhythm,  no murmur, rub or gallop. Abdomen:    Soft, non-tender. Not distended. Bowel sounds normal. No masses  Extremities:  Extremities normal, atraumatic, No cyanosis. Arms with edema. No clubbing  Lymph nodes:  Cervical, supraclavicular normal.  Neurologic:  General decreased strength, Alert and oriented X 3.    Skin:                 No rash, Skin breakdown on buttocks as per wound care note from 10/16      Lab Data Reviewed:    Recent Results (from the past 24 hour(s))   METABOLIC PANEL, BASIC    Collection Time: 10/21/19  5:31 AM   Result Value Ref Range    Sodium 143 136 - 145 mmol/L    Potassium 3.7 3.5 - 5.1 mmol/L    Chloride 114 (H) 97 - 108 mmol/L    CO2 19 (L) 21 - 32 mmol/L    Anion gap 10 5 - 15 mmol/L    Glucose 81 65 - 100 mg/dL    BUN 44 (H) 6 - 20 MG/DL    Creatinine 1.51 (H) 0.55 - 1.02 MG/DL    BUN/Creatinine ratio 29 (H) 12 - 20      GFR est AA 40 (L) >60 ml/min/1.73m2    GFR est non-AA 33 (L) >60 ml/min/1.73m2    Calcium 9.4 8.5 - 10.1 MG/DL   CBC WITH AUTOMATED DIFF    Collection Time: 10/21/19  5:31 AM   Result Value Ref Range    WBC 13.1 (H) 3.6 - 11.0 K/uL    RBC 2.91 (L) 3.80 - 5.20 M/uL    HGB 8.7 (L) 11.5 - 16.0 g/dL    HCT 26.7 (L) 35.0 - 47.0 %    MCV 91.8 80.0 - 99.0 FL    MCH 29.9 26.0 - 34.0 PG    MCHC 32.6 30.0 - 36.5 g/dL    RDW 15.6 (H) 11.5 - 14.5 %    PLATELET 851 459 - 439 K/uL    MPV 10.6 8.9 - 12.9 FL    NRBC 0.0 0  WBC    ABSOLUTE NRBC 0.00 0.00 - 0.01 K/uL    NEUTROPHILS 73 32 - 75 %    LYMPHOCYTES 6 (L) 12 - 49 %    MONOCYTES 15 (H) 5 - 13 %    EOSINOPHILS 3 0 - 7 %    BASOPHILS 1 0 - 1 %    IMMATURE GRANULOCYTES 2 (H) 0.0 - 0.5 %    ABS. NEUTROPHILS 9.7 (H) 1.8 - 8.0 K/UL    ABS. LYMPHOCYTES 0.8 0.8 - 3.5 K/UL    ABS. MONOCYTES 2.0 (H) 0.0 - 1.0 K/UL    ABS. EOSINOPHILS 0.4 0.0 - 0.4 K/UL    ABS. BASOPHILS 0.1 0.0 - 0.1 K/UL    ABS. IMM. GRANS. 0.2 (H) 0.00 - 0.04 K/UL    DF AUTOMATED            Assessment/Plan:     Principal Problem:    Acute kidney injury superimposed on CKD ([facility] (10/15/2019)    Active Problems:    COPD (chronic obstructive pulmonary disease) ([facility] (8/14/2010)      Essential hypertension (7/20/2016)      Moderate protein-calorie malnutrition ([facility] (3/30/2018)      Stage 3 chronic kidney disease ([facility] (11/5/2018)      Weakness generalized (10/15/2019)       ___________________________________________________  PLAN:    1. Discontinued IV hydration  2. Follow Creat 4.0 on adm to 4.36 and 1.51 now with baseline 1.9  3. Continue off Lasix and Lisinopril  4. F/U on repeat urine culture, Yeast  5. Follow WBC, 15.3 adm to 18.1 (10/17)now 13.1 w/  maxipime  6. Continue Hydralazine and Metoprolol for HTN and follow  7. Continue nasal oxygen with COPD  8. Renal consult note reviewed and appreciate help  9. PT evaluation   10. Repeat CXR no definite infiltrate, ? Congestion but reluctant to use diuretic as stable and has Acute Renal failure and sent urine and blood cultures  11. Diflucan with yeast  12.  DNR per patient request, D/W her          ___________________________________________________    Attending Physician: Vickie Jameson MD

## 2019-10-21 NOTE — PROGRESS NOTES
Nutrition Assessment:    INTERVENTIONS/RECOMMENDATIONS:   Continue Regular/2g Na diet  Supplements: Discontinue Ensure enlive and gelatein, try ensure clear, magic cup, and mighty shake daily    ASSESSMENT:   Patient medically noted for CHRISTY on CKD, HTN, and COPD. Currently receiving a 2g Na diet. Patient reports a poor appetite. Lunch tray appeared untouched. She had drank about half of the ensure enlive but she states she doesn't like the taste; chalky. PO mostly <25% of meals per flowsheets. Discussed trying other supplements and patient agreeable to trying a variety to help with kcal/protein intake. Encouraged use of menu/room service. Patient states nothing sounds good and she doesn't know what she wants to eat. Encouraged intake of meals as tolerated. Diet Order: Regular(2g Na + Ensure enlive and gelatein daily)  % Eaten:    Patient Vitals for the past 72 hrs:   % Diet Eaten   10/21/19 1304 10 %   10/20/19 1635 10 %   10/20/19 1316 25 %   10/20/19 0858 20 %   10/19/19 1705 10 %   10/19/19 1104 25 %   10/19/19 0856 20 %   10/18/19 1645 35 %     Pertinent Medications: [x] Reviewed []Other: Lasix, Hydralazine, Lopressor, Paxil, Pravastatin   Pertinent Labs: [x]Reviewed  []Other:   Food Allergies: [x]None []Yes:     Last BM: 10/20  [x]Active     []Hyperactive  []Hypoactive       [] Absent  BS  Skin:    [] Intact   [] Incision  [] Breakdown   [x]Edema   [x]Other: DTI bilateral heels    Anthropometrics: Height: 5' (152.4 cm) Weight: 54.4 kg (119 lb 14.9 oz)    IBW (%IBW):   ( ) UBW (%UBW):   (  %)    BMI: Body mass index is 23.42 kg/m².     This BMI is indicative of:  []Underweight   [x]Normal   []Overweight   [] Obesity   [] Extreme Obesity (BMI>40)  Last Weight Metrics:  Weight Loss Metrics 10/15/2019 9/26/2019 8/2/2019 7/10/2019 7/9/2019 6/14/2019 6/4/2019   Today's Wt 119 lb 14.9 oz 132 lb 4.4 oz - 113 lb 15.7 oz 115 lb 1.3 oz - 116 lb 3.2 oz   BMI 23.42 kg/m2 23.43 kg/m2 21.19 kg/m2 21.19 kg/m2 21.39 kg/m2 21.25 kg/m2 21.25 kg/m2       Estimated Nutrition Needs (Based on): 4841 Kcals/day(BMR (927) x 1. 3AF) , 66 g(1.2 g/kg bw) Protein  Carbohydrate: At Least 130 g/day  Fluids: 1200 mL/day     Pt expected to meet estimated nutrient needs: [x]Yes []No    NUTRITION DIAGNOSES:   Problem:  Inadequate protein-energy intake     Etiology: related to poor appetite     Signs/Symptoms: as evidenced by PO <25% of meals      NUTRITION INTERVENTIONS:  Meals/Snacks: General/healthful diet   Supplements: Commercial supplement              GOAL:   PO intake >25% of meals and 50% of ONS next 1-3 days    NUTRITION MONITORING AND EVALUATION   Food/Nutrient Intake Outcomes:  Total energy intake  Physical Signs/Symptoms Outcomes: Weight/weight change, Electrolyte and renal profile    Previous Goal Met:   [] Met              [] Progressing Towards Goal              [x] Not Progressing Towards Goal   Previous Recommendations:   [x] Implemented          [] Not Implemented          [] Not Applicable    LEARNING NEEDS (Diet, Food/Nutrient-Drug Interaction):    [x] None Identified   [] Identified and Education Provided/Documented   [] Identified and Pt declined/was not appropriate     Cultural, Catholic, OR Ethnic Dietary Needs:    [x] None Identified   [] Identified and Addressed     [x] Interdisciplinary Care Plan Reviewed/Documented    [x] Discharge Planning: Low Na diet   [] Participated in Interdisciplinary Rounds    NUTRITION RISK:    [x] Patient At Nutritional Risk             [] Patient Not At Nutritional Risk      Elgin Bonilla [id number]  Pager [phone]    Weekend Pager [phone]

## 2019-10-21 NOTE — PROGRESS NOTES
Problem: Pressure Injury - Risk of  Goal: *Prevention of pressure injury  Description  Document Rashid Scale and appropriate interventions in the flowsheet. Outcome: Progressing Towards Goal  Note:   Pressure Injury Interventions:  Sensory Interventions: Assess changes in LOC, Assess need for specialty bed, Chair cushion, Check visual cues for pain, Discuss PT/OT consult with provider, Keep linens dry and wrinkle-free, Maintain/enhance activity level    Moisture Interventions: Absorbent underpads, Apply protective barrier, creams and emollients, Check for incontinence Q2 hours and as needed, Internal/External urinary devices    Activity Interventions: Chair cushion, Increase time out of bed, Pressure redistribution bed/mattress(bed type)    Mobility Interventions: Float heels, Turn and reposition approx.  every two hours(pillow and wedges), HOB 30 degrees or less, Pressure redistribution bed/mattress (bed type)    Nutrition Interventions: Document food/fluid/supplement intake, Discuss nutritional consult with provider    Friction and Shear Interventions: Apply protective barrier, creams and emollients, Foam dressings/transparent film/skin sealants, Feet elevated on foot rest, Transferring/repositioning devices

## 2019-10-21 NOTE — PROGRESS NOTES
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 Urine eos negative. Hgb 8.7 - will give retacrit 10 k sc weekly    High SBP; volume overloaded; will give IV lasix 40 bid today; continue holding home Lisinopril/thiazide for now       Subjective:     Chief Complaint:  Swollen. Fair to poor po intake. No N/V.  + dyspnea. Not too talkative. Review of Systems:    Symptom Y/N Comments  Symptom Y/N Comments   Fever/Chills    Chest Pain     Poor Appetite y   Edema y    Cough    Abdominal Pain     Sputum    Joint Pain     SOB/MOTNANO y   Pruritis/Rash     Nausea/vomit n   Tolerating PT/OT     Diarrhea    Tolerating Diet     Constipation    Other       Could not obtain due to:      Objective:     VITALS:   Last 24hrs VS reviewed since prior progress note.  Most recent are:  Visit Vitals  /75 (BP 1 Location: Left arm, BP Patient Position: At rest)   Pulse (!) 102   Temp 98 °F (36.7 °C)   Resp 16   Ht 5' (1.524 m)   Wt 54.4 kg (119 lb 14.9 oz)   SpO2 92%   BMI 23.42 kg/m²       Intake/Output Summary (Last 24 hours) at 10/21/2019 0935  Last data filed at 10/21/2019 0158  Gross per 24 hour   Intake 720 ml   Output    Net 720 ml      Telemetry Reviewed:     PHYSICAL EXAM:  General: NAD  Rhonchi  + edema    Lab Data Reviewed: (see below)    Medications Reviewed: (see below)    PMH/SH reviewed - no change compared to H&P  ________________________________________________________________________  Care Plan discussed with:  Patient y    Family      RN y    Care Manager                    Consultant:          Comments   >50% of visit spent in counseling and coordination of care       ________________________________________________________________________  Rudolph Chaidez MD     Procedures: see electronic medical records for all procedures/Xrays and details which  were not copied into this note but were reviewed prior to creation of Plan. LABS:  Recent Labs     10/21/19  0531 10/19/19  0356   WBC 13.1* 16.7*   HGB 8.7* 8.6*   HCT 26.7* 27.9*    253     Recent Labs     10/21/19  0531 10/20/19  0312 10/19/19  0356    143 141   K 3.7 3.9 3.9   * 114* 113*   CO2 19* 19* 17*   BUN 44* 45* 41*   CREA 1.51* 1.61* 1.83*   GLU 81 81 83   CA 9.4 9.4 9.2     No results for input(s): SGOT, GPT, AP, TBIL, TP, ALB, GLOB, GGT, AML, LPSE in the last 72 hours. No lab exists for component: AMYP, HLPSE  No results for input(s): INR, PTP, APTT, INREXT, INREXT in the last 72 hours. No results for input(s): FE, TIBC, PSAT, FERR in the last 72 hours. No results found for: FOL, RBCF   No results for input(s): PH, PCO2, PO2 in the last 72 hours. No results for input(s): CPK, CKMB in the last 72 hours.     No lab exists for component: TROPONINI  No components found for: [place]  Lab Results   Component Value Date/Time    Color YELLOW/STRAW 10/17/2019 07:12 AM    Appearance CLOUDY (A) 10/17/2019 07:12 AM    Specific gravity 1.015 10/17/2019 07:12 AM    Specific gravity 1.015 08/02/2019 11:39 AM    pH (UA) 5.0 10/17/2019 07:12 AM    Protein 100 (A) 10/17/2019 07:12 AM    Glucose NEGATIVE  10/17/2019 07:12 AM    Ketone NEGATIVE  10/17/2019 07:12 AM    Bilirubin NEGATIVE  10/17/2019 07:12 AM    Urobilinogen 0.2 10/17/2019 07:12 AM    Nitrites NEGATIVE  10/17/2019 07:12 AM    Leukocyte Esterase SMALL (A) 10/17/2019 07:12 AM    Epithelial cells FEW 10/17/2019 07:12 AM    Bacteria NEGATIVE  10/17/2019 07:12 AM    WBC 20-50 10/17/2019 07:12 AM    RBC 0-5 10/17/2019 07:12 AM       MEDICATIONS:  Current Facility-Administered Medications   Medication Dose Route Frequency    fluconazole (DIFLUCAN) tablet 100 mg  100 mg Oral DAILY    ondansetron (ZOFRAN) injection 4 mg  4 mg IntraVENous Q6H PRN    cefepime (MAXIPIME) 2 g in 0.9% sodium chloride (MBP/ADV) 100 mL  2 g IntraVENous Q24H    balsam peru-castor oil (VENELEX) ointment   Topical TID    gabapentin (NEURONTIN) capsule 300 mg  300 mg Oral DAILY    acetaminophen (TYLENOL) tablet 650 mg  650 mg Oral Q6H PRN    aspirin chewable tablet 81 mg  81 mg Oral DAILY    hydrALAZINE (APRESOLINE) tablet 100 mg  100 mg Oral TID    loperamide (IMODIUM) capsule 4 mg  4 mg Oral Q8H PRN    metoprolol tartrate (LOPRESSOR) tablet 50 mg  50 mg Oral BID    PARoxetine (PAXIL) tablet 20 mg  20 mg Oral DAILY    polyethylene glycol (MIRALAX) packet 17 g  17 g Oral DAILY PRN    pravastatin (PRAVACHOL) tablet 40 mg  40 mg Oral QHS    temazepam (RESTORIL) capsule 15 mg  15 mg Oral QHS    traMADol (ULTRAM) tablet 50 mg  50 mg Oral Q6H PRN    heparin (porcine) injection 5,000 Units  5,000 Units SubCUTAneous Q12H

## 2019-10-21 NOTE — PROGRESS NOTES
Bedside shift change report given to LOVELY Quintanilla (oncoming nurse) by Ghada Rizzo (offgoing nurse). Report included the following information SBAR, Kardex, Intake/Output, MAR and Recent Results. O2 sats 86% during shift change, increased NC from 3L to 4L. O2 sat>90%. Oncoming RN aware.

## 2019-10-22 LAB
ANION GAP SERPL CALC-SCNC: 8 MMOL/L (ref 5–15)
BACTERIA SPEC CULT: NORMAL
BASOPHILS # BLD: 0 K/UL (ref 0–0.1)
BASOPHILS NFR BLD: 0 % (ref 0–1)
BUN SERPL-MCNC: 43 MG/DL (ref 6–20)
BUN/CREAT SERPL: 26 (ref 12–20)
CALCIUM SERPL-MCNC: 9.3 MG/DL (ref 8.5–10.1)
CHLORIDE SERPL-SCNC: 112 MMOL/L (ref 97–108)
CO2 SERPL-SCNC: 22 MMOL/L (ref 21–32)
CREAT SERPL-MCNC: 1.63 MG/DL (ref 0.55–1.02)
DIFFERENTIAL METHOD BLD: ABNORMAL
EOSINOPHIL # BLD: 0.2 K/UL (ref 0–0.4)
EOSINOPHIL NFR BLD: 2 % (ref 0–7)
ERYTHROCYTE [DISTWIDTH] IN BLOOD BY AUTOMATED COUNT: 15.5 % (ref 11.5–14.5)
GLUCOSE SERPL-MCNC: 88 MG/DL (ref 65–100)
HCT VFR BLD AUTO: 23.6 % (ref 35–47)
HGB BLD-MCNC: 7.5 G/DL (ref 11.5–16)
IMM GRANULOCYTES # BLD AUTO: 0 K/UL (ref 0–0.04)
IMM GRANULOCYTES NFR BLD AUTO: 0 % (ref 0–0.5)
LYMPHOCYTES # BLD: 0.5 K/UL (ref 0.8–3.5)
LYMPHOCYTES NFR BLD: 5 % (ref 12–49)
MAGNESIUM SERPL-MCNC: 1.3 MG/DL (ref 1.6–2.4)
MCH RBC QN AUTO: 29.1 PG (ref 26–34)
MCHC RBC AUTO-ENTMCNC: 31.8 G/DL (ref 30–36.5)
MCV RBC AUTO: 91.5 FL (ref 80–99)
MONOCYTES # BLD: 0.6 K/UL (ref 0–1)
MONOCYTES NFR BLD: 6 % (ref 5–13)
MYELOCYTES NFR BLD MANUAL: 1 %
NEUTS SEG # BLD: 8.8 K/UL (ref 1.8–8)
NEUTS SEG NFR BLD: 86 % (ref 32–75)
NRBC # BLD: 0 K/UL (ref 0–0.01)
NRBC BLD-RTO: 0 PER 100 WBC
PLATELET # BLD AUTO: 190 K/UL (ref 150–400)
PMV BLD AUTO: 10.5 FL (ref 8.9–12.9)
POTASSIUM SERPL-SCNC: 3.5 MMOL/L (ref 3.5–5.1)
RBC # BLD AUTO: 2.58 M/UL (ref 3.8–5.2)
RBC MORPH BLD: ABNORMAL
RBC MORPH BLD: ABNORMAL
SERVICE CMNT-IMP: NORMAL
SODIUM SERPL-SCNC: 142 MMOL/L (ref 136–145)
WBC # BLD AUTO: 10.2 K/UL (ref 3.6–11)

## 2019-10-22 PROCEDURE — 74011250636 HC RX REV CODE- 250/636: Performed by: INTERNAL MEDICINE

## 2019-10-22 PROCEDURE — 74011250637 HC RX REV CODE- 250/637: Performed by: INTERNAL MEDICINE

## 2019-10-22 PROCEDURE — 74011000258 HC RX REV CODE- 258: Performed by: INTERNAL MEDICINE

## 2019-10-22 PROCEDURE — 85025 COMPLETE CBC W/AUTO DIFF WBC: CPT

## 2019-10-22 PROCEDURE — 80048 BASIC METABOLIC PNL TOTAL CA: CPT

## 2019-10-22 PROCEDURE — 77010033678 HC OXYGEN DAILY

## 2019-10-22 PROCEDURE — 83735 ASSAY OF MAGNESIUM: CPT

## 2019-10-22 PROCEDURE — 36415 COLL VENOUS BLD VENIPUNCTURE: CPT

## 2019-10-22 PROCEDURE — 77030038269 HC DRN EXT URIN PURWCK BARD -A

## 2019-10-22 PROCEDURE — 94760 N-INVAS EAR/PLS OXIMETRY 1: CPT

## 2019-10-22 PROCEDURE — 65270000029 HC RM PRIVATE

## 2019-10-22 RX ORDER — FUROSEMIDE 10 MG/ML
40 INJECTION INTRAMUSCULAR; INTRAVENOUS ONCE
Status: COMPLETED | OUTPATIENT
Start: 2019-10-22 | End: 2019-10-22

## 2019-10-22 RX ADMIN — CEFEPIME HYDROCHLORIDE 2 G: 2 INJECTION, POWDER, FOR SOLUTION INTRAVENOUS at 08:28

## 2019-10-22 RX ADMIN — HYDRALAZINE HYDROCHLORIDE 100 MG: 50 TABLET, FILM COATED ORAL at 18:06

## 2019-10-22 RX ADMIN — ASPIRIN 81 MG 81 MG: 81 TABLET ORAL at 08:54

## 2019-10-22 RX ADMIN — METOPROLOL TARTRATE 50 MG: 50 TABLET, FILM COATED ORAL at 08:51

## 2019-10-22 RX ADMIN — GABAPENTIN 300 MG: 300 CAPSULE ORAL at 08:50

## 2019-10-22 RX ADMIN — CASTOR OIL AND BALSAM, PERU: 788; 87 OINTMENT TOPICAL at 09:02

## 2019-10-22 RX ADMIN — HEPARIN SODIUM 5000 UNITS: 5000 INJECTION INTRAVENOUS; SUBCUTANEOUS at 08:55

## 2019-10-22 RX ADMIN — PRAVASTATIN SODIUM 40 MG: 40 TABLET ORAL at 22:44

## 2019-10-22 RX ADMIN — FLUCONAZOLE 100 MG: 100 TABLET ORAL at 08:53

## 2019-10-22 RX ADMIN — HYDRALAZINE HYDROCHLORIDE 100 MG: 50 TABLET, FILM COATED ORAL at 08:47

## 2019-10-22 RX ADMIN — TEMAZEPAM 15 MG: 15 CAPSULE ORAL at 22:44

## 2019-10-22 RX ADMIN — METOPROLOL TARTRATE 50 MG: 50 TABLET, FILM COATED ORAL at 18:06

## 2019-10-22 RX ADMIN — FUROSEMIDE 40 MG: 10 INJECTION, SOLUTION INTRAMUSCULAR; INTRAVENOUS at 10:07

## 2019-10-22 RX ADMIN — CASTOR OIL AND BALSAM, PERU: 788; 87 OINTMENT TOPICAL at 22:43

## 2019-10-22 RX ADMIN — CASTOR OIL AND BALSAM, PERU: 788; 87 OINTMENT TOPICAL at 18:06

## 2019-10-22 RX ADMIN — HEPARIN SODIUM 5000 UNITS: 5000 INJECTION INTRAVENOUS; SUBCUTANEOUS at 22:44

## 2019-10-22 RX ADMIN — PAROXETINE 20 MG: 20 TABLET, FILM COATED ORAL at 08:47

## 2019-10-22 RX ADMIN — HYDRALAZINE HYDROCHLORIDE 100 MG: 50 TABLET, FILM COATED ORAL at 22:44

## 2019-10-22 NOTE — PROGRESS NOTES
Bedside shift change report given to Leonor Domingo (oncoming nurse) by Pawel Walker (offgoing nurse). Report included the following information SBAR, Kardex, Intake/Output, MAR and Recent Results. Error message on specialty bed, transferred pt to new specialty bed, pt tolerated. No significant changes in pt status.

## 2019-10-22 NOTE — PROGRESS NOTES
Bedside and Verbal shift change report given to Abi Thornton RN (oncoming nurse) by Sabi Hsieh (offgoing nurse). Report given with SBAR, Kardex, Intake/Output, MAR and Recent Results.

## 2019-10-22 NOTE — PROGRESS NOTES
Problem: Pressure Injury - Risk of  Goal: *Prevention of pressure injury  Description  Document Rashid Scale and appropriate interventions in the flowsheet.   Outcome: Progressing Towards Goal  Note:   Pressure Injury Interventions:  Sensory Interventions: Assess changes in LOC, Assess need for specialty bed, Avoid rigorous massage over bony prominences, Check visual cues for pain, Minimize linen layers, Keep linens dry and wrinkle-free    Moisture Interventions: Absorbent underpads, Apply protective barrier, creams and emollients, Check for incontinence Q2 hours and as needed, Internal/External urinary devices, Minimize layers    Activity Interventions: Increase time out of bed, Chair cushion, Pressure redistribution bed/mattress(bed type)    Mobility Interventions: Chair cushion    Nutrition Interventions: Discuss nutritional consult with provider, Offer support with meals,snacks and hydration    Friction and Shear Interventions: Feet elevated on foot rest, Apply protective barrier, creams and emollients, Foam dressings/transparent film/skin sealants, HOB 30 degrees or less

## 2019-10-22 NOTE — PROGRESS NOTES
PROGRESS NOTE    NAME:  Jatinder Ferro   :   1935   MRN:   [id number]     Date/Time:  10/22/2019 7:48 AM  Subjective:   History:  Chart reviewed and patient seen and examined and D/W her nurse this AM and all events noted. She was recently admitted with CAP and UTI and D/Micah to adult home on 10/11 feeling well with Creat. 2.07 (baseline 1.9). Apparently although she felt well on D/C she became weaker and had taken in little PO and represented to the ER on 10/15 weaker with Creat 4.00 and was thus admitted with acute renal failure on CKD. She had a US w/o renal obstruction and ureteral stent in place. She has UA c/w infection with culture sent ( pos for Yeast). Her WBC was elevated on admission and improved initially but higher now and had developed a cough and had a fever 102 during the night 10/18. Maxipime started and CXR w/o clear infiltrate. Initially her Creatinine was higher at 4.36 despite hydration, but significantly better now daily and at 1.63 now (baseline 1.9). She was on Lisinopril for HTN and Lasix for edema as outpatient but currently on hold except resumed Lasix yesterday. She feels well now except she is weak and has no appetite. She denies CP, SOB or cardiac or respiratory c/o except now has a cough. She has no nausea or GI c/o except poor appetite and she has no  c/o. There are no there c/o on complete ROS.        Medications reviewed:  Current Facility-Administered Medications   Medication Dose Route Frequency    fluconazole (DIFLUCAN) tablet 100 mg  100 mg Oral DAILY    epoetin brandt-epbx (RETACRIT) injection 10,000 Units  10,000 Units SubCUTAneous Q7D    ondansetron (ZOFRAN) injection 4 mg  4 mg IntraVENous Q6H PRN    cefepime (MAXIPIME) 2 g in 0.9% sodium chloride (MBP/ADV) 100 mL  2 g IntraVENous Q24H    balsam peru-castor oil (VENELEX) ointment   Topical TID    gabapentin (NEURONTIN) capsule 300 mg  300 mg Oral DAILY    acetaminophen (TYLENOL) tablet 650 mg  650 mg Oral Q6H PRN    aspirin chewable tablet 81 mg  81 mg Oral DAILY    hydrALAZINE (APRESOLINE) tablet 100 mg  100 mg Oral TID    loperamide (IMODIUM) capsule 4 mg  4 mg Oral Q8H PRN    metoprolol tartrate (LOPRESSOR) tablet 50 mg  50 mg Oral BID    PARoxetine (PAXIL) tablet 20 mg  20 mg Oral DAILY    polyethylene glycol (MIRALAX) packet 17 g  17 g Oral DAILY PRN    pravastatin (PRAVACHOL) tablet 40 mg  40 mg Oral QHS    temazepam (RESTORIL) capsule 15 mg  15 mg Oral QHS    traMADol (ULTRAM) tablet 50 mg  50 mg Oral Q6H PRN    heparin (porcine) injection 5,000 Units  5,000 Units SubCUTAneous Q12H        Objective:   Vitals:  Visit Vitals  /60 (BP 1 Location: Left arm, BP Patient Position: At rest)   Pulse 69   Temp 97.4 °F (36.3 °C)   Resp 16   Ht 5' (1.524 m)   Wt 119 lb 14.9 oz (54.4 kg)   SpO2 97%   BMI 23.42 kg/m²    O2 Flow Rate (L/min): 4 l/min O2 Device: Nasal cannula Temp (24hrs), Av °F (36.7 °C), Min:97.4 °F (36.3 °C), Max:98.6 °F (37 °C)      Last 24hr Input/Output:    Intake/Output Summary (Last 24 hours) at 10/22/2019 0748  Last data filed at 10/22/2019 0131  Gross per 24 hour   Intake    Output 650 ml   Net -650 ml        PHYSICAL EXAM:  General:     Alert, cooperative, no distress, appears stated age. Head:    Normocephalic, without obvious abnormality, atraumatic. Eyes:    Conjunctivae/corneas clear. PERRLA  Nose:   Nares normal. No drainage or sinus tenderness. Throat:     Lips, mucosa, and tongue normal.  No Thrush  Neck:   Supple, symmetrical,  no adenopathy, thyroid: non tender     no carotid bruit and no JVD. Back:     Symmetric,  No CVA tenderness. Lungs:    Scattered coarse rhonchi bilaterally. No Wheezing. No rales. Heart:    Regular rate and rhythm,  no murmur, rub or gallop. Abdomen:    Soft, non-tender. Not distended. Bowel sounds normal. No masses  Extremities:  Extremities normal, atraumatic, No cyanosis. Arms with edema.  No clubbing  Lymph nodes:  Cervical, supraclavicular normal.  Neurologic:  General decreased strength, Alert and oriented X 3. Skin:                 No rash, Skin breakdown on buttocks as per wound care note from 10/16      Lab Data Reviewed:    Recent Results (from the past 24 hour(s))   CBC WITH AUTOMATED DIFF    Collection Time: 10/22/19  5:40 AM   Result Value Ref Range    WBC 10.2 3.6 - 11.0 K/uL    RBC 2.58 (L) 3.80 - 5.20 M/uL    HGB 7.5 (L) 11.5 - 16.0 g/dL    HCT 23.6 (L) 35.0 - 47.0 %    MCV 91.5 80.0 - 99.0 FL    MCH 29.1 26.0 - 34.0 PG    MCHC 31.8 30.0 - 36.5 g/dL    RDW 15.5 (H) 11.5 - 14.5 %    PLATELET 681 773 - 690 K/uL    MPV 10.5 8.9 - 12.9 FL    NRBC 0.0 0  WBC    ABSOLUTE NRBC 0.00 0.00 - 0.01 K/uL    NEUTROPHILS 86 (H) 32 - 75 %    LYMPHOCYTES 5 (L) 12 - 49 %    MONOCYTES 6 5 - 13 %    EOSINOPHILS 2 0 - 7 %    BASOPHILS 0 0 - 1 %    MYELOCYTES 1 %    IMMATURE GRANULOCYTES 0 0.0 - 0.5 %    ABS. NEUTROPHILS 8.8 (H) 1.8 - 8.0 K/UL    ABS. LYMPHOCYTES 0.5 (L) 0.8 - 3.5 K/UL    ABS. MONOCYTES 0.6 0.0 - 1.0 K/UL    ABS. EOSINOPHILS 0.2 0.0 - 0.4 K/UL    ABS. BASOPHILS 0.0 0.0 - 0.1 K/UL    ABS. IMM.  GRANS. 0.0 0.00 - 0.04 K/UL    DF MANUAL      RBC COMMENTS ANISOCYTOSIS  1+        RBC COMMENTS HELMET CELLS  1+       METABOLIC PANEL, BASIC    Collection Time: 10/22/19  5:40 AM   Result Value Ref Range    Sodium 142 136 - 145 mmol/L    Potassium 3.5 3.5 - 5.1 mmol/L    Chloride 112 (H) 97 - 108 mmol/L    CO2 22 21 - 32 mmol/L    Anion gap 8 5 - 15 mmol/L    Glucose 88 65 - 100 mg/dL    BUN 43 (H) 6 - 20 MG/DL    Creatinine 1.63 (H) 0.55 - 1.02 MG/DL    BUN/Creatinine ratio 26 (H) 12 - 20      GFR est AA 37 (L) >60 ml/min/1.73m2    GFR est non-AA 30 (L) >60 ml/min/1.73m2    Calcium 9.3 8.5 - 10.1 MG/DL   MAGNESIUM    Collection Time: 10/22/19  5:40 AM   Result Value Ref Range    Magnesium 1.3 (L) 1.6 - 2.4 mg/dL          Assessment/Plan:     Principal Problem:    Acute kidney injury superimposed on CKD ([facility] (10/15/2019)    Active Problems:    COPD (chronic obstructive pulmonary disease) ([facility] (8/14/2010)      Essential hypertension (7/20/2016)      Moderate protein-calorie malnutrition ([facility] (3/30/2018)      Stage 3 chronic kidney disease ([facility] (11/5/2018)      Weakness generalized (10/15/2019)       ___________________________________________________  PLAN:    1. Discontinued IV hydration  2. Follow Creat 4.0 on adm to 4.36 and 1.63 now with baseline 1.9  3. Continue off Lisinopril and resumed Lasix  4. F/U on repeat urine culture, Yeast  5. Follow WBC, 15.3 adm to 18.1 (10/17), now 10.2 w/  maxipime  6. Continue Hydralazine and Metoprolol for HTN and follow  7. Continue nasal oxygen with COPD  8. Renal consult note reviewed and appreciate help  9. PT evaluation   10. Repeat CXR no definite infiltrate, ? Congestion but reluctant to use diuretic as stable and has Acute Renal failure and sent urine and blood cultures  11. Diflucan added 10/21with yeast  12. DNR per patient request, D/W her  15.  Hgb 7.5 and procrit started          ___________________________________________________    Attending Physician: Jed Johnson MD

## 2019-10-22 NOTE — PROGRESS NOTES
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 Urine eos negative. Hgb < 9 - initiated retacrit 10 k sc weekly 10/21    Improved SBP (still labile though)    volume overloaded; s/p IV lasix 40 bid 10/21; another dose ordered today; continue holding home Lisinopril/thiazide for now       Subjective:     Chief Complaint:  Swollen. poor po intake. No N/V.  no dyspnea. Not too talkative. Review of Systems:    Symptom Y/N Comments  Symptom Y/N Comments   Fever/Chills    Chest Pain     Poor Appetite y   Edema y    Cough    Abdominal Pain     Sputum    Joint Pain     SOB/MONTANO y   Pruritis/Rash     Nausea/vomit n   Tolerating PT/OT     Diarrhea    Tolerating Diet     Constipation    Other       Could not obtain due to:      Objective:     VITALS:   Last 24hrs VS reviewed since prior progress note.  Most recent are:  Visit Vitals  /60 (BP 1 Location: Left arm, BP Patient Position: At rest)   Pulse 69   Temp 97.4 °F (36.3 °C)   Resp 16   Ht 5' (1.524 m)   Wt 54.4 kg (119 lb 14.9 oz)   SpO2 97%   BMI 23.42 kg/m²       Intake/Output Summary (Last 24 hours) at 10/22/2019 0657  Last data filed at 10/22/2019 0131  Gross per 24 hour   Intake    Output 650 ml   Net -650 ml      Telemetry Reviewed:     PHYSICAL EXAM:  General: NAD  Rhonchi  + edema    Lab Data Reviewed: (see below)    Medications Reviewed: (see below)    PMH/SH reviewed - no change compared to H&P  ________________________________________________________________________  Care Plan discussed with:  Patient y    Family      RN y    Care Manager                    Consultant:          Comments   >50% of visit spent in counseling and coordination of care       ________________________________________________________________________  Eliazar Camacho MD     Procedures: see electronic medical records for all procedures/Xrays and details which  were not copied into this note but were reviewed prior to creation of Plan. LABS:  Recent Labs     10/22/19  0540 10/21/19  0531   WBC 10.2 13.1*   HGB 7.5* 8.7*   HCT 23.6* 26.7*    228     Recent Labs     10/22/19  0540 10/21/19  0531 10/20/19  0312    143 143   K 3.5 3.7 3.9   * 114* 114*   CO2 22 19* 19*   BUN 43* 44* 45*   CREA 1.63* 1.51* 1.61*   GLU 88 81 81   CA 9.3 9.4 9.4   MG 1.3*  --   --      No results for input(s): SGOT, GPT, AP, TBIL, TP, ALB, GLOB, GGT, AML, LPSE in the last 72 hours. No lab exists for component: AMYP, HLPSE  No results for input(s): INR, PTP, APTT, INREXT, INREXT in the last 72 hours. No results for input(s): FE, TIBC, PSAT, FERR in the last 72 hours. No results found for: FOL, RBCF   No results for input(s): PH, PCO2, PO2 in the last 72 hours. No results for input(s): CPK, CKMB in the last 72 hours.     No lab exists for component: TROPONINI  No components found for: [place]  Lab Results   Component Value Date/Time    Color YELLOW/STRAW 10/17/2019 07:12 AM    Appearance CLOUDY (A) 10/17/2019 07:12 AM    Specific gravity 1.015 10/17/2019 07:12 AM    Specific gravity 1.015 08/02/2019 11:39 AM    pH (UA) 5.0 10/17/2019 07:12 AM    Protein 100 (A) 10/17/2019 07:12 AM    Glucose NEGATIVE  10/17/2019 07:12 AM    Ketone NEGATIVE  10/17/2019 07:12 AM    Bilirubin NEGATIVE  10/17/2019 07:12 AM    Urobilinogen 0.2 10/17/2019 07:12 AM    Nitrites NEGATIVE  10/17/2019 07:12 AM    Leukocyte Esterase SMALL (A) 10/17/2019 07:12 AM    Epithelial cells FEW 10/17/2019 07:12 AM    Bacteria NEGATIVE  10/17/2019 07:12 AM    WBC 20-50 10/17/2019 07:12 AM    RBC 0-5 10/17/2019 07:12 AM       MEDICATIONS:  Current Facility-Administered Medications Medication Dose Route Frequency    fluconazole (DIFLUCAN) tablet 100 mg  100 mg Oral DAILY    epoetin brandt-epbx (RETACRIT) injection 10,000 Units  10,000 Units SubCUTAneous Q7D    ondansetron (ZOFRAN) injection 4 mg  4 mg IntraVENous Q6H PRN    cefepime (MAXIPIME) 2 g in 0.9% sodium chloride (MBP/ADV) 100 mL  2 g IntraVENous Q24H    balsam peru-castor oil (VENELEX) ointment   Topical TID    gabapentin (NEURONTIN) capsule 300 mg  300 mg Oral DAILY    acetaminophen (TYLENOL) tablet 650 mg  650 mg Oral Q6H PRN    aspirin chewable tablet 81 mg  81 mg Oral DAILY    hydrALAZINE (APRESOLINE) tablet 100 mg  100 mg Oral TID    loperamide (IMODIUM) capsule 4 mg  4 mg Oral Q8H PRN    metoprolol tartrate (LOPRESSOR) tablet 50 mg  50 mg Oral BID    PARoxetine (PAXIL) tablet 20 mg  20 mg Oral DAILY    polyethylene glycol (MIRALAX) packet 17 g  17 g Oral DAILY PRN    pravastatin (PRAVACHOL) tablet 40 mg  40 mg Oral QHS    temazepam (RESTORIL) capsule 15 mg  15 mg Oral QHS    traMADol (ULTRAM) tablet 50 mg  50 mg Oral Q6H PRN    heparin (porcine) injection 5,000 Units  5,000 Units SubCUTAneous Q12H

## 2019-10-23 LAB
ANION GAP SERPL CALC-SCNC: 11 MMOL/L (ref 5–15)
BASOPHILS # BLD: 0.1 K/UL (ref 0–0.1)
BASOPHILS NFR BLD: 1 % (ref 0–1)
BUN SERPL-MCNC: 43 MG/DL (ref 6–20)
BUN/CREAT SERPL: 26 (ref 12–20)
CALCIUM SERPL-MCNC: 9.7 MG/DL (ref 8.5–10.1)
CHLORIDE SERPL-SCNC: 109 MMOL/L (ref 97–108)
CO2 SERPL-SCNC: 22 MMOL/L (ref 21–32)
CREAT SERPL-MCNC: 1.63 MG/DL (ref 0.55–1.02)
DIFFERENTIAL METHOD BLD: ABNORMAL
EOSINOPHIL # BLD: 0.4 K/UL (ref 0–0.4)
EOSINOPHIL NFR BLD: 3 % (ref 0–7)
ERYTHROCYTE [DISTWIDTH] IN BLOOD BY AUTOMATED COUNT: 16.2 % (ref 11.5–14.5)
GLUCOSE SERPL-MCNC: 83 MG/DL (ref 65–100)
HCT VFR BLD AUTO: 27.7 % (ref 35–47)
HGB BLD-MCNC: 8.8 G/DL (ref 11.5–16)
IMM GRANULOCYTES # BLD AUTO: 0.4 K/UL (ref 0–0.04)
IMM GRANULOCYTES NFR BLD AUTO: 3 % (ref 0–0.5)
LYMPHOCYTES # BLD: 0.9 K/UL (ref 0.8–3.5)
LYMPHOCYTES NFR BLD: 8 % (ref 12–49)
MCH RBC QN AUTO: 29.4 PG (ref 26–34)
MCHC RBC AUTO-ENTMCNC: 31.8 G/DL (ref 30–36.5)
MCV RBC AUTO: 92.6 FL (ref 80–99)
MONOCYTES # BLD: 2 K/UL (ref 0–1)
MONOCYTES NFR BLD: 17 % (ref 5–13)
NEUTS SEG # BLD: 7.9 K/UL (ref 1.8–8)
NEUTS SEG NFR BLD: 68 % (ref 32–75)
NRBC # BLD: 0.03 K/UL (ref 0–0.01)
NRBC BLD-RTO: 0.3 PER 100 WBC
PLATELET # BLD AUTO: 222 K/UL (ref 150–400)
PMV BLD AUTO: 10.9 FL (ref 8.9–12.9)
POTASSIUM SERPL-SCNC: 3.2 MMOL/L (ref 3.5–5.1)
RBC # BLD AUTO: 2.99 M/UL (ref 3.8–5.2)
RBC MORPH BLD: ABNORMAL
SODIUM SERPL-SCNC: 142 MMOL/L (ref 136–145)
WBC # BLD AUTO: 11.7 K/UL (ref 3.6–11)

## 2019-10-23 PROCEDURE — 74011250637 HC RX REV CODE- 250/637: Performed by: INTERNAL MEDICINE

## 2019-10-23 PROCEDURE — 74011000258 HC RX REV CODE- 258: Performed by: INTERNAL MEDICINE

## 2019-10-23 PROCEDURE — 80048 BASIC METABOLIC PNL TOTAL CA: CPT

## 2019-10-23 PROCEDURE — 94760 N-INVAS EAR/PLS OXIMETRY 1: CPT

## 2019-10-23 PROCEDURE — 65270000029 HC RM PRIVATE

## 2019-10-23 PROCEDURE — 77010033678 HC OXYGEN DAILY

## 2019-10-23 PROCEDURE — 74011250636 HC RX REV CODE- 250/636: Performed by: INTERNAL MEDICINE

## 2019-10-23 PROCEDURE — 85025 COMPLETE CBC W/AUTO DIFF WBC: CPT

## 2019-10-23 PROCEDURE — 36415 COLL VENOUS BLD VENIPUNCTURE: CPT

## 2019-10-23 PROCEDURE — 77030038269 HC DRN EXT URIN PURWCK BARD -A

## 2019-10-23 RX ORDER — POTASSIUM CHLORIDE 20 MEQ/1
40 TABLET, EXTENDED RELEASE ORAL
Status: COMPLETED | OUTPATIENT
Start: 2019-10-23 | End: 2019-10-23

## 2019-10-23 RX ORDER — FUROSEMIDE 10 MG/ML
40 INJECTION INTRAMUSCULAR; INTRAVENOUS ONCE
Status: COMPLETED | OUTPATIENT
Start: 2019-10-23 | End: 2019-10-23

## 2019-10-23 RX ADMIN — HYDRALAZINE HYDROCHLORIDE 100 MG: 50 TABLET, FILM COATED ORAL at 08:57

## 2019-10-23 RX ADMIN — HEPARIN SODIUM 5000 UNITS: 5000 INJECTION INTRAVENOUS; SUBCUTANEOUS at 08:57

## 2019-10-23 RX ADMIN — ASPIRIN 81 MG 81 MG: 81 TABLET ORAL at 08:56

## 2019-10-23 RX ADMIN — PRAVASTATIN SODIUM 40 MG: 40 TABLET ORAL at 21:19

## 2019-10-23 RX ADMIN — HEPARIN SODIUM 5000 UNITS: 5000 INJECTION INTRAVENOUS; SUBCUTANEOUS at 21:19

## 2019-10-23 RX ADMIN — CEFEPIME HYDROCHLORIDE 2 G: 2 INJECTION, POWDER, FOR SOLUTION INTRAVENOUS at 06:52

## 2019-10-23 RX ADMIN — HYDRALAZINE HYDROCHLORIDE 100 MG: 50 TABLET, FILM COATED ORAL at 21:19

## 2019-10-23 RX ADMIN — METOPROLOL TARTRATE 50 MG: 50 TABLET, FILM COATED ORAL at 18:03

## 2019-10-23 RX ADMIN — HYDRALAZINE HYDROCHLORIDE 100 MG: 50 TABLET, FILM COATED ORAL at 18:03

## 2019-10-23 RX ADMIN — CASTOR OIL AND BALSAM, PERU: 788; 87 OINTMENT TOPICAL at 08:56

## 2019-10-23 RX ADMIN — PAROXETINE 20 MG: 20 TABLET, FILM COATED ORAL at 08:57

## 2019-10-23 RX ADMIN — FLUCONAZOLE 100 MG: 100 TABLET ORAL at 08:57

## 2019-10-23 RX ADMIN — CASTOR OIL AND BALSAM, PERU: 788; 87 OINTMENT TOPICAL at 18:03

## 2019-10-23 RX ADMIN — METOPROLOL TARTRATE 50 MG: 50 TABLET, FILM COATED ORAL at 08:56

## 2019-10-23 RX ADMIN — GABAPENTIN 300 MG: 300 CAPSULE ORAL at 08:57

## 2019-10-23 RX ADMIN — FUROSEMIDE 40 MG: 10 INJECTION, SOLUTION INTRAMUSCULAR; INTRAVENOUS at 08:57

## 2019-10-23 RX ADMIN — POTASSIUM CHLORIDE 40 MEQ: 20 TABLET, EXTENDED RELEASE ORAL at 12:46

## 2019-10-23 RX ADMIN — CASTOR OIL AND BALSAM, PERU: 788; 87 OINTMENT TOPICAL at 21:19

## 2019-10-23 RX ADMIN — TEMAZEPAM 15 MG: 15 CAPSULE ORAL at 21:19

## 2019-10-23 NOTE — PROGRESS NOTES
PROGRESS NOTE    NAME:  Ary Colvin   :   1935   MRN:   [id number]     Date/Time:  10/23/2019 7:36 AM  Subjective:   History:  Chart reviewed and patient seen and examined and D/W her nurse this AM and all events noted. She was recently admitted with CAP and UTI and D/Micah to adult home on 10/11 feeling well with Creat. 2.07 (baseline 1.9). Apparently although she felt well on D/C she became weaker and had taken in little PO and represented to the ER on 10/15 weaker with Creat 4.00 and was thus admitted with acute renal failure on CKD. She had a US w/o renal obstruction and ureteral stent in place. She has UA c/w infection with culture sent ( pos for Yeast). Her WBC was elevated on admission and improved initially but higher and had developed a cough and had a fever 102 during the night 10/18. Maxipime started and CXR w/o clear infiltrate. Initially her Creatinine was higher at 4.36 despite hydration, but significantly better now daily and at 1.63 yesterday (baseline 1.9). She was on Lisinopril for HTN and Lasix for edema as outpatient but currently on hold except resumed Lasix 10/21 BID and Once yesterday. She feels well now except she is weak and has no appetite. She denies CP, SOB or cardiac or respiratory c/o except now has a cough. She has no nausea or GI c/o except poor appetite and she has no  c/o. There are no there c/o on complete ROS.        Medications reviewed:  Current Facility-Administered Medications   Medication Dose Route Frequency    fluconazole (DIFLUCAN) tablet 100 mg  100 mg Oral DAILY    epoetin brandt-epbx (RETACRIT) injection 10,000 Units  10,000 Units SubCUTAneous Q7D    ondansetron (ZOFRAN) injection 4 mg  4 mg IntraVENous Q6H PRN    cefepime (MAXIPIME) 2 g in 0.9% sodium chloride (MBP/ADV) 100 mL  2 g IntraVENous Q24H    balsam peru-castor oil (VENELEX) ointment   Topical TID    gabapentin (NEURONTIN) capsule 300 mg  300 mg Oral DAILY    acetaminophen (TYLENOL) tablet 650 mg  650 mg Oral Q6H PRN    aspirin chewable tablet 81 mg  81 mg Oral DAILY    hydrALAZINE (APRESOLINE) tablet 100 mg  100 mg Oral TID    loperamide (IMODIUM) capsule 4 mg  4 mg Oral Q8H PRN    metoprolol tartrate (LOPRESSOR) tablet 50 mg  50 mg Oral BID    PARoxetine (PAXIL) tablet 20 mg  20 mg Oral DAILY    polyethylene glycol (MIRALAX) packet 17 g  17 g Oral DAILY PRN    pravastatin (PRAVACHOL) tablet 40 mg  40 mg Oral QHS    temazepam (RESTORIL) capsule 15 mg  15 mg Oral QHS    traMADol (ULTRAM) tablet 50 mg  50 mg Oral Q6H PRN    heparin (porcine) injection 5,000 Units  5,000 Units SubCUTAneous Q12H        Objective:   Vitals:  Visit Vitals  /60 Comment: RN notified   Pulse 76   Temp 98 °F (36.7 °C)   Resp 14   Ht 5' (1.524 m)   Wt 119 lb 14.9 oz (54.4 kg)   SpO2 96%   BMI 23.42 kg/m²    O2 Flow Rate (L/min): 4 l/min O2 Device: Nasal cannula Temp (24hrs), Av.7 °F (36.5 °C), Min:97.2 °F (36.2 °C), Max:98.4 °F (36.9 °C)      Last 24hr Input/Output:    Intake/Output Summary (Last 24 hours) at 10/23/2019 0736  Last data filed at 10/22/2019 1425  Gross per 24 hour   Intake 238 ml   Output 1200 ml   Net -962 ml        PHYSICAL EXAM:  General:     Alert, cooperative, no distress, appears stated age. Head:    Normocephalic, without obvious abnormality, atraumatic. Eyes:    Conjunctivae/corneas clear. PERRLA  Nose:   Nares normal. No drainage or sinus tenderness. Throat:     Lips, mucosa, and tongue normal.  No Thrush  Neck:   Supple, symmetrical,  no adenopathy, thyroid: non tender     no carotid bruit and no JVD. Back:     Symmetric,  No CVA tenderness. Lungs:    Scattered coarse rhonchi bilaterally. No Wheezing. No rales. Heart:    Regular rate and rhythm,  no murmur, rub or gallop. Abdomen:    Soft, non-tender. Not distended. Bowel sounds normal. No masses  Extremities:  Extremities normal, atraumatic, No cyanosis. Arms with edema.  No clubbing  Lymph nodes:  Cervical, supraclavicular normal.  Neurologic:  General decreased strength, Alert and oriented X 3. Skin:                 No rash, Skin breakdown on buttocks as per wound care note from 10/16      Lab Data Reviewed:    Recent Results (from the past 24 hour(s))   CBC WITH AUTOMATED DIFF    Collection Time: 10/23/19  5:45 AM   Result Value Ref Range    WBC 11.7 (H) 3.6 - 11.0 K/uL    RBC 2.99 (L) 3.80 - 5.20 M/uL    HGB 8.8 (L) 11.5 - 16.0 g/dL    HCT 27.7 (L) 35.0 - 47.0 %    MCV 92.6 80.0 - 99.0 FL    MCH 29.4 26.0 - 34.0 PG    MCHC 31.8 30.0 - 36.5 g/dL    RDW 16.2 (H) 11.5 - 14.5 %    PLATELET 251 896 - 503 K/uL    MPV 10.9 8.9 - 12.9 FL    NRBC 0.3 (H) 0  WBC    ABSOLUTE NRBC 0.03 (H) 0.00 - 0.01 K/uL    NEUTROPHILS 68 32 - 75 %    LYMPHOCYTES 8 (L) 12 - 49 %    MONOCYTES 17 (H) 5 - 13 %    EOSINOPHILS 3 0 - 7 %    BASOPHILS 1 0 - 1 %    IMMATURE GRANULOCYTES 3 (H) 0.0 - 0.5 %    ABS. NEUTROPHILS 7.9 1.8 - 8.0 K/UL    ABS. LYMPHOCYTES 0.9 0.8 - 3.5 K/UL    ABS. MONOCYTES 2.0 (H) 0.0 - 1.0 K/UL    ABS. EOSINOPHILS 0.4 0.0 - 0.4 K/UL    ABS. BASOPHILS 0.1 0.0 - 0.1 K/UL    ABS. IMM. GRANS. 0.4 (H) 0.00 - 0.04 K/UL    DF SMEAR SCANNED      RBC COMMENTS ANISOCYTOSIS  1+              Assessment/Plan:     Principal Problem:    Acute kidney injury superimposed on CKD ([facility] (10/15/2019)    Active Problems:    COPD (chronic obstructive pulmonary disease) ([facility] (8/14/2010)      Essential hypertension (7/20/2016)      Moderate protein-calorie malnutrition ([facility] (3/30/2018)      Stage 3 chronic kidney disease ([address] (11/5/2018)      Weakness generalized (10/15/2019)       ___________________________________________________  PLAN:    1. Discontinued IV hydration  2. Follow Creat 4.0 on adm to 4.36 and 1.63 yesterday and pending now with baseline 1.9  3. Continue off Lisinopril and resumed Lasix  4. F/U on repeat urine culture, Yeast  5. Follow WBC, 15.3 adm to 18.1 (10/17), now 11.7 w/  maxipime  6.   Continue Hydralazine and Metoprolol for HTN and follow  7. Continue nasal oxygen with COPD  8. Renal consult note reviewed and appreciate help  9. PT evaluation   10. Repeat CXR no definite infiltrate, ? Congestion but reluctant to use diuretic as stable and has Acute Renal failure and sent urine and blood cultures  11. Diflucan added 10/21with yeast  12. DNR per patient request, D/W her  15.  Hgb down to 7.5 and procrit weekly started, now 8.8          ___________________________________________________    Attending Physician: Dez sEcalona MD

## 2019-10-23 NOTE — PROGRESS NOTES
Problem: Pressure Injury - Risk of  Goal: *Prevention of pressure injury  Description  Document Rashid Scale and appropriate interventions in the flowsheet. Outcome: Progressing Towards Goal  Note:   Pressure Injury Interventions:  Sensory Interventions: Assess need for specialty bed, Float heels, Keep linens dry and wrinkle-free, Maintain/enhance activity level, Monitor skin under medical devices    Moisture Interventions: Absorbent underpads, Apply protective barrier, creams and emollients, Check for incontinence Q2 hours and as needed, Internal/External urinary devices    Activity Interventions: Pressure redistribution bed/mattress(bed type), Chair cushion, Assess need for specialty bed    Mobility Interventions: Float heels, HOB 30 degrees or less, Turn and reposition approx.  every two hours(pillow and wedges)    Nutrition Interventions: Discuss nutritional consult with provider, Offer support with meals,snacks and hydration    Friction and Shear Interventions: Feet elevated on foot rest, Foam dressings/transparent film/skin sealants, Lift team/patient mobility team

## 2019-10-23 NOTE — PROGRESS NOTES
Bedside shift change report given to Alexandra Peoples (oncoming nurse) by Caio Berry (offgoing nurse). Report included the following information SBAR, Kardex, Intake/Output, MAR, Accordion and Recent Results.

## 2019-10-23 NOTE — PROGRESS NOTES
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 Urine eos negative. Hgb < 9 - initiated retacrit 10 k sc weekly 10/21    Improved SBP (still labile though)    holding home Lisinopril/thiazide for now    Give loop diuretic prn (has had IV lasix the last few days)    Give po K today       Subjective:     Chief Complaint:  Sleepy. poor po intake. No N/V.  no dyspnea. Not too talkative. Review of Systems:    Symptom Y/N Comments  Symptom Y/N Comments   Fever/Chills    Chest Pain     Poor Appetite y   Edema y    Cough    Abdominal Pain     Sputum    Joint Pain     SOB/MONTANO y   Pruritis/Rash     Nausea/vomit n   Tolerating PT/OT     Diarrhea    Tolerating Diet     Constipation    Other       Could not obtain due to:      Objective:     VITALS:   Last 24hrs VS reviewed since prior progress note.  Most recent are:  Visit Vitals  /61 (BP 1 Location: Left arm, BP Patient Position: At rest)   Pulse 65   Temp 98.4 °F (36.9 °C)   Resp 16   Ht 5' (1.524 m)   Wt 54.4 kg (119 lb 14.9 oz)   SpO2 98%   BMI 23.42 kg/m²       Intake/Output Summary (Last 24 hours) at 10/23/2019 1159  Last data filed at 10/23/2019 0954  Gross per 24 hour   Intake 237 ml   Output 750 ml   Net -513 ml      Telemetry Reviewed:     PHYSICAL EXAM:  General: NAD  Rhonchi  + edema    Lab Data Reviewed: (see below)    Medications Reviewed: (see below)    PMH/SH reviewed - no change compared to H&P  ________________________________________________________________________  Care Plan discussed with:  Patient y    Family      RN y    Care Manager                    Consultant:          Comments >50% of visit spent in counseling and coordination of care       ________________________________________________________________________  Rogue Mcburney, MD     Procedures: see electronic medical records for all procedures/Xrays and details which  were not copied into this note but were reviewed prior to creation of Plan. LABS:  Recent Labs     10/23/19  0545 10/22/19  0540   WBC 11.7* 10.2   HGB 8.8* 7.5*   HCT 27.7* 23.6*    190     Recent Labs     10/23/19  0545 10/22/19  0540 10/21/19  0531    142 143   K 3.2* 3.5 3.7   * 112* 114*   CO2 22 22 19*   BUN 43* 43* 44*   CREA 1.63* 1.63* 1.51*   GLU 83 88 81   CA 9.7 9.3 9.4   MG  --  1.3*  --      No results for input(s): SGOT, GPT, AP, TBIL, TP, ALB, GLOB, GGT, AML, LPSE in the last 72 hours. No lab exists for component: AMYP, HLPSE  No results for input(s): INR, PTP, APTT, INREXT, INREXT in the last 72 hours. No results for input(s): FE, TIBC, PSAT, FERR in the last 72 hours. No results found for: FOL, RBCF   No results for input(s): PH, PCO2, PO2 in the last 72 hours. No results for input(s): CPK, CKMB in the last 72 hours.     No lab exists for component: TROPONINI  No components found for: [place]  Lab Results   Component Value Date/Time    Color YELLOW/STRAW 10/17/2019 07:12 AM    Appearance CLOUDY (A) 10/17/2019 07:12 AM    Specific gravity 1.015 10/17/2019 07:12 AM    Specific gravity 1.015 08/02/2019 11:39 AM    pH (UA) 5.0 10/17/2019 07:12 AM    Protein 100 (A) 10/17/2019 07:12 AM    Glucose NEGATIVE  10/17/2019 07:12 AM    Ketone NEGATIVE  10/17/2019 07:12 AM    Bilirubin NEGATIVE  10/17/2019 07:12 AM    Urobilinogen 0.2 10/17/2019 07:12 AM    Nitrites NEGATIVE  10/17/2019 07:12 AM    Leukocyte Esterase SMALL (A) 10/17/2019 07:12 AM    Epithelial cells FEW 10/17/2019 07:12 AM    Bacteria NEGATIVE  10/17/2019 07:12 AM    WBC 20-50 10/17/2019 07:12 AM    RBC 0-5 10/17/2019 07:12 AM       MEDICATIONS:  Current Facility-Administered Medications   Medication Dose Route Frequency    fluconazole (DIFLUCAN) tablet 100 mg  100 mg Oral DAILY    epoetin brandt-epbx (RETACRIT) injection 10,000 Units  10,000 Units SubCUTAneous Q7D    ondansetron (ZOFRAN) injection 4 mg  4 mg IntraVENous Q6H PRN    cefepime (MAXIPIME) 2 g in 0.9% sodium chloride (MBP/ADV) 100 mL  2 g IntraVENous Q24H    balsam peru-castor oil (VENELEX) ointment   Topical TID    gabapentin (NEURONTIN) capsule 300 mg  300 mg Oral DAILY    acetaminophen (TYLENOL) tablet 650 mg  650 mg Oral Q6H PRN    aspirin chewable tablet 81 mg  81 mg Oral DAILY    hydrALAZINE (APRESOLINE) tablet 100 mg  100 mg Oral TID    loperamide (IMODIUM) capsule 4 mg  4 mg Oral Q8H PRN    metoprolol tartrate (LOPRESSOR) tablet 50 mg  50 mg Oral BID    PARoxetine (PAXIL) tablet 20 mg  20 mg Oral DAILY    polyethylene glycol (MIRALAX) packet 17 g  17 g Oral DAILY PRN    pravastatin (PRAVACHOL) tablet 40 mg  40 mg Oral QHS    temazepam (RESTORIL) capsule 15 mg  15 mg Oral QHS    traMADol (ULTRAM) tablet 50 mg  50 mg Oral Q6H PRN    heparin (porcine) injection 5,000 Units  5,000 Units SubCUTAneous Q12H

## 2019-10-23 NOTE — PROGRESS NOTES
MARC PLAN:     Pt is a readmit: Pt was here 9/26/19 to 10/11/19 for CAP     Plan is to return to [place] at [address] with [facility] once medically stable     Therapy is recommending to return to [facility] with Caregivers to help with all care and ADLs.    Son may be able to transport her back to [place] once stable if Pt is able to ride in car.       Need to get copy of AMD from family for chart. 4:13 PM   CM talked to [place] with [place] at [facility]. She will call Dr. Avril Montejo to discuss Pt with him. She will need to reassess Pt to determine if they can manage her at [facility] level of care. Annika stated she would call family to talk with them about her care needs and come to reassess Pt in hospital to determine if they can accept her back. 3:19 PM   CM called to touch base with Libby at [place] at [facility]. She indicated that [place] with the facility would be calling and talking to family to see if Pt is still at the appropriate level of care and that they can meet her needs at the [facility]: The Phone Number is [phone]    OT reports: \" She is extremely weak and requires constant encouragement to attempt any functional tasks. She declined attempting to feed herself breakfast, despite max encouragement\"    PT reports: \"Patient is dependent for bed mobility and is refusing to attempt sitting EOB\"    Recommend [facility] for [address] [place] [address]. CM noted that Pt appears to be declining. Would a Palliative Care Consult be helpful to help Pt and family understand goals of care? CM will continue to monitor discharge plan.       Kaitlin Jay  Ext [phone]

## 2019-10-23 NOTE — PROGRESS NOTES
Bedside shift change report given to LOVELY Quintanilla (oncoming nurse) by Jael Beard (offgoing nurse). Report included the following information SBAR, Kardex, Procedure Summary, Intake/Output, MAR and Recent Results.

## 2019-10-24 LAB
ANION GAP SERPL CALC-SCNC: 5 MMOL/L (ref 5–15)
BASOPHILS # BLD: 0.1 K/UL (ref 0–0.1)
BASOPHILS NFR BLD: 1 % (ref 0–1)
BUN SERPL-MCNC: 38 MG/DL (ref 6–20)
BUN/CREAT SERPL: 22 (ref 12–20)
CALCIUM SERPL-MCNC: 9.9 MG/DL (ref 8.5–10.1)
CHLORIDE SERPL-SCNC: 110 MMOL/L (ref 97–108)
CO2 SERPL-SCNC: 25 MMOL/L (ref 21–32)
CREAT SERPL-MCNC: 1.75 MG/DL (ref 0.55–1.02)
DIFFERENTIAL METHOD BLD: ABNORMAL
EOSINOPHIL # BLD: 0.4 K/UL (ref 0–0.4)
EOSINOPHIL NFR BLD: 3 % (ref 0–7)
ERYTHROCYTE [DISTWIDTH] IN BLOOD BY AUTOMATED COUNT: 16.8 % (ref 11.5–14.5)
GLUCOSE SERPL-MCNC: 90 MG/DL (ref 65–100)
HCT VFR BLD AUTO: 29.4 % (ref 35–47)
HGB BLD-MCNC: 9.2 G/DL (ref 11.5–16)
IMM GRANULOCYTES # BLD AUTO: 0 K/UL (ref 0–0.04)
IMM GRANULOCYTES NFR BLD AUTO: 0 % (ref 0–0.5)
LYMPHOCYTES # BLD: 0.6 K/UL (ref 0.8–3.5)
LYMPHOCYTES NFR BLD: 5 % (ref 12–49)
MCH RBC QN AUTO: 29.5 PG (ref 26–34)
MCHC RBC AUTO-ENTMCNC: 31.3 G/DL (ref 30–36.5)
MCV RBC AUTO: 94.2 FL (ref 80–99)
MONOCYTES # BLD: 1.1 K/UL (ref 0–1)
MONOCYTES NFR BLD: 9 % (ref 5–13)
MYELOCYTES NFR BLD MANUAL: 1 %
NEUTS SEG # BLD: 9.6 K/UL (ref 1.8–8)
NEUTS SEG NFR BLD: 81 % (ref 32–75)
NRBC # BLD: 0.04 K/UL (ref 0–0.01)
NRBC BLD-RTO: 0.3 PER 100 WBC
PLATELET # BLD AUTO: 258 K/UL (ref 150–400)
PMV BLD AUTO: 10.6 FL (ref 8.9–12.9)
POTASSIUM SERPL-SCNC: 3.7 MMOL/L (ref 3.5–5.1)
RBC # BLD AUTO: 3.12 M/UL (ref 3.8–5.2)
RBC MORPH BLD: ABNORMAL
SODIUM SERPL-SCNC: 140 MMOL/L (ref 136–145)
WBC # BLD AUTO: 11.9 K/UL (ref 3.6–11)

## 2019-10-24 PROCEDURE — 74011000258 HC RX REV CODE- 258: Performed by: INTERNAL MEDICINE

## 2019-10-24 PROCEDURE — 97161 PT EVAL LOW COMPLEX 20 MIN: CPT | Performed by: PHYSICAL THERAPIST

## 2019-10-24 PROCEDURE — 94760 N-INVAS EAR/PLS OXIMETRY 1: CPT

## 2019-10-24 PROCEDURE — 74011250637 HC RX REV CODE- 250/637: Performed by: INTERNAL MEDICINE

## 2019-10-24 PROCEDURE — 80048 BASIC METABOLIC PNL TOTAL CA: CPT

## 2019-10-24 PROCEDURE — 36415 COLL VENOUS BLD VENIPUNCTURE: CPT

## 2019-10-24 PROCEDURE — 85025 COMPLETE CBC W/AUTO DIFF WBC: CPT

## 2019-10-24 PROCEDURE — 74011250636 HC RX REV CODE- 250/636: Performed by: INTERNAL MEDICINE

## 2019-10-24 PROCEDURE — 77010033678 HC OXYGEN DAILY

## 2019-10-24 PROCEDURE — 97530 THERAPEUTIC ACTIVITIES: CPT | Performed by: PHYSICAL THERAPIST

## 2019-10-24 PROCEDURE — 97164 PT RE-EVAL EST PLAN CARE: CPT | Performed by: PHYSICAL THERAPIST

## 2019-10-24 PROCEDURE — 65270000029 HC RM PRIVATE

## 2019-10-24 PROCEDURE — 74011250637 HC RX REV CODE- 250/637: Performed by: FAMILY MEDICINE

## 2019-10-24 RX ADMIN — CASTOR OIL AND BALSAM, PERU: 788; 87 OINTMENT TOPICAL at 08:01

## 2019-10-24 RX ADMIN — ASPIRIN 81 MG 81 MG: 81 TABLET ORAL at 08:00

## 2019-10-24 RX ADMIN — GABAPENTIN 300 MG: 300 CAPSULE ORAL at 08:01

## 2019-10-24 RX ADMIN — HYDRALAZINE HYDROCHLORIDE 100 MG: 50 TABLET, FILM COATED ORAL at 17:12

## 2019-10-24 RX ADMIN — METOPROLOL TARTRATE 50 MG: 50 TABLET, FILM COATED ORAL at 17:12

## 2019-10-24 RX ADMIN — METOPROLOL TARTRATE 50 MG: 50 TABLET, FILM COATED ORAL at 08:01

## 2019-10-24 RX ADMIN — HYDRALAZINE HYDROCHLORIDE 100 MG: 50 TABLET, FILM COATED ORAL at 08:00

## 2019-10-24 RX ADMIN — CEFEPIME HYDROCHLORIDE 2 G: 2 INJECTION, POWDER, FOR SOLUTION INTRAVENOUS at 07:58

## 2019-10-24 RX ADMIN — HEPARIN SODIUM 5000 UNITS: 5000 INJECTION INTRAVENOUS; SUBCUTANEOUS at 21:08

## 2019-10-24 RX ADMIN — ACETAMINOPHEN 650 MG: 325 TABLET ORAL at 07:58

## 2019-10-24 RX ADMIN — HYDRALAZINE HYDROCHLORIDE 100 MG: 50 TABLET, FILM COATED ORAL at 21:07

## 2019-10-24 RX ADMIN — HEPARIN SODIUM 5000 UNITS: 5000 INJECTION INTRAVENOUS; SUBCUTANEOUS at 08:00

## 2019-10-24 RX ADMIN — PRAVASTATIN SODIUM 40 MG: 40 TABLET ORAL at 21:07

## 2019-10-24 RX ADMIN — FLUCONAZOLE 100 MG: 100 TABLET ORAL at 08:00

## 2019-10-24 RX ADMIN — TEMAZEPAM 15 MG: 15 CAPSULE ORAL at 21:07

## 2019-10-24 RX ADMIN — PAROXETINE 20 MG: 20 TABLET, FILM COATED ORAL at 08:00

## 2019-10-24 RX ADMIN — CASTOR OIL AND BALSAM, PERU: 788; 87 OINTMENT TOPICAL at 16:02

## 2019-10-24 RX ADMIN — CASTOR OIL AND BALSAM, PERU: 788; 87 OINTMENT TOPICAL at 21:07

## 2019-10-24 NOTE — PROGRESS NOTES
Occupational Therapy  New orders received. Reviewed medical record. Have communicated with  re: pt care and needs at discharge. Attempting to initiate OT treatment session, however, pt declined and wishes to participate later this date. Pt c/o shakiness in her BUEs, she was only able to raise her BUEs ~ minus 75% of full range. Pt reports that she participated in self care at her AL and generally used a w/c that she was able to propel herself using BUEs and BLEs. Discussed with pt that she needs to participate to increase her strength and that she will not get stronger staying in bed. Pt verbalized understanding, but declined to participate in OT. Pt has been declining therapy and per notes is assistance X2 and is dependent for care; she is not feeding herself. If pt is willing to participate in therapy, then recommend SNF rehab at discharge. If pt is not willing to participate, feel pt needs discussion re: goals of care and likely placement in Long term care. Will follow up as requested by patient.

## 2019-10-24 NOTE — PROGRESS NOTES
NAME: Emir Vasquez        :  1935        MRN:  [id number]        Assessment :    Plan:  --CKD-3-baseline creatinine 1.5  CHRISTY    Hypokalemia     Proteinuria    Chronic obstructive uropathy with right ureteral stricture from previous AAA    Diarrhea    Anemia    HTN --Creatinine (peaked at 4.4, now up a tad - 1.6 to 1.8). Urine eos negative. Hgb < 9 - initiated retacrit 10 k sc weekly 10/21    Improved SBP (still labile though)    holding home Lisinopril/thiazide for now    Give loop diuretic prn (has had IV lasix the last few days; creatinine up today - I would hold)           Subjective:     Chief Complaint:  Sleepy. Improving appetite. No N/V.  no dyspnea. Not too talkative. I encouraged she get oob. Review of Systems:    Symptom Y/N Comments  Symptom Y/N Comments   Fever/Chills    Chest Pain     Poor Appetite    Edema     Cough    Abdominal Pain     Sputum    Joint Pain     SOB/MONTANO    Pruritis/Rash     Nausea/vomit n   Tolerating PT/OT     Diarrhea    Tolerating Diet     Constipation    Other       Could not obtain due to:      Objective:     VITALS:   Last 24hrs VS reviewed since prior progress note.  Most recent are:  Visit Vitals  /75   Pulse 72   Temp 98 °F (36.7 °C)   Resp 17   Ht 5' (1.524 m)   Wt 54.4 kg (119 lb 14.9 oz)   SpO2 94%   BMI 23.42 kg/m²       Intake/Output Summary (Last 24 hours) at 10/24/2019 1214  Last data filed at 10/24/2019 0654  Gross per 24 hour   Intake 480 ml   Output 1500 ml   Net -1020 ml      Telemetry Reviewed:     PHYSICAL EXAM:  General: NAD  Rhonchi  + edema    Lab Data Reviewed: (see below)    Medications Reviewed: (see below)    PMH/SH reviewed - no change compared to H&P  ________________________________________________________________________  Care Plan discussed with:  Patient y    Family      RN y    Care Manager                    Consultant:          Comments   >50% of visit spent in counseling and coordination of care       ________________________________________________________________________  Denys Hanna MD     Procedures: see electronic medical records for all procedures/Xrays and details which  were not copied into this note but were reviewed prior to creation of Plan. LABS:  Recent Labs     10/24/19  0426 10/23/19  0545   WBC 11.9* 11.7*   HGB 9.2* 8.8*   HCT 29.4* 27.7*    222     Recent Labs     10/24/19  0426 10/23/19  0545 10/22/19  0540    142 142   K 3.7 3.2* 3.5   * 109* 112*   CO2 25 22 22   BUN 38* 43* 43*   CREA 1.75* 1.63* 1.63*   GLU 90 83 88   CA 9.9 9.7 9.3   MG  --   --  1.3*     No results for input(s): SGOT, GPT, AP, TBIL, TP, ALB, GLOB, GGT, AML, LPSE in the last 72 hours. No lab exists for component: AMYP, HLPSE  No results for input(s): INR, PTP, APTT, INREXT, INREXT in the last 72 hours. No results for input(s): FE, TIBC, PSAT, FERR in the last 72 hours. No results found for: FOL, RBCF   No results for input(s): PH, PCO2, PO2 in the last 72 hours. No results for input(s): CPK, CKMB in the last 72 hours.     No lab exists for component: TROPONINI  No components found for: [place]  Lab Results   Component Value Date/Time    Color YELLOW/STRAW 10/17/2019 07:12 AM    Appearance CLOUDY (A) 10/17/2019 07:12 AM    Specific gravity 1.015 10/17/2019 07:12 AM    Specific gravity 1.015 08/02/2019 11:39 AM    pH (UA) 5.0 10/17/2019 07:12 AM    Protein 100 (A) 10/17/2019 07:12 AM    Glucose NEGATIVE  10/17/2019 07:12 AM    Ketone NEGATIVE  10/17/2019 07:12 AM    Bilirubin NEGATIVE  10/17/2019 07:12 AM    Urobilinogen 0.2 10/17/2019 07:12 AM    Nitrites NEGATIVE  10/17/2019 07:12 AM    Leukocyte Esterase SMALL (A) 10/17/2019 07:12 AM    Epithelial cells FEW 10/17/2019 07:12 AM    Bacteria NEGATIVE  10/17/2019 07:12 AM    WBC 20-50 10/17/2019 07:12 AM    RBC 0-5 10/17/2019 07:12 AM       MEDICATIONS:  Current Facility-Administered Medications   Medication Dose Route Frequency    fluconazole (DIFLUCAN) tablet 100 mg  100 mg Oral DAILY    epoetin brandt-epbx (RETACRIT) injection 10,000 Units  10,000 Units SubCUTAneous Q7D    ondansetron (ZOFRAN) injection 4 mg  4 mg IntraVENous Q6H PRN    cefepime (MAXIPIME) 2 g in 0.9% sodium chloride (MBP/ADV) 100 mL  2 g IntraVENous Q24H    balsam peru-castor oil (VENELEX) ointment   Topical TID    gabapentin (NEURONTIN) capsule 300 mg  300 mg Oral DAILY    acetaminophen (TYLENOL) tablet 650 mg  650 mg Oral Q6H PRN    aspirin chewable tablet 81 mg  81 mg Oral DAILY    hydrALAZINE (APRESOLINE) tablet 100 mg  100 mg Oral TID    loperamide (IMODIUM) capsule 4 mg  4 mg Oral Q8H PRN    metoprolol tartrate (LOPRESSOR) tablet 50 mg  50 mg Oral BID    PARoxetine (PAXIL) tablet 20 mg  20 mg Oral DAILY    polyethylene glycol (MIRALAX) packet 17 g  17 g Oral DAILY PRN    pravastatin (PRAVACHOL) tablet 40 mg  40 mg Oral QHS    temazepam (RESTORIL) capsule 15 mg  15 mg Oral QHS    traMADol (ULTRAM) tablet 50 mg  50 mg Oral Q6H PRN    heparin (porcine) injection 5,000 Units  5,000 Units SubCUTAneous Q12H

## 2019-10-24 NOTE — PROGRESS NOTES
1900- Bedside shift change report given to Delta Maurice (oncoming nurse) by Darling Benitez (offgoing nurse). Report included the following information SBAR, Kardex, Intake/Output, MAR, Accordion and Recent Results. Pt worked with therapy today but unable to walk to the chair.  Wound care performed per protocol

## 2019-10-24 NOTE — PROGRESS NOTES
Bedside shift change report given to [facility] (oncoming nurse) by Zackery Castro (offgoing nurse). Report included the following information SBAR, Kardex, Intake/Output, MAR and Recent Results.

## 2019-10-24 NOTE — PROGRESS NOTES
PROGRESS NOTE    NAME:  Cassandra Collet   :   1935   MRN:   [id number]     Date/Time:  10/24/2019 7:49 AM  Subjective:   History:  Chart reviewed and patient seen and examined and D/W her nurse this AM and all events noted. She was recently admitted with CAP and UTI and D/Micah to adult home on 10/11 feeling well with Creat. 2.07 (baseline 1.9). Apparently although she felt well on D/C she became weaker and had taken in little PO and represented to the ER on 10/15 weaker with Creat 4.00 and was thus admitted with acute renal failure on CKD. She had a US w/o renal obstruction and ureteral stent in place. She has UA c/w infection with culture sent ( pos for Yeast). Her WBC was elevated on admission and improved initially but higher and had developed a cough and had a fever 102 during the night 10/18. Maxipime started and CXR w/o clear infiltrate. Initially her Creatinine was higher at 4.36 despite hydration, but significantly better now daily and at 1.63 yesterday (baseline 1.9). She was on Lisinopril for HTN and Lasix for edema as outpatient but currently on hold except resumed Lasix 10/21 BID and Once yesterday. She feels well now except she is weak and has no appetite. She denies CP, SOB or cardiac or respiratory c/o except now has a cough. She has no nausea or GI c/o except poor appetite and she has no  c/o. There are no there c/o on complete ROS.        Medications reviewed:  Current Facility-Administered Medications   Medication Dose Route Frequency    fluconazole (DIFLUCAN) tablet 100 mg  100 mg Oral DAILY    epoetin brandt-epbx (RETACRIT) injection 10,000 Units  10,000 Units SubCUTAneous Q7D    ondansetron (ZOFRAN) injection 4 mg  4 mg IntraVENous Q6H PRN    cefepime (MAXIPIME) 2 g in 0.9% sodium chloride (MBP/ADV) 100 mL  2 g IntraVENous Q24H    balsam peru-castor oil (VENELEX) ointment   Topical TID    gabapentin (NEURONTIN) capsule 300 mg  300 mg Oral DAILY    acetaminophen (TYLENOL) tablet 650 mg  650 mg Oral Q6H PRN    aspirin chewable tablet 81 mg  81 mg Oral DAILY    hydrALAZINE (APRESOLINE) tablet 100 mg  100 mg Oral TID    loperamide (IMODIUM) capsule 4 mg  4 mg Oral Q8H PRN    metoprolol tartrate (LOPRESSOR) tablet 50 mg  50 mg Oral BID    PARoxetine (PAXIL) tablet 20 mg  20 mg Oral DAILY    polyethylene glycol (MIRALAX) packet 17 g  17 g Oral DAILY PRN    pravastatin (PRAVACHOL) tablet 40 mg  40 mg Oral QHS    temazepam (RESTORIL) capsule 15 mg  15 mg Oral QHS    traMADol (ULTRAM) tablet 50 mg  50 mg Oral Q6H PRN    heparin (porcine) injection 5,000 Units  5,000 Units SubCUTAneous Q12H        Objective:   Vitals:  Visit Vitals  /75   Pulse 72   Temp 98 °F (36.7 °C)   Resp 17   Ht 5' (1.524 m)   Wt 119 lb 14.9 oz (54.4 kg)   SpO2 94%   BMI 23.42 kg/m²    O2 Flow Rate (L/min): 4 l/min O2 Device: Nasal cannula Temp (24hrs), Av.3 °F (36.8 °C), Min:97.8 °F (36.6 °C), Max:98.8 °F (37.1 °C)      Last 24hr Input/Output:    Intake/Output Summary (Last 24 hours) at 10/24/2019 0750  Last data filed at 10/24/2019 0654  Gross per 24 hour   Intake 717 ml   Output 1500 ml   Net -783 ml        PHYSICAL EXAM:  General:     Alert, cooperative, no distress, appears stated age. Head:    Normocephalic, without obvious abnormality, atraumatic. Eyes:    Conjunctivae/corneas clear. PERRLA  Nose:   Nares normal. No drainage or sinus tenderness. Throat:     Lips, mucosa, and tongue normal.  No Thrush  Neck:   Supple, symmetrical,  no adenopathy, thyroid: non tender     no carotid bruit and no JVD. Back:     Symmetric,  No CVA tenderness. Lungs:    Scattered coarse rhonchi bilaterally. No Wheezing. No rales. Heart:    Regular rate and rhythm,  no murmur, rub or gallop. Abdomen:    Soft, non-tender. Not distended. Bowel sounds normal. No masses  Extremities:  Extremities normal, atraumatic, No cyanosis. Arms with edema.  No clubbing  Lymph nodes:  Cervical, supraclavicular normal.  Neurologic:  General decreased strength, Alert and oriented X 3. Skin:                 No rash, Skin breakdown on buttocks as per wound care note from 10/16      Lab Data Reviewed:    Recent Results (from the past 24 hour(s))   METABOLIC PANEL, BASIC    Collection Time: 10/24/19  4:26 AM   Result Value Ref Range    Sodium 140 136 - 145 mmol/L    Potassium 3.7 3.5 - 5.1 mmol/L    Chloride 110 (H) 97 - 108 mmol/L    CO2 25 21 - 32 mmol/L    Anion gap 5 5 - 15 mmol/L    Glucose 90 65 - 100 mg/dL    BUN 38 (H) 6 - 20 MG/DL    Creatinine 1.75 (H) 0.55 - 1.02 MG/DL    BUN/Creatinine ratio 22 (H) 12 - 20      GFR est AA 34 (L) >60 ml/min/1.73m2    GFR est non-AA 28 (L) >60 ml/min/1.73m2    Calcium 9.9 8.5 - 10.1 MG/DL   CBC WITH AUTOMATED DIFF    Collection Time: 10/24/19  4:26 AM   Result Value Ref Range    WBC 11.9 (H) 3.6 - 11.0 K/uL    RBC 3.12 (L) 3.80 - 5.20 M/uL    HGB 9.2 (L) 11.5 - 16.0 g/dL    HCT 29.4 (L) 35.0 - 47.0 %    MCV 94.2 80.0 - 99.0 FL    MCH 29.5 26.0 - 34.0 PG    MCHC 31.3 30.0 - 36.5 g/dL    RDW 16.8 (H) 11.5 - 14.5 %    PLATELET 167 300 - 709 K/uL    MPV 10.6 8.9 - 12.9 FL    NRBC 0.3 (H) 0  WBC    ABSOLUTE NRBC 0.04 (H) 0.00 - 0.01 K/uL    NEUTROPHILS 81 (H) 32 - 75 %    LYMPHOCYTES 5 (L) 12 - 49 %    MONOCYTES 9 5 - 13 %    EOSINOPHILS 3 0 - 7 %    BASOPHILS 1 0 - 1 %    MYELOCYTES 1 %    IMMATURE GRANULOCYTES 0 0.0 - 0.5 %    ABS. NEUTROPHILS 9.6 (H) 1.8 - 8.0 K/UL    ABS. LYMPHOCYTES 0.6 (L) 0.8 - 3.5 K/UL    ABS. MONOCYTES 1.1 (H) 0.0 - 1.0 K/UL    ABS. EOSINOPHILS 0.4 0.0 - 0.4 K/UL    ABS. BASOPHILS 0.1 0.0 - 0.1 K/UL    ABS. IMM.  GRANS. 0.0 0.00 - 0.04 K/UL    DF AUTOMATED      RBC COMMENTS ANISOCYTOSIS  1+              Assessment/Plan:     Principal Problem:    Acute kidney injury superimposed on CKD ([facility] (10/15/2019)    Active Problems:    COPD (chronic obstructive pulmonary disease) ([facility] (8/14/2010)      Essential hypertension (7/20/2016)      Moderate protein-calorie malnutrition ([facility] (3/30/2018)      Stage 3 chronic kidney disease ([facility] (11/5/2018)      Weakness generalized (10/15/2019)       ___________________________________________________  PLAN:    1. Discontinued IV hydration  2. Follow Creat 4.0 on adm to 4.36 and 1.75  now with baseline 1.9  3. Continue off Lisinopril and resumed Lasix last 3 days  4. F/U on repeat urine culture, Yeast  5. Follow WBC, 15.3 adm to 18.1 (10/17), now 11.9 w/  maxipime  6. Continue Hydralazine and Metoprolol for HTN and follow  7. Continue nasal oxygen with COPD  8. Renal consult note reviewed and appreciate help  9. PT evaluation   10. Repeat CXR no definite infiltrate, ? Congestion but reluctant to use diuretic as stable and has Acute Renal failure and sent urine and blood cultures  11. Diflucan added 10/21with yeast  12. DNR per patient request, D/W her  15. Hgb down to 7.5 and procrit weekly started, now 9.2  14.  Up in chair and D/C planning, D/W nurse          ___________________________________________________    Attending Physician: Giulia Edwards MD

## 2019-10-24 NOTE — PROGRESS NOTES
Problem: Mobility Impaired (Adult and Pediatric)  Goal: *Acute Goals and Plan of Care (Insert Text)  Description  FUNCTIONAL STATUS PRIOR TO ADMISSION: The patient was at the wheelchair level and required moderate assistance for transfers to the chair; unable to propel chair secondary to arthritis    HOME [facility] [address]: The patient lived in [place] and requires assist for all mobility and ADLs. Physical Therapy Goals  Initiated 10/24/2019  1. Patient will move from supine to sit and sit to supine , scoot up and down and roll side to side in bed with moderate assistance  within 7 day(s). 2.  Patient will transfer from bed to chair and chair to bed with maximal assistance using the least restrictive device within 7 day(s). 3.  Patient will perform sit to stand with maximal assistance within 7 day(s). 4. Patient will perform 2 sets of 10 repetitions of active range of motion exercises for bilateral upper and lower extremity(s) with moderate assistance  within 7 day(s). Physical Therapy Goals  Initiated 10/17/2019  1. Patient will move from supine to sit and sit to supine , scoot up and down and roll side to side in bed with moderate assistance  within 7 day(s). 2.  Patient will transfer from bed to chair and chair to bed with moderate assistance  using the least restrictive device within 7 day(s). 3.  Patient will perform sit to stand with moderate assistance  within 7 day(s). Outcome: Not Met   PHYSICAL THERAPY REEVALUATION  Patient: Kira Soto (52 y.o. female)  Date: 10/24/2019  Primary Diagnosis: Acute kidney injury superimposed on CKD ([facility] [N17.9, N18.9]        Precautions: falls         ASSESSMENT  Based on the objective data described below, the patient presents with grossly impaired strength and ROM secondary to arthritis. Patient demonstrating profoundly impaired functional mobility and impaired balance. Patient requiring max to total A of 2 for all mobility.  She was able to stand at EOB briefly for wound care to buttocks and then returned to supine. Patient demonstrating poor balance overall in sitting and standing. Patient more cooperative this pm and participating minimally in therapy. Fluctuating reports of PLOF but was requiring increased assistance for transfers and was w/c bound for mobility. Will follow on a trial basis based on patient's willingness to participate in therapy. If willing to participate, patient would benefit from SNF rehab following discharge to maximize functional mobility which would decrease caregiver burden    Current Level of Function Impacting Discharge (mobility/balance): max to total A of 2    Functional Outcome Measure: The patient scored 0/20 on the Elderly Mobility scale outcome measure which is indicative of dependence. Patient will benefit from skilled therapy intervention to address the above noted impairments. PLAN :  Recommendations and Planned Interventions: bed mobility training, transfer training, therapeutic exercises, patient and family training/education and therapeutic activities      Frequency/Duration: Patient will be followed by physical therapy:  3 times a week to address goals. Recommendation for discharge: (in order for the patient to meet his/her long term goals)  To be determined: by participation- SNF vs LTC     This discharge recommendation:  Has been made in collaboration with the attending provider and/or case management    Equipment recommendations for successful discharge (if) home: patient owns DME required for discharge         SUBJECTIVE:   Patient stated You tell Dr. Aida Ponce that I worked with you.     OBJECTIVE DATA SUMMARY:   HISTORY:    Past Medical History:   Diagnosis Date    Arrhythmia     A-fib    Arthritis     shoulder/right    Cancer ([facility] 2015    left lung    Chronic kidney disease     Stent in Right Kidney, Stage III    Chronic obstructive pulmonary disease ([facility] Hypertension     Ill-defined condition     Ex Lap with Murel Posey patch of a perforated pyloric channel ulcer 7/19/16    Other ill-defined conditions(799.89)     lipids    Other ill-defined conditions(799.89)     blood transfusion history    PVD (peripheral vascular disease) ([facility]      Past Surgical History:   Procedure Laterality Date    BYPASS GRAFT OTHR,FEM-FEM      BYPASS GRAFT OTHR,FEM-POP Right     PVD    HX HEENT      bilateral cataracts    HX ORTHOPAEDIC Right     right hip fracture/pinning    HX UROLOGICAL      right stent/kidney     Hospital course since last seen and reason for reevaluation: fluctuating reports of PLOF and possible rehab placement      Home Situation  Home Environment: Assisted living  # Steps to Enter: 0  One/Two Story Residence: One story  Living Alone: No  Support Systems: Assisted living, Child(hansel), Long term acute care, Skilled nursing facility  Patient Expects to be Discharged to[de-identified] Assisted living  Current DME Used/Available at Home: Wheelchair  Tub or Shower Type: Shower    EXAMINATION/PRESENTATION/DECISION MAKING:   Critical Behavior:  Neurologic State: Alert  Orientation Level: Oriented X4  Cognition: Follows commands, Appropriate decision making  Safety/Judgement: Awareness of environment, Decreased awareness of need for safety  Hearing: Auditory  Auditory Impairment: None    Range Of Motion:  AROM: Grossly decreased, non-functional           PROM: Generally decreased, functional           Strength:    Strength: Grossly decreased, non-functional                    Tone & Sensation:   Tone: Abnormal(mildly tremulous)                              Coordination:  Coordination: Generally decreased, functional       Functional Mobility:  Bed Mobility:  Rolling: Maximum assistance  Supine to Sit: Total assistance  Sit to Supine: Total assistance  Scooting:  Total assistance  Transfers:  Sit to Stand: Maximum assistance;Assist x2  Stand to Sit: Total assistance Balance:   Sitting: Impaired  Sitting - Static: (poor initially progressing to fair)  Sitting - Dynamic: Poor (constant support)  Standing: Impaired  Standing - Static: Poor             Therapeutic Exercises:   Patient requiring mod A for AAROM of B LEs    Functional Measure:    Elder Mobility Scale    4/20         Scores under 10 - generally these patients are dependent in mobility maneuvers; require help with  basic ADL, such as transfers, toileting and dressing. Scores between 10 - 13 - generally these patients are borderline in terms of safe mobility and  independence in ADL i.e. they require some help with some mobility maneuvers. Scores over 14 - Generally these patients are able to perform mobility maneuvers alone and safely  and are independent in basic ADL. Activity Tolerance:   Poor  Please refer to the flowsheet for vital signs taken during this treatment. After treatment patient left in no apparent distress:   Supine in bed and Call bell within reach    COMMUNICATION/EDUCATION:   The patients plan of care was discussed with: Occupational Therapist, Registered Nurse,  and Rehabilitation Attendant. Fall prevention education was provided and the patient/caregiver indicated understanding., Patient/family have participated as able in goal setting and plan of care., Patient/family agree to work toward stated goals and plan of care. and Patient understands intent and goals of therapy, but is neutral about his/her participation.     Thank you for this referral.  Gaylen Closs, PT   Time Calculation: 36 mins

## 2019-10-24 NOTE — PROGRESS NOTES
Physical Therapy  Re-consult received. Chart reviewed and OT note noted- patient is not agreeable to therapy at this time. Will defer and follow back later today.   Thank you,  Madhav Salvador, PT

## 2019-10-24 NOTE — PROGRESS NOTES
Nutrition Assessment:    INTERVENTIONS/RECOMMENDATIONS:   Meals/Snacks: General/healthful diet: continue current diet  Supplements: Commercial supplement: continue current supplements     ASSESSMENT:   10/24: chart reviewed. Medically noted for: CHRISTY, CKD, malnutrition, HTN, lung cancer. Pt noted for chronic poor PO intake. Pt visited pt to assess PO intake and appetite. Pt reports to poor PO intake and appetite. Pt is consuming <25% of meals and ONS. Pt reports to disliking the hospital food and will eat \"what she wants\". Intern encouraged pt to eat as much as tolerated to meed the calculated nutritional demands. Will continue to monitor PO intake and will provide further nutritional recommendations as needed. Diet Order: Regular(2 g Na, house shake, magic cup, ensure clear )  % Eaten:    Patient Vitals for the past 72 hrs:   % Diet Eaten   10/23/19 0954 0 %   10/22/19 1141 0 %   10/21/19 1803 15 %   10/21/19 1304 10 %     Pertinent Medications: [x] Reviewed []Other:  Pertinent Labs: [x]Reviewed  []Other: porcine, lopressor  Food Allergies: [x]None []Other: hgb (9.2), hct (29.4), cl (110), BUN (38), creat (1.75), mg (1.3)  Last BM: 10/22   []Active     []Hyperactive  [x]Hypoactive       [] Absent  BS  Skin:    [] Intact   [] Incision  [] Breakdown   []Edema   [x]Other: wound    Anthropometrics: Height: 5' (152.4 cm) Weight: 54.4 kg (119 lb 14.9 oz)    IBW (%IBW):   ( ) UBW (%UBW):   (  %)    BMI: Body mass index is 23.42 kg/m². This BMI is indicative of:  []Underweight   [x]Normal   []Overweight   [] Obesity   [] Extreme Obesity (BMI>40)  Last Weight Metrics:  Weight Loss Metrics 10/15/2019 9/26/2019 8/2/2019 7/10/2019 7/9/2019 6/14/2019 6/4/2019   Today's Wt 119 lb 14.9 oz 132 lb 4.4 oz - 113 lb 15.7 oz 115 lb 1.3 oz - 116 lb 3.2 oz   BMI 23.42 kg/m2 23.43 kg/m2 21.19 kg/m2 21.19 kg/m2 21.39 kg/m2 21.25 kg/m2 21.25 kg/m2       Estimated Nutrition Needs (Based on): 3593 Kcals/day(BMR (927) x 1. 3AF) , 66 g(1.2 g/kg bw) Protein  Carbohydrate: At Least 130 g/day  Fluids: 1200 mL/day    NUTRITION DIAGNOSES:   Problem:  Inadequate protein-energy intake Increased nutrient needs    Etiology: related to poor appetite  pressure ulcer    Signs/Symptoms: as evidenced by PO <25% of meals  need for wound healing   Previous Nutrition Dx:  [] Resolved  [x] Unresolved           [] Progressing    NUTRITION INTERVENTIONS:  Meals/Snacks: General/healthful diet   Supplements: Commercial supplement              GOAL:   PO intake >25% of meals and 50% of ONS next 1-3 days    NUTRITION MONITORING AND EVALUATION      Food/Nutrient Intake Outcomes:  Total energy intake  Physical Signs/Symptoms Outcomes: Weight/weight change, Electrolyte and renal profile    Previous Goal Met:   [] Met              [] Progressing Towards Goal              [x] Not Progressing Towards Goal   Previous Recommendations:   [x] Implemented          [] Not Implemented          [] Not Applicable    LEARNING NEEDS (Diet, Food/Nutrient-Drug Interaction):    [x] None Identified   [] Identified and Education Provided/Documented   [] Identified and Pt declined/was not appropriate     Cultural, Yazdanism, OR Ethnic Dietary Needs:    [x] None Identified   [] Identified and Addressed     [x] Interdisciplinary Care Plan Reviewed/Documented    [x] Discharge Planning: regular diet with ONS as needed   [] Participated in Interdisciplinary Rounds    NUTRITION RISK:    [x] Patient At Nutritional Risk   [] Patient Not At Nutritional Risk      Moncho Epley  Pager [phone]  Weekend Pager [phone]

## 2019-10-25 LAB
ANION GAP SERPL CALC-SCNC: 6 MMOL/L (ref 5–15)
BASOPHILS # BLD: 0 K/UL (ref 0–0.1)
BASOPHILS NFR BLD: 0 % (ref 0–1)
BUN SERPL-MCNC: 40 MG/DL (ref 6–20)
BUN/CREAT SERPL: 24 (ref 12–20)
CALCIUM SERPL-MCNC: 9.8 MG/DL (ref 8.5–10.1)
CHLORIDE SERPL-SCNC: 112 MMOL/L (ref 97–108)
CO2 SERPL-SCNC: 24 MMOL/L (ref 21–32)
CREAT SERPL-MCNC: 1.66 MG/DL (ref 0.55–1.02)
DIFFERENTIAL METHOD BLD: ABNORMAL
EOSINOPHIL # BLD: 0.3 K/UL (ref 0–0.4)
EOSINOPHIL NFR BLD: 3 % (ref 0–7)
ERYTHROCYTE [DISTWIDTH] IN BLOOD BY AUTOMATED COUNT: 17 % (ref 11.5–14.5)
GLUCOSE SERPL-MCNC: 88 MG/DL (ref 65–100)
HCT VFR BLD AUTO: 27.1 % (ref 35–47)
HGB BLD-MCNC: 8.3 G/DL (ref 11.5–16)
IMM GRANULOCYTES # BLD AUTO: 0 K/UL (ref 0–0.04)
IMM GRANULOCYTES NFR BLD AUTO: 0 % (ref 0–0.5)
LYMPHOCYTES # BLD: 0.9 K/UL (ref 0.8–3.5)
LYMPHOCYTES NFR BLD: 9 % (ref 12–49)
MAGNESIUM SERPL-MCNC: 1.2 MG/DL (ref 1.6–2.4)
MCH RBC QN AUTO: 28.8 PG (ref 26–34)
MCHC RBC AUTO-ENTMCNC: 30.6 G/DL (ref 30–36.5)
MCV RBC AUTO: 94.1 FL (ref 80–99)
METAMYELOCYTES NFR BLD MANUAL: 2 %
MONOCYTES # BLD: 0.6 K/UL (ref 0–1)
MONOCYTES NFR BLD: 6 % (ref 5–13)
MYELOCYTES NFR BLD MANUAL: 1 %
NEUTS SEG # BLD: 8.1 K/UL (ref 1.8–8)
NEUTS SEG NFR BLD: 79 % (ref 32–75)
NRBC # BLD: 0.03 K/UL (ref 0–0.01)
NRBC BLD-RTO: 0.3 PER 100 WBC
PLATELET # BLD AUTO: 242 K/UL (ref 150–400)
PMV BLD AUTO: 10.9 FL (ref 8.9–12.9)
POTASSIUM SERPL-SCNC: 3.4 MMOL/L (ref 3.5–5.1)
RBC # BLD AUTO: 2.88 M/UL (ref 3.8–5.2)
RBC MORPH BLD: ABNORMAL
SODIUM SERPL-SCNC: 142 MMOL/L (ref 136–145)
WBC # BLD AUTO: 10.3 K/UL (ref 3.6–11)

## 2019-10-25 PROCEDURE — 74011250637 HC RX REV CODE- 250/637: Performed by: INTERNAL MEDICINE

## 2019-10-25 PROCEDURE — 65270000029 HC RM PRIVATE

## 2019-10-25 PROCEDURE — 74011250636 HC RX REV CODE- 250/636: Performed by: INTERNAL MEDICINE

## 2019-10-25 PROCEDURE — 97530 THERAPEUTIC ACTIVITIES: CPT

## 2019-10-25 PROCEDURE — 80048 BASIC METABOLIC PNL TOTAL CA: CPT

## 2019-10-25 PROCEDURE — 94760 N-INVAS EAR/PLS OXIMETRY 1: CPT

## 2019-10-25 PROCEDURE — 83735 ASSAY OF MAGNESIUM: CPT

## 2019-10-25 PROCEDURE — 85025 COMPLETE CBC W/AUTO DIFF WBC: CPT

## 2019-10-25 PROCEDURE — 74011000258 HC RX REV CODE- 258: Performed by: INTERNAL MEDICINE

## 2019-10-25 PROCEDURE — 77010033678 HC OXYGEN DAILY

## 2019-10-25 PROCEDURE — 36415 COLL VENOUS BLD VENIPUNCTURE: CPT

## 2019-10-25 PROCEDURE — 97530 THERAPEUTIC ACTIVITIES: CPT | Performed by: OCCUPATIONAL THERAPIST

## 2019-10-25 RX ORDER — AMLODIPINE BESYLATE 2.5 MG/1
2.5 TABLET ORAL DAILY
Status: DISCONTINUED | OUTPATIENT
Start: 2019-10-25 | End: 2019-10-29 | Stop reason: HOSPADM

## 2019-10-25 RX ORDER — POTASSIUM CHLORIDE 20 MEQ/1
40 TABLET, EXTENDED RELEASE ORAL
Status: COMPLETED | OUTPATIENT
Start: 2019-10-25 | End: 2019-10-25

## 2019-10-25 RX ORDER — CHLORTHALIDONE 25 MG/1
25 TABLET ORAL DAILY
Status: DISCONTINUED | OUTPATIENT
Start: 2019-10-25 | End: 2019-10-29 | Stop reason: HOSPADM

## 2019-10-25 RX ADMIN — PAROXETINE 20 MG: 20 TABLET, FILM COATED ORAL at 08:28

## 2019-10-25 RX ADMIN — CHLORTHALIDONE 25 MG: 25 TABLET ORAL at 12:26

## 2019-10-25 RX ADMIN — HEPARIN SODIUM 5000 UNITS: 5000 INJECTION INTRAVENOUS; SUBCUTANEOUS at 08:27

## 2019-10-25 RX ADMIN — PRAVASTATIN SODIUM 40 MG: 40 TABLET ORAL at 21:11

## 2019-10-25 RX ADMIN — POTASSIUM CHLORIDE 40 MEQ: 20 TABLET, EXTENDED RELEASE ORAL at 12:26

## 2019-10-25 RX ADMIN — AMLODIPINE BESYLATE 2.5 MG: 2.5 TABLET ORAL at 12:26

## 2019-10-25 RX ADMIN — CEFEPIME HYDROCHLORIDE 2 G: 2 INJECTION, POWDER, FOR SOLUTION INTRAVENOUS at 07:28

## 2019-10-25 RX ADMIN — METOPROLOL TARTRATE 50 MG: 50 TABLET, FILM COATED ORAL at 17:00

## 2019-10-25 RX ADMIN — CASTOR OIL AND BALSAM, PERU: 788; 87 OINTMENT TOPICAL at 16:59

## 2019-10-25 RX ADMIN — TEMAZEPAM 15 MG: 15 CAPSULE ORAL at 21:11

## 2019-10-25 RX ADMIN — HYDRALAZINE HYDROCHLORIDE 100 MG: 50 TABLET, FILM COATED ORAL at 21:11

## 2019-10-25 RX ADMIN — CASTOR OIL AND BALSAM, PERU: 788; 87 OINTMENT TOPICAL at 08:28

## 2019-10-25 RX ADMIN — GABAPENTIN 300 MG: 300 CAPSULE ORAL at 08:28

## 2019-10-25 RX ADMIN — CASTOR OIL AND BALSAM, PERU: 788; 87 OINTMENT TOPICAL at 21:12

## 2019-10-25 RX ADMIN — HYDRALAZINE HYDROCHLORIDE 100 MG: 50 TABLET, FILM COATED ORAL at 07:32

## 2019-10-25 RX ADMIN — METOPROLOL TARTRATE 50 MG: 50 TABLET, FILM COATED ORAL at 07:31

## 2019-10-25 RX ADMIN — HYDRALAZINE HYDROCHLORIDE 100 MG: 50 TABLET, FILM COATED ORAL at 16:58

## 2019-10-25 RX ADMIN — HEPARIN SODIUM 5000 UNITS: 5000 INJECTION INTRAVENOUS; SUBCUTANEOUS at 22:18

## 2019-10-25 RX ADMIN — FLUCONAZOLE 100 MG: 100 TABLET ORAL at 08:27

## 2019-10-25 RX ADMIN — ASPIRIN 81 MG 81 MG: 81 TABLET ORAL at 08:28

## 2019-10-25 NOTE — PROGRESS NOTES
Bedside shift change report given to Danielle (oncoming nurse) by Estelita Brown   (offgoing nurse). Report included the following information SBAR, Kardex, Intake/Output, MAR and Recent Results.

## 2019-10-25 NOTE — PROGRESS NOTES
PROGRESS NOTE    NAME:  Laura Georges   :   1935   MRN:   [id number]     Date/Time:  10/25/2019 7:59 AM  Subjective:   History:  Chart reviewed and patient seen and examined and D/W her nurse this AM and all events noted. She was recently admitted with CAP and UTI and D/Micah to adult home on 10/11 feeling well with Creat. 2.07 (baseline 1.9). Apparently although she felt well on D/C she became weaker and had taken in little PO and represented to the ER on 10/15 weaker with Creat 4.00 and was thus admitted with acute renal failure on CKD. She had a US w/o renal obstruction and ureteral stent in place. She has UA c/w infection with culture sent ( pos for Yeast). Her WBC was elevated on admission and improved initially but higher and had developed a cough and had a fever 102 during the night 10/18. Maxipime started and CXR w/o clear infiltrate. Initially her Creatinine was higher at 4.36 despite hydration, but significantly better now daily and at 1.66 today (baseline 1.9). She was on Lisinopril for HTN and Lasix for edema as outpatient but currently on hold except resumed Lasix 10/21 BID and once 10/22 and 10/23 w/o yesterday. She feels well now except she is weak and has no appetite. She denies CP, SOB or cardiac or respiratory c/o except now has a cough. She has no nausea or GI c/o except poor appetite and she has no  c/o. There are no there c/o on complete ROS.        Medications reviewed:  Current Facility-Administered Medications   Medication Dose Route Frequency    fluconazole (DIFLUCAN) tablet 100 mg  100 mg Oral DAILY    epoetin brandt-epbx (RETACRIT) injection 10,000 Units  10,000 Units SubCUTAneous Q7D    ondansetron (ZOFRAN) injection 4 mg  4 mg IntraVENous Q6H PRN    cefepime (MAXIPIME) 2 g in 0.9% sodium chloride (MBP/ADV) 100 mL  2 g IntraVENous Q24H    balsam peru-castor oil (VENELEX) ointment   Topical TID    gabapentin (NEURONTIN) capsule 300 mg  300 mg Oral DAILY    acetaminophen (TYLENOL) tablet 650 mg  650 mg Oral Q6H PRN    aspirin chewable tablet 81 mg  81 mg Oral DAILY    hydrALAZINE (APRESOLINE) tablet 100 mg  100 mg Oral TID    loperamide (IMODIUM) capsule 4 mg  4 mg Oral Q8H PRN    metoprolol tartrate (LOPRESSOR) tablet 50 mg  50 mg Oral BID    PARoxetine (PAXIL) tablet 20 mg  20 mg Oral DAILY    polyethylene glycol (MIRALAX) packet 17 g  17 g Oral DAILY PRN    pravastatin (PRAVACHOL) tablet 40 mg  40 mg Oral QHS    temazepam (RESTORIL) capsule 15 mg  15 mg Oral QHS    traMADol (ULTRAM) tablet 50 mg  50 mg Oral Q6H PRN    heparin (porcine) injection 5,000 Units  5,000 Units SubCUTAneous Q12H        Objective:   Vitals:  Visit Vitals  /65 (BP 1 Location: Left arm)   Pulse 66   Temp 98.4 °F (36.9 °C)   Resp 17   Ht 5' (1.524 m)   Wt 119 lb 14.9 oz (54.4 kg)   SpO2 97%   BMI 23.42 kg/m²    O2 Flow Rate (L/min): 3 l/min O2 Device: Nasal cannula Temp (24hrs), Av.3 °F (36.8 °C), Min:98.1 °F (36.7 °C), Max:98.4 °F (36.9 °C)      Last 24hr Input/Output:    Intake/Output Summary (Last 24 hours) at 10/25/2019 0759  Last data filed at 10/25/2019 0650  Gross per 24 hour   Intake 240 ml   Output 650 ml   Net -410 ml        PHYSICAL EXAM:  General:     Alert, cooperative, no distress, appears stated age. Head:    Normocephalic, without obvious abnormality, atraumatic. Eyes:    Conjunctivae/corneas clear. PERRLA  Nose:   Nares normal. No drainage or sinus tenderness. Throat:     Lips, mucosa, and tongue normal.  No Thrush  Neck:   Supple, symmetrical,  no adenopathy, thyroid: non tender     no carotid bruit and no JVD. Back:     Symmetric,  No CVA tenderness. Lungs:    Scattered coarse rhonchi bilaterally. No Wheezing. No rales. Heart:    Regular rate and rhythm,  no murmur, rub or gallop. Abdomen:    Soft, non-tender. Not distended. Bowel sounds normal. No masses  Extremities:  Extremities normal, atraumatic, No cyanosis. Arms with edema.  No clubbing  Lymph nodes:  Cervical, supraclavicular normal.  Neurologic:  General decreased strength, Alert and oriented X 3. Skin:                 No rash, Skin breakdown on buttocks as per wound care note from 10/16      Lab Data Reviewed:    Recent Results (from the past 24 hour(s))   CBC WITH AUTOMATED DIFF    Collection Time: 10/25/19  2:46 AM   Result Value Ref Range    WBC 10.3 3.6 - 11.0 K/uL    RBC 2.88 (L) 3.80 - 5.20 M/uL    HGB 8.3 (L) 11.5 - 16.0 g/dL    HCT 27.1 (L) 35.0 - 47.0 %    MCV 94.1 80.0 - 99.0 FL    MCH 28.8 26.0 - 34.0 PG    MCHC 30.6 30.0 - 36.5 g/dL    RDW 17.0 (H) 11.5 - 14.5 %    PLATELET 601 942 - 931 K/uL    MPV 10.9 8.9 - 12.9 FL    NRBC 0.3 (H) 0  WBC    ABSOLUTE NRBC 0.03 (H) 0.00 - 0.01 K/uL    NEUTROPHILS 79 (H) 32 - 75 %    LYMPHOCYTES 9 (L) 12 - 49 %    MONOCYTES 6 5 - 13 %    EOSINOPHILS 3 0 - 7 %    BASOPHILS 0 0 - 1 %    METAMYELOCYTES 2 %    MYELOCYTES 1 %    IMMATURE GRANULOCYTES 0 0.0 - 0.5 %    ABS. NEUTROPHILS 8.1 (H) 1.8 - 8.0 K/UL    ABS. LYMPHOCYTES 0.9 0.8 - 3.5 K/UL    ABS. MONOCYTES 0.6 0.0 - 1.0 K/UL    ABS. EOSINOPHILS 0.3 0.0 - 0.4 K/UL    ABS. BASOPHILS 0.0 0.0 - 0.1 K/UL    ABS. IMM.  GRANS. 0.0 0.00 - 0.04 K/UL    DF MANUAL      RBC COMMENTS ANISOCYTOSIS  1+       METABOLIC PANEL, BASIC    Collection Time: 10/25/19  2:46 AM   Result Value Ref Range    Sodium 142 136 - 145 mmol/L    Potassium 3.4 (L) 3.5 - 5.1 mmol/L    Chloride 112 (H) 97 - 108 mmol/L    CO2 24 21 - 32 mmol/L    Anion gap 6 5 - 15 mmol/L    Glucose 88 65 - 100 mg/dL    BUN 40 (H) 6 - 20 MG/DL    Creatinine 1.66 (H) 0.55 - 1.02 MG/DL    BUN/Creatinine ratio 24 (H) 12 - 20      GFR est AA 36 (L) >60 ml/min/1.73m2    GFR est non-AA 30 (L) >60 ml/min/1.73m2    Calcium 9.8 8.5 - 10.1 MG/DL          Assessment/Plan:     Principal Problem:    Acute kidney injury superimposed on CKD ([facility] (10/15/2019)    Active Problems:    COPD (chronic obstructive pulmonary disease) ([facility] (8/14/2010)      Essential hypertension (7/20/2016)      Moderate protein-calorie malnutrition ([facility] (3/30/2018)      Stage 3 chronic kidney disease ([facility] (11/5/2018)      Weakness generalized (10/15/2019)       ___________________________________________________  PLAN:    1. Discontinued IV hydration  2. Follow Creat 4.0 on adm to 4.36 and 1.66  now with baseline 1.9  3. Continue off Lisinopril and resumed Lasix last 3 days  4. F/U on repeat urine culture, Yeast  5. Follow WBC, 15.3 adm to 18.1 (10/17), now 10.3 w/  maxipime  6. Continue Hydralazine and Metoprolol for HTN and follow  7. Continue nasal oxygen with COPD  8. Renal consult note reviewed and appreciate help  9. PT evaluation   10. Repeat CXR no definite infiltrate, ? Congestion but reluctant to use diuretic as stable and has Acute Renal failure and sent urine and blood cultures  11. Diflucan added 10/21 with yeast  12. DNR per patient request, D/W her  15. Hgb down to 7.5 and procrit weekly started, now 8.3  14.  Up in chair and D/C planning, D/W nurse          ___________________________________________________    Attending Physician: Casimiro Leong MD

## 2019-10-25 NOTE — PROGRESS NOTES
Transitional Care READMISSION  HUG Note [facility]        Patient: Kira Soto MRN: [id number]  SSN: [id number]    YOB: 1935  Age: 80 y.o. Sex: female      Admit Date: 10/15/2019     LOS: 10 days        Assessment /Risk    MARC Diagnosis:    1. CHRISTY  With CKD d/t dehydration (decreased po intake following dc 10/11)            2. Probable Sepsis r/t PNA      WBC -  18.1 ( incr) - Cefepime started today      CR -  3.28 (improved) - nephrology consult in process      febrile        BP - stable now       O2 -2LPM        confusion  3. Generalized Fatigue - increased since discharge    Readmission Risks:  HIGH    1. Questionable [facility] candidate - functional decline more evident - decreased po intake, imani fluids  ( does she need to transisiton to LTC???)  2. Increased functional decline, baseline WC dependent, and most ADLs dependent [facility] has been providing incr assistance   3. Care Goals - requested AMD, DDNR on file      Fluid Status:  Incomplete, need complete record (ordered)    Nurse Navigator: PHANI  MARC appointment with Dr Hanson Mix    18.5 - 24.9 Normal weight / Body mass index is 23.42 kg/m². Code status: Durable DNR sent with patient  Recommended Disposition: SNF/LTC     Subjective:     10/25/2019  - CHRISTY labs improving -                            Anemia continued - Retacrit started Hgb today 8.3                          HTN continues to be problematic THZ restarted, ACE still on hold, Lasix restarted as well                           Leucocytosis resolved                           Concerns for decline decreased appetite, continued weakness with malaise. Disposition possible return to [facility] ( patient and son prefer) is only ASLor possible AC. No DC today. 80 y.o. female readmitted within 4 days of discharge for CHRISTY due to dehydration and probable PNA.  Upon discharge patient continued to have increased weakness, decreased po intake and was dc to baseline care home wit new requirement of O2 2LPM. Family noticed she was not eating/drinking and becoming more progressively weakness. Also noted more confusion. PMH pertient for CKD. Nephrology consulted. ACE and Lasix held. Today she appears somnolent, needs rpeated verbal cues to follow commands where she is inconsistent. Son a t bedside, appears very anxious over recent events\" I think she was discharged too early last time\"    Baseline she lives at the [address], [facility] dependent and they manage her RX and provide a higher level of care than basic [facility]. Son reports she has never been \"good at drinking water\" and needs reminders. BCX and UCX are in process. She may benefit for a ID consult. Will continue to follow. Number of Admissions this year  4, UTI and PNA  Heart Failure Bundle no      Advance Care Plan:     Son to look for AMD - states he is the mPOA. He has one sister who is also very involved in care. Patient is single      ROS:     A comprehensive ROS was performed and was negative except for as per HPI. Objective:      Allergies   Allergen Reactions    Hydrocodone Nausea and Vomiting    Morphine Rash    Dilaudid [Hydromorphone] Itching     Does not remember       Past Medical History:   Diagnosis Date    Arrhythmia     A-fib    Arthritis     shoulder/right    Cancer ([address] 2015    left lung    Chronic kidney disease     Stent in Right Kidney, Stage III    Chronic obstructive pulmonary disease ([address]     Hypertension     Ill-defined condition     Ex Lap with Ирина Shawanda patch of a perforated pyloric channel ulcer 7/19/16    Other ill-defined conditions(799.89)     lipids    Other ill-defined conditions(799.89)     blood transfusion history    PVD (peripheral vascular disease) ([address]        Past Surgical History:   Procedure Laterality Date    BYPASS GRAFT OTHR,FEM-FEM      BYPASS GRAFT OTHR,FEM-POP Right     PVD    HX HEENT      bilateral cataracts    HX ORTHOPAEDIC Right     right hip fracture/pinning    HX UROLOGICAL      right stent/kidney       Social History     Tobacco Use   Smoking Status Former Smoker    Packs/day: 0.25    Years: 60.00    Pack years: 15.00    Last attempt to quit: 2015    Years since quittin.8   Smokeless Tobacco Never Used       Current Facility-Administered Medications   Medication Dose Route Frequency    chlorthalidone (HYGROTEN) tablet 25 mg  25 mg Oral DAILY    amLODIPine (NORVASC) tablet 2.5 mg  2.5 mg Oral DAILY    potassium chloride (K-DUR, KLOR-CON) SR tablet 40 mEq  40 mEq Oral NOW    fluconazole (DIFLUCAN) tablet 100 mg  100 mg Oral DAILY    epoetin brandt-epbx (RETACRIT) injection 10,000 Units  10,000 Units SubCUTAneous Q7D    ondansetron (ZOFRAN) injection 4 mg  4 mg IntraVENous Q6H PRN    cefepime (MAXIPIME) 2 g in 0.9% sodium chloride (MBP/ADV) 100 mL  2 g IntraVENous Q24H    balsam peru-castor oil (VENELEX) ointment   Topical TID    gabapentin (NEURONTIN) capsule 300 mg  300 mg Oral DAILY    acetaminophen (TYLENOL) tablet 650 mg  650 mg Oral Q6H PRN    aspirin chewable tablet 81 mg  81 mg Oral DAILY    hydrALAZINE (APRESOLINE) tablet 100 mg  100 mg Oral TID    loperamide (IMODIUM) capsule 4 mg  4 mg Oral Q8H PRN    metoprolol tartrate (LOPRESSOR) tablet 50 mg  50 mg Oral BID    PARoxetine (PAXIL) tablet 20 mg  20 mg Oral DAILY    polyethylene glycol (MIRALAX) packet 17 g  17 g Oral DAILY PRN    pravastatin (PRAVACHOL) tablet 40 mg  40 mg Oral QHS    temazepam (RESTORIL) capsule 15 mg  15 mg Oral QHS    traMADol (ULTRAM) tablet 50 mg  50 mg Oral Q6H PRN    heparin (porcine) injection 5,000 Units  5,000 Units SubCUTAneous Q12H       Prior to Admission Medications   Prescriptions Last Dose Informant Patient Reported? Taking? L. acidoph & paracasei- S therm- Bifido (HANG-Q/RISAQUAD) 8 billion cell cap cap 10/15/2019 at 0900 Care Giver No Yes   Sig: Take 1 Cap by mouth daily.    PARoxetine (PAXIL) 20 mg tablet 10/15/2019 at 0900 Care Giver Yes Yes   Sig: Take 20 mg by mouth daily. acetaminophen (TYLENOL) 500 mg tablet 10/13/2019 at Unknown time Care Giver Yes Yes   Sig: Take 500 mg by mouth every six (6) hours as needed for Pain. aspirin 81 mg tablet 10/15/2019 at 0900 Care Giver Yes Yes   Sig: Take 81 mg by mouth daily. chlorthalidone (HYGROTEN) 25 mg tablet 10/15/2019 at 0900 Care Giver No Yes   Sig: Take 1 Tab by mouth daily. cholecalciferol (VITAMIN D3) 50,000 unit capsule 10/4/2019 at Unknown time Care Giver Yes Yes   Sig: Take 50,000 Units by mouth Every Friday. dilTIAZem CD (CARDIZEM CD) 300 mg ER capsule 10/15/2019 at 0900 Care Giver No Yes   Sig: Take 1 Cap by mouth daily. gabapentin (NEURONTIN) 400 mg capsule 10/15/2019 at 0900 Care Giver No Yes   Sig: Take 1 Cap by mouth four (4) times daily. Max Daily Amount: 1,600 mg.   hydrALAZINE (APRESOLINE) 100 mg tablet 10/15/2019 at [address] Yes Yes   Sig: Take 100 mg by mouth three (3) times daily. levoFLOXacin (LEVAQUIN) 500 mg tablet 10/15/2019 at 0900 Care Giver No Yes   Sig: Take 1 Tab by mouth Daily (before breakfast). lisinopril (PRINIVIL, ZESTRIL) 40 mg tablet 10/15/2019 at 0900 Care Giver No Yes   Sig: Take 1 Tab by mouth daily. loperamide (IMODIUM) 2 mg capsule 10/14/2019 at Unknown time Care Giver No Yes   Sig: Take 2 Caps by mouth every eight (8) hours as needed for Diarrhea.   metoprolol tartrate (LOPRESSOR) 50 mg tablet 10/15/2019 at 0900 Care Giver No Yes   Sig: Take 1 Tab by mouth two (2) times a day. ondansetron hcl (ZOFRAN) 4 mg tablet 10/15/2019 at Unknown time Care Giver Yes Yes   Sig: Take 4 mg by mouth every eight (8) hours as needed for Nausea. polyethylene glycol (MIRALAX) 17 gram packet Not Taking at Unknown time Care Giver Yes No   Sig: Take 17 g by mouth daily as needed (constipation). potassium chloride (KLOR-CON) 20 mEq pack 10/15/2019 at 0900 Care Giver No Yes   Sig: Take 1 Packet by mouth daily.    pravastatin (PRAVACHOL) 40 mg tablet 10/14/2019 at 2100 Care Giver No Yes   Sig: Take 1 Tab by mouth nightly. temazepam (RESTORIL) 15 mg capsule 10/14/2019 at 2100 Care Giver No Yes   Sig: Take 1 Cap by mouth nightly. Max Daily Amount: 15 mg.   traMADol (ULTRAM) 50 mg tablet 10/14/2019 at Unknown time Care Giver No Yes   Sig: Take 1 Tab by mouth every six (6) hours as needed for Pain. Max Daily Amount: 200 mg. Facility-Administered Medications: None       Patient Vitals for the past 24 hrs:   Temp Pulse Resp BP SpO2   10/25/19 0815    150/65    10/25/19 0730    180/65    10/25/19 0714 98.4 °F (36.9 °C) 66 17 193/80 97 %   10/24/19 2332 98.1 °F (36.7 °C) 69 16 178/61 95 %   10/24/19 1606  67  182/57    10/24/19 1600  69  (!) 167/92    10/24/19 1557    (!) 162/106 94 %   10/24/19 1545  66  175/55 98 %        Intake and Output:    Intake/Output Summary (Last 24 hours) at 10/25/2019 1208  Last data filed at 10/25/2019 0650  Gross per 24 hour   Intake 240 ml   Output 650 ml   Net -410 ml         PHYSICAL EXAM:    Visit Vitals  /65   Pulse 66   Temp 98.4 °F (36.9 °C)   Resp 17   Ht 5' (1.524 m)   Wt 54.4 kg (119 lb 14.9 oz)   SpO2 97%   BMI 23.42 kg/m²     General appearance: fatigued, mild distress, appears stated age, toxic, mildy confused oriented to name, place. Head: Normocephalic, without obvious abnormality, atraumatic  Eyes: conjunctivae/corneas clear. PERRL, EOM's intact. Ears: hearing intact  Throat: Lips, mucosa, and tongue normal. Teeth and gums normal  Lungs:decreased breath sounds bilaterally bases  Heart: regular rate and rhythm, S1, S2 normal, no murmur, click, rub or gallop  Abdomen: soft, non-tender.  Bowel sounds normal. No masses,  no organomegaly  Extremities: UE significant edema, non pitting  Pulses: +2 equal  Skin:mulitple contusions in UE bilaterally   Neurologic: somnolent        Lab/Data Review:   Recent Results (from the past 24 hour(s))   CBC WITH AUTOMATED DIFF    Collection Time: 10/25/19  2:46 AM   Result Value Ref Range    WBC 10.3 3.6 - 11.0 K/uL    RBC 2.88 (L) 3.80 - 5.20 M/uL    HGB 8.3 (L) 11.5 - 16.0 g/dL    HCT 27.1 (L) 35.0 - 47.0 %    MCV 94.1 80.0 - 99.0 FL    MCH 28.8 26.0 - 34.0 PG    MCHC 30.6 30.0 - 36.5 g/dL    RDW 17.0 (H) 11.5 - 14.5 %    PLATELET 889 074 - 633 K/uL    MPV 10.9 8.9 - 12.9 FL    NRBC 0.3 (H) 0  WBC    ABSOLUTE NRBC 0.03 (H) 0.00 - 0.01 K/uL    NEUTROPHILS 79 (H) 32 - 75 %    LYMPHOCYTES 9 (L) 12 - 49 %    MONOCYTES 6 5 - 13 %    EOSINOPHILS 3 0 - 7 %    BASOPHILS 0 0 - 1 %    METAMYELOCYTES 2 %    MYELOCYTES 1 %    IMMATURE GRANULOCYTES 0 0.0 - 0.5 %    ABS. NEUTROPHILS 8.1 (H) 1.8 - 8.0 K/UL    ABS. LYMPHOCYTES 0.9 0.8 - 3.5 K/UL    ABS. MONOCYTES 0.6 0.0 - 1.0 K/UL    ABS. EOSINOPHILS 0.3 0.0 - 0.4 K/UL    ABS. BASOPHILS 0.0 0.0 - 0.1 K/UL    ABS. IMM. GRANS. 0.0 0.00 - 0.04 K/UL    DF MANUAL      RBC COMMENTS ANISOCYTOSIS  1+       METABOLIC PANEL, BASIC    Collection Time: 10/25/19  2:46 AM   Result Value Ref Range    Sodium 142 136 - 145 mmol/L    Potassium 3.4 (L) 3.5 - 5.1 mmol/L    Chloride 112 (H) 97 - 108 mmol/L    CO2 24 21 - 32 mmol/L    Anion gap 6 5 - 15 mmol/L    Glucose 88 65 - 100 mg/dL    BUN 40 (H) 6 - 20 MG/DL    Creatinine 1.66 (H) 0.55 - 1.02 MG/DL    BUN/Creatinine ratio 24 (H) 12 - 20      GFR est AA 36 (L) >60 ml/min/1.73m2    GFR est non-AA 30 (L) >60 ml/min/1.73m2    Calcium 9.8 8.5 - 10.1 MG/DL       Imaging Reviewed:     Us Retroperitoneum Comp    Result Date: 10/15/2019  IMPRESSION: No hydronephrosis. No significant change. Xr Chest Port    Result Date: 10/17/2019  IMPRESSION: 1. Evidence of developing congestive change/pulmonary edema. 2. Opacification of the left lung base as described above. Developing atelectasis/infiltration in this region must be considered. Presence of atelectatic change in the lateral aspect of the left midlung.     Xr Chest Port    Result Date: 10/15/2019   impression: No change. .  Assessment:     Principal Problem:    Acute kidney injury superimposed on CKD ([facility] (10/15/2019)    Active Problems:    COPD (chronic obstructive pulmonary disease) ([facility] (8/14/2010)      Essential hypertension (7/20/2016)      Moderate protein-calorie malnutrition ([facility] (3/30/2018)      Stage 3 chronic kidney disease ([facility] (11/5/2018)      Weakness generalized (10/15/2019)        Plan:     The objective of todays visit was to review the transitional care plan with the patient. We discussed the importance of transitional care as it relates to reducing the likelihood of readmission. We reviewed the goals for the first 30 days following hospital discharge. The patient and I discussed wrap around services including nurse navigation, care coordination, home health, transitional care appointments with their primary care provider and specialist team and the role of dispatch [facility]. Patient education focused on readmission zones as described as    The Red Zone: High risk for readmission, days 1-21  The Yellow Zone: Moderate risk for readmission, days 22-29  The Green Zone: Lower risk for readmission, days 30 and after        1. Family to provided AMD, if not found will complete POST    2. HUG Tool Education  To be provided long discussion with son in regards to recent set of events with continuous O2, decreased po intake, CHRISTY in setting with CKD,  Overall functional decline, current confusion. He appears very overwhelmed with current situation. Provided reassurance about current consults, IV hydration, CX pending, Lab results  And ATB coverage. 3. Hug Calendar distributed. 3. All labs, I/O's and imaging have been reviewed. 4. Medication Reconciliation  performed at discharge. Accessiblity GOOd   RX rationale explained TBD at [facility]        5. Collaborated with  RN, CM and mPOA on this plan of care.     6. Discussed  - [facility]  option to avoid frequent ED visits. [facility] can treat; pains, sprains, cuts, wounds, high fevers, upper respiratory infections and much more. There medical team is equipped with all the tools necessary to provide advanced medical care in the comfort of you home, workplace, or location of need. The medical team consists of doctors, nurse practitioners, and EMTs. [facility] is available 7 days per week 9 am to 9 pm.   Request care by calling [phone] or by going online at [facility]        The son expressed understanding of the disease process and management plan, and all questions were answered. Greater than 60  minutes were spent in patient management, greater than half of which was spent in counseling and coordination of care.       Signed By: Shani Oliveira NP     October 25, 2019

## 2019-10-25 NOTE — PROGRESS NOTES
MARC:  -Annika with [id number], [id number], will reassess pt today at the bedside, discuss with son, and let me know if she can accept back.   -[facility] vs son for transport  -send DDNR with pt  -[facility] [facility] SNF if needed.  -anticipate pt will be here thru the weekend  -updates sent to [facility] [place]  -

## 2019-10-25 NOTE — PROGRESS NOTES
Pharmacy Automatic Renal Dosing Protocol - Antimicrobials    Indication for Antimicrobials: PNA? Current Regimen of Each Antimicrobial:  Cefepime 2g IV Q24H (Start Date 10/17; Day # 9)  Fluconazole 100 mg PO daily (Start 10/21; Day 5  Previous Antimicrobial Therapy:      Significant Cultures:   Urine -  C Albicans, C. Tropicalis  10/16: blood - NGTD - Final    Radiology / Imaging results: (X-ray, CT scan or MRI):   10/15: ultrasound: no hydronephrosis  10/15: chest xray: no change    Paralysis, amputations, malnutrition:  Malnutrition    Labs:  Recent Labs     10/24/19  0426 10/23/19  0545 10/22/19  0540   CREA 1.75* 1.63* 1.63*   BUN 38* 43* 43*   WBC 11.9* 11.7* 10.2     Temp (24hrs), Av.2 °F (36.8 °C), Min:97.8 °F (36.6 °C), Max:98.8 °F (37.1 °C)    Creatinine Clearance (mL/min) or Dialysis: 12 ml/min (Malnutrition)    Impression/Plan:   Please consider d/c of Cefepime. PNA should be treated at day 9. Fluconazole for UTI to continue. Antimicrobial stop date TBD     Pharmacy will follow daily and adjust medications as appropriate for renal function and/or serum levels. Thank you,  Doni Whiteside, PHARMD    Recommended duration of therapy  [email or web address]/Pharmacy/Clinical%20Companion/[email or web address]. docx    Renal Dosing  [email or web address]/[facility]/Pharmacy/Clinical%20Companion/Renal%[id number]. pdf

## 2019-10-25 NOTE — PROGRESS NOTES
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 Hgb < 9 - initiated retacrit 10 k sc weekly 10/21    High SBP - restart thiazide and give amlodipine 2.5    Continue holding home Lisinopril    PO K today; check Mg    Will see again on Monday, but please call with questions or changes in the meantime. Subjective:     Chief Complaint:  Sleepy. Lying in bed. No N/V.  no dyspnea. Not too talkative. Review of Systems:    Symptom Y/N Comments  Symptom Y/N Comments   Fever/Chills    Chest Pain     Poor Appetite    Edema n    Cough    Abdominal Pain     Sputum    Joint Pain     SOB/MONTANO    Pruritis/Rash     Nausea/vomit n   Tolerating PT/OT     Diarrhea    Tolerating Diet     Constipation    Other       Could not obtain due to:      Objective:     VITALS:   Last 24hrs VS reviewed since prior progress note.  Most recent are:  Visit Vitals  /65 (BP 1 Location: Left arm)   Pulse 66   Temp 98.4 °F (36.9 °C)   Resp 17   Ht 5' (1.524 m)   Wt 54.4 kg (119 lb 14.9 oz)   SpO2 97%   BMI 23.42 kg/m²       Intake/Output Summary (Last 24 hours) at 10/25/2019 1140  Last data filed at 10/25/2019 0650  Gross per 24 hour   Intake 240 ml   Output 650 ml   Net -410 ml      Telemetry Reviewed:     PHYSICAL EXAM:  General: NAD  rhonchi  litlle edema    Lab Data Reviewed: (see below)    Medications Reviewed: (see below)    PMH/SH reviewed - no change compared to H&P  ________________________________________________________________________  Care Plan discussed with:  Patient y    Family      RN y    Care Manager                    Consultant:          Comments   >50% of visit spent in counseling and coordination of care       ________________________________________________________________________  Giuliana Sandoval MD     Procedures: see electronic medical records for all procedures/Xrays and details which  were not copied into this note but were reviewed prior to creation of Plan. LABS:  Recent Labs     10/25/19  0246 10/24/19  0426   WBC 10.3 11.9*   HGB 8.3* 9.2*   HCT 27.1* 29.4*    258     Recent Labs     10/25/19  0246 10/24/19  0426 10/23/19  0545    140 142   K 3.4* 3.7 3.2*   * 110* 109*   CO2 24 25 22   BUN 40* 38* 43*   CREA 1.66* 1.75* 1.63*   GLU 88 90 83   CA 9.8 9.9 9.7     No results for input(s): SGOT, GPT, AP, TBIL, TP, ALB, GLOB, GGT, AML, LPSE in the last 72 hours. No lab exists for component: AMYP, HLPSE  No results for input(s): INR, PTP, APTT, INREXT, INREXT in the last 72 hours. No results for input(s): FE, TIBC, PSAT, FERR in the last 72 hours. No results found for: FOL, RBCF   No results for input(s): PH, PCO2, PO2 in the last 72 hours. No results for input(s): CPK, CKMB in the last 72 hours.     No lab exists for component: TROPONINI  No components found for: [place]  Lab Results   Component Value Date/Time    Color YELLOW/STRAW 10/17/2019 07:12 AM    Appearance CLOUDY (A) 10/17/2019 07:12 AM    Specific gravity 1.015 10/17/2019 07:12 AM    Specific gravity 1.015 08/02/2019 11:39 AM    pH (UA) 5.0 10/17/2019 07:12 AM    Protein 100 (A) 10/17/2019 07:12 AM    Glucose NEGATIVE  10/17/2019 07:12 AM    Ketone NEGATIVE  10/17/2019 07:12 AM    Bilirubin NEGATIVE  10/17/2019 07:12 AM    Urobilinogen 0.2 10/17/2019 07:12 AM    Nitrites NEGATIVE  10/17/2019 07:12 AM    Leukocyte Esterase SMALL (A) 10/17/2019 07:12 AM    Epithelial cells FEW 10/17/2019 07:12 AM    Bacteria NEGATIVE  10/17/2019 07:12 AM    WBC 20-50 10/17/2019 07:12 AM    RBC 0-5 10/17/2019 07:12 AM       MEDICATIONS:  Current Facility-Administered Medications   Medication Dose Route Frequency    fluconazole (DIFLUCAN) tablet 100 mg  100 mg Oral DAILY    epoetin brandt-epbx (RETACRIT) injection 10,000 Units  10,000 Units SubCUTAneous Q7D    ondansetron (ZOFRAN) injection 4 mg  4 mg IntraVENous Q6H PRN    cefepime (MAXIPIME) 2 g in 0.9% sodium chloride (MBP/ADV) 100 mL  2 g IntraVENous Q24H    balsam peru-castor oil (VENELEX) ointment   Topical TID    gabapentin (NEURONTIN) capsule 300 mg  300 mg Oral DAILY    acetaminophen (TYLENOL) tablet 650 mg  650 mg Oral Q6H PRN    aspirin chewable tablet 81 mg  81 mg Oral DAILY    hydrALAZINE (APRESOLINE) tablet 100 mg  100 mg Oral TID    loperamide (IMODIUM) capsule 4 mg  4 mg Oral Q8H PRN    metoprolol tartrate (LOPRESSOR) tablet 50 mg  50 mg Oral BID    PARoxetine (PAXIL) tablet 20 mg  20 mg Oral DAILY    polyethylene glycol (MIRALAX) packet 17 g  17 g Oral DAILY PRN    pravastatin (PRAVACHOL) tablet 40 mg  40 mg Oral QHS    temazepam (RESTORIL) capsule 15 mg  15 mg Oral QHS    traMADol (ULTRAM) tablet 50 mg  50 mg Oral Q6H PRN    heparin (porcine) injection 5,000 Units  5,000 Units SubCUTAneous Q12H

## 2019-10-25 NOTE — PROGRESS NOTES
Problem: Mobility Impaired (Adult and Pediatric)  Goal: *Acute Goals and Plan of Care (Insert Text)  Description  FUNCTIONAL STATUS PRIOR TO ADMISSION: The patient was at the wheelchair level and required moderate assistance for transfers to the chair; unable to propel chair secondary to arthritis    HOME [address]: The patient lived in [facility] and requires assist for all mobility and ADLs. Physical Therapy Goals  Initiated 10/24/2019  1. Patient will move from supine to sit and sit to supine , scoot up and down and roll side to side in bed with moderate assistance  within 7 day(s). 2.  Patient will transfer from bed to chair and chair to bed with maximal assistance using the least restrictive device within 7 day(s). 3.  Patient will perform sit to stand with maximal assistance within 7 day(s). 4. Patient will perform 2 sets of 10 repetitions of active range of motion exercises for bilateral upper and lower extremity(s) with moderate assistance  within 7 day(s). Physical Therapy Goals  Initiated 10/17/2019  1. Patient will move from supine to sit and sit to supine , scoot up and down and roll side to side in bed with moderate assistance  within 7 day(s). 2.  Patient will transfer from bed to chair and chair to bed with moderate assistance  using the least restrictive device within 7 day(s). 3.  Patient will perform sit to stand with moderate assistance  within 7 day(s). Outcome: Not Progressing Towards Goal     PHYSICAL THERAPY TREATMENT  Patient: Crystal Grant (7 y.o. female)  Date: 10/25/2019  Diagnosis: Acute kidney injury superimposed on CKD ([address] [N17.9, N18.9] Acute kidney injury superimposed on CKD [facility])       Precautions:  Fall  Chart, physical therapy assessment, plan of care and goals were reviewed. ASSESSMENT  Patient continues with skilled PT services and is not progressing towards goals.  Pt requires multi-modal and consistent cuing for mobility attempts today, safety cuing for hand/foot placement with pt demo. Posterior lean and trunk flexion with sit to stand attempts at bedside utilizing RW. Pt unable to safely transfer to chair. .. Recommended pt mobility team for bed<>chair at this time. Continue to follow. Patient Vitals for the past 8 hrs:   Temp Pulse Resp BP SpO2   10/25/19 1225 -- -- -- 160/55 --   10/25/19 0815 -- -- -- 150/65 --   10/25/19 0730 -- -- -- 180/65 --   10/25/19 0714 98.4 °F (36.9 °C) 66 17 193/80 97 %          Current Level of Function Impacting Discharge (mobility/balance): pt remains max. Assist x 2 for sit<>stand attempts and unable to reach full stand at bedside    Other factors to consider for discharge: pt requires 24/7 care/assist at this time for safety          PLAN :  Patient continues to benefit from skilled intervention to address the above impairments. Continue treatment per established plan of care. to address goals. Recommendation for discharge: (in order for the patient to meet his/her long term goals)  Therapy up to 5 days/week in SNF setting    This discharge recommendation:  Has been made in collaboration with the attending provider and/or case management    IF patient discharges home will need the following DME: to be determined (TBD)       SUBJECTIVE:   Patient stated I used to be able to do this.     OBJECTIVE DATA SUMMARY:   Critical Behavior:  Neurologic State: Alert, Lethargic  Orientation Level: Disoriented to time, Disoriented to situation, Oriented to person, Oriented to place  Cognition: Follows commands  Safety/Judgement: Awareness of environment, Decreased awareness of need for safety  Functional Mobility Training:  Bed Mobility:  Rolling: Moderate assistance  Supine to Sit: Maximum assistance;Bed Modified     Scooting: Maximum assistance     Interventions: Tactile cues; Verbal cues; Weight shifting training/Pressure relief; Safety awareness training  Transfers:  Sit to Stand: Maximum assistance;Assist x2(3 trials, pt unable to reach full standing positioning)  Stand to Sit: Total assistance           Balance:  Sitting: Impaired  Sitting - Static: Good (unsupported)  Sitting - Dynamic: Fair (occasional)  Standing: Impaired  Standing - Static: Constant support;Poor    Pain Rating:  Pt reports 0/10 pain    Activity Tolerance:   Poor  Please refer to the flowsheet for vital signs taken during this treatment.     After treatment patient left in no apparent distress:   Call bell within reach and in bed with right side-lying positioning, pillow props     COMMUNICATION/COLLABORATION:   The patients plan of care was discussed with: Occupational Therapist and Registered Nurse    Shelley Kurtz, PT, DPT   Time Calculation: 18 mins

## 2019-10-25 NOTE — PROGRESS NOTES
Problem: Self Care Deficits Care Plan (Adult)  Goal: *Acute Goals and Plan of Care (Insert Text)  Description  FUNCTIONAL STATUS PRIOR TO ADMISSION: Patient lives in assisted living, receiving assistance with all ADL. She reports being able to help with some upper body ADL; was able to feed herself. HOME SUPPORT: Assisted living staff; family    Occupational Therapy Goals  Routine 7 day re-eval 10/25/2019     Initiated 10/17/2019  1. Patient will perform grooming in bed or chair with moderate assistance  within 7 day(s). Upgrade to SBA  2. Patient will feed herself with moderate assistance within 7 day(s). Upgrade to min assist  3. Patient will roll side to side with maximal assistance within 7 day(s). Continue   4. Patient will participate in upper extremity therapeutic exercises with minimal cues/assist for 10 minutes within 7 day(s). continue   Outcome: Progressing Towards Goal   OCCUPATIONAL THERAPY RE-EVALUATION  Patient: Abdias Gonsalez (74 y.o. female)  Date: 10/25/2019  Diagnosis: Acute kidney injury superimposed on CKD ([facility] [address] [N17.9, N18.9] Acute kidney injury superimposed on CKD [facility])       Precautions: DNR  Chart, occupational therapy assessment, plan of care, and goals were reviewed. ASSESSMENT  Based on the objective data described below, pt remains confused but was willing to participate with encouragement. BUE strength and ROM has improved but pt is generally weak. Unable to stand today due to weakness and confusion. Pt was able to reach her hands to her face today. Slow progress being made and goals have been upgraded .     Current Level of Function Impacting Discharge (ADLs): self feeding min assist, grooming moderate assist, total assist LB ADLS,     Other factors to consider for discharge: buttock wound, will most likely need significant assist at discharge         PLAN :  Recommendations and Planned Interventions: self care training, functional mobility training, therapeutic exercise, balance training, therapeutic activities, cognitive retraining, neuromuscular re-education, patient education and home safety training    Frequency/Duration: Patient will be followed by occupational therapy 3 times a week to address goals. Recommend with staff: frequent turns, encourage self feeding and assist as needed    Recommend next OT session: UB ADLS, sit to stand, seated balance    Recommendation for discharge: (in order for the patient to meet his/her long term goals)  Therapy up to 5 days/week in SNF setting    This discharge recommendation:  Has not yet been discussed the attending provider and/or case management    Equipment recommendations for successful discharge (if) home: TBD        SUBJECTIVE:   Patient stated I don't think I can do it.     OBJECTIVE DATA SUMMARY:   Hospital course since last seen and reason for reevaluation: needing encouragement to participate but making slow progress    Cognitive/Behavioral Status:  Neurologic State: Alert  Orientation Level: Disoriented to time;Disoriented to situation;Oriented to person;Oriented to place  Cognition: Decreased attention/concentration;Decreased command following;Memory loss  Perception: Appears intact  Perseveration: No perseveration noted  Safety/Judgement: Fall prevention;Decreased awareness of need for safety;Decreased insight into deficits    Functional Mobility and Transfers for ADLs:  Bed Mobility:  Rolling:  Moderate assistance  Supine to Sit: Maximum assistance;Bed Modified  Scooting: Maximum assistance    Transfers:  Sit to Stand: Maximum assistance;Assist x2(3 trials, pt unable to reach full standing positioning)  Functional Transfers  Bathroom Mobility: (unable)  Toilet Transfer : (unable)  Bed to Chair: (unable)    Balance:  Sitting: Impaired  Sitting - Static: Good (unsupported)  Sitting - Dynamic: Fair (occasional)  Standing: Impaired  Standing - Static: Constant support;Poor    ADL Assessment:  Feeding: Minimum assistance    Oral Facial Hygiene/Grooming: Moderate assistance    Bathing: Maximum assistance    Upper Body Dressing: Moderate assistance    Lower Body Dressing: Total assistance    Toileting: Total assistance                ADL Intervention and task modifications: Total assist for incontinence of bowel after sit to stand attempts. Able to wipe face with hands  Cognitive Retraining  Safety/Judgement: Fall prevention;Decreased awareness of need for safety;Decreased insight into deficits        Functional Measure:  Barthel Index:    Bathin  Bladder: 0  Bowels: 0  Groomin  Dressin  Feedin  Mobility: 0  Stairs: 0  Toilet Use: 0  Transfer (Bed to Chair and Back): 5  Total: 10/100        The Barthel ADL Index: Guidelines  1. The index should be used as a record of what a patient does, not as a record of what a patient could do. 2. The main aim is to establish degree of independence from any help, physical or verbal, however minor and for whatever reason. 3. The need for supervision renders the patient not independent. 4. A patient's performance should be established using the best available evidence. Asking the patient, friends/relatives and nurses are the usual sources, but direct observation and common sense are also important. However direct testing is not needed. 5. Usually the patient's performance over the preceding 24-48 hours is important, but occasionally longer periods will be relevant. 6. Middle categories imply that the patient supplies over 50 per cent of the effort. 7. Use of aids to be independent is allowed. Gabrielle Aguilar., Barthel, D.W. (5756). Functional evaluation: the Barthel Index. [address] [facility] (14)2. CARINA Hartman, Dirk Carrasco., Cecelia Mccormick.[address] (). Measuring the change indisability after inpatient rehabilitation; comparison of the responsiveness of the Barthel Index and Functional [place] Measure.  Journal of Neurology, Neurosurgery, and Psychiatry, 66(4), 187-335. ROSE MARY Camargo, PAULO Sanchez, & Oanh Siu M.A. (2004.) Assessment of post-stroke quality of life in cost-effectiveness studies: The usefulness of the Barthel Index and the EuroQoL-5D. Quality of Life Research, 13, 005-45         Activity Tolerance:   Fair and requires frequent rest breaks  Please refer to the flowsheet for vital signs taken during this treatment.     After treatment patient left in no apparent distress:   Supine in bed, Call bell within reach and rolled to her side for pressure relief     COMMUNICATION/COLLABORATION:   The patients plan of care was discussed with: Physical Therapist, Registered Nurse and patient     Frances Domingo, OTR/L  Time Calculation: 20 mins

## 2019-10-26 LAB
ANION GAP SERPL CALC-SCNC: 5 MMOL/L (ref 5–15)
BASOPHILS # BLD: 0.2 K/UL (ref 0–0.1)
BASOPHILS NFR BLD: 2 % (ref 0–1)
BUN SERPL-MCNC: 38 MG/DL (ref 6–20)
BUN/CREAT SERPL: 24 (ref 12–20)
CALCIUM SERPL-MCNC: 9.9 MG/DL (ref 8.5–10.1)
CHLORIDE SERPL-SCNC: 112 MMOL/L (ref 97–108)
CO2 SERPL-SCNC: 25 MMOL/L (ref 21–32)
CREAT SERPL-MCNC: 1.57 MG/DL (ref 0.55–1.02)
DIFFERENTIAL METHOD BLD: ABNORMAL
EOSINOPHIL # BLD: 0.3 K/UL (ref 0–0.4)
EOSINOPHIL NFR BLD: 3 % (ref 0–7)
ERYTHROCYTE [DISTWIDTH] IN BLOOD BY AUTOMATED COUNT: 17.3 % (ref 11.5–14.5)
GLUCOSE SERPL-MCNC: 87 MG/DL (ref 65–100)
HCT VFR BLD AUTO: 28.1 % (ref 35–47)
HGB BLD-MCNC: 8.7 G/DL (ref 11.5–16)
IMM GRANULOCYTES # BLD AUTO: 0 K/UL (ref 0–0.04)
IMM GRANULOCYTES NFR BLD AUTO: 0 % (ref 0–0.5)
LYMPHOCYTES # BLD: 0.9 K/UL (ref 0.8–3.5)
LYMPHOCYTES NFR BLD: 8 % (ref 12–49)
MCH RBC QN AUTO: 29.1 PG (ref 26–34)
MCHC RBC AUTO-ENTMCNC: 31 G/DL (ref 30–36.5)
MCV RBC AUTO: 94 FL (ref 80–99)
METAMYELOCYTES NFR BLD MANUAL: 1 %
MONOCYTES # BLD: 0.6 K/UL (ref 0–1)
MONOCYTES NFR BLD: 5 % (ref 5–13)
MYELOCYTES NFR BLD MANUAL: 1 %
NEUTS SEG # BLD: 8.9 K/UL (ref 1.8–8)
NEUTS SEG NFR BLD: 80 % (ref 32–75)
NRBC # BLD: 0 K/UL (ref 0–0.01)
NRBC BLD-RTO: 0 PER 100 WBC
PLATELET # BLD AUTO: 272 K/UL (ref 150–400)
PMV BLD AUTO: 10.9 FL (ref 8.9–12.9)
POTASSIUM SERPL-SCNC: 3.7 MMOL/L (ref 3.5–5.1)
RBC # BLD AUTO: 2.99 M/UL (ref 3.8–5.2)
RBC MORPH BLD: ABNORMAL
SODIUM SERPL-SCNC: 142 MMOL/L (ref 136–145)
WBC # BLD AUTO: 11.1 K/UL (ref 3.6–11)

## 2019-10-26 PROCEDURE — 80048 BASIC METABOLIC PNL TOTAL CA: CPT

## 2019-10-26 PROCEDURE — 65270000029 HC RM PRIVATE

## 2019-10-26 PROCEDURE — 94760 N-INVAS EAR/PLS OXIMETRY 1: CPT

## 2019-10-26 PROCEDURE — 74011250637 HC RX REV CODE- 250/637: Performed by: INTERNAL MEDICINE

## 2019-10-26 PROCEDURE — 74011000258 HC RX REV CODE- 258: Performed by: INTERNAL MEDICINE

## 2019-10-26 PROCEDURE — 74011250636 HC RX REV CODE- 250/636: Performed by: INTERNAL MEDICINE

## 2019-10-26 PROCEDURE — 77010033678 HC OXYGEN DAILY

## 2019-10-26 PROCEDURE — 85025 COMPLETE CBC W/AUTO DIFF WBC: CPT

## 2019-10-26 PROCEDURE — 36415 COLL VENOUS BLD VENIPUNCTURE: CPT

## 2019-10-26 RX ADMIN — CASTOR OIL AND BALSAM, PERU: 788; 87 OINTMENT TOPICAL at 21:19

## 2019-10-26 RX ADMIN — CHLORTHALIDONE 25 MG: 25 TABLET ORAL at 08:38

## 2019-10-26 RX ADMIN — HEPARIN SODIUM 5000 UNITS: 5000 INJECTION INTRAVENOUS; SUBCUTANEOUS at 08:39

## 2019-10-26 RX ADMIN — TEMAZEPAM 15 MG: 15 CAPSULE ORAL at 21:18

## 2019-10-26 RX ADMIN — HEPARIN SODIUM 5000 UNITS: 5000 INJECTION INTRAVENOUS; SUBCUTANEOUS at 21:18

## 2019-10-26 RX ADMIN — FLUCONAZOLE 100 MG: 100 TABLET ORAL at 08:37

## 2019-10-26 RX ADMIN — PRAVASTATIN SODIUM 40 MG: 40 TABLET ORAL at 21:18

## 2019-10-26 RX ADMIN — METOPROLOL TARTRATE 50 MG: 50 TABLET, FILM COATED ORAL at 17:10

## 2019-10-26 RX ADMIN — METOPROLOL TARTRATE 50 MG: 50 TABLET, FILM COATED ORAL at 08:38

## 2019-10-26 RX ADMIN — HYDRALAZINE HYDROCHLORIDE 100 MG: 50 TABLET, FILM COATED ORAL at 08:37

## 2019-10-26 RX ADMIN — CASTOR OIL AND BALSAM, PERU: 788; 87 OINTMENT TOPICAL at 08:39

## 2019-10-26 RX ADMIN — ASPIRIN 81 MG 81 MG: 81 TABLET ORAL at 08:37

## 2019-10-26 RX ADMIN — PAROXETINE 20 MG: 20 TABLET, FILM COATED ORAL at 08:37

## 2019-10-26 RX ADMIN — CASTOR OIL AND BALSAM, PERU: 788; 87 OINTMENT TOPICAL at 16:00

## 2019-10-26 RX ADMIN — AMLODIPINE BESYLATE 2.5 MG: 2.5 TABLET ORAL at 08:38

## 2019-10-26 RX ADMIN — HYDRALAZINE HYDROCHLORIDE 100 MG: 50 TABLET, FILM COATED ORAL at 17:10

## 2019-10-26 RX ADMIN — CEFEPIME HYDROCHLORIDE 2 G: 2 INJECTION, POWDER, FOR SOLUTION INTRAVENOUS at 08:37

## 2019-10-26 RX ADMIN — GABAPENTIN 300 MG: 300 CAPSULE ORAL at 08:37

## 2019-10-26 NOTE — PROGRESS NOTES
Bedside shift change report given to Danielle (oncoming nurse) by Newton Martinez (offgoing nurse). Report included the following information SBAR, Kardex, Intake/Output, MAR and Recent Results. Pt very lethargic throughout shift. Easily arousable, but very sleepy.

## 2019-10-26 NOTE — PROGRESS NOTES
Bedside shift change report given to Roopa (oncoming nurse) by Dereck Flores RN (offgoing nurse). Report included the following information SBAR, Kardex, Procedure Summary, Intake/Output, MAR and Recent Results.

## 2019-10-26 NOTE — PROGRESS NOTES
INTERNAL MEDICINE ATTENDING NOTE    S: Ms. Maninder Rueda is a patient of Bib Garcia MD and was seen by me today during rounds. At this time, she is resting comfortably. She is tired. \"I've been coughing a lot. \" The patient has no other new complaints today. ROS otherwise unremarkable except as noted elsewhere. O: Blood pressure 179/72, pulse 76, temperature 98 °F (36.7 °C), resp. rate 16, height 5' (1.524 m), weight 119 lb 14.9 oz (54.4 kg), SpO2 96 %. Gen: Patient is in no acute distress. Lungs: Distant BS. Heart: RRR. Abd: S, NT, ND, BS present. Extremities: Warm. She has extensive bruising of R arm. Recent Results (from the past 12 hour(s))   CBC WITH AUTOMATED DIFF    Collection Time: 10/26/19  3:35 AM   Result Value Ref Range    WBC 11.1 (H) 3.6 - 11.0 K/uL    RBC 2.99 (L) 3.80 - 5.20 M/uL    HGB 8.7 (L) 11.5 - 16.0 g/dL    HCT 28.1 (L) 35.0 - 47.0 %    MCV 94.0 80.0 - 99.0 FL    MCH 29.1 26.0 - 34.0 PG    MCHC 31.0 30.0 - 36.5 g/dL    RDW 17.3 (H) 11.5 - 14.5 %    PLATELET 669 031 - 680 K/uL    MPV 10.9 8.9 - 12.9 FL    NRBC 0.0 0  WBC    ABSOLUTE NRBC 0.00 0.00 - 0.01 K/uL    NEUTROPHILS 80 (H) 32 - 75 %    LYMPHOCYTES 8 (L) 12 - 49 %    MONOCYTES 5 5 - 13 %    EOSINOPHILS 3 0 - 7 %    BASOPHILS 2 (H) 0 - 1 %    METAMYELOCYTES 1 %    MYELOCYTES 1 %    IMMATURE GRANULOCYTES 0 0.0 - 0.5 %    ABS. NEUTROPHILS 8.9 (H) 1.8 - 8.0 K/UL    ABS. LYMPHOCYTES 0.9 0.8 - 3.5 K/UL    ABS. MONOCYTES 0.6 0.0 - 1.0 K/UL    ABS. EOSINOPHILS 0.3 0.0 - 0.4 K/UL    ABS. BASOPHILS 0.2 (H) 0.0 - 0.1 K/UL    ABS. IMM.  GRANS. 0.0 0.00 - 0.04 K/UL    DF AUTOMATED      RBC COMMENTS ANISOCYTOSIS  1+       METABOLIC PANEL, BASIC    Collection Time: 10/26/19  3:35 AM   Result Value Ref Range    Sodium 142 136 - 145 mmol/L    Potassium 3.7 3.5 - 5.1 mmol/L    Chloride 112 (H) 97 - 108 mmol/L    CO2 25 21 - 32 mmol/L    Anion gap 5 5 - 15 mmol/L    Glucose 87 65 - 100 mg/dL    BUN 38 (H) 6 - 20 MG/DL Creatinine 1.57 (H) 0.55 - 1.02 MG/DL    BUN/Creatinine ratio 24 (H) 12 - 20      GFR est AA 38 (L) >60 ml/min/1.73m2    GFR est non-AA 31 (L) >60 ml/min/1.73m2    Calcium 9.9 8.5 - 10.1 MG/DL        A / P:   1. Acute kidney injury superimposed on CKD ([facility] (10/15/2019): Nephrology following. S/p IVF. Holding ACEI. 2. Recent PNA and UTI: On cefepime. 3. UTI with yeast: On fluconazole. 4. Chronic obstructive uropathy  5. COPD (chronic obstructive pulmonary disease) ([facility] (8/14/2010): Oxygen, inhaled bronchodilators. 6. Essential hypertension (7/20/2016): BP high this morning. Now back on HCTZ and amlodipine. 7. Moderate protein-calorie malnutrition ([facility] (3/30/2018)  8. Weakness generalized (10/15/2019): PT amd OT.       Nik Gregory MD, FACP  Best contact is via Pager: [phone], or via hospital  at [phone]

## 2019-10-27 LAB
GLUCOSE BLD STRIP.AUTO-MCNC: 100 MG/DL (ref 65–100)
SERVICE CMNT-IMP: NORMAL

## 2019-10-27 PROCEDURE — 74011250637 HC RX REV CODE- 250/637: Performed by: INTERNAL MEDICINE

## 2019-10-27 PROCEDURE — 82962 GLUCOSE BLOOD TEST: CPT

## 2019-10-27 PROCEDURE — 74011250636 HC RX REV CODE- 250/636: Performed by: INTERNAL MEDICINE

## 2019-10-27 PROCEDURE — 65270000029 HC RM PRIVATE

## 2019-10-27 PROCEDURE — 94760 N-INVAS EAR/PLS OXIMETRY 1: CPT

## 2019-10-27 PROCEDURE — 77010033678 HC OXYGEN DAILY

## 2019-10-27 PROCEDURE — 74011000258 HC RX REV CODE- 258: Performed by: INTERNAL MEDICINE

## 2019-10-27 RX ADMIN — GABAPENTIN 300 MG: 300 CAPSULE ORAL at 08:01

## 2019-10-27 RX ADMIN — ASPIRIN 81 MG 81 MG: 81 TABLET ORAL at 08:01

## 2019-10-27 RX ADMIN — CASTOR OIL AND BALSAM, PERU: 788; 87 OINTMENT TOPICAL at 08:02

## 2019-10-27 RX ADMIN — HYDRALAZINE HYDROCHLORIDE 100 MG: 50 TABLET, FILM COATED ORAL at 21:00

## 2019-10-27 RX ADMIN — CASTOR OIL AND BALSAM, PERU: 788; 87 OINTMENT TOPICAL at 17:04

## 2019-10-27 RX ADMIN — FLUCONAZOLE 100 MG: 100 TABLET ORAL at 08:01

## 2019-10-27 RX ADMIN — HYDRALAZINE HYDROCHLORIDE 100 MG: 50 TABLET, FILM COATED ORAL at 08:01

## 2019-10-27 RX ADMIN — PRAVASTATIN SODIUM 40 MG: 40 TABLET ORAL at 21:00

## 2019-10-27 RX ADMIN — HYDRALAZINE HYDROCHLORIDE 100 MG: 50 TABLET, FILM COATED ORAL at 17:04

## 2019-10-27 RX ADMIN — AMLODIPINE BESYLATE 2.5 MG: 2.5 TABLET ORAL at 08:01

## 2019-10-27 RX ADMIN — HEPARIN SODIUM 5000 UNITS: 5000 INJECTION INTRAVENOUS; SUBCUTANEOUS at 08:01

## 2019-10-27 RX ADMIN — METOPROLOL TARTRATE 50 MG: 50 TABLET, FILM COATED ORAL at 17:04

## 2019-10-27 RX ADMIN — METOPROLOL TARTRATE 50 MG: 50 TABLET, FILM COATED ORAL at 08:01

## 2019-10-27 RX ADMIN — PAROXETINE 20 MG: 20 TABLET, FILM COATED ORAL at 08:01

## 2019-10-27 RX ADMIN — CASTOR OIL AND BALSAM, PERU: 788; 87 OINTMENT TOPICAL at 21:01

## 2019-10-27 RX ADMIN — CEFEPIME HYDROCHLORIDE 2 G: 2 INJECTION, POWDER, FOR SOLUTION INTRAVENOUS at 06:44

## 2019-10-27 RX ADMIN — CHLORTHALIDONE 25 MG: 25 TABLET ORAL at 08:03

## 2019-10-27 RX ADMIN — TEMAZEPAM 15 MG: 15 CAPSULE ORAL at 21:00

## 2019-10-27 RX ADMIN — HEPARIN SODIUM 5000 UNITS: 5000 INJECTION INTRAVENOUS; SUBCUTANEOUS at 20:45

## 2019-10-27 NOTE — PROGRESS NOTES
INTERNAL MEDICINE ATTENDING NOTE    S: Ms. Cassi Webber is a patient of Sherly Urrutia MD and was seen by me today during rounds. At this time, she is resting comfortably. She is tired. AGain says, \"I've been coughing a lot. \" The patient has no other new complaints today. ROS otherwise unremarkable except as noted elsewhere. O: Blood pressure (P) 180/77, pulse (P) 76, temperature (P) 97.9 °F (36.6 °C), resp. rate (P) 16, height 5' (1.524 m), weight 119 lb 14.9 oz (54.4 kg), SpO2 (P) 95 %. Gen: Patient is in no acute distress. Lungs: Distant BS. Heart: RRR. Abd: S, NT, ND, BS present. Extremities: Warm. She has extensive bruising of R arm. No results found for this or any previous visit (from the past 12 hour(s)). A / P:   1. Acute kidney injury superimposed on CKD ([facility] (10/15/2019): Nephrology following. S/p IVF. Holding ACEI. 2. Recent PNA and UTI: On cefepime. 3. UTI with yeast: On fluconazole. 4. Chronic obstructive uropathy  5. COPD (chronic obstructive pulmonary disease) ([facility] [address] (8/14/2010): Oxygen, inhaled bronchodilators. 6. Essential hypertension (7/20/2016): BP high this morning. Now back on HCTZ and amlodipine. 7. Moderate protein-calorie malnutrition ([facility] [address] (3/30/2018)  8. Weakness generalized (10/15/2019): PT amd OT.       Torito Spear MD, FACP  Best contact is via Pager: [phone], or via hospital  at [phone]

## 2019-10-27 NOTE — PROGRESS NOTES
Bedside shift change report given to [facility]  (oncoming nurse) by Marybeth Ziegler RN (offgoing nurse). Report included the following information SBAR, Kardex, Procedure Summary, Intake/Output, MAR and Recent Results.

## 2019-10-28 LAB
ANION GAP SERPL CALC-SCNC: 7 MMOL/L (ref 5–15)
BASOPHILS # BLD: 0 K/UL (ref 0–0.1)
BASOPHILS NFR BLD: 0 % (ref 0–1)
BUN SERPL-MCNC: 37 MG/DL (ref 6–20)
BUN/CREAT SERPL: 22 (ref 12–20)
CALCIUM SERPL-MCNC: 10.4 MG/DL (ref 8.5–10.1)
CHLORIDE SERPL-SCNC: 107 MMOL/L (ref 97–108)
CO2 SERPL-SCNC: 25 MMOL/L (ref 21–32)
CREAT SERPL-MCNC: 1.66 MG/DL (ref 0.55–1.02)
DIFFERENTIAL METHOD BLD: ABNORMAL
EOSINOPHIL # BLD: 0.4 K/UL (ref 0–0.4)
EOSINOPHIL NFR BLD: 3 % (ref 0–7)
ERYTHROCYTE [DISTWIDTH] IN BLOOD BY AUTOMATED COUNT: 16.7 % (ref 11.5–14.5)
GLUCOSE SERPL-MCNC: 101 MG/DL (ref 65–100)
HCT VFR BLD AUTO: 31.5 % (ref 35–47)
HGB BLD-MCNC: 9.9 G/DL (ref 11.5–16)
IMM GRANULOCYTES # BLD AUTO: 0.2 K/UL (ref 0–0.04)
IMM GRANULOCYTES NFR BLD AUTO: 2 % (ref 0–0.5)
LYMPHOCYTES # BLD: 0.9 K/UL (ref 0.8–3.5)
LYMPHOCYTES NFR BLD: 7 % (ref 12–49)
MCH RBC QN AUTO: 29 PG (ref 26–34)
MCHC RBC AUTO-ENTMCNC: 31.4 G/DL (ref 30–36.5)
MCV RBC AUTO: 92.4 FL (ref 80–99)
MONOCYTES # BLD: 1.4 K/UL (ref 0–1)
MONOCYTES NFR BLD: 12 % (ref 5–13)
NEUTS SEG # BLD: 8.8 K/UL (ref 1.8–8)
NEUTS SEG NFR BLD: 76 % (ref 32–75)
NRBC # BLD: 0 K/UL (ref 0–0.01)
NRBC BLD-RTO: 0 PER 100 WBC
PLATELET # BLD AUTO: 335 K/UL (ref 150–400)
PMV BLD AUTO: 11.2 FL (ref 8.9–12.9)
POTASSIUM SERPL-SCNC: 3.5 MMOL/L (ref 3.5–5.1)
RBC # BLD AUTO: 3.41 M/UL (ref 3.8–5.2)
SODIUM SERPL-SCNC: 139 MMOL/L (ref 136–145)
WBC # BLD AUTO: 11.6 K/UL (ref 3.6–11)

## 2019-10-28 PROCEDURE — 74011250636 HC RX REV CODE- 250/636: Performed by: INTERNAL MEDICINE

## 2019-10-28 PROCEDURE — 74011250637 HC RX REV CODE- 250/637: Performed by: INTERNAL MEDICINE

## 2019-10-28 PROCEDURE — 74011000258 HC RX REV CODE- 258: Performed by: INTERNAL MEDICINE

## 2019-10-28 PROCEDURE — 94760 N-INVAS EAR/PLS OXIMETRY 1: CPT

## 2019-10-28 PROCEDURE — 85025 COMPLETE CBC W/AUTO DIFF WBC: CPT

## 2019-10-28 PROCEDURE — 36415 COLL VENOUS BLD VENIPUNCTURE: CPT

## 2019-10-28 PROCEDURE — 80048 BASIC METABOLIC PNL TOTAL CA: CPT

## 2019-10-28 PROCEDURE — 77010033678 HC OXYGEN DAILY

## 2019-10-28 PROCEDURE — 65270000029 HC RM PRIVATE

## 2019-10-28 RX ADMIN — AMLODIPINE BESYLATE 2.5 MG: 2.5 TABLET ORAL at 10:30

## 2019-10-28 RX ADMIN — CASTOR OIL AND BALSAM, PERU: 788; 87 OINTMENT TOPICAL at 17:01

## 2019-10-28 RX ADMIN — PRAVASTATIN SODIUM 40 MG: 40 TABLET ORAL at 21:32

## 2019-10-28 RX ADMIN — PAROXETINE 20 MG: 20 TABLET, FILM COATED ORAL at 10:30

## 2019-10-28 RX ADMIN — FLUCONAZOLE 100 MG: 100 TABLET ORAL at 10:30

## 2019-10-28 RX ADMIN — CASTOR OIL AND BALSAM, PERU: 788; 87 OINTMENT TOPICAL at 10:29

## 2019-10-28 RX ADMIN — CASTOR OIL AND BALSAM, PERU: 788; 87 OINTMENT TOPICAL at 21:35

## 2019-10-28 RX ADMIN — HYDRALAZINE HYDROCHLORIDE 100 MG: 50 TABLET, FILM COATED ORAL at 21:32

## 2019-10-28 RX ADMIN — CHLORTHALIDONE 25 MG: 25 TABLET ORAL at 10:29

## 2019-10-28 RX ADMIN — GABAPENTIN 300 MG: 300 CAPSULE ORAL at 10:30

## 2019-10-28 RX ADMIN — HYDRALAZINE HYDROCHLORIDE 100 MG: 50 TABLET, FILM COATED ORAL at 17:01

## 2019-10-28 RX ADMIN — METOPROLOL TARTRATE 50 MG: 50 TABLET, FILM COATED ORAL at 10:30

## 2019-10-28 RX ADMIN — ASPIRIN 81 MG 81 MG: 81 TABLET ORAL at 10:30

## 2019-10-28 RX ADMIN — CEFEPIME HYDROCHLORIDE 2 G: 2 INJECTION, POWDER, FOR SOLUTION INTRAVENOUS at 06:29

## 2019-10-28 RX ADMIN — HEPARIN SODIUM 5000 UNITS: 5000 INJECTION INTRAVENOUS; SUBCUTANEOUS at 10:29

## 2019-10-28 RX ADMIN — HYDRALAZINE HYDROCHLORIDE 100 MG: 50 TABLET, FILM COATED ORAL at 10:30

## 2019-10-28 RX ADMIN — TEMAZEPAM 15 MG: 15 CAPSULE ORAL at 21:32

## 2019-10-28 RX ADMIN — EPOETIN ALFA-EPBX 10000 UNITS: 10000 INJECTION, SOLUTION INTRAVENOUS; SUBCUTANEOUS at 21:33

## 2019-10-28 RX ADMIN — HEPARIN SODIUM 5000 UNITS: 5000 INJECTION INTRAVENOUS; SUBCUTANEOUS at 21:32

## 2019-10-28 RX ADMIN — METOPROLOL TARTRATE 50 MG: 50 TABLET, FILM COATED ORAL at 17:01

## 2019-10-28 NOTE — PROGRESS NOTES
Nutrition Assessment:    INTERVENTIONS/RECOMMENDATIONS:   Continue current diet  Continue mighty shake and magic cup daily, discontinue ensure clear per patient request    ASSESSMENT:   Chart reviewed; patient having lunch at time of visit. Caregiver providing assistance with meal. Patient reports she is eating better. Multiple ensure clear bottles at bedside; patient doesn't like them so will discontinue. Drank half of mighty shake already. Encouraged intake of meals/supplements. Possible discharge tomorrow per notes. Diet Order: Regular(2 g Na, house shake, magic cup, ensure clear )  % Eaten:    Patient Vitals for the past 72 hrs:   % Diet Eaten   10/27/19 1717 15 %   10/27/19 1108 10 %   10/27/19 0724 20 %   10/26/19 1500 20 %   10/26/19 1338 20 %   10/26/19 0916 95 %   10/25/19 1656 20 %     Pertinent Medications: [x] Reviewed []Other: Norvasc, Hydralazine, paxil, Pravastatin, PRN Zofran  Pertinent Labs: [x]Reviewed  []Other:   Food Allergies: [x]None []Yes:     Last BM: 10/28  []Active     []Hyperactive  []Hypoactive       [] Absent  BS  Skin:    [] Intact   [] Incision  [] Breakdown   []Edema   [x]Other: DTI left heel and buttocks     Anthropometrics: Height: 5' (152.4 cm) Weight: 54.4 kg (119 lb 14.9 oz)    IBW (%IBW):   ( ) UBW (%UBW):   (  %)    BMI: Body mass index is 23.42 kg/m². This BMI is indicative of:  []Underweight   [x]Normal   []Overweight   [] Obesity   [] Extreme Obesity (BMI>40)  Last Weight Metrics:  Weight Loss Metrics 10/15/2019 9/26/2019 8/2/2019 7/10/2019 7/9/2019 6/14/2019 6/4/2019   Today's Wt 119 lb 14.9 oz 132 lb 4.4 oz - 113 lb 15.7 oz 115 lb 1.3 oz - 116 lb 3.2 oz   BMI 23.42 kg/m2 23.43 kg/m2 21.19 kg/m2 21.19 kg/m2 21.39 kg/m2 21.25 kg/m2 21.25 kg/m2       Estimated Nutrition Needs (Based on): 1433 Kcals/day(BMR (927) x 1. 3AF) , 66 g(1.2 g/kg bw) Protein  Carbohydrate:  At Least 130 g/day  Fluids: 1200 mL/day     Pt expected to meet estimated nutrient needs: [x]Yes []No    NUTRITION DIAGNOSES:   Problem:  Inadequate protein-energy intake     Etiology: related to poor appetite     Signs/Symptoms: as evidenced by PO <50% of meals     NUTRITION INTERVENTIONS:  Meals/Snacks: General/healthful diet   Supplements: Commercial supplement              GOAL:   PO intake at least 50% of meals and 70% of ONS next 3-5 days    NUTRITION MONITORING AND EVALUATION   Food/Nutrient Intake Outcomes:  Total energy intake  Physical Signs/Symptoms Outcomes: Weight/weight change, Electrolyte and renal profile    Previous Goal Met:   [] Met              [x] Progressing Towards Goal              [] Not Progressing Towards Goal   Previous Recommendations:   [x] Implemented          [] Not Implemented          [] Not Applicable    LEARNING NEEDS (Diet, Food/Nutrient-Drug Interaction):    [x] None Identified   [] Identified and Education Provided/Documented   [] Identified and Pt declined/was not appropriate     Cultural, Rastafarian, OR Ethnic Dietary Needs:    [x] None Identified   [] Identified and Addressed     [x] Interdisciplinary Care Plan Reviewed/Documented    [x] Discharge Planning: Regular diet + ONS 2-3x/day as accepted    [] Participated in Interdisciplinary Rounds    NUTRITION RISK:    [x] Patient At Nutritional Risk             [] Patient Not At Nutritional Risk      Mercedes Cook [phone]  Pager [phone]    Weekend Pager [phone]

## 2019-10-28 NOTE — PROGRESS NOTES
MARC:  -Annika with [facility], [phone] spoke with the son who would not like hospice at present time but will finance sitters at [facility]; therefore, [facility] can accept her back and anticipates d/c tomorrow if stable. -[facility] vs son for transport  -send DDNR with pt  -updates sent to [facility] who is aware of no d/c today. 36  Dtr would like to pursue hospice at [facility]. Will ask MD to write order if he is in agreement.

## 2019-10-28 NOTE — PROGRESS NOTES
PROGRESS NOTE    NAME:  Zachary Buckley   :   1935   MRN:   [id number]     Date/Time:  10/28/2019 7:37 AM  Subjective:   History:  Chart reviewed and patient seen and examined and D/W her nurse this AM and all events noted. She was recently admitted with CAP and UTI and D/Micah to adult home on 10/11 feeling well with Creat. 2.07 (baseline 1.9). Apparently although she felt well on D/C she became weaker and had taken in little PO and represented to the ER on 10/15 weaker with Creat 4.00 and was thus admitted with acute renal failure on CKD. She had a US w/o renal obstruction and ureteral stent was in place. She has UA c/w infection with culture sent ( pos for Yeast). Her WBC was elevated on admission and improved initially but higher and had developed a cough and had a fever 102 during the night 10/18. Maxipime started and CXR w/o clear infiltrate. Initially her Creatinine was higher at 4.36 despite hydration, but significantly better now daily and at 1.66 today (baseline 1.9). She was on Lisinopril for HTN and Lasix for edema as outpatient but currently on hold except resumed Lasix 10/21 BID and once 10/22 and 10/23 w/o since. She feels well now except she is weak and has no appetite. She denies CP, SOB or cardiac or respiratory c/o except now has a cough. She has no nausea or GI c/o except poor appetite and she has no  c/o. There are no there c/o on complete ROS.        Medications reviewed:  Current Facility-Administered Medications   Medication Dose Route Frequency    chlorthalidone (HYGROTEN) tablet 25 mg  25 mg Oral DAILY    amLODIPine (NORVASC) tablet 2.5 mg  2.5 mg Oral DAILY    fluconazole (DIFLUCAN) tablet 100 mg  100 mg Oral DAILY    epoetin brandt-epbx (RETACRIT) injection 10,000 Units  10,000 Units SubCUTAneous Q7D    ondansetron (ZOFRAN) injection 4 mg  4 mg IntraVENous Q6H PRN    cefepime (MAXIPIME) 2 g in 0.9% sodium chloride (MBP/ADV) 100 mL  2 g IntraVENous Q24H    balsam peru-castor oil (VENELEX) ointment   Topical TID    gabapentin (NEURONTIN) capsule 300 mg  300 mg Oral DAILY    acetaminophen (TYLENOL) tablet 650 mg  650 mg Oral Q6H PRN    aspirin chewable tablet 81 mg  81 mg Oral DAILY    hydrALAZINE (APRESOLINE) tablet 100 mg  100 mg Oral TID    loperamide (IMODIUM) capsule 4 mg  4 mg Oral Q8H PRN    metoprolol tartrate (LOPRESSOR) tablet 50 mg  50 mg Oral BID    PARoxetine (PAXIL) tablet 20 mg  20 mg Oral DAILY    polyethylene glycol (MIRALAX) packet 17 g  17 g Oral DAILY PRN    pravastatin (PRAVACHOL) tablet 40 mg  40 mg Oral QHS    temazepam (RESTORIL) capsule 15 mg  15 mg Oral QHS    traMADol (ULTRAM) tablet 50 mg  50 mg Oral Q6H PRN    heparin (porcine) injection 5,000 Units  5,000 Units SubCUTAneous Q12H        Objective:   Vitals:  Visit Vitals  /79 (BP 1 Location: Left arm, BP Patient Position: At rest)   Pulse 66   Temp 98.2 °F (36.8 °C)   Resp 16   Ht 5' (1.524 m)   Wt 119 lb 14.9 oz (54.4 kg)   SpO2 97%   BMI 23.42 kg/m²    O2 Flow Rate (L/min): 3 l/min O2 Device: Nasal cannula Temp (24hrs), Av.2 °F (36.8 °C), Min:98.2 °F (36.8 °C), Max:98.2 °F (36.8 °C)      Last 24hr Input/Output:    Intake/Output Summary (Last 24 hours) at 10/28/2019 0737  Last data filed at 10/28/2019 0700  Gross per 24 hour   Intake 560 ml   Output 700 ml   Net -140 ml        PHYSICAL EXAM:  General:     Alert, cooperative, no distress, appears stated age. Head:    Normocephalic, without obvious abnormality, atraumatic. Eyes:    Conjunctivae/corneas clear. PERRLA  Nose:   Nares normal. No drainage or sinus tenderness. Throat:     Lips, mucosa, and tongue normal.  No Thrush  Neck:   Supple, symmetrical,  no adenopathy, thyroid: non tender     no carotid bruit and no JVD. Back:     Symmetric,  No CVA tenderness. Lungs:    Scattered coarse rhonchi bilaterally. No Wheezing. No rales. Heart:    Regular rate and rhythm,  no murmur, rub or gallop.   Abdomen:    Soft, non-tender. Not distended. Bowel sounds normal. No masses  Extremities:  Extremities normal, atraumatic, No cyanosis. Arms with edema. No clubbing  Lymph nodes:  Cervical, supraclavicular normal.  Neurologic:  General decreased strength, Alert and oriented X 3. Skin:                 No rash, Skin breakdown on buttocks as per wound care note from 10/16      Lab Data Reviewed:    Recent Results (from the past 24 hour(s))   GLUCOSE, POC    Collection Time: 10/27/19  4:41 PM   Result Value Ref Range    Glucose (POC) 100 65 - 100 mg/dL    Performed by Christopher Shelton (CON) PCT    CBC WITH AUTOMATED DIFF    Collection Time: 10/28/19  5:54 AM   Result Value Ref Range    WBC 11.6 (H) 3.6 - 11.0 K/uL    RBC 3.41 (L) 3.80 - 5.20 M/uL    HGB 9.9 (L) 11.5 - 16.0 g/dL    HCT 31.5 (L) 35.0 - 47.0 %    MCV 92.4 80.0 - 99.0 FL    MCH 29.0 26.0 - 34.0 PG    MCHC 31.4 30.0 - 36.5 g/dL    RDW 16.7 (H) 11.5 - 14.5 %    PLATELET 004 076 - 167 K/uL    MPV 11.2 8.9 - 12.9 FL    NRBC 0.0 0  WBC    ABSOLUTE NRBC 0.00 0.00 - 0.01 K/uL    NEUTROPHILS 76 (H) 32 - 75 %    LYMPHOCYTES 7 (L) 12 - 49 %    MONOCYTES 12 5 - 13 %    EOSINOPHILS 3 0 - 7 %    BASOPHILS 0 0 - 1 %    IMMATURE GRANULOCYTES 2 (H) 0.0 - 0.5 %    ABS. NEUTROPHILS 8.8 (H) 1.8 - 8.0 K/UL    ABS. LYMPHOCYTES 0.9 0.8 - 3.5 K/UL    ABS. MONOCYTES 1.4 (H) 0.0 - 1.0 K/UL    ABS. EOSINOPHILS 0.4 0.0 - 0.4 K/UL    ABS. BASOPHILS 0.0 0.0 - 0.1 K/UL    ABS. IMM.  GRANS. 0.2 (H) 0.00 - 0.04 K/UL    DF AUTOMATED     METABOLIC PANEL, BASIC    Collection Time: 10/28/19  5:54 AM   Result Value Ref Range    Sodium 139 136 - 145 mmol/L    Potassium 3.5 3.5 - 5.1 mmol/L    Chloride 107 97 - 108 mmol/L    CO2 25 21 - 32 mmol/L    Anion gap 7 5 - 15 mmol/L    Glucose 101 (H) 65 - 100 mg/dL    BUN 37 (H) 6 - 20 MG/DL    Creatinine 1.66 (H) 0.55 - 1.02 MG/DL    BUN/Creatinine ratio 22 (H) 12 - 20      GFR est AA 36 (L) >60 ml/min/1.73m2    GFR est non-AA 30 (L) >60 ml/min/1.73m2 Calcium 10.4 (H) 8.5 - 10.1 MG/DL          Assessment/Plan:     Principal Problem:    Acute kidney injury superimposed on CKD ([facility] (10/15/2019)    Active Problems:    COPD (chronic obstructive pulmonary disease) ([facility] (8/14/2010)      Essential hypertension (7/20/2016)      Moderate protein-calorie malnutrition ([facility] (3/30/2018)      Stage 3 chronic kidney disease ([facility] (11/5/2018)      Weakness generalized (10/15/2019)       ___________________________________________________  PLAN:    1. Discontinued IV hydration  2. Follow Creat 4.0 on adm to 4.36 and 1.66  now with baseline 1.9  3. Continue off Lisinopril and resumed Lasix for 3 days and now w/o for 3 days  4. F/U on repeat urine culture, Yeast  5. Follow WBC, 15.3 adm to 18.1 (10/17), now 11.6 w/  maxipime (will DC it today as has had 10 days)  6. Continue Hydralazine and Metoprolol for HTN and follow  7. Continue nasal oxygen with COPD  8. Renal consult note reviewed and appreciate help  9. PT evaluation   10. Repeat CXR no definite infiltrate, ? Congestion but reluctant to use diuretic as stable and has Acute Renal failure and sent urine and blood cultures  11. Diflucan added 10/21 with yeast  12. DNR per patient request, D/W her  15. Hgb down to 7.5 and procrit weekly started, now 9.9  14.  Up in chair and D/C planning, D/W nurse and D/W her daughter on 10/25          ___________________________________________________    Attending Physician: Flory Babb MD

## 2019-10-28 NOTE — PROGRESS NOTES
Bedside shift change report given to Franchesca Wilkins (oncoming nurse) by Dominic Ag (offgoing nurse). Report included the following information SBAR, Kardex, Intake/Output, MAR, Accordion and Recent Results. Anticipate possible D/C tomorrow.

## 2019-10-28 NOTE — PROGRESS NOTES
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 Hgb < 9 - initiated retacrit 10 k sc weekly 10/21    High SBP - restart thiazide and give amlodipine 2.5    Continue holding home Lisinopril    Will see again upon request. Note plans for dc tomorrow                 Subjective:     Chief Complaint:  Sleepy. . Not too talkative. Review of Systems:    Symptom Y/N Comments  Symptom Y/N Comments   Fever/Chills    Chest Pain     Poor Appetite    Edema n    Cough    Abdominal Pain     Sputum    Joint Pain     SOB/MONTANO    Pruritis/Rash     Nausea/vomit n   Tolerating PT/OT     Diarrhea    Tolerating Diet     Constipation    Other       Could not obtain due to:      Objective:     VITALS:   Last 24hrs VS reviewed since prior progress note.  Most recent are:  Visit Vitals  /74   Pulse 66   Temp 98.7 °F (37.1 °C)   Resp 15   Ht 5' (1.524 m)   Wt 54.4 kg (119 lb 14.9 oz)   SpO2 98%   BMI 23.42 kg/m²       Intake/Output Summary (Last 24 hours) at 10/28/2019 1401  Last data filed at 10/28/2019 0700  Gross per 24 hour   Intake 440 ml   Output 700 ml   Net -260 ml      Telemetry Reviewed:     PHYSICAL EXAM:  General: NAD  rhonchi  litlle edema    Lab Data Reviewed: (see below)    Medications Reviewed: (see below)    PMH/SH reviewed - no change compared to H&P  ________________________________________________________________________  Care Plan discussed with:  Patient y    Family      RN y    Care Manager                    Consultant:          Comments   >50% of visit spent in counseling and coordination of care ________________________________________________________________________  Carla Nathan MD     Procedures: see electronic medical records for all procedures/Xrays and details which  were not copied into this note but were reviewed prior to creation of Plan. LABS:  Recent Labs     10/28/19  0554 10/26/19  0335   WBC 11.6* 11.1*   HGB 9.9* 8.7*   HCT 31.5* 28.1*    272     Recent Labs     10/28/19  0554 10/26/19  0335    142   K 3.5 3.7    112*   CO2 25 25   BUN 37* 38*   CREA 1.66* 1.57*   * 87   CA 10.4* 9.9     No results for input(s): SGOT, GPT, AP, TBIL, TP, ALB, GLOB, GGT, AML, LPSE in the last 72 hours. No lab exists for component: AMYP, HLPSE  No results for input(s): INR, PTP, APTT, INREXT, INREXT in the last 72 hours. No results for input(s): FE, TIBC, PSAT, FERR in the last 72 hours. No results found for: FOL, RBCF   No results for input(s): PH, PCO2, PO2 in the last 72 hours. No results for input(s): CPK, CKMB in the last 72 hours.     No lab exists for component: TROPONINI  No components found for: [place]  Lab Results   Component Value Date/Time    Color YELLOW/STRAW 10/17/2019 07:12 AM    Appearance CLOUDY (A) 10/17/2019 07:12 AM    Specific gravity 1.015 10/17/2019 07:12 AM    Specific gravity 1.015 08/02/2019 11:39 AM    pH (UA) 5.0 10/17/2019 07:12 AM    Protein 100 (A) 10/17/2019 07:12 AM    Glucose NEGATIVE  10/17/2019 07:12 AM    Ketone NEGATIVE  10/17/2019 07:12 AM    Bilirubin NEGATIVE  10/17/2019 07:12 AM    Urobilinogen 0.2 10/17/2019 07:12 AM    Nitrites NEGATIVE  10/17/2019 07:12 AM    Leukocyte Esterase SMALL (A) 10/17/2019 07:12 AM    Epithelial cells FEW 10/17/2019 07:12 AM    Bacteria NEGATIVE  10/17/2019 07:12 AM    WBC 20-50 10/17/2019 07:12 AM    RBC 0-5 10/17/2019 07:12 AM       MEDICATIONS:  Current Facility-Administered Medications   Medication Dose Route Frequency    chlorthalidone (HYGROTEN) tablet 25 mg  25 mg Oral DAILY    amLODIPine (NORVASC) tablet 2.5 mg  2.5 mg Oral DAILY    fluconazole (DIFLUCAN) tablet 100 mg  100 mg Oral DAILY    epoetin brandt-epbx (RETACRIT) injection 10,000 Units  10,000 Units SubCUTAneous Q7D    ondansetron (ZOFRAN) injection 4 mg  4 mg IntraVENous Q6H PRN    balsam peru-castor oil (VENELEX) ointment   Topical TID    gabapentin (NEURONTIN) capsule 300 mg  300 mg Oral DAILY    acetaminophen (TYLENOL) tablet 650 mg  650 mg Oral Q6H PRN    aspirin chewable tablet 81 mg  81 mg Oral DAILY    hydrALAZINE (APRESOLINE) tablet 100 mg  100 mg Oral TID    loperamide (IMODIUM) capsule 4 mg  4 mg Oral Q8H PRN    metoprolol tartrate (LOPRESSOR) tablet 50 mg  50 mg Oral BID    PARoxetine (PAXIL) tablet 20 mg  20 mg Oral DAILY    polyethylene glycol (MIRALAX) packet 17 g  17 g Oral DAILY PRN    pravastatin (PRAVACHOL) tablet 40 mg  40 mg Oral QHS    temazepam (RESTORIL) capsule 15 mg  15 mg Oral QHS    traMADol (ULTRAM) tablet 50 mg  50 mg Oral Q6H PRN    heparin (porcine) injection 5,000 Units  5,000 Units SubCUTAneous Q12H

## 2019-10-29 VITALS
TEMPERATURE: 97.9 F | WEIGHT: 123 LBS | HEIGHT: 60 IN | HEART RATE: 57 BPM | DIASTOLIC BLOOD PRESSURE: 45 MMHG | RESPIRATION RATE: 16 BRPM | OXYGEN SATURATION: 98 % | BODY MASS INDEX: 24.15 KG/M2 | SYSTOLIC BLOOD PRESSURE: 157 MMHG

## 2019-10-29 LAB
ANION GAP SERPL CALC-SCNC: 8 MMOL/L (ref 5–15)
BASOPHILS # BLD: 0 K/UL (ref 0–0.1)
BASOPHILS NFR BLD: 1 % (ref 0–1)
BUN SERPL-MCNC: 37 MG/DL (ref 6–20)
BUN/CREAT SERPL: 21 (ref 12–20)
CALCIUM SERPL-MCNC: 9.8 MG/DL (ref 8.5–10.1)
CHLORIDE SERPL-SCNC: 106 MMOL/L (ref 97–108)
CO2 SERPL-SCNC: 25 MMOL/L (ref 21–32)
CREAT SERPL-MCNC: 1.73 MG/DL (ref 0.55–1.02)
DIFFERENTIAL METHOD BLD: ABNORMAL
EOSINOPHIL # BLD: 0.3 K/UL (ref 0–0.4)
EOSINOPHIL NFR BLD: 4 % (ref 0–7)
ERYTHROCYTE [DISTWIDTH] IN BLOOD BY AUTOMATED COUNT: 16.4 % (ref 11.5–14.5)
GLUCOSE SERPL-MCNC: 84 MG/DL (ref 65–100)
HCT VFR BLD AUTO: 26.4 % (ref 35–47)
HGB BLD-MCNC: 8 G/DL (ref 11.5–16)
IMM GRANULOCYTES # BLD AUTO: 0.1 K/UL (ref 0–0.04)
IMM GRANULOCYTES NFR BLD AUTO: 1 % (ref 0–0.5)
LYMPHOCYTES # BLD: 0.8 K/UL (ref 0.8–3.5)
LYMPHOCYTES NFR BLD: 10 % (ref 12–49)
MCH RBC QN AUTO: 28.6 PG (ref 26–34)
MCHC RBC AUTO-ENTMCNC: 30.3 G/DL (ref 30–36.5)
MCV RBC AUTO: 94.3 FL (ref 80–99)
MONOCYTES # BLD: 1.3 K/UL (ref 0–1)
MONOCYTES NFR BLD: 15 % (ref 5–13)
NEUTS SEG # BLD: 5.8 K/UL (ref 1.8–8)
NEUTS SEG NFR BLD: 69 % (ref 32–75)
NRBC # BLD: 0 K/UL (ref 0–0.01)
NRBC BLD-RTO: 0 PER 100 WBC
PLATELET # BLD AUTO: 263 K/UL (ref 150–400)
PMV BLD AUTO: 11.2 FL (ref 8.9–12.9)
POTASSIUM SERPL-SCNC: 3.2 MMOL/L (ref 3.5–5.1)
RBC # BLD AUTO: 2.8 M/UL (ref 3.8–5.2)
SODIUM SERPL-SCNC: 139 MMOL/L (ref 136–145)
WBC # BLD AUTO: 8.4 K/UL (ref 3.6–11)

## 2019-10-29 PROCEDURE — 74011250637 HC RX REV CODE- 250/637: Performed by: INTERNAL MEDICINE

## 2019-10-29 PROCEDURE — 80048 BASIC METABOLIC PNL TOTAL CA: CPT

## 2019-10-29 PROCEDURE — 74011250636 HC RX REV CODE- 250/636: Performed by: INTERNAL MEDICINE

## 2019-10-29 PROCEDURE — 36415 COLL VENOUS BLD VENIPUNCTURE: CPT

## 2019-10-29 PROCEDURE — 94760 N-INVAS EAR/PLS OXIMETRY 1: CPT

## 2019-10-29 PROCEDURE — 85025 COMPLETE CBC W/AUTO DIFF WBC: CPT

## 2019-10-29 PROCEDURE — 77010033678 HC OXYGEN DAILY

## 2019-10-29 RX ORDER — AMLODIPINE BESYLATE 2.5 MG/1
2.5 TABLET ORAL DAILY
Qty: 30 TAB | Status: SHIPPED | OUTPATIENT
Start: 2019-10-29

## 2019-10-29 RX ORDER — BALSAM PERU/CASTOR OIL
OINTMENT (GRAM) TOPICAL 3 TIMES DAILY
Qty: 1 TUBE | Status: SHIPPED | OUTPATIENT
Start: 2019-10-29

## 2019-10-29 RX ADMIN — CASTOR OIL AND BALSAM, PERU: 788; 87 OINTMENT TOPICAL at 09:20

## 2019-10-29 RX ADMIN — GABAPENTIN 300 MG: 300 CAPSULE ORAL at 09:19

## 2019-10-29 RX ADMIN — HEPARIN SODIUM 5000 UNITS: 5000 INJECTION INTRAVENOUS; SUBCUTANEOUS at 09:19

## 2019-10-29 RX ADMIN — AMLODIPINE BESYLATE 2.5 MG: 2.5 TABLET ORAL at 09:20

## 2019-10-29 RX ADMIN — FLUCONAZOLE 100 MG: 100 TABLET ORAL at 09:20

## 2019-10-29 RX ADMIN — PAROXETINE 20 MG: 20 TABLET, FILM COATED ORAL at 09:19

## 2019-10-29 RX ADMIN — METOPROLOL TARTRATE 50 MG: 50 TABLET, FILM COATED ORAL at 09:19

## 2019-10-29 RX ADMIN — CHLORTHALIDONE 25 MG: 25 TABLET ORAL at 09:29

## 2019-10-29 RX ADMIN — ASPIRIN 81 MG 81 MG: 81 TABLET ORAL at 09:19

## 2019-10-29 RX ADMIN — HYDRALAZINE HYDROCHLORIDE 100 MG: 50 TABLET, FILM COATED ORAL at 09:19

## 2019-10-29 NOTE — PROGRESS NOTES
Pt being discharged to [facility]. Report called to nurse Annika. Pt sent via [facility], [place]. All paperwork given to transporters. All pt belongings packed up and placed on stretcher with pt. IV removed. Pt left with no complaints.

## 2019-10-29 NOTE — PROGRESS NOTES
Bedside shift change report given to Jie (oncoming nurse) by Rufino Hutchinson (offgoing nurse). Report included the following information SBAR, Kardex, Intake/Output, MAR and Recent Results. Pt rested quietly through night. Lethargic, but easily woken.

## 2019-10-29 NOTE — PROGRESS NOTES
Transitional Care READMISSION  HUG Note [facility]        Patient: Katherin Bass MRN: [id number]  SSN: [id number]    YOB: 1935  Age: 80 y.o. Sex: female      Admit Date: 10/15/2019     LOS: 14 days        Assessment /Risk    MARC Diagnosis:    1. CHRISTY  With CKD d/t dehydration (decreased po intake following dc 10/11)          CR- increasing at 1.76     2. COPD - now on continuous O2 at 3LPM  3. Generalized weakness - no improvement  4. Malnutrition - decreased po intake ( readmission - contributing to #1)            Wt stable at 123               Family has been providing food- needs much encouragement  5. HTN - unable to tolerate aggressive tx d/t #1 (ACE held)  6. Anemia of chronic disease -  Hgb/Hct dropping to 8.0/26.4    Readmission Risks:  HIGH    1. Questionable nursing home candidate - functional decline more evident - decreased po intake, imani fluids  ( does she need to transisiton to LTC???)  2. Increased functional decline, baseline WC dependent, and most ADLs dependent nursing home has been providing incr assistance   3. Care Goals - requested AMD, DDNR on file           Possible hospice transistion soon    Fluid Status:  Incomplete, need complete record (ordered)    Nurse Navigator: TBD  MARC appointment with Dr Radha Waters    18.5 - 24.9 Normal weight / Body mass index is 24.02 kg/m². Code status: Durable DNR sent with patient  Recommended Disposition: SNF/LTC     Subjective:     10/29/2019 returning to [place], family in discussions about possible Hospice. Today patient denies SOB (O2 3LPM), CP, Abd pain, dizziness/vertigo, cough. Appears lethargic, easily aroused and answers questions appropriately, follows commands, verbalizes despair, reports no appetite. Breakfast not eaten. This is going to be problematic. Decreased po intake contributed to her CHRISTY. SBP elevated (ACE Held d/t decreased kidney function). Progressing weakness - unlikely to make substantial recovery.  Supportive daughter and son. Caregiver support provided independently. DDNR completed during admission. 10/25/2019  - CHRISTY labs improving -                            Anemia continued - Retacrit started Hgb today 8.3                          HTN continues to be problematic THZ restarted, ACE still on hold, Lasix restarted as well                           Leucocytosis resolved                           Concerns for decline decreased appetite, continued weakness with malaise. Disposition possible return to [facility] ( patient and son prefer) is only ASLor possible AC. No DC today. 80 y.o. female readmitted within 4 days of discharge for CHRISTY due to dehydration and probable PNA. Upon discharge patient continued to have increased weakness, decreased po intake and was dc to baseline assisted wit new requirement of O2 2LPM. Family noticed she was not eating/drinking and becoming more progressively weakness. Also noted more confusion. PMH pertient for CKD. Nephrology consulted. ACE and Lasix held. Today she appears somnolent, needs rpeated verbal cues to follow commands where she is inconsistent. Son a t bedside, appears very anxious over recent events\" I think she was discharged too early last time\"    Baseline she lives at [place] [address] [address], [facility] and they manage her RX and provide a higher level of care than basic [facility]. Son reports she has never been \"good at drinking water\" and needs reminders. BCX and UCX are in process. She may benefit for a ID consult. Will continue to follow. Number of Admissions this year  4, UTI and PNA  Heart Failure Bundle no      Advance Care Plan:     Son to look for AMD - states he is the mPOA. He has one sister who is also very involved in care. Patient is single      ROS:     A comprehensive ROS was performed and was negative except for as per HPI. Objective:      Allergies   Allergen Reactions    Hydrocodone Nausea and Vomiting    Morphine Rash    Dilaudid [Hydromorphone] Itching     Does not remember       Past Medical History:   Diagnosis Date    Arrhythmia     A-fib    Arthritis     shoulder/right    Cancer [address] [address] 2015    left lung    Chronic kidney disease     Stent in Right Kidney, Stage III    Chronic obstructive pulmonary disease ([facility] [address]     Hypertension     Ill-defined condition     Ex Lap with Olita Olive patch of a perforated pyloric channel ulcer 16    Other ill-defined conditions(799.89)     lipids    Other ill-defined conditions(799.89)     blood transfusion history    PVD (peripheral vascular disease) (HCC)        Past Surgical History:   Procedure Laterality Date    BYPASS GRAFT OTHR,FEM-FEM      BYPASS GRAFT OTHR,FEM-POP Right     PVD    HX HEENT      bilateral cataracts    HX ORTHOPAEDIC Right     right hip fracture/pinning    HX UROLOGICAL      right stent/kidney       Social History     Tobacco Use   Smoking Status Former Smoker    Packs/day: 0.25    Years: 60.00    Pack years: 15.00    Last attempt to quit:     Years since quittin.8   Smokeless Tobacco Never Used       Current Facility-Administered Medications   Medication Dose Route Frequency    chlorthalidone (HYGROTEN) tablet 25 mg  25 mg Oral DAILY    amLODIPine (NORVASC) tablet 2.5 mg  2.5 mg Oral DAILY    fluconazole (DIFLUCAN) tablet 100 mg  100 mg Oral DAILY    epoetin brandt-epbx (RETACRIT) injection 10,000 Units  10,000 Units SubCUTAneous Q7D    ondansetron (ZOFRAN) injection 4 mg  4 mg IntraVENous Q6H PRN    balsam peru-castor oil (VENELEX) ointment   Topical TID    gabapentin (NEURONTIN) capsule 300 mg  300 mg Oral DAILY    acetaminophen (TYLENOL) tablet 650 mg  650 mg Oral Q6H PRN    aspirin chewable tablet 81 mg  81 mg Oral DAILY    hydrALAZINE (APRESOLINE) tablet 100 mg  100 mg Oral TID    loperamide (IMODIUM) capsule 4 mg  4 mg Oral Q8H PRN    metoprolol tartrate (LOPRESSOR) tablet 50 mg  50 mg Oral BID    PARoxetine (PAXIL) tablet 20 mg  20 mg Oral DAILY    polyethylene glycol (MIRALAX) packet 17 g  17 g Oral DAILY PRN    pravastatin (PRAVACHOL) tablet 40 mg  40 mg Oral QHS    temazepam (RESTORIL) capsule 15 mg  15 mg Oral QHS    traMADol (ULTRAM) tablet 50 mg  50 mg Oral Q6H PRN    heparin (porcine) injection 5,000 Units  5,000 Units SubCUTAneous Q12H       Prior to Admission Medications   Prescriptions Last Dose Informant Patient Reported? Taking? L. acidoph & paracasei- S therm- Bifido (HANG-Q/RISAQUAD) 8 billion cell cap cap 10/15/2019 at 0900 Care Giver No Yes   Sig: Take 1 Cap by mouth daily. PARoxetine (PAXIL) 20 mg tablet 10/15/2019 at [address] Yes Yes   Sig: Take 20 mg by mouth daily. acetaminophen (TYLENOL) 500 mg tablet 10/13/2019 at Unknown time Care Giver Yes Yes   Sig: Take 500 mg by mouth every six (6) hours as needed for Pain. aspirin 81 mg tablet 10/15/2019 at 0900 Care Giver Yes Yes   Sig: Take 81 mg by mouth daily. chlorthalidone (HYGROTEN) 25 mg tablet 10/15/2019 at 0900 Care Giver No Yes   Sig: Take 1 Tab by mouth daily. cholecalciferol (VITAMIN D3) 50,000 unit capsule 10/4/2019 at Unknown time Care Giver Yes Yes   Sig: Take 50,000 Units by mouth Every Friday. dilTIAZem CD (CARDIZEM CD) 300 mg ER capsule 10/15/2019 at 0900 Care Giver No Yes   Sig: Take 1 Cap by mouth daily. gabapentin (NEURONTIN) 400 mg capsule 10/15/2019 at 0900 Care Giver No Yes   Sig: Take 1 Cap by mouth four (4) times daily. Max Daily Amount: 1,600 mg.   hydrALAZINE (APRESOLINE) 100 mg tablet 10/15/2019 at [address] Yes Yes   Sig: Take 100 mg by mouth three (3) times daily. levoFLOXacin (LEVAQUIN) 500 mg tablet 10/15/2019 at 0900 Care Giver No Yes   Sig: Take 1 Tab by mouth Daily (before breakfast). lisinopril (PRINIVIL, ZESTRIL) 40 mg tablet 10/15/2019 at 0900 Care Giver No Yes   Sig: Take 1 Tab by mouth daily.    loperamide (IMODIUM) 2 mg capsule 10/14/2019 at Unknown time Care Giver No Yes   Sig: Take 2 Caps by mouth every eight (8) hours as needed for Diarrhea.   metoprolol tartrate (LOPRESSOR) 50 mg tablet 10/15/2019 at 0900 Care Giver No Yes   Sig: Take 1 Tab by mouth two (2) times a day. ondansetron hcl (ZOFRAN) 4 mg tablet 10/15/2019 at Unknown time Care Giver Yes Yes   Sig: Take 4 mg by mouth every eight (8) hours as needed for Nausea. polyethylene glycol (MIRALAX) 17 gram packet Not Taking at Unknown time Care Giver Yes No   Sig: Take 17 g by mouth daily as needed (constipation). potassium chloride (KLOR-CON) 20 mEq pack 10/15/2019 at 0900 Care Giver No Yes   Sig: Take 1 Packet by mouth daily. pravastatin (PRAVACHOL) 40 mg tablet 10/14/2019 at 2100 Care Giver No Yes   Sig: Take 1 Tab by mouth nightly. temazepam (RESTORIL) 15 mg capsule 10/14/2019 at 2100 Care Giver No Yes   Sig: Take 1 Cap by mouth nightly. Max Daily Amount: 15 mg.   traMADol (ULTRAM) 50 mg tablet 10/14/2019 at Unknown time Care Giver No Yes   Sig: Take 1 Tab by mouth every six (6) hours as needed for Pain. Max Daily Amount: 200 mg. Facility-Administered Medications: None       Patient Vitals for the past 24 hrs:   Temp Pulse Resp BP SpO2   10/29/19 0748 98.1 °F (36.7 °C) 69 16 179/52 95 %   10/28/19 2209 98.7 °F (37.1 °C) 65 20 160/50 95 %   10/28/19 1546 98 °F (36.7 °C) 72 18 137/43 93 %        Intake and Output:    Intake/Output Summary (Last 24 hours) at 10/29/2019 1054  Last data filed at 10/29/2019 0658  Gross per 24 hour   Intake 240 ml   Output 400 ml   Net -160 ml         PHYSICAL EXAM:    Visit Vitals  /52 (BP 1 Location: Left arm, BP Patient Position: At rest)   Pulse 69   Temp 98.1 °F (36.7 °C)   Resp 16   Ht 5' (1.524 m)   Wt 55.8 kg (123 lb)   SpO2 95%   BMI 24.02 kg/m²       No change 10/29/2019  General appearance: fatigued, mild distress, appears stated age, toxic, mildy confused oriented to name, place.   Head: Normocephalic, without obvious abnormality, atraumatic  Eyes: conjunctivae/corneas clear. PERRL, EOM's intact. Ears: hearing intact  Throat: Lips, mucosa, and tongue normal. Teeth and gums normal  Lungs:decreased breath sounds bilaterally bases  Heart: regular rate and rhythm, S1, S2 normal, no murmur, click, rub or gallop  Abdomen: soft, non-tender. Bowel sounds normal. No masses,  no organomegaly  Extremities:gneralized weakness 4/5 x 4 worse in LE  Pulses: +2 equal  Skin:mulitple contusions in UE bilaterally   Neurologic: somnolent        Lab/Data Review:   Recent Results (from the past 24 hour(s))   METABOLIC PANEL, BASIC    Collection Time: 10/29/19  3:59 AM   Result Value Ref Range    Sodium 139 136 - 145 mmol/L    Potassium 3.2 (L) 3.5 - 5.1 mmol/L    Chloride 106 97 - 108 mmol/L    CO2 25 21 - 32 mmol/L    Anion gap 8 5 - 15 mmol/L    Glucose 84 65 - 100 mg/dL    BUN 37 (H) 6 - 20 MG/DL    Creatinine 1.73 (H) 0.55 - 1.02 MG/DL    BUN/Creatinine ratio 21 (H) 12 - 20      GFR est AA 34 (L) >60 ml/min/1.73m2    GFR est non-AA 28 (L) >60 ml/min/1.73m2    Calcium 9.8 8.5 - 10.1 MG/DL   CBC WITH AUTOMATED DIFF    Collection Time: 10/29/19  3:59 AM   Result Value Ref Range    WBC 8.4 3.6 - 11.0 K/uL    RBC 2.80 (L) 3.80 - 5.20 M/uL    HGB 8.0 (L) 11.5 - 16.0 g/dL    HCT 26.4 (L) 35.0 - 47.0 %    MCV 94.3 80.0 - 99.0 FL    MCH 28.6 26.0 - 34.0 PG    MCHC 30.3 30.0 - 36.5 g/dL    RDW 16.4 (H) 11.5 - 14.5 %    PLATELET 242 333 - 073 K/uL    MPV 11.2 8.9 - 12.9 FL    NRBC 0.0 0  WBC    ABSOLUTE NRBC 0.00 0.00 - 0.01 K/uL    NEUTROPHILS 69 32 - 75 %    LYMPHOCYTES 10 (L) 12 - 49 %    MONOCYTES 15 (H) 5 - 13 %    EOSINOPHILS 4 0 - 7 %    BASOPHILS 1 0 - 1 %    IMMATURE GRANULOCYTES 1 (H) 0.0 - 0.5 %    ABS. NEUTROPHILS 5.8 1.8 - 8.0 K/UL    ABS. LYMPHOCYTES 0.8 0.8 - 3.5 K/UL    ABS. MONOCYTES 1.3 (H) 0.0 - 1.0 K/UL    ABS. EOSINOPHILS 0.3 0.0 - 0.4 K/UL    ABS. BASOPHILS 0.0 0.0 - 0.1 K/UL    ABS. IMM.  GRANS. 0.1 (H) 0.00 - 0.04 K/UL    DF AUTOMATED         Imaging Reviewed:     Us Retroperitoneum Comp    Result Date: 10/15/2019  IMPRESSION: No hydronephrosis. No significant change. Xr Chest Port    Result Date: 10/17/2019  IMPRESSION: 1. Evidence of developing congestive change/pulmonary edema. 2. Opacification of the left lung base as described above. Developing atelectasis/infiltration in this region must be considered. Presence of atelectatic change in the lateral aspect of the left midlung. Xr Chest Port    Result Date: 10/15/2019   impression: No change. .  Assessment:     Principal Problem:    Acute kidney injury superimposed on CKD ([facility] [address] (10/15/2019)    Active Problems:    COPD (chronic obstructive pulmonary disease) ([facility] [address] (8/14/2010)      Essential hypertension (7/20/2016)      Moderate protein-calorie malnutrition ([facility] [address] (3/30/2018)      Stage 3 chronic kidney disease ([facility] [address] (11/5/2018)      Weakness generalized (10/15/2019)        Plan:     The objective of todays visit was to review the transitional care plan with the patient. We discussed the importance of transitional care as it relates to reducing the likelihood of readmission. We reviewed the goals for the first 30 days following hospital discharge. The patient and I discussed wrap around services including nurse navigation, care coordination, home health, transitional care appointments with their primary care provider and specialist team and the role of dispatch health. Patient education focused on readmission zones as described as    The Red Zone: High risk for readmission, days 1-21  The Yellow Zone: Moderate risk for readmission, days 22-29  The Green Zone: Lower risk for readmission, days 30 and after        1. Family to provided AMD    2. All labs, I/O's and imaging have been reviewed. Comments above    3. Medication Reconciliation  performed at discharge.    Accessiblity GOOd   RX rationale explained TBD at AL   Compliancy [facility] provides atient pnar    Greater than 30  minutes were spent in patient management, greater than half of which was spent in counseling and coordination of care.       Signed By: Maurice Mckeon NP     October 29, 2019

## 2019-10-29 NOTE — DISCHARGE INSTRUCTIONS
[address] [phone]  [phone]      Patient Discharge Instructions    Blake Isaacs / [id number] : 1935    Admitted 10/15/2019 Discharged: 10/29/2019     Principal Problem:    Acute kidney injury superimposed on CKD ([facility] [address] (10/15/2019)    Active Problems:    COPD (chronic obstructive pulmonary disease) ([facility] (2010)      Essential hypertension (2016)      Moderate protein[facility]calorie malnutrition ([facility] (3/30/2018)      Stage 3 chronic kidney disease ([facility] (2018)      Weakness generalized (10/15/2019)          Allergies   Allergen Reactions    Hydrocodone Nausea and Vomiting    Morphine Rash    Dilaudid [Hydromorphone] Itching     Does not remember       · It is important that you take the medication exactly as they are prescribed. · Do not take other medications without consulting your doctor. What to do at Next Level of Care    Disposition:  Adult home    Recommended diet: as tolerated    Recommended activity: Up in chair as tolerated    Wound Care:  Venelex TID to sacral breakdown    Oxygen:  2 L NC          Information obtained by :  I understand that if any problems occur once I am at home I am to contact my physician. I understand and acknowledge receipt of the instructions indicated above.                                                                                                                                            Physician's or R.N.'s Signature                                                                  Date/Time                                                                                                                                              Patient or Representative Signature                                                          Date/Time

## 2019-10-29 NOTE — PROGRESS NOTES
MARC:  -Annika with [facility], [phone] is aware of d/c back to [facility] today.    -dtr would like to pursue hospice  -Annika will confirm son is in agreement  -Annika will coordinate [facility] for hospice  -[facility] transport will transport pt on oxygen at 12n if all in agreement.    -updates sent to [facility] who is aware of d/c today  -Call report to James Viramontes at [phone], send emar, d/c instructions, facesheet//ambulance form(on clipboard), Rx, hospice order, and completed DDNR. Thanks    [phone]  Dtr called and is in agreement with the above. She states she and her brother are in agreement with hospice.

## 2019-10-29 NOTE — PROGRESS NOTES
Transitions of Care - Pharmacist Discharge Medication Reconciliation     S/O: Ms. Tomas Tara 80 y.o., referred by HUG team, to pharmacy for transitions of care. Patient was recently hospitalized at [facility] from 10/15/19 to 10/29/19. Patient's PCP: Vanessa Oliva MD       Past Medical History:   Diagnosis Date    Arrhythmia     A-fib    Arthritis     shoulder/right    Cancer [facility]) 2015    left lung    Chronic kidney disease     Stent in Right Kidney, Stage III    Chronic obstructive pulmonary disease ([facility] [address]     Hypertension     Ill-defined condition     Ex Lap with Misael Neth patch of a perforated pyloric channel ulcer 7/19/16    Other ill-defined conditions(799.89)     lipids    Other ill-defined conditions(799.89)     blood transfusion history    PVD (peripheral vascular disease) ([facility] [address]      Reason for hospitalization: CHRISTY    HUG/Bundle patient?: YES  Core Measure: Pneumonia     Did patient receive antibiotic therapy during hospitalization?: YES  If yes: For possible PNA - Cefepime x 11 days (Last dose: 10/28/19). For UTI (yeast): Fluconazole x 8 days (Last dose: 10/29/19)  Discharged with abx? NO - therapy completed    Is patient on anticoagulation?: NO  Antiplatelet therapy?: ASA 81 mg daily    Other pertinent findings:   SCr peaked at 4.4 mg/dL, now 1.73 mg/dL on discharge. Patient will continue to hold lisinopril per nephrology. Amlodipine added to help with HTN   Noted potassium a little low at 3.2, but potassium supplements were discontinued. Will defer to provider as it appears patient's family is considering hospice at this time. Admission medication history completed by pharmacy?: YES     Patient was discharged by Dr. Miriam Garzon with the following medication list:    Current Discharge Medication List        START taking these medications    Details   amLODIPine (NORVASC) 2.5 mg tablet Take 1 Tab by mouth daily.   Qty: 30 Tab, Refills: prn      balsam peru-castor oil (VENELEX) ointment Apply  to affected area three (3) times daily. Qty: 1 Tube, Refills: prn           CONTINUE these medications which have NOT CHANGED    Details   hydrALAZINE (APRESOLINE) 100 mg tablet Take 100 mg by mouth three (3) times daily. temazepam (RESTORIL) 15 mg capsule Take 1 Cap by mouth nightly. Max Daily Amount: 15 mg.  Qty: 30 Cap, Refills: 5    Associated Diagnoses: Peripheral vascular disease (HCC)      gabapentin (NEURONTIN) 400 mg capsule Take 1 Cap by mouth four (4) times daily. Max Daily Amount: 1,600 mg. Qty: 120 Cap, Refills: 5    Associated Diagnoses: Neuropathy      loperamide (IMODIUM) 2 mg capsule Take 2 Caps by mouth every eight (8) hours as needed for Diarrhea. Qty: 60 Cap, Refills: 2      chlorthalidone (HYGROTEN) 25 mg tablet Take 1 Tab by mouth daily. Qty: 30 Tab, Refills: prn      traMADol (ULTRAM) 50 mg tablet Take 1 Tab by mouth every six (6) hours as needed for Pain. Max Daily Amount: 200 mg. Qty: 60 Tab, Refills: 0    Associated Diagnoses: Primary osteoarthritis involving multiple joints      PARoxetine (PAXIL) 20 mg tablet Take 20 mg by mouth daily. metoprolol tartrate (LOPRESSOR) 50 mg tablet Take 1 Tab by mouth two (2) times a day. Qty: 60 Tab, Refills: PRN      acetaminophen (TYLENOL) 500 mg tablet Take 500 mg by mouth every six (6) hours as needed for Pain. aspirin 81 mg tablet Take 81 mg by mouth daily. polyethylene glycol (MIRALAX) 17 gram packet Take 17 g by mouth daily as needed (constipation).            STOP taking these medications       levoFLOXacin (LEVAQUIN) 500 mg tablet Comments:   Reason for Stopping:         potassium chloride (KLOR-CON) 20 mEq pack Comments:   Reason for Stopping:         ondansetron hcl (ZOFRAN) 4 mg tablet Comments:   Reason for Stopping:         pravastatin (PRAVACHOL) 40 mg tablet Comments:   Reason for Stopping:         lisinopril (PRINIVIL, ZESTRIL) 40 mg tablet Comments:   Reason for Stopping:         dilTIAZem CD (CARDIZEM CD) 300 mg ER capsule Comments:   Reason for Stopping:         cholecalciferol (VITAMIN D3) 50,000 unit capsule Comments:   Reason for Stopping:         L. acidoph & paracasei- S therm- Bifido (HANG-Q/RISAQUAD) 8 billion cell cap cap Comments:   Reason for Stopping:               Recommendation(s)/Plan(s): If patient will continue gabapentin, consider decreasing dose due to current renal function.   If patient is to continue medications, consider discontinuing chlorthalidone as agent may be contributing to hypokalemia    Other interventions completed by pharmacy (patient education, medication access, changes to AVS, etc.): None      Thank you,  Woody Rhodes, PHARMD

## 2019-10-30 ENCOUNTER — PATIENT OUTREACH (OUTPATIENT)
Dept: FAMILY MEDICINE CLINIC | Age: 84
End: 2019-10-30

## 2019-10-30 NOTE — PROGRESS NOTES
Current Outpatient Medications   Medication Sig    amLODIPine (NORVASC) 2.5 mg tablet Take 1 Tab by mouth daily.  balsam peru-castor oil (VENELEX) ointment Apply  to affected area three (3) times daily.  hydrALAZINE (APRESOLINE) 100 mg tablet Take 100 mg by mouth three (3) times daily.  temazepam (RESTORIL) 15 mg capsule Take 1 Cap by mouth nightly. Max Daily Amount: 15 mg.    gabapentin (NEURONTIN) 400 mg capsule Take 1 Cap by mouth four (4) times daily. Max Daily Amount: 1,600 mg.  loperamide (IMODIUM) 2 mg capsule Take 2 Caps by mouth every eight (8) hours as needed for Diarrhea.  chlorthalidone (HYGROTEN) 25 mg tablet Take 1 Tab by mouth daily.  traMADol (ULTRAM) 50 mg tablet Take 1 Tab by mouth every six (6) hours as needed for Pain. Max Daily Amount: 200 mg.    PARoxetine (PAXIL) 20 mg tablet Take 20 mg by mouth daily.  polyethylene glycol (MIRALAX) 17 gram packet Take 17 g by mouth daily as needed (constipation).  metoprolol tartrate (LOPRESSOR) 50 mg tablet Take 1 Tab by mouth two (2) times a day.  acetaminophen (TYLENOL) 500 mg tablet Take 500 mg by mouth every six (6) hours as needed for Pain.  aspirin 81 mg tablet Take 81 mg by mouth daily. No current facility-administered medications for this visit. There are no discontinued medications. [facility] follow up appointment(s):   Future Appointments   Date Time Provider Miroslaav Olga   11/8/2019 10:20 AM Meño Bartlett MD [address]        Goals      Attend follow up appointments on schedule      10/14/19 Patient is scheduled to follow up with PCP on 10/15/19 @ 1110. Will monitor for attendance. JACQUE       Prevent complications post hospitalization. 10/14/19 Misa with [facility] reports patient began Levaquin on 10/12/19 and oxygen was delivered. Nursing staff will monitor patient for S&S of ssepsis and report at next outreach.  JACQUE

## 2019-10-30 NOTE — DISCHARGE SUMMARY
[address] SUMMARY    Name:  Wilber Bolden  MR#:  [id number]  :  1935  ACCOUNT #:  [de-identified]  ADMIT DATE:  10/15/2019  DISCHARGE DATE:  10/29/2019    FINAL DIAGNOSES:  1. Acute renal failure on chronic kidney disease. 2.  Chronic obstructive pulmonary disease. 3.  Bronchitis. 4.  Fungal urinary tract infection with Candida. 5.  Hypertension. 6.  Protein calorie malnutrition. 7.  Chronic kidney disease, stage III. 8.  Generalized weakness with failure to thrive. CONSULTATIONS:  Renal Dr. Sami Philippe. PROCEDURES:  None. For details of admission history and physical, please see admit note. HOSPITAL COURSE:  Briefly, the patient is an 80-year-old white female, who was recently in the hospital secondary to a community-acquired pneumonia as well as urinary tract infection and she was successfully treated and discharged back to the [facility] facility where she had been residing for some time. At this point, her renal function was remarkable for creatinine of around 2.0, with baseline being 1.9. She unfortunately after being discharged had very [id number] oral intake and presented back to the emergency room with acute renal failure with a creatinine of 4.0. She was started on IV hydration and renal function did improve and gradually she returned to a better than normal value and had mild fluid overload at that time, at which time she was resumed on low dose Lasix for 3 days and it seemed her renal function stabilized. She continued to do poorly from a general overall standpoint from protein calorie malnutrition as well as generalized weakness and was seen by physical therapy and made [id number] progress. She did have a bronchitis during this hospitalization, treated with Maxipime for a full 10 days and she also had Candida in the urine which was treated with Diflucan for 8 days.   It was felt at this point that she has reached [facility] hospital benefit and after discussion with the son and daughter, they feel that she could benefit from hospice which I am in agreement with, with her end-stage COPD and general malnutrition from protein calorie deficiency and failure to thrive. So she would be discharged back to her adult home at this point and hospice will be consulted today and decide whether they will take her as a patient at this point. DISCHARGE MEDICATIONS:  Consists of starting new medicine Norvasc 2.5 mg daily, Venelex ointment to superficial sacral breakdown 3 times daily. She will stop her vitamin D3, diltiazem CD, Priscila-Q, Levaquin, lisinopril, potassium, Pravachol, and Zofran. She will be continued on her other medicines, which consist of Hygroton 25 mg daily, hydralazine 100 mg 3 times daily, Imodium 2 mg every 8 hours p.r.n. for diarrhea, Paxil 20 mg daily, Restoril 15 mg at bedtime, tramadol 50 mg every 6 hours p.r.n. for pain, Tylenol 500 mg q.i.d. p.r.n. for pain, aspirin 81 mg daily, gabapentin 400 mg 1 tablet 4 times daily, Lopressor 50 mg 2 times daily, MiraLax 1 packet daily. DISCHARGE INSTRUCTIONS:  She will have follow up by me while she is at the adult home.       Micaela Winter MD      [id number]  D:  10/29/2019 7:57  T:  10/30/2019 7:19  JOB #:  [id number]

## 2019-12-09 DIAGNOSIS — G62.9 NEUROPATHY: ICD-10-CM

## 2019-12-09 RX ORDER — GABAPENTIN 400 MG/1
400 CAPSULE ORAL 4 TIMES DAILY
Qty: 120 CAP | Refills: 5 | Status: SHIPPED | OUTPATIENT
Start: 2019-12-09

## 2019-12-09 NOTE — TELEPHONE ENCOUNTER
RX refill request from the patient/pharmacy. Patient last seen 10- with labs, and does not have a f/up appointment. Requested Prescriptions     Pending Prescriptions Disp Refills    gabapentin (NEURONTIN) 400 mg capsule 120 Cap 5     Sig: Take 1 Cap by mouth four (4) times daily. Max Daily Amount: 1,600 mg.

## 2020-09-21 NOTE — PROGRESS NOTES
Patient has blanchable redness on sacrum area, put mepilex on the area. General Surgery End of Shift Nursing Note Bedside shift change report given to Analy (oncoming nurse) by Luis Alfredo Colorado (offgoing nurse). Report included the following information SBAR, Kardex and Intake/Output. Shift worked:   7a-730p Summary of shift:    Nothing out of ordinary happened during day shift today. Issues for physician to address:   none Number times ambulated in hallway past shift: 0 Number of times OOB to chair past shift: 0 Pain Management: 
Current medication: see mar Patient states pain is manageable on current pain medication: YES 
 
GI: 
 
Current diet:  DIET ONE TIME MESSAGE 
DIET REGULAR Tolerating current diet: YES Passing flatus: YES Last Bowel Movement: several days ago Appearance: loose Respiratory: 
 
Incentive Spirometer at bedside: YES Patient instructed on use: YES Patient Safety: 
 
Falls Score: 2 Bed Alarm On? No 
Sitter? No 
 
Gustav Seip Tissue Cultured Epidermal Autograft Text: The defect edges were debeveled with a #15 scalpel blade.  Given the location of the defect, shape of the defect and the proximity to free margins a tissue cultured epidermal autograft was deemed most appropriate.  The graft was then trimmed to fit the size of the defect.  The graft was then placed in the primary defect and oriented appropriately.

## 2021-02-07 NOTE — PROGRESS NOTES
PROGRESS NOTE    NAME:  Abdias Credit   :   1935   MRN:   [id number]     Date/Time:  2019 5:33 AM  Subjective:   History:  Chart reviewed and patient seen and examined and D/W her nurse this AM and all events noted. She was admitted with Pneumonia and a recurrent UTI with Enterococcus. She persists with a nonproductive cough but is less SOB w/o other cardio/respiratory c/o. She has no abdominal pain or other GI/ c/o. She is very weak w/o other neurologic c/o. She has no other c/o on complete ROS.       Medications reviewed:  Current Facility-Administered Medications   Medication Dose Route Frequency    temazepam (RESTORIL) capsule 15 mg  15 mg Oral QHS    chlorthalidone (HYGROTEN) tablet 25 mg  25 mg Oral DAILY    lisinopril (PRINIVIL, ZESTRIL) tablet 40 mg  40 mg Oral DAILY    hydrALAZINE (APRESOLINE) tablet 100 mg  100 mg Oral TID    vancomycin (VANCOCIN) 1,000 mg in 0.9% sodium chloride (MBP/ADV) 250 mL  1,000 mg IntraVENous Q36H    influenza vaccine - (6 mos+)(PF) (FLUARIX/FLULAVAL/FLUZONE QUAD) injection 0.5 mL  0.5 mL IntraMUSCular PRIOR TO DISCHARGE    albuterol-ipratropium (DUO-NEB) 2.5 MG-0.5 MG/3 ML  3 mL Nebulization Q4H PRN    sodium chloride (NS) flush 5-40 mL  5-40 mL IntraVENous Q8H    sodium chloride (NS) flush 5-40 mL  5-40 mL IntraVENous PRN    acetaminophen (TYLENOL) tablet 650 mg  650 mg Oral Q4H PRN    ondansetron (ZOFRAN) injection 4 mg  4 mg IntraVENous Q4H PRN    heparin (porcine) injection 5,000 Units  5,000 Units SubCUTAneous Q12H    azithromycin (ZITHROMAX) 500 mg in 0.9% sodium chloride (MBP/ADV) 250 mL  500 mg IntraVENous Q24H    cefTRIAXone (ROCEPHIN) 1 g in 0.9% sodium chloride (MBP/ADV) 50 mL  1 g IntraVENous Q24H    aspirin chewable tablet 81 mg  81 mg Oral DAILY    dilTIAZem CD (CARDIZEM CD) capsule 300 mg  300 mg Oral DAILY    gabapentin (NEURONTIN) capsule 400 mg  400 mg Oral QID    metoprolol tartrate (LOPRESSOR) tablet 50 mg  50 mg Oral BID    PARoxetine (PAXIL) tablet 20 mg  20 mg Oral DAILY    pravastatin (PRAVACHOL) tablet 40 mg  40 mg Oral QHS        Objective:   Vitals:  Visit Vitals  /46 (BP 1 Location: Right arm, BP Patient Position: At rest)   Pulse 66   Temp 97.9 °F (36.6 °C)   Resp 17   Ht 5' 3\" (1.6 m)   Wt 132 lb 4.4 oz (60 kg)   SpO2 90%   BMI 23.43 kg/m²    O2 Flow Rate (L/min): 2 l/min O2 Device: Nasal cannula Temp (24hrs), Av.7 °F (37.1 °C), Min:97.9 °F (36.6 °C), Max:99.1 °F (37.3 °C)      Last 24hr Input/Output:    Intake/Output Summary (Last 24 hours) at 2019 0533  Last data filed at 2019 2030  Gross per 24 hour   Intake 60 ml   Output    Net 60 ml        PHYSICAL EXAM:  General:     Alert, cooperative, no distress, appears stated age. Head:    Normocephalic, without obvious abnormality, atraumatic. Eyes:    Conjunctivae/corneas clear. PERRLA  Nose:   Nares normal. No drainage or sinus tenderness. Throat:     Lips, mucosa, and tongue normal.  No Thrush  Neck:   Supple, symmetrical,  no adenopathy, thyroid: non tender     no carotid bruit and no JVD. Back:     Symmetric,  No CVA tenderness. Lungs:    Decreased BS bilaterally with scattered rhonchi. No Wheezing. No rales. Heart:    Regular rate and rhythm,  no murmur, rub or gallop. Abdomen:    Soft, non-tender. Not distended. Bowel sounds normal. No masses  Extremities:  Extremities normal, atraumatic, No cyanosis. No edema. No clubbing  Lymph nodes:  Cervical, supraclavicular normal.  Neurologic:  General decreased strength, Alert and oriented X 3. Skin:                 No rash      Lab Data Reviewed:    Recent Results (from the past 24 hour(s))   SAMPLES BEING HELD    Collection Time: 19  3:05 AM   Result Value Ref Range    SAMPLES BEING HELD LAVENDER TUBE     COMMENT        Add-on orders for these samples will be processed based on acceptable specimen integrity and analyte stability, which may vary by analyte.    VANCOMYCIN, TROUGH    Collection Time: 09/30/19  3:06 AM   Result Value Ref Range    Vancomycin,trough 11.5 (H) 5.0 - 10.0 ug/mL    Reported dose date: NOT PROVIDED      Reported dose time: NOT PROVIDED      Reported dose: NOT PROVIDED UNITS   METABOLIC PANEL, BASIC    Collection Time: 09/30/19  3:06 AM   Result Value Ref Range    Sodium 144 136 - 145 mmol/L    Potassium 3.9 3.5 - 5.1 mmol/L    Chloride 114 (H) 97 - 108 mmol/L    CO2 26 21 - 32 mmol/L    Anion gap 4 (L) 5 - 15 mmol/L    Glucose 102 (H) 65 - 100 mg/dL    BUN 30 (H) 6 - 20 MG/DL    Creatinine 1.58 (H) 0.55 - 1.02 MG/DL    BUN/Creatinine ratio 19 12 - 20      GFR est AA 38 (L) >60 ml/min/1.73m2    GFR est non-AA 31 (L) >60 ml/min/1.73m2    Calcium 9.2 8.5 - 10.1 MG/DL         Assessment/Plan:     Principal Problem:    CAP (community acquired pneumonia) (9/26/2019)    Active Problems:    COPD (chronic obstructive pulmonary disease) ([facility] (8/14/2010)      Essential hypertension (7/20/2016)      PAF (paroxysmal atrial fibrillation) ([facility]) (3/30/2018)      Moderate protein-calorie malnutrition ([facility]) (3/30/2018)      Stage 3 chronic kidney disease ([facility] (11/5/2018)      Recurrent UTI (6/14/2019)      Anemia (8/1/2019)       ___________________________________________________  PLAN:    1. CAP (community acquired pneumonia) (9/26/2019): Continue antibiotics. Currently rocephin + azithromycin + vanc. 2. UTI: enterococcal. On Vanc. 3. CKD III:   4. COPD: Stable. Continue prn bronchodilator therapy. 5. P AFib: Seems to be sinus at this itme. 6. Code: DNR.   7.  Hypertension- will continue all her outpatient meds.    8.  Insomnia- will continue her outpatient temazepam.             ___________________________________________________    Attending Physician: Gus Mark MD Yes

## 2023-04-15 NOTE — ED NOTES
TRANSFER - OUT REPORT:    Verbal report given to Pao Duval RN(name) on Zhang Janeen  being transferred to ED hold area. Report consisted of patients Situation, Background, Assessment and   Recommendations(SBAR). Yes - the patient is able to be screened

## 2024-10-11 NOTE — PROGRESS NOTES
- Not controlled at this time   - Ordered Flonase 1 SEN daily   - Agree with Zyrtec but holding for inhalant skin testing  - RTC in 1 week  - Will continue to monitor and reassess    NATASHA Clifton notified of patient's readmission to [facility]/ Virginia Rodriguez of Care Management

## (undated) DEVICE — SOLUTION IRRIGATION H2O 0797305] ICU MEDICAL INC]

## (undated) DEVICE — SOLUTION SCRB 4OZ 10% PVP I POVIDONE IOD TOP PAINT EXIDINE

## (undated) DEVICE — HANDLE LT SNAP ON ULT DURABLE LENS FOR TRUMPF ALC DISPOSABLE

## (undated) DEVICE — STERILE POLYISOPRENE POWDER-FREE SURGICAL GLOVES: Brand: PROTEXIS

## (undated) DEVICE — KENDALL SCD EXPRESS SLEEVES, KNEE LENGTH, MEDIUM: Brand: KENDALL SCD

## (undated) DEVICE — 3000CC GUARDIAN II: Brand: GUARDIAN

## (undated) DEVICE — Z DISCONTINUEDSOLUTION PREP 2OZ 10% POVIDONE IOD SCR CAP BTL

## (undated) DEVICE — JELLY,LUBE,STERILE,FLIP TOP,TUBE,4-OZ: Brand: MEDLINE

## (undated) DEVICE — SPONGE GZ W4XL4IN COT 12 PLY TYP VII WVN C FLD DSGN

## (undated) DEVICE — SYR 10ML LUER LOK 1/5ML GRAD --

## (undated) DEVICE — BAG COLLECTION FLD OR-TBL NS --

## (undated) DEVICE — GAUZE SPONGES,12 PLY: Brand: CURITY

## (undated) DEVICE — TOWEL SURG W17XL27IN STD BLU COT NONFENESTRATED PREWASHED

## (undated) DEVICE — DEVON™ KNEE AND BODY STRAP 60" X 3" (1.5 M X 7.6 CM): Brand: DEVON

## (undated) DEVICE — SOLUTION IRRIG 1000ML H2O STRL BLT

## (undated) DEVICE — TUBING IRRIG L77IN DIA0.241IN L BOR FOR CYSTO W/ NVENT

## (undated) DEVICE — OPEN-END URETERAL CATHETER: Brand: COOK

## (undated) DEVICE — CYSTOSCOPY PACK: Brand: CONVERTORS

## (undated) DEVICE — INFECTION CONTROL KIT SYS

## (undated) DEVICE — MARKER,SKIN,WI/RULER AND LABELS: Brand: MEDLINE

## (undated) DEVICE — MEDI-VAC NON-CONDUCTIVE SUCTION TUBING: Brand: CARDINAL HEALTH

## (undated) DEVICE — SKIN MARKER,REGULAR TIP WITH RULER AND LABELS: Brand: DEVON

## (undated) DEVICE — GOWN,PLEAT,SPECIALTY,XL,STRL: Brand: MEDLINE

## (undated) DEVICE — GOWN,SIRUS,FABRNF,XL,20/CS: Brand: MEDLINE

## (undated) DEVICE — GDWIRE URET STR STD .035X150 -- ZIPWIRE STD

## (undated) DEVICE — SOLUTION IRRIG 3000ML 0.9% SOD CHL FLX CONT 0797208] ICU MEDICAL INC]

## (undated) DEVICE — STRAP,POSITIONING,KNEE/BODY,FOAM,4X60": Brand: MEDLINE